# Patient Record
Sex: FEMALE | Race: OTHER | Employment: OTHER | URBAN - METROPOLITAN AREA
[De-identification: names, ages, dates, MRNs, and addresses within clinical notes are randomized per-mention and may not be internally consistent; named-entity substitution may affect disease eponyms.]

---

## 2022-09-30 ENCOUNTER — TELEPHONE (OUTPATIENT)
Dept: FAMILY MEDICINE CLINIC | Facility: CLINIC | Age: 52
End: 2022-09-30

## 2022-10-27 DIAGNOSIS — G40.901 STATUS EPILEPTICUS (HCC): ICD-10-CM

## 2022-10-27 RX ORDER — DIVALPROEX SODIUM 250 MG
750 TABLET, EXTENDED RELEASE 24 HR ORAL EVERY 12 HOURS
Qty: 180 TABLET | Refills: 5 | Status: SHIPPED | OUTPATIENT
Start: 2022-10-27 | End: 2022-11-10

## 2023-03-15 ENCOUNTER — TELEPHONE (OUTPATIENT)
Dept: NEUROLOGY | Facility: CLINIC | Age: 53
End: 2023-03-15

## 2023-03-15 NOTE — TELEPHONE ENCOUNTER
Nurse visit with pt and mother with  service used. Went through each medication and explained dosage, administration time(s) and indication. Pt and mother verbalized an understanding for all of the medications except Invega, which pt's mother was unaware was a psychiatric medication. She was also unaware that it was prescribed to her daughter and was unsure as to why. Explained to pt's mother that we did not prescribe this medication and she would have to contact the doctor for that information. She verbalized an understanding.    Total time spent with pt was >1 hour.

## 2023-04-02 RX ORDER — SODIUM CHLORIDE, SODIUM LACTATE, POTASSIUM CHLORIDE, CALCIUM CHLORIDE 600; 310; 30; 20 MG/100ML; MG/100ML; MG/100ML; MG/100ML
50 INJECTION, SOLUTION INTRAVENOUS CONTINUOUS
OUTPATIENT
Start: 2023-04-02

## 2023-04-17 DIAGNOSIS — R56.9 SEIZURE (HCC): ICD-10-CM

## 2023-04-17 DIAGNOSIS — G40.919 BREAKTHROUGH SEIZURE (HCC): ICD-10-CM

## 2023-04-17 RX ORDER — LACOSAMIDE 150 MG/1
150 TABLET ORAL EVERY 12 HOURS SCHEDULED
Qty: 60 TABLET | Refills: 5 | Status: SHIPPED | OUTPATIENT
Start: 2023-04-17 | End: 2023-07-26 | Stop reason: SDUPTHER

## 2023-04-17 RX ORDER — DIVALPROEX SODIUM 250 MG/1
750 TABLET, FILM COATED, EXTENDED RELEASE ORAL EVERY MORNING
Qty: 90 TABLET | Refills: 5 | Status: SHIPPED | OUTPATIENT
Start: 2023-04-17 | End: 2023-05-09

## 2023-04-17 RX ORDER — DIVALPROEX SODIUM 500 MG/1
1000 TABLET, FILM COATED, EXTENDED RELEASE ORAL
Qty: 60 TABLET | Refills: 5 | Status: SHIPPED | OUTPATIENT
Start: 2023-04-17 | End: 2023-05-09

## 2023-04-17 NOTE — TELEPHONE ENCOUNTER
Recv'd call from chiara Vogel. Was calling in refills for patient as she is [place]e speaking only and patient is almost out of medications.  CB# [phone]    LOV 3/15/23 and next appointment is 5/18/23.    Please refill if agreeable. Requested refills go to [facility]

## 2023-07-17 ENCOUNTER — HOSPITAL ENCOUNTER (EMERGENCY)
Facility: HOSPITAL | Age: 53
Discharge: HOME | End: 2023-07-17
Attending: EMERGENCY MEDICINE | Admitting: EMERGENCY MEDICINE
Payer: MEDICARE

## 2023-07-17 VITALS
DIASTOLIC BLOOD PRESSURE: 58 MMHG | TEMPERATURE: 98 F | OXYGEN SATURATION: 93 % | RESPIRATION RATE: 18 BRPM | SYSTOLIC BLOOD PRESSURE: 109 MMHG | HEART RATE: 102 BPM

## 2023-07-17 DIAGNOSIS — S62.609A FINGER FRACTURE, LEFT: Primary | ICD-10-CM

## 2023-07-17 PROCEDURE — 99284 EMERGENCY DEPT VISIT MOD MDM: CPT | Performed by: EMERGENCY MEDICINE

## 2023-07-17 PROCEDURE — 99283 EMERGENCY DEPT VISIT LOW MDM: CPT

## 2023-07-17 RX ORDER — IBUPROFEN 600 MG/1
600 TABLET ORAL EVERY 6 HOURS PRN
Qty: 20 TABLET | Refills: 0 | Status: SHIPPED | OUTPATIENT
Start: 2023-07-17 | End: 2023-08-03

## 2023-07-17 NOTE — ED PROVIDER NOTES
History  Chief Complaint   Patient presents with   • Arm Pain     Left arm pain. Reports she did not received the two phone calls that ortho made to patient. Pt with continued left hand pain , pt has not seen ortho      Hand Pain  Location:  Left 3rd adn 4th fingers  Severity:  Moderate  Timing:  Constant  Progression:  Unchanged  Chronicity:  Recurrent  Associated symptoms: no abdominal pain        Prior to Admission Medications   Prescriptions Last Dose Informant Patient Reported? Taking?   atorvastatin (LIPITOR) 80 mg tablet  Self, Child No No   Sig: Take 1 tablet (80 mg total) by mouth daily with dinner   divalproex sodium (DEPAKOTE ER) 250 mg 24 hr tablet   No No   Sig: TAKE 3 TABLETS(750 MG) BY MOUTH EVERY MORNING   divalproex sodium (DEPAKOTE ER) 500 mg 24 hr tablet   No No   Sig: TAKE 2 TABLETS(1000 MG) BY MOUTH DAILY AT BEDTIME   folic acid (FOLVITE) 1 mg tablet  Self, Child No No   Sig: Take 1 tablet (1 mg total) by mouth daily Do not start before February 7, 2023.   hydrocortisone 1 % cream   No No   Sig: Apply to affected area 2 times daily   ketoconazole (NIZORAL) 2 % shampoo   Yes No   lacosamide (VIMPAT) 150 mg tablet   No No   Sig: Take 1 tablet (150 mg total) by mouth every 12 (twelve) hours   lacosamide (Vimpat) 50 mg   No No   Sig: Take one tablet by mouth at night.    levothyroxine 150 mcg tablet   No No   Sig: Take 1 tablet (150 mcg total) by mouth daily in the early morning   metoprolol tartrate (LOPRESSOR) 25 mg tablet  Self, Child No No   Sig: Take 1 tablet (25 mg total) by mouth every 12 (twelve) hours   naproxen (EC NAPROSYN) 500 MG EC tablet   No No   Sig: Take 1 tablet (500 mg total) by mouth 2 (two) times a day with meals   niacin (SLO-NIACIN) 500 mg tablet  Self, Child No No   Sig: Take 2 tablets (1,000 mg total) by mouth 2 (two) times a day with meals   paliperidone (INVEGA) 3 mg 24 hr tablet   No No   Sig: Take 1 tablet (3 mg total) by mouth every morning Facility-Administered Medications: None       Past Medical History:   Diagnosis Date   • Cancer ([address])     thyroid cancer   • Disease of thyroid gland    • Hyperactivity (behavior)     Last Assessed: 11/7/2014    • Hyperlipidemia    • Obesity    • Seizures ([address])        Past Surgical History:   Procedure Laterality Date   • OTHER SURGICAL HISTORY      Removal of urinary calculus    • OTHER SURGICAL HISTORY      Rhinologic Surgery    • TOTAL THYROIDECTOMY      LAst Assessed: 11/7/2014       Family History   Problem Relation Age of Onset   • Breast cancer Mother 61   • Hypertension Mother    • ALS Mother    • Stroke Mother    • Prostate cancer Father    • Diabetes type II Father      I have reviewed and agree with the history as documented. E-Cigarette/Vaping   • E-Cigarette Use Never User      E-Cigarette/Vaping Substances   • Nicotine No    • THC No    • CBD No    • Flavoring No    • Other No    • Unknown No      Social History     Tobacco Use   • Smoking status: Never   • Smokeless tobacco: Never   Vaping Use   • Vaping Use: Never used   Substance Use Topics   • Alcohol use: Never   • Drug use: Never       Review of Systems   Constitutional: Negative. HENT: Negative. Eyes: Negative. Respiratory: Negative. Cardiovascular: Negative. Gastrointestinal: Negative. Negative for abdominal pain. Endocrine: Negative. Genitourinary: Negative. Musculoskeletal: Negative. Skin: Negative. Allergic/Immunologic: Negative. Neurological: Negative. Hematological: Negative. Psychiatric/Behavioral: Negative. All other systems reviewed and are negative. Physical Exam  Physical Exam  Vitals and nursing note reviewed. Constitutional:       Appearance: Normal appearance. She is normal weight. Comments: Pt splint intact and in good shape, new ace bandage applied uses original splint    HENT:      Head: Normocephalic and atraumatic.    Cardiovascular:      Rate and Rhythm: Normal rate and regular rhythm. Pulses: Normal pulses. Heart sounds: Normal heart sounds. Pulmonary:      Effort: Pulmonary effort is normal.      Breath sounds: Normal breath sounds. Abdominal:      General: Abdomen is flat. Bowel sounds are normal.      Palpations: Abdomen is soft. Musculoskeletal:      Comments: Left hand 3rd and 4th fingers pain and swelling   From wnl  Distal neuro and vascular wnl  Cap refll less than 2 secs    Skin:     General: Skin is warm. Capillary Refill: Capillary refill takes less than 2 seconds. Neurological:      General: No focal deficit present. Mental Status: She is alert and oriented to person, place, and time. Psychiatric:         Mood and Affect: Mood normal.         Vital Signs  ED Triage Vitals [07/17/23 1246]   Temperature Pulse Respirations Blood Pressure SpO2   98 °F (36.7 °C) 102 18 109/58 93 %      Temp Source Heart Rate Source Patient Position - Orthostatic VS BP Location FiO2 (%)   Tympanic Monitor -- Right arm --      Pain Score       --           Vitals:    07/17/23 1246   BP: 109/58   Pulse: 102         Visual Acuity      ED Medications  Medications - No data to display    Diagnostic Studies  Results Reviewed     None                 No orders to display              Procedures  Procedures         ED Course                                             MDM    Disposition  Final diagnoses:   Finger fracture, left     Time reflects when diagnosis was documented in both MDM as applicable and the Disposition within this note     Time User Action Codes Description Comment    7/17/2023  1:12 PM Michelle Lea. Add [F52.522V] Finger fracture, left       ED Disposition     ED Disposition   Discharge    Condition   Stable    Date/Time   Mon Jul 17, 2023  1:12 PM    Comment   Judith Gandhi discharge to home/self care.                Follow-up Information     Follow up With Specialties Details Why Contact Info Additional Information    [facility] Special[facility] Orthopedic Surgery   [place]  [facility] [address] [facility],[address] [address] [phone] [facility] Orthopedic Care Specialist [facility], [address] [place] [address], [id number] [phone]          Discharge Medication List as of 7/17/2023  1:13 PM      START taking these medications    Details   ibuprofen (MOTRIN) 600 mg tablet Take 1 tablet (600 mg total) by mouth every 6 (six) hours as needed for moderate pain, Starting Mon 7/17/2023, Print         CONTINUE these medications which have NOT CHANGED    Details   atorvastatin (LIPITOR) 80 mg tablet Take 1 tablet (80 mg total) by mouth daily with dinner, Starting Mon 2/6/2023, Normal      !! divalproex sodium (DEPAKOTE ER) 250 mg 24 hr tablet TAKE 3 TABLETS(750 MG) BY MOUTH EVERY MORNING, Normal      !! divalproex sodium (DEPAKOTE ER) 500 mg 24 hr tablet TAKE 2 TABLETS(1000 MG) BY MOUTH DAILY AT BEDTIME, Normal      folic acid (FOLVITE) 1 mg tablet Take 1 tablet (1 mg total) by mouth daily Do not start before February 7, 2023., Starting Tue 2/7/2023, Normal      hydrocortisone 1 % cream Apply to affected area 2 times daily, Normal      ketoconazole (NIZORAL) 2 % shampoo Starting Wed 5/10/2023, Historical Med      !! lacosamide (VIMPAT) 150 mg tablet Take 1 tablet (150 mg total) by mouth every 12 (twelve) hours, Starting Mon 4/17/2023, Normal      !! lacosamide (Vimpat) 50 mg Take one tablet by mouth at night., Normal      levothyroxine 150 mcg tablet Take 1 tablet (150 mcg total) by mouth daily in the early morning, Starting Mon 7/3/2023, Until Wed 8/2/2023, Print      metoprolol tartrate (LOPRESSOR) 25 mg tablet Take 1 tablet (25 mg total) by mouth every 12 (twelve) hours, Starting Mon 2/6/2023, Normal      naproxen (EC NAPROSYN) 500 MG EC tablet Take 1 tablet (500 mg total) by mouth 2 (two) times a day with meals, Starting Wed 7/5/2023, Until Thu 7/4/2024, Normal      niacin (SLO-NIACIN) 500 mg tablet Take 2 tablets (1,000 mg total) by mouth 2 (two) times a day with meals, Starting Mon 2/6/2023, Normal      paliperidone (INVEGA) 3 mg 24 hr tablet Take 1 tablet (3 mg total) by mouth every morning, Starting Thu 6/8/2023, Until Wed 9/6/2023, Normal       !! - Potential duplicate medications found. Please discuss with provider. No discharge procedures on file.     PDMP Review       Value Time User    PDMP Reviewed  Yes 2/7/2023 10:18 AM [employer] DO          ED Provider  Electronically Signed by           Kennedy Toscano., SYLVIE  07/17/23 9579

## 2023-07-20 ENCOUNTER — TELEPHONE (OUTPATIENT)
Dept: OBGYN CLINIC | Facility: HOSPITAL | Age: 53
End: 2023-07-20

## 2023-07-20 NOTE — TELEPHONE ENCOUNTER
Caller: Joana Martinez PT     Doctor/Office: Juan Alberto Mckinley    CB#: [phone]    What needs to be faxed: needs Physical Therapy script faxed over.  Patient in the office     ATTN to: [place]    Fax#: [phone]

## 2023-07-29 ENCOUNTER — HOSPITAL ENCOUNTER (EMERGENCY)
Facility: HOSPITAL | Age: 53
Discharge: HOME | End: 2023-07-29
Attending: EMERGENCY MEDICINE
Payer: MEDICARE

## 2023-07-29 VITALS
RESPIRATION RATE: 20 BRPM | BODY MASS INDEX: 34.09 KG/M2 | HEART RATE: 104 BPM | WEIGHT: 224.21 LBS | SYSTOLIC BLOOD PRESSURE: 114 MMHG | TEMPERATURE: 98.1 F | OXYGEN SATURATION: 93 % | DIASTOLIC BLOOD PRESSURE: 77 MMHG

## 2023-07-29 DIAGNOSIS — R56.9 SEIZURE-LIKE ACTIVITY (HCC): ICD-10-CM

## 2023-07-29 DIAGNOSIS — R25.1 TREMOR: Primary | ICD-10-CM

## 2023-07-29 LAB
ANION GAP SERPL CALCULATED.3IONS-SCNC: 8 MMOL/L
BUN SERPL-MCNC: 25 MG/DL (ref 5–25)
CALCIUM SERPL-MCNC: 9.2 MG/DL (ref 8.4–10.2)
CHLORIDE SERPL-SCNC: 100 MMOL/L (ref 96–108)
CO2 SERPL-SCNC: 28 MMOL/L (ref 21–32)
CREAT SERPL-MCNC: 0.98 MG/DL (ref 0.6–1.3)
GFR SERPL CREATININE-BSD FRML MDRD: 67 ML/MIN/1.73SQ M
GLUCOSE SERPL-MCNC: 94 MG/DL (ref 65–140)
POTASSIUM SERPL-SCNC: 4.1 MMOL/L (ref 3.5–5.3)
SODIUM SERPL-SCNC: 136 MMOL/L (ref 135–147)
VALPROATE SERPL-MCNC: 64 UG/ML (ref 50–100)

## 2023-07-29 PROCEDURE — 36415 COLL VENOUS BLD VENIPUNCTURE: CPT | Performed by: EMERGENCY MEDICINE

## 2023-07-29 PROCEDURE — 80048 BASIC METABOLIC PNL TOTAL CA: CPT | Performed by: EMERGENCY MEDICINE

## 2023-07-29 PROCEDURE — 99284 EMERGENCY DEPT VISIT MOD MDM: CPT | Performed by: EMERGENCY MEDICINE

## 2023-07-29 PROCEDURE — 80164 ASSAY DIPROPYLACETIC ACD TOT: CPT | Performed by: EMERGENCY MEDICINE

## 2023-07-29 PROCEDURE — 99285 EMERGENCY DEPT VISIT HI MDM: CPT

## 2023-07-29 NOTE — ED PROVIDER NOTES
History  Chief Complaint   Patient presents with   • Medical Problem     Patient arrives with EMS; per EMS patient was walking with her cousin and starting having tremors to b/l arms and legs, hx of seizures, cousin called 46. Patient arrives stating she felt a pain in her abdomen and almost fell but the cousin caught her. Patient says she takes medications for seizures, took today, A&O during triage     HPI  Patient is a 29-year-old female with history of epilepsy on Depakote, lacosamide presenting with seizure-like activity. Patient was walking home with her cousin when she complained of some belly discomfort and felt weak and required her cousin's assistance. While patient's cousin was holding her patient started having some tremors in her upper extremities and that is when EMS was called. Per witness patient did not have any loss of consciousness. Patient also reports no loss of consciousness. Currently states that she is asymptomatic. Denies any abdominal pain. No weakness or numbness. Patient is alert oriented x3 with no signs of postictal state. Patient denies any fever, chills, nausea, vomiting, diarrhea. Per EMS patient's glucose was above 200. States that patient has been taking her seizure medications as prescribed. This was confirmed by her mother who make sure that patient takes her antiseizure medications. Prior to Admission Medications   Prescriptions Last Dose Informant Patient Reported?  Taking?   atorvastatin (LIPITOR) 80 mg tablet  Self, Child, Mother No No   Sig: Take 1 tablet (80 mg total) by mouth daily with dinner   divalproex sodium (DEPAKOTE ER) 250 mg 24 hr tablet   No No   Sig: Take 3 tablets (750 mg total) by mouth every morning   divalproex sodium (DEPAKOTE ER) 500 mg 24 hr tablet   No No   Sig: Take 2 tablets (1,000 mg total) by mouth daily at bedtime   folic acid (FOLVITE) 1 mg tablet  Self, Child, Mother No No   Sig: Take 1 tablet (1 mg total) by mouth daily Do not start before February 7, 2023.   hydrocortisone 1 % cream  Mother No No   Sig: Apply to affected area 2 times daily   ibuprofen (MOTRIN) 600 mg tablet  Mother No No   Sig: Take 1 tablet (600 mg total) by mouth every 6 (six) hours as needed for moderate pain   ketoconazole (NIZORAL) 2 % shampoo  Mother Yes No   lacosamide (VIMPAT) 150 mg tablet   No No   Sig: Take 1 tablet (150 mg total) by mouth every 12 (twelve) hours   lacosamide (Vimpat) 50 mg   No No   Sig: Take 1 tablet (50 mg total) by mouth daily at bedtime   levothyroxine 150 mcg tablet  Mother No No   Sig: Take 1 tablet (150 mcg total) by mouth daily in the early morning   metoprolol tartrate (LOPRESSOR) 25 mg tablet  Self, Child, Mother No No   Sig: Take 1 tablet (25 mg total) by mouth every 12 (twelve) hours   naproxen (EC NAPROSYN) 500 MG EC tablet  Mother No No   Sig: Take 1 tablet (500 mg total) by mouth 2 (two) times a day with meals   niacin (SLO-NIACIN) 500 mg tablet  Self, Child, Mother No No   Sig: Take 2 tablets (1,000 mg total) by mouth 2 (two) times a day with meals   paliperidone (INVEGA) 3 mg 24 hr tablet  Mother No No   Sig: Take 1 tablet (3 mg total) by mouth every morning      Facility-Administered Medications: None       Past Medical History:   Diagnosis Date   • Cancer ([address])     thyroid cancer   • Disease of thyroid gland    • Hyperactivity (behavior)     Last Assessed: 11/7/2014    • Hyperlipidemia    • Obesity    • Seizures ([facility])        Past Surgical History:   Procedure Laterality Date   • OTHER SURGICAL HISTORY      Removal of urinary calculus    • OTHER SURGICAL HISTORY      Rhinologic Surgery    • TOTAL THYROIDECTOMY      LAst Assessed: 11/7/2014       Family History   Problem Relation Age of Onset   • Breast cancer Mother 61   • Hypertension Mother    • ALS Mother    • Stroke Mother    • Prostate cancer Father    • Diabetes type II Father      I have reviewed and agree with the history as documented.     E-Cigarette/Vaping   • E-Cigarette Use Never User      E-Cigarette/Vaping Substances   • Nicotine No    • THC No    • CBD No    • Flavoring No    • Other No    • Unknown No      Social History     Tobacco Use   • Smoking status: Never   • Smokeless tobacco: Never   Vaping Use   • Vaping Use: Never used   Substance Use Topics   • Alcohol use: Never   • Drug use: Never       Review of Systems   Constitutional: Negative for chills, diaphoresis, fever and unexpected weight change. HENT: Negative for ear pain and sore throat. Eyes: Negative for visual disturbance. Respiratory: Negative for cough, chest tightness and shortness of breath. Cardiovascular: Negative for chest pain and leg swelling. Gastrointestinal: Negative for abdominal distention, abdominal pain, constipation, diarrhea, nausea and vomiting. Endocrine: Negative. Genitourinary: Negative for difficulty urinating and dysuria. Musculoskeletal: Negative. Skin: Negative. Allergic/Immunologic: Negative. Neurological: Negative. Hematological: Negative. Psychiatric/Behavioral: Negative. All other systems reviewed and are negative. Physical Exam  Physical Exam  Vitals and nursing note reviewed. Constitutional:       General: She is not in acute distress. Appearance: Normal appearance. She is not ill-appearing. HENT:      Head: Normocephalic and atraumatic. Right Ear: External ear normal.      Left Ear: External ear normal.      Nose: Nose normal.      Mouth/Throat:      Mouth: Mucous membranes are moist.      Pharynx: Oropharynx is clear. Eyes:      General: No scleral icterus. Right eye: No discharge. Left eye: No discharge. Extraocular Movements: Extraocular movements intact. Conjunctiva/sclera: Conjunctivae normal.      Pupils: Pupils are equal, round, and reactive to light. Cardiovascular:      Rate and Rhythm: Normal rate and regular rhythm. Pulses: Normal pulses.       Heart sounds: Normal heart sounds. Pulmonary:      Effort: Pulmonary effort is normal.      Breath sounds: Normal breath sounds. Abdominal:      General: Abdomen is flat. Bowel sounds are normal. There is no distension. Palpations: Abdomen is soft. Tenderness: There is no abdominal tenderness. There is no guarding or rebound. Musculoskeletal:         General: Normal range of motion. Cervical back: Normal range of motion and neck supple. Skin:     General: Skin is warm and dry. Capillary Refill: Capillary refill takes less than 2 seconds. Neurological:      General: No focal deficit present. Mental Status: She is alert and oriented to person, place, and time. Mental status is at baseline. Psychiatric:         Mood and Affect: Mood normal.         Behavior: Behavior normal.         Thought Content:  Thought content normal.         Judgment: Judgment normal.         Vital Signs  ED Triage Vitals [07/29/23 1137]   Temperature Pulse Respirations Blood Pressure SpO2   98.1 °F (36.7 °C) 104 20 114/77 93 %      Temp Source Heart Rate Source Patient Position - Orthostatic VS BP Location FiO2 (%)   Oral Monitor Lying Left arm --      Pain Score       --           Vitals:    07/29/23 1137   BP: 114/77   Pulse: 104   Patient Position - Orthostatic VS: Lying         Visual Acuity      ED Medications  Medications - No data to display    Diagnostic Studies  Results Reviewed     Procedure Component Value Units Date/Time    Valproic acid level, total [[id number]]  (Normal) Collected: 07/29/23 1200    Lab Status: Final result Specimen: Blood from Arm, Right Updated: 07/29/23 1246     Valproic Acid, Total 64 ug/mL     Basic metabolic panel [[id number]] Collected: 07/29/23 1200    Lab Status: Final result Specimen: Blood from Arm, Right Updated: 07/29/23 1238     Sodium 136 mmol/L      Potassium 4.1 mmol/L      Chloride 100 mmol/L      CO2 28 mmol/L      ANION GAP 8 mmol/L      BUN 25 mg/dL      Creatinine 0.98 mg/dL Glucose 94 mg/dL      Calcium 9.2 mg/dL      eGFR 67 ml/min/1.73sq m     Narrative:      [place] guidelines for Chronic Kidney Disease (CKD):   •  Stage 1 with normal or high GFR (GFR > 90 mL/min/1.73 square meters)  •  Stage 2 Mild CKD (GFR = 60-89 mL/min/1.73 square meters)  •  Stage 3A Moderate CKD (GFR = 45-59 mL/min/1.73 square meters)  •  Stage 3B Moderate CKD (GFR = 30-44 mL/min/1.73 square meters)  •  Stage 4 Severe CKD (GFR = 15-29 mL/min/1.73 square meters)  •  Stage 5 End Stage CKD (GFR <15 mL/min/1.73 square meters)  Note: GFR calculation is accurate only with a steady state creatinine    Lacosamide [[id number]] Collected: 07/29/23 1200    Lab Status: In process Specimen: Blood from Arm, Right Updated: 07/29/23 1204                 No orders to display              Procedures  Procedures         ED Course                               SBIRT 20yo+    Flowsheet Row Most Recent Value   Initial Alcohol Screen: US AUDIT-C     1. How often do you have a drink containing alcohol? 0 Filed at: 07/29/2023 1141   2. How many drinks containing alcohol do you have on a typical day you are drinking? 0 Filed at: 07/29/2023 1141   3a. Male UNDER 65: How often do you have five or more drinks on one occasion? 0 Filed at: 07/29/2023 1141   3b. FEMALE Any Age, or MALE 65+: How often do you have 4 or more drinks on one occassion? 0 Filed at: 07/29/2023 1141   Audit-C Score 0 Filed at: 07/29/2023 1141   CATHY: How many times in the past year have you. .. Used an illegal drug or used a prescription medication for non-medical reasons? Never Filed at: 07/29/2023 1141                    Medical Decision Making   27-year-old female presenting with suppose it seizure-like activities  Patient is not postictal, and is not symptomatic on examination.   We will obtain blood work as well as drug levels for antiepileptics  Blood work is normal as well as normal glucose  Patient remained asymptomatic throughout her stay. We will plan for discharge with neurology follow-up  Return precautions provided    Seizure-like activity [facility]): acute illness or injury  Tremor: acute illness or injury  Amount and/or Complexity of Data Reviewed  Labs: ordered. Disposition  Final diagnoses:   Tremor   Seizure-like activity ([address])     Time reflects when diagnosis was documented in both MDM as applicable and the Disposition within this note     Time User Action Codes Description Comment    7/29/2023 12:55 PM Amari Daunt Add [R25.1] Tremor     7/29/2023 12:55 PM Amari Daunt Add [R56.9] Seizure-like activity [facility])       ED Disposition     ED Disposition   Discharge    Condition   Stable    Date/Time   Sat Jul 29, 2023 12:55 PM    Comment   Haseeb Lewis discharge to home/self care.                Follow-up Information     Follow up With Specialties Details Why Contact Info Additional [address] [facility] Emergency Medicine Go to  If symptoms worsen [address] [place]  [address] [address] [facility] Emergency Department          Discharge Medication List as of 7/29/2023 12:59 PM      CONTINUE these medications which have NOT CHANGED    Details   atorvastatin (LIPITOR) 80 mg tablet Take 1 tablet (80 mg total) by mouth daily with dinner, Starting Mon 2/6/2023, Normal      !! divalproex sodium (DEPAKOTE ER) 250 mg 24 hr tablet Take 3 tablets (750 mg total) by mouth every morning, Starting Wed 7/26/2023, Normal      !! divalproex sodium (DEPAKOTE ER) 500 mg 24 hr tablet Take 2 tablets (1,000 mg total) by mouth daily at bedtime, Starting Wed 8/68/6606, Normal      folic acid (FOLVITE) 1 mg tablet Take 1 tablet (1 mg total) by mouth daily Do not start before February 7, 2023., Starting Tue 2/7/2023, Normal      hydrocortisone 1 % cream Apply to affected area 2 times daily, Normal      ibuprofen (MOTRIN) 600 mg tablet Take 1 tablet (600 mg total) by mouth every 6 (six) hours as needed for moderate pain, Starting Mon 7/17/2023, Print      ketoconazole (NIZORAL) 2 % shampoo Starting Wed 5/10/2023, Historical Med      !! lacosamide (VIMPAT) 150 mg tablet Take 1 tablet (150 mg total) by mouth every 12 (twelve) hours, Starting Wed 7/26/2023, Normal      !! lacosamide (Vimpat) 50 mg Take 1 tablet (50 mg total) by mouth daily at bedtime, Starting Wed 7/26/2023, Normal      levothyroxine 150 mcg tablet Take 1 tablet (150 mcg total) by mouth daily in the early morning, Starting Mon 7/3/2023, Until Wed 8/2/2023, Print      metoprolol tartrate (LOPRESSOR) 25 mg tablet Take 1 tablet (25 mg total) by mouth every 12 (twelve) hours, Starting Mon 2/6/2023, Normal      naproxen (EC NAPROSYN) 500 MG EC tablet Take 1 tablet (500 mg total) by mouth 2 (two) times a day with meals, Starting Wed 7/5/2023, Until Thu 7/4/2024, Normal      niacin (SLO-NIACIN) 500 mg tablet Take 2 tablets (1,000 mg total) by mouth 2 (two) times a day with meals, Starting Mon 2/6/2023, Normal      paliperidone (INVEGA) 3 mg 24 hr tablet Take 1 tablet (3 mg total) by mouth every morning, Starting Thu 6/8/2023, Until Wed 9/6/2023, Normal       !! - Potential duplicate medications found. Please discuss with provider. No discharge procedures on file.     PDMP Review       Value Time User    PDMP Reviewed  Yes 2/7/2023 10:18 AM Clarice Baumann DO          ED Provider  Electronically Signed by           Hanna Hu MD  07/30/23 0091

## 2023-07-31 ENCOUNTER — HOSPITAL ENCOUNTER (OUTPATIENT)
Dept: RADIOLOGY | Facility: HOSPITAL | Age: 53
Discharge: HOME | End: 2023-07-31
Attending: STUDENT IN AN ORGANIZED HEALTH CARE EDUCATION/TRAINING PROGRAM
Payer: MEDICARE

## 2023-07-31 ENCOUNTER — CONSULT (OUTPATIENT)
Dept: OBGYN CLINIC | Facility: CLINIC | Age: 53
End: 2023-07-31
Payer: MEDICARE

## 2023-07-31 VITALS — WEIGHT: 224 LBS | BODY MASS INDEX: 33.95 KG/M2 | HEIGHT: 68 IN

## 2023-07-31 DIAGNOSIS — S62.92XA CLOSED FRACTURE OF LEFT HAND, INITIAL ENCOUNTER: Primary | ICD-10-CM

## 2023-07-31 DIAGNOSIS — S62.92XA CLOSED FRACTURE OF LEFT HAND, INITIAL ENCOUNTER: ICD-10-CM

## 2023-07-31 PROCEDURE — 73130 X-RAY EXAM OF HAND: CPT

## 2023-07-31 PROCEDURE — 99214 OFFICE O/P EST MOD 30 MIN: CPT | Performed by: STUDENT IN AN ORGANIZED HEALTH CARE EDUCATION/TRAINING PROGRAM

## 2023-07-31 NOTE — PROGRESS NOTES
ORTHOPAEDIC HAND, WRIST, AND ELBOW OFFICE  VISIT       ASSESSMENT/PLAN:      Diagnoses and all orders for this visit:    Closed fracture of left hand, initial encounter  -     XR hand 3+ vw left; Future  -     Ambulatory Referral to PT/OT Hand Therapy; Future  -     Case request operating room: CLOSED REDUCTION PERCUTANEOUS PINNING VERSUS OPEN REDUCTION W/ INTERNAL FIXATION (ORIF) - LEFT RING FINGER; Standing  -     Case request operating room: CLOSED REDUCTION PERCUTANEOUS PINNING VERSUS OPEN REDUCTION W/ INTERNAL FIXATION (ORIF) - LEFT RING FINGER    Other orders  -     Diet NPO; Sips with meds; Standing  -     Void on call to OR; Standing  -     Insert peripheral IV; Standing  -     ceFAZolin (ANCEF) 2,000 mg in dextrose 5 % 100 mL IVPB      50 y.o. female with fractures to left long and ring fingers, DOI 7/5/23. Treatment options and expected outcomes were discussed. Operative vs non operative options were discussed at length. The patient and her family verbalized understanding of exam findings and treatment plan. The patient was given the opportunity to ask questions. Questions were answered to the patient and her families satisfaction. We discussed that her cognitive abilities and delayed presentation put her at higher risk of postoperative complications. Discussed risks of complications including pain, bleeding, stiffness, infection, malrotation, need for further surgery, infection  The patient and mother decided to move forward operative treatment with CRPP versus ORIF of left ring finger proximal phalanx fracture via shared decision making. Follow Up:  2 weeks postop    To Do Next Visit:    Discussions:  Fracture Operative Treatment: The physiology of a fractured bone was discussed with the patient today. With non-displaced or minimally displaced fractures, conservative treatment such as casting or splinting often results in a functional recovery.   Typically, these fractures are immobilized in either a cast or splint depending on the pattern. Radiographs are typically taken at intervals throughout the fracture healing to ensure that reduction or alignment is not lost.  If the fracture loses its alignment, surgical intervention may be required to stabilize it. Medical conditions such as diabetes, osteoporosis, vitamin D deficiency, and a history of or exposure to smoking may delay or prevent fracture healing. Options between cast/splint immobilization and surgical treatment were offered and the risks and benefits of both were discussed. With displaced fractures, operative treatment often results in a functional recovery. Typically, these fractures undergo reduction either through percutaneous or open methods depending on the location and fracture pattern. Radiographs are typically taken at intervals throughout the fracture healing ensure maintenance of reduction and alignment. If the fracture loses its alignment, revision surgery may be required. Medical conditions such as diabetes, osteoporosis, vitamin D deficiency, and a history of or exposure to smoking may delay or prevent fracture healing. The risks and benefits of the procedure were explained to the patient, which include, but are not limited to: Bleeding, infection, recurrence, pain, scar, malunion, nonunion, damage to tendons, damage to nerves, and damage to blood vessels, and complications related to anesthesia, failure to give desire result, need for more surgery. These risks, along with alternative conservative treatment options, and postoperative protocols were voiced back and understood by the patient. All questions were answered to the patient's satisfaction. The patient agrees to comply with a standard postoperative protocol, and is willing to proceed. Education was provided via written and auditory forms. There were no barriers to learning.   Written handouts regarding wound care, incision and scar care, and general preoperative information was provided to the patient. Prior to surgery, the patient may be requested to stop all anti-inflammatory medications. Prophylactic aspirin, Plavix, and Coumadin may be allowed to be continued. Medications including vitamin E., ginkgo, and fish oil are requested to be stopped approximately one week prior to surgery. Hypertensive medications and beta blockers, if taken, should be continued. Natanael Mcdonnell MD  Attending, Orthopaedic Surgery  Hand, Wrist, and Elbow Surgery  [facility]  ____________________________________________________________________________________________________________________    CHIEF COMPLAINT:  Chief Complaint   Patient presents with   • Left Hand - Pain     SUBJECTIVE:  Patient is a 50 y.o. RHD female who presents today for evaluation and treatment of left long and ring finger fractures, DOI 7/5/23. She is accompanied by mother who assisted in history. Patient tripped and fell 3x onto left hand on uneven sidewalk. She was taken to ED via EMS and placed in ulnar gutter splint. Patient followed up with Sophie Conway PA-C on 7/20/23 and was placed in a splint. They  note patient has been splint compliant. She is still swollen and bruised and notes pain over the fracture sites. They asked how to to wash her hands without hurting her further. Patient denies any numbness and tingling. Patient has history of of TIA, schizoaffective disorder, cognitive delays, intellectual disability, and epilepsy. She lives with mom, who has POA. Patient presented to ER on 7/5/2023 and patient was instructed to follow-up with ortho. Patient did not schedule follow-up after initial ER visit and revisited ER on 7/17/2023, which patient was again recommended to follow-up with ortho. Patient seen by orthopedic urgent care on 7/20/23. Patient was scheduled follow-up after a few days with us, however, patient no showed her appt and presents today for follow-up.   Patient referred for evaluation by [facility] Fritz.     I have personally reviewed all the relevant PMH, PSH, SH, FH, Medications and allergies    PAST MEDICAL HISTORY:  Past Medical History:   Diagnosis Date   • Cancer ([address])     thyroid cancer   • Disease of thyroid gland    • Hyperactivity (behavior)     Last Assessed: 11/7/2014    • Hyperlipidemia    • Obesity    • Seizures ([address])      PAST SURGICAL HISTORY:  Past Surgical History:   Procedure Laterality Date   • OTHER SURGICAL HISTORY      Removal of urinary calculus    • OTHER SURGICAL HISTORY      Rhinologic Surgery    • TOTAL THYROIDECTOMY      LAst Assessed: 11/7/2014     FAMILY HISTORY:  Family History   Problem Relation Age of Onset   • Breast cancer Mother 61   • Hypertension Mother    • ALS Mother    • Stroke Mother    • Prostate cancer Father    • Diabetes type II Father      SOCIAL HISTORY:  Social History     Tobacco Use   • Smoking status: Never   • Smokeless tobacco: Never   Vaping Use   • Vaping Use: Never used   Substance Use Topics   • Alcohol use: Never   • Drug use: Never     MEDICATIONS:    Current Outpatient Medications:   •  atorvastatin (LIPITOR) 80 mg tablet, Take 1 tablet (80 mg total) by mouth daily with dinner, Disp: 30 tablet, Rfl: 1  •  divalproex sodium (DEPAKOTE ER) 250 mg 24 hr tablet, Take 3 tablets (750 mg total) by mouth every morning, Disp: 270 tablet, Rfl: 3  •  divalproex sodium (DEPAKOTE ER) 500 mg 24 hr tablet, Take 2 tablets (1,000 mg total) by mouth daily at bedtime, Disp: 180 tablet, Rfl: 3  •  folic acid (FOLVITE) 1 mg tablet, Take 1 tablet (1 mg total) by mouth daily Do not start before February 7, 2023., Disp: 30 tablet, Rfl: 1  •  hydrocortisone 1 % cream, Apply to affected area 2 times daily, Disp: 15 g, Rfl: 0  •  ibuprofen (MOTRIN) 600 mg tablet, Take 1 tablet (600 mg total) by mouth every 6 (six) hours as needed for moderate pain, Disp: 20 tablet, Rfl: 0  •  ketoconazole (NIZORAL) 2 % shampoo, , Disp: , Rfl:   •  lacosamide (VIMPAT) 150 mg tablet, Take 1 tablet (150 mg total) by mouth every 12 (twelve) hours, Disp: 60 tablet, Rfl: 5  •  lacosamide (Vimpat) 50 mg, Take 1 tablet (50 mg total) by mouth daily at bedtime, Disp: 30 tablet, Rfl: 5  •  levothyroxine 150 mcg tablet, Take 1 tablet (150 mcg total) by mouth daily in the early morning, Disp: 30 tablet, Rfl: 0  •  metoprolol tartrate (LOPRESSOR) 25 mg tablet, Take 1 tablet (25 mg total) by mouth every 12 (twelve) hours, Disp: 60 tablet, Rfl: 1  •  naproxen (EC NAPROSYN) 500 MG EC tablet, Take 1 tablet (500 mg total) by mouth 2 (two) times a day with meals, Disp: 20 tablet, Rfl: 0  •  niacin (SLO-NIACIN) 500 mg tablet, Take 2 tablets (1,000 mg total) by mouth 2 (two) times a day with meals, Disp: 60 tablet, Rfl: 1  •  paliperidone (INVEGA) 3 mg 24 hr tablet, Take 1 tablet (3 mg total) by mouth every morning, Disp: 30 tablet, Rfl: 2    ALLERGIES:  No Known Allergies    REVIEW OF SYSTEMS:  Musculoskeletal:        As noted in HPI. All other systems reviewed and are negative. VITALS:  There were no vitals filed for this visit.     LABS:  HgA1c:   Lab Results   Component Value Date    HGBA1C 5.3 08/19/2022     BMP:   Lab Results   Component Value Date    GLUCOSE 106 08/26/2013    CALCIUM 9.2 07/29/2023     06/19/2018    K 4.1 07/29/2023    CO2 28 07/29/2023     07/29/2023    BUN 25 07/29/2023    CREATININE 0.98 07/29/2023   ____________________________________________________  PHYSICAL EXAMINATION:  General: Well developed and well nourished, alert & oriented x 3, appears comfortable  Psychiatric: Normal  HEENT: Normocephalic, Atraumatic Trachea Midline, No torticollis  Pulmonary: No audible wheezing or respiratory distress   Abdomen/GI: Non tender, non distended   Cardiovascular: No pitting edema, 2+ radial pulse   Skin: No masses, erythema, lacerations, fluctation, ulcerations  Neurovascular: Sensation Intact to the Median, Ulnar, Radial Nerve, Motor Intact to the Median, Ulnar, Radial Nerve and Pulses Intact  Musculoskeletal: Normal, except as noted in detailed exam and in HPI.     MUSCULOSKELETAL EXAMINATION:  Left long and ring fingers:  Mild edema and ecchymosis  No erythema  Limited ROM secondary to pain and stiffness  TTP at fracture site  Brisk capillary refill  SILT  Ring finger: 5cm pulp to palm distance  Extensor lag at PIP joint 30 deg  ___________________________________________________  STUDIES REVIEWED:  Xrays of the left hand were reviewed and independently interpreted in PACS by Dr. Ana Crowder and demonstrate:  Long Finger P1 fx - 5 deg extension thru fracture site  Ring Finger P1 fx - 30 deg extension thru fracture site    PROCEDURES PERFORMED:  Procedures  No Procedures performed today  _____________________________________________________    Arvis Bowels    I,:  Silvano Hernandez am acting as a scribe while in the presence of the attending physician.:       I,:  Andi Ponce MD personally performed the services described in this documentation    as scribed in my presence.:

## 2023-07-31 NOTE — H&P (VIEW-ONLY)
ORTHOPAEDIC HAND, WRIST, AND ELBOW OFFICE  VISIT       ASSESSMENT/PLAN:      Diagnoses and all orders for this visit:    Closed fracture of left hand, initial encounter  -     XR hand 3+ vw left; Future  -     Ambulatory Referral to PT/OT Hand Therapy; Future  -     Case request operating room: CLOSED REDUCTION PERCUTANEOUS PINNING VERSUS OPEN REDUCTION W/ INTERNAL FIXATION (ORIF) - LEFT RING FINGER; Standing  -     Case request operating room: CLOSED REDUCTION PERCUTANEOUS PINNING VERSUS OPEN REDUCTION W/ INTERNAL FIXATION (ORIF) - LEFT RING FINGER    Other orders  -     Diet NPO; Sips with meds; Standing  -     Void on call to OR; Standing  -     Insert peripheral IV; Standing  -     ceFAZolin (ANCEF) 2,000 mg in dextrose 5 % 100 mL IVPB      50 y.o. female with fractures to left long and ring fingers, DOI 7/5/23. Treatment options and expected outcomes were discussed. Operative vs non operative options were discussed at length. The patient and her family verbalized understanding of exam findings and treatment plan. The patient was given the opportunity to ask questions. Questions were answered to the patient and her families satisfaction. We discussed that her cognitive abilities and delayed presentation put her at higher risk of postoperative complications. Discussed risks of complications including pain, bleeding, stiffness, infection, malrotation, need for further surgery, infection  The patient and mother decided to move forward operative treatment with CRPP versus ORIF of left ring finger proximal phalanx fracture via shared decision making. Follow Up:  2 weeks postop    To Do Next Visit:    Discussions:  Fracture Operative Treatment: The physiology of a fractured bone was discussed with the patient today. With non-displaced or minimally displaced fractures, conservative treatment such as casting or splinting often results in a functional recovery.   Typically, these fractures are immobilized in either a cast or splint depending on the pattern. Radiographs are typically taken at intervals throughout the fracture healing to ensure that reduction or alignment is not lost.  If the fracture loses its alignment, surgical intervention may be required to stabilize it. Medical conditions such as diabetes, osteoporosis, vitamin D deficiency, and a history of or exposure to smoking may delay or prevent fracture healing. Options between cast/splint immobilization and surgical treatment were offered and the risks and benefits of both were discussed. With displaced fractures, operative treatment often results in a functional recovery. Typically, these fractures undergo reduction either through percutaneous or open methods depending on the location and fracture pattern. Radiographs are typically taken at intervals throughout the fracture healing ensure maintenance of reduction and alignment. If the fracture loses its alignment, revision surgery may be required. Medical conditions such as diabetes, osteoporosis, vitamin D deficiency, and a history of or exposure to smoking may delay or prevent fracture healing. The risks and benefits of the procedure were explained to the patient, which include, but are not limited to: Bleeding, infection, recurrence, pain, scar, malunion, nonunion, damage to tendons, damage to nerves, and damage to blood vessels, and complications related to anesthesia, failure to give desire result, need for more surgery. These risks, along with alternative conservative treatment options, and postoperative protocols were voiced back and understood by the patient. All questions were answered to the patient's satisfaction. The patient agrees to comply with a standard postoperative protocol, and is willing to proceed. Education was provided via written and auditory forms. There were no barriers to learning.   Written handouts regarding wound care, incision and scar care, and general preoperative information was provided to the patient. Prior to surgery, the patient may be requested to stop all anti-inflammatory medications. Prophylactic aspirin, Plavix, and Coumadin may be allowed to be continued. Medications including vitamin E., ginkgo, and fish oil are requested to be stopped approximately one week prior to surgery. Hypertensive medications and beta blockers, if taken, should be continued. Paulo George MD  Attending, Orthopaedic Surgery  Hand, Wrist, and Elbow Surgery  [facility]  ____________________________________________________________________________________________________________________    CHIEF COMPLAINT:  Chief Complaint   Patient presents with   • Left Hand - Pain     SUBJECTIVE:  Patient is a 50 y.o. RHD female who presents today for evaluation and treatment of left long and ring finger fractures, DOI 7/5/23. She is accompanied by mother who assisted in history. Patient tripped and fell 3x onto left hand on uneven sidewalk. She was taken to ED via EMS and placed in ulnar gutter splint. Patient followed up with Carlos Hernandez PA-C on 7/20/23 and was placed in a splint. They  note patient has been splint compliant. She is still swollen and bruised and notes pain over the fracture sites. They asked how to to wash her hands without hurting her further. Patient denies any numbness and tingling. Patient has history of of TIA, schizoaffective disorder, cognitive delays, intellectual disability, and epilepsy. She lives with mom, who has POA. Patient presented to ER on 7/5/2023 and patient was instructed to follow-up with ortho. Patient did not schedule follow-up after initial ER visit and revisited ER on 7/17/2023, which patient was again recommended to follow-up with ortho. Patient seen by orthopedic urgent care on 7/20/23. Patient was scheduled follow-up after a few days with us, however, patient no showed her appt and presents today for follow-up.   Patient referred for evaluation by FANI Gonzales.     I have personally reviewed all the relevant PMH, PSH, SH, FH, Medications and allergies    PAST MEDICAL HISTORY:  Past Medical History:   Diagnosis Date   • Cancer ([address])     thyroid cancer   • Disease of thyroid gland    • Hyperactivity (behavior)     Last Assessed: 11/7/2014    • Hyperlipidemia    • Obesity    • Seizures ([address])      PAST SURGICAL HISTORY:  Past Surgical History:   Procedure Laterality Date   • OTHER SURGICAL HISTORY      Removal of urinary calculus    • OTHER SURGICAL HISTORY      Rhinologic Surgery    • TOTAL THYROIDECTOMY      LAst Assessed: 11/7/2014     FAMILY HISTORY:  Family History   Problem Relation Age of Onset   • Breast cancer Mother 61   • Hypertension Mother    • ALS Mother    • Stroke Mother    • Prostate cancer Father    • Diabetes type II Father      SOCIAL HISTORY:  Social History     Tobacco Use   • Smoking status: Never   • Smokeless tobacco: Never   Vaping Use   • Vaping Use: Never used   Substance Use Topics   • Alcohol use: Never   • Drug use: Never     MEDICATIONS:    Current Outpatient Medications:   •  atorvastatin (LIPITOR) 80 mg tablet, Take 1 tablet (80 mg total) by mouth daily with dinner, Disp: 30 tablet, Rfl: 1  •  divalproex sodium (DEPAKOTE ER) 250 mg 24 hr tablet, Take 3 tablets (750 mg total) by mouth every morning, Disp: 270 tablet, Rfl: 3  •  divalproex sodium (DEPAKOTE ER) 500 mg 24 hr tablet, Take 2 tablets (1,000 mg total) by mouth daily at bedtime, Disp: 180 tablet, Rfl: 3  •  folic acid (FOLVITE) 1 mg tablet, Take 1 tablet (1 mg total) by mouth daily Do not start before February 7, 2023., Disp: 30 tablet, Rfl: 1  •  hydrocortisone 1 % cream, Apply to affected area 2 times daily, Disp: 15 g, Rfl: 0  •  ibuprofen (MOTRIN) 600 mg tablet, Take 1 tablet (600 mg total) by mouth every 6 (six) hours as needed for moderate pain, Disp: 20 tablet, Rfl: 0  •  ketoconazole (NIZORAL) 2 % shampoo, , Disp: , Rfl:   •  lacosamide (VIMPAT) 150 mg tablet, Take 1 tablet (150 mg total) by mouth every 12 (twelve) hours, Disp: 60 tablet, Rfl: 5  •  lacosamide (Vimpat) 50 mg, Take 1 tablet (50 mg total) by mouth daily at bedtime, Disp: 30 tablet, Rfl: 5  •  levothyroxine 150 mcg tablet, Take 1 tablet (150 mcg total) by mouth daily in the early morning, Disp: 30 tablet, Rfl: 0  •  metoprolol tartrate (LOPRESSOR) 25 mg tablet, Take 1 tablet (25 mg total) by mouth every 12 (twelve) hours, Disp: 60 tablet, Rfl: 1  •  naproxen (EC NAPROSYN) 500 MG EC tablet, Take 1 tablet (500 mg total) by mouth 2 (two) times a day with meals, Disp: 20 tablet, Rfl: 0  •  niacin (SLO-NIACIN) 500 mg tablet, Take 2 tablets (1,000 mg total) by mouth 2 (two) times a day with meals, Disp: 60 tablet, Rfl: 1  •  paliperidone (INVEGA) 3 mg 24 hr tablet, Take 1 tablet (3 mg total) by mouth every morning, Disp: 30 tablet, Rfl: 2    ALLERGIES:  No Known Allergies    REVIEW OF SYSTEMS:  Musculoskeletal:        As noted in HPI. All other systems reviewed and are negative. VITALS:  There were no vitals filed for this visit.     LABS:  HgA1c:   Lab Results   Component Value Date    HGBA1C 5.3 08/19/2022     BMP:   Lab Results   Component Value Date    GLUCOSE 106 08/26/2013    CALCIUM 9.2 07/29/2023     06/19/2018    K 4.1 07/29/2023    CO2 28 07/29/2023     07/29/2023    BUN 25 07/29/2023    CREATININE 0.98 07/29/2023   ____________________________________________________  PHYSICAL EXAMINATION:  General: Well developed and well nourished, alert & oriented x 3, appears comfortable  Psychiatric: Normal  HEENT: Normocephalic, Atraumatic Trachea Midline, No torticollis  Pulmonary: No audible wheezing or respiratory distress   Abdomen/GI: Non tender, non distended   Cardiovascular: No pitting edema, 2+ radial pulse   Skin: No masses, erythema, lacerations, fluctation, ulcerations  Neurovascular: Sensation Intact to the Median, Ulnar, Radial Nerve, Motor Intact to the Median, Ulnar, Radial Nerve and Pulses Intact  Musculoskeletal: Normal, except as noted in detailed exam and in HPI.     MUSCULOSKELETAL EXAMINATION:  Left long and ring fingers:  Mild edema and ecchymosis  No erythema  Limited ROM secondary to pain and stiffness  TTP at fracture site  Brisk capillary refill  SILT  Ring finger: 5cm pulp to palm distance  Extensor lag at PIP joint 30 deg  ___________________________________________________  STUDIES REVIEWED:  Xrays of the left hand were reviewed and independently interpreted in PACS by Dr. Anabel Matthew and demonstrate:  Long Finger P1 fx - 5 deg extension thru fracture site  Ring Finger P1 fx - 30 deg extension thru fracture site    PROCEDURES PERFORMED:  Procedures  No Procedures performed today  _____________________________________________________    [facility]    I,:  Margarito Hernandez am acting as a scribe while in the presence of the attending physician.:       I,:  Josie Rivera MD personally performed the services described in this documentation    as scribed in my presence.:

## 2023-07-31 NOTE — LETTER
July 31, 2023     SYLVIE Arita    Patient: Debora Hill   YOB: 1970   Date of Visit: 7/31/2023       Dear Dr. Yenny Du:    Thank you for referring Debora Hill to me for evaluation. Below are my notes for this consultation. If you have questions, please do not hesitate to call me. I look forward to following your patient along with you. Sincerely,        Cat Lema MD        CC: No Recipients    Cat Lema MD  7/31/2023  9:45 PM  Incomplete  ORTHOPAEDIC HAND, WRIST, AND ELBOW OFFICE  VISIT       ASSESSMENT/PLAN:      Diagnoses and all orders for this visit:    Closed fracture of left hand, initial encounter  -     XR hand 3+ vw left; Future  -     Ambulatory Referral to PT/OT Hand Therapy; Future      50 y.o. female with fractures to left long and ring fingers, DOI 7/5/23. Treatment options and expected outcomes were discussed. Operative vs non operative options were discussed at length. The patient and her family verbalized understanding of exam findings and treatment plan. The patient was given the opportunity to ask questions. Questions were answered to the patient and her families satisfaction. The patient decided to move forward with non operative treatment via shared decision making. They are aware that we can revisit surgical options at a later date if needed. Order was placed for PT/OT, working on AROM as tolerated and hold off on Passive motion for 3 weeks. Patient will continue in splint at this time. Can begin weaning out of splint in 2 weeks. NSAIDs/Tylenol, ice, and rest as needed for pain relief. Follow Up:  3 weeks     To Do Next Visit:  Re-evaluation of current issue and X-rays of the  left  Hand, Dr. Patt Sanchez to review    Discussions:  Fracture - Nonoperative Care: The physiology of a fractured bone was discussed with the patient today.   With non-displaced or minimally displaced fractures, conservative treatment such as casting or splinting often results in a functional recovery. Typically, these fractures are immobilized in either a cast or splint depending on the pattern. Radiographs are typically taken at intervals throughout the fracture healing to ensure that reduction or alignment is not lost.  If the fracture loses its alignment, surgical intervention may be required to stabilize it. Medical conditions such as diabetes, osteoporosis, vitamin D deficiency, and a history of or exposure to smoking may delay or prevent fracture healing. Options between cast/splint immobilization and surgical treatment were offered and the risks and benefits of both were discussed. Nany Lopez MD  Attending, Orthopaedic Surgery  Hand, Wrist, and Elbow Surgery  [facility]  ____________________________________________________________________________________________________________________    CHIEF COMPLAINT:  Chief Complaint   Patient presents with   • Left Hand - Pain     SUBJECTIVE:  Patient is a 50 y.o. RHD female who presents today for evaluation and treatment of left long and ring finger fractures, DOI 7/5/23. She is accompanied by mother who assisted in history. Patient tripped and fell 3x onto left hand on uneven sidewalk. She was taken to ED via EMS and placed in ulnar gutter splint. Patient followed up with Keri Cheng PA-C on 7/20/23 and was placed in a splint. They  note patient has been splint compliant. She is still swollen and bruised and notes pain over the fracture sites. They asked how to to wash her hands without hurting her further. Patient denies any numbness and tingling. Patient has history of of TIA, schizoaffective disorder, cognitive delays, intellectual disability, and epilepsy. She lives with mom, who has POA.     I have personally reviewed all the relevant PMH, PSH, SH, FH, Medications and allergies    PAST MEDICAL HISTORY:  Past Medical History: Diagnosis Date   • Cancer [facility])     thyroid cancer   • Disease of thyroid gland    • Hyperactivity (behavior)     Last Assessed: 11/7/2014    • Hyperlipidemia    • Obesity    • Seizures ([address])      PAST SURGICAL HISTORY:  Past Surgical History:   Procedure Laterality Date   • OTHER SURGICAL HISTORY      Removal of urinary calculus    • OTHER SURGICAL HISTORY      Rhinologic Surgery    • TOTAL THYROIDECTOMY      LAst Assessed: 11/7/2014     FAMILY HISTORY:  Family History   Problem Relation Age of Onset   • Breast cancer Mother 61   • Hypertension Mother    • ALS Mother    • Stroke Mother    • Prostate cancer Father    • Diabetes type II Father      SOCIAL HISTORY:  Social History     Tobacco Use   • Smoking status: Never   • Smokeless tobacco: Never   Vaping Use   • Vaping Use: Never used   Substance Use Topics   • Alcohol use: Never   • Drug use: Never     MEDICATIONS:    Current Outpatient Medications:   •  atorvastatin (LIPITOR) 80 mg tablet, Take 1 tablet (80 mg total) by mouth daily with dinner, Disp: 30 tablet, Rfl: 1  •  divalproex sodium (DEPAKOTE ER) 250 mg 24 hr tablet, Take 3 tablets (750 mg total) by mouth every morning, Disp: 270 tablet, Rfl: 3  •  divalproex sodium (DEPAKOTE ER) 500 mg 24 hr tablet, Take 2 tablets (1,000 mg total) by mouth daily at bedtime, Disp: 180 tablet, Rfl: 3  •  folic acid (FOLVITE) 1 mg tablet, Take 1 tablet (1 mg total) by mouth daily Do not start before February 7, 2023., Disp: 30 tablet, Rfl: 1  •  hydrocortisone 1 % cream, Apply to affected area 2 times daily, Disp: 15 g, Rfl: 0  •  ibuprofen (MOTRIN) 600 mg tablet, Take 1 tablet (600 mg total) by mouth every 6 (six) hours as needed for moderate pain, Disp: 20 tablet, Rfl: 0  •  ketoconazole (NIZORAL) 2 % shampoo, , Disp: , Rfl:   •  lacosamide (VIMPAT) 150 mg tablet, Take 1 tablet (150 mg total) by mouth every 12 (twelve) hours, Disp: 60 tablet, Rfl: 5  •  lacosamide (Vimpat) 50 mg, Take 1 tablet (50 mg total) by mouth daily at bedtime, Disp: 30 tablet, Rfl: 5  •  levothyroxine 150 mcg tablet, Take 1 tablet (150 mcg total) by mouth daily in the early morning, Disp: 30 tablet, Rfl: 0  •  metoprolol tartrate (LOPRESSOR) 25 mg tablet, Take 1 tablet (25 mg total) by mouth every 12 (twelve) hours, Disp: 60 tablet, Rfl: 1  •  naproxen (EC NAPROSYN) 500 MG EC tablet, Take 1 tablet (500 mg total) by mouth 2 (two) times a day with meals, Disp: 20 tablet, Rfl: 0  •  niacin (SLO-NIACIN) 500 mg tablet, Take 2 tablets (1,000 mg total) by mouth 2 (two) times a day with meals, Disp: 60 tablet, Rfl: 1  •  paliperidone (INVEGA) 3 mg 24 hr tablet, Take 1 tablet (3 mg total) by mouth every morning, Disp: 30 tablet, Rfl: 2    ALLERGIES:  No Known Allergies    REVIEW OF SYSTEMS:  Musculoskeletal:        As noted in HPI. All other systems reviewed and are negative. VITALS:  There were no vitals filed for this visit. LABS:  HgA1c:   Lab Results   Component Value Date    HGBA1C 5.3 08/19/2022     BMP:   Lab Results   Component Value Date    GLUCOSE 106 08/26/2013    CALCIUM 9.2 07/29/2023     06/19/2018    K 4.1 07/29/2023    CO2 28 07/29/2023     07/29/2023    BUN 25 07/29/2023    CREATININE 0.98 07/29/2023   ____________________________________________________  PHYSICAL EXAMINATION:  General: Well developed and well nourished, alert & oriented x 3, appears comfortable  Psychiatric: Normal  HEENT: Normocephalic, Atraumatic Trachea Midline, No torticollis  Pulmonary: No audible wheezing or respiratory distress   Abdomen/GI: Non tender, non distended   Cardiovascular: No pitting edema, 2+ radial pulse   Skin: No masses, erythema, lacerations, fluctation, ulcerations  Neurovascular: Sensation Intact to the Median, Ulnar, Radial Nerve, Motor Intact to the Median, Ulnar, Radial Nerve and Pulses Intact  Musculoskeletal: Normal, except as noted in detailed exam and in HPI.     MUSCULOSKELETAL EXAMINATION:  Left long and ring fingers:  Mild edema and ecchymosis  No erythema  Limited ROM secondary to pain and stiffness  TTP at fracture site  Brisk capillary refill  SILT  Ring finger: 5cm pulp to palm distance  Extensor lag at PIP joint 30 deg  ___________________________________________________  STUDIES REVIEWED:  Xrays of the left hand were reviewed and independently interpreted in PACS by Dr. Claire Watson and demonstrate:  Long Finger - 5 deg extension thru fracture site  Ring Finger - 30 deg extension thru fracture site    PROCEDURES PERFORMED:  Procedures  No Procedures performed today  _____________________________________________________    Gagandeep Beasley      I,:  Monique Hernandez am acting as a scribe while in the presence of the attending physician.:       I,:  Beth Ward MD personally performed the services described in this documentation    as scribed in my presence.:

## 2023-08-01 ENCOUNTER — ANESTHESIA EVENT (OUTPATIENT)
Dept: PERIOP | Facility: AMBULARY SURGERY CENTER | Age: 53
End: 2023-08-01
Payer: MEDICARE

## 2023-08-02 NOTE — ANESTHESIA PREPROCEDURE EVALUATION
Procedure:  CLOSED REDUCTION PERCUTANEOUS PINNING VERSUS OPEN REDUCTION W/ INTERNAL FIXATION (ORIF) - LEFT RING FINGER (Left: Finger)    Relevant Problems   CARDIO   (+) Hypertension   (+) Mixed hyperlipidemia      ENDO   (+) Hypothyroidism   (+) Myxedema      /RENAL   (+) Chronic kidney disease, stage 3 unspecified (HCC)      MUSCULOSKELETAL   (+) Gout      NEURO/PSYCH   (+) Focal epilepsy (HCC)   (+) Major depression   (+) Seizure disorder (HCC)   (+) Transient ischemic attack      PULMONARY   (+) Obstructive sleep apnea        Physical Exam    Airway    Mallampati score: III  TM Distance: >3 FB  Neck ROM: full     Dental   No notable dental hx     Cardiovascular  Cardiovascular exam normal    Pulmonary  Pulmonary exam normal     Other Findings        Anesthesia Plan  ASA Score- 3     Anesthesia Type- IV sedation with anesthesia with ASA Monitors. Additional Monitors:   Airway Plan:           Plan Factors-    Chart reviewed. EKG reviewed. Existing labs reviewed. Patient summary reviewed. Induction- intravenous. Postoperative Plan-     Informed Consent- Anesthetic plan and risks discussed with patient. I personally reviewed this patient with the CRNA. Discussed and agreed on the Anesthesia Plan with the CRNA. Shemar Franklin Declined

## 2023-08-03 ENCOUNTER — HOSPITAL ENCOUNTER (OUTPATIENT)
Facility: AMBULARY SURGERY CENTER | Age: 53
Setting detail: OUTPATIENT SURGERY
Discharge: HOME | End: 2023-08-03
Attending: STUDENT IN AN ORGANIZED HEALTH CARE EDUCATION/TRAINING PROGRAM | Admitting: STUDENT IN AN ORGANIZED HEALTH CARE EDUCATION/TRAINING PROGRAM
Payer: MEDICARE

## 2023-08-03 ENCOUNTER — APPOINTMENT (OUTPATIENT)
Dept: RADIOLOGY | Facility: AMBULARY SURGERY CENTER | Age: 53
End: 2023-08-03
Payer: MEDICARE

## 2023-08-03 ENCOUNTER — ANESTHESIA (OUTPATIENT)
Dept: PERIOP | Facility: AMBULARY SURGERY CENTER | Age: 53
End: 2023-08-03
Payer: MEDICARE

## 2023-08-03 VITALS
RESPIRATION RATE: 16 BRPM | OXYGEN SATURATION: 99 % | HEIGHT: 68 IN | DIASTOLIC BLOOD PRESSURE: 50 MMHG | HEART RATE: 87 BPM | TEMPERATURE: 97.6 F | SYSTOLIC BLOOD PRESSURE: 102 MMHG | WEIGHT: 234 LBS | BODY MASS INDEX: 35.46 KG/M2

## 2023-08-03 DIAGNOSIS — Z47.89 AFTERCARE FOLLOWING SURGERY OF THE MUSCULOSKELETAL SYSTEM: Primary | ICD-10-CM

## 2023-08-03 LAB
EXT PREGNANCY TEST URINE: NEGATIVE
EXT. CONTROL: NORMAL

## 2023-08-03 PROCEDURE — C1713 ANCHOR/SCREW BN/BN,TIS/BN: HCPCS | Performed by: STUDENT IN AN ORGANIZED HEALTH CARE EDUCATION/TRAINING PROGRAM

## 2023-08-03 PROCEDURE — 73140 X-RAY EXAM OF FINGER(S): CPT

## 2023-08-03 PROCEDURE — 26727 TREAT FINGER FRACTURE EACH: CPT | Performed by: STUDENT IN AN ORGANIZED HEALTH CARE EDUCATION/TRAINING PROGRAM

## 2023-08-03 PROCEDURE — 26727 TREAT FINGER FRACTURE EACH: CPT | Performed by: PHYSICIAN ASSISTANT

## 2023-08-03 PROCEDURE — 26720 TREAT FINGER FRACTURE EACH: CPT | Performed by: STUDENT IN AN ORGANIZED HEALTH CARE EDUCATION/TRAINING PROGRAM

## 2023-08-03 PROCEDURE — 81025 URINE PREGNANCY TEST: CPT | Performed by: STUDENT IN AN ORGANIZED HEALTH CARE EDUCATION/TRAINING PROGRAM

## 2023-08-03 DEVICE — C-WIRE PAK DOUBLE ENDED ORTHOPAEDIC WIRE, SPADE, .045" (1.14 MM)
Type: IMPLANTABLE DEVICE | Site: FINGER | Status: FUNCTIONAL
Brand: C-WIRE

## 2023-08-03 RX ORDER — FENTANYL CITRATE 50 UG/ML
INJECTION, SOLUTION INTRAMUSCULAR; INTRAVENOUS AS NEEDED
Status: DISCONTINUED | OUTPATIENT
Start: 2023-08-03 | End: 2023-08-03

## 2023-08-03 RX ORDER — MIDAZOLAM HYDROCHLORIDE 2 MG/2ML
INJECTION, SOLUTION INTRAMUSCULAR; INTRAVENOUS AS NEEDED
Status: DISCONTINUED | OUTPATIENT
Start: 2023-08-03 | End: 2023-08-03

## 2023-08-03 RX ORDER — OXYCODONE HYDROCHLORIDE 5 MG/1
5 TABLET ORAL EVERY 6 HOURS PRN
Status: DISCONTINUED | OUTPATIENT
Start: 2023-08-03 | End: 2023-08-03 | Stop reason: HOSPADM

## 2023-08-03 RX ORDER — SODIUM CHLORIDE, SODIUM LACTATE, POTASSIUM CHLORIDE, CALCIUM CHLORIDE 600; 310; 30; 20 MG/100ML; MG/100ML; MG/100ML; MG/100ML
INJECTION, SOLUTION INTRAVENOUS CONTINUOUS PRN
Status: DISCONTINUED | OUTPATIENT
Start: 2023-08-03 | End: 2023-08-03

## 2023-08-03 RX ORDER — FENTANYL CITRATE/PF 50 MCG/ML
25 SYRINGE (ML) INJECTION
Status: DISCONTINUED | OUTPATIENT
Start: 2023-08-03 | End: 2023-08-03 | Stop reason: HOSPADM

## 2023-08-03 RX ORDER — MAGNESIUM HYDROXIDE 1200 MG/15ML
LIQUID ORAL AS NEEDED
Status: DISCONTINUED | OUTPATIENT
Start: 2023-08-03 | End: 2023-08-03 | Stop reason: HOSPADM

## 2023-08-03 RX ORDER — ONDANSETRON 2 MG/ML
4 INJECTION INTRAMUSCULAR; INTRAVENOUS ONCE AS NEEDED
Status: DISCONTINUED | OUTPATIENT
Start: 2023-08-03 | End: 2023-08-03 | Stop reason: HOSPADM

## 2023-08-03 RX ORDER — ONDANSETRON 2 MG/ML
INJECTION INTRAMUSCULAR; INTRAVENOUS AS NEEDED
Status: DISCONTINUED | OUTPATIENT
Start: 2023-08-03 | End: 2023-08-03

## 2023-08-03 RX ORDER — OXYCODONE HYDROCHLORIDE 5 MG/1
5 TABLET ORAL EVERY 6 HOURS PRN
Qty: 15 TABLET | Refills: 0 | Status: SHIPPED | OUTPATIENT
Start: 2023-08-03

## 2023-08-03 RX ORDER — PROPOFOL 10 MG/ML
INJECTION, EMULSION INTRAVENOUS CONTINUOUS PRN
Status: DISCONTINUED | OUTPATIENT
Start: 2023-08-03 | End: 2023-08-03

## 2023-08-03 RX ORDER — CEFAZOLIN SODIUM 2 G/50ML
2000 SOLUTION INTRAVENOUS ONCE
Status: COMPLETED | OUTPATIENT
Start: 2023-08-03 | End: 2023-08-03

## 2023-08-03 RX ADMIN — ONDANSETRON 4 MG: 2 INJECTION INTRAMUSCULAR; INTRAVENOUS at 09:22

## 2023-08-03 RX ADMIN — PROPOFOL 100 MCG/KG/MIN: 10 INJECTION, EMULSION INTRAVENOUS at 09:22

## 2023-08-03 RX ADMIN — MIDAZOLAM 1 MG: 1 INJECTION INTRAMUSCULAR; INTRAVENOUS at 09:22

## 2023-08-03 RX ADMIN — MIDAZOLAM 1 MG: 1 INJECTION INTRAMUSCULAR; INTRAVENOUS at 09:16

## 2023-08-03 RX ADMIN — FENTANYL CITRATE 25 MCG: 50 INJECTION INTRAMUSCULAR; INTRAVENOUS at 09:22

## 2023-08-03 RX ADMIN — FENTANYL CITRATE 25 MCG: 50 INJECTION INTRAMUSCULAR; INTRAVENOUS at 09:46

## 2023-08-03 RX ADMIN — CEFAZOLIN SODIUM 2000 MG: 2 SOLUTION INTRAVENOUS at 09:22

## 2023-08-03 RX ADMIN — SODIUM CHLORIDE, SODIUM LACTATE, POTASSIUM CHLORIDE, AND CALCIUM CHLORIDE: .6; .31; .03; .02 INJECTION, SOLUTION INTRAVENOUS at 09:22

## 2023-08-03 NOTE — OP NOTE
OPERATIVE REPORT  PATIENT NAME: Dia Ortega    :  1970  MRN: [id number]  Pt Location: [facility] OR ROOM 04    SURGERY DATE: 8/3/2023     Surgeon(s) and Role:     * Inga Boeck, MD - Primary     * Vaishali Ramírez, [address]    Physician Assistant need: A physician assistant was required during the procedure for reduction, pinning, and splinting. No qualified resident was available. Pre-Op Diagnosis Codes:  Closed fracture of left middle finger proximal phalanx fracture  Closed fracture of left ring finger proximal phalanx fracture    Post-Op Diagnosis Codes:  Closed fracture of left middle finger proximal phalanx fracture  Closed fracture of left ring finger proximal phalanx fracture    Procedure(s) (LRB):  CLOSED REDUCTION PERCUTANEOUS PINNING OF LEFT RING FINGER PROXIMAL PHALANX FRACTURE  CLOSED MANAGEMENT OF LEFT MIDDLE FINGER PROXIMAL PHALANX FRACTURE    Specimen(s):  * No specimens in log *    Estimated Blood Loss:   Minimal    Drains:  None    Implants:  Implant Name Type Inv. Item Serial No.  Lot No. LRB No. Used Action   C-WIRE GREEN .045 - [id number]  [employer]EN .045  [employer] [id number] Left 2 Implanted       Anesthesia Type:   IV Sedation with Anesthesia    Operative Indications:  Patient is a 50 y.o. female evaluated in clinic with Left middle and ring finger proximal phalanx fractures. Radiographs were reviewed with patient. We discussed treatment options with patient and mother (who is medical decision maker given cognitive issues) including conservative management and surgical management. Discussed nonoperative management with immobilization. We discussed operative management with closed reduction percutaneous pinning versus open reduction internal fixation. Given 30 degrees extension at fracture site of left ring finger proximal phalanx on radiographs, we recommended operative management. Discussed risks, benefits, alternatives to surgery.   We discussed risks including pain, bleeding, stiffness, infection, need for therapy, malunion, nonunion. Patient and mother elected for surgical management. Informed consent was obtained. Operative Findings:  Left ring finger proximal phalanx fracture that was able to be close reduced and percutaneously pinned. Left middle finger proximal phalanx fracture that was in reasonable alignment, therefore no reduction or fixation was performed. Complications:   None     Procedure and Technique:  Patient was identified in the preoperative holding area. Surgical sites were marked with patient participation. Patient was taken back to the operating theater. Anesthesia was induced. Arm tourniquet was applied, however, never inflated. Extremity was prepped and draped in typical fashion. Formal time-out was performed confirming site, patient, and procedure. All present were in agreement. A 50-50 mixture of lidocaine 1% and bupivacaine 0.25% was used for digital block to ring finger. Examination under fluoroscopy was performed of middle finger proximal phalanx fracture. The fracture was in reasonable alignment, therefore, no fixation was performed. Attention was turned to the ring finger proximal phalanx fracture. Fracture reduction was performed under fluoroscopy. Fracture reduction was successful with a combination of flexion through the fracture site. Two 0.045 K-wires were introduced at radial base and ulnar base of P1 driven intramedullary distally. Final radiographs were performed and confirmed appropriate alignment with no extension noted at fracture site. Digital alignment was inspected and there was appropriate rotational and angular alignment. Wounds were dressed with Xeroform, 4x4s, cast padding, ulnar gutter splint, and Ace bandage. Patient was taken to PACU in stable condition. Postoperative Plan: We will see the patient at around the 2-week avinash.   We will leave pins in until 4-6 weeks pending radiographic healing. We will likely immobilize in nonremovable splint for 4 weeks (given cognitive issues) and likely remove pins and start therapy at that time.     Patient Disposition:  PACU         SIGNATURE: Cedric Lomeli MD  DATE: August 3, 2023  TIME: 10:54 AM

## 2023-08-03 NOTE — ANESTHESIA POSTPROCEDURE EVALUATION
Post-Op Assessment Note    CV Status:  Stable  Pain Score: 0    Pain management: adequate     Mental Status:  Sleepy   Hydration Status:  Stable   PONV Controlled:  None   Airway Patency:  Patent      Post Op Vitals Reviewed: Yes      Staff: CRNA         There were no known notable events for this encounter.     BP   107/68   Temp  97F   Pulse 89   Resp   10   SpO2 99% 6L FM

## 2023-08-09 ENCOUNTER — HOSPITAL ENCOUNTER (EMERGENCY)
Facility: HOSPITAL | Age: 53
Discharge: HOME | End: 2023-08-09
Attending: EMERGENCY MEDICINE
Payer: MEDICARE

## 2023-08-09 VITALS
BODY MASS INDEX: 35.53 KG/M2 | TEMPERATURE: 97.6 F | RESPIRATION RATE: 20 BRPM | HEART RATE: 81 BPM | WEIGHT: 233.69 LBS | OXYGEN SATURATION: 94 % | DIASTOLIC BLOOD PRESSURE: 76 MMHG | SYSTOLIC BLOOD PRESSURE: 126 MMHG

## 2023-08-09 DIAGNOSIS — E03.9 HYPOTHYROID: ICD-10-CM

## 2023-08-09 DIAGNOSIS — E03.9 HYPOTHYROIDISM: ICD-10-CM

## 2023-08-09 DIAGNOSIS — Z76.0 MEDICATION REFILL: Primary | ICD-10-CM

## 2023-08-09 PROCEDURE — NC001 PR NO CHARGE: Performed by: EMERGENCY MEDICINE

## 2023-08-09 PROCEDURE — 99282 EMERGENCY DEPT VISIT SF MDM: CPT

## 2023-08-09 PROCEDURE — 99284 EMERGENCY DEPT VISIT MOD MDM: CPT | Performed by: EMERGENCY MEDICINE

## 2023-08-09 RX ORDER — LEVOTHYROXINE SODIUM 0.15 MG/1
150 TABLET ORAL
Qty: 21 TABLET | Refills: 0 | Status: SHIPPED | OUTPATIENT
Start: 2023-08-09 | End: 2023-08-30

## 2023-08-09 NOTE — ED TRIAGE NOTES
Reports she takes levothyroxine and she is out of it now since yesterday. Reports she has a family dr- reports hospital fill her meds.  Taking it as prescribed as per mom

## 2023-08-09 NOTE — ED PROVIDER NOTES
History  Chief Complaint   Patient presents with   • Medication Refill     Patient ran out of levothyroxine yesterday. Mother states she always comes to the hospital for refills. I had a long discussion with patient and mother regarding need for f/u with family practice for continued supervision of her medication and health care needs. No sob. Mother notes some weight gain. Patient is awake and alert. Offers no complaints. No medical complaints at this time. No aggravating or alleviating factors. Prior to Admission Medications   Prescriptions Last Dose Informant Patient Reported?  Taking?   atorvastatin (LIPITOR) 80 mg tablet  Self, Child, Mother No No   Sig: Take 1 tablet (80 mg total) by mouth daily with dinner   divalproex sodium (DEPAKOTE ER) 250 mg 24 hr tablet   No No   Sig: Take 3 tablets (750 mg total) by mouth every morning   divalproex sodium (DEPAKOTE ER) 500 mg 24 hr tablet   No No   Sig: Take 2 tablets (1,000 mg total) by mouth daily at bedtime   folic acid (FOLVITE) 1 mg tablet  Self, Child, Mother No No   Sig: Take 1 tablet (1 mg total) by mouth daily Do not start before February 7, 2023.   hydrocortisone 1 % cream  Mother No No   Sig: Apply to affected area 2 times daily   ketoconazole (NIZORAL) 2 % shampoo  Mother Yes No   lacosamide (VIMPAT) 150 mg tablet   No No   Sig: Take 1 tablet (150 mg total) by mouth every 12 (twelve) hours   lacosamide (Vimpat) 50 mg   No No   Sig: Take 1 tablet (50 mg total) by mouth daily at bedtime   levothyroxine 150 mcg tablet  Mother No No   Sig: Take 1 tablet (150 mcg total) by mouth daily in the early morning   levothyroxine 150 mcg tablet   No Yes   Sig: Take 1 tablet (150 mcg total) by mouth daily in the early morning for 21 days   metoprolol tartrate (LOPRESSOR) 25 mg tablet  Self, Child, Mother No No   Sig: Take 1 tablet (25 mg total) by mouth every 12 (twelve) hours   niacin (SLO-NIACIN) 500 mg tablet  Self, Child, Mother No No   Sig: Take 2 tablets (1,000 mg total) by mouth 2 (two) times a day with meals   oxyCODONE (ROXICODONE) 5 immediate release tablet   No No   Sig: Take 1 tablet (5 mg total) by mouth every 6 (six) hours as needed for severe pain for up to 15 doses Max Daily Amount: 20 mg   paliperidone (INVEGA) 3 mg 24 hr tablet  Mother No No   Sig: Take 1 tablet (3 mg total) by mouth every morning      Facility-Administered Medications: None       Past Medical History:   Diagnosis Date   • Cancer ([address])     thyroid cancer   • Disease of thyroid gland    • Hyperactivity (behavior)     Last Assessed: 11/7/2014    • Hyperlipidemia    • Obesity    • Seizures ([address])        Past Surgical History:   Procedure Laterality Date   • OTHER SURGICAL HISTORY      Removal of urinary calculus    • OTHER SURGICAL HISTORY      Rhinologic Surgery    • KS OPEN TX PHALANGEAL SHAFT FRACTURE PROX/MIDDLE EA Left 8/3/2023    Procedure: CLOSED REDUCTION PERCUTANEOUS PINNING - LEFT RING FINGER;  Surgeon: Olive London MD;  Location: [facility] MAIN OR;  Service: Orthopedics   • TOTAL THYROIDECTOMY      LAst Assessed: 11/7/2014       Family History   Problem Relation Age of Onset   • Breast cancer Mother 61   • Hypertension Mother    • ALS Mother    • Stroke Mother    • Prostate cancer Father    • Diabetes type II Father      I have reviewed and agree with the history as documented. E-Cigarette/Vaping   • E-Cigarette Use Never User      E-Cigarette/Vaping Substances   • Nicotine No    • THC No    • CBD No    • Flavoring No    • Other No    • Unknown No      Social History     Tobacco Use   • Smoking status: Never   • Smokeless tobacco: Never   Vaping Use   • Vaping Use: Never used   Substance Use Topics   • Alcohol use: Never   • Drug use: Never       Review of Systems   Constitutional: Positive for unexpected weight change (weight gain). Negative for activity change, appetite change and fever. HENT: Negative for congestion, ear pain, rhinorrhea and sore throat.     Eyes: Negative for pain and redness. Respiratory: Negative for cough, shortness of breath and wheezing. Cardiovascular: Negative for chest pain and palpitations. Gastrointestinal: Negative for abdominal pain, diarrhea, nausea and vomiting. Genitourinary: Negative for difficulty urinating and dysuria. Musculoskeletal: Negative for arthralgias and myalgias. Skin: Negative for color change and rash. Allergic/Immunologic: Negative for immunocompromised state. Neurological: Negative for dizziness, syncope and light-headedness. Hematological: Does not bruise/bleed easily. Psychiatric/Behavioral: Negative for confusion. Physical Exam  Physical Exam  Vitals and nursing note reviewed. Constitutional:       General: She is not in acute distress. Appearance: She is well-developed. HENT:      Head: Normocephalic and atraumatic. Nose: Nose normal.   Eyes:      General: No scleral icterus. Conjunctiva/sclera: Conjunctivae normal.   Cardiovascular:      Rate and Rhythm: Normal rate and regular rhythm. Heart sounds: Normal heart sounds. Pulmonary:      Effort: Pulmonary effort is normal. No respiratory distress. Breath sounds: Normal breath sounds. No stridor. No wheezing. Abdominal:      General: There is no distension. Palpations: Abdomen is soft. Tenderness: There is no abdominal tenderness. There is no guarding or rebound. Musculoskeletal:         General: No deformity. Cervical back: Normal range of motion and neck supple. Comments: Left arm in splint. Skin:     General: Skin is warm and dry. Findings: No rash. Neurological:      Mental Status: She is alert and oriented to person, place, and time. Psychiatric:         Thought Content:  Thought content normal.         Vital Signs  ED Triage Vitals [08/09/23 1558]   Temperature Pulse Respirations Blood Pressure SpO2   97.6 °F (36.4 °C) 81 20 126/76 94 %      Temp Source Heart Rate Source Patient Position - Orthostatic VS BP Location FiO2 (%)   Oral Monitor Sitting Right arm --      Pain Score       --           Vitals:    08/09/23 1558   BP: 126/76   Pulse: 81   Patient Position - Orthostatic VS: Sitting         Visual Acuity      ED Medications  Medications - No data to display    Diagnostic Studies  Results Reviewed     None                 No orders to display              Procedures  Procedures         ED Course  ED Course as of 08/15/23 1408   Wed Aug 09, 2023   1631 Patient's old records were reviewed. She has been referred to follow-up with family or internal medicine on several occasions. In discussion with her and mother, they state that they did not understand that they needed to make an appointment. Explained that I will give three weeks of levothyroxine which will give her time to make appointment. I explained to mother several times that she should call the family practice office tomorrow to schedule a new patient appointment to ensure patient has follow-up for her medical conditions such as HTN, hypercholesterol, hypothyroid. Currently she only sees neurology, psychiatry and ortho. Mother verbalized understanding. Will give referral and number for [facility] family practice. Medical Decision Making  Patient requires medication refill of levothyroxine. Mother states she has all other medications. Discussed need for f/u with family practice. Will provide referral and number. Explained that they need to call to make appointment. Will provide three week refill so patient has time to make appointment. Amount and/or Complexity of Data Reviewed  Independent Historian: parent     Details: mother assisted with history. External Data Reviewed: notes. Details: see ED course. Risk  Prescription drug management.           Disposition  Final diagnoses:   Medication refill   Hypothyroid     Time reflects when diagnosis was documented in both MDM as applicable and the Disposition within this note     Time User Action Codes Description Comment    8/9/2023  4:42 PM Glena Baba Add [Z76.0] Medication refill     8/9/2023  4:42 PM Glena Baba Add [E03.9] Hypothyroid     8/9/2023  4:42 PM Keerthi Barrera Add [E03.9] Hypothyroidism       ED Disposition     ED Disposition   Discharge    Condition   Stable    Date/Time   Wed Aug 9, 2023  4:42 PM    Comment   Milton Yuval discharge to home/self care.                Follow-up Information     Follow up With Specialties Details Why [facility] Family Medicine Call in 1 day Jay Welsh monique [address]  [phone]            Discharge Medication List as of 8/9/2023  4:45 PM      CONTINUE these medications which have CHANGED    Details   levothyroxine 150 mcg tablet Take 1 tablet (150 mcg total) by mouth daily in the early morning for 21 days, Starting Wed 8/9/2023, Until Wed 8/30/2023, Normal         CONTINUE these medications which have NOT CHANGED    Details   atorvastatin (LIPITOR) 80 mg tablet Take 1 tablet (80 mg total) by mouth daily with dinner, Starting Mon 2/6/2023, Normal      !! divalproex sodium (DEPAKOTE ER) 250 mg 24 hr tablet Take 3 tablets (750 mg total) by mouth every morning, Starting Wed 7/26/2023, Normal      !! divalproex sodium (DEPAKOTE ER) 500 mg 24 hr tablet Take 2 tablets (1,000 mg total) by mouth daily at bedtime, Starting Wed 0/67/9104, Normal      folic acid (FOLVITE) 1 mg tablet Take 1 tablet (1 mg total) by mouth daily Do not start before February 7, 2023., Starting Tue 2/7/2023, Normal      hydrocortisone 1 % cream Apply to affected area 2 times daily, Normal      ketoconazole (NIZORAL) 2 % shampoo Starting Wed 5/10/2023, Historical Med      !! lacosamide (VIMPAT) 150 mg tablet Take 1 tablet (150 mg total) by mouth every 12 (twelve) hours, Starting Wed 7/26/2023, Normal      !! lacosamide (Vimpat) 50 mg Take 1 tablet (50 mg total) by mouth daily at bedtime, Starting Wed 7/26/2023, Normal      metoprolol tartrate (LOPRESSOR) 25 mg tablet Take 1 tablet (25 mg total) by mouth every 12 (twelve) hours, Starting Mon 2/6/2023, Normal      niacin (SLO-NIACIN) 500 mg tablet Take 2 tablets (1,000 mg total) by mouth 2 (two) times a day with meals, Starting Mon 2/6/2023, Normal      oxyCODONE (ROXICODONE) 5 immediate release tablet Take 1 tablet (5 mg total) by mouth every 6 (six) hours as needed for severe pain for up to 15 doses Max Daily Amount: 20 mg, Starting Thu 8/3/2023, Normal      paliperidone (INVEGA) 3 mg 24 hr tablet Take 1 tablet (3 mg total) by mouth every morning, Starting Thu 6/8/2023, Until Wed 9/6/2023, Normal       !! - Potential duplicate medications found. Please discuss with provider.               PDMP Review       Value Time User    PDMP Reviewed  Yes 8/3/2023 10:50 AM Jana Lino PA-C          ED Provider  Electronically Signed by           Kevin Martinez MD  08/15/23 3109

## 2023-08-10 ENCOUNTER — HOSPITAL ENCOUNTER (EMERGENCY)
Facility: HOSPITAL | Age: 53
Discharge: HOME | End: 2023-08-10
Attending: EMERGENCY MEDICINE
Payer: MEDICARE

## 2023-08-10 VITALS
DIASTOLIC BLOOD PRESSURE: 78 MMHG | TEMPERATURE: 98.4 F | SYSTOLIC BLOOD PRESSURE: 128 MMHG | OXYGEN SATURATION: 96 % | HEART RATE: 79 BPM | RESPIRATION RATE: 16 BRPM

## 2023-08-10 DIAGNOSIS — R56.9 SEIZURE (HCC): Primary | ICD-10-CM

## 2023-08-10 LAB
ALBUMIN SERPL BCP-MCNC: 3.7 G/DL (ref 3.5–5)
ALP SERPL-CCNC: 35 U/L (ref 34–104)
ALT SERPL W P-5'-P-CCNC: 6 U/L (ref 7–52)
ANION GAP SERPL CALCULATED.3IONS-SCNC: 6 MMOL/L
AST SERPL W P-5'-P-CCNC: 10 U/L (ref 13–39)
BASOPHILS # BLD AUTO: 0.02 THOUSANDS/ÂΜL (ref 0–0.1)
BASOPHILS NFR BLD AUTO: 1 % (ref 0–1)
BILIRUB SERPL-MCNC: 0.37 MG/DL (ref 0.2–1)
BUN SERPL-MCNC: 22 MG/DL (ref 5–25)
CALCIUM SERPL-MCNC: 9.2 MG/DL (ref 8.4–10.2)
CHLORIDE SERPL-SCNC: 99 MMOL/L (ref 96–108)
CO2 SERPL-SCNC: 30 MMOL/L (ref 21–32)
CREAT SERPL-MCNC: 0.98 MG/DL (ref 0.6–1.3)
EOSINOPHIL # BLD AUTO: 0.06 THOUSAND/ÂΜL (ref 0–0.61)
EOSINOPHIL NFR BLD AUTO: 2 % (ref 0–6)
ERYTHROCYTE [DISTWIDTH] IN BLOOD BY AUTOMATED COUNT: 13.2 % (ref 11.6–15.1)
GFR SERPL CREATININE-BSD FRML MDRD: 67 ML/MIN/1.73SQ M
GLUCOSE SERPL-MCNC: 86 MG/DL (ref 65–140)
GLUCOSE SERPL-MCNC: 87 MG/DL (ref 65–140)
HCT VFR BLD AUTO: 32.7 % (ref 34.8–46.1)
HGB BLD-MCNC: 11.2 G/DL (ref 11.5–15.4)
IMM GRANULOCYTES # BLD AUTO: 0.03 THOUSAND/UL (ref 0–0.2)
IMM GRANULOCYTES NFR BLD AUTO: 1 % (ref 0–2)
LYMPHOCYTES # BLD AUTO: 1.16 THOUSANDS/ÂΜL (ref 0.6–4.47)
LYMPHOCYTES NFR BLD AUTO: 37 % (ref 14–44)
MCH RBC QN AUTO: 34.4 PG (ref 26.8–34.3)
MCHC RBC AUTO-ENTMCNC: 34.3 G/DL (ref 31.4–37.4)
MCV RBC AUTO: 100 FL (ref 82–98)
MONOCYTES # BLD AUTO: 0.47 THOUSAND/ÂΜL (ref 0.17–1.22)
MONOCYTES NFR BLD AUTO: 15 % (ref 4–12)
NEUTROPHILS # BLD AUTO: 1.39 THOUSANDS/ÂΜL (ref 1.85–7.62)
NEUTS SEG NFR BLD AUTO: 44 % (ref 43–75)
NRBC BLD AUTO-RTO: 0 /100 WBCS
PLATELET # BLD AUTO: 170 THOUSANDS/UL (ref 149–390)
PMV BLD AUTO: 9.7 FL (ref 8.9–12.7)
POTASSIUM SERPL-SCNC: 4 MMOL/L (ref 3.5–5.3)
PROT SERPL-MCNC: 6.7 G/DL (ref 6.4–8.4)
RBC # BLD AUTO: 3.26 MILLION/UL (ref 3.81–5.12)
SODIUM SERPL-SCNC: 135 MMOL/L (ref 135–147)
VALPROATE SERPL-MCNC: 107 UG/ML (ref 50–100)
WBC # BLD AUTO: 3.13 THOUSAND/UL (ref 4.31–10.16)

## 2023-08-10 PROCEDURE — 82948 REAGENT STRIP/BLOOD GLUCOSE: CPT

## 2023-08-10 PROCEDURE — 99285 EMERGENCY DEPT VISIT HI MDM: CPT | Performed by: EMERGENCY MEDICINE

## 2023-08-10 PROCEDURE — 80235 DRUG ASSAY LACOSAMIDE: CPT | Performed by: EMERGENCY MEDICINE

## 2023-08-10 PROCEDURE — 99284 EMERGENCY DEPT VISIT MOD MDM: CPT

## 2023-08-10 PROCEDURE — 93005 ELECTROCARDIOGRAM TRACING: CPT

## 2023-08-10 PROCEDURE — 85025 COMPLETE CBC W/AUTO DIFF WBC: CPT

## 2023-08-10 PROCEDURE — 36415 COLL VENOUS BLD VENIPUNCTURE: CPT | Performed by: EMERGENCY MEDICINE

## 2023-08-10 PROCEDURE — 80053 COMPREHEN METABOLIC PANEL: CPT

## 2023-08-10 PROCEDURE — 80164 ASSAY DIPROPYLACETIC ACD TOT: CPT | Performed by: EMERGENCY MEDICINE

## 2023-08-10 PROCEDURE — NC001 PR NO CHARGE: Performed by: EMERGENCY MEDICINE

## 2023-08-10 NOTE — ED ATTENDING ATTESTATION
Final Diagnoses:     1. Seizure [facility])      ED Course as of 08/10/23 1502   Thu Aug 10, 2023   1250 ECG 12 lead  This ECG was interpreted by me. The heart rate is 77, which is normal. The rhythm is regular. The axis is normal. The P waves are normal and the KS interval is normal. The QRS height is normal and width is normal. The ST segments are not elevated or depressed. The T waves are normal. The QT segment is normal. This ecg shows sinus rhythm. IEthan, saw and evaluated the patient. All available labs and X-rays were ordered by me or the resident / non-physician and have been reviewed by myself. I discussed the patient with the resident / non-physician and agree with the resident's / non-physician practitioner's findings and plan as documented in the resident's / non-physician practicitioner's note, except where noted. At this point, I agree with the current assessment done in the ED. I was present during key portions of all procedures performed unless otherwise stated. Nursing Triage:     Chief Complaint   Patient presents with   • Seizure - Prior Hx Of     EMS was [place] bus [place]. Pt with known hx of seizures. Pt was awake when EMS arrived. HPI:   This is a 50 y.o. female with a PMH of seizures, schizoaffective disorder, hypertension, developmental delay, hypothyroidism, hyperlipidemia, PCOS, and depression presenting for evaluation of reported seizure. Patient is managed on Depakote and Vimpat for seizures. Her mom is at bedside and reports that patient had a seizure while they were at the bus stop today. Mom describes generalized tonic-clonic type activity. She states that the patient was unconscious. Patient did not fall to the ground, hit her head, or sustain any other kind of trauma. Patient was postictal afterwards. Patient denies any tongue biting or incontinence. No recent fevers.   Patient states that she has been compliant with her medications. Patient also notes a "stomach ache" this morning, which is now resolved. She had no associated symptoms with this and currently has no GI symptoms. ASSESSMENT + PLAN:   This is a 51-year-old female presenting with seizure-like activity. Differential includes but is not limited to: Seizure, syncope, electrolyte imbalance, anemia, arrhythmia, or other acute lab abnormality. No reported trauma and patient has a history of seizures, so CT of the head is not indicated, though considered. We will further evaluate with: EKG, CBC, CMP, antiepileptic medication levels    Work-up here shows no acute abnormalities that would cause patient's symptoms  Plan includes: Continue antiepileptic medications as directed. Follow-up with neurologist.  Return to ED for subsequent seizures, mental status changes, or other worsening symptoms    Physical:       General: VS reviewed  Appears in NAD  awake, alert. Well-nourished, well-developed. Appears stated age. Speaking normally in full sentences. Head: Normocephalic, atraumatic   Eyes: EOM-I. No diplopia. No hyphema. No subconjunctival hemorrhages. Symmetrical lids. ENT: Atraumatic external nose and ears. MMM  No malocclusion. No stridor. Normal phonation. No drooling. Normal swallowing. Neck: No JVD. CV: No pallor noted    Lungs:   No tachypnea  No respiratory distress   Abd: soft nt nd no guarding or rebound  MSK:   FROM spontaneously, 5 out of 5 strength  Skin: Dry, intact. Neuro: Awake, alert, GCS15, cranial nerves II through XII intact  Motor grossly intact. Psychiatric/Behavioral: interacting normally; appropriate mood/affect.  Exam: deferred     Vitals:    08/10/23 1125 08/10/23 1322   BP: 130/77 128/78   BP Location: Left arm Left arm   Pulse: 92 79   Resp: 20 16   Temp: 98.4 °F (36.9 °C)    TempSrc: Oral    SpO2: 95% 96%     - Nursing note reviewed. - Vitals reviewed.    - Orders placed by myself and/or advanced practitioner / resident.    - Previous chart was reviewed including past medical history and medications  - History obtained from patient and mother and EMS      Past Medical:    has a past medical history of Cancer [facility]), Disease of thyroid gland, Hyperactivity (behavior), Hyperlipidemia, Obesity, and Seizures ([address] Past Surgical:    has a past surgical history that includes Other surgical history; Other surgical history; Total thyroidectomy; and pr open tx phalangeal shaft fracture prox/middle ea (Left, 8/3/2023).     Social:     Social History     Substance and Sexual Activity   Alcohol Use Never     Social History     Tobacco Use   Smoking Status Never   Smokeless Tobacco Never     Social History     Substance and Sexual Activity   Drug Use Never           Medications - No data to display  No orders to display     Orders Placed This Encounter   Procedures   • Lacosamide   • Valproic acid level, total   • CBC and differential   • Comprehensive metabolic panel   • ECG 12 lead     Labs Reviewed   VALPROIC ACID LEVEL, TOTAL - Abnormal       Result Value Ref Range Status    Valproic Acid, Total 107 (*) 50 - 100 ug/mL Final   CBC AND DIFFERENTIAL - Abnormal    WBC 3.13 (*) 4.31 - 10.16 Thousand/uL Final    RBC 3.26 (*) 3.81 - 5.12 Million/uL Final    Hemoglobin 11.2 (*) 11.5 - 15.4 g/dL Final    Hematocrit 32.7 (*) 34.8 - 46.1 % Final     (*) 82 - 98 fL Final    MCH 34.4 (*) 26.8 - 34.3 pg Final    MCHC 34.3  31.4 - 37.4 g/dL Final    RDW 13.2  11.6 - 15.1 % Final    MPV 9.7  8.9 - 12.7 fL Final    Platelets 134  285 - 390 Thousands/uL Final    nRBC 0  /100 WBCs Final    Neutrophils Relative 44  43 - 75 % Final    Immat GRANS % 1  0 - 2 % Final    Lymphocytes Relative 37  14 - 44 % Final    Monocytes Relative 15 (*) 4 - 12 % Final    Eosinophils Relative 2  0 - 6 % Final    Basophils Relative 1  0 - 1 % Final    Neutrophils Absolute 1.39 (*) 1.85 - 7.62 Thousands/µL Final    Immature Grans Absolute 0.03  0.00 - 0.20 Thousand/uL Final    Lymphocytes Absolute 1.16  0.60 - 4.47 Thousands/µL Final    Monocytes Absolute 0.47  0.17 - 1.22 Thousand/µL Final    Eosinophils Absolute 0.06  0.00 - 0.61 Thousand/µL Final    Basophils Absolute 0.02  0.00 - 0.10 Thousands/µL Final   COMPREHENSIVE METABOLIC PANEL - Abnormal    Sodium 135  135 - 147 mmol/L Final    Potassium 4.0  3.5 - 5.3 mmol/L Final    Chloride 99  96 - 108 mmol/L Final    CO2 30  21 - 32 mmol/L Final    ANION GAP 6  mmol/L Final    BUN 22  5 - 25 mg/dL Final    Creatinine 0.98  0.60 - 1.30 mg/dL Final    Comment: Standardized to IDMS reference method    Glucose 87  65 - 140 mg/dL Final    Comment: If the patient is fasting, the ADA then defines impaired fasting glucose as > 100 mg/dL and diabetes as > or equal to 123 mg/dL. Calcium 9.2  8.4 - 10.2 mg/dL Final    AST 10 (*) 13 - 39 U/L Final    ALT 6 (*) 7 - 52 U/L Final    Comment: Specimen collection should occur prior to Sulfasalazine administration due to the potential for falsely depressed results. Alkaline Phosphatase 35  34 - 104 U/L Final    Total Protein 6.7  6.4 - 8.4 g/dL Final    Albumin 3.7  3.5 - 5.0 g/dL Final    Total Bilirubin 0.37  0.20 - 1.00 mg/dL Final    Comment: Use of this assay is not recommended for patients undergoing treatment with eltrombopag due to the potential for falsely elevated results. N-acetyl-p-benzoquinone imine (metabolite of Acetaminophen) will generate erroneously low results in samples for patients that have taken an overdose of Acetaminophen.     eGFR 67  ml/min/1.73sq m Final    Narrative:     [place] guidelines for Chronic Kidney Disease (CKD):   •  Stage 1 with normal or high GFR (GFR > 90 mL/min/1.73 square meters)  •  Stage 2 Mild CKD (GFR = 60-89 mL/min/1.73 square meters)  •  Stage 3A Moderate CKD (GFR = 45-59 mL/min/1.73 square meters)  •  Stage 3B Moderate CKD (GFR = 30-44 mL/min/1.73 square meters)  •  Stage 4 Severe CKD (GFR = 15-29 mL/min/1.73 square meters)  •  Stage 5 End Stage CKD (GFR <15 mL/min/1.73 square meters)  Note: GFR calculation is accurate only with a steady state creatinine   POCT GLUCOSE - Normal    POC Glucose 86  65 - 140 mg/dl Final   LACOSAMIDE     Time reflects when diagnosis was documented in both MDM as applicable and the Disposition within this note     Time User Action Codes Description Comment    8/10/2023  2:32 PM Britt Deal [R56.9] Seizure [facility])       ED Disposition     ED Disposition   Discharge    Condition   Stable    Date/Time   Thu Aug 10, 2023  2:32 PM    Comment   Arzella Dose discharge to home/self care.                Follow-up Information     Follow up With Specialties Details Why Contact Lj Jane MD Neurology Schedule an appointment as soon as possible for a visit  As needed, If symptoms worsen [address] [place] [address]  [phone]          Discharge Medication List as of 8/10/2023  2:34 PM      CONTINUE these medications which have NOT CHANGED    Details   atorvastatin (LIPITOR) 80 mg tablet Take 1 tablet (80 mg total) by mouth daily with dinner, Starting Mon 2/6/2023, Normal      !! divalproex sodium (DEPAKOTE ER) 250 mg 24 hr tablet Take 3 tablets (750 mg total) by mouth every morning, Starting Wed 7/26/2023, Normal      !! divalproex sodium (DEPAKOTE ER) 500 mg 24 hr tablet Take 2 tablets (1,000 mg total) by mouth daily at bedtime, Starting Wed 1/24/5720, Normal      folic acid (FOLVITE) 1 mg tablet Take 1 tablet (1 mg total) by mouth daily Do not start before February 7, 2023., Starting Tue 2/7/2023, Normal      hydrocortisone 1 % cream Apply to affected area 2 times daily, Normal      ketoconazole (NIZORAL) 2 % shampoo Starting Wed 5/10/2023, Historical Med      !! lacosamide (VIMPAT) 150 mg tablet Take 1 tablet (150 mg total) by mouth every 12 (twelve) hours, Starting Wed 7/26/2023, Normal      !! lacosamide (Vimpat) 50 mg Take 1 tablet (50 mg total) by mouth daily at bedtime, Starting Wed 7/26/2023, Normal      levothyroxine 150 mcg tablet Take 1 tablet (150 mcg total) by mouth daily in the early morning for 21 days, Starting Wed 8/9/2023, Until Wed 8/30/2023, Normal      metoprolol tartrate (LOPRESSOR) 25 mg tablet Take 1 tablet (25 mg total) by mouth every 12 (twelve) hours, Starting Mon 2/6/2023, Normal      niacin (SLO-NIACIN) 500 mg tablet Take 2 tablets (1,000 mg total) by mouth 2 (two) times a day with meals, Starting Mon 2/6/2023, Normal      oxyCODONE (ROXICODONE) 5 immediate release tablet Take 1 tablet (5 mg total) by mouth every 6 (six) hours as needed for severe pain for up to 15 doses Max Daily Amount: 20 mg, Starting Thu 8/3/2023, Normal      paliperidone (INVEGA) 3 mg 24 hr tablet Take 1 tablet (3 mg total) by mouth every morning, Starting Thu 6/8/2023, Until Wed 9/6/2023, Normal       !! - Potential duplicate medications found. Please discuss with provider. No discharge procedures on file. Prior to Admission Medications   Prescriptions Last Dose Informant Patient Reported?  Taking?   atorvastatin (LIPITOR) 80 mg tablet  Self, Child, Mother No No   Sig: Take 1 tablet (80 mg total) by mouth daily with dinner   divalproex sodium (DEPAKOTE ER) 250 mg 24 hr tablet   No No   Sig: Take 3 tablets (750 mg total) by mouth every morning   divalproex sodium (DEPAKOTE ER) 500 mg 24 hr tablet   No No   Sig: Take 2 tablets (1,000 mg total) by mouth daily at bedtime   folic acid (FOLVITE) 1 mg tablet  Self, Child, Mother No No   Sig: Take 1 tablet (1 mg total) by mouth daily Do not start before February 7, 2023.   hydrocortisone 1 % cream  Mother No No   Sig: Apply to affected area 2 times daily   ketoconazole (NIZORAL) 2 % shampoo  Mother Yes No   lacosamide (VIMPAT) 150 mg tablet   No No   Sig: Take 1 tablet (150 mg total) by mouth every 12 (twelve) hours   lacosamide (Vimpat) 50 mg   No No   Sig: Take 1 tablet (50 mg total) by mouth daily at bedtime levothyroxine 150 mcg tablet   No No   Sig: Take 1 tablet (150 mcg total) by mouth daily in the early morning for 21 days   metoprolol tartrate (LOPRESSOR) 25 mg tablet  Self, Child, Mother No No   Sig: Take 1 tablet (25 mg total) by mouth every 12 (twelve) hours   niacin (SLO-NIACIN) 500 mg tablet  Self, Child, Mother No No   Sig: Take 2 tablets (1,000 mg total) by mouth 2 (two) times a day with meals   oxyCODONE (ROXICODONE) 5 immediate release tablet   No No   Sig: Take 1 tablet (5 mg total) by mouth every 6 (six) hours as needed for severe pain for up to 15 doses Max Daily Amount: 20 mg   paliperidone (INVEGA) 3 mg 24 hr tablet  Mother No No   Sig: Take 1 tablet (3 mg total) by mouth every morning      Facility-Administered Medications: None                          Portions of the record may have been created with voice recognition software. Occasional wrong word or "sound a like" substitutions may have occurred due to the inherent limitations of voice recognition software. Read the chart carefully and recognize, using context, where substitutions have occurred.     Electronically signed by:  Corey Ellis DO

## 2023-08-10 NOTE — ED PROVIDER NOTES
History  Chief Complaint   Patient presents with   • Seizure - Prior Hx Of     EMS was called for a seizure at the bus stop. Pt with known hx of seizures. Pt was awake when EMS arrived. 51 yo female patient with PMH of seizures, hypothyroidism, obesity. Comes today due to sudden onset of abdominal pain and a witnessed episode of seizure. Patient was with her mother at a bus stop when she started feeling pain in her stomach, Patient does not recall loosing conscious. Mother reports her daughter having jerking movements but does not recall her hitting her head. Patient denies headache, she has full cervical ROM. Denies nausea, vomiting , diarrhea. Patient reports taking her medication regularly. During exam patient is alert and oriented x3. Denies any pain or discomfort. Prior to Admission Medications   Prescriptions Last Dose Informant Patient Reported?  Taking?   atorvastatin (LIPITOR) 80 mg tablet  Self, Child, Mother No No   Sig: Take 1 tablet (80 mg total) by mouth daily with dinner   divalproex sodium (DEPAKOTE ER) 250 mg 24 hr tablet   No No   Sig: Take 3 tablets (750 mg total) by mouth every morning   divalproex sodium (DEPAKOTE ER) 500 mg 24 hr tablet   No No   Sig: Take 2 tablets (1,000 mg total) by mouth daily at bedtime   folic acid (FOLVITE) 1 mg tablet  Self, Child, Mother No No   Sig: Take 1 tablet (1 mg total) by mouth daily Do not start before February 7, 2023.   hydrocortisone 1 % cream  Mother No No   Sig: Apply to affected area 2 times daily   ketoconazole (NIZORAL) 2 % shampoo  Mother Yes No   lacosamide (VIMPAT) 150 mg tablet   No No   Sig: Take 1 tablet (150 mg total) by mouth every 12 (twelve) hours   lacosamide (Vimpat) 50 mg   No No   Sig: Take 1 tablet (50 mg total) by mouth daily at bedtime   levothyroxine 150 mcg tablet   No No   Sig: Take 1 tablet (150 mcg total) by mouth daily in the early morning for 21 days   metoprolol tartrate (LOPRESSOR) 25 mg tablet  Self, Child, Mother No No   Sig: Take 1 tablet (25 mg total) by mouth every 12 (twelve) hours   niacin (SLO-NIACIN) 500 mg tablet  Self, Child, Mother No No   Sig: Take 2 tablets (1,000 mg total) by mouth 2 (two) times a day with meals   oxyCODONE (ROXICODONE) 5 immediate release tablet   No No   Sig: Take 1 tablet (5 mg total) by mouth every 6 (six) hours as needed for severe pain for up to 15 doses Max Daily Amount: 20 mg   paliperidone (INVEGA) 3 mg 24 hr tablet  Mother No No   Sig: Take 1 tablet (3 mg total) by mouth every morning      Facility-Administered Medications: None       Past Medical History:   Diagnosis Date   • Cancer ([address])     thyroid cancer   • Disease of thyroid gland    • Hyperactivity (behavior)     Last Assessed: 11/7/2014    • Hyperlipidemia    • Obesity    • Seizures ([address])        Past Surgical History:   Procedure Laterality Date   • OTHER SURGICAL HISTORY      Removal of urinary calculus    • OTHER SURGICAL HISTORY      Rhinologic Surgery    • MD OPEN TX PHALANGEAL SHAFT FRACTURE PROX/MIDDLE EA Left 8/3/2023    Procedure: CLOSED REDUCTION PERCUTANEOUS PINNING - LEFT RING FINGER;  Surgeon: Andi Ponce MD;  Location: [facility];  Service: Orthopedics   • TOTAL THYROIDECTOMY      LAst Assessed: 11/7/2014       Family History   Problem Relation Age of Onset   • Breast cancer Mother 61   • Hypertension Mother    • ALS Mother    • Stroke Mother    • Prostate cancer Father    • Diabetes type II Father      I have reviewed and agree with the history as documented. E-Cigarette/Vaping   • E-Cigarette Use Never User      E-Cigarette/Vaping Substances   • Nicotine No    • THC No    • CBD No    • Flavoring No    • Other No    • Unknown No      Social History     Tobacco Use   • Smoking status: Never   • Smokeless tobacco: Never   Vaping Use   • Vaping Use: Never used   Substance Use Topics   • Alcohol use: Never   • Drug use: Never        Review of Systems   Constitutional: Positive for fatigue. Negative for chills, diaphoresis and fever. HENT: Negative for drooling, ear pain, facial swelling, hearing loss and sore throat. Eyes: Negative for pain and visual disturbance. Respiratory: Negative for cough, chest tightness, shortness of breath and wheezing. Cardiovascular: Negative for chest pain and palpitations. Gastrointestinal: Negative for abdominal pain, diarrhea, nausea and vomiting. Genitourinary: Negative for dysuria and hematuria. Musculoskeletal: Negative for arthralgias, back pain and joint swelling. Skin: Negative for color change and rash. Neurological: Positive for seizures. Negative for headaches. All other systems reviewed and are negative. Physical Exam  ED Triage Vitals [08/10/23 1125]   Temperature Pulse Respirations Blood Pressure SpO2   98.4 °F (36.9 °C) 92 20 130/77 95 %      Temp Source Heart Rate Source Patient Position - Orthostatic VS BP Location FiO2 (%)   Oral Monitor Lying Left arm --      Pain Score       --             Orthostatic Vital Signs  Vitals:    08/10/23 1125   BP: 130/77   Pulse: 92   Patient Position - Orthostatic VS: Lying       Physical Exam  Vitals and nursing note reviewed. Constitutional:       General: She is not in acute distress. Appearance: She is well-developed. She is obese. HENT:      Head: Normocephalic and atraumatic. Right Ear: External ear normal.      Left Ear: External ear normal.      Mouth/Throat:      Mouth: Mucous membranes are moist.   Eyes:      General: No scleral icterus. Conjunctiva/sclera: Conjunctivae normal.   Cardiovascular:      Rate and Rhythm: Normal rate and regular rhythm. Heart sounds: No murmur heard. Pulmonary:      Effort: Pulmonary effort is normal. No respiratory distress. Breath sounds: Normal breath sounds. No wheezing or rales. Abdominal:      General: Bowel sounds are normal. There is no distension. Palpations: Abdomen is soft. Tenderness:  There is no abdominal tenderness. Musculoskeletal:         General: No swelling. Cervical back: Neck supple. Right lower leg: No edema. Left lower leg: No edema. Skin:     General: Skin is warm and dry. Capillary Refill: Capillary refill takes less than 2 seconds. Neurological:      Mental Status: She is alert and oriented to person, place, and time. ED Medications  Medications - No data to display    Diagnostic Studies  Results Reviewed     Procedure Component Value Units Date/Time    CBC and differential [[id number]]     Lab Status: No result Specimen: Blood     Comprehensive metabolic panel [[id number]]     Lab Status: No result Specimen: Blood     Lacosamide [[id number]]     Lab Status: No result Specimen: Blood     Valproic acid level, total [[id number]]     Lab Status: No result Specimen: Blood     Fingerstick Glucose (POCT) [[id number]]  (Normal) Collected: 08/10/23 1122    Lab Status: Final result Updated: 08/10/23 1136     POC Glucose 86 mg/dl                  No orders to display         Procedures  Procedures      ED Course  ED Course as of 08/10/23 1154   Thu Aug 10, 2023   1145 50 y.o. female with PMH of seizures, had a sudden onset abdominal pain this morning. Denies n/v. BM changes. No sick contacts, no recent travels. Patient does not recall loosing conscious, falling. Abdominal pain subsided. [id number] Mother is in the room now, she was wih the patient this morning and witnessed her seizures, she denies seeing the patient hitting her head, patient denies headaches. Medical Decision Making  50 y.o. female with PMH of seizure, hypothyoidism,  Brought to ER after a witnessed seizures. Differential episode of seizure due to hx vs medication noncompiance, vs hypoglycemia vs recent head trauma. CBC, CMP, POCT glucose is normal.  During ED stay she did not experience any other episodes of seizures, no acute complains.  , valproic acid levels are in therapeutic range. lacosamide still pending. Patient will follow up with neurologist outpatient. Amount and/or Complexity of Data Reviewed  Labs: ordered. Disposition  Final diagnoses:   None     ED Disposition     None      Follow-up Information    None         Patient's Medications   Discharge Prescriptions    No medications on file     No discharge procedures on file. PDMP Review       Value Time User    PDMP Reviewed  Yes 8/3/2023 10:50 AM Sammy Ontiveros PA-C           ED Provider  Attending physically available and evaluated Montey Seen. I managed the patient along with the ED Attending.     Electronically Signed by         La Nena Capone MD  08/10/23 3976

## 2023-08-11 ENCOUNTER — TELEPHONE (OUTPATIENT)
Dept: FAMILY MEDICINE CLINIC | Facility: CLINIC | Age: 53
End: 2023-08-11

## 2023-08-11 LAB
ATRIAL RATE: 77 BPM
GLUCOSE SERPL-MCNC: 86 MG/DL (ref 65–140)
P AXIS: 63 DEGREES
PR INTERVAL: 160 MS
QRS AXIS: 32 DEGREES
QRSD INTERVAL: 72 MS
QT INTERVAL: 356 MS
QTC INTERVAL: 402 MS
T WAVE AXIS: 66 DEGREES
VENTRICULAR RATE: 77 BPM

## 2023-08-11 PROCEDURE — 93010 ELECTROCARDIOGRAM REPORT: CPT

## 2023-08-11 NOTE — TELEPHONE ENCOUNTER
[facility] FORM RECEIVED VIA FAX AND PLACED IN PCP FOLDER TO BE DELIVERED AT ASSIGNED TIMES.

## 2023-08-14 ENCOUNTER — HOSPITAL ENCOUNTER (OUTPATIENT)
Dept: RADIOLOGY | Facility: HOSPITAL | Age: 53
Discharge: HOME | End: 2023-08-14
Attending: STUDENT IN AN ORGANIZED HEALTH CARE EDUCATION/TRAINING PROGRAM
Payer: MEDICARE

## 2023-08-14 ENCOUNTER — OFFICE VISIT (OUTPATIENT)
Dept: OBGYN CLINIC | Facility: CLINIC | Age: 53
End: 2023-08-14
Payer: MEDICARE

## 2023-08-14 DIAGNOSIS — Z47.89 AFTERCARE FOLLOWING SURGERY OF THE MUSCULOSKELETAL SYSTEM: Primary | ICD-10-CM

## 2023-08-14 DIAGNOSIS — S62.92XA CLOSED FRACTURE OF LEFT HAND, INITIAL ENCOUNTER: ICD-10-CM

## 2023-08-14 DIAGNOSIS — S62.92XD CLOSED FRACTURE OF LEFT HAND WITH ROUTINE HEALING, SUBSEQUENT ENCOUNTER: ICD-10-CM

## 2023-08-14 LAB — LACOSAMIDE SERPL-MCNC: 1.1 UG/ML (ref 5–10)

## 2023-08-14 PROCEDURE — 29125 APPL SHORT ARM SPLINT STATIC: CPT | Performed by: STUDENT IN AN ORGANIZED HEALTH CARE EDUCATION/TRAINING PROGRAM

## 2023-08-14 PROCEDURE — 99024 POSTOP FOLLOW-UP VISIT: CPT | Performed by: STUDENT IN AN ORGANIZED HEALTH CARE EDUCATION/TRAINING PROGRAM

## 2023-08-14 PROCEDURE — 73130 X-RAY EXAM OF HAND: CPT

## 2023-08-14 NOTE — PROGRESS NOTES
Assessment/Plan:  Patient ID: 50 y.o. female   Surgery: Closed Reduction Percutaneous Pinning - Left Ring Finger - Left  Date of Surgery: 8/3/2023    The patient is doing well postoperatively. Pin sites are clean, dry and intact. Patient was placed into a plaster ulnar gutter splint to include to the long finger in the office today. The patient will remain non weightbearing with her LUE. A referral was provided for OT which she will begin after her next appointment. She will follow up in 2 weeks for repeat evaluation, splint removal, x-rays and pin removal.     Follow Up:  2 weeks    To Do Next Visit:  Splint off, X-rays of the  left  hand with 10 degree supinated lateral view and Pins out      CHIEF COMPLAINT:  No chief complaint on file. SUBJECTIVE:  Patient is a 50y.o. year old female who presents for follow up after Closed Reduction Percutaneous Pinning - Left Ring Finger - Left. Today patient states she is doing well postoperatively. PHYSICAL EXAMINATION:  General: Well developed and well nourished, alert and oriented x 3, appears comfortable  Psychiatric: Normal    MUSCULOSKELETAL EXAMINATION:  Incision: Clean, dry, intact  Surgery Site: normal, no evidence of infection   Range of Motion: As expected  Neurovascular status: Neuro intact, good cap refill         STUDIES REVIEWED:  Xrays of the left hand were reviewed and independently interpreted in PACS by Dr. Sivakumar Zaidi and demonstrate CRPP left ring finger proximal pahalnx fx. PROCEDURES PERFORMED:  Splint application    Date/Time: 8/14/2023 1:00 PM    Performed by: Rita Riggs MD  Authorized by: Rita Riggs MD  Universal Protocol:  Consent: Verbal consent obtained.   Consent given by: patient  Patient identity confirmed: verbally with patient      Procedure details:     Laterality:  Left    Location:  Hand    Hand:  L hand    Splint type:  Ulnar gutter (to include long finger)    Supplies:  Cotton padding, plaster and elastic bandage           Scribe Attestation    I,:  Anitra Sagastume MA am acting as a scribe while in the presence of the attending physician.:       I,:  Alis Peng MD personally performed the services described in this documentation    as scribed in my presence.:

## 2023-08-18 DIAGNOSIS — R56.9 SEIZURE (HCC): ICD-10-CM

## 2023-08-21 ENCOUNTER — OFFICE VISIT (OUTPATIENT)
Dept: FAMILY MEDICINE CLINIC | Facility: CLINIC | Age: 53
End: 2023-08-21

## 2023-08-21 VITALS
RESPIRATION RATE: 18 BRPM | SYSTOLIC BLOOD PRESSURE: 102 MMHG | HEART RATE: 95 BPM | TEMPERATURE: 96.1 F | DIASTOLIC BLOOD PRESSURE: 78 MMHG | BODY MASS INDEX: 34.22 KG/M2 | OXYGEN SATURATION: 96 % | WEIGHT: 225.8 LBS | HEIGHT: 68 IN

## 2023-08-21 DIAGNOSIS — G40.109 FOCAL EPILEPSY (HCC): ICD-10-CM

## 2023-08-21 DIAGNOSIS — Z71.89 COMPLEX CARE COORDINATION: ICD-10-CM

## 2023-08-21 DIAGNOSIS — M79.89 LOCALIZED SWELLING OF LOWER EXTREMITY: ICD-10-CM

## 2023-08-21 DIAGNOSIS — E89.0 POSTOPERATIVE HYPOTHYROIDISM: Primary | ICD-10-CM

## 2023-08-21 RX ORDER — DIVALPROEX SODIUM 500 MG/1
TABLET, EXTENDED RELEASE ORAL
Qty: 180 TABLET | Refills: 3 | Status: SHIPPED | OUTPATIENT
Start: 2023-08-21

## 2023-08-21 NOTE — ASSESSMENT & PLAN NOTE
CERTIFICATE OF WORK       August 2, 2023      Re: Karolyn Plata  [address] [place] [address]      This is to certify that Karolyn Plata has been under my care from 8/2/2023 and can return to regular work on 08/03/2023. Please excuse her from work today 08/02/2023.    RESTRICTIONS: NONE      REMARKS: NONE        SIGNATURE:___________________________________________          Daren Gallegos MD  [facility]  [address]  [place] [address]  Dept Phone: [phone]   7/26/2023 Neurology: continue divalproex 750mg AM and 1000mg HS and lacosamide 200mg HS and 150mg in the morning. Much confusion from both caregiver and patient in regards to Neuro medications. They were under the impression that Depakote was discontinued therefore they were not taking meds.  Have reviewed most recent Neuro visit and have written down medication information so they can use as recommended by specialist.

## 2023-08-21 NOTE — PROGRESS NOTES
Name: Hesham Payer      : 1970      MRN: [id number]  Encounter Provider: Marcella Norman MD  Encounter Date: 2023   Encounter department: [address] [facility]     1. Postoperative hypothyroidism  Assessment & Plan:  Lab Results   Component Value Date    [id number] 152.001 (H) 2023     Endorsing compliance with levothyroxine 150mcg daily however as noted above TSH severely elevated. Will repeat TSH and will adjust levothyroxine dose as needed. Orders:  -     TSH, 3rd generation; Future  -     Ambulatory Referral to Complex Care Management Program; Future  -     Ambulatory Referral to Social Work Care Management Program; Future    2. Focal epilepsy [facility])  Assessment & Plan:  2023 Neurology: continue divalproex 750mg AM and 1000mg HS and lacosamide 200mg HS and 150mg in the morning. Much confusion from both caregiver and patient in regards to Neuro medications. They were under the impression that Depakote was discontinued therefore they were not taking meds. Have reviewed most recent Neuro visit and have written down medication information so they can use as recommended by specialist.    Orders:  -     Ambulatory Referral to Complex Care Management Program; Future  -     Ambulatory Referral to Social Work Care Management Program; Future    3. Localized swelling of lower extremity  Assessment & Plan: Will not give any medication given there is some confusion regarding current medication regimen. Advised to use compression stockings and leg elevation. ED precautions given. 4. Complex care coordination  Assessment & Plan:  Pt has multiple ailments requiring coordinated care with multiple specialists. Care giver and pt both appear to have difficulty in keeping track of recommendations therefore I will be placing a referral to complex care for further assistance.  Will also send referral to  for evaluation of aid for social needs. Lastly, advised pt return to clinic with all of her medications so that med rec can be completed. Orders:  -     Ambulatory Referral to Complex Care Management Program; Future         Subjective      56yo female w/ medical history significant for thyroid cancer s/p total thyroidectomy, focal epilepsy, PCOS, and cognitive developmental delay presenting with her mother Juan Apodaca for evaluation of LE swelling and to continue care with PCP. Pt was previously seen in the office to establish care however she has not followed up since 10/2022. Pt has multiple comorbidities that require frequent follow up with multiple specialists. .    Leg swelling: began 4 months ago. No known trauma to extremities. No orthopnea or SOB noted. Unsure of what medications pt is actually taking. Worse in the evenings. Review of Systems   Constitutional: Negative for chills and fever. Respiratory: Negative for cough and shortness of breath. Cardiovascular: Negative for chest pain. Gastrointestinal: Negative for abdominal pain, blood in stool, constipation, diarrhea, nausea and vomiting. Genitourinary: Negative for dysuria and hematuria. Neurological: Positive for tremors and seizures. Negative for dizziness and headaches. Current Outpatient Medications on File Prior to Visit   Medication Sig   • atorvastatin (LIPITOR) 80 mg tablet Take 1 tablet (80 mg total) by mouth daily with dinner   • divalproex sodium (DEPAKOTE ER) 250 mg 24 hr tablet Take 3 tablets (750 mg total) by mouth every morning   • divalproex sodium (DEPAKOTE ER) 500 mg 24 hr tablet TAKE 2 TABLETS(1000 MG) BY MOUTH DAILY AT BEDTIME   • folic acid (FOLVITE) 1 mg tablet Take 1 tablet (1 mg total) by mouth daily Do not start before February 7, 2023.    • hydrocortisone 1 % cream Apply to affected area 2 times daily   • ketoconazole (NIZORAL) 2 % shampoo    • lacosamide (VIMPAT) 150 mg tablet Take 1 tablet (150 mg total) by mouth every 12 (twelve) hours   • lacosamide (Vimpat) 50 mg Take 1 tablet (50 mg total) by mouth daily at bedtime   • levothyroxine 150 mcg tablet Take 1 tablet (150 mcg total) by mouth daily in the early morning for 21 days   • metoprolol tartrate (LOPRESSOR) 25 mg tablet Take 1 tablet (25 mg total) by mouth every 12 (twelve) hours   • niacin (SLO-NIACIN) 500 mg tablet Take 2 tablets (1,000 mg total) by mouth 2 (two) times a day with meals   • oxyCODONE (ROXICODONE) 5 immediate release tablet Take 1 tablet (5 mg total) by mouth every 6 (six) hours as needed for severe pain for up to 15 doses Max Daily Amount: 20 mg   • paliperidone (INVEGA) 3 mg 24 hr tablet Take 1 tablet (3 mg total) by mouth every morning       Objective     /78 (BP Location: Right arm, Patient Position: Sitting, Cuff Size: Large)   Pulse 95   Temp (!) 96.1 °F (35.6 °C) (Temporal)   Resp 18   Ht 5' 8" (1.727 m)   Wt 102 kg (225 lb 12.8 oz)   LMP  (LMP Unknown)   SpO2 96%   BMI 34.33 kg/m²     Physical Exam  Vitals and nursing note reviewed. Constitutional:       Appearance: Normal appearance. She is obese. HENT:      Head: Normocephalic. Right Ear: Tympanic membrane, ear canal and external ear normal.      Left Ear: Tympanic membrane, ear canal and external ear normal.   Eyes:      Conjunctiva/sclera: Conjunctivae normal.   Cardiovascular:      Rate and Rhythm: Normal rate and regular rhythm. Pulses: Normal pulses. Heart sounds: Normal heart sounds. Pulmonary:      Effort: Pulmonary effort is normal.      Breath sounds: Normal breath sounds. Abdominal:      General: There is no distension. Palpations: Abdomen is soft. Tenderness: There is no abdominal tenderness. There is no right CVA tenderness, left CVA tenderness, guarding or rebound. Skin:     General: Skin is warm and dry. Capillary Refill: Capillary refill takes less than 2 seconds. Neurological:      General: No focal deficit present.       Mental Status: She is alert and oriented to person, place, and time. Comments: Resting tremor   Psychiatric:         Mood and Affect: Mood normal.         Behavior: Behavior normal. Behavior is cooperative.        Gema Arrington MD

## 2023-08-21 NOTE — ASSESSMENT & PLAN NOTE
Lab Results   Component Value Date    [id number] 152.001 (H) 06/29/2023     Endorsing compliance with levothyroxine 150mcg daily however as noted above TSH severely elevated. Will repeat TSH and will adjust levothyroxine dose as needed.

## 2023-08-22 ENCOUNTER — PATIENT OUTREACH (OUTPATIENT)
Dept: FAMILY MEDICINE CLINIC | Facility: CLINIC | Age: 53
End: 2023-08-22

## 2023-08-22 PROBLEM — M79.89 LOCALIZED SWELLING OF LOWER EXTREMITY: Status: ACTIVE | Noted: 2023-08-22

## 2023-08-22 PROBLEM — Z71.89 COMPLEX CARE COORDINATION: Status: ACTIVE | Noted: 2023-08-22

## 2023-08-22 NOTE — ASSESSMENT & PLAN NOTE
Will not give any medication given there is some confusion regarding current medication regimen. Advised to use compression stockings and leg elevation. ED precautions given.

## 2023-08-22 NOTE — PROGRESS NOTES
Direct Provider Referral.    I called the patient's mother using [employer] but received voicemail. Message was left asking for a return call. I will continue further outreach. Chart reviewed.

## 2023-08-22 NOTE — ASSESSMENT & PLAN NOTE
Pt has multiple ailments requiring coordinated care with multiple specialists. Care giver and pt both appear to have difficulty in keeping track of recommendations therefore I will be placing a referral to complex care for further assistance. Will also send referral to  for evaluation of aid for social needs. Lastly, advised pt return to clinic with all of her medications so that med rec can be completed.

## 2023-08-24 ENCOUNTER — PATIENT OUTREACH (OUTPATIENT)
Dept: FAMILY MEDICINE CLINIC | Facility: CLINIC | Age: 53
End: 2023-08-24

## 2023-08-24 NOTE — PROGRESS NOTES
I called Louise using [employer] but received voicemail. Message was left asking for a return call and reminding them to have TSH drawn. I am mailing the 'unable to reach' letter and await response.

## 2023-08-24 NOTE — LETTER
Fecha: 08/24/23    Estimado/a German Brucephil Eugene Iraj nombre es Nohemy; Soy un enfermera registrado administrador de [facility]. No pude comunicarme con usted y me gustaría programar un horario para que hablemos por teléfono. Me dedico a ayudar a los pacientes que tienen afecciones médicas complejas a obtener la atención que necesitan. [place] comprende a pacientes que pueden estar en el hospital o en anne higinio de emergencias. Si tiene preguntas, no dude en llamarme. Atentamente.   Nohemy  [address]

## 2023-08-25 ENCOUNTER — TELEPHONE (OUTPATIENT)
Dept: FAMILY MEDICINE CLINIC | Facility: CLINIC | Age: 53
End: 2023-08-25

## 2023-08-25 NOTE — TELEPHONE ENCOUNTER
08/25/23    Pt mom came into the office today requesting refill / script for the following med: Depakote & Lalosamide    Informed Pt mom that the Depakote med was proscribed by another PCP on 08/21/23 and sent to the Pharmacy. Also informed Pt mom that Lalosamide was also Proscribed by other PCP on 07/26/23 and sent to the Pharmacy with Refills. Pt mom stated that she was not aware, but states that she always haves trouble getting her medication at the pharmacy and is requesting if you can proscribe the med. Any questions, please contact Pt Mom.

## 2023-08-30 NOTE — TELEPHONE ENCOUNTER
08/30/23    Pt mom was advised, and she stated that she was going to the Pharmacy in person to check. Also advised mom to come into the office again or contact us if she is not able to get med because PCP will resend med / [phone] Per Message. PT MOM UNDERSTOOD & AGREED.

## 2023-09-01 ENCOUNTER — OFFICE VISIT (OUTPATIENT)
Dept: OBGYN CLINIC | Facility: CLINIC | Age: 53
End: 2023-09-01

## 2023-09-01 ENCOUNTER — HOSPITAL ENCOUNTER (OUTPATIENT)
Dept: RADIOLOGY | Facility: HOSPITAL | Age: 53
End: 2023-09-01
Attending: STUDENT IN AN ORGANIZED HEALTH CARE EDUCATION/TRAINING PROGRAM
Payer: MEDICARE

## 2023-09-01 VITALS — WEIGHT: 225 LBS | HEIGHT: 68 IN | BODY MASS INDEX: 34.1 KG/M2

## 2023-09-01 DIAGNOSIS — S62.92XA CLOSED FRACTURE OF LEFT HAND, INITIAL ENCOUNTER: ICD-10-CM

## 2023-09-01 DIAGNOSIS — S62.92XA CLOSED FRACTURE OF LEFT HAND, INITIAL ENCOUNTER: Primary | ICD-10-CM

## 2023-09-01 PROCEDURE — 99024 POSTOP FOLLOW-UP VISIT: CPT | Performed by: STUDENT IN AN ORGANIZED HEALTH CARE EDUCATION/TRAINING PROGRAM

## 2023-09-01 PROCEDURE — 73130 X-RAY EXAM OF HAND: CPT

## 2023-09-01 NOTE — PROGRESS NOTES
Assessment/Plan:  Patient ID: 50 y.o. female   Surgery: Closed Reduction Percutaneous Pinning - Left Ring Finger - Left  Date of Surgery: 8/3/2023    The patient is doing well postoperatively. X-rays were reviewed in the office today. Pins were pulled without any issues. a referral was provided to OT to work on active range of motion. Patient may wash her hand but no submerging the hand for 3 days to allow pin sites to heal. She will follow up in 4 weeks for repeat evaluation and x-rays. Follow Up:  4 weeks    To Do Next Visit:  X-rays of the  left  hand with 10 degree supinated lateral view      CHIEF COMPLAINT:  Chief Complaint   Patient presents with   • Left Hand - Post-op         SUBJECTIVE:  Patient is a 50y.o. year old female who presents for follow up after Closed Reduction Percutaneous Pinning - Left Ring Finger - Left. Today patient states she is doing well. She has been tolerating the splint well. PHYSICAL EXAMINATION:  General: Well developed and well nourished, alert and oriented x 3, appears comfortable  Psychiatric: Normal    MUSCULOSKELETAL EXAMINATION:  Incision: Clean, dry, intact  Surgery Site: normal, no evidence of infection   Range of Motion: pulp to palm 6 cm all digits  Full finger extension   Neurovascular status: Neuro intact, good cap refill  Done today: Pin removed      STUDIES REVIEWED:  Xrays of the left hand were reviewed and independently interpreted in PACS by Dr. Magaly Beal and demonstrate healing third proximal phalanx fracture.        PROCEDURES PERFORMED:  Procedures  No Procedures performed today    Scribe Attestation    I,:  Anitra Sagastume MA am acting as a scribe while in the presence of the attending physician.:       I,:  Vickie Carr MD personally performed the services described in this documentation    as scribed in my presence.:

## 2023-09-05 ENCOUNTER — TELEPHONE (OUTPATIENT)
Dept: FAMILY MEDICINE CLINIC | Facility: CLINIC | Age: 53
End: 2023-09-05

## 2023-09-05 DIAGNOSIS — E03.9 HYPOTHYROIDISM: ICD-10-CM

## 2023-09-05 RX ORDER — LEVOTHYROXINE SODIUM 0.15 MG/1
150 TABLET ORAL
Qty: 21 TABLET | Refills: 0 | Status: SHIPPED | OUTPATIENT
Start: 2023-09-05 | End: 2023-09-26

## 2023-09-05 NOTE — TELEPHONE ENCOUNTER
Patient came in and stated she needs refill of following medication. Previous script sent by pharmacy was cancelled.      levothyroxine 150 mcg tablet

## 2023-09-07 ENCOUNTER — PATIENT OUTREACH (OUTPATIENT)
Dept: FAMILY MEDICINE CLINIC | Facility: CLINIC | Age: 53
End: 2023-09-07

## 2023-09-12 ENCOUNTER — HOSPITAL ENCOUNTER (EMERGENCY)
Facility: HOSPITAL | Age: 53
Discharge: HOME | End: 2023-09-12
Attending: EMERGENCY MEDICINE
Payer: COMMERCIAL

## 2023-09-12 ENCOUNTER — APPOINTMENT (EMERGENCY)
Dept: RADIOLOGY | Facility: HOSPITAL | Age: 53
End: 2023-09-12
Payer: COMMERCIAL

## 2023-09-12 VITALS
RESPIRATION RATE: 18 BRPM | OXYGEN SATURATION: 95 % | TEMPERATURE: 97.6 F | HEART RATE: 94 BPM | DIASTOLIC BLOOD PRESSURE: 87 MMHG | SYSTOLIC BLOOD PRESSURE: 123 MMHG

## 2023-09-12 DIAGNOSIS — R60.9 SWELLING: Primary | ICD-10-CM

## 2023-09-12 LAB
ALBUMIN SERPL BCP-MCNC: 3.7 G/DL (ref 3.5–5)
ALP SERPL-CCNC: 35 U/L (ref 34–104)
ALT SERPL W P-5'-P-CCNC: 6 U/L (ref 7–52)
ANION GAP SERPL CALCULATED.3IONS-SCNC: 5 MMOL/L
AST SERPL W P-5'-P-CCNC: 11 U/L (ref 13–39)
BASOPHILS # BLD AUTO: 0.04 THOUSANDS/ÂΜL (ref 0–0.1)
BASOPHILS NFR BLD AUTO: 1 % (ref 0–1)
BILIRUB SERPL-MCNC: 0.26 MG/DL (ref 0.2–1)
BUN SERPL-MCNC: 18 MG/DL (ref 5–25)
CALCIUM SERPL-MCNC: 8.8 MG/DL (ref 8.4–10.2)
CHLORIDE SERPL-SCNC: 99 MMOL/L (ref 96–108)
CO2 SERPL-SCNC: 32 MMOL/L (ref 21–32)
CREAT SERPL-MCNC: 1 MG/DL (ref 0.6–1.3)
EOSINOPHIL # BLD AUTO: 0.07 THOUSAND/ÂΜL (ref 0–0.61)
EOSINOPHIL NFR BLD AUTO: 2 % (ref 0–6)
ERYTHROCYTE [DISTWIDTH] IN BLOOD BY AUTOMATED COUNT: 12.1 % (ref 11.6–15.1)
GFR SERPL CREATININE-BSD FRML MDRD: 65 ML/MIN/1.73SQ M
GLUCOSE SERPL-MCNC: 80 MG/DL (ref 65–140)
HCT VFR BLD AUTO: 37.9 % (ref 34.8–46.1)
HGB BLD-MCNC: 12.7 G/DL (ref 11.5–15.4)
IMM GRANULOCYTES # BLD AUTO: 0.03 THOUSAND/UL (ref 0–0.2)
IMM GRANULOCYTES NFR BLD AUTO: 1 % (ref 0–2)
LYMPHOCYTES # BLD AUTO: 1.88 THOUSANDS/ÂΜL (ref 0.6–4.47)
LYMPHOCYTES NFR BLD AUTO: 49 % (ref 14–44)
MCH RBC QN AUTO: 34.1 PG (ref 26.8–34.3)
MCHC RBC AUTO-ENTMCNC: 33.5 G/DL (ref 31.4–37.4)
MCV RBC AUTO: 102 FL (ref 82–98)
MONOCYTES # BLD AUTO: 0.4 THOUSAND/ÂΜL (ref 0.17–1.22)
MONOCYTES NFR BLD AUTO: 10 % (ref 4–12)
NEUTROPHILS # BLD AUTO: 1.41 THOUSANDS/ÂΜL (ref 1.85–7.62)
NEUTS SEG NFR BLD AUTO: 37 % (ref 43–75)
NRBC BLD AUTO-RTO: 0 /100 WBCS
PLATELET # BLD AUTO: 153 THOUSANDS/UL (ref 149–390)
PMV BLD AUTO: 10.6 FL (ref 8.9–12.7)
POTASSIUM SERPL-SCNC: 4.5 MMOL/L (ref 3.5–5.3)
PROT SERPL-MCNC: 6.9 G/DL (ref 6.4–8.4)
RBC # BLD AUTO: 3.72 MILLION/UL (ref 3.81–5.12)
SODIUM SERPL-SCNC: 136 MMOL/L (ref 135–147)
T4 FREE SERPL-MCNC: 0.8 NG/DL (ref 0.61–1.12)
TSH SERPL DL<=0.05 MIU/L-ACNC: 29.78 UIU/ML (ref 0.45–4.5)
WBC # BLD AUTO: 3.83 THOUSAND/UL (ref 4.31–10.16)

## 2023-09-12 PROCEDURE — 71046 X-RAY EXAM CHEST 2 VIEWS: CPT

## 2023-09-12 PROCEDURE — 85025 COMPLETE CBC W/AUTO DIFF WBC: CPT

## 2023-09-12 PROCEDURE — 99283 EMERGENCY DEPT VISIT LOW MDM: CPT

## 2023-09-12 PROCEDURE — 99284 EMERGENCY DEPT VISIT MOD MDM: CPT | Performed by: EMERGENCY MEDICINE

## 2023-09-12 PROCEDURE — 36415 COLL VENOUS BLD VENIPUNCTURE: CPT

## 2023-09-12 PROCEDURE — 84439 ASSAY OF FREE THYROXINE: CPT

## 2023-09-12 PROCEDURE — 80053 COMPREHEN METABOLIC PANEL: CPT

## 2023-09-12 PROCEDURE — 84443 ASSAY THYROID STIM HORMONE: CPT

## 2023-09-12 NOTE — DISCHARGE INSTRUCTIONS
Your labs and xray are reassuring. We do not have a specific cause for the symptoms you have been experiencing, but at this point in time we believe the best next step is to discuss it with your family doctor as they may have other testing they would ilke to perform. Please return to the ED with difficulty breathing, chest pain, or other concerning symptoms.

## 2023-09-15 DIAGNOSIS — R56.9 SEIZURE (HCC): ICD-10-CM

## 2023-09-15 RX ORDER — DIVALPROEX SODIUM 500 MG/1
TABLET, EXTENDED RELEASE ORAL
Qty: 180 TABLET | Refills: 3 | Status: SHIPPED | OUTPATIENT
Start: 2023-09-15

## 2023-09-25 ENCOUNTER — TELEPHONE (OUTPATIENT)
Dept: FAMILY MEDICINE CLINIC | Facility: CLINIC | Age: 53
End: 2023-09-25

## 2023-09-29 ENCOUNTER — OFFICE VISIT (OUTPATIENT)
Dept: OBGYN CLINIC | Facility: CLINIC | Age: 53
End: 2023-09-29

## 2023-09-29 ENCOUNTER — HOSPITAL ENCOUNTER (OUTPATIENT)
Dept: RADIOLOGY | Facility: HOSPITAL | Age: 53
End: 2023-09-29
Attending: STUDENT IN AN ORGANIZED HEALTH CARE EDUCATION/TRAINING PROGRAM
Payer: COMMERCIAL

## 2023-09-29 VITALS — HEIGHT: 68 IN | WEIGHT: 255 LBS | BODY MASS INDEX: 38.65 KG/M2

## 2023-09-29 DIAGNOSIS — S62.92XA CLOSED FRACTURE OF LEFT HAND, INITIAL ENCOUNTER: ICD-10-CM

## 2023-09-29 DIAGNOSIS — S62.92XA CLOSED FRACTURE OF LEFT HAND, INITIAL ENCOUNTER: Primary | ICD-10-CM

## 2023-09-29 PROCEDURE — 73130 X-RAY EXAM OF HAND: CPT

## 2023-09-29 PROCEDURE — 99024 POSTOP FOLLOW-UP VISIT: CPT | Performed by: STUDENT IN AN ORGANIZED HEALTH CARE EDUCATION/TRAINING PROGRAM

## 2023-09-29 NOTE — PROGRESS NOTES
Assessment/Plan:  Patient ID: 50 y.o. female   Surgery: Closed Reduction Percutaneous Pinning - Left Ring Finger - Left  Date of Surgery: 8/3/2023    8 weeks s/p above surgery. X-rays were performed in the office and reviewed, fracture is healed. She was provided with a therapy script at her last visit, but she has not started therapy thus far. She was advised to start formal therapy as she is stiff. She will attend the [address] [facility] location in [facility]. Follow Up:  6 weeks    To Do Next Visit:  Re-evaluation of current issue      CHIEF COMPLAINT:  Chief Complaint   Patient presents with   • Left Hand - Post-op         SUBJECTIVE:  Patient is a 50y.o. year old female who presents for follow up after Closed Reduction Percutaneous Pinning - Left Ring Finger - Left. At last appointment pins were pulled and patient was given a script for therapy. Today patient states she has not started formal therapy thus far.        PHYSICAL EXAMINATION:  General: Well developed and well nourished, alert and oriented x 3, appears comfortable  Psychiatric: Normal    MUSCULOSKELETAL EXAMINATION:  Incision: healed  Surgery Site: normal, no evidence of infection   Range of Motion: see below   Neurovascular status: Neuro intact, good cap refill   Full MCP extension   PIP is 10 degrees shy of full extension   MCP flexion 65  PIP flexion 75  Tip to palm 3CM       STUDIES REVIEWED:  Xrays of the left ring finger were reviewed and independently interpreted in PACS by Dr. Neal Olmos and demonstrate healed ring proximal phalanx fracture       PROCEDURES PERFORMED:  Procedures  No Procedures performed today    Scribe Attestation    I,:  Zach Sagastume am acting as a scribe while in the presence of the attending physician.:       I,:  Paco Lucas MD personally performed the services described in this documentation    as scribed in my presence.:

## 2023-10-09 ENCOUNTER — OFFICE VISIT (OUTPATIENT)
Dept: FAMILY MEDICINE CLINIC | Facility: CLINIC | Age: 53
End: 2023-10-09

## 2023-10-09 VITALS
BODY MASS INDEX: 33.98 KG/M2 | DIASTOLIC BLOOD PRESSURE: 62 MMHG | OXYGEN SATURATION: 93 % | TEMPERATURE: 98 F | HEART RATE: 101 BPM | HEIGHT: 68 IN | WEIGHT: 224.2 LBS | RESPIRATION RATE: 20 BRPM | SYSTOLIC BLOOD PRESSURE: 102 MMHG

## 2023-10-09 DIAGNOSIS — E89.0 POSTOPERATIVE HYPOTHYROIDISM: ICD-10-CM

## 2023-10-09 DIAGNOSIS — Z78.9 IMPAIRED ABILITY TO MANAGE MEDICATION REGIME: ICD-10-CM

## 2023-10-09 DIAGNOSIS — F81.9 COGNITIVE DEVELOPMENTAL DELAY: ICD-10-CM

## 2023-10-09 DIAGNOSIS — R25.1 TREMOR: ICD-10-CM

## 2023-10-09 DIAGNOSIS — G40.909 SEIZURE DISORDER (HCC): Primary | ICD-10-CM

## 2023-10-09 PROCEDURE — 99213 OFFICE O/P EST LOW 20 MIN: CPT | Performed by: FAMILY MEDICINE

## 2023-10-09 PROCEDURE — 3074F SYST BP LT 130 MM HG: CPT | Performed by: FAMILY MEDICINE

## 2023-10-09 PROCEDURE — 3078F DIAST BP <80 MM HG: CPT | Performed by: FAMILY MEDICINE

## 2023-10-09 RX ORDER — LEVOTHYROXINE SODIUM 175 UG/1
175 TABLET ORAL DAILY
Qty: 90 TABLET | Refills: 0 | Status: SHIPPED | OUTPATIENT
Start: 2023-10-09 | End: 2024-01-07

## 2023-10-09 NOTE — PROGRESS NOTES
Name: Fredric Heimlich      : 1970      MRN: [id number]  Encounter Provider: Ricky Lundebrg MD  Encounter Date: 10/9/2023   Encounter department: [address] [facility],[address] Floor     1. Seizure disorder [facility])  Assessment & Plan:  Following with Neurology. Per Neurology visit 2023: should be taking lacosamide 150mg AM and 200mg PM, divalproex 750mg AM and 1000mg PM.     Medication adherence still questionable as family unsure of what medication doses Suresholljorge should be taking. Previous referral sent to complex care for assistance in management. Next Neurology visit scheduled for 2023. Counseled once again on medication adherence and have written down instructions for both anti-seizure medications. 2. Postoperative hypothyroidism  Assessment & Plan:  Lab Results   Component Value Date    [id number] 29.779 (H) 2023     Currently on levothyroxine 150mcg daily. Most recent TSH reviewed and still elevated. Will increase levo dose to 175mcg daily and repeat TSH in 6 weeks. Orders:  -     levothyroxine 175 mcg tablet; Take 1 tablet (175 mcg total) by mouth daily  -     TSH, 3rd generation; Future; Expected date: 2023    3. Cognitive developmental delay    4. Impaired ability to manage medication regime  Assessment & Plan:  Referral placed to complex care for assistance. Will consider sending referral to pharmacy for med rec if pt again fails to bring in home meds for reconciliation. Have hand written regimen for seizure and thyroid medication. 5. Tremor  Assessment & Plan:  Per Neuro note this is second to divalproex however because it is not interfering with activities will continue to monitor. Subjective      56yo female with cognitive developmental delay presenting with her mother for follow up of seizure and hypothyroidism. Family endorsing continued confusion with anti-seizure medication.  They did not bring in medications today for reconciliation. Pt mother states that Jyothi last had a seizure 3 weeks ago that lasted for a few minutes that self improved. They did not seek medical attention at that time. Review of Systems   Constitutional: Negative for chills and fever. Respiratory: Negative for cough and shortness of breath. Cardiovascular: Negative for chest pain and palpitations. Gastrointestinal: Negative for abdominal pain, blood in stool, constipation, diarrhea, nausea and vomiting. Genitourinary: Negative for dysuria and hematuria. Neurological: Positive for tremors and seizures. Negative for syncope and weakness. Current Outpatient Medications on File Prior to Visit   Medication Sig   • atorvastatin (LIPITOR) 80 mg tablet Take 1 tablet (80 mg total) by mouth daily with dinner   • divalproex sodium (DEPAKOTE ER) 250 mg 24 hr tablet TAKE 3 TABLETS(750 MG) BY MOUTH EVERY MORNING   • divalproex sodium (DEPAKOTE ER) 500 mg 24 hr tablet TAKE 2 TABLETS(1000 MG) BY MOUTH DAILY AT BEDTIME   • folic acid (FOLVITE) 1 mg tablet Take 1 tablet (1 mg total) by mouth daily Do not start before February 7, 2023.    • hydrocortisone 1 % cream Apply to affected area 2 times daily   • ketoconazole (NIZORAL) 2 % shampoo    • lacosamide (VIMPAT) 150 mg tablet Take 1 tablet (150 mg total) by mouth every 12 (twelve) hours   • lacosamide (Vimpat) 50 mg Take 1 tablet (50 mg total) by mouth daily at bedtime   • metoprolol tartrate (LOPRESSOR) 25 mg tablet Take 1 tablet (25 mg total) by mouth every 12 (twelve) hours   • niacin (SLO-NIACIN) 500 mg tablet Take 2 tablets (1,000 mg total) by mouth 2 (two) times a day with meals   • oxyCODONE (ROXICODONE) 5 immediate release tablet Take 1 tablet (5 mg total) by mouth every 6 (six) hours as needed for severe pain for up to 15 doses Max Daily Amount: 20 mg   • paliperidone (INVEGA) 3 mg 24 hr tablet Take 1 tablet (3 mg total) by mouth every morning   • [DISCONTINUED] levothyroxine 150 mcg tablet Take 1 tablet (150 mcg total) by mouth daily       Objective     /62 (BP Location: Right arm, Patient Position: Sitting, Cuff Size: Standard)   Pulse 101   Temp 98 °F (36.7 °C) (Tympanic)   Resp 20   Ht 5' 8" (1.727 m)   Wt 102 kg (224 lb 3.2 oz)   LMP  (LMP Unknown)   SpO2 93%   BMI 34.09 kg/m²     Physical Exam  Vitals reviewed. Eyes:      Conjunctiva/sclera: Conjunctivae normal.      Pupils: Pupils are equal, round, and reactive to light. Cardiovascular:      Rate and Rhythm: Normal rate and regular rhythm. Pulses: Normal pulses. Heart sounds: Normal heart sounds. Pulmonary:      Effort: Pulmonary effort is normal.      Breath sounds: Normal breath sounds. Musculoskeletal:         General: Normal range of motion. Cervical back: Normal range of motion. Skin:     General: Skin is warm. Capillary Refill: Capillary refill takes less than 2 seconds. Neurological:      General: No focal deficit present. Mental Status: She is alert. Mental status is at baseline. Motor: Tremor present.    Psychiatric:         Mood and Affect: Mood normal.         Behavior: Behavior normal.       Nemesio Mendez MD

## 2023-10-09 NOTE — ASSESSMENT & PLAN NOTE
Following with Neurology. Per Neurology visit 7/26/2023: should be taking lacosamide 150mg AM and 200mg PM, divalproex 750mg AM and 1000mg PM.     Medication adherence still questionable as family unsure of what medication doses Edolly should be taking. Previous referral sent to complex care for assistance in management. Next Neurology visit scheduled for 11/28/2023. Counseled once again on medication adherence and have written down instructions for both anti-seizure medications.

## 2023-10-09 NOTE — ASSESSMENT & PLAN NOTE
Per Neuro note this is second to divalproex however because it is not interfering with activities will continue to monitor.

## 2023-10-09 NOTE — ASSESSMENT & PLAN NOTE
Referral placed to complex care for assistance. Will consider sending referral to pharmacy for med rec if pt again fails to bring in home meds for reconciliation. Have hand written regimen for seizure and thyroid medication.

## 2023-10-09 NOTE — ASSESSMENT & PLAN NOTE
Lab Results   Component Value Date    [id number] 29.779 (H) 09/12/2023     Currently on levothyroxine 150mcg daily. Most recent TSH reviewed and still elevated. Will increase levo dose to 175mcg daily and repeat TSH in 6 weeks.

## 2023-10-16 ENCOUNTER — OFFICE VISIT (OUTPATIENT)
Dept: FAMILY MEDICINE CLINIC | Facility: CLINIC | Age: 53
End: 2023-10-16

## 2023-10-16 VITALS
RESPIRATION RATE: 17 BRPM | DIASTOLIC BLOOD PRESSURE: 80 MMHG | HEIGHT: 68 IN | WEIGHT: 224 LBS | HEART RATE: 82 BPM | SYSTOLIC BLOOD PRESSURE: 132 MMHG | TEMPERATURE: 97.5 F | OXYGEN SATURATION: 95 % | BODY MASS INDEX: 33.95 KG/M2

## 2023-10-16 DIAGNOSIS — E89.0 POSTOPERATIVE HYPOTHYROIDISM: ICD-10-CM

## 2023-10-16 DIAGNOSIS — I10 HYPERTENSION, UNSPECIFIED TYPE: ICD-10-CM

## 2023-10-16 DIAGNOSIS — G40.019 PARTIAL IDIOPATHIC EPILEPSY WITH SEIZURES OF LOCALIZED ONSET, INTRACTABLE, WITHOUT STATUS EPILEPTICUS (HCC): ICD-10-CM

## 2023-10-16 DIAGNOSIS — Z79.899 MEDICATION MANAGEMENT: ICD-10-CM

## 2023-10-16 DIAGNOSIS — G40.909 SEIZURE DISORDER (HCC): Primary | ICD-10-CM

## 2023-10-16 RX ORDER — LACOSAMIDE 50 MG/1
50 TABLET ORAL
Qty: 30 TABLET | Refills: 5 | Status: SHIPPED | OUTPATIENT
Start: 2023-10-16

## 2023-10-16 NOTE — ASSESSMENT & PLAN NOTE
Thanked pt for coming in with all of her medications. Have updated pt chart accordingly and removed all medication not present in her case. Have marked all of her medications to help pt manage proper dosing and med schedule. Have also reviewed pt's alarms that help her remember to take her medications. Have provided refill for lacosamide 50mg HS.

## 2023-10-16 NOTE — ASSESSMENT & PLAN NOTE
BP Readings from Last 3 Encounters:   10/16/23 132/80   10/09/23 102/62   09/20/23 102/60      Not using any medication at this time though previous tx consisted of metoprolol 25mg (for tachycardia) and losartan 25mg prior to that. As noted, most recent BP values have been wnl and at goal per [facility] <140/90. Will continue to monitor BP without medication as pt already having difficulty with current medication regimen. If needed will begin medication in future.

## 2023-10-16 NOTE — PROGRESS NOTES
Name: Dimitrios Shaikh      : 1970      MRN: [id number]  Encounter Provider: Maya Kimball MD  Encounter Date: 10/16/2023   Encounter department: [address] [facility],[address] Floor     1. Seizure disorder [facility])  Assessment & Plan:  Medications reviewed. Follow up with Neurology on 2023. No seizures since last evaluation. Orders:  -     lacosamide (Vimpat) 50 mg; Take 1 tablet (50 mg total) by mouth daily at bedtime    2. Partial idiopathic epilepsy with seizures of localized onset, intractable, without status epilepticus (HCC)  -     lacosamide (Vimpat) 50 mg; Take 1 tablet (50 mg total) by mouth daily at bedtime    3. Postoperative hypothyroidism  Assessment & Plan:  Lab Results   Component Value Date    [id number] 29.779 (H) 2023      Due for TSH in 6 weeks. Continue with levothyroxine 175mcg daily. 4. Hypertension, unspecified type  Assessment & Plan:  BP Readings from Last 3 Encounters:   10/16/23 132/80   10/09/23 102/62   23 102/60      Not using any medication at this time though previous tx consisted of metoprolol 25mg (for tachycardia) and losartan 25mg prior to that. As noted, most recent BP values have been wnl and at goal per [facility] <140/90. Will continue to monitor BP without medication as pt already having difficulty with current medication regimen. If needed will begin medication in future. Orders:  -     Lipid Panel with Direct LDL reflex; Future    5. Medication management  Assessment & Plan: Thanked pt for coming in with all of her medications. Have updated pt chart accordingly and removed all medication not present in her case. Have marked all of her medications to help pt manage proper dosing and med schedule. Have also reviewed pt's alarms that help her remember to take her medications. Have provided refill for lacosamide 50mg HS.               Subjective      54yo female presenting to clinic with her mother for medication reconciliation. Endorsing no other concerns. Review of Systems   Constitutional:  Negative for chills and fever. Respiratory:  Negative for shortness of breath. Cardiovascular:  Negative for chest pain. Gastrointestinal:  Negative for abdominal pain, diarrhea, nausea and vomiting. Genitourinary:  Negative for dysuria and hematuria. Neurological:  Negative for seizures. Current Outpatient Medications on File Prior to Visit   Medication Sig    divalproex sodium (DEPAKOTE ER) 250 mg 24 hr tablet TAKE 3 TABLETS(750 MG) BY MOUTH EVERY MORNING    divalproex sodium (DEPAKOTE ER) 500 mg 24 hr tablet TAKE 2 TABLETS(1000 MG) BY MOUTH DAILY AT BEDTIME    lacosamide (VIMPAT) 150 mg tablet Take 1 tablet (150 mg total) by mouth every 12 (twelve) hours    levothyroxine 175 mcg tablet Take 1 tablet (175 mcg total) by mouth daily    paliperidone (INVEGA) 3 mg 24 hr tablet Take 1 tablet (3 mg total) by mouth every morning    [DISCONTINUED] atorvastatin (LIPITOR) 80 mg tablet Take 1 tablet (80 mg total) by mouth daily with dinner    [DISCONTINUED] folic acid (FOLVITE) 1 mg tablet Take 1 tablet (1 mg total) by mouth daily Do not start before February 7, 2023.     [DISCONTINUED] hydrocortisone 1 % cream Apply to affected area 2 times daily    [DISCONTINUED] ketoconazole (NIZORAL) 2 % shampoo     [DISCONTINUED] lacosamide (Vimpat) 50 mg Take 1 tablet (50 mg total) by mouth daily at bedtime    [DISCONTINUED] metoprolol tartrate (LOPRESSOR) 25 mg tablet Take 1 tablet (25 mg total) by mouth every 12 (twelve) hours    [DISCONTINUED] niacin (SLO-NIACIN) 500 mg tablet Take 2 tablets (1,000 mg total) by mouth 2 (two) times a day with meals    [DISCONTINUED] oxyCODONE (ROXICODONE) 5 immediate release tablet Take 1 tablet (5 mg total) by mouth every 6 (six) hours as needed for severe pain for up to 15 doses Max Daily Amount: 20 mg       Objective     /80 (BP Location: Right arm, Patient Position: Sitting, Cuff Size: Standard)   Pulse 82   Temp 97.5 °F (36.4 °C) (Temporal)   Resp 17   Ht 5' 8" (1.727 m)   Wt 102 kg (224 lb)   LMP  (LMP Unknown)   SpO2 95%   BMI 34.06 kg/m²     Physical Exam  Vitals reviewed. Eyes:      Conjunctiva/sclera: Conjunctivae normal.   Skin:     General: Skin is warm and dry. Neurological:      Mental Status: She is alert. Mental status is at baseline. Psychiatric:         Mood and Affect: Mood normal.         Behavior: Behavior normal. Behavior is cooperative.        Tamia Campo MD

## 2023-10-16 NOTE — ASSESSMENT & PLAN NOTE
Lab Results   Component Value Date    [id number] 29.779 (H) 09/12/2023      Due for TSH in 6 weeks. Continue with levothyroxine 175mcg daily.

## 2023-10-18 ENCOUNTER — HOSPITAL ENCOUNTER (EMERGENCY)
Facility: HOSPITAL | Age: 53
Discharge: HOME | End: 2023-10-18
Attending: EMERGENCY MEDICINE
Payer: COMMERCIAL

## 2023-10-18 VITALS
RESPIRATION RATE: 16 BRPM | DIASTOLIC BLOOD PRESSURE: 60 MMHG | OXYGEN SATURATION: 92 % | SYSTOLIC BLOOD PRESSURE: 110 MMHG | BODY MASS INDEX: 34.53 KG/M2 | HEART RATE: 104 BPM | TEMPERATURE: 98.4 F | WEIGHT: 227.07 LBS

## 2023-10-18 DIAGNOSIS — R55 NEAR SYNCOPE: Primary | ICD-10-CM

## 2023-10-18 LAB
ANION GAP SERPL CALCULATED.3IONS-SCNC: 8 MMOL/L
ATRIAL RATE: 116 BPM
BASOPHILS # BLD AUTO: 0.01 THOUSANDS/ÂΜL (ref 0–0.1)
BASOPHILS NFR BLD AUTO: 0 % (ref 0–1)
BUN SERPL-MCNC: 22 MG/DL (ref 5–25)
CALCIUM SERPL-MCNC: 9.7 MG/DL (ref 8.4–10.2)
CHLORIDE SERPL-SCNC: 101 MMOL/L (ref 96–108)
CO2 SERPL-SCNC: 28 MMOL/L (ref 21–32)
CREAT SERPL-MCNC: 0.86 MG/DL (ref 0.6–1.3)
EOSINOPHIL # BLD AUTO: 0.04 THOUSAND/ÂΜL (ref 0–0.61)
EOSINOPHIL NFR BLD AUTO: 1 % (ref 0–6)
ERYTHROCYTE [DISTWIDTH] IN BLOOD BY AUTOMATED COUNT: 12.1 % (ref 11.6–15.1)
GFR SERPL CREATININE-BSD FRML MDRD: 79 ML/MIN/1.73SQ M
GLUCOSE SERPL-MCNC: 145 MG/DL (ref 65–140)
HCT VFR BLD AUTO: 40.1 % (ref 34.8–46.1)
HGB BLD-MCNC: 13.7 G/DL (ref 11.5–15.4)
IMM GRANULOCYTES # BLD AUTO: 0.01 THOUSAND/UL (ref 0–0.2)
IMM GRANULOCYTES NFR BLD AUTO: 0 % (ref 0–2)
LYMPHOCYTES # BLD AUTO: 1.3 THOUSANDS/ÂΜL (ref 0.6–4.47)
LYMPHOCYTES NFR BLD AUTO: 41 % (ref 14–44)
MCH RBC QN AUTO: 33.4 PG (ref 26.8–34.3)
MCHC RBC AUTO-ENTMCNC: 34.2 G/DL (ref 31.4–37.4)
MCV RBC AUTO: 98 FL (ref 82–98)
MONOCYTES # BLD AUTO: 0.35 THOUSAND/ÂΜL (ref 0.17–1.22)
MONOCYTES NFR BLD AUTO: 11 % (ref 4–12)
NEUTROPHILS # BLD AUTO: 1.45 THOUSANDS/ÂΜL (ref 1.85–7.62)
NEUTS SEG NFR BLD AUTO: 47 % (ref 43–75)
NRBC BLD AUTO-RTO: 0 /100 WBCS
P AXIS: 67 DEGREES
PLATELET # BLD AUTO: 174 THOUSANDS/UL (ref 149–390)
PMV BLD AUTO: 9.9 FL (ref 8.9–12.7)
POTASSIUM SERPL-SCNC: 3.6 MMOL/L (ref 3.5–5.3)
PR INTERVAL: 142 MS
QRS AXIS: 7 DEGREES
QRSD INTERVAL: 72 MS
QT INTERVAL: 296 MS
QTC INTERVAL: 411 MS
RBC # BLD AUTO: 4.1 MILLION/UL (ref 3.81–5.12)
SODIUM SERPL-SCNC: 137 MMOL/L (ref 135–147)
T WAVE AXIS: 190 DEGREES
VENTRICULAR RATE: 116 BPM
WBC # BLD AUTO: 3.16 THOUSAND/UL (ref 4.31–10.16)

## 2023-10-18 PROCEDURE — 93010 ELECTROCARDIOGRAM REPORT: CPT | Performed by: INTERNAL MEDICINE

## 2023-10-18 PROCEDURE — 85025 COMPLETE CBC W/AUTO DIFF WBC: CPT | Performed by: EMERGENCY MEDICINE

## 2023-10-18 PROCEDURE — 36415 COLL VENOUS BLD VENIPUNCTURE: CPT | Performed by: EMERGENCY MEDICINE

## 2023-10-18 PROCEDURE — 80048 BASIC METABOLIC PNL TOTAL CA: CPT | Performed by: EMERGENCY MEDICINE

## 2023-10-18 PROCEDURE — 93005 ELECTROCARDIOGRAM TRACING: CPT

## 2023-10-18 PROCEDURE — 99284 EMERGENCY DEPT VISIT MOD MDM: CPT

## 2023-10-18 PROCEDURE — 99285 EMERGENCY DEPT VISIT HI MDM: CPT | Performed by: EMERGENCY MEDICINE

## 2023-10-18 NOTE — ED PROVIDER NOTES
History  Chief Complaint   Patient presents with    Syncope     Pt brought in by EMS. Per EMS pt had a witnessed syncopal episode while standing on a sidewalk. EMS reports she may have struck her head when she fell, but unsure. Pt reports she was having abdominal pain right before she passed out. 51-year-old female, history of epilepsy, presents with near syncopal episode. Patient states she was walking, developed cramping pain in stomach and felt like she was going to pass out. Patient states that she did not lose consciousness, denies any injury. Currently feels well and has no complaints. History provided by:  Patient   used: No    Syncope  Associated symptoms: no chest pain, no fever, no headaches, no palpitations and no shortness of breath        Prior to Admission Medications   Prescriptions Last Dose Informant Patient Reported?  Taking?   divalproex sodium (DEPAKOTE ER) 250 mg 24 hr tablet   No No   Sig: TAKE 3 TABLETS(750 MG) BY MOUTH EVERY MORNING   divalproex sodium (DEPAKOTE ER) 500 mg 24 hr tablet   No No   Sig: TAKE 2 TABLETS(1000 MG) BY MOUTH DAILY AT BEDTIME   lacosamide (VIMPAT) 150 mg tablet   No No   Sig: Take 1 tablet (150 mg total) by mouth every 12 (twelve) hours   lacosamide (Vimpat) 50 mg   No No   Sig: Take 1 tablet (50 mg total) by mouth daily at bedtime   levothyroxine 175 mcg tablet   No No   Sig: Take 1 tablet (175 mcg total) by mouth daily   paliperidone (INVEGA) 3 mg 24 hr tablet  Mother No No   Sig: Take 1 tablet (3 mg total) by mouth every morning      Facility-Administered Medications: None       Past Medical History:   Diagnosis Date    Cancer ([address])     thyroid cancer    Disease of thyroid gland     Hyperactivity (behavior)     Last Assessed: 11/7/2014     Hyperlipidemia     Obesity     Seizures ([address])        Past Surgical History:   Procedure Laterality Date    OTHER SURGICAL HISTORY      Removal of urinary calculus     OTHER SURGICAL HISTORY Rhinologic Surgery     CO OPEN TX PHALANGEAL SHAFT FRACTURE PROX/MIDDLE EA Left 8/3/2023    Procedure: CLOSED REDUCTION PERCUTANEOUS PINNING - LEFT RING FINGER;  Surgeon: Francia Javed MD;  [facility]: [facility] MAIN [facility];  Service: Orthopedics    TOTAL THYROIDECTOMY      LAst Assessed: 11/7/2014       Family History   Problem Relation Age of Onset    Breast cancer Mother 61    Hypertension Mother     ALS Mother     Stroke Mother     Prostate cancer Father     Diabetes type II Father      I have reviewed and agree with the history as documented. E-Cigarette/Vaping    E-Cigarette Use Never User      E-Cigarette/Vaping Substances    Nicotine No     THC No     CBD No     Flavoring No     Other No     Unknown No      Social History     Tobacco Use    Smoking status: Never     Passive exposure: Never    Smokeless tobacco: Never   Vaping Use    Vaping Use: Never used   Substance Use Topics    Alcohol use: Never    Drug use: Never       Review of Systems   Constitutional: Negative. Negative for fever. Respiratory: Negative. Negative for shortness of breath. Cardiovascular:  Positive for syncope. Negative for chest pain and palpitations. Neurological:  Negative for headaches. Physical Exam  Physical Exam  Vitals and nursing note reviewed. Constitutional:       General: She is not in acute distress. HENT:      Head: Normocephalic and atraumatic. Cardiovascular:      Rate and Rhythm: Regular rhythm. Tachycardia present. Pulmonary:      Effort: Pulmonary effort is normal.      Breath sounds: Normal breath sounds. Skin:     General: Skin is warm and dry. Neurological:      General: No focal deficit present. Mental Status: She is alert and oriented to person, place, and time. Motor: No weakness.       Comments: Bilateral upper extremity resting tremors (patient states this is baseline)         Vital Signs  ED Triage Vitals [10/18/23 1418]   Temperature Pulse Respirations Blood Pressure SpO2 98.4 °F (36.9 °C) (!) 121 16 110/60 92 %      Temp src Heart Rate Source Patient Position - Orthostatic VS BP Location FiO2 (%)   -- Monitor Sitting Left arm --      Pain Score       --           Vitals:    10/18/23 1418 10/18/23 1542   BP: 110/60    Pulse: (!) 121 104   Patient Position - Orthostatic VS: Sitting          Visual Acuity      ED Medications  Medications - No data to display    Diagnostic Studies  Results Reviewed       Procedure Component Value Units Date/Time    Basic metabolic panel [[id number]]  (Abnormal) Collected: 10/18/23 1438    Lab Status: Final result Specimen: Blood from Arm, Left Updated: 10/18/23 1515     Sodium 137 mmol/L      Potassium 3.6 mmol/L      Chloride 101 mmol/L      CO2 28 mmol/L      ANION GAP 8 mmol/L      BUN 22 mg/dL      Creatinine 0.86 mg/dL      Glucose 145 mg/dL      Calcium 9.7 mg/dL      eGFR 79 ml/min/1.73sq m     Narrative:      [facility] guidelines for Chronic Kidney Disease (CKD):     Stage 1 with normal or high GFR (GFR > 90 mL/min/1.73 square meters)    Stage 2 Mild CKD (GFR = 60-89 mL/min/1.73 square meters)    Stage 3A Moderate CKD (GFR = 45-59 mL/min/1.73 square meters)    Stage 3B Moderate CKD (GFR = 30-44 mL/min/1.73 square meters)    Stage 4 Severe CKD (GFR = 15-29 mL/min/1.73 square meters)    Stage 5 End Stage CKD (GFR <15 mL/min/1.73 square meters)  Note: GFR calculation is accurate only with a steady state creatinine    CBC and differential [[id number]]  (Abnormal) Collected: 10/18/23 1438    Lab Status: Final result Specimen: Blood from Arm, Left Updated: 10/18/23 1448     WBC 3.16 Thousand/uL      RBC 4.10 Million/uL      Hemoglobin 13.7 g/dL      Hematocrit 40.1 %      MCV 98 fL      MCH 33.4 pg      MCHC 34.2 g/dL      RDW 12.1 %      MPV 9.9 fL      Platelets 454 Thousands/uL      nRBC 0 /100 WBCs      Neutrophils Relative 47 %      Immat GRANS % 0 %      Lymphocytes Relative 41 %      Monocytes Relative 11 % Eosinophils Relative 1 %      Basophils Relative 0 %      Neutrophils Absolute 1.45 Thousands/µL      Immature Grans Absolute 0.01 Thousand/uL      Lymphocytes Absolute 1.30 Thousands/µL      Monocytes Absolute 0.35 Thousand/µL      Eosinophils Absolute 0.04 Thousand/µL      Basophils Absolute 0.01 Thousands/µL                    No orders to display              Procedures  ECG 12 Lead Documentation Only    Date/Time: 10/18/2023 2:44 PM    Performed by: Kaci Barron MD  Authorized by: Kaci Barron MD    ECG reviewed by me, the ED Provider: yes    Patient location:  ED  Previous ECG:     Previous ECG:  Compared to current  Quality:     Tracing quality:  Limited by artifact  Rate:     ECG rate assessment: tachycardic    Rhythm:     Rhythm: sinus tachycardia    Ectopy:     Ectopy: none    QRS:     QRS axis:  Normal    QRS intervals:  Normal  Comments:      Sinus tachycardia 116, baseline artifact due to tremors           ED Course  ED Course as of 10/18/23 1543   Wed Oct 18, 2023   1542 Patient has been awake and alert since arrival.  Feels well and is requesting to be discharged home. Discussed with patient follow-up with primary doctor, instructed to follow-up or return for any worsening or new concerning symptoms. SBIRT 20yo+      Flowsheet Row Most Recent Value   Initial Alcohol Screen: US AUDIT-C     1. How often do you have a drink containing alcohol? 0 Filed at: 10/18/2023 1420   2. How many drinks containing alcohol do you have on a typical day you are drinking? 0 Filed at: 10/18/2023 1420   3a. Male UNDER 65: How often do you have five or more drinks on one occasion? 0 Filed at: 10/18/2023 1420   3b. FEMALE Any Age, or MALE 65+: How often do you have 4 or more drinks on one occassion? 0 Filed at: 10/18/2023 1420   Audit-C Score 0 Filed at: 10/18/2023 1420   CATHY: How many times in the past year have you. ..     Used an illegal drug or used a prescription medication for non-medical reasons? Never Filed at: 10/18/2023 1420                      Medical Decision Making  53-year-old female, history of seizures, presenting with near syncopal episode. Patient states she was walking outside, developed some cramping pain in abdomen then felt like she was going to pass out. Differential diagnosis includes arrhythmia, seizure, vasovagal reaction among other diagnoses. On exam, patient is awake and alert, in no distress, denies any current complaints. EKG and labs ordered, will continue to monitor in ED and reevaluate. Amount and/or Complexity of Data Reviewed  External Data Reviewed: ECG. Labs: ordered. Decision-making details documented in ED Course. ECG/medicine tests: ordered and independent interpretation performed. Decision-making details documented in ED Course. Disposition  Final diagnoses:   Near syncope     Time reflects when diagnosis was documented in both MDM as applicable and the Disposition within this note       Time User Action Codes Description Comment    10/18/2023  3:39 PM Ferd Rides Add [R55] Near syncope           ED Disposition       ED Disposition   Discharge    Condition   Stable    Date/Time   Wed Oct 18, 2023 [address] discharge to home/self care. Follow-up Information       Follow up With Specialties Details Why [address] [facility] 2nd Floor Family Medicine Schedule an appointment as soon as possible for a visit   [address]  [phone]              Patient's Medications   Discharge Prescriptions    No medications on file       No discharge procedures on file.     PDMP Review         Value Time User    PDMP Reviewed  Yes 8/3/2023 10:50 AM [employer], PA-C            ED Provider  Electronically Signed by             Dyllan Villasenor MD  10/18/23 9153

## 2023-10-30 ENCOUNTER — TELEPHONE (OUTPATIENT)
Dept: OBGYN CLINIC | Facility: CLINIC | Age: 53
End: 2023-10-30

## 2023-10-30 NOTE — TELEPHONE ENCOUNTER
Moving staff message to patient chart for documentation.       ----- Message from Robel Wolfe MD sent at 10/30/2023 11:51 AM EDT -----    ----- Message -----  From: Heriberto Begum PT  Sent: 10/30/2023  11:43 AM EDT  To: Michelle Shah, PT; Robel Wolfe MD    Hi Dr. Rosanne Mullen is going to contact her. They are trying to figure out public transportation for her and which office would be closer for her. We'll keep you updated. Thanks,  Abel Cogan  ----- Message -----  From: Robel Wolfe MD  Sent: 10/29/2023   3:06 PM EDT  To: Michelle Shah, PT; Heriberto Begum PT    Hi Team. It looks like Amanda Molina had some therapy scheduled with your team and one appointment was cancelled by provider and patient no showed the other one. She had a CRPP of finger and now has some stiffness. She has some cognitive issues as well. Patient is Martiniquais speaking. If patient does not answer phone, please try calling mother. Please try to call and schedule her in this week. Thank you.

## 2023-11-01 DIAGNOSIS — R56.9 SEIZURE (HCC): ICD-10-CM

## 2023-11-01 RX ORDER — DIVALPROEX SODIUM 250 MG/1
TABLET, EXTENDED RELEASE ORAL
Qty: 270 TABLET | Refills: 1 | Status: SHIPPED | OUTPATIENT
Start: 2023-11-01

## 2023-11-06 ENCOUNTER — TELEPHONE (OUTPATIENT)
Dept: PSYCHIATRY | Facility: CLINIC | Age: 53
End: 2023-11-06

## 2023-11-28 ENCOUNTER — APPOINTMENT (OUTPATIENT)
Dept: LAB | Facility: CLINIC | Age: 53
End: 2023-11-28
Payer: COMMERCIAL

## 2023-11-28 ENCOUNTER — OFFICE VISIT (OUTPATIENT)
Dept: NEUROLOGY | Facility: CLINIC | Age: 53
End: 2023-11-28
Payer: COMMERCIAL

## 2023-11-28 VITALS
WEIGHT: 222.9 LBS | TEMPERATURE: 98.2 F | SYSTOLIC BLOOD PRESSURE: 125 MMHG | HEART RATE: 95 BPM | DIASTOLIC BLOOD PRESSURE: 85 MMHG | BODY MASS INDEX: 33.89 KG/M2

## 2023-11-28 DIAGNOSIS — E55.9 VITAMIN D DEFICIENCY: ICD-10-CM

## 2023-11-28 DIAGNOSIS — G40.909 SEIZURE DISORDER (HCC): ICD-10-CM

## 2023-11-28 DIAGNOSIS — G40.019 PARTIAL IDIOPATHIC EPILEPSY WITH SEIZURES OF LOCALIZED ONSET, INTRACTABLE, WITHOUT STATUS EPILEPTICUS (HCC): ICD-10-CM

## 2023-11-28 DIAGNOSIS — G40.109 FOCAL EPILEPSY (HCC): Primary | ICD-10-CM

## 2023-11-28 DIAGNOSIS — G40.919 BREAKTHROUGH SEIZURE (HCC): ICD-10-CM

## 2023-11-28 DIAGNOSIS — E89.0 POSTOPERATIVE HYPOTHYROIDISM: ICD-10-CM

## 2023-11-28 DIAGNOSIS — G40.109 FOCAL EPILEPSY (HCC): ICD-10-CM

## 2023-11-28 DIAGNOSIS — I10 HYPERTENSION, UNSPECIFIED TYPE: ICD-10-CM

## 2023-11-28 LAB
25(OH)D3 SERPL-MCNC: 17.9 NG/ML (ref 30–100)
ALBUMIN SERPL BCP-MCNC: 3.8 G/DL (ref 3.5–5)
ALP SERPL-CCNC: 46 U/L (ref 34–104)
ALT SERPL W P-5'-P-CCNC: 11 U/L (ref 7–52)
ANION GAP SERPL CALCULATED.3IONS-SCNC: 5 MMOL/L
AST SERPL W P-5'-P-CCNC: 12 U/L (ref 13–39)
BASOPHILS # BLD AUTO: 0.04 THOUSANDS/ÂΜL (ref 0–0.1)
BASOPHILS NFR BLD AUTO: 1 % (ref 0–1)
BILIRUB SERPL-MCNC: 0.41 MG/DL (ref 0.2–1)
BUN SERPL-MCNC: 18 MG/DL (ref 5–25)
CALCIUM SERPL-MCNC: 10 MG/DL (ref 8.4–10.2)
CHLORIDE SERPL-SCNC: 103 MMOL/L (ref 96–108)
CHOLEST SERPL-MCNC: 245 MG/DL
CO2 SERPL-SCNC: 33 MMOL/L (ref 21–32)
CREAT SERPL-MCNC: 0.9 MG/DL (ref 0.6–1.3)
EOSINOPHIL # BLD AUTO: 0.06 THOUSAND/ÂΜL (ref 0–0.61)
EOSINOPHIL NFR BLD AUTO: 2 % (ref 0–6)
ERYTHROCYTE [DISTWIDTH] IN BLOOD BY AUTOMATED COUNT: 12.6 % (ref 11.6–15.1)
GFR SERPL CREATININE-BSD FRML MDRD: 74 ML/MIN/1.73SQ M
GLUCOSE P FAST SERPL-MCNC: 96 MG/DL (ref 65–99)
HCT VFR BLD AUTO: 43.5 % (ref 34.8–46.1)
HDLC SERPL-MCNC: 42 MG/DL
HGB BLD-MCNC: 14.2 G/DL (ref 11.5–15.4)
IMM GRANULOCYTES # BLD AUTO: 0.03 THOUSAND/UL (ref 0–0.2)
IMM GRANULOCYTES NFR BLD AUTO: 1 % (ref 0–2)
LDLC SERPL CALC-MCNC: 144 MG/DL (ref 0–100)
LYMPHOCYTES # BLD AUTO: 1.52 THOUSANDS/ÂΜL (ref 0.6–4.47)
LYMPHOCYTES NFR BLD AUTO: 38 % (ref 14–44)
MCH RBC QN AUTO: 32.6 PG (ref 26.8–34.3)
MCHC RBC AUTO-ENTMCNC: 32.6 G/DL (ref 31.4–37.4)
MCV RBC AUTO: 100 FL (ref 82–98)
MONOCYTES # BLD AUTO: 0.45 THOUSAND/ÂΜL (ref 0.17–1.22)
MONOCYTES NFR BLD AUTO: 11 % (ref 4–12)
NEUTROPHILS # BLD AUTO: 1.87 THOUSANDS/ÂΜL (ref 1.85–7.62)
NEUTS SEG NFR BLD AUTO: 47 % (ref 43–75)
NRBC BLD AUTO-RTO: 0 /100 WBCS
PLATELET # BLD AUTO: 199 THOUSANDS/UL (ref 149–390)
PMV BLD AUTO: 9.9 FL (ref 8.9–12.7)
POTASSIUM SERPL-SCNC: 4.7 MMOL/L (ref 3.5–5.3)
PROT SERPL-MCNC: 7.8 G/DL (ref 6.4–8.4)
RBC # BLD AUTO: 4.35 MILLION/UL (ref 3.81–5.12)
SODIUM SERPL-SCNC: 141 MMOL/L (ref 135–147)
TRIGL SERPL-MCNC: 293 MG/DL
TSH SERPL DL<=0.05 MIU/L-ACNC: 0.03 UIU/ML (ref 0.45–4.5)
VALPROATE SERPL-MCNC: 116 UG/ML (ref 50–100)
WBC # BLD AUTO: 3.97 THOUSAND/UL (ref 4.31–10.16)

## 2023-11-28 PROCEDURE — 82306 VITAMIN D 25 HYDROXY: CPT

## 2023-11-28 PROCEDURE — 80235 DRUG ASSAY LACOSAMIDE: CPT

## 2023-11-28 PROCEDURE — 80164 ASSAY DIPROPYLACETIC ACD TOT: CPT

## 2023-11-28 PROCEDURE — 80061 LIPID PANEL: CPT

## 2023-11-28 PROCEDURE — 85025 COMPLETE CBC W/AUTO DIFF WBC: CPT

## 2023-11-28 PROCEDURE — 99215 OFFICE O/P EST HI 40 MIN: CPT | Performed by: NURSE PRACTITIONER

## 2023-11-28 PROCEDURE — 80053 COMPREHEN METABOLIC PANEL: CPT

## 2023-11-28 PROCEDURE — 36415 COLL VENOUS BLD VENIPUNCTURE: CPT

## 2023-11-28 PROCEDURE — 84443 ASSAY THYROID STIM HORMONE: CPT

## 2023-11-28 RX ORDER — LACOSAMIDE 50 MG/1
50 TABLET ORAL
Qty: 30 TABLET | Refills: 2 | Status: SHIPPED | OUTPATIENT
Start: 2023-11-28

## 2023-11-28 RX ORDER — LACOSAMIDE 150 MG/1
150 TABLET ORAL EVERY 12 HOURS SCHEDULED
Qty: 60 TABLET | Refills: 2 | Status: SHIPPED | OUTPATIENT
Start: 2023-11-28

## 2023-11-28 NOTE — PROGRESS NOTES
Patient ID: Ki Barth is a 48 y.o. female with history of developmental delay and static encephalopathy who returns for follow-up regarding focal epilepsy manifest as simple partial, complex partial and generalized tonic-clonic seizures possibly due to head injury around the age of 5 months, who is returning to Neurology office for follow up of her seizures. Assessment/Plan:    Focal epilepsy [facility])  Patient with focal epilepsy who has been seizure free since her last visit. She confirms she is consistently taking her medications. She brought in her daily pill box and we were able to confirm what she was taking as appropriate. She has been setting an alarm as well to remind her to take her medication. Since she did not take her AEDs yet this morning, she will have blood work done this morning and then take her medications after. Currently taking divalproex 750 mg in the morning and 1000 mg at night as well as lacosamide 150 mg in the morning and 200 mg at night. Prior MRI brain was normal. Most recent EEG with right temporal sharp waves and intermittent right temporal polymorphic theta slowing suggest underlying neuronal dysfunction that is highly epileptogenic. Occasional left temporal sharp waves suggest underlying focal epileptogenic potential. Neurologic exam with bilateral resting tremor and evidence of developmental delay. Due to her ongoing tremor, weight gain, and PCOS we will not make any changes to her Divalproex plan to potentially decrease dose once blood work is resulted. Discussed if changes are made to divalproex dosing, she will need to ensure that she continues to consistently take her lacosamide without missed doses as well. If she continues to be seizure free, goal would be to have her on lacosamide monotherapy. Recommended calling the office with seizures or concerns. Follow up in 3-4 months or sooner if needed with Dr Concha Roth.      Vitamin D deficiency  History of vitamin D deficiency on divalproex. Not currently on supplementation. Checking vitamin D level with labs and will supplement as appropriate. She will Return for 3-4 months with Dr Zainab Marcum. Subjective:  Margarita Obregon is a 48 y.o. female with history of developmental delay and static encephalopathy who returns for follow-up regarding focal epilepsy manifest as simple partial, complex partial and generalized tonic-clonic seizures possibly due to head injury around the age of 5 months, who is returning to Neurology office for follow up of her seizures. She was last seen in the office by Dr Araceli Fitzgerald on 7/26/23. At that time, it was difficult to confirm medication adherence. Noted tremor from divalproex that was not interfering with activities. There was also weight gain related to divalproex and PCOS. Noted if we could establish she was consistently taking lacosamide and seizures were relatively well controlled, could consider monotherapy with lacosamide. No changes were made to AED regimen. Recommended follow up in 4 months or sooner if needed. Since her last visit, she feels that she has been feeling a little better. She does continue to have a tremor. It does bother her sometimes. We reviewed that this is related to kerri Depakote but she reports that her mother thinks that it is something very bad but she is not sure what it is. She is unsure if there are any seizures but she has been consistently taking her medications. She does bring her pill container with her and verified what she is taking with her medication list. She sets an alarm as well. Mother denies her having any recent seizures.  [id number]    Current seizure medications:  - lacosamide 150-200  - divalproex 750-1000  Other medications as per Epic. Event/Seizure semiology:  1. “Big attacks” mother reports the patient tries to sit protect herself. These last couple minutes.   The patient feel something and she describes a stomach pain. There snoring, shakes, moves a lot and foaming at the mouth. Can be combative afterwards. There is a postictal state that may last 30 minutes or more. 2. “Small ones”. Rubs her stomach, turns her head to the right, staring, face turns to the right. Duration is less than a minute. Special Features  Status epilepticus: none known  Self Injury Seizures: unknown  Precipitating Factors: none known     Epilepsy Risk Factors:  Intellectual disability  Head injury (moderate/severe)     Prior AEDs:  Carbamazepine caused behavior problems  Lamotrigine trial in 2020. Stopped on own. Phenobarbital as a child  Phenytoin gingival hyperplasia  Topiramate possible behavior problems     Prior Evaluation:  Routine EEG July 2016 at [facility]:  1- Slow posterior dominant rhythm  2- Frequent right temporal sharp waves  3- Generalized beta activity. REEG 6/10/2021  Abundant right temporal sharp waves and intermittent right temporal polymorphic theta slowing suggest underlying neuronal dysfunction that is highly epileptogenic. Occasional left temporal sharp waves suggest underlying focal epileptogenic potential.      REEG 9/30/2022  This Routine EEG recorded during wakefulness and sleep is abnormal.  The study captures a 1 minute and 20 second left temporal electroclinical seizure involving unresponsiveness, right arm dystonic posturing, as well as oral and left hand automatisms. Most of the study is recorded during stage 2 sleep with only brief wakefulness that occurs immediately before after the seizure. Frequent right temporal sharp waves are present throughout the recording and suggest right temporal epileptogenic potential (right temporal seizure tendency in addition to the left temporal seizure seen during this study). MRI brain 8/18/2021:   IMPRESSION:  No mass effect, acute intracranial hemorrhage or evidence of recent infarction.   No abnormal parenchymal or leptomeningeal enhancement identified  Hippocampal formations are symmetric in size and appearance. No discrete migrational anomalies identified     Psychiatric History:  paranoia and hallucinations in 2020  Following with psychiatry. Social History:   Driving: No  Lives Alone: No  Occupation: on permanent disability     Her history was also obtained from her mother, who was present at today's visit. I reviewed prior neurology notes, most recent labs, as documented in Epic/[employer], and summarized above. Objective:    Blood pressure 125/85, pulse 95, temperature 98.2 °F (36.8 °C), weight 101 kg (222 lb 14.4 oz), not currently breastfeeding. Physical Exam  No apparent distress. Appears well nourished. Mood appropriate for situation     Neurologic Exam  Mental status- alert and oriented to person, place, and time. Speech appropriate for conversation. Limited attention and understanding of medical situation. Cranial Nerves- EOMS normal, facial muscles symmetric, hearing intact, speech normal.      Motor- No pronator drift. Appropriate strength. Moves all extremities freely. Resting tremor of bilateral hands R>L. Sensory-  Intact distally in all extremities to light touch. DTRs- 2+ and symmetric in all extremities. Gait- normal casual gait. Coordination- not assessed at today's visit. ROS:  Review of Systems   Constitutional:  Negative for appetite change, fatigue and fever. HENT: Negative. Negative for hearing loss, tinnitus, trouble swallowing and voice change. Eyes: Negative. Negative for photophobia, pain and visual disturbance. Respiratory: Negative. Negative for shortness of breath. Cardiovascular: Negative. Negative for palpitations. Gastrointestinal: Negative. Negative for nausea and vomiting. Endocrine: Negative. Negative for cold intolerance. Genitourinary: Negative. Negative for dysuria, frequency and urgency.    Musculoskeletal:  Negative for back pain, gait problem, myalgias and neck pain. Skin: Negative. Negative for rash. Allergic/Immunologic: Negative. Neurological: Negative. Negative for dizziness, tremors, seizures, syncope, facial asymmetry, speech difficulty, weakness, light-headedness, numbness and headaches. Hematological: Negative. Does not bruise/bleed easily. Psychiatric/Behavioral: Negative. Negative for confusion, hallucinations and sleep disturbance. All other systems reviewed and are negative. ROS obtained by MA and reviewed by myself. This note may have been created using voice recognition software. There may be unintentional errors such as grammatical errors, spelling errors, or pronoun errors.

## 2023-11-28 NOTE — ASSESSMENT & PLAN NOTE
History of vitamin D deficiency on divalproex. Not currently on supplementation. Checking vitamin D level with labs and will supplement as appropriate.

## 2023-11-28 NOTE — PATIENT INSTRUCTIONS
- Continue current medications  - Have blood work today since you did not take your morning medications yet  - Depending on blood work results, we may be able to make some changes to the Depakote but DO NOT make any changes yet - we will call you with instructions  - Call the office with seizures or concerns  - Follow up in 3-4 months with Dr Munoz Cons

## 2023-11-28 NOTE — ASSESSMENT & PLAN NOTE
Patient with focal epilepsy who has been seizure free since her last visit. She confirms she is consistently taking her medications. She brought in her daily pill box and we were able to confirm what she was taking as appropriate. She has been setting an alarm as well to remind her to take her medication. Since she did not take her AEDs yet this morning, she will have blood work done this morning and then take her medications after. Currently taking divalproex 750 mg in the morning and 1000 mg at night as well as lacosamide 150 mg in the morning and 200 mg at night. Prior MRI brain was normal. Most recent EEG with right temporal sharp waves and intermittent right temporal polymorphic theta slowing suggest underlying neuronal dysfunction that is highly epileptogenic. Occasional left temporal sharp waves suggest underlying focal epileptogenic potential. Neurologic exam with bilateral resting tremor and evidence of developmental delay. Due to her ongoing tremor, weight gain, and PCOS we will not make any changes to her Divalproex plan to potentially decrease dose once blood work is resulted. Discussed if changes are made to divalproex dosing, she will need to ensure that she continues to consistently take her lacosamide without missed doses as well. If she continues to be seizure free, goal would be to have her on lacosamide monotherapy. Recommended calling the office with seizures or concerns. Follow up in 3-4 months or sooner if needed with Dr Tio Gillis

## 2023-12-02 LAB — LACOSAMIDE SERPL-MCNC: 9.4 UG/ML (ref 5–10)

## 2023-12-04 DIAGNOSIS — R56.9 SEIZURE (HCC): ICD-10-CM

## 2023-12-04 RX ORDER — DIVALPROEX SODIUM 250 MG/1
TABLET, EXTENDED RELEASE ORAL
Qty: 270 TABLET | Refills: 3 | Status: SHIPPED | OUTPATIENT
Start: 2023-12-04 | End: 2023-12-06

## 2023-12-06 ENCOUNTER — TELEPHONE (OUTPATIENT)
Dept: NEUROLOGY | Facility: CLINIC | Age: 53
End: 2023-12-06

## 2023-12-06 DIAGNOSIS — E55.9 VITAMIN D DEFICIENCY: Primary | ICD-10-CM

## 2023-12-06 DIAGNOSIS — R56.9 SEIZURE (HCC): ICD-10-CM

## 2023-12-06 RX ORDER — ERGOCALCIFEROL 1.25 MG/1
50000 CAPSULE ORAL WEEKLY
Qty: 12 CAPSULE | Refills: 0 | Status: SHIPPED | OUTPATIENT
Start: 2023-12-06

## 2023-12-06 RX ORDER — DIVALPROEX SODIUM 500 MG/1
500 TABLET, EXTENDED RELEASE ORAL 2 TIMES DAILY
Qty: 180 TABLET | Refills: 1 | Status: SHIPPED | OUTPATIENT
Start: 2023-12-06

## 2023-12-06 RX ORDER — DIVALPROEX SODIUM 250 MG/1
250 TABLET, EXTENDED RELEASE ORAL 2 TIMES DAILY
Qty: 180 TABLET | Refills: 1 | Status: SHIPPED | OUTPATIENT
Start: 2023-12-06

## 2023-12-06 NOTE — TELEPHONE ENCOUNTER
----- Message from Jena Navarrete sent at 12/6/2023  9:46 AM EST -----  Since lacosamide level is good, I am going to start decreasing her Depakote. Currently she is taking 750 mg in the morning and 1000 mg at night. Let's decrease to 750 mg BID (I will send an updated script for her) for about one month. If she is still doing well at that time, we can decrease to 500 mg BID until we see her back. Make sure she knows to continue current dose of lacosamide (vimpat) unchanged. I am going to send high dose vitamin D for her at well. She will take one capsule once per week for 12 weeks.    Thanks  Meg  ----- Message -----  From: Lab, Background User  Sent: 11/28/2023  10:28 AM EST  To: BRINA Navarrete

## 2023-12-06 NOTE — TELEPHONE ENCOUNTER
Called re: below using  services. Agent: Javan Beal  ID #: [id number]     tried several times to explained to pt's mother the instructions below but she was not able to understand. Eventually she provided the  with a number for a family member that speaks English, so that she might assist with the interpretation. Left message on that line to return call to our office. Total time on call 39:44. John Click - would you be able to assist with interpretation of BRINA Cunningham's message below to the pt's mother? Unfortunately, the pt, who speaks [place], is developmentally delayed and she doesn't understand the instructions, so they must be given to the pt's mother, who is Solomon Islander speaking only? The  had extreme difficulty trying to translate BRINA uCnningham's instructions below to the pt's mother. Could you please assist? Thank you. Me     12/6/23 10:12 AM  Note      ----- Message from Jaiden Navarro sent at 12/6/2023  9:46 AM EST -----  Since lacosamide level is good, I am going to start decreasing her Depakote. Currently she is taking 750 mg in the morning and 1000 mg at night. Let's decrease to 750 mg BID (I will send an updated script for her) for about one month. If she is still doing well at that time, we can decrease to 500 mg BID until we see her back. Make sure she knows to continue current dose of lacosamide (vimpat) unchanged. I am going to send high dose vitamin D for her at well. She will take one capsule once per week for 12 weeks.    Thanks  Meg  ----- Message -----  From: Lab, Background User  Sent: 11/28/2023  10:28 AM EST  To: BRINA Patel

## 2023-12-06 NOTE — TELEPHONE ENCOUNTER
Placed call to patient, spoke to patients mom. I relayed medication changes to patients mom, she verbalized understanding. I spoke to patient as well, she stated she needed me to also speak Turkmen. Patients mom verbalized understanding. She will  medication today. No questions or concerns.

## 2023-12-08 ENCOUNTER — OFFICE VISIT (OUTPATIENT)
Dept: FAMILY MEDICINE CLINIC | Facility: CLINIC | Age: 53
End: 2023-12-08

## 2023-12-08 VITALS
SYSTOLIC BLOOD PRESSURE: 100 MMHG | BODY MASS INDEX: 33.3 KG/M2 | OXYGEN SATURATION: 97 % | TEMPERATURE: 96.1 F | WEIGHT: 219 LBS | DIASTOLIC BLOOD PRESSURE: 80 MMHG | RESPIRATION RATE: 16 BRPM | HEART RATE: 90 BPM

## 2023-12-08 DIAGNOSIS — E89.0 POSTOPERATIVE HYPOTHYROIDISM: ICD-10-CM

## 2023-12-08 DIAGNOSIS — Z12.11 COLON CANCER SCREENING: ICD-10-CM

## 2023-12-08 DIAGNOSIS — Z00.00 MEDICARE ANNUAL WELLNESS VISIT, INITIAL: Primary | ICD-10-CM

## 2023-12-08 DIAGNOSIS — Z12.31 ENCOUNTER FOR SCREENING MAMMOGRAM FOR MALIGNANT NEOPLASM OF BREAST: ICD-10-CM

## 2023-12-08 DIAGNOSIS — M81.0 AGE-RELATED OSTEOPOROSIS WITHOUT CURRENT PATHOLOGICAL FRACTURE: ICD-10-CM

## 2023-12-08 DIAGNOSIS — G40.109 FOCAL EPILEPSY (HCC): ICD-10-CM

## 2023-12-08 DIAGNOSIS — Z23 ENCOUNTER FOR IMMUNIZATION: ICD-10-CM

## 2023-12-08 PROBLEM — E03.9 MYXEDEMA: Status: RESOLVED | Noted: 2023-05-15 | Resolved: 2023-12-08

## 2023-12-08 PROBLEM — N90.60 LABIAL HYPERTROPHY: Status: RESOLVED | Noted: 2020-07-10 | Resolved: 2023-12-08

## 2023-12-08 PROBLEM — M79.89 LOCALIZED SWELLING OF LOWER EXTREMITY: Status: RESOLVED | Noted: 2023-08-22 | Resolved: 2023-12-08

## 2023-12-08 PROBLEM — R65.10 SIRS (SYSTEMIC INFLAMMATORY RESPONSE SYNDROME) (HCC): Status: RESOLVED | Noted: 2022-09-30 | Resolved: 2023-12-08

## 2023-12-08 PROBLEM — Z79.899 MEDICATION MANAGEMENT: Status: RESOLVED | Noted: 2023-10-16 | Resolved: 2023-12-08

## 2023-12-08 PROBLEM — Z00.8 MEDICAL CLEARANCE FOR PSYCHIATRIC ADMISSION: Status: RESOLVED | Noted: 2022-03-25 | Resolved: 2023-12-08

## 2023-12-08 PROCEDURE — 3074F SYST BP LT 130 MM HG: CPT | Performed by: FAMILY MEDICINE

## 2023-12-08 PROCEDURE — G0438 PPPS, INITIAL VISIT: HCPCS | Performed by: FAMILY MEDICINE

## 2023-12-08 PROCEDURE — 90677 PCV20 VACCINE IM: CPT | Performed by: FAMILY MEDICINE

## 2023-12-08 PROCEDURE — G0008 ADMIN INFLUENZA VIRUS VAC: HCPCS | Performed by: FAMILY MEDICINE

## 2023-12-08 PROCEDURE — 99213 OFFICE O/P EST LOW 20 MIN: CPT | Performed by: FAMILY MEDICINE

## 2023-12-08 PROCEDURE — 90686 IIV4 VACC NO PRSV 0.5 ML IM: CPT | Performed by: FAMILY MEDICINE

## 2023-12-08 PROCEDURE — G0009 ADMIN PNEUMOCOCCAL VACCINE: HCPCS | Performed by: FAMILY MEDICINE

## 2023-12-08 PROCEDURE — 3079F DIAST BP 80-89 MM HG: CPT | Performed by: FAMILY MEDICINE

## 2023-12-08 NOTE — PROGRESS NOTES
Assessment and Plan:     Problem List Items Addressed This Visit          Endocrine    Hypothyroidism     Lab Results   Component Value Date    [id number] 0.027 (L) 11/28/2023      Will get repeat TSH today. If this value continues to be low, will likely decrease levo dosage. Relevant Orders    TSH, 3rd generation with Free T4 reflex       Nervous and Auditory    Focal epilepsy [facility])     Following with Neurology with most recent visit on 11/28/2023. Per note pt should continue taking divalproex 750 mg in the morning and 1000 mg at night as well as lacosamide 150 mg in the morning and 200 mg at night. She continues to be seizure free since last visit. Advised to continue with Neuro follow in 3-4 months or sooner if needed. Other    Medicare annual wellness visit, initial - Primary     VS and physical examination wnl. Age appropriate screenings and vaccinations reviewed and ordered as noted below. Healthy diet and exercise counseling provided. Last Mammo: Mammo 1/12/2022: negative continue with yearly checks  Pap 3/8/2016: negative, due for repeat. Last Colonoscopy: never completed.  Will send referral to GI         Relevant Orders    HEMOGLOBIN A1C W/ EAG ESTIMATION    Mammo screening bilateral w 3d & cad    DXA bone density spine hip and pelvis     Other Visit Diagnoses       Encounter for immunization        Relevant Orders    influenza vaccine, quadrivalent, 0.5 mL, preservative-free, for adult and pediatric patients 6 mos+ (AFLURIA, FLUARIX, FLULAVAL, FLUZONE) (Completed)    Pneumococcal Conjugate Vaccine 20-valent (Pcv20) (Completed)    Encounter for screening mammogram for malignant neoplasm of breast        Relevant Orders    Mammo screening bilateral w 3d & cad    Colon cancer screening        Relevant Orders    Ambulatory Referral to Gastroenterology    Age-related osteoporosis without current pathological fracture        Relevant Orders    DXA bone density spine hip and pelvis Preventive health issues were discussed with patient, and age appropriate screening tests were ordered as noted in patient's After Visit Summary. Personalized health advice and appropriate referrals for health education or preventive services given if needed, as noted in patient's After Visit Summary. History of Present Illness:     Patient presents for a Medicare Wellness Visit    HPI   Patient Care Team:  Karina Llamas MD as PCP - General (Family Medicine)  Natalia Stevenson MD as PCP - [address] [place] [address])  MD Arvind Grimaldo MD Boni Morn, MD C Benjaman Hem, DO     Review of Systems:     Review of Systems   Constitutional:  Negative for chills and fever. Respiratory:  Negative for cough and shortness of breath. Cardiovascular:  Negative for chest pain. Gastrointestinal:  Negative for abdominal pain, blood in stool, constipation, diarrhea, nausea and vomiting. Genitourinary:  Negative for dysuria and hematuria. Problem List:     Patient Active Problem List   Diagnosis    Vitamin D deficiency    Cognitive developmental delay    Hypertension    Hypothyroidism    Focal epilepsy ([address])    Obesity (BMI 30-39. 9)    Obstructive sleep apnea    History of thyroid cancer    Proteinuria    Encounter for follow-up surveillance of thyroid cancer    Papillary carcinoma of thyroid ([address])    Mixed hyperlipidemia    Brief psychotic disorder ([address])    Family history of diabetes mellitus    Family history of malignant neoplasm of breast    PCOS (polycystic ovarian syndrome)    Intellectual disability    Chronic kidney disease, stage 3 unspecified (HCC)    Dyslipidemia    Gout    Major depression    Schizoaffective disorder (HCC)    Thyroid cancer (HCC)    Transient ischemic attack    Tremor    Vitamin B12 deficiency    Sinus tachycardia    Elevated serum creatinine    Morbid (severe) obesity due to excess calories (HCC)    Bilateral lower extremity edema    Complex care coordination    Impaired ability to manage medication regime    Medicare annual wellness visit, initial      Past Medical and Surgical History:     Past Medical History:   Diagnosis Date    Cancer ([address])     thyroid cancer    Disease of thyroid gland     Hyperactivity (behavior)     Last Assessed: 11/7/2014     Hyperlipidemia     Obesity     Seizures ([address])      Past Surgical History:   Procedure Laterality Date    OTHER SURGICAL HISTORY      Removal of urinary calculus     OTHER SURGICAL HISTORY      Rhinologic Surgery     AZ OPEN TX PHALANGEAL SHAFT FRACTURE PROX/MIDDLE EA Left 8/3/2023    Procedure: CLOSED REDUCTION PERCUTANEOUS PINNING - LEFT RING FINGER;  Surgeon: Emily Adan MD;  [facility]: [facility] MAIN [facility];  Service: Orthopedics    TOTAL THYROIDECTOMY      LAst Assessed: 11/7/2014      Family History:     Family History   Problem Relation Age of Onset    Breast cancer Mother 61    Hypertension Mother     ALS Mother     Stroke Mother     Prostate cancer Father     Diabetes type II Father       Social History:     Social History     Socioeconomic History    Marital status: Single     Spouse name: None    Number of children: None    Years of education: None    Highest education level: None   Occupational History    None   Tobacco Use    Smoking status: Never     Passive exposure: Never    Smokeless tobacco: Never   Vaping Use    Vaping Use: Never used   Substance and Sexual Activity    Alcohol use: Never    Drug use: Never    Sexual activity: Not Currently   Other Topics Concern    None   Social History Narrative    Caffeine Use      Social Determinants of Health     Financial Resource Strain: Low Risk  (12/8/2023)    Overall Financial Resource Strain (CARDIA)     Difficulty of Paying Living Expenses: Not hard at all   Food Insecurity: No Food Insecurity (12/8/2023)    Hunger Vital Sign     Worried About Running Out of Food in the Last Year: Never true     Ran Out of Food in the Last Year: Never true Transportation Needs: Unmet Transportation Needs (12/8/2023)    PRAPARE - Transportation     Lack of Transportation (Medical): Yes     Lack of Transportation (Non-Medical): Yes   Physical Activity: Not on file   Stress: Not on file   Social Connections: Not on file   Intimate Partner Violence: Not on file   Housing Stability: Unknown (5/16/2023)    Housing Stability Vital Sign     Unable to Pay for Housing in the Last Year: No     Number of [address] in the Last Year: Not on file     Unstable Housing in the Last Year: No      Medications and Allergies:     Current Outpatient Medications   Medication Sig Dispense Refill    divalproex sodium (DEPAKOTE ER) 250 mg 24 hr tablet Take 1 tablet (250 mg total) by mouth 2 (two) times a day (In addition to one 500 mg tablet twice per day - total dose of 750 mg twice per day) 180 tablet 1    divalproex sodium (DEPAKOTE ER) 500 mg 24 hr tablet Take 1 tablet (500 mg total) by mouth 2 (two) times a day (In addition to one 250 mg tablet twice per day - total dose of 750 mg twice per day) 180 tablet 1    ergocalciferol (VITAMIN D2) 50,000 units Take 1 capsule (50,000 Units total) by mouth once a week 12 capsule 0    lacosamide (VIMPAT) 150 mg tablet Take 1 tablet (150 mg total) by mouth every 12 (twelve) hours 60 tablet 2    lacosamide (Vimpat) 50 mg Take 1 tablet (50 mg total) by mouth daily at bedtime 30 tablet 2    levothyroxine 175 mcg tablet Take 1 tablet (175 mcg total) by mouth daily 90 tablet 0    paliperidone (INVEGA) 3 mg 24 hr tablet Take 1 tablet (3 mg total) by mouth every morning 30 tablet 2     No current facility-administered medications for this visit.      No Known Allergies   Immunizations:     Immunization History   Administered Date(s) Administered    COVID-19 MODERNA VACC 0.5 ML IM 05/27/2021, 06/22/2021    COVID-19 Pfizer Vac BIVALENT Manuel-sucrose 12 Yr+ IM 10/31/2022    INFLUENZA 11/11/2011    Influenza LAIV (Nasal) 09/24/2015    Influenza Quadrivalent 3 years and older 12/02/2021    Influenza Split Preservative Free ID 10/19/2017    Influenza, injectable, quadrivalent, preservative free 0.5 mL 12/08/2023    Influenza, recombinant, quadrivalent,injectable, preservative free 10/31/2022    Pneumococcal Conjugate Vaccine 20-valent (Pcv20), Polysace 12/08/2023    Pneumococcal Polysaccharide PPV23 06/10/2011    Tdap 06/10/2011    Unknown 09/04/2020, 09/09/2020, 09/16/2020, 09/23/2020, 09/29/2020, 10/28/2020, 01/21/2021, 03/11/2021, 03/24/2022      Health Maintenance:         Topic Date Due    Colorectal Cancer Screening  Never done    Cervical Cancer Screening  03/08/2021    Breast Cancer Screening: Mammogram  01/12/2023    HIV Screening  Completed    Hepatitis C Screening  Completed     There are no preventive care reminders to display for this patient. Medicare Screening Tests and Risk Assessments:     Júnior Bangura is here for her Initial Wellness visit. Health Risk Assessment:   Patient rates overall health as very good. Patient feels that their physical health rating is same. Patient is satisfied with their life. Eyesight was rated as same. Hearing was rated as same. Patient feels that their emotional and mental health rating is slightly better. Patients states they are never, rarely angry. Patient states they are never, rarely unusually tired/fatigued. Pain experienced in the last 7 days has been none. Patient states that she has experienced weight loss or gain in last 6 months. Since 5/2023 has lost 11lbs per records. Fall Risk Screening: In the past year, patient has experienced: no history of falling in past year      Urinary Incontinence Screening:   Patient has not leaked urine accidently in the last six months. Home Safety:  Patient does not have trouble with stairs inside or outside of their home. Patient has working smoke alarms and has working carbon monoxide detector. Home safety hazards include: not having non-slip bath and/or shower mats. Wears sandals that prevent slipping    Nutrition:   Current diet is No Added Salt and Regular. Medications:   Patient is not currently taking any over-the-counter supplements. Patient is able to manage medications. Has pill box that was marked by me to give visual aid to medications. Activities of Daily Living (ADLs)/Instrumental Activities of Daily Living (IADLs):   Walk and transfer into and out of bed and chair?: Yes  Dress and groom yourself?: Yes    Bathe or shower yourself?: Yes    Feed yourself? Yes  Do your laundry/housekeeping?: Yes  Manage your money, pay your bills and track your expenses?: Yes  Make your own meals?: No    Do your own shopping?: Yes    ADL comments: Mother is in charge of making her meals    Previous Hospitalizations:   Any hospitalizations or ED visits within the last 12 months?: Yes    How many hospitalizations have you had in the last year?: more than 4    Hospitalization Comments: Pt has been seen in the hospital 23 times in the last year. Records indicate these visits are due to needing medication refills, breakthrough seizures, bilateral extremity swelling, post fracture care, and hypothyroidism follow up.     Advance Care Planning:     End of Life Decisions reviewed with patient: Yes      Cognitive Screening:   Provider or family/friend/caregiver concerned regarding cognition?: No    PREVENTIVE SCREENINGS      Cardiovascular Screening:    General: Screening Not Indicated and History Lipid Disorder      Diabetes Screening:     General: Risks and Benefits Discussed    Due for: Blood Glucose      Colorectal Cancer Screening:     General: Risks and Benefits Discussed    Due for: Colonoscopy - Low Risk      Breast Cancer Screening:     General: Risks and Benefits Discussed    Due for: Mammogram        Cervical Cancer Screening:    General: Risks and Benefits Discussed    Due for: Cervical Pap Smear and HPV Testing      Osteoporosis Screening:    General: Screening Not Indicated      Abdominal Aortic Aneurysm (AAA) Screening:        General: Screening Not Indicated      Lung Cancer Screening:     General: Screening Not Indicated      Hepatitis C Screening:    General: Screening Current    Screening, Brief Intervention, and Referral to Treatment (SBIRT)    Screening  Typical number of drinks in a day: 0  Typical number of drinks in a week: 0  Interpretation: Low risk drinking behavior. Single Item Drug Screening:  How often have you used an illegal drug (including marijuana) or a prescription medication for non-medical reasons in the past year? never    Single Item Drug Screen Score: 0  Interpretation: Negative screen for possible drug use disorder    Brief Intervention  Alcohol & drug use screenings were reviewed. No concerns regarding substance use disorder identified. Other Counseling Topics:   Car/seat belt/driving safety, skin self-exam, sunscreen and regular weightbearing exercise. No results found. Physical Exam:     /80 (BP Location: Left arm, Patient Position: Sitting, Cuff Size: Large)   Pulse 90   Temp (!) 96.1 °F (35.6 °C)   Resp 16   Wt 99.3 kg (219 lb)   LMP  (LMP Unknown)   SpO2 97%   BMI 33.30 kg/m²     Physical Exam  Vitals reviewed. Constitutional:       General: She is awake. She is not in acute distress. Appearance: Normal appearance. She is well-developed and well-groomed. She is not ill-appearing, toxic-appearing or diaphoretic. HENT:      Head: Normocephalic and atraumatic. Right Ear: Tympanic membrane, ear canal and external ear normal.      Left Ear: Tympanic membrane, ear canal and external ear normal.      Nose: Nose normal.      Mouth/Throat:      Mouth: Mucous membranes are moist.      Pharynx: Oropharynx is clear. Eyes:      General: No scleral icterus. Right eye: No discharge. Left eye: No discharge.       Conjunctiva/sclera: Conjunctivae normal.   Cardiovascular:      Rate and Rhythm: Normal rate and regular rhythm. Pulses: Normal pulses. Radial pulses are 2+ on the right side and 2+ on the left side. Heart sounds: Normal heart sounds, S1 normal and S2 normal. No murmur heard. No friction rub. No gallop. Pulmonary:      Effort: Pulmonary effort is normal. No respiratory distress. Breath sounds: Normal breath sounds and air entry. No stridor. No wheezing, rhonchi or rales. Chest:      Chest wall: No tenderness. Abdominal:      General: There is no distension. Palpations: Abdomen is soft. Tenderness: There is no abdominal tenderness. There is no right CVA tenderness, left CVA tenderness, guarding or rebound. Musculoskeletal:         General: Normal range of motion. Cervical back: Normal range of motion. Right lower leg: No edema. Left lower leg: No edema. Skin:     General: Skin is warm and dry. Coloration: Skin is not jaundiced or pale. Neurological:      General: No focal deficit present. Mental Status: She is alert. Psychiatric:         Mood and Affect: Mood normal.         Speech: Speech normal.         Behavior: Behavior normal. Behavior is cooperative.           Oneida Cruz MD

## 2023-12-08 NOTE — PATIENT INSTRUCTIONS
Medicare Preventive Visit Patient Instructions  Thank you for completing your Welcome to Medicare Visit or Medicare Annual Wellness Visit today. Your next wellness visit will be due in one year (12/8/2024). The screening/preventive services that you may require over the next 5-10 years are detailed below. Some tests may not apply to you based off risk factors and/or age. Screening tests ordered at today's visit but not completed yet may show as past due. Also, please note that scanned in results may not display below. Preventive Screenings:  Service Recommendations Previous Testing/Comments   Colorectal Cancer Screening  * Colonoscopy    * Fecal Occult Blood Test (FOBT)/Fecal Immunochemical Test (FIT)  * Fecal DNA/Cologuard Test  * Flexible Sigmoidoscopy Age: 43-73 years old   Colonoscopy: every 10 years (may be performed more frequently if at higher risk)  OR  FOBT/FIT: every 1 year  OR  Cologuard: every 3 years  OR  Sigmoidoscopy: every 5 years  Screening may be recommended earlier than age 39 if at higher risk for colorectal cancer. Also, an individualized decision between you and your healthcare provider will decide whether screening between the ages of 77-80 would be appropriate. Colonoscopy: Not on file  FOBT/FIT: Not on file  Cologuard: Not on file  Sigmoidoscopy: Not on file          Breast Cancer Screening Age: 36 years old  Frequency: every 1-2 years  Not required if history of left and right mastectomy Mammogram: 01/12/2022        Cervical Cancer Screening Between the ages of 21-29, pap smear recommended once every 3 years. Between the ages of 32-69, can perform pap smear with HPV co-testing every 5 years.    Recommendations may differ for women with a history of total hysterectomy, cervical cancer, or abnormal pap smears in past. Pap Smear: 03/08/2016        Hepatitis C Screening Once for adults born between 1945 and 1965  More frequently in patients at high risk for Hepatitis C Hep C Antibody: 06/29/2023        Diabetes Screening 1-2 times per year if you're at risk for diabetes or have pre-diabetes Fasting glucose: 96 mg/dL (11/28/2023)  A1C: 5.3 % (8/19/2022)      Cholesterol Screening Once every 5 years if you don't have a lipid disorder. May order more often based on risk factors. Lipid panel: 11/28/2023          Other Preventive Screenings Covered by Medicare:  Abdominal Aortic Aneurysm (AAA) Screening: covered once if your at risk. You're considered to be at risk if you have a family history of AAA. Lung Cancer Screening: covers low dose CT scan once per year if you meet all of the following conditions: (1) Age 48-67; (2) No signs or symptoms of lung cancer; (3) Current smoker or have quit smoking within the last 15 years; (4) You have a tobacco smoking history of at least 20 pack years (packs per day multiplied by number of years you smoked); (5) You get a written order from a healthcare provider. Glaucoma Screening: covered annually if you're considered high risk: (1) You have diabetes OR (2) Family history of glaucoma OR (3)  aged 48 and older OR (3)  American aged 72 and older  Osteoporosis Screening: covered every 2 years if you meet one of the following conditions: (1) You're estrogen deficient and at risk for osteoporosis based off medical history and other findings; (2) Have a vertebral abnormality; (3) On glucocorticoid therapy for more than 3 months; (4) Have primary hyperparathyroidism; (5) On osteoporosis medications and need to assess response to drug therapy. Last bone density test (DXA Scan): Not on file. HIV Screening: covered annually if you're between the age of 14-79. Also covered annually if you are younger than 13 and older than 72 with risk factors for HIV infection. For pregnant patients, it is covered up to 3 times per pregnancy.     Immunizations:  Immunization Recommendations   Influenza Vaccine Annual influenza vaccination during flu season is recommended for all persons aged >= 6 months who do not have contraindications   Pneumococcal Vaccine   * Pneumococcal conjugate vaccine = PCV13 (Prevnar 13), PCV15 (Vaxneuvance), PCV20 (Prevnar 20)  * Pneumococcal polysaccharide vaccine = PPSV23 (Pneumovax) Adults 74-03 yo with certain risk factors or if 69+ yo  If never received any pneumonia vaccine: recommend Prevnar 20 (PCV20)  Give PCV20 if previously received 1 dose of PCV13 or PPSV23   Hepatitis B Vaccine 3 dose series if at intermediate or high risk (ex: diabetes, end stage renal disease, liver disease)   Respiratory syncytial virus (RSV) Vaccine - COVERED BY MEDICARE PART D  * RSVPreF3 (Arexvy) CDC recommends that adults 61years of age and older may receive a single dose of RSV vaccine using shared clinical decision-making (SCDM)   Tetanus (Td) Vaccine - COST NOT COVERED BY MEDICARE PART B Following completion of primary series, a booster dose should be given every 10 years to maintain immunity against tetanus. Td may also be given as tetanus wound prophylaxis. Tdap Vaccine - COST NOT COVERED BY MEDICARE PART B Recommended at least once for all adults. For pregnant patients, recommended with each pregnancy. Shingles Vaccine (Shingrix) - COST NOT COVERED BY MEDICARE PART B  2 shot series recommended in those 19 years and older who have or will have weakened immune systems or those 50 years and older     Health Maintenance Due:      Topic Date Due   • Colorectal Cancer Screening  Never done   • Cervical Cancer Screening  03/08/2021   • Breast Cancer Screening: Mammogram  01/12/2023   • HIV Screening  Completed   • Hepatitis C Screening  Completed     Immunizations Due:      Topic Date Due   • Pneumococcal Vaccine: Pediatrics (0 to 5 Years) and At-Risk Patients (6 to 59 Years) (2 - PCV) 06/10/2012   • Influenza Vaccine (1) 09/01/2023     Advance Directives   What are advance directives?   Advance directives are legal documents that state your wishes and plans for medical care. These plans are made ahead of time in case you lose your ability to make decisions for yourself. Advance directives can apply to any medical decision, such as the treatments you want, and if you want to donate organs. What are the types of advance directives? There are many types of advance directives, and each state has rules about how to use them. You may choose a combination of any of the following:  Living will: This is a written record of the treatment you want. You can also choose which treatments you do not want, which to limit, and which to stop at a certain time. This includes surgery, medicine, IV fluid, and tube feedings. Durable power of  for healthcare [facility]): This is a written record that states who you want to make healthcare choices for you when you are unable to make them for yourself. This person, called a proxy, is usually a family member or a friend. You may choose more than 1 proxy. Do not resuscitate (DNR) order:  A DNR order is used in case your heart stops beating or you stop breathing. It is a request not to have certain forms of treatment, such as CPR. A DNR order may be included in other types of advance directives. Medical directive: This covers the care that you want if you are in a coma, near death, or unable to make decisions for yourself. You can list the treatments you want for each condition. Treatment may include pain medicine, surgery, blood transfusions, dialysis, IV or tube feedings, and a ventilator (breathing machine). Values history: This document has questions about your views, beliefs, and how you feel and think about life. This information can help others choose the care that you would choose. Why are advance directives important? An advance directive helps you control your care. Although spoken wishes may be used, it is better to have your wishes written down. Spoken wishes can be misunderstood, or not followed.  Treatments may be given even if you do not want them. An advance directive may make it easier for your family to make difficult choices about your care. Weight Management   Why it is important to manage your weight:  Being overweight increases your risk of health conditions such as heart disease, high blood pressure, type 2 diabetes, and certain types of cancer. It can also increase your risk for osteoarthritis, sleep apnea, and other respiratory problems. Aim for a slow, steady weight loss. Even a small amount of weight loss can lower your risk of health problems. How to lose weight safely:  A safe and healthy way to lose weight is to eat fewer calories and get regular exercise. You can lose up about 1 pound a week by decreasing the number of calories you eat by 500 calories each day. Healthy meal plan for weight management:  A healthy meal plan includes a variety of foods, contains fewer calories, and helps you stay healthy. A healthy meal plan includes the following:  Eat whole-grain foods more often. A healthy meal plan should contain fiber. Fiber is the part of grains, fruits, and vegetables that is not broken down by your body. Whole-grain foods are healthy and provide extra fiber in your diet. Some examples of whole-grain foods are whole-wheat breads and pastas, oatmeal, brown rice, and bulgur. Eat a variety of vegetables every day. Include dark, leafy greens such as spinach, kale, mike greens, and mustard greens. Eat yellow and orange vegetables such as carrots, sweet potatoes, and winter squash. Eat a variety of fruits every day. Choose fresh or canned fruit (canned in its own juice or light syrup) instead of juice. Fruit juice has very little or no fiber. Eat low-fat dairy foods. Drink fat-free (skim) milk or 1% milk. Eat fat-free yogurt and low-fat cottage cheese. Try low-fat cheeses such as mozzarella and other reduced-fat cheeses. Choose meat and other protein foods that are low in fat.   Choose beans or other legumes such as split peas or lentils. Choose fish, skinless poultry (chicken or turkey), or lean cuts of red meat (beef or pork). Before you cook meat or poultry, cut off any visible fat. Use less fat and oil. Try baking foods instead of frying them. Add less fat, such as margarine, sour cream, regular salad dressing and mayonnaise to foods. Eat fewer high-fat foods. Some examples of high-fat foods include french fries, doughnuts, ice cream, and cakes. Eat fewer sweets. Limit foods and drinks that are high in sugar. This includes candy, cookies, regular soda, and sweetened drinks. Exercise:  Exercise at least 30 minutes per day on most days of the week. Some examples of exercise include walking, biking, dancing, and swimming. You can also fit in more physical activity by taking the stairs instead of the elevator or parking farther away from stores. Ask your healthcare provider about the best exercise plan for you. © Copyright [employer] 2018 Information is for End User's use only and may not be sold, redistributed or otherwise used for commercial purposes.  All illustrations and images included in CareNotes® are the copyrighted property of [employer] or [address] [place], [place] [address]ols

## 2023-12-09 NOTE — ASSESSMENT & PLAN NOTE
VS and physical examination wnl. Age appropriate screenings and vaccinations reviewed and ordered as noted below. Healthy diet and exercise counseling provided. Last Mammo: Mammo 1/12/2022: negative continue with yearly checks  Pap 3/8/2016: negative, due for repeat. Last Colonoscopy: never completed.  Will send referral to GI

## 2023-12-09 NOTE — ASSESSMENT & PLAN NOTE
Following with Neurology with most recent visit on 11/28/2023. Per note pt should continue taking divalproex 750 mg in the morning and 1000 mg at night as well as lacosamide 150 mg in the morning and 200 mg at night. She continues to be seizure free since last visit. Advised to continue with Neuro follow in 3-4 months or sooner if needed.

## 2023-12-09 NOTE — ASSESSMENT & PLAN NOTE
Lab Results   Component Value Date    [id number] 0.027 (L) 11/28/2023      Will get repeat TSH today. If this value continues to be low, will likely decrease levo dosage.

## 2023-12-13 ENCOUNTER — PATIENT OUTREACH (OUTPATIENT)
Dept: FAMILY MEDICINE CLINIC | Facility: CLINIC | Age: 53
End: 2023-12-13

## 2023-12-13 NOTE — PROGRESS NOTES
SWCM received referral from provider to assist patient with needs, dated 8/21/2. SWCM completed chart review. SWCM called patient to assist with needs. SWCM unable to reach patient, left voice message in Romanian requesting return call. Contact information provided. SWCM will remain available for further assistance as needed.

## 2023-12-18 ENCOUNTER — HOSPITAL ENCOUNTER (EMERGENCY)
Facility: HOSPITAL | Age: 53
Discharge: HOME | End: 2023-12-18
Attending: EMERGENCY MEDICINE | Admitting: EMERGENCY MEDICINE
Payer: COMMERCIAL

## 2023-12-18 VITALS
DIASTOLIC BLOOD PRESSURE: 78 MMHG | SYSTOLIC BLOOD PRESSURE: 122 MMHG | TEMPERATURE: 97.5 F | OXYGEN SATURATION: 95 % | HEIGHT: 68 IN | WEIGHT: 220.9 LBS | HEART RATE: 90 BPM | BODY MASS INDEX: 33.48 KG/M2 | RESPIRATION RATE: 18 BRPM

## 2023-12-18 DIAGNOSIS — F81.9 COGNITIVE DEVELOPMENTAL DELAY: ICD-10-CM

## 2023-12-18 DIAGNOSIS — L21.0 DANDRUFF IN ADULT: Primary | ICD-10-CM

## 2023-12-18 DIAGNOSIS — Z71.1 WORRIED WELL: ICD-10-CM

## 2023-12-18 LAB
ATRIAL RATE: 101 BPM
P AXIS: 70 DEGREES
PR INTERVAL: 156 MS
QRS AXIS: 64 DEGREES
QRSD INTERVAL: 70 MS
QT INTERVAL: 312 MS
QTC INTERVAL: 404 MS
T WAVE AXIS: 81 DEGREES
VENTRICULAR RATE: 101 BPM

## 2023-12-18 PROCEDURE — 99284 EMERGENCY DEPT VISIT MOD MDM: CPT | Performed by: EMERGENCY MEDICINE

## 2023-12-18 PROCEDURE — 93005 ELECTROCARDIOGRAM TRACING: CPT

## 2023-12-18 PROCEDURE — 99285 EMERGENCY DEPT VISIT HI MDM: CPT

## 2023-12-18 NOTE — DISCHARGE INSTRUCTIONS
Please return to the emergency department if you develop new or worsening symptoms including fever, chest pain, shortness of breath, nausea and vomiting that does not resolve on its own.    Please follow-up with your primary care provider for additional care and management of your seizures and thyroid hormone levels.     Please continue to take your home medications as prescribed.

## 2023-12-18 NOTE — ED PROVIDER NOTES
History  Chief Complaint   Patient presents with    Chest Pain     C/o chest pain and stating she thinks she has a mass on one of her breasts that keeps getting bigger and causing pain. Hx of thyroid cancer. Currently in treatment     Patient is a 53-year-old female with past medical history of thyroid cancer status post total thyroidectomy in 2014, hypothyroidism secondary to total thyroidectomy, treated with levothyroxine, epilepsy, treated with lacosamide/Depakote, essential tremor, cognitive developmental delay, hypertension, obesity, hyperlipidemia, PCOS, who presents to the emergency department along with her mother for evaluation of multiple body abnormalities.  Per mother who is present with the patient, she is concerned about several features of her daughter's physical exam.  She is concerned that her daughter's hair appears to be thinning, appears lessened compared to prior years, she is concerned that the eyes do not appear the same, concerned that the pupils are too small, she is concerned that the neck appears enlarged/has extra folds of tissue, concern at the left breast appears abnormal compared to the right breast, enlarged compared to the right, is concerned that there were too many folds of tissue around her daughter's abdomen, is concerned of the left leg appears enlarged compared to the right leg.  When asked about the origin of the symptoms, she is not able to state a specific timeline/timeframe but reports that she has noticed them in the gradual sense over the last several years.     When asked about acute changes, daughter and mother both deny any, denies recent nausea/vomiting, denies fever, denies recent viral illness/infection, denies chest pain, denies shortness of breath, denies abdominal pain, denies new onset swelling, denies headache, denies loss of appetite/loss of energy.         Prior to Admission Medications   Prescriptions Last Dose Informant Patient Reported? Taking?    divalproex sodium (DEPAKOTE ER) 250 mg 24 hr tablet   No No   Sig: Take 1 tablet (250 mg total) by mouth 2 (two) times a day (In addition to one 500 mg tablet twice per day - total dose of 750 mg twice per day)   divalproex sodium (DEPAKOTE ER) 500 mg 24 hr tablet   No No   Sig: Take 1 tablet (500 mg total) by mouth 2 (two) times a day (In addition to one 250 mg tablet twice per day - total dose of 750 mg twice per day)   ergocalciferol (VITAMIN D2) 50,000 units   No No   Sig: Take 1 capsule (50,000 Units total) by mouth once a week   lacosamide (VIMPAT) 150 mg tablet   No No   Sig: Take 1 tablet (150 mg total) by mouth every 12 (twelve) hours   lacosamide (Vimpat) 50 mg   No No   Sig: Take 1 tablet (50 mg total) by mouth daily at bedtime   levothyroxine 175 mcg tablet   No No   Sig: Take 1 tablet (175 mcg total) by mouth daily   paliperidone (INVEGA) 3 mg 24 hr tablet  Mother No No   Sig: Take 1 tablet (3 mg total) by mouth every morning      Facility-Administered Medications: None       Past Medical History:   Diagnosis Date    Cancer (HCC)     thyroid cancer    Disease of thyroid gland     Hyperactivity (behavior)     Last Assessed: 11/7/2014     Hyperlipidemia     Obesity     Seizures (HCC)        Past Surgical History:   Procedure Laterality Date    OTHER SURGICAL HISTORY      Removal of urinary calculus     OTHER SURGICAL HISTORY      Rhinologic Surgery     NV OPEN TX PHALANGEAL SHAFT FRACTURE PROX/MIDDLE EA Left 8/3/2023    Procedure: CLOSED REDUCTION PERCUTANEOUS PINNING - LEFT RING FINGER;  Surgeon: Fransisco Escalante MD;  Location: [facility] MA[facility];  Service: Orthopedics    TOTAL THYROIDECTOMY      LAst Assessed: 11/7/2014       Family History   Problem Relation Age of Onset    Breast cancer Mother 59    Hypertension Mother     ALS Mother     Stroke Mother     Prostate cancer Father     Diabetes type II Father      I have reviewed and agree with the history as documented.    E-Cigarette/Vaping     E-Cigarette Use Never User      E-Cigarette/Vaping Substances    Nicotine No     THC No     CBD No     Flavoring No     Other No     Unknown No      Social History     Tobacco Use    Smoking status: Never     Passive exposure: Never    Smokeless tobacco: Never   Vaping Use    Vaping status: Never Used   Substance Use Topics    Alcohol use: Never    Drug use: Never        Review of Systems   Constitutional:  Negative for chills and fever.   HENT:  Negative for congestion, ear pain, postnasal drip, rhinorrhea and sore throat.    Eyes:  Negative for pain and visual disturbance.   Respiratory:  Negative for cough and shortness of breath.    Cardiovascular:  Negative for chest pain and palpitations.   Gastrointestinal:  Negative for abdominal pain and vomiting.   Genitourinary:  Negative for dysuria and hematuria.   Musculoskeletal:  Negative for arthralgias and back pain.        Complaint from mother of multiple concerns of swelling / enlarged tissue including neck / L breast / L leg, fatty tissue around neck / waist   Skin:  Negative for color change and rash.   Neurological:  Positive for tremors. Negative for seizures, syncope and headaches.   Psychiatric/Behavioral:  Negative for agitation and confusion.    All other systems reviewed and are negative.      Physical Exam  ED Triage Vitals   Temperature Pulse Respirations Blood Pressure SpO2   12/18/23 1557 12/18/23 1352 12/18/23 1352 12/18/23 1352 12/18/23 1352   97.5 °F (36.4 °C) 97 18 118/79 95 %      Temp Source Heart Rate Source Patient Position - Orthostatic VS BP Location FiO2 (%)   12/18/23 1557 12/18/23 1352 12/18/23 1352 12/18/23 1352 --   Oral Monitor Lying Left arm       Pain Score       --                    Orthostatic Vital Signs  Vitals:    12/18/23 1352 12/18/23 1557   BP: 118/79 122/78   Pulse: 97 90   Patient Position - Orthostatic VS: Lying Lying       Physical Exam  Vitals and nursing note reviewed.   Constitutional:       General: She is not in  acute distress.     Appearance: She is well-developed. She is not ill-appearing or toxic-appearing.   HENT:      Head: Normocephalic and atraumatic.   Eyes:      Conjunctiva/sclera: Conjunctivae normal.   Cardiovascular:      Rate and Rhythm: Normal rate and regular rhythm.      Heart sounds: No murmur heard.  Pulmonary:      Effort: Pulmonary effort is normal. No respiratory distress.      Breath sounds: Normal breath sounds.   Chest:      Chest wall: No mass or tenderness.   Abdominal:      Palpations: Abdomen is soft. There is no mass.      Tenderness: There is no abdominal tenderness. There is no guarding or rebound.   Musculoskeletal:         General: No swelling.      Cervical back: Neck supple.      Right lower leg: No tenderness. No edema.      Left lower leg: No tenderness. No edema.   Skin:     General: Skin is warm and dry.      Capillary Refill: Capillary refill takes less than 2 seconds.      Findings: No ecchymosis, erythema or rash.   Neurological:      General: No focal deficit present.      Mental Status: She is alert.   Psychiatric:         Mood and Affect: Mood normal.         ED Medications  Medications - No data to display    Diagnostic Studies  Results Reviewed       None                   No orders to display         Procedures  Procedures      ED Course                             SBIRT 22yo+      Flowsheet Row Most Recent Value   Initial Alcohol Screen: US AUDIT-C     1. How often do you have a drink containing alcohol? 0 Filed at: 12/18/2023 1425   2. How many drinks containing alcohol do you have on a typical day you are drinking?  0 Filed at: 12/18/2023 1425   3b. FEMALE Any Age, or MALE 65+: How often do you have 4 or more drinks on one occassion? 0 Filed at: 12/18/2023 1425   Audit-C Score 0 Filed at: 12/18/2023 1425   CATHY: How many times in the past year have you...    Used an illegal drug or used a prescription medication for non-medical reasons? Never Filed at: 12/18/2023 1425                   Medical Decision Making  Patient is a 53 y.o. female with PMH of thyroid cancer status post total thyroidectomy, hypothyroidism, seizures/epilepsy, developmental delay who presents to the ED with her mother who is primary caretaker for complaint of multiple body abnormalities.    Vital signs within normal limits. On exam patient's physical exam is largely benign, cardiopulmonary auscultation within normal limits, head is atraumatic/normocephalic, pupils are equal and reactive to light, abdomen is soft/nontender, there is no swelling of the bilateral lower extremities, normal capillary refill, mucous membranes are moist, oropharynx is clear no evidence of erythema/rash/exudate, the focus of concern for the mother includes the hair, which appears normal for someone her stated age, the neck which the mother is complaining swollen, appears normal, the extra folds of tissue around the patient's abdomen appear to be just fatty changes, the left breast appears normal in comparison to the right breast there is no evidence of rash/erythema/mass, the legs are equal in size bilaterally, there is no mass palpably, there is no rash or erythema, the legs appear equal in length, size.    History and physical exam most consistent with a worried well mother, and a well patient. Plan I spent several minutes speaking with the patient as well as with the mother, ascertaining the history of the present illness, the nature of the changes that were being described, the timeframe in which they had happened, and on my physical exam, after determining that the patient had no complaints and no evidence of disease process, I spent a large amount of time educating the patient and the parent on the physical exam features that she was concerned about, and the likelihood that they represented normal age-related changes.    View ED course above for further discussion on patient workup.     All labs reviewed and utilized in the medical  decision making process  All radiology studies independently viewed by me and interpreted by the radiologist.  I reviewed all testing with the patient.     Upon re-evaluation the daughter remains asymptomatic, no complaint, she is safe for return home and outpatient follow-up with her primary care provider at a regular interval.     Plan for care discussed with patient and mother, acknowledges plan for care, consents to plan for care, educated on symptoms concerning for return to the emergency department, feels safe to return home at this time, cleared for discharge.              Disposition  Final diagnoses:   Dandruff in adult   Cognitive developmental delay   Worried well - Caregiver worried for Pt     Time reflects when diagnosis was documented in both MDM as applicable and the Disposition within this note       Time User Action Codes Description Comment    12/18/2023  3:38 PM Benji Porter Add [L21.0] Dandruff in adult     12/18/2023  3:39 PM Benji Porter Add [F81.9] Cognitive developmental delay     12/18/2023  3:44 PM Benji Porter Add [Z71.1] Worried well     12/18/2023  3:45 PM Benji Porter Modify [Z71.1] Worried well Caregiver worried for Pt          ED Disposition       ED Disposition   Discharge    Condition   Stable    Date/Time   Mon Dec 18, 2023 1538    Comment   Esperanza Prajapati discharge to home/self care.                   Follow-up Information       Follow up With Specialties Details Why Contact Info Additional Information    [facility] Go to  for follow up care of your dandruff [address] [address]  [phone] [facility], [address], [place], [address]   [phone]            Discharge Medication List as of 12/18/2023  3:49 PM        CONTINUE these medications which have NOT CHANGED    Details   !! divalproex sodium (DEPAKOTE ER) 250 mg 24 hr tablet Take 1 tablet (250 mg total)  by mouth 2 (two) times a day (In addition to one 500 mg tablet twice per day - total dose of 750 mg twice per day), Starting Wed 12/6/2023, Normal      !! divalproex sodium (DEPAKOTE ER) 500 mg 24 hr tablet Take 1 tablet (500 mg total) by mouth 2 (two) times a day (In addition to one 250 mg tablet twice per day - total dose of 750 mg twice per day), Starting Wed 12/6/2023, Normal      ergocalciferol (VITAMIN D2) 50,000 units Take 1 capsule (50,000 Units total) by mouth once a week, Starting Wed 12/6/2023, Normal      !! lacosamide (VIMPAT) 150 mg tablet Take 1 tablet (150 mg total) by mouth every 12 (twelve) hours, Starting Tue 11/28/2023, Normal      !! lacosamide (Vimpat) 50 mg Take 1 tablet (50 mg total) by mouth daily at bedtime, Starting Tue 11/28/2023, Normal      levothyroxine 175 mcg tablet Take 1 tablet (175 mcg total) by mouth daily, Starting Mon 10/9/2023, Until Sun 1/7/2024, Normal      paliperidone (INVEGA) 3 mg 24 hr tablet Take 1 tablet (3 mg total) by mouth every morning, Starting Thu 6/8/2023, Until Wed 9/6/2023, Normal       !! - Potential duplicate medications found. Please discuss with provider.        No discharge procedures on file.    PDMP Review         Value Time User    PDMP Reviewed  Yes 8/3/2023 10:50 AM Jessica Porter PA-C             ED Provider  Attending physically available and evaluated Esperanza Prajapati. I managed the patient along with the ED Attending.    Electronically Signed by           Benji Porter DO  12/19/23 0695

## 2023-12-18 NOTE — ED ATTENDING ATTESTATION
12/18/2023  IMeg DO, saw and evaluated the patient. I have discussed the patient with the resident/non-physician practitioner and agree with the resident's/non-physician practitioner's findings, Plan of Care, and MDM as documented in the resident's/non-physician practitioner's note, except where noted. All available labs and Radiology studies were reviewed.  I was present for key portions of any procedure(s) performed by the resident/non-physician practitioner and I was immediately available to provide assistance.       At this point I agree with the current assessment done in the Emergency Department.  I have conducted an independent evaluation of this patient a history and physical is as follows:          Sheridan Alexandra760    A 53-year-old female with past medical history of schizoaffective disorder, developmental delay, hypertension, hypothyroidism, epilepsy, thyroid cancer s/p thyroidectomy, hyperlipidemia and CKD; presents with her mother for evaluation of several chronic concerns.  History is provided by the patient's mother, who expresses concern for weight gain, increased neck folds, asymmetric breasts, left leg swelling and dandruff.  Mother states the symptoms have been ongoing for the past 7-8 years, however she would like a diagnosis.  Patient currently offers no complaints, stating she felt well throughout the course of the past several days.  She reports her mother made her come to the ED today.  Pt otherwise denies fever, chills, chest pain, SOB, abd pain, N/V/D, peripheral edema and rashes.       Physical Exam  General Appearance: alert and oriented, nad, non toxic appearing.  Obese.  Skin:  Warm, dry, intact  HEENT: atraumatic, normocephalic  Neck: Supple, trachea midline  Cardiac: RRR; no murmurs, rub, gallops  Pulmonary: lungs CTAB; no wheezes, rales, rhonchi  Gastrointestinal: abdomen soft, nontender, nondistended; no guarding or rebound tenderness; good bowel sounds, no mass or  bruits  Extremities:  no pedal edema, 2+ pulses; no calf tenderness, no clubbing, no cyanosis  Neuro:  no focal motor or sensory deficits, CN 2-12 grossly intact  Psych:  Normal mood and affect, normal judgement and insight    Assessment and Plan:  1.) Worried well by caregiver, pt offering no complaints.  Pt nontoxic with a benign exam.  Reassurance provided to patient and her mother, recommend continued outpatient follow up.  2.) Anika, pt referred to dermatology.       ED Course         Critical Care Time  Procedures

## 2024-01-02 ENCOUNTER — HOSPITAL ENCOUNTER (EMERGENCY)
Facility: HOSPITAL | Age: 54
Discharge: HOME | End: 2024-01-02
Attending: EMERGENCY MEDICINE
Payer: COMMERCIAL

## 2024-01-02 ENCOUNTER — APPOINTMENT (EMERGENCY)
Dept: CT IMAGING | Facility: HOSPITAL | Age: 54
End: 2024-01-02
Payer: COMMERCIAL

## 2024-01-02 VITALS
OXYGEN SATURATION: 96 % | HEART RATE: 88 BPM | SYSTOLIC BLOOD PRESSURE: 132 MMHG | RESPIRATION RATE: 18 BRPM | BODY MASS INDEX: 35.2 KG/M2 | WEIGHT: 231.48 LBS | DIASTOLIC BLOOD PRESSURE: 80 MMHG | TEMPERATURE: 98.4 F

## 2024-01-02 DIAGNOSIS — G40.919 BREAKTHROUGH SEIZURE (HCC): Primary | ICD-10-CM

## 2024-01-02 DIAGNOSIS — M85.2 HYPEROSTOSIS FRONTALIS INTERNA: ICD-10-CM

## 2024-01-02 DIAGNOSIS — D64.9 ANEMIA: ICD-10-CM

## 2024-01-02 LAB
ANION GAP SERPL CALCULATED.3IONS-SCNC: 5 MMOL/L
BASOPHILS # BLD AUTO: 0.02 THOUSANDS/ÂΜL (ref 0–0.1)
BASOPHILS NFR BLD AUTO: 1 % (ref 0–1)
BUN SERPL-MCNC: 20 MG/DL (ref 5–25)
CALCIUM SERPL-MCNC: 8.9 MG/DL (ref 8.4–10.2)
CHLORIDE SERPL-SCNC: 103 MMOL/L (ref 96–108)
CO2 SERPL-SCNC: 30 MMOL/L (ref 21–32)
CREAT SERPL-MCNC: 0.83 MG/DL (ref 0.6–1.3)
EOSINOPHIL # BLD AUTO: 0.1 THOUSAND/ÂΜL (ref 0–0.61)
EOSINOPHIL NFR BLD AUTO: 2 % (ref 0–6)
ERYTHROCYTE [DISTWIDTH] IN BLOOD BY AUTOMATED COUNT: 13.5 % (ref 11.6–15.1)
GFR SERPL CREATININE-BSD FRML MDRD: 81 ML/MIN/1.73SQ M
GLUCOSE SERPL-MCNC: 87 MG/DL (ref 65–140)
GLUCOSE SERPL-MCNC: 88 MG/DL (ref 65–140)
HCT VFR BLD AUTO: 33.8 % (ref 34.8–46.1)
HGB BLD-MCNC: 11.1 G/DL (ref 11.5–15.4)
IMM GRANULOCYTES # BLD AUTO: 0.05 THOUSAND/UL (ref 0–0.2)
IMM GRANULOCYTES NFR BLD AUTO: 1 % (ref 0–2)
LYMPHOCYTES # BLD AUTO: 1.59 THOUSANDS/ÂΜL (ref 0.6–4.47)
LYMPHOCYTES NFR BLD AUTO: 36 % (ref 14–44)
MCH RBC QN AUTO: 33.1 PG (ref 26.8–34.3)
MCHC RBC AUTO-ENTMCNC: 32.8 G/DL (ref 31.4–37.4)
MCV RBC AUTO: 101 FL (ref 82–98)
MONOCYTES # BLD AUTO: 0.71 THOUSAND/ÂΜL (ref 0.17–1.22)
MONOCYTES NFR BLD AUTO: 16 % (ref 4–12)
NEUTROPHILS # BLD AUTO: 1.94 THOUSANDS/ÂΜL (ref 1.85–7.62)
NEUTS SEG NFR BLD AUTO: 44 % (ref 43–75)
NRBC BLD AUTO-RTO: 0 /100 WBCS
PLATELET # BLD AUTO: 184 THOUSANDS/UL (ref 149–390)
PMV BLD AUTO: 11.2 FL (ref 8.9–12.7)
POTASSIUM SERPL-SCNC: 4.5 MMOL/L (ref 3.5–5.3)
RBC # BLD AUTO: 3.35 MILLION/UL (ref 3.81–5.12)
SODIUM SERPL-SCNC: 138 MMOL/L (ref 135–147)
VALPROATE SERPL-MCNC: 97 UG/ML (ref 50–100)
WBC # BLD AUTO: 4.41 THOUSAND/UL (ref 4.31–10.16)

## 2024-01-02 PROCEDURE — G1004 CDSM NDSC: HCPCS

## 2024-01-02 PROCEDURE — 80048 BASIC METABOLIC PNL TOTAL CA: CPT

## 2024-01-02 PROCEDURE — 99284 EMERGENCY DEPT VISIT MOD MDM: CPT

## 2024-01-02 PROCEDURE — 85025 COMPLETE CBC W/AUTO DIFF WBC: CPT

## 2024-01-02 PROCEDURE — 36415 COLL VENOUS BLD VENIPUNCTURE: CPT

## 2024-01-02 PROCEDURE — 82948 REAGENT STRIP/BLOOD GLUCOSE: CPT

## 2024-01-02 PROCEDURE — 70450 CT HEAD/BRAIN W/O DYE: CPT

## 2024-01-02 PROCEDURE — 80164 ASSAY DIPROPYLACETIC ACD TOT: CPT

## 2024-01-02 PROCEDURE — 80235 DRUG ASSAY LACOSAMIDE: CPT

## 2024-01-02 NOTE — ED PROVIDER NOTES
History  Chief Complaint   Patient presents with    Seizure - Prior Hx Of     Mother historian - pt hx of seizure. Was at store today and had a seizure while loading the care with groceries. Mother witnessed fall and states she hit head but was wearing a hoodie.     53 year old female presenting with mother after having seizure today at the [employer]. Witnessed by mother. Did fall and hit her head, was wearing a hoodie. States some head pain. Was confused for a few minutes after seizure. No confusion at present. No nausea or vomiting. No pains other than mild headache. Has been taking seizure medications as prescribed.        Prior to Admission Medications   Prescriptions Last Dose Informant Patient Reported? Taking?   divalproex sodium (DEPAKOTE ER) 250 mg 24 hr tablet   No No   Sig: Take 1 tablet (250 mg total) by mouth 2 (two) times a day (In addition to one 500 mg tablet twice per day - total dose of 750 mg twice per day)   divalproex sodium (DEPAKOTE ER) 500 mg 24 hr tablet   No No   Sig: Take 1 tablet (500 mg total) by mouth 2 (two) times a day (In addition to one 250 mg tablet twice per day - total dose of 750 mg twice per day)   ergocalciferol (VITAMIN D2) 50,000 units   No No   Sig: Take 1 capsule (50,000 Units total) by mouth once a week   lacosamide (VIMPAT) 150 mg tablet   No No   Sig: Take 1 tablet (150 mg total) by mouth every 12 (twelve) hours   lacosamide (Vimpat) 50 mg   No No   Sig: Take 1 tablet (50 mg total) by mouth daily at bedtime   levothyroxine 175 mcg tablet   No No   Sig: Take 1 tablet (175 mcg total) by mouth daily   paliperidone (INVEGA) 3 mg 24 hr tablet  Mother No No   Sig: Take 1 tablet (3 mg total) by mouth every morning      Facility-Administered Medications: None       Past Medical History:   Diagnosis Date    Cancer (HCC)     thyroid cancer    Disease of thyroid gland     Hyperactivity (behavior)     Last Assessed: 11/7/2014     Hyperlipidemia     Obesity     Seizures (HCC)         Past Surgical History:   Procedure Laterality Date    OTHER SURGICAL HISTORY      Removal of urinary calculus     OTHER SURGICAL HISTORY      Rhinologic Surgery     [place] OPEN TX PHALANGEAL SHAFT FRACTURE PROX/MIDDLE EA Left 8/3/2023    Procedure: CLOSED REDUCTION PERCUTANEOUS PINNING - LEFT RING FINGER;  Surgeon: Fransisco Escalante MD;  Location: [facility] MAIN OR;  Service: Orthopedics    TOTAL THYROIDECTOMY      LAst Assessed: 11/7/2014       Family History   Problem Relation Age of Onset    Breast cancer Mother 59    Hypertension Mother     ALS Mother     Stroke Mother     Prostate cancer Father     Diabetes type II Father      I have reviewed and agree with the history as documented.    E-Cigarette/Vaping    E-Cigarette Use Never User      E-Cigarette/Vaping Substances    Nicotine No     THC No     CBD No     Flavoring No     Other No     Unknown No      Social History     Tobacco Use    Smoking status: Never     Passive exposure: Never    Smokeless tobacco: Never   Vaping Use    Vaping status: Never Used   Substance Use Topics    Alcohol use: Never    Drug use: Never       Review of Systems   Constitutional:  Negative for chills and fever.   HENT:  Negative for ear pain and sore throat.    Eyes:  Negative for pain and visual disturbance.   Respiratory:  Negative for cough and shortness of breath.    Cardiovascular:  Negative for chest pain and palpitations.   Gastrointestinal:  Negative for abdominal pain and vomiting.   Genitourinary:  Negative for dysuria and hematuria.   Musculoskeletal:  Negative for arthralgias and back pain.   Skin:  Negative for color change and rash.   Neurological:  Positive for seizures and headaches. Negative for dizziness, tremors, syncope, facial asymmetry, speech difficulty, weakness, light-headedness and numbness.   All other systems reviewed and are negative.      Physical Exam  Physical Exam  Vitals and nursing note reviewed.   Constitutional:       General: She is not in  acute distress.     Appearance: She is well-developed. She is not ill-appearing.   HENT:      Head: Normocephalic and atraumatic.   Eyes:      Conjunctiva/sclera: Conjunctivae normal.   Cardiovascular:      Rate and Rhythm: Normal rate and regular rhythm.      Pulses: Normal pulses.      Heart sounds: Normal heart sounds. No murmur heard.     No friction rub. No gallop.   Pulmonary:      Effort: Pulmonary effort is normal. No respiratory distress.      Breath sounds: Normal breath sounds.   Abdominal:      Palpations: Abdomen is soft.      Tenderness: There is no abdominal tenderness.   Musculoskeletal:         General: No swelling.      Cervical back: Neck supple.      Right lower leg: No edema.      Left lower leg: No edema.   Skin:     General: Skin is warm and dry.      Capillary Refill: Capillary refill takes less than 2 seconds.   Neurological:      Mental Status: She is alert.   Psychiatric:         Mood and Affect: Mood normal.         Vital Signs  ED Triage Vitals   Temperature Pulse Respirations Blood Pressure SpO2   01/02/24 1413 01/02/24 1413 01/02/24 1413 01/02/24 1413 01/02/24 1413   98.4 °F (36.9 °C) 88 18 132/80 96 %      Temp Source Heart Rate Source Patient Position - Orthostatic VS BP Location FiO2 (%)   01/02/24 1413 01/02/24 1413 01/02/24 1413 01/02/24 1413 --   Tympanic Monitor Lying Left arm       Pain Score       01/02/24 1646       No Pain           Vitals:    01/02/24 1413   BP: 132/80   Pulse: 88   Patient Position - Orthostatic VS: Lying         Visual Acuity  Visual Acuity      Flowsheet Row Most Recent Value   L Pupil Size (mm) 3   R Pupil Size (mm) 3            ED Medications  Medications - No data to display    Diagnostic Studies  Results Reviewed       Procedure Component Value Units Date/Time    Valproic acid level, total [[id number]]  (Normal) Collected: 01/02/24 1551    Lab Status: Final result Specimen: Blood from Arm, Right Updated: 01/02/24 1647     Valproic Acid, Total 97 ug/mL      Basic metabolic panel [[id number]] Collected: 01/02/24 1551    Lab Status: Final result Specimen: Blood from Arm, Right Updated: 01/02/24 1636     Sodium 138 mmol/L      Potassium 4.5 mmol/L      Chloride 103 mmol/L      CO2 30 mmol/L      ANION GAP 5 mmol/L      BUN 20 mg/dL      Creatinine 0.83 mg/dL      Glucose 87 mg/dL      Calcium 8.9 mg/dL      eGFR 81 ml/min/1.73sq m     Narrative:      National Kidney Disease Foundation guidelines for Chronic Kidney Disease (CKD):     Stage 1 with normal or high GFR (GFR > 90 mL/min/1.73 square meters)    Stage 2 Mild CKD (GFR = 60-89 mL/min/1.73 square meters)    Stage 3A Moderate CKD (GFR = 45-59 mL/min/1.73 square meters)    Stage 3B Moderate CKD (GFR = 30-44 mL/min/1.73 square meters)    Stage 4 Severe CKD (GFR = 15-29 mL/min/1.73 square meters)    Stage 5 End Stage CKD (GFR <15 mL/min/1.73 square meters)  Note: GFR calculation is accurate only with a steady state creatinine    CBC and differential [[id number]]  (Abnormal) Collected: 01/02/24 1457    Lab Status: Final result Specimen: Blood from Arm, Left Updated: 01/02/24 1512     WBC 4.41 Thousand/uL      RBC 3.35 Million/uL      Hemoglobin 11.1 g/dL      Hematocrit 33.8 %       fL      MCH 33.1 pg      MCHC 32.8 g/dL      RDW 13.5 %      MPV 11.2 fL      Platelets 184 Thousands/uL      nRBC 0 /100 WBCs      Neutrophils Relative 44 %      Immat GRANS % 1 %      Lymphocytes Relative 36 %      Monocytes Relative 16 %      Eosinophils Relative 2 %      Basophils Relative 1 %      Neutrophils Absolute 1.94 Thousands/µL      Immature Grans Absolute 0.05 Thousand/uL      Lymphocytes Absolute 1.59 Thousands/µL      Monocytes Absolute 0.71 Thousand/µL      Eosinophils Absolute 0.10 Thousand/µL      Basophils Absolute 0.02 Thousands/µL     Lacosamide [[id number]] Collected: 01/02/24 1457    Lab Status: In process Specimen: Blood from Arm, Left Updated: 01/02/24 1504    Fingerstick Glucose (POCT) [[id number]]  (Normal)  Collected: 01/02/24 1500    Lab Status: Final result Updated: 01/02/24 1500     POC Glucose 88 mg/dl                    CT head without contrast   Final Result by Joselito Bonner MD (01/02 1614)      No intracranial hemorrhage or calvarial fracture.                  Resident: ROLF AVERY I, the attending radiologist, have reviewed the images and agree with the final report above.      Workstation performed: [id number]                    Procedures  Procedures         ED Course                               SBIRT 20yo+      Flowsheet Row Most Recent Value   Initial Alcohol Screen: US AUDIT-C     1. How often do you have a drink containing alcohol? 0 Filed at: 01/02/2024 1437   2. How many drinks containing alcohol do you have on a typical day you are drinking?  0 Filed at: 01/02/2024 1437   3b. FEMALE Any Age, or MALE 65+: How often do you have 4 or more drinks on one occassion? 0 Filed at: 01/02/2024 1437   Audit-C Score 0 Filed at: 01/02/2024 1437   CATHY: How many times in the past year have you...    Used an illegal drug or used a prescription medication for non-medical reasons? Never Filed at: 01/02/2024 1437                      Medical Decision Making  53 year old female presenting today with concerns of seizure today. Hit head. Witnessed fall  Seizure medication levels.  CT head.  Glucose normal.  Lab showing anemia, no signs of bleeding. BMP/CBC wnl.  CT head normal.  No seizure activity while in the ER. Patient back to baseline. Will have patient follow up with neurology as soon as possible for breakthrough seizure.  ------------------------------------------------------------  Strict return precautions discussed. Patient at time of discharge well-appearing in no acute distress, all questions answered. Patient agreeable to plan.  Patient's vitals, lab/imaging results, diagnosis, and treatment plan were discussed with the patient. All new/changed medications were discussed with patient,  "specifically, route of administration, how often and when to take, and where they can be picked up. Strict return precautions as well as close follow up with PCP was discussed with the patient and the patient was agreeable to my recommendations.  Patient verbally acknowledged understanding of the above communications. All labs reviewed and utilized in the medical decision making process (if labs were ordered). Portions of the record may have been created with voice recognition software.  Occasional wrong word or \"sound a like\" substitutions may have occurred due to the inherent limitations of voice recognition software.  Read the chart carefully and recognize, using context, where substitutions have occurred.      Amount and/or Complexity of Data Reviewed  Labs: ordered.  Radiology: ordered.             Disposition  Final diagnoses:   Breakthrough seizure (HCC)   Hyperostosis frontalis interna   Anemia     Time reflects when diagnosis was documented in both MDM as applicable and the Disposition within this note       Time User Action Codes Description Comment    1/2/2024  4:15 PM Gómez Chu [G40.919] Breakthrough seizure (HCC)     1/2/2024  4:16 PM Gómez Chu [M85.2] Hyperostosis frontalis interna     1/2/2024  4:42 PM Gómez Chu [D64.9] Anemia           ED Disposition       ED Disposition   Discharge    Condition   Stable    Date/Time   Tue Jan 2, 2024 1616    Comment   Esperanza Prajapati discharge to home/self care.                   Follow-up Information       Follow up With Specialties Details Why Contact Info Additional Information    [facility] Emergency Department Emergency Medicine Go to  If symptoms worsen [address] [address] [phone] [facility] Emergency Department    BRINA Liu Neurology, Nurse Practitioner Schedule an appointment as soon as possible for a visit   [address] [place] [id number]  [phone]               Discharge Medication List as of 1/2/2024  4:46 PM        CONTINUE these medications which have NOT CHANGED    Details   !! divalproex sodium (DEPAKOTE ER) 250 mg 24 hr tablet Take 1 tablet (250 mg total) by mouth 2 (two) times a day (In addition to one 500 mg tablet twice per day - total dose of 750 mg twice per day), Starting Wed 12/6/2023, Normal      !! divalproex sodium (DEPAKOTE ER) 500 mg 24 hr tablet Take 1 tablet (500 mg total) by mouth 2 (two) times a day (In addition to one 250 mg tablet twice per day - total dose of 750 mg twice per day), Starting Wed 12/6/2023, Normal      ergocalciferol (VITAMIN D2) 50,000 units Take 1 capsule (50,000 Units total) by mouth once a week, Starting Wed 12/6/2023, Normal      !! lacosamide (VIMPAT) 150 mg tablet Take 1 tablet (150 mg total) by mouth every 12 (twelve) hours, Starting Tue 11/28/2023, Normal      !! lacosamide (Vimpat) 50 mg Take 1 tablet (50 mg total) by mouth daily at bedtime, Starting Tue 11/28/2023, Normal      levothyroxine 175 mcg tablet Take 1 tablet (175 mcg total) by mouth daily, Starting Mon 10/9/2023, Until Sun 1/7/2024, Normal      paliperidone (INVEGA) 3 mg 24 hr tablet Take 1 tablet (3 mg total) by mouth every morning, Starting Thu 6/8/2023, Until Wed 9/6/2023, Normal       !! - Potential duplicate medications found. Please discuss with provider.          No discharge procedures on file.    PDMP Review         Value Time User    PDMP Reviewed  Yes 8/3/2023 10:50 AM Jessica Porter PA-C            ED Provider  Electronically Signed by             Gómez Chu PA-C  01/02/24 0665

## 2024-01-03 DIAGNOSIS — E89.0 POSTOPERATIVE HYPOTHYROIDISM: ICD-10-CM

## 2024-01-03 RX ORDER — LEVOTHYROXINE SODIUM 175 UG/1
TABLET ORAL
Qty: 90 TABLET | Refills: 0 | Status: SHIPPED | OUTPATIENT
Start: 2024-01-03

## 2024-01-04 LAB — LACOSAMIDE SERPL-MCNC: 2.1 UG/ML (ref 5–10)

## 2024-01-10 ENCOUNTER — TELEPHONE (OUTPATIENT)
Dept: NEUROLOGY | Facility: CLINIC | Age: 54
End: 2024-01-10

## 2024-01-10 NOTE — TELEPHONE ENCOUNTER
----- Message from BRINA Liu sent at 1/10/2024  8:18 AM EST -----  Patient placed on my schedule due to recent breakthrough seizure. Looking at her labs, she likely is not taking her lacosamide correctly which is likely the cause of the seizure. Please call her to ensure she is taking the appropriate dose of lacosamide. She has been managing her pills herself but I am wondering if we need to get her pill packs.

## 2024-01-11 ENCOUNTER — PATIENT OUTREACH (OUTPATIENT)
Dept: FAMILY MEDICINE CLINIC | Facility: CLINIC | Age: 54
End: 2024-01-11

## 2024-01-11 NOTE — LETTER
01/11/24    Estimado/a Hector Neal un coordinador de la atención médica en [facility] SERGE  [facility]ACT[facility]  [address] [address] [phone]. Intentamos comunicarnos con usted por teléfono varias veces. Es importante que se comunique con nosotros al [phone] para que podamos ofrecerle ayuda con irasema necesidades de atención médica.     Atentamente.     Flakita Coby, Tr[employer]ajadora Social

## 2024-01-11 NOTE — PROGRESS NOTES
SWCM called patient to follow up on referral from provider to assist patient with needs, dated 8/21/2.  SWCM completed chart review: As per chart, patient ED visit on 1/2/24 due to concerns of seizure. Hit head. Witnessed fall. SWCM called patient to assist with needs (x2). SWCM unable to reach patient, left voice message in Kitt[place] requesting return call. Contact information provided.     SWCM sent Unable to Reach letter in [place] via mail to listed address in chart. SWCM will remain available for further assistance as needed.

## 2024-01-15 ENCOUNTER — TELEPHONE (OUTPATIENT)
Dept: NEUROLOGY | Facility: CLINIC | Age: 54
End: 2024-01-15

## 2024-01-15 NOTE — TELEPHONE ENCOUNTER
Spoke to patient's mom and confirmed appointment with Meg this Wednesday, 1/17. Patient thought appointment was next Wednesday.

## 2024-01-17 ENCOUNTER — TELEPHONE (OUTPATIENT)
Dept: NEUROLOGY | Facility: CLINIC | Age: 54
End: 2024-01-17

## 2024-01-17 ENCOUNTER — OFFICE VISIT (OUTPATIENT)
Dept: NEUROLOGY | Facility: CLINIC | Age: 54
End: 2024-01-17
Payer: COMMERCIAL

## 2024-01-17 VITALS
WEIGHT: 219.7 LBS | BODY MASS INDEX: 33.3 KG/M2 | HEIGHT: 68 IN | TEMPERATURE: 98.1 F | DIASTOLIC BLOOD PRESSURE: 84 MMHG | SYSTOLIC BLOOD PRESSURE: 130 MMHG

## 2024-01-17 DIAGNOSIS — G40.109 FOCAL EPILEPSY (HCC): Primary | ICD-10-CM

## 2024-01-17 PROCEDURE — 99215 OFFICE O/P EST HI 40 MIN: CPT | Performed by: NURSE PRACTITIONER

## 2024-01-17 RX ORDER — LACOSAMIDE 50 MG/1
50 TABLET ORAL
Qty: 30 TABLET | Refills: 2 | Status: SHIPPED | OUTPATIENT
Start: 2024-01-17 | End: 2024-01-19 | Stop reason: SDUPTHER

## 2024-01-17 RX ORDER — DIVALPROEX SODIUM 250 MG/1
250 TABLET, EXTENDED RELEASE ORAL 2 TIMES DAILY
Qty: 270 TABLET | Refills: 1 | Status: SHIPPED | OUTPATIENT
Start: 2024-01-17 | End: 2024-01-19 | Stop reason: SDUPTHER

## 2024-01-17 RX ORDER — LACOSAMIDE 150 MG/1
150 TABLET ORAL EVERY 12 HOURS SCHEDULED
Qty: 60 TABLET | Refills: 2 | Status: SHIPPED | OUTPATIENT
Start: 2024-01-17 | End: 2024-01-19 | Stop reason: SDUPTHER

## 2024-01-17 NOTE — TELEPHONE ENCOUNTER
----- Message from BRINA Liu sent at 1/17/2024 11:01 AM EST -----  Patient likely needs pill packs from her pharmacy. There are issues with her taking her medications appropriately at home. I am wondering if she may need resources as her mother has not been monitoring her medications and it recently resulted in a seizure. The patient speaks english and Romanian but mother speaks Romanian so you will need . It may be better to have someone Romanian speaking to call if able as mother/patient has some difficulty with .

## 2024-01-17 NOTE — PROGRESS NOTES
Patient ID: Esperanza Prajapati is a 53 y.o. female with history of developmental delay and static encephalopathy who returns for follow-up regarding focal epilepsy manifest as simple partial, complex partial and generalized tonic-clonic seizures possibly due to head injury around the age of 10 months, who is returning to Neurology office for follow up of her seizures.     Assessment/Plan:    Focal epilepsy (HCC)  Patient with focal epilepsy, recent breakthrough seizure in the setting of medication non compliance (low lacosamide level). She reports that she is taking her medications without missing doses but she is also not taking them correctly and is confused regarding what she is taking. At her last visit she did seem to be doing much better with managing her medications at home. There is some concern that her mother is not assisting with her medication management. Discussed pill packs to which they are agreeable in the future so Esperanza does not have to dispense the medications herself which has resulted in errors. Social work looking into pharmacies that provide pill packs. In the meantime, Esperanza and her mother will be coming in for a nursing visit Friday at which time she will bring all of her medications from home and make sure that she is taking her medication appropriately.     Prior MRI brain was normal. Most recent EEG with right temporal sharp waves and intermittent right temporal polymorphic theta slowing suggest underlying neuronal dysfunction that is highly epileptogenic. Occasional left temporal sharp waves suggest underlying focal epileptogenic potential. Neurologic exam with bilateral resting tremor and evidence of developmental delay.     Due to her ongoing tremor, weight gain, and PCOS we have been trying to at least decrease her divalproex dose. Given her recent seizure in the setting of medication non-compliance, we will have to wait until she is more consistent with her medications prior  to lowering dose any further. Goal for lacosamide monotherapy int he future if able.     Recommended calling the office with seizures or concerns. Follow up in 3 months or sooner if needed with Dr Cevallos.           Subjective:  Esperanza Prajapati is a 53 y.o. female with history of developmental delay and static encephalopathy who returns for follow-up regarding focal epilepsy manifest as simple partial, complex partial and generalized tonic-clonic seizures possibly due to head injury around the age of 10 months, who is returning to Neurology office for follow up of her seizures. She was last seen in the office on 11/28/2023. At that time, she was seizure free since her prior visit and was consistently taking medications. Recommended having blood work completed. She was taking divalroex 750 mg in the morning and 1000 mg at night as well as lacosamide 150 mg int he morning and 200 mg at night. Due to ongoing tremor, weight gain and PCOS we were working on plan to decrease dose as long as she was taking her lacosamide consistently.     Her blood work revealed an appropriate lacosamide level after her last visit, recommended starting to decrease dose of divalproex from 750 mg in the morning and 1000 mg at night to 750 mg twice per day.  There was unfortunately recent ER visit for a breakthrough seizure, at that time her lacosamide level was extremely low, concerning that she was not taking her lacosamide.    She presents today with her mother.  She is very confused regarding her medicaitons. She was given 3 bottles of divalproex from the pharmacy with her most recent refill and had been taking 9 tablets in the morning instead of 3 tablets twice pre day. She just recently started doing 6 tablets in the morning.  She is not taking any divalproex at nighttime.  Other than divalproex she did not bring any other medications to the office.      She has also been getting confused again when there are changes in  .  Mom does not seem to be involved in helping her make sure her medications are correct.  Esperanza has been managing her own medications.  She is upset when provider tells her that she has been making some mistakes with her medication.  She would be agreeable to pill packs in the future.    She thinks she takes the lacosamide. She is unsure of the names. She does take levothyroxine. She takes two pills as night as well. Her mother is willing to come into the office with her medicaitons on Friday to do a nurse visit with a Chinese-speaking nurse.    Attempted to use  although most of the visit when attempting to use interpretation patient and mother would argue in Chinese.(: [id number])    Current seizure medications:  - lacosamide 150-200  - divalproex 750 mg BID  Other medications as per Epic.    Event/Seizure semiology:  1. “Big attacks” mother reports the patient tries to sit protect herself.  These last couple minutes.  The patient feel something and she describes a stomach pain.  There snoring, shakes, moves a lot and foaming at the mouth.  Can be combative afterwards.  There is a postictal state that may last 30 minutes or more.   2. “Small ones”.  Rubs her stomach, turns her head to the right, staring, face turns to the right.  Duration is less than a minute.     Special Features  Status epilepticus: none known  Self Injury Seizures: unknown  Precipitating Factors: none known     Epilepsy Risk Factors:  Intellectual disability  Head injury (moderate/severe)     Prior AEDs:  Carbamazepine caused behavior problems  Lamotrigine trial in 2020. Stopped on own.   Phenobarbital as a child  Phenytoin gingival hyperplasia  Topiramate possible behavior problems     Prior Evaluation:  Routine EEG July 2016 at [facility]:  1- Slow posterior dominant rhythm  2- Frequent right temporal sharp waves  3- Generalized beta activity.     REEG 6/10/2021  Abundant right temporal sharp waves and  "intermittent right temporal polymorphic theta slowing suggest underlying neuronal dysfunction that is highly epileptogenic. Occasional left temporal sharp waves suggest underlying focal epileptogenic potential.      REEG 9/30/2022  This Routine EEG recorded during wakefulness and sleep is abnormal.  The study captures a 1 minute and 20 second left temporal electroclinical seizure involving unresponsiveness, right arm dystonic posturing, as well as oral and left hand automatisms.   Most of the study is recorded during stage 2 sleep with only brief wakefulness that occurs immediately before after the seizure. Frequent right temporal sharp waves are present throughout the recording and suggest right temporal epileptogenic potential (right temporal seizure tendency in addition to the left temporal seizure seen during this study).     MRI brain 8/18/2021:   IMPRESSION:  No mass effect, acute intracranial hemorrhage or evidence of recent infarction.  No abnormal parenchymal or leptomeningeal enhancement identified  Hippocampal formations are symmetric in size and appearance.  No discrete migrational anomalies identified     Psychiatric History:  paranoia and hallucinations in 2020  Following with psychiatry.      Social History:   Driving: No  Lives Alone: No  Occupation: on permanent disability     I reviewed prior neurology notes, most recent labs, recent ER documentation, as documented in Epic/[employer], and summarized above.        Objective:    Blood pressure 130/84, temperature 98.1 °F (36.7 °C), temperature source Temporal, height 5' 8\" (1.727 m), weight 99.7 kg (219 lb 11.2 oz), not currently breastfeeding.    Physical Exam  No apparent distress.   Appears well nourished.   Mood appropriate for situation     Neurologic Exam  Mental status- alert and oriented to person, place, and time. Speech appropriate for conversation. Limited attention and understanding of medical situation.      Cranial Nerves- EOMS " normal, facial muscles symmetric, hearing intact, speech normal.      Motor- No pronator drift. Appropriate strength. Moves all extremities freely. Resting tremor of bilateral hands R>L.     Sensory-  Intact distally in all extremities to light touch.      DTRs- 2+ and symmetric in all extremities.      Gait- normal casual gait.     Coordination- not assessed at today's visit.        ROS:  Review of Systems   Constitutional:  Negative for appetite change, fatigue and fever.   HENT: Negative.  Negative for hearing loss, tinnitus, trouble swallowing and voice change.    Eyes:  Negative for photophobia, pain and visual disturbance.   Respiratory: Negative.  Negative for shortness of breath.    Cardiovascular: Negative.  Negative for palpitations.   Gastrointestinal: Negative.  Negative for nausea and vomiting.   Endocrine: Negative.  Negative for cold intolerance.   Genitourinary: Negative.  Negative for dysuria, frequency and urgency.   Musculoskeletal:  Negative for back pain, gait problem, myalgias, neck pain and neck stiffness.   Skin: Negative.  Negative for rash.   Allergic/Immunologic: Negative.    Neurological:  Positive for seizures (about a month ago. Went to hospital following seizure.). Negative for dizziness, tremors, syncope, facial asymmetry, speech difficulty, weakness, light-headedness, numbness and headaches.   Hematological: Negative.  Does not bruise/bleed easily.   Psychiatric/Behavioral: Negative.  Negative for confusion, hallucinations and sleep disturbance.    All other systems reviewed and are negative.    ROS obtained by MA and reviewed by myself.       This note may have been created using voice recognition software. There may be unintentional errors such as grammatical errors, spelling errors, or pronoun errors.

## 2024-01-17 NOTE — PATIENT INSTRUCTIONS
- Please take divalproex 250 mg tablets - 3 tablets in the morning and 3 tablets night  - Take lacosamide 150 mg tablets - 1 tablet in the morning and 1 tablet at night  - Take lacosamide 50 mg tablet - 1 tablet at night time only  - Call the office with further seizures or concerns  - Follow up in 3 months with Dr Cevallos    We will call about nurse visit for medication check on Friday

## 2024-01-17 NOTE — TELEPHONE ENCOUNTER
[id number] -   Phone:  [phone]    -do not do pill packs.      [facility] [phone]  [address] [place] [address]   -enroll online [phone] through mail order or they can deliver to pharmacy.     [facility]  [address] [place] [address]  Phone: [phone]  -they do pill packs.      [facility]  [address], [place] [address]  Phone: [phone]  -do not do pill packs.      [facility]  [address] [place] [address]  Phone: [phone]  -do pill packs, will have to do all patient's meds, will need med list as well.      [facility]  [address], [place] [address]  Phone: [phone]   can do pill packs    [facility]  [address], [place] [address]  Phone: [phone]  -can do Pill packs     [facility]  [address] [place] [address]   [phone]  -do not do pill packs.

## 2024-01-17 NOTE — PROGRESS NOTES
Review of Systems   Constitutional:  Negative for appetite change, fatigue and fever.   HENT: Negative.  Negative for hearing loss, tinnitus, trouble swallowing and voice change.    Eyes:  Positive for pain (Yellow eyes, pain.). Negative for photophobia and visual disturbance.   Respiratory: Negative.  Negative for shortness of breath.    Cardiovascular: Negative.  Negative for palpitations.   Gastrointestinal: Negative.  Negative for nausea and vomiting.   Endocrine: Negative.  Negative for cold intolerance.   Genitourinary: Negative.  Negative for dysuria, frequency and urgency.   Musculoskeletal:  Negative for back pain, gait problem, myalgias, neck pain and neck stiffness.   Skin: Negative.  Negative for rash.   Allergic/Immunologic: Negative.    Neurological:  Positive for seizures (about a month ago. Went to hospital following seizure.). Negative for dizziness, tremors, syncope, facial asymmetry, speech difficulty, weakness, light-headedness, numbness and headaches.   Hematological: Negative.  Does not bruise/bleed easily.   Psychiatric/Behavioral: Negative.  Negative for confusion, hallucinations and sleep disturbance.    All other systems reviewed and are negative.

## 2024-01-17 NOTE — ASSESSMENT & PLAN NOTE
Patient with focal epilepsy, recent breakthrough seizure in the setting of medication non compliance (low lacosamide level). She reports that she is taking her medications without missing doses but she is also not taking them correctly and is confused regarding what she is taking. At her last visit she did seem to be doing much better with managing her medications at home. There is some concern that her mother is not assisting with her medication management. Discussed pill packs to which they are agreeable in the future so Esperanza does not have to dispense the medications herself which has resulted in errors. Social work looking into pharmacies that provide pill packs. In the meantime, Esperanza and her mother will be coming in for a nursing visit Friday at which time she will bring all of her medications from home and make sure that she is taking her medication appropriately.     Prior MRI brain was normal. Most recent EEG with right temporal sharp waves and intermittent right temporal polymorphic theta slowing suggest underlying neuronal dysfunction that is highly epileptogenic. Occasional left temporal sharp waves suggest underlying focal epileptogenic potential. Neurologic exam with bilateral resting tremor and evidence of developmental delay.     Due to her ongoing tremor, weight gain, and PCOS we have been trying to at least decrease her divalproex dose. Given her recent seizure in the setting of medication non-compliance, we will have to wait until she is more consistent with her medications prior to lowering dose any further. Goal for lacosamide monotherapy int he future if able.     Recommended calling the office with seizures or concerns. Follow up in 3 months or sooner if needed with Dr Cevallos.

## 2024-01-18 NOTE — TELEPHONE ENCOUNTER
MSW phoned Neisha (mother) and [facility] requesting a call back at [phone].       MSW phoned pt at [phone] and mother Neisha answered and she informed that she will be taking pt to appt tomorrow at neurology (nurse visit) she informed that she will be asking questions about how to take the neuro meds. She also informed that she is agreeable to have pill packs but would like a pharmacy that is in the [facility] area. MSW will continue to research pharmacies who can do pill packs and then will contact Louise.       [facility] pharmacy   [address] [place] [address]   [phone]  Informed that they can do the pill packs   [facility] just have to transfer everything to them.   Fax #[phone]      MSW phoned patient's mother and she is agreeable for patient's meds to be switched to [facility].

## 2024-01-18 NOTE — TELEPHONE ENCOUNTER
Meg,    Patient's mother confirmed that she will be going tomorrow with pt to her nurse appt at [address]. She would like to receive education about how pt has to take her medications. Also,   They are agreeable for prescriptions to be sent to [facility] pharmacy   [facility] [address] [phone]  Can clinical team ensure all meds are transferred correctly to [facility]

## 2024-01-19 ENCOUNTER — TELEPHONE (OUTPATIENT)
Dept: NEUROLOGY | Facility: CLINIC | Age: 54
End: 2024-01-19

## 2024-01-19 ENCOUNTER — CLINICAL SUPPORT (OUTPATIENT)
Dept: NEUROLOGY | Facility: CLINIC | Age: 54
End: 2024-01-19

## 2024-01-19 DIAGNOSIS — G40.109 FOCAL EPILEPSY (HCC): ICD-10-CM

## 2024-01-19 DIAGNOSIS — E55.9 VITAMIN D DEFICIENCY: ICD-10-CM

## 2024-01-19 RX ORDER — DIVALPROEX SODIUM 250 MG/1
TABLET, EXTENDED RELEASE ORAL
Qty: 270 TABLET | Refills: 1 | Status: SHIPPED | OUTPATIENT
Start: 2024-01-19 | End: 2024-01-19 | Stop reason: SDUPTHER

## 2024-01-19 RX ORDER — LACOSAMIDE 150 MG/1
150 TABLET ORAL EVERY 12 HOURS SCHEDULED
Qty: 60 TABLET | Refills: 2 | Status: SHIPPED | OUTPATIENT
Start: 2024-01-19

## 2024-01-19 RX ORDER — DIVALPROEX SODIUM 250 MG/1
TABLET, EXTENDED RELEASE ORAL
Qty: 180 TABLET | Refills: 11 | Status: SHIPPED | OUTPATIENT
Start: 2024-01-19

## 2024-01-19 RX ORDER — DIVALPROEX SODIUM 250 MG/1
250 TABLET, EXTENDED RELEASE ORAL 2 TIMES DAILY
Qty: 270 TABLET | Refills: 1 | Status: CANCELLED | OUTPATIENT
Start: 2024-01-19

## 2024-01-19 RX ORDER — ERGOCALCIFEROL 1.25 MG/1
50000 CAPSULE ORAL WEEKLY
Qty: 12 CAPSULE | Refills: 0 | Status: SHIPPED | OUTPATIENT
Start: 2024-01-19

## 2024-01-19 RX ORDER — LACOSAMIDE 50 MG/1
50 TABLET ORAL
Qty: 30 TABLET | Refills: 2 | Status: SHIPPED | OUTPATIENT
Start: 2024-01-19

## 2024-01-19 NOTE — TELEPHONE ENCOUNTER
Updated scripts sent for all seizures medications if you can please let patient/mother know. I also want her to have blood work in 2 weeks to make sure her levels look okay with her back on prescribed medication. The orders are in and she can go to any [facility] lab to have them completed.

## 2024-01-19 NOTE — TELEPHONE ENCOUNTER
received vm from 1/18 at 12:14pm-I'm calling from [facility] [address] [phone]. In regards to a mutual patient. shirley norris, date of birth 1970. In regards to the depakote 250 extended release prescription, it looks like there's 2 sets of directions. The 1st set of directions is one tablet by mouth, bid. And then the 2nd set is take 3 tablets by mouth in the morning and 3 tablet by mouth at night. Um we just need clarification and what set of directions were going to be using. Um, if you can give us a call back again, that phone number is [phone]. Thank you. Have a nice day.  ------------------------------------------  Updated script sent to [facility] pharm today.    Vm was received from [facility].  See note from erlin alvarenga from today.    Looks like all meds that we recently sent to [facility] were then sent to [facility] pharm today    When script was sent to [facility], script at [facility] should be cancelled electronically

## 2024-01-19 NOTE — TELEPHONE ENCOUNTER
Lalo Weaver,    I see that Depakote is going to be filled at [facility] pharmacy [facility] that can do pill packs. Is there any other medications that patient needs to be taking? If so orders will have to be sent to new pharmacy.

## 2024-01-19 NOTE — TELEPHONE ENCOUNTER
MSW phoned [facility] pharmacy at [phone] and technician informed that they will be closing at 1pm due to the snow and  is not available. They can do pill packs for next week once pharmacist verifies the order. technician will check to see if she has all meds ready and will talk to pt to see if she can  at least a 5 day supply to give them time to work on the bubble/pill packs. She will be calling Louise and patient directly as she speaks Monegasque.   EVARISTO will continue to follow

## 2024-01-19 NOTE — TELEPHONE ENCOUNTER
MSW phoned [facility] at [phone]  Tammie arranged lyft transportation. System is searching for a .     MSW phoned Louise and made her aware to keep phone close as she will be receiving drivers info.     Digna asked pt to sign transportation waiver.     Star informed that  was picked up and is on her way home.

## 2024-01-19 NOTE — PROGRESS NOTES
I reviewed medication list with patient.  I have updated her list to her current regimen. She is in need of Depakote 250 MG 3 tablets BID, she has not been taking it since 1/16/24 due to her running out of it.   Order is pended.

## 2024-01-19 NOTE — TELEPHONE ENCOUNTER
----- Message from Manju Robertson sent at 1/19/2024 11:17 AM EST -----  Hello,   Patient currently in office needing a ride home.

## 2024-01-24 NOTE — TELEPHONE ENCOUNTER
MSW phoned [facility] pharmacy and they informed that patient picked up her medications. They will start doing the pill packs next month.   MSW remains available for  future social needs.

## 2024-01-31 ENCOUNTER — OFFICE VISIT (OUTPATIENT)
Dept: DENTISTRY | Facility: CLINIC | Age: 54
End: 2024-01-31

## 2024-01-31 VITALS — TEMPERATURE: 97.3 F | SYSTOLIC BLOOD PRESSURE: 107 MMHG | DIASTOLIC BLOOD PRESSURE: 78 MMHG | HEART RATE: 106 BPM

## 2024-01-31 DIAGNOSIS — Z01.20 ENCOUNTER FOR DENTAL EXAMINATION: Primary | ICD-10-CM

## 2024-01-31 DIAGNOSIS — K03.6 ACCRETIONS ON TEETH: ICD-10-CM

## 2024-01-31 DIAGNOSIS — K03.6 DENTAL CALCULUS: ICD-10-CM

## 2024-01-31 PROCEDURE — D4346 SCALING IN PRESENCE OF GENERALIZED MODERATE OR SEVERE GINGIVAL INFLAMMATION - FULL MOUTH, AFTER ORAL EVALUATION: HCPCS

## 2024-01-31 NOTE — PROGRESS NOTES
ASA  2   pt takes med for epilepsy   Mom accompanied her to room but was also being seen today    Patient scheduled for 4346 GINGIVITIS CODE    WAS DENIED SRP BY INSURANCE    Very heavy calc and bacteria present   black sub ging calc removed  9 should have been a debridement)  Patient has areas of blake charted from init encounter 6/2023    need to re eval TX     Needs # 3 extracted  should have # 1 extr as well   # 31 (32?) heavy deposits and partially erupted/ tipped   Stressed cervical brushing   stressed she needs to return for blake, periodic exam and also another pro in 6 mos     NV  blake   start easy area anterior   Per EXAM    re eval TX plan  refer as needed  NV 2  3 mos   prophy   re eval

## 2024-02-01 DIAGNOSIS — E89.0 POSTOPERATIVE HYPOTHYROIDISM: ICD-10-CM

## 2024-02-01 RX ORDER — LEVOTHYROXINE SODIUM 175 UG/1
175 TABLET ORAL DAILY
Qty: 30 TABLET | Refills: 0 | Status: SHIPPED | OUTPATIENT
Start: 2024-02-01

## 2024-02-06 PROBLEM — Z00.00 MEDICARE ANNUAL WELLNESS VISIT, INITIAL: Status: RESOLVED | Noted: 2023-12-08 | Resolved: 2024-02-06

## 2024-02-15 ENCOUNTER — OFFICE VISIT (OUTPATIENT)
Dept: DENTISTRY | Facility: CLINIC | Age: 54
End: 2024-02-15

## 2024-02-15 VITALS — DIASTOLIC BLOOD PRESSURE: 84 MMHG | SYSTOLIC BLOOD PRESSURE: 115 MMHG | HEART RATE: 96 BPM | TEMPERATURE: 97.8 F

## 2024-02-15 DIAGNOSIS — K08.9 EXTRACTION OF TOOTH NEEDED: ICD-10-CM

## 2024-02-15 DIAGNOSIS — K02.9 DENTAL CARIES: Primary | ICD-10-CM

## 2024-02-15 PROCEDURE — D2330 RESIN-BASED COMPOSITE - 1 SURFACE, ANTERIOR: HCPCS

## 2024-02-16 ENCOUNTER — OFFICE VISIT (OUTPATIENT)
Dept: DENTISTRY | Facility: CLINIC | Age: 54
End: 2024-02-16

## 2024-02-16 VITALS — SYSTOLIC BLOOD PRESSURE: 137 MMHG | TEMPERATURE: 98.6 F | HEART RATE: 106 BPM | DIASTOLIC BLOOD PRESSURE: 84 MMHG

## 2024-02-16 DIAGNOSIS — Z01.20 ENCOUNTER FOR DENTAL EXAMINATION: ICD-10-CM

## 2024-02-16 DIAGNOSIS — K02.9 DENTAL CARIES: Primary | ICD-10-CM

## 2024-02-16 PROCEDURE — D1354 INTERIM CARIES ARRESTING MEDICAMENT APPLICATION - PER TOOTH: HCPCS

## 2024-02-16 PROCEDURE — D0191 ASSESSMENT OF A PATIENT: HCPCS

## 2024-02-16 NOTE — PROGRESS NOTES
Composite Filling    Esperanza Prajapati presents for composite filling. PMH reviewed, no changes.    Applied topical benzocaine, administered 1 carp 4% articaine 1:100k epi via buccal infiltration    Prepped tooth #6 F, caries removed with round carbide on slow speed. Isolation with cotton rolls     GC cavity conditioner was scrubbed into prep, rinsed, dried. Restored with Equia Forte shade A3 and allowed to cure for 2.5 minutes.    Refined with finishing burs, polished with enhance point, applied Equia forte coat and light cured. Verified  contacts. Pt left satisfied.    NV: #30 O, 31 O

## 2024-02-18 NOTE — PROGRESS NOTES
Assessment and SDF Application    Patient presents for restorative work on number #30, 31. After removing plaque via through tooth brushing, #30, 31 surfaces deemed intact and do not require restorative work. I performed a clinical exam of the rest of the dentition and updated the tooth chart accordingly. #1 presents with carious area but due to difficult access will be very difficult to restore with composite. As such, the decision was made to treat with SDF per the following protocol:    #1 Occ rinsed with area water spray and air dried. SDF solution was scrubbed into site of concern for 60 second, excess blotted with cotton roll, Fl2 varnish applied to treatment area.    NV: 1 week follow up SDF, new OS referral for #3

## 2024-02-28 ENCOUNTER — HOSPITAL ENCOUNTER (EMERGENCY)
Facility: HOSPITAL | Age: 54
Discharge: HOME | End: 2024-02-28
Attending: EMERGENCY MEDICINE | Admitting: EMERGENCY MEDICINE
Payer: COMMERCIAL

## 2024-02-28 VITALS
RESPIRATION RATE: 18 BRPM | DIASTOLIC BLOOD PRESSURE: 88 MMHG | BODY MASS INDEX: 32.83 KG/M2 | OXYGEN SATURATION: 97 % | TEMPERATURE: 97.2 F | HEART RATE: 94 BPM | SYSTOLIC BLOOD PRESSURE: 130 MMHG | WEIGHT: 215.9 LBS

## 2024-02-28 DIAGNOSIS — Z01.89 LABORATORY TEST: Primary | ICD-10-CM

## 2024-02-28 LAB
ALBUMIN SERPL BCP-MCNC: 3.8 G/DL (ref 3.5–5)
ALP SERPL-CCNC: 48 U/L (ref 34–104)
ALT SERPL W P-5'-P-CCNC: 14 U/L (ref 7–52)
ANION GAP SERPL CALCULATED.3IONS-SCNC: 7 MMOL/L
AST SERPL W P-5'-P-CCNC: 13 U/L (ref 13–39)
BASOPHILS # BLD AUTO: 0.02 THOUSANDS/ÂΜL (ref 0–0.1)
BASOPHILS NFR BLD AUTO: 1 % (ref 0–1)
BILIRUB SERPL-MCNC: 0.54 MG/DL (ref 0.2–1)
BUN SERPL-MCNC: 21 MG/DL (ref 5–25)
CALCIUM SERPL-MCNC: 9.5 MG/DL (ref 8.4–10.2)
CHLORIDE SERPL-SCNC: 102 MMOL/L (ref 96–108)
CO2 SERPL-SCNC: 29 MMOL/L (ref 21–32)
CREAT SERPL-MCNC: 0.78 MG/DL (ref 0.6–1.3)
EOSINOPHIL # BLD AUTO: 0.03 THOUSAND/ÂΜL (ref 0–0.61)
EOSINOPHIL NFR BLD AUTO: 1 % (ref 0–6)
ERYTHROCYTE [DISTWIDTH] IN BLOOD BY AUTOMATED COUNT: 12.5 % (ref 11.6–15.1)
GFR SERPL CREATININE-BSD FRML MDRD: 88 ML/MIN/1.73SQ M
GLUCOSE SERPL-MCNC: 102 MG/DL (ref 65–140)
HCT VFR BLD AUTO: 39.5 % (ref 34.8–46.1)
HGB BLD-MCNC: 13.5 G/DL (ref 11.5–15.4)
IMM GRANULOCYTES # BLD AUTO: 0.01 THOUSAND/UL (ref 0–0.2)
IMM GRANULOCYTES NFR BLD AUTO: 0 % (ref 0–2)
LYMPHOCYTES # BLD AUTO: 1.38 THOUSANDS/ÂΜL (ref 0.6–4.47)
LYMPHOCYTES NFR BLD AUTO: 41 % (ref 14–44)
MCH RBC QN AUTO: 33.3 PG (ref 26.8–34.3)
MCHC RBC AUTO-ENTMCNC: 34.2 G/DL (ref 31.4–37.4)
MCV RBC AUTO: 98 FL (ref 82–98)
MONOCYTES # BLD AUTO: 0.49 THOUSAND/ÂΜL (ref 0.17–1.22)
MONOCYTES NFR BLD AUTO: 15 % (ref 4–12)
NEUTROPHILS # BLD AUTO: 1.45 THOUSANDS/ÂΜL (ref 1.85–7.62)
NEUTS SEG NFR BLD AUTO: 42 % (ref 43–75)
NRBC BLD AUTO-RTO: 0 /100 WBCS
PLATELET # BLD AUTO: 197 THOUSANDS/UL (ref 149–390)
PMV BLD AUTO: 10 FL (ref 8.9–12.7)
POTASSIUM SERPL-SCNC: 4 MMOL/L (ref 3.5–5.3)
PROT SERPL-MCNC: 7.5 G/DL (ref 6.4–8.4)
RBC # BLD AUTO: 4.05 MILLION/UL (ref 3.81–5.12)
SODIUM SERPL-SCNC: 138 MMOL/L (ref 135–147)
WBC # BLD AUTO: 3.38 THOUSAND/UL (ref 4.31–10.16)

## 2024-02-28 PROCEDURE — 85025 COMPLETE CBC W/AUTO DIFF WBC: CPT | Performed by: EMERGENCY MEDICINE

## 2024-02-28 PROCEDURE — 99281 EMR DPT VST MAYX REQ PHY/QHP: CPT

## 2024-02-28 PROCEDURE — 99283 EMERGENCY DEPT VISIT LOW MDM: CPT | Performed by: EMERGENCY MEDICINE

## 2024-02-28 PROCEDURE — 80053 COMPREHEN METABOLIC PANEL: CPT | Performed by: EMERGENCY MEDICINE

## 2024-02-28 PROCEDURE — 36415 COLL VENOUS BLD VENIPUNCTURE: CPT | Performed by: EMERGENCY MEDICINE

## 2024-02-28 NOTE — ED TRIAGE NOTES
"Via  #[id number] w/mother w/complaint per mother  of one month ago, whites of eyes became yellow\"; currently no discoloration of eyes; pt now rambling in English stating \"it's the public who is doing this to my eyes, the pictures, cameras and little children's\";  pt appears to be paranoid; mother complains pt's abdomen is swollen; further states pt has liver issues and is requesting to have liver function checked; difficult to speak w/pt and pt's mother utilizing   "

## 2024-02-28 NOTE — ED PROVIDER NOTES
"History  Chief Complaint   Patient presents with    Labs Only     Via  #[id number] w/mother w/complaint per mother  of one month ago, whites of eyes became yellow\"; currently no discoloration of eyes; pt now rambling in English stating \"it's the public who is doing this to my eyes, the pictures, cameras and little children's\";  pt appears to be paranoid; mother complains pt's abdomen is swollen; further states pt has liver issues and is requesting to have liver function checked     HPI  Patient is a 53-year-old female presenting with multiple complaints.  However her main complaints appear to be due to her intermittent sclera turning yellow.  On arrival patient does not have any scleral icterus.  Denies any abdominal pain.  Patient has no other complaints.  Request to check her liver function due to concerns that it may be due to her liver.  Patient however without any known liver disease      Prior to Admission Medications   Prescriptions Last Dose Informant Patient Reported? Taking?   divalproex sodium (DEPAKOTE ER) 250 mg 24 hr tablet   No No   Sig: Take three tablets by mouth in the morning and three tablets by mouth at night time.   ergocalciferol (VITAMIN D2) 50,000 units   No No   Sig: Take 1 capsule (50,000 Units total) by mouth once a week   lacosamide (VIMPAT) 150 mg tablet   No No   Sig: Take 1 tablet (150 mg total) by mouth every 12 (twelve) hours   lacosamide (Vimpat) 50 mg   No No   Sig: Take 1 tablet (50 mg total) by mouth daily at bedtime   levothyroxine 175 mcg tablet   No No   Sig: TAKE 1 TABLET BY MOUTH DAILY   paliperidone (INVEGA) 3 mg 24 hr tablet  Mother No No   Sig: Take 1 tablet (3 mg total) by mouth every morning   Patient not taking: Reported on 1/19/2024      Facility-Administered Medications: None       Past Medical History:   Diagnosis Date    Cancer (HCC)     thyroid cancer    Disease of thyroid gland     Hyperactivity (behavior)     Last Assessed: 11/7/2014     " Hyperlipidemia     Obesity     Seizures (HCC)        Past Surgical History:   Procedure Laterality Date    OTHER SURGICAL HISTORY      Removal of urinary calculus     OTHER SURGICAL HISTORY      Rhinologic Surgery     TX OPEN TX PHALANGEAL SHAFT FRACTURE PROX/MIDDLE EA Left 8/3/2023    Procedure: CLOSED REDUCTION PERCUTANEOUS PINNING - LEFT RING FINGER;  Surgeon: Fransisco Escalante MD;  Location: [facility];  Service: Orthopedics    TOTAL THYROIDECTOMY      LAst Assessed: 11/7/2014       Family History   Problem Relation Age of Onset    Breast cancer Mother 59    Hypertension Mother     ALS Mother     Stroke Mother     Prostate cancer Father     Diabetes type II Father      I have reviewed and agree with the history as documented.    E-Cigarette/Vaping    E-Cigarette Use Never User      E-Cigarette/Vaping Substances    Nicotine No     THC No     CBD No     Flavoring No     Other No     Unknown No      Social History     Tobacco Use    Smoking status: Never     Passive exposure: Never    Smokeless tobacco: Never   Vaping Use    Vaping status: Never Used   Substance Use Topics    Alcohol use: Never    Drug use: Never       Review of Systems   Constitutional:  Negative for chills, diaphoresis, fever and unexpected weight change.   HENT:  Negative for ear pain and sore throat.    Eyes:  Negative for visual disturbance.   Respiratory:  Negative for cough, chest tightness and shortness of breath.    Cardiovascular:  Negative for chest pain and leg swelling.   Gastrointestinal:  Negative for abdominal distention, abdominal pain, constipation, diarrhea, nausea and vomiting.   Endocrine: Negative.    Genitourinary:  Negative for difficulty urinating and dysuria.   Musculoskeletal: Negative.    Skin: Negative.    Allergic/Immunologic: Negative.    Neurological: Negative.    Hematological: Negative.    Psychiatric/Behavioral: Negative.     All other systems reviewed and are negative.      Physical Exam  Physical  Exam  Vitals and nursing note reviewed.   Constitutional:       General: She is not in acute distress.     Appearance: Normal appearance. She is not ill-appearing.   HENT:      Head: Normocephalic and atraumatic.      Right Ear: External ear normal.      Left Ear: External ear normal.      Nose: Nose normal.      Mouth/Throat:      Mouth: Mucous membranes are moist.      Pharynx: Oropharynx is clear.   Eyes:      General: No scleral icterus.        Right eye: No discharge.         Left eye: No discharge.      Extraocular Movements: Extraocular movements intact.      Conjunctiva/sclera: Conjunctivae normal.      Pupils: Pupils are equal, round, and reactive to light.   Cardiovascular:      Rate and Rhythm: Normal rate and regular rhythm.      Pulses: Normal pulses.      Heart sounds: Normal heart sounds.   Pulmonary:      Effort: Pulmonary effort is normal.      Breath sounds: Normal breath sounds.   Abdominal:      General: Abdomen is flat. Bowel sounds are normal. There is no distension.      Palpations: Abdomen is soft.      Tenderness: There is no abdominal tenderness. There is no guarding or rebound.   Musculoskeletal:         General: Normal range of motion.      Cervical back: Normal range of motion and neck supple.   Skin:     General: Skin is warm and dry.      Capillary Refill: Capillary refill takes less than 2 seconds.   Neurological:      General: No focal deficit present.      Mental Status: She is alert and oriented to person, place, and time. Mental status is at baseline.   Psychiatric:         Mood and Affect: Mood normal.         Behavior: Behavior normal.         Thought Content: Thought content normal.         Judgment: Judgment normal.         Vital Signs  ED Triage Vitals   Temperature Pulse Respirations Blood Pressure SpO2   02/28/24 0855 02/28/24 0855 02/28/24 0855 02/28/24 0855 02/28/24 0855   (!) 97.2 °F (36.2 °C) 94 18 130/88 97 %      Temp Source Heart Rate Source Patient Position -  Orthostatic VS BP Location FiO2 (%)   02/28/24 0855 02/28/24 0855 02/28/24 0855 02/28/24 0855 --   Tympanic Monitor Sitting Left arm       Pain Score       02/28/24 0904       No Pain           Vitals:    02/28/24 0855   BP: 130/88   Pulse: 94   Patient Position - Orthostatic VS: Sitting         Visual Acuity      ED Medications  Medications - No data to display    Diagnostic Studies  Results Reviewed       Procedure Component Value Units Date/Time    Comprehensive metabolic panel [[id number]] Collected: 02/28/24 0917    Lab Status: Final result Specimen: Blood from Arm, Right Updated: 02/28/24 0941     Sodium 138 mmol/L      Potassium 4.0 mmol/L      Chloride 102 mmol/L      CO2 29 mmol/L      ANION GAP 7 mmol/L      BUN 21 mg/dL      Creatinine 0.78 mg/dL      Glucose 102 mg/dL      Calcium 9.5 mg/dL      AST 13 U/L      ALT 14 U/L      Alkaline Phosphatase 48 U/L      Total Protein 7.5 g/dL      Albumin 3.8 g/dL      Total Bilirubin 0.54 mg/dL      eGFR 88 ml/min/1.73sq m     Narrative:      National Kid[employer] Disease Foundation guidelines for Chronic Kidney Disease (CKD):     Stage 1 with normal or high GFR (GFR > 90 mL/min/1.73 square meters)    Stage 2 Mild CKD (GFR = 60-89 mL/min/1.73 square meters)    Stage 3A Moderate CKD (GFR = 45-59 mL/min/1.73 square meters)    Stage 3B Moderate CKD (GFR = 30-44 mL/min/1.73 square meters)    Stage 4 Severe CKD (GFR = 15-29 mL/min/1.73 square meters)    Stage 5 End Stage CKD (GFR <15 mL/min/1.73 square meters)  Note: GFR calculation is accurate only with a steady state creatinine    CBC and differential [[id number]]  (Abnormal) Collected: 02/28/24 0917    Lab Status: Final result Specimen: Blood from Arm, Right Updated: 02/28/24 0926     WBC 3.38 Thousand/uL      RBC 4.05 Million/uL      Hemoglobin 13.5 g/dL      Hematocrit 39.5 %      MCV 98 fL      MCH 33.3 pg      MCHC 34.2 g/dL      RDW 12.5 %      MPV 10.0 fL      Platelets 197 Thousands/uL      nRBC 0 /100 WBCs       Neutrophils Relative 42 %      Immat GRANS % 0 %      Lymphocytes Relative 41 %      Monocytes Relative 15 %      Eosinophils Relative 1 %      Basophils Relative 1 %      Neutrophils Absolute 1.45 Thousands/µL      Immature Grans Absolute 0.01 Thousand/uL      Lymphocytes Absolute 1.38 Thousands/µL      Monocytes Absolute 0.49 Thousand/µL      Eosinophils Absolute 0.03 Thousand/µL      Basophils Absolute 0.02 Thousands/µL                    No orders to display              Procedures  Procedures         ED Course                               SBIRT 22yo+      Flowsheet Row Most Recent Value   Initial Alcohol Screen: US AUDIT-C     1. How often do you have a drink containing alcohol? 0 Filed at: 02/28/2024 0907   2. How many drinks containing alcohol do you have on a typical day you are drinking?  0 Filed at: 02/28/2024 0907   3b. FEMALE Any Age, or MALE 65+: How often do you have 4 or more drinks on one occassion? 0 Filed at: 02/28/2024 0907   Audit-C Score 0 Filed at: 02/28/2024 0907   CATHY: How many times in the past year have you...    Used an illegal drug or used a prescription medication for non-medical reasons? Never Filed at: 02/28/2024 0907                      Medical Decision Making  53-year-old female presenting with intermittent scleral icterus  No signs of scleral icterus on exam  Will check for liver function due to patient's request  Liver function is normal and patient was discharged with instructions to follow-up with primary care provider  Return precautions provided    Problems Addressed:  Laboratory test: acute illness or injury    Amount and/or Complexity of Data Reviewed  Labs: ordered.             Disposition  Final diagnoses:   Laboratory test     Time reflects when diagnosis was documented in both MDM as applicable and the Disposition within this note       Time User Action Codes Description Comment    2/28/2024 10:34 AM Phillip Coronado Add [Z01.89] Laboratory test           ED Disposition        ED Disposition   Discharge    Condition   Stable    Date/Time   Wed Feb 28, 2024 1034    Comment   Esperanza Prajapati discharge to home/self care.                   Follow-up Information       Follow up With Specialties Details Why Contact Info Additional Information    [facility] Emergency Medicine Go to   [address] [facility] [address] [phone] [facility]    [facility] Schedule an appointment as soon as possible for a visit   [address] [place] [address], [address]  [facility] [address] [phone] [facility], [address] [place] [address], [place] [address], [place], [place], [address] [phone]            Discharge Medication List as of 2/28/2024 10:35 AM        CONTINUE these medications which have NOT CHANGED    Details   divalproex sodium (DEPAKOTE ER) 250 mg 24 hr tablet Take three tablets by mouth in the morning and three tablets by mouth at night time., Normal      ergocalciferol (VITAMIN D2) 50,000 units Take 1 capsule (50,000 Units total) by mouth once a week, Starting Fri 1/19/2024, Normal      !! lacosamide (VIMPAT) 150 mg tablet Take 1 tablet (150 mg total) by mouth every 12 (twelve) hours, Starting Fri 1/19/2024, Normal      !! lacosamide (Vimpat) 50 mg Take 1 tablet (50 mg total) by mouth daily at bedtime, Starting Fri 1/19/2024, Normal      levothyroxine 175 mcg tablet TAKE 1 TABLET BY MOUTH DAILY, Starting Thu 2/1/2024, Normal      paliperidone (INVEGA) 3 mg 24 hr tablet Take 1 tablet (3 mg total) by mouth every morning, Starting Thu 6/8/2023, Until Wed 9/6/2023, Normal       !! - Potential duplicate medications found. Please discuss with provider.          No discharge procedures on file.    PDMP Review         Value Time User    PDMP Reviewed  Yes 8/3/2023 10:50 AM Jessica Porter PA-C            ED  Provider  Electronically Signed by             Phillip Coronado MD  02/29/24 [id number]

## 2024-03-06 ENCOUNTER — OFFICE VISIT (OUTPATIENT)
Dept: NEUROLOGY | Facility: CLINIC | Age: 54
End: 2024-03-06
Payer: COMMERCIAL

## 2024-03-06 DIAGNOSIS — R25.1 TREMOR: ICD-10-CM

## 2024-03-06 DIAGNOSIS — Z71.89 COMPLEX CARE COORDINATION: Primary | ICD-10-CM

## 2024-03-06 DIAGNOSIS — E28.2 PCOS (POLYCYSTIC OVARIAN SYNDROME): ICD-10-CM

## 2024-03-06 DIAGNOSIS — E55.9 VITAMIN D DEFICIENCY: ICD-10-CM

## 2024-03-06 DIAGNOSIS — R41.82 ALTERED MENTAL STATUS, UNSPECIFIED ALTERED MENTAL STATUS TYPE: ICD-10-CM

## 2024-03-06 DIAGNOSIS — Z78.9 LANGUAGE BARRIER AFFECTING HEALTH CARE: ICD-10-CM

## 2024-03-06 DIAGNOSIS — F81.9 COGNITIVE DEVELOPMENTAL DELAY: ICD-10-CM

## 2024-03-06 DIAGNOSIS — Z91.148 NONADHERENCE TO MEDICATION: ICD-10-CM

## 2024-03-06 DIAGNOSIS — C73 PAPILLARY CARCINOMA OF THYROID (HCC): ICD-10-CM

## 2024-03-06 DIAGNOSIS — G47.33 OBSTRUCTIVE SLEEP APNEA: ICD-10-CM

## 2024-03-06 DIAGNOSIS — E89.0 POSTOPERATIVE HYPOTHYROIDISM: ICD-10-CM

## 2024-03-06 DIAGNOSIS — D75.89 MACROCYTOSIS WITHOUT ANEMIA: ICD-10-CM

## 2024-03-06 DIAGNOSIS — G40.019 PARTIAL IDIOPATHIC EPILEPSY WITH SEIZURES OF LOCALIZED ONSET, INTRACTABLE, WITHOUT STATUS EPILEPTICUS (HCC): ICD-10-CM

## 2024-03-06 DIAGNOSIS — C73 THYROID CANCER (HCC): ICD-10-CM

## 2024-03-06 PROCEDURE — 99215 OFFICE O/P EST HI 40 MIN: CPT | Performed by: PSYCHIATRY & NEUROLOGY

## 2024-03-06 PROCEDURE — G2211 COMPLEX E/M VISIT ADD ON: HCPCS | Performed by: PSYCHIATRY & NEUROLOGY

## 2024-03-06 PROCEDURE — 99417 PROLNG OP E/M EACH 15 MIN: CPT | Performed by: PSYCHIATRY & NEUROLOGY

## 2024-03-06 NOTE — Clinical Note
"Yahnily,  Communication was a struggle during today's visit. There were some abnormal behaviors and odd thinking that was more prominent today than in the past. She mentioned things happening since 9/2022 that are \"very bad\", \"dangerous\" and are \"about life.\" She told me should would not give me any more details and did not answer safety questions directly, but I did not have the impression that this was something new and did not think an acute crisis evaluation was needed. I want to make sure she gets setup with behavioral health. Her mother told me this was in process, but I'd like that to be confirmed and expedited if possible. Can you look into making sure she gets setup with behavioral health?   Thank you. "

## 2024-03-06 NOTE — PATIENT INSTRUCTIONS
Have blood work done.   Continue divalproex and lacosamide unchanged.   Let us know if there are seizures.   Establish care with behavioral health.   Setup visit with Dr. Platt.

## 2024-03-06 NOTE — PROGRESS NOTES
"[facility]  Follow Up Visit    Impression/Plan    Ms. Néstor Prajapati is a 53 y.o. female  with history of developmental delay and static encephalopathy who returns for follow-up regarding focal epilepsy manifest as simple partial, complex partial and generalized tonic-clonic seizures possibly due to head injury around the age of 10 months. Her neurological exam reveals evidence of cognitive impairment and bilateral postural and action tremor, as well as intermittent resting tremor today. There is a history of medication non adherence and confusion about her medication dosing, but this should be improved now that medications are supplied in packs by the pharmacy.     Obtaining details about her history was very difficult today due to multiple barriers to useful communication and the patient's abnormal though process and fixation on reading the screen in the room, which she felt was helping her abnormal \"eye movements\" (repetitive eyebrow elevation). The abnormal facial movements in the office today appeared volitional and distractible. Her eye movements were normal and visual fields were full. Turning off the screen caused significant distress, but she had difficulty focusing on my questions when it was on. She reports \"very bad\" and \"dangerous\" things happening since 9/2022, but will not explain more specifics other than it is \"about life.\" She did not report any intentions to harm herself, but she did not directly answer questions about safety. I strongly encouraged her mother to get her setup with behavioral health, which apparently is in process. Checking TSH, B12 and ammonia for cause of possible cognitive/behavioral change. I am uncertain if the behaviors seen in the visit today were due to a psychiatric/neurologic change or were situational.      Her tremor is likely related to divalproex, although lacosamide can also contribute in some patients. In the past it was " apparently not interfering with activities, but today I was not able to get additional useful history about how this is affecting her.  Today the tremor is more prominent than I have seen in the past. There is also weight gain related to divalproex and PCOS.  I had planned to transition to monotherapy with lacosamide once adherence with an adequate dose of lacosamide had been confirmed, but she has not yet had a repeat lacosamide level after the low level in early January in the ED. Could consider lowering divalproex dose before next visit if a lacosamide level is adequate.     Patient Instructions   Have blood work done.   Continue divalproex and lacosamide unchanged.   Let us know if there are seizures.   Establish care with behavioral health.   Setup visit with Dr. Platt.     Diagnoses and all orders for this visit:    Complex care coordination    PCOS (polycystic ovarian syndrome)    Obstructive sleep apnea    Tremor    Partial idiopathic epilepsy with seizures of localized onset, intractable, without status epilepticus (HCC)  -     Lacosamide; Future  -     Valproic acid level, total; Future  -     Comprehensive metabolic panel; Future    Language barrier affecting health care    Cognitive developmental delay    Nonadherence to medication    Vitamin D deficiency        Subjective    Jinjorge Néstor Lugoes is returning to the [facility] for follow up.     Interval Events:   Seizures since last visit: None (prior: 1/2/2024, low lacosamide level in the emergency department)  Hospitalizations: Multiple ED visits, not for seizure    Record Review:  Last seen 1/17/2024.  We have been working on decreasing divalproex as long as it was confirmed she was taking lacosamide consistently and had a good level.  Her divalproex may contribute to tremor, weight gain and she has a diagnosis of PCOS.  There was a lot of confusion about her medication dosing at the last visit.  She was apparently  taking 9 tablets of divalproex in the morning instead of 3 tablets twice a day and then shortly prior to the visit she started taking 6 tablets in the morning, again without any dose at night.  There had been an emergency department visit on 1/2/2024 for seizure at which time her lacosamide level was low at 2.1.  Valproate was 97.  Our office worked to get her set up with pill packs through her pharmacy.  Repeat valproate and lacosamide levels were ordered, but do not appear to have been completed.    There were 2 back-to-back emergency department visits on January 24 January 25.  The reason for presentation to the emergency department was confusing.  Initially her mother reported complaints about her eyes being yellow and smaller than earlier in the month, but apparently that resolved.  Laboratory workup was unremarkable including normal bilirubin.  There was no objective yellowing of the eyes seen in the emergency department.  The patient mentioned that sometimes when she watched TV or another electronic device she would get dizzy.  She denied blurred vision or double vision.  There is no trouble walking.  Denied recent seizure.  They also noted tremor, but described this is going on for the last 7 years.    She presented back to the emergency department on 2/28/2024.  It seems that the main complaint was intermittent sclera turning yellow.  On exam there was no scleral icterus.  They asked to have her liver checked.  Laboratory workup revealed normal hepatic enzymes.    History from Visit today:  They deny any seizures since January 2, 2024.  Pills come in a pill pack.  Communication was very difficult during today's visit.  The patient spoke in English and asked that we use an  when communicating with her mother.  Her mother often spoken English and occasionally spoke in Monegasque to me when I asked her to use the .  The patient and her mother had many discussions in Monegasque, often while the  " was trying to speak. Attempts to redirect or organize the conversation were only slightly and temporarily successful.     The patient was focused on reading the screen in the room that displays educational information and advertisements.  She felt that looking at and reading the information on the screen was stopping her abnormal eye movements.  I told her that we needed to turn the screen off so that she could focus on the questions I was asking and the exam.  I unplugged the screen and she became distressed, stating that the abnormal \"eye movements\" were returning because I unplugged the screen.  Throughout the visit she had intermittent high amplitude eyebrow raising, sometimes semi-rhythmic.  She told me that the movements were involuntary and that she could not stop them.  Sometimes they did stop when she changed focus or performed a task.  She told me they stopped when she was reading the screen across the room, but they did not consistently stop when she watched the screen. He mother reports that the patient's eyes become yellow and the interpretor describes the mother as stating that Esperanza's liver literally goes into her eyes.     She tells me that something has been happening since 9/2022. Really bad, really wrong and dangerous. When asked for more details she said it is \"about life.\" She mentions that it has to do with things on the TV and cell phone that are \"in real life\", things that are dangerous. Says her mother doesn't believe her about certain things. She says she won't tell me or anyone else any other details, but that it is very bad. She did not directly answer questions about feeling safe at home or if she had intentions to harm herself. She said she would not tell a  about it either. Her mother says they are working on getting her setup with psychiatry.  Her mother does not provide any significant additional insight into the specifics.     The most recent vitamin D " level was 17.9 on 11/28/2023.  She is prescribed ergocalciferol 50,000 units weekly.  DEXA scan was ordered by primary care in December, but has not yet been completed.    Current AEDs:  Lacosamide 150-200  Divalproex 750 mg BID  In pill packs     Event/Seizure semiology:  1. “Big attacks” mother reports the patient tries to sit protect herself.  These last couple minutes.  The patient feel something and she describes a stomach pain.  There snoring, shakes, moves a lot and foaming at the mouth.  Can be combative afterwards.  There is a postictal state that may last 30 minutes or more.   2. “Small ones”.  Rubs her stomach, turns her head to the right, staring, face turns to the right.  Duration is less than a minute.     Special Features  Status epilepticus: none known  Self Injury Seizures: unknown  Precipitating Factors: none known     Epilepsy Risk Factors:  Intellectual disability  Head injury (moderate/severe)     Prior AEDs:  Carbamazepine caused behavior problems  Lamotrigine trial in 2020. Stopped on own.   Phenobarbital as a child  Phenytoin gingival hyperplasia  Topiramate possible behavior problems     Prior Evaluation:  Routine EEG July 2016 at [facility]:  1- Slow posterior dominant rhythm  2- Frequent right temporal sharp waves  3- Generalized beta activity.     REEG 6/10/2021  Abundant right temporal sharp waves and intermittent right temporal polymorphic theta slowing suggest underlying neuronal dysfunction that is highly epileptogenic. Occasional left temporal sharp waves suggest underlying focal epileptogenic potential.      REEG 9/30/2022  This Routine EEG recorded during wakefulness and sleep is abnormal.  The study captures a 1 minute and 20 second left temporal electroclinical seizure involving unresponsiveness, right arm dystonic posturing, as well as oral and left hand automatisms.   Most of the study is recorded during stage 2 sleep with only brief wakefulness that occurs immediately  before after the seizure. Frequent right temporal sharp waves are present throughout the recording and suggest right temporal epileptogenic potential (right temporal seizure tendency in addition to the left temporal seizure seen during this study).     MRI brain 8/18/2021:   IMPRESSION:  No mass effect, acute intracranial hemorrhage or evidence of recent infarction.  No abnormal parenchymal or leptomeningeal enhancement identified  Hippocampal formations are symmetric in size and appearance.  No discrete migrational anomalies identified     Psychiatric History:  paranoia and hallucinations in 2020  Following with psychiatry.      Social History:   Driving: No  Lives Alone: No  Occupation: on permanent disability      Objective    LMP  (LMP Unknown)      General Exam  No acute distress.    Neurologic Exam  Mental Status:  Alert. Follows commands when able to focus on my instructions.   Language: normal fluency.   Cranial Nerves:  VFFTC. EOMI, no nystagums. Face symmetric. No dysarthria.  Motor:  No drift. Strength 5/5 throughout.  Coordination: Intermittent mild to moderate, moderate frequency, ,postural and action tremor. Significant at times in hand holding phone, varies. There is occasional moderate resting tremor in the left upper more than right upper.   Gait: Steady gait. Normal stride length.         I have spent a total time of 70 minutes on 03/06/24 in caring for this patient including Diagnostic results, Prognosis, Risks and benefits of tx options, Instructions for management, Patient and family education, Importance of tx compliance, Risk factor reductions, Impressions, Counseling / Coordination of care, Documenting in the medical record, Reviewing / ordering tests, medicine, procedures  , Obtaining or reviewing history  , and Communicating with other healthcare professionals . Reviewed case with social work after the visit.

## 2024-03-07 ENCOUNTER — TELEPHONE (OUTPATIENT)
Dept: NEUROLOGY | Facility: CLINIC | Age: 54
End: 2024-03-07

## 2024-03-07 DIAGNOSIS — F25.9 SCHIZOAFFECTIVE DISORDER, UNSPECIFIED TYPE (HCC): Primary | Chronic | ICD-10-CM

## 2024-03-07 NOTE — TELEPHONE ENCOUNTER
Dr. Cevallos,    Pt is agreeable to see a MH provider. If agreeable, Could you please place referral in epic ? Thanks !

## 2024-03-07 NOTE — TELEPHONE ENCOUNTER
MSW phoned [phone] and patient's mother answered. This writer requested to speak with patient and she informed that everything is managed by her mother to please speak to mother. This writer was placed on a speaker phone. Pt confirmed that she is safe, no SI. Informed that she is agreeable to see a Psychologist. She participate in a resident program at [facility] part 2/3 times in the month. They are interested in applying for waiver services to participate in a day program in order for patient to be more active.   Pt is active with [facility] and [facility]         [id number] 03/07/2024 03/07/2024   [facility] 03/07/2024 03/07/2024   Medicaid Category: J  Program Status: 00  Service Program: [id number]-[id number] 03/07/2024 03/07/2024   Other or Additional Payor MEDICARE PART B 03/07/2024 03/07/2024   Other or Additional Payor MEDICARE PART A 03/07/2024 03/07/2024   Other or Additional Payor ADVANTRA (MEDICARE ADVANTAGE)

## 2024-03-07 NOTE — TELEPHONE ENCOUNTER
"MD Jeremías Solis, MARIAELENA Veronica,    Communication was a struggle during today's visit. There were some abnormal behaviors and odd thinking that was more prominent today than in the past. She mentioned things happening since 9/2022 that are \"very bad\", \"dangerous\" and are \"about life.\" She told me should would not give me any more details and did not answer safety questions directly, but I did not have the impression that this was something new and did not think an acute crisis evaluation was needed. I want to make sure she gets setup with behavioral health. Her mother told me this was in process, but I'd like that to be confirmed and expedited if possible. Can you look into making sure she gets setup with behavioral health?   "

## 2024-03-11 DIAGNOSIS — G40.109 FOCAL EPILEPSY (HCC): ICD-10-CM

## 2024-03-12 ENCOUNTER — TELEPHONE (OUTPATIENT)
Dept: PSYCHIATRY | Facility: CLINIC | Age: 54
End: 2024-03-12

## 2024-03-12 NOTE — TELEPHONE ENCOUNTER
At this time patient will remain on wait list until something becomes avail. Patient wait list has been updated to [facility] and [facility] due to insurance not being accepted at [facility].

## 2024-03-13 RX ORDER — LACOSAMIDE 150 MG/1
150 TABLET ORAL EVERY 12 HOURS SCHEDULED
Qty: 60 TABLET | Refills: 2 | Status: SHIPPED | OUTPATIENT
Start: 2024-03-13

## 2024-03-13 NOTE — TELEPHONE ENCOUNTER
MSW phoned [facility]   [address] [place] [address]  Hours: Open ? Closes 6?PM  Phone: [phone]  m to add patient to their waitlist

## 2024-03-14 ENCOUNTER — APPOINTMENT (OUTPATIENT)
Dept: LAB | Facility: CLINIC | Age: 54
End: 2024-03-14
Payer: COMMERCIAL

## 2024-03-14 DIAGNOSIS — G40.019 PARTIAL IDIOPATHIC EPILEPSY WITH SEIZURES OF LOCALIZED ONSET, INTRACTABLE, WITHOUT STATUS EPILEPTICUS (HCC): ICD-10-CM

## 2024-03-14 LAB
ALBUMIN SERPL BCP-MCNC: 3.7 G/DL (ref 3.5–5)
ALP SERPL-CCNC: 49 U/L (ref 34–104)
ALT SERPL W P-5'-P-CCNC: 12 U/L (ref 7–52)
ANION GAP SERPL CALCULATED.3IONS-SCNC: 9 MMOL/L (ref 4–13)
AST SERPL W P-5'-P-CCNC: 13 U/L (ref 13–39)
BILIRUB SERPL-MCNC: 0.43 MG/DL (ref 0.2–1)
BUN SERPL-MCNC: 17 MG/DL (ref 5–25)
CALCIUM SERPL-MCNC: 9.4 MG/DL (ref 8.4–10.2)
CHLORIDE SERPL-SCNC: 103 MMOL/L (ref 96–108)
CO2 SERPL-SCNC: 28 MMOL/L (ref 21–32)
CREAT SERPL-MCNC: 0.71 MG/DL (ref 0.6–1.3)
GFR SERPL CREATININE-BSD FRML MDRD: 98 ML/MIN/1.73SQ M
GLUCOSE P FAST SERPL-MCNC: 98 MG/DL (ref 65–99)
POTASSIUM SERPL-SCNC: 4.1 MMOL/L (ref 3.5–5.3)
PROT SERPL-MCNC: 7 G/DL (ref 6.4–8.4)
SODIUM SERPL-SCNC: 140 MMOL/L (ref 135–147)
VALPROATE SERPL-MCNC: 80 UG/ML (ref 50–100)

## 2024-03-14 PROCEDURE — 36415 COLL VENOUS BLD VENIPUNCTURE: CPT

## 2024-03-14 PROCEDURE — 80164 ASSAY DIPROPYLACETIC ACD TOT: CPT

## 2024-03-14 PROCEDURE — 80235 DRUG ASSAY LACOSAMIDE: CPT

## 2024-03-14 PROCEDURE — 80053 COMPREHEN METABOLIC PANEL: CPT

## 2024-03-14 NOTE — TELEPHONE ENCOUNTER
MSW phoned [facility]   [address] [place] [address]  Hours: Open ? Closes 6?PM  Phone: [phone]  Spoke with Felipa and added patient to waitlist# [phone]

## 2024-03-14 NOTE — TELEPHONE ENCOUNTER
EVARISTO phoned [facility] Information and referral at [phone] and spoke with [facility] who informed that she will be sending referral to this writer for an ICM.   She will fax form to :[phone]

## 2024-03-15 ENCOUNTER — HOSPITAL ENCOUNTER (EMERGENCY)
Facility: HOSPITAL | Age: 54
Discharge: HOME | End: 2024-03-15
Attending: EMERGENCY MEDICINE
Payer: COMMERCIAL

## 2024-03-15 VITALS
DIASTOLIC BLOOD PRESSURE: 78 MMHG | OXYGEN SATURATION: 95 % | TEMPERATURE: 97.2 F | SYSTOLIC BLOOD PRESSURE: 120 MMHG | RESPIRATION RATE: 18 BRPM | HEART RATE: 92 BPM

## 2024-03-15 DIAGNOSIS — G40.919 BREAKTHROUGH SEIZURE (HCC): ICD-10-CM

## 2024-03-15 DIAGNOSIS — H57.89 REDNESS OF LEFT EYE: Primary | ICD-10-CM

## 2024-03-15 LAB
ALBUMIN SERPL BCP-MCNC: 3.5 G/DL (ref 3.5–5)
ALP SERPL-CCNC: 47 U/L (ref 34–104)
ALT SERPL W P-5'-P-CCNC: 11 U/L (ref 7–52)
ANION GAP SERPL CALCULATED.3IONS-SCNC: 8 MMOL/L (ref 4–13)
AST SERPL W P-5'-P-CCNC: 13 U/L (ref 13–39)
BASOPHILS # BLD AUTO: 0.02 THOUSANDS/ÂΜL (ref 0–0.1)
BASOPHILS NFR BLD AUTO: 0 % (ref 0–1)
BILIRUB SERPL-MCNC: 0.36 MG/DL (ref 0.2–1)
BUN SERPL-MCNC: 19 MG/DL (ref 5–25)
CALCIUM SERPL-MCNC: 9.4 MG/DL (ref 8.4–10.2)
CHLORIDE SERPL-SCNC: 104 MMOL/L (ref 96–108)
CO2 SERPL-SCNC: 27 MMOL/L (ref 21–32)
CREAT SERPL-MCNC: 0.64 MG/DL (ref 0.6–1.3)
EOSINOPHIL # BLD AUTO: 0.05 THOUSAND/ÂΜL (ref 0–0.61)
EOSINOPHIL NFR BLD AUTO: 1 % (ref 0–6)
ERYTHROCYTE [DISTWIDTH] IN BLOOD BY AUTOMATED COUNT: 12.6 % (ref 11.6–15.1)
GFR SERPL CREATININE-BSD FRML MDRD: 103 ML/MIN/1.73SQ M
GLUCOSE SERPL-MCNC: 81 MG/DL (ref 65–140)
HCT VFR BLD AUTO: 37.1 % (ref 34.8–46.1)
HGB BLD-MCNC: 12.8 G/DL (ref 11.5–15.4)
IMM GRANULOCYTES # BLD AUTO: 0.02 THOUSAND/UL (ref 0–0.2)
IMM GRANULOCYTES NFR BLD AUTO: 0 % (ref 0–2)
LYMPHOCYTES # BLD AUTO: 1.62 THOUSANDS/ÂΜL (ref 0.6–4.47)
LYMPHOCYTES NFR BLD AUTO: 29 % (ref 14–44)
MCH RBC QN AUTO: 33.1 PG (ref 26.8–34.3)
MCHC RBC AUTO-ENTMCNC: 34.5 G/DL (ref 31.4–37.4)
MCV RBC AUTO: 96 FL (ref 82–98)
MONOCYTES # BLD AUTO: 0.83 THOUSAND/ÂΜL (ref 0.17–1.22)
MONOCYTES NFR BLD AUTO: 15 % (ref 4–12)
NEUTROPHILS # BLD AUTO: 3.05 THOUSANDS/ÂΜL (ref 1.85–7.62)
NEUTS SEG NFR BLD AUTO: 55 % (ref 43–75)
NRBC BLD AUTO-RTO: 0 /100 WBCS
PLATELET # BLD AUTO: 211 THOUSANDS/UL (ref 149–390)
PMV BLD AUTO: 10.1 FL (ref 8.9–12.7)
POTASSIUM SERPL-SCNC: 4.2 MMOL/L (ref 3.5–5.3)
PROT SERPL-MCNC: 6.7 G/DL (ref 6.4–8.4)
RBC # BLD AUTO: 3.87 MILLION/UL (ref 3.81–5.12)
SODIUM SERPL-SCNC: 139 MMOL/L (ref 135–147)
VALPROATE SERPL-MCNC: 76 UG/ML (ref 50–100)
WBC # BLD AUTO: 5.59 THOUSAND/UL (ref 4.31–10.16)

## 2024-03-15 PROCEDURE — 80235 DRUG ASSAY LACOSAMIDE: CPT

## 2024-03-15 PROCEDURE — 99284 EMERGENCY DEPT VISIT MOD MDM: CPT | Performed by: EMERGENCY MEDICINE

## 2024-03-15 PROCEDURE — 80053 COMPREHEN METABOLIC PANEL: CPT

## 2024-03-15 PROCEDURE — 80164 ASSAY DIPROPYLACETIC ACD TOT: CPT

## 2024-03-15 PROCEDURE — 99284 EMERGENCY DEPT VISIT MOD MDM: CPT

## 2024-03-15 PROCEDURE — 85025 COMPLETE CBC W/AUTO DIFF WBC: CPT

## 2024-03-15 PROCEDURE — 36415 COLL VENOUS BLD VENIPUNCTURE: CPT

## 2024-03-15 RX ORDER — TETRACAINE HYDROCHLORIDE 5 MG/ML
1 SOLUTION OPHTHALMIC ONCE
Status: COMPLETED | OUTPATIENT
Start: 2024-03-15 | End: 2024-03-15

## 2024-03-15 RX ORDER — ERYTHROMYCIN 5 MG/G
OINTMENT OPHTHALMIC
Qty: 3.5 G | Refills: 0 | Status: SHIPPED | OUTPATIENT
Start: 2024-03-15

## 2024-03-15 RX ADMIN — FLUORESCEIN SODIUM 1 STRIP: 1 STRIP OPHTHALMIC at 16:33

## 2024-03-15 RX ADMIN — TETRACAINE HYDROCHLORIDE 1 DROP: 5 SOLUTION OPHTHALMIC at 16:33

## 2024-03-15 NOTE — ED ATTENDING ATTESTATION
"3/15/2024  I, Brittany Mendes MD, saw and evaluated the patient. I have discussed the patient with the resident/non-physician practitioner and agree with the resident's/non-physician practitioner's findings, Plan of Care, and MDM as documented in the resident's/non-physician practitioner's note, except where noted. All available labs and Radiology studies were reviewed.  I was present for key portions of any procedure(s) performed by the resident/non-physician practitioner and I was immediately available to provide assistance.       At this point I agree with the current assessment done in the Emergency Department.  I have conducted an independent evaluation of this patient a history and physical is as follows:    OA: 52 y/o f with h/o seizures and intellectual disability  who presents to the ED with mother with concern for abnormal eye movements and discoloration of eyes. Per mother, the patient intermittently rolls her eyes backward and then closes them tightly. Also notes she touches her eyes often. Mother is concerned regarding this behavior. Also concerned that her eyes may look more \"yellow\" and her right eye is injected. Per chart review these complaints have been evaluated previously in the ED as well as by neurology most recently within the last two weeks. The pt and mother deny any seizures. Deny any headaches/lightheadedness. No cp/sob. No recent falls/trauma. Pt herself denies any eye pain or visual changes. No discharge. PE, NAD, VSS, NC/AT, MMM, no appreciable icterus, + mild injection to R sclera, PERRL, no hyphema, follows fingers and is able to describe fingers being held up. Neck supple/FROM, RR< lungs CTAB, abd soft, +BS, nonttp, WAITE, - edema, no calf ttp, intact pulses, capillary refill < 2 sec. A/p concern for eye movements, pt is able to control these during eval and is awake the entire time. Pt also without icterus. Given medications and family concern, will eval with labs, stain eye, " treat accordingly.     Upper sorbian interpretor used [id number] during entirety of history and exam.     ED Course         Critical Care Time  Procedures

## 2024-03-15 NOTE — DISCHARGE INSTRUCTIONS
Esperanza Néstor Alo was seen and evaluated today in the emergency department over your concern of red eye.  The workup that we performed showed no abrasion or ulcer.  Please return to the emergency department if you experience blurry vision, difficulty seeing or any other signs and symptoms that may be concerning to you.  Please follow-up with your primary care doctor within 1 day.  All questions were answered prior to discharge.  Thank you for choosing [facility] for your care.    Use ointment 4 times per day for 1 week

## 2024-03-15 NOTE — ED PROVIDER NOTES
History  Chief Complaint   Patient presents with    Seizure Re-Evaluation     Reports seizure like activity where eyes roll in the back of her head     Patient is a 53-year-old female with a history of developmental delay, static encephalopathy, focal epilepsy seizures, presents emergency department for concern of facial tics and redness of right eye.  Patient also mentions a concern of jaundice of her eye.  Based on previous chart reviewed patient was recently tested at [facility] emergency department for very similar complaint of discoloration of eyes.  At that point in time chemistry panel was overall unremarkable for acute pathology.  Based on a neurology note on 3/6/2024, they noted abnormal facial movements that appeared to be volitional and distractible.  She denies any falls or recent trauma.  She denies any focal or generalized seizures that she knows of.  She denies any trauma to the eye itself.  She denies any purulent discharge.  She denies any fevers or chills.  She denies any double vision or blurry vision.     was used throughout the entirety of encounter        Prior to Admission Medications   Prescriptions Last Dose Informant Patient Reported? Taking?   divalproex sodium (DEPAKOTE ER) 250 mg 24 hr tablet  Self No No   Sig: Take three tablets by mouth in the morning and three tablets by mouth at night time.   ergocalciferol (VITAMIN D2) 50,000 units  Self No No   Sig: Take 1 capsule (50,000 Units total) by mouth once a week   lacosamide (VIMPAT) 150 mg tablet   No No   Sig: TAKE 1 TABLET (150 MG TOTAL) BY MOUTH EVERY 12 (TWELVE) HOURS   lacosamide (Vimpat) 50 mg  Self No No   Sig: Take 1 tablet (50 mg total) by mouth daily at bedtime   levothyroxine 175 mcg tablet  Self No No   Sig: TAKE 1 TABLET BY MOUTH DAILY   paliperidone (INVEGA) 3 mg 24 hr tablet  Mother No No   Sig: Take 1 tablet (3 mg total) by mouth every morning   Patient not taking: Reported on 1/19/2024       Facility-Administered Medications: None       Past Medical History:   Diagnosis Date    Cancer (HCC)     thyroid cancer    Disease of thyroid gland     Hyperactivity (behavior)     Last Assessed: 11/7/2014     Hyperlipidemia     Obesity     Seizures (HCC)        Past Surgical History:   Procedure Laterality Date    OTHER SURGICAL HISTORY      Removal of urinary calculus     OTHER SURGICAL HISTORY      Rhinologic Surgery     ND OPEN TX PHALANGEAL SHAFT FRACTURE PROX/MIDDLE EA Left 8/3/2023    Procedure: CLOSED REDUCTION PERCUTANEOUS PINNING - LEFT RING FINGER;  Surgeon: Fransisco Escalante MD;  Location: [facility] MAIN OR;  Service: Orthopedics    TOTAL THYROIDECTOMY      LAst Assessed: 11/7/2014       Family History   Problem Relation Age of Onset    Breast cancer Mother 59    Hypertension Mother     ALS Mother     Stroke Mother     Prostate cancer Father     Diabetes type II Father      I have reviewed and agree with the history as documented.    E-Cigarette/Vaping    E-Cigarette Use Never User      E-Cigarette/Vaping Substances    Nicotine No     THC No     CBD No     Flavoring No     Other No     Unknown No      Social History     Tobacco Use    Smoking status: Never     Passive exposure: Never    Smokeless tobacco: Never   Vaping Use    Vaping status: Never Used   Substance Use Topics    Alcohol use: Never    Drug use: Never        Review of Systems   Eyes:  Positive for redness.   All other systems reviewed and are negative.      Physical Exam  ED Triage Vitals [03/15/24 1221]   Temperature Pulse Respirations Blood Pressure SpO2   (!) 97.2 °F (36.2 °C) (!) 108 18 120/78 95 %      Temp src Heart Rate Source Patient Position - Orthostatic VS BP Location FiO2 (%)   -- Monitor -- Right arm --      Pain Score       --             Orthostatic Vital Signs  Vitals:    03/15/24 1221 03/15/24 1645   BP: 120/78    Pulse: (!) 108 92       Physical Exam  Vitals and nursing note reviewed.   Constitutional:       General: She  is not in acute distress.     Appearance: She is well-developed.   HENT:      Head: Normocephalic and atraumatic.   Eyes:      General: No scleral icterus.        Right eye: No foreign body or discharge.         Left eye: No foreign body or discharge.      Extraocular Movements:      Right eye: Normal extraocular motion and no nystagmus.      Left eye: Normal extraocular motion and no nystagmus.      Conjunctiva/sclera:      Right eye: Right conjunctiva is injected. No chemosis or exudate.     Left eye: Left conjunctiva is not injected. No chemosis or exudate.     Comments: Fluorescein staining was negative from bilateral eyes.   Cardiovascular:      Rate and Rhythm: Normal rate and regular rhythm.      Heart sounds: No murmur heard.  Pulmonary:      Effort: Pulmonary effort is normal. No respiratory distress.      Breath sounds: Normal breath sounds.   Abdominal:      Palpations: Abdomen is soft.      Tenderness: There is no abdominal tenderness.   Musculoskeletal:         General: No swelling.      Cervical back: Neck supple.   Skin:     General: Skin is warm and dry.      Capillary Refill: Capillary refill takes less than 2 seconds.   Neurological:      Mental Status: She is alert.   Psychiatric:         Mood and Affect: Mood normal.         ED Medications  Medications   tetracaine 0.5 % ophthalmic solution 1 drop (1 drop Both Eyes Given by Other 3/15/24 1633)   fluorescein sodium sterile ophthalmic strip 1 strip (1 strip Both Eyes Given by Other 3/15/24 9973)       Diagnostic Studies  Results Reviewed       Procedure Component Value Units Date/Time    Valproic acid level, total [[id number]]  (Normal) Collected: 03/15/24 1353    Lab Status: Final result Specimen: Blood from Arm, Right Updated: 03/15/24 1451     Valproic Acid, Total 76 ug/mL     Comprehensive metabolic panel [[id number]] Collected: 03/15/24 1353    Lab Status: Final result Specimen: Blood from Arm, Right Updated: 03/15/24 1451     Sodium 139 mmol/L       Potassium 4.2 mmol/L      Chloride 104 mmol/L      CO2 27 mmol/L      ANION GAP 8 mmol/L      BUN 19 mg/dL      Creatinine 0.64 mg/dL      Glucose 81 mg/dL      Calcium 9.4 mg/dL      AST 13 U/L      ALT 11 U/L      Alkaline Phosphatase 47 U/L      Total Protein 6.7 g/dL      Albumin 3.5 g/dL      Total Bilirubin 0.36 mg/dL      eGFR 103 ml/min/1.73sq m     Narrative:      National Kidney Disease Foundation guidelines for Chronic Kidney Disease (CKD):     Stage 1 with normal or high GFR (GFR > 90 mL/min/1.73 square meters)    Stage 2 Mild CKD (GFR = 60-89 mL/min/1.73 square meters)    Stage 3A Moderate CKD (GFR = 45-59 mL/min/1.73 square meters)    Stage 3B Moderate CKD (GFR = 30-44 mL/min/1.73 square meters)    Stage 4 Severe CKD (GFR = 15-29 mL/min/1.73 square meters)    Stage 5 End Stage CKD (GFR <15 mL/min/1.73 square meters)  Note: GFR calculation is accurate only with a steady state creatinine    CBC and differential [[id number]]  (Abnormal) Collected: 03/15/24 1353    Lab Status: Final result Specimen: Blood from Arm, Right Updated: 03/15/24 1409     WBC 5.59 Thousand/uL      RBC 3.87 Million/uL      Hemoglobin 12.8 g/dL      Hematocrit 37.1 %      MCV 96 fL      MCH 33.1 pg      MCHC 34.5 g/dL      RDW 12.6 %      MPV 10.1 fL      Platelets 211 Thousands/uL      nRBC 0 /100 WBCs      Neutrophils Relative 55 %      Immature Grans % 0 %      Lymphocytes Relative 29 %      Monocytes Relative 15 %      Eosinophils Relative 1 %      Basophils Relative 0 %      Neutrophils Absolute 3.05 Thousands/µL      Absolute Immature Grans 0.02 Thousand/uL      Absolute Lymphocytes 1.62 Thousands/µL      Absolute Monocytes 0.83 Thousand/µL      Eosinophils Absolute 0.05 Thousand/µL      Basophils Absolute 0.02 Thousands/µL     Lacosamide [[id number]] Collected: 03/15/24 1725    Lab Status: In process Specimen: Blood from Arm, Right Updated: 03/15/24 1400                   No orders to display          Procedures  Procedures      ED Course  ED Course as of 03/15/24 1935   Fri Mar 15, 2024   1453 WBC: 5.59   1453 Hemoglobin: 12.8   1453 Hematocrit: 37.1   1453 Platelet Count: 211   1453 Sodium: 139   1453 Potassium: 4.2   1453 BUN: 19   1453 Creatinine: 0.64   1453 GLUCOSE: 81   1453 VALPROIC ACID TOTAL: 76   1644 Eye exam negative                                       Medical Decision Making      DDx: Seizure, viral conjunctivitis, doubt corneal abrasion, corneal ulcer, glaucoma    Plan: CMP to evaluate for jaundice, bilirubin level, CBC, lacosamide valproic acid level,  Ocular testing    Workup was overall unremarkable for signs and symptoms concerning for jaundice or liver pathology.  Unlikely to present overall of reportable.  No corneal abrasions or vitals were detected.  Patient did have slight resolution of pain and discomfort in her right eye after tetracaine eyedrops.  Eyelids were everted and no foreign bodies were seen.  Given the patient had mild resolution of symptoms with tetracaine drops, patient will be treated with erythromycin ointment for 1 week.  Instructed to follow-up with primary care provider for repeat evaluation as well as neurologist.  Mother and patient verbalized understanding.   manage stable for discharge home.        Amount and/or Complexity of Data Reviewed  Labs: ordered. Decision-making details documented in ED Course.    Risk  Prescription drug management.          Disposition  Final diagnoses:   Redness of right eye   Breakthrough seizure (HCC)     Time reflects when diagnosis was documented in both MDM as applicable and the Disposition within this note       Time User Action Codes Description Comment    3/15/2024  3:21 PM Major Beard [H57.89] Redness of left eye     3/15/2024  4:44 PM Major Berad [G40.909] Recurrent seizures (HCC)     3/15/2024  4:44 PM Major Beard [G40.909] Recurrent seizures (HCC)     3/15/2024  4:45 PM  Major Beard Add [G40.919] Breakthrough seizure (HCC)     3/15/2024  7:35 PM Major Beard Modify [H57.89] Redness of right eye           ED Disposition       ED Disposition   Discharge    Condition   Stable    Date/Time   Fri Mar 15, 2024  4:45 PM    Comment   Esperanza Prajapati discharge to home/self care.                   Follow-up Information    None         Discharge Medication List as of 3/15/2024  4:45 PM        START taking these medications    Details   erythromycin (ILOTYCIN) ophthalmic ointment Place a 1/2 inch ribbon of ointment into the lower eyelid., Normal           CONTINUE these medications which have NOT CHANGED    Details   divalproex sodium (DEPAKOTE ER) 250 mg 24 hr tablet Take three tablets by mouth in the morning and three tablets by mouth at night time., Normal      ergocalciferol (VITAMIN D2) 50,000 units Take 1 capsule (50,000 Units total) by mouth once a week, Starting Fri 1/19/2024, Normal      !! lacosamide (VIMPAT) 150 mg tablet TAKE 1 TABLET (150 MG TOTAL) BY MOUTH EVERY 12 (TWELVE) HOURS, Starting Wed 3/13/2024, Normal      !! lacosamide (Vimpat) 50 mg Take 1 tablet (50 mg total) by mouth daily at bedtime, Starting Fri 1/19/2024, Normal      levothyroxine 175 mcg tablet TAKE 1 TABLET BY MOUTH DAILY, Starting Thu 2/1/2024, Normal      paliperidone (INVEGA) 3 mg 24 hr tablet Take 1 tablet (3 mg total) by mouth every morning, Starting Thu 6/8/2023, Until Wed 9/6/2023, Normal       !! - Potential duplicate medications found. Please discuss with provider.        No discharge procedures on file.    PDMP Review         Value Time User    PDMP Reviewed  Yes 3/13/2024  8:50 AM BRINA Liu             ED Provider  Attending physically available and evaluated Esperanza Prajapati. I managed the patient along with the ED Attending.    Electronically Signed by           Major Beard DO  03/15/24 1935

## 2024-03-18 ENCOUNTER — OFFICE VISIT (OUTPATIENT)
Dept: FAMILY MEDICINE CLINIC | Facility: CLINIC | Age: 54
End: 2024-03-18

## 2024-03-18 VITALS
SYSTOLIC BLOOD PRESSURE: 124 MMHG | TEMPERATURE: 98.3 F | OXYGEN SATURATION: 96 % | WEIGHT: 218 LBS | HEART RATE: 98 BPM | DIASTOLIC BLOOD PRESSURE: 86 MMHG | BODY MASS INDEX: 33.15 KG/M2

## 2024-03-18 DIAGNOSIS — Z12.11 COLON CANCER SCREENING: ICD-10-CM

## 2024-03-18 DIAGNOSIS — Z12.31 ENCOUNTER FOR SCREENING MAMMOGRAM FOR MALIGNANT NEOPLASM OF BREAST: ICD-10-CM

## 2024-03-18 DIAGNOSIS — Z12.4 CERVICAL CANCER SCREENING: ICD-10-CM

## 2024-03-18 DIAGNOSIS — G40.109 FOCAL EPILEPSY (HCC): ICD-10-CM

## 2024-03-18 DIAGNOSIS — R17 YELLOW EYES: ICD-10-CM

## 2024-03-18 DIAGNOSIS — Z71.89 COMPLEX CARE COORDINATION: ICD-10-CM

## 2024-03-18 DIAGNOSIS — F81.9 COGNITIVE DEVELOPMENTAL DELAY: ICD-10-CM

## 2024-03-18 DIAGNOSIS — Z86.79 HISTORY OF HYPERTENSION: Primary | ICD-10-CM

## 2024-03-18 PROCEDURE — 3074F SYST BP LT 130 MM HG: CPT | Performed by: FAMILY MEDICINE

## 2024-03-18 PROCEDURE — 3079F DIAST BP 80-89 MM HG: CPT | Performed by: FAMILY MEDICINE

## 2024-03-18 PROCEDURE — 99213 OFFICE O/P EST LOW 20 MIN: CPT | Performed by: FAMILY MEDICINE

## 2024-03-18 PROCEDURE — G2211 COMPLEX E/M VISIT ADD ON: HCPCS | Performed by: FAMILY MEDICINE

## 2024-03-18 RX ORDER — LACOSAMIDE 50 MG/1
50 TABLET ORAL
Qty: 30 TABLET | Refills: 2 | Status: SHIPPED | OUTPATIENT
Start: 2024-03-18

## 2024-03-18 NOTE — PROGRESS NOTES
Name: Esperanza Prajapati      : 1970      MRN: [id number]  Encounter Provider: Howard Platt MD  Encounter Date: 3/18/2024   Encounter department: [facility]    Assessment & Plan     1. History of hypertension  Assessment & Plan:  BP Readings from Last 3 Encounters:   24 124/86   03/15/24 120/78   24 130/88      BP at goal. Continue without medication. Will monitor and add medication as needed.      2. Focal epilepsy (HCC)  Assessment & Plan:  Last seen by Neuro on 3/6/2024: tremor likely second to divalproex  though lacosamide can also contribute. Continue divalproex 750mg BID and lacosamide 150mg BID unchanged. Establish care w. BH.    Continue per Neuro recommendations.      3. Cognitive developmental delay  Assessment & Plan:  Significant barrier to maintaining stability of chronic conditions. Majority of support given by her mother who also suffers from cognitive impairment. Have previously placed order for complex care however family declined services. Is currently getting medication via packs which should aid compliance of medication.       4. Complex care coordination    5. Encounter for screening mammogram for malignant neoplasm of breast  Comments:  Reminded to schedule screening. Offered she speak with referral staff today to schedule however pt declined.  Orders:  -     Mammo screening bilateral w 3d & cad; Future    6. Colon cancer screening  Comments:  Reminded to schedule screening. Offered she speak with referral staff today to schedule however pt declined.  Orders:  -     Ambulatory Referral to Gastroenterology; Future    7. Cervical cancer screening  Comments:  Reminded to schedule screening. Offered she speak with referral staff today to schedule however pt declined.    8. Yellow eyes  Comments:  No signs juandice on PE. Reviewed recent LFTs, wnl. Do not believe pt requires imaging at this time. Will continue to monitor for changes.  Reviewed with family           Subjective      54yo female presenting to clinic with her mother with concern of yellow eyes. Pt mother states that this initially began in January where pt's eyes would get very yellow and would cause pt to frequently close her eyes. Pt does not believe her eyes get yellow and instead states her mother is just worrying for no reason. When asked about  the frequent eye closing she states it happens after she has been watching her tv for a long time. ROS as noted below.      Review of Systems   Constitutional:  Negative for chills and fever.   Eyes:  Negative for photophobia, pain, discharge, redness and itching.   Respiratory:  Negative for cough and shortness of breath.    Cardiovascular:  Negative for chest pain.   Gastrointestinal:  Negative for abdominal pain, blood in stool, constipation, diarrhea, nausea and vomiting.   Genitourinary:  Negative for dysuria and hematuria.   Neurological:  Negative for seizures.       Current Outpatient Medications on File Prior to Visit   Medication Sig    divalproex sodium (DEPAKOTE ER) 250 mg 24 hr tablet Take three tablets by mouth in the morning and three tablets by mouth at night time.    ergocalciferol (VITAMIN D2) 50,000 units Take 1 capsule (50,000 Units total) by mouth once a week    erythromycin (ILOTYCIN) ophthalmic ointment Place a 1/2 inch ribbon of ointment into the lower eyelid.    lacosamide (VIMPAT) 150 mg tablet TAKE 1 TABLET (150 MG TOTAL) BY MOUTH EVERY 12 (TWELVE) HOURS    levothyroxine 175 mcg tablet TAKE 1 TABLET BY MOUTH DAILY    paliperidone (INVEGA) 3 mg 24 hr tablet Take 1 tablet (3 mg total) by mouth every morning (Patient not taking: Reported on 1/19/2024)    [DISCONTINUED] lacosamide (Vimpat) 50 mg Take 1 tablet (50 mg total) by mouth daily at bedtime       Objective     /86 (BP Location: Left arm, Patient Position: Sitting, Cuff Size: Standard)   Pulse 98   Temp 98.3 °F (36.8 °C) (Temporal)   Wt 98.9 kg (218  lb)   LMP  (LMP Unknown)   SpO2 96%   BMI 33.15 kg/m²     Physical Exam  Vitals reviewed.   Constitutional:       General: She is not in acute distress.     Appearance: Normal appearance. She is not ill-appearing, toxic-appearing or diaphoretic.   HENT:      Head: Normocephalic.   Eyes:      General: Lids are normal. No scleral icterus.        Right eye: No discharge.         Left eye: No discharge.      Extraocular Movements: Extraocular movements intact.      Conjunctiva/sclera: Conjunctivae normal.   Cardiovascular:      Rate and Rhythm: Normal rate and regular rhythm.      Pulses: Normal pulses.      Heart sounds: Normal heart sounds. No murmur heard.     No friction rub. No gallop.   Pulmonary:      Effort: Pulmonary effort is normal. No respiratory distress.      Breath sounds: Normal breath sounds. No stridor. No wheezing, rhonchi or rales.   Abdominal:      General: There is no distension.      Palpations: Abdomen is soft.      Tenderness: There is no abdominal tenderness. There is no guarding or rebound.   Musculoskeletal:         General: Normal range of motion.   Skin:     General: Skin is warm and dry.      Coloration: Skin is not jaundiced.   Neurological:      Mental Status: She is alert. Mental status is at baseline.   Psychiatric:         Mood and Affect: Mood normal.         Behavior: Behavior normal.       Howard Platt MD     out of season (available sept 1 thru apr 2 only)

## 2024-03-18 NOTE — ASSESSMENT & PLAN NOTE
Significant barrier to maintaining stability of chronic conditions. Majority of support given by her mother who also suffers from cognitive impairment. Have previously placed order for complex care however family declined services. Is currently getting medication via packs which should aid compliance of medication.

## 2024-03-18 NOTE — ASSESSMENT & PLAN NOTE
BP Readings from Last 3 Encounters:   03/18/24 124/86   03/15/24 120/78   02/28/24 130/88      BP at goal. Continue without medication. Will monitor and add medication as needed.

## 2024-03-18 NOTE — ASSESSMENT & PLAN NOTE
Last seen by Neuro on 3/6/2024: tremor likely second to divalproex  though lacosamide can also contribute. Continue divalproex 750mg BID and lacosamide 150mg BID unchanged. Establish care w. BH.    Continue per Neuro recommendations.

## 2024-03-22 LAB
LACOSAMIDE SERPL-MCNC: 11.5 UG/ML (ref 5–10)
LACOSAMIDE SERPL-MCNC: 12.5 UG/ML (ref 5–10)

## 2024-03-29 DIAGNOSIS — E55.9 VITAMIN D DEFICIENCY: ICD-10-CM

## 2024-03-29 RX ORDER — ERGOCALCIFEROL 1.25 MG/1
50000 CAPSULE ORAL WEEKLY
Qty: 12 CAPSULE | Refills: 0 | Status: SHIPPED | OUTPATIENT
Start: 2024-03-29

## 2024-04-01 ENCOUNTER — APPOINTMENT (OUTPATIENT)
Dept: LAB | Facility: HOSPITAL | Age: 54
End: 2024-04-01
Payer: COMMERCIAL

## 2024-04-01 ENCOUNTER — ANNUAL EXAM (OUTPATIENT)
Dept: FAMILY MEDICINE CLINIC | Facility: CLINIC | Age: 54
End: 2024-04-01

## 2024-04-01 VITALS
HEIGHT: 68 IN | SYSTOLIC BLOOD PRESSURE: 120 MMHG | OXYGEN SATURATION: 97 % | RESPIRATION RATE: 16 BRPM | HEART RATE: 84 BPM | DIASTOLIC BLOOD PRESSURE: 82 MMHG | TEMPERATURE: 98 F | BODY MASS INDEX: 33.15 KG/M2

## 2024-04-01 DIAGNOSIS — G40.019 PARTIAL IDIOPATHIC EPILEPSY WITH SEIZURES OF LOCALIZED ONSET, INTRACTABLE, WITHOUT STATUS EPILEPTICUS (HCC): ICD-10-CM

## 2024-04-01 DIAGNOSIS — E89.0 POSTOPERATIVE HYPOTHYROIDISM: Primary | ICD-10-CM

## 2024-04-01 DIAGNOSIS — E89.0 POSTOPERATIVE HYPOTHYROIDISM: ICD-10-CM

## 2024-04-01 DIAGNOSIS — C73 THYROID CANCER (HCC): ICD-10-CM

## 2024-04-01 DIAGNOSIS — C73 PAPILLARY CARCINOMA OF THYROID (HCC): ICD-10-CM

## 2024-04-01 DIAGNOSIS — G40.109 FOCAL EPILEPSY (HCC): Primary | ICD-10-CM

## 2024-04-01 DIAGNOSIS — R41.82 ALTERED MENTAL STATUS, UNSPECIFIED ALTERED MENTAL STATUS TYPE: ICD-10-CM

## 2024-04-01 DIAGNOSIS — F81.9 COGNITIVE DEVELOPMENTAL DELAY: ICD-10-CM

## 2024-04-01 DIAGNOSIS — Z12.31 BREAST CANCER SCREENING BY MAMMOGRAM: ICD-10-CM

## 2024-04-01 DIAGNOSIS — Z01.419 WOMEN'S ANNUAL ROUTINE GYNECOLOGICAL EXAMINATION: ICD-10-CM

## 2024-04-01 DIAGNOSIS — D75.89 MACROCYTOSIS WITHOUT ANEMIA: ICD-10-CM

## 2024-04-01 PROBLEM — Z12.4 PAP SMEAR FOR CERVICAL CANCER SCREENING: Status: ACTIVE | Noted: 2023-08-22

## 2024-04-01 LAB
ALBUMIN SERPL BCP-MCNC: 3.9 G/DL (ref 3.5–5)
ALP SERPL-CCNC: 48 U/L (ref 34–104)
ALT SERPL W P-5'-P-CCNC: 10 U/L (ref 7–52)
AMMONIA PLAS-SCNC: 32 UMOL/L (ref 18–72)
ANION GAP SERPL CALCULATED.3IONS-SCNC: 7 MMOL/L (ref 4–13)
AST SERPL W P-5'-P-CCNC: 11 U/L (ref 13–39)
BASOPHILS # BLD AUTO: 0.03 THOUSANDS/ÂΜL (ref 0–0.1)
BASOPHILS NFR BLD AUTO: 1 % (ref 0–1)
BILIRUB SERPL-MCNC: 0.37 MG/DL (ref 0.2–1)
BUN SERPL-MCNC: 16 MG/DL (ref 5–25)
CALCIUM SERPL-MCNC: 9.9 MG/DL (ref 8.4–10.2)
CHLORIDE SERPL-SCNC: 100 MMOL/L (ref 96–108)
CO2 SERPL-SCNC: 31 MMOL/L (ref 21–32)
CREAT SERPL-MCNC: 0.75 MG/DL (ref 0.6–1.3)
EOSINOPHIL # BLD AUTO: 0.04 THOUSAND/ÂΜL (ref 0–0.61)
EOSINOPHIL NFR BLD AUTO: 1 % (ref 0–6)
ERYTHROCYTE [DISTWIDTH] IN BLOOD BY AUTOMATED COUNT: 12.3 % (ref 11.6–15.1)
GFR SERPL CREATININE-BSD FRML MDRD: 92 ML/MIN/1.73SQ M
GLUCOSE P FAST SERPL-MCNC: 88 MG/DL (ref 65–99)
HCT VFR BLD AUTO: 39.7 % (ref 34.8–46.1)
HGB BLD-MCNC: 13.4 G/DL (ref 11.5–15.4)
IMM GRANULOCYTES # BLD AUTO: 0.03 THOUSAND/UL (ref 0–0.2)
IMM GRANULOCYTES NFR BLD AUTO: 1 % (ref 0–2)
LYMPHOCYTES # BLD AUTO: 1.67 THOUSANDS/ÂΜL (ref 0.6–4.47)
LYMPHOCYTES NFR BLD AUTO: 36 % (ref 14–44)
MCH RBC QN AUTO: 33.8 PG (ref 26.8–34.3)
MCHC RBC AUTO-ENTMCNC: 33.8 G/DL (ref 31.4–37.4)
MCV RBC AUTO: 100 FL (ref 82–98)
MONOCYTES # BLD AUTO: 0.45 THOUSAND/ÂΜL (ref 0.17–1.22)
MONOCYTES NFR BLD AUTO: 10 % (ref 4–12)
NEUTROPHILS # BLD AUTO: 2.41 THOUSANDS/ÂΜL (ref 1.85–7.62)
NEUTS SEG NFR BLD AUTO: 51 % (ref 43–75)
NRBC BLD AUTO-RTO: 0 /100 WBCS
PLATELET # BLD AUTO: 219 THOUSANDS/UL (ref 149–390)
PMV BLD AUTO: 10.6 FL (ref 8.9–12.7)
POTASSIUM SERPL-SCNC: 4.5 MMOL/L (ref 3.5–5.3)
PROT SERPL-MCNC: 7.3 G/DL (ref 6.4–8.4)
RBC # BLD AUTO: 3.97 MILLION/UL (ref 3.81–5.12)
SODIUM SERPL-SCNC: 138 MMOL/L (ref 135–147)
TSH SERPL DL<=0.05 MIU/L-ACNC: 0.07 UIU/ML (ref 0.45–4.5)
VALPROATE SERPL-MCNC: 98 UG/ML (ref 50–100)
VIT B12 SERPL-MCNC: 548 PG/ML (ref 180–914)
WBC # BLD AUTO: 4.63 THOUSAND/UL (ref 4.31–10.16)

## 2024-04-01 PROCEDURE — 82607 VITAMIN B-12: CPT

## 2024-04-01 PROCEDURE — 80235 DRUG ASSAY LACOSAMIDE: CPT

## 2024-04-01 PROCEDURE — 80164 ASSAY DIPROPYLACETIC ACD TOT: CPT

## 2024-04-01 PROCEDURE — G2211 COMPLEX E/M VISIT ADD ON: HCPCS | Performed by: STUDENT IN AN ORGANIZED HEALTH CARE EDUCATION/TRAINING PROGRAM

## 2024-04-01 PROCEDURE — 3079F DIAST BP 80-89 MM HG: CPT | Performed by: STUDENT IN AN ORGANIZED HEALTH CARE EDUCATION/TRAINING PROGRAM

## 2024-04-01 PROCEDURE — 84443 ASSAY THYROID STIM HORMONE: CPT

## 2024-04-01 PROCEDURE — 99213 OFFICE O/P EST LOW 20 MIN: CPT | Performed by: STUDENT IN AN ORGANIZED HEALTH CARE EDUCATION/TRAINING PROGRAM

## 2024-04-01 PROCEDURE — 85025 COMPLETE CBC W/AUTO DIFF WBC: CPT

## 2024-04-01 PROCEDURE — 3074F SYST BP LT 130 MM HG: CPT | Performed by: STUDENT IN AN ORGANIZED HEALTH CARE EDUCATION/TRAINING PROGRAM

## 2024-04-01 PROCEDURE — 82140 ASSAY OF AMMONIA: CPT

## 2024-04-01 PROCEDURE — 36415 COLL VENOUS BLD VENIPUNCTURE: CPT

## 2024-04-01 PROCEDURE — 80053 COMPREHEN METABOLIC PANEL: CPT

## 2024-04-01 RX ORDER — LEVOTHYROXINE SODIUM 175 UG/1
175 TABLET ORAL DAILY
Qty: 30 TABLET | Refills: 1 | Status: SHIPPED | OUTPATIENT
Start: 2024-04-01 | End: 2024-05-31

## 2024-04-01 NOTE — ASSESSMENT & PLAN NOTE
Lab Results   Component Value Date    [id number] 0.027 (L) 11/28/2023    TSH 1.40 06/11/2020     Pending labs that have been ordered by previous provider to be done : TSH    -TSH (order placed by previous provider) patient will do lab today  -Levothyroxine 175 mcg   -F/U w/ PCP

## 2024-04-01 NOTE — ASSESSMENT & PLAN NOTE
Patient with a pmh of intellectual disability accompanied by mother.  Mother and patient reports they are unsure if Ms. Néstor Prajapati has ever had a pap smear done.  Patient has never been sexually active.  Last menstrual period 2 years ago  Patient denies hot flashes, night sweats, sob, palpitations    -Unable to do pap smear during this encounter as patient appeared very uncomfortable upon instrument insertion and could not tolerate. Patient would like to re attempt a different day  -mammogram screen ordered.

## 2024-04-01 NOTE — PROGRESS NOTES
Name: Esperanza Prajapati      : 1970      MRN: [id number]  Encounter Provider: Jo Macario MD  Encounter Date: 2024   Encounter department: [facility]    Assessment & Plan     1. Postoperative hypothyroidism  Assessment & Plan:  Lab Results   Component Value Date    [id number] 0.027 (L) 2023    TSH 1.40 2020     Pending labs that have been ordered by previous provider to be done : TSH    -TSH (order placed by previous provider) patient will do lab today  -Levothyroxine 175 mcg   -F/U w/ PCP    Orders:  -     levothyroxine 175 mcg tablet; Take 1 tablet (175 mcg total) by mouth daily    2. Breast cancer screening by mammogram  -     Mammo screening bilateral w 3d & cad; Future    3. Women's annual routine gynecological examination  Assessment & Plan:  Patient with a pmh of intellectual disability accompanied by mother.  Mother and patient reports they are unsure if Ms. Néstor Prajapati has ever had a pap smear done.  Patient has never been sexually active.  Last menstrual period 2 years ago  Patient denies hot flashes, night sweats, sob, palpitations    -Unable to do pap smear during this encounter as patient appeared very uncomfortable upon instrument insertion and could not tolerate. Patient would like to re attempt a different day  -mammogram screen ordered.             Subjective      Esperanza Prajapati is a 54 yo female with a pmh of postoperative hypothyroidism who presents to clinic today for a GYN wellness exam and management of her postoperative hypothyroidism. Patient has intellectual disability. Unable to do pap smear during today's encounter as patient was very uncomfortable and prefferred to reattempt a cervical pap smear another day. Of note mom reports she has never been sexually active and is uncertain if she ever had a papa smear done. Mammogram screen was ordered.  Patient denies sob, palpitations, urinary frequency,  "burning during urination, vaginal discharge, n/v/d/c    Gynecologic Exam  Pertinent negatives include no abdominal pain, back pain, chills, dysuria, fever, hematuria, rash, sore throat or vomiting.     Review of Systems   Constitutional:  Negative for chills and fever.   HENT:  Negative for ear pain and sore throat.    Eyes:  Negative for pain and visual disturbance.   Respiratory:  Negative for cough and shortness of breath.    Cardiovascular:  Negative for chest pain and palpitations.   Gastrointestinal:  Negative for abdominal pain and vomiting.   Genitourinary:  Negative for dysuria and hematuria.   Musculoskeletal:  Negative for arthralgias and back pain.   Skin:  Negative for color change and rash.   Neurological:  Negative for seizures and syncope.   All other systems reviewed and are negative.      Current Outpatient Medications on File Prior to Visit   Medication Sig    divalproex sodium (DEPAKOTE ER) 250 mg 24 hr tablet Take three tablets by mouth in the morning and three tablets by mouth at night time.    ergocalciferol (VITAMIN D2) 50,000 units TAKE 1 CAPSULE (50,000 UNITS TOTAL) BY MOUTH ONCE A WEEK    erythromycin (ILOTYCIN) ophthalmic ointment Place a 1/2 inch ribbon of ointment into the lower eyelid.    lacosamide (VIMPAT) 150 mg tablet TAKE 1 TABLET (150 MG TOTAL) BY MOUTH EVERY 12 (TWELVE) HOURS    lacosamide (VIMPAT) 50 mg TAKE 1 TABLET (50 MG TOTAL) BY MOUTH DAILY AT BEDTIME    paliperidone (INVEGA) 3 mg 24 hr tablet Take 1 tablet (3 mg total) by mouth every morning (Patient not taking: Reported on 1/19/2024)    [DISCONTINUED] levothyroxine 175 mcg tablet TAKE 1 TABLET BY MOUTH DAILY       Objective     /82 (BP Location: Right arm, Patient Position: Sitting, Cuff Size: Standard)   Pulse 84   Temp 98 °F (36.7 °C) (Temporal)   Resp 16   Ht 5' 8\" (1.727 m)   LMP  (LMP Unknown)   SpO2 97%   BMI 33.15 kg/m²     Physical Exam  Vitals reviewed.   HENT:      Head: Normocephalic.      Right " Ear: External ear normal.      Left Ear: External ear normal.      Nose: Nose normal.      Mouth/Throat:      Mouth: Mucous membranes are moist.   Cardiovascular:      Rate and Rhythm: Normal rate and regular rhythm.   Pulmonary:      Effort: Pulmonary effort is normal.      Breath sounds: Normal breath sounds.   Abdominal:      General: Bowel sounds are normal.   Genitourinary:     General: Normal vulva.   Musculoskeletal:         General: Normal range of motion.   Skin:     General: Skin is warm.   Neurological:      General: No focal deficit present.      Mental Status: She is alert. Mental status is at baseline.       Jo Macario MD

## 2024-04-10 LAB — LACOSAMIDE SERPL-MCNC: 8 UG/ML (ref 5–10)

## 2024-04-11 ENCOUNTER — TELEPHONE (OUTPATIENT)
Dept: NEUROLOGY | Facility: CLINIC | Age: 54
End: 2024-04-11

## 2024-04-11 NOTE — TELEPHONE ENCOUNTER
----- Message from BRINA Liu sent at 4/10/2024  4:59 PM EDT -----  Continue current seizure medications. Follow up with Dr Cevallos as scheduled.   ----- Message -----  From: Lab, Background User  Sent: 4/1/2024  10:59 AM EDT  To: BRINA Liu

## 2024-04-11 NOTE — TELEPHONE ENCOUNTER
Called re: below and spoke with mother, who was trying to explain something, but she was speaking in Welsh some of the time and it was also very muffled, which made it impossible to understand.    Called back using  services.    Agent: Adalid  ID #: [id number]    Called re: below with the assistance of  services and spoke with pt, who verbalized an understanding.

## 2024-05-01 PROBLEM — Z01.419 WOMEN'S ANNUAL ROUTINE GYNECOLOGICAL EXAMINATION: Status: RESOLVED | Noted: 2023-08-22 | Resolved: 2024-05-01

## 2024-05-06 ENCOUNTER — OFFICE VISIT (OUTPATIENT)
Dept: DENTISTRY | Facility: CLINIC | Age: 54
End: 2024-05-06

## 2024-05-06 VITALS — SYSTOLIC BLOOD PRESSURE: 115 MMHG | TEMPERATURE: 97.1 F | DIASTOLIC BLOOD PRESSURE: 84 MMHG

## 2024-05-06 DIAGNOSIS — K08.89 NON-RESTORABLE TOOTH: ICD-10-CM

## 2024-05-06 DIAGNOSIS — Z01.20 ENCOUNTER FOR DENTAL EXAMINATION: Primary | ICD-10-CM

## 2024-05-06 DIAGNOSIS — K03.6 DENTAL CALCULUS: ICD-10-CM

## 2024-05-06 DIAGNOSIS — K03.6 ACCRETIONS ON TEETH: ICD-10-CM

## 2024-05-06 PROCEDURE — D1110 PROPHYLAXIS - ADULT: HCPCS

## 2024-05-06 NOTE — DENTAL PROCEDURE DETAILS
ASA 2   Rev. MH  Mother stayed in waiting room    No exam    Patient had scaling in the presence of gingivitis performed 1/31/2024    Suggested she return for prophy today  should be on 3-4 mos recall prophy visits    Prophylaxis completed with ultrasonic  and hand instrumentation.  Soft plaque removed and supra AND SUB gingival calculus removed     MODERATE - heavy plaque  especially UR side  L side improvement noted  Non restorable tooth # 3 needs to be extracted   Gave referral    Polished with prophy cup and paste.  Flossed and provided Oral Health Instructions.  Demonstrated proper brushing and flossing technique.  Patient left satisfied and ambulatory.    Gave referral for OS and copy of x ray    NV  3-4 mos per exam and prophy      May need BLE

## 2024-05-08 ENCOUNTER — HOSPITAL ENCOUNTER (EMERGENCY)
Facility: HOSPITAL | Age: 54
Discharge: HOME | End: 2024-05-08
Attending: STUDENT IN AN ORGANIZED HEALTH CARE EDUCATION/TRAINING PROGRAM
Payer: COMMERCIAL

## 2024-05-08 VITALS
DIASTOLIC BLOOD PRESSURE: 65 MMHG | HEART RATE: 110 BPM | TEMPERATURE: 98 F | OXYGEN SATURATION: 98 % | RESPIRATION RATE: 18 BRPM | SYSTOLIC BLOOD PRESSURE: 109 MMHG

## 2024-05-08 DIAGNOSIS — F95.9 FACIAL TIC: ICD-10-CM

## 2024-05-08 DIAGNOSIS — L30.9 ECZEMA OF FACE: Primary | ICD-10-CM

## 2024-05-08 PROCEDURE — 99282 EMERGENCY DEPT VISIT SF MDM: CPT

## 2024-05-08 PROCEDURE — 99284 EMERGENCY DEPT VISIT MOD MDM: CPT | Performed by: STUDENT IN AN ORGANIZED HEALTH CARE EDUCATION/TRAINING PROGRAM

## 2024-05-08 RX ORDER — BENZOCAINE/MENTHOL 6 MG-10 MG
LOZENGE MUCOUS MEMBRANE ONCE
Status: COMPLETED | OUTPATIENT
Start: 2024-05-08 | End: 2024-05-08

## 2024-05-08 RX ORDER — BENZOCAINE/MENTHOL 6 MG-10 MG
LOZENGE MUCOUS MEMBRANE
Qty: 15 G | Refills: 0 | Status: SHIPPED | OUTPATIENT
Start: 2024-05-08

## 2024-05-08 RX ADMIN — HYDROCORTISONE: 1 CREAM TOPICAL at 13:23

## 2024-05-08 NOTE — DISCHARGE INSTRUCTIONS
Hoy te salgado atendido por sarpullido y tics faciales. Aplique crema de esteroides en la yared dos veces al día. No entre en contacto con los ojos.     Le salgado dado anne derivación a dermatología por la erupción. Por favor, póngase en contacto con ellos.       You were seen today for rash and facial tics. Please apply steroid cream to face twice daily. Do not get in eyes.     You have been given a referral to dermatology for the rash. Please follow up with them.

## 2024-05-08 NOTE — ED ATTENDING ATTESTATION
Final Diagnoses:     1. Eczema of face    2. Facial tic           Fito BERNAL DO, saw and evaluated the patient. All available labs and X-rays were ordered by me or the resident / non-physician and have been reviewed by myself. I discussed the patient with the resident / non-physician and agree with the resident's / non-physician practitioner's findings and plan as documented in the resident's / non-physician practicitioner's note, except where noted.   At this point, I agree with the current assessment done in the ED.   I was present during key portions of all procedures performed unless otherwise stated.     Nursing Triage:     Chief Complaint   Patient presents with    Eye Pain     Pt mother states pt has been moving eyes differently and started with a rash above and below eyes x2 months ago.        HPI:   This is a 53 y.o. female presenting for evaluation of repetitive eye movements, rash.  History difficult to obtain secondary to language barrier and patient not following  instructions.  Apparently patient has been having this type movements for the past 3 months.  They are getting worse.  She states the patient will close her eyes for 10 minutes and move her eyebrows up and down.  She apparently does not respond during these episodes.  Patient has also had a redness around the eyes.  Is unclear how long this is been present for.  Mother states they have been here 3 times for this.  No other complaints or concerns.    ASSESSMENT + PLAN:   Unclear etiology of movements.  Possible tic disorder versus seizure disorder.  Rash is consistent with seborrheic dermatitis versus eczema.    Plan: Neurology consult, steroids, d/c with outpatient f/u    Physical:   Pertinent: Patient intermittently closing eyes and raising eyebrows.  There is also erythematous plaques noted around patient's eyes, nose, and mouth.    General: VS reviewed  Appears in NAD  awake, alert.   Well-nourished, well-developed.  Appears stated age.   Speaking normally in full sentences.   Head: Normocephalic, atraumatic  Eyes: EOM-I. No diplopia.   No hyphema.   No subconjunctival hemorrhages.  Symmetrical lids.   ENT: Atraumatic external nose and ears.    MMM  No malocclusion. No stridor. Normal phonation. No drooling. Normal swallowing.   Neck: No JVD.  CV: No pallor noted  Lungs:   No tachypnea  No respiratory distress  Abd: soft nt nd no rebound/guarding  MSK:   FROM spontaneously  Neuro: Awake, alert  Motor grossly intact.  Psychiatric/Behavioral: interacting normally; appropriate mood/affect.    Exam: deferred    Vitals:    05/08/24 1150   BP: 109/65   Pulse: (!) 110   Resp: 18   Temp: 98 °F (36.7 °C)   SpO2: 98%     - There are no obvious limitations to social determinants of care.   - Nursing note reviewed.   - Vitals reviewed.   - Orders placed by myself and/or advanced practitioner / resident.    - Previous chart was reviewed  - No language barrier.   - History obtained from patient.    - There are no limitations to the history obtained:     Past Medical:    has a past medical history of Cancer (HCC), Disease of thyroid gland, Hyperactivity (behavior), Hyperlipidemia, Obesity, and Seizures (HCC).    Past Surgical:    has a past surgical history that includes Other surgical history; Other surgical history; Total thyroidectomy; and pr open tx phalangeal shaft fracture prox/middle ea (Left, 8/3/2023).    Social:     Social History     Substance and Sexual Activity   Alcohol Use Never     Social History     Tobacco Use   Smoking Status Never    Passive exposure: Never   Smokeless Tobacco Never     Social History     Substance and Sexual Activity   Drug Use Never       Echo:   No results found for this or any previous visit.    No results found for this or any previous visit.      Cath:    No results found for this or any previous visit.      Code Status: Prior  Advance Directive and Living Will:      Power of :    POLST:     Medications   hydrocortisone 1 % cream (has no administration in time range)     No orders to display     Orders Placed This Encounter   Procedures    Ambulatory Referral to Dermatology    Inpatient consult to Neurology     Labs Reviewed - No data to display  Time reflects when diagnosis was documented in both MDM as applicable and the Disposition within this note       Time User Action Codes Description Comment    5/8/2024 12:47 PM Unique Guerra [L30.9] Eczema of face     5/8/2024 12:47 PM Unique Guerra [F95.9] Facial tic           ED Disposition       None          Follow-up Information    None       Patient's Medications   Discharge Prescriptions    HYDROCORTISONE 1 % CREAM    Apply to affected area 2 times daily       Start Date: 5/8/2024  End Date: --       Order Dose: --       Quantity: 15 g    Refills: 0       Prior to Admission Medications   Prescriptions Last Dose Informant Patient Reported? Taking?   divalproex sodium (DEPAKOTE ER) 250 mg 24 hr tablet  Self No No   Sig: Take three tablets by mouth in the morning and three tablets by mouth at night time.   ergocalciferol (VITAMIN D2) 50,000 units   No No   Sig: TAKE 1 CAPSULE (50,000 UNITS TOTAL) BY MOUTH ONCE A WEEK   erythromycin (ILOTYCIN) ophthalmic ointment   No No   Sig: Place a 1/2 inch ribbon of ointment into the lower eyelid.   lacosamide (VIMPAT) 150 mg tablet   No No   Sig: TAKE 1 TABLET (150 MG TOTAL) BY MOUTH EVERY 12 (TWELVE) HOURS   lacosamide (VIMPAT) 50 mg   No No   Sig: TAKE 1 TABLET (50 MG TOTAL) BY MOUTH DAILY AT BEDTIME   levothyroxine 175 mcg tablet   No No   Sig: Take 1 tablet (175 mcg total) by mouth daily   paliperidone (INVEGA) 3 mg 24 hr tablet  Mother No No   Sig: Take 1 tablet (3 mg total) by mouth every morning   Patient not taking: Reported on 1/19/2024      Facility-Administered Medications: None                        Portions of the record may have been created with voice recognition software. Occasional wrong  "word or \"sound a like\" substitutions may have occurred due to the inherent limitations of voice recognition software. Read the chart carefully and recognize, using context, where substitutions have occurred.    Electronically signed by:  Fito Garcia  "

## 2024-05-08 NOTE — ED PROVIDER NOTES
History  Chief Complaint   Patient presents with    Eye Pain     Pt mother states pt has been moving eyes differently and started with a rash above and below eyes x2 months ago.      HPI    Esperanza Prajapati is a 53 y.o. female presenting for rash on face and abnormal eye movements. Rash has been persistent for multiple week; mother is applying lotion without improvement. Now feels that there is L eyelid swelling. Patient often furrowing eyebrows and looking up to forehead, but does track with eyes and has intentional movement of eyes. Denies blurry vision or headache. Had CT head 1/2/24 after blunt trauma with no finding. Per sukumar review, abnormal facial movements were noted by neurology 3/6/24 which they documented as volitional and distractible. My exam is consistent with this.     Prior to Admission Medications   Prescriptions Last Dose Informant Patient Reported? Taking?   divalproex sodium (DEPAKOTE ER) 250 mg 24 hr tablet  Self No No   Sig: Take three tablets by mouth in the morning and three tablets by mouth at night time.   ergocalciferol (VITAMIN D2) 50,000 units   No No   Sig: TAKE 1 CAPSULE (50,000 UNITS TOTAL) BY MOUTH ONCE A WEEK   erythromycin (ILOTYCIN) ophthalmic ointment   No No   Sig: Place a 1/2 inch ribbon of ointment into the lower eyelid.   lacosamide (VIMPAT) 150 mg tablet   No No   Sig: TAKE 1 TABLET (150 MG TOTAL) BY MOUTH EVERY 12 (TWELVE) HOURS   lacosamide (VIMPAT) 50 mg   No No   Sig: TAKE 1 TABLET (50 MG TOTAL) BY MOUTH DAILY AT BEDTIME   levothyroxine 175 mcg tablet   No No   Sig: Take 1 tablet (175 mcg total) by mouth daily   paliperidone (INVEGA) 3 mg 24 hr tablet  Mother No No   Sig: Take 1 tablet (3 mg total) by mouth every morning   Patient not taking: Reported on 1/19/2024      Facility-Administered Medications: None       Past Medical History:   Diagnosis Date    Cancer (HCC)     thyroid cancer    Disease of thyroid gland     Hyperactivity (behavior)     Last Assessed:  11/7/2014     Hyperlipidemia     Obesity     Seizures (HCC)        Past Surgical History:   Procedure Laterality Date    OTHER SURGICAL HISTORY      Removal of urinary calculus     OTHER SURGICAL HISTORY      Rhinologic Surgery     NV OPEN TX PHALANGEAL SHAFT FRACTURE PROX/MIDDLE EA Left 8/3/2023    Procedure: CLOSED REDUCTION PERCUTANEOUS PINNING - LEFT RING FINGER;  Surgeon: Fransisco Escalante MD;  Location: [facility] MAIN OR;  Service: Orthopedics    TOTAL THYROIDECTOMY      LAst Assessed: 11/7/2014       Family History   Problem Relation Age of Onset    Breast cancer Mother 59    Hypertension Mother     ALS Mother     Stroke Mother     Prostate cancer Father     Diabetes type II Father      I have reviewed and agree with the history as documented.    E-Cigarette/Vaping    E-Cigarette Use Never User      E-Cigarette/Vaping Substances    Nicotine No     THC No     CBD No     Flavoring No     Other No     Unknown No      Social History     Tobacco Use    Smoking status: Never     Passive exposure: Never    Smokeless tobacco: Never   Vaping Use    Vaping status: Never Used   Substance Use Topics    Alcohol use: Never    Drug use: Never        Review of Systems   Constitutional:  Negative for activity change and appetite change.   Eyes:  Negative for discharge, redness and visual disturbance.   Respiratory:  Negative for apnea.    Gastrointestinal:  Negative for abdominal distention.   Skin:  Positive for rash.   Neurological:  Positive for seizures. Negative for dizziness.       Physical Exam  ED Triage Vitals [05/08/24 1150]   Temperature Pulse Respirations Blood Pressure SpO2   98 °F (36.7 °C) (!) 110 18 109/65 98 %      Temp src Heart Rate Source Patient Position - Orthostatic VS BP Location FiO2 (%)   -- Monitor -- -- --      Pain Score       --             Orthostatic Vital Signs  Vitals:    05/08/24 1150   BP: 109/65   Pulse: (!) 110       Physical Exam  Constitutional:       Appearance: Normal appearance.    HENT:      Head: Normocephalic and atraumatic.      Nose: Nose normal.      Mouth/Throat:      Mouth: Mucous membranes are moist.   Eyes:      Pupils: Pupils are equal, round, and reactive to light.   Cardiovascular:      Pulses: Normal pulses.   Pulmonary:      Effort: Pulmonary effort is normal.   Abdominal:      General: Abdomen is flat.   Skin:     General: Skin is warm and dry.      Comments: Scaling erythematous rash scattered across forehead and chin with some involvement of eyelids   Neurological:      General: No focal deficit present.      Mental Status: She is alert and oriented to person, place, and time.      Comments: Patient frequently with exaggerated blinking, eyebrow raising, looking up towards forehead.  Is distractible and redirectable.  Appears behavioral/intentional in nature; no evidence of extraparametal symptoms.  No tardive dyskinesia.  Not consistent with seizure activity         ED Medications  Medications   hydrocortisone 1 % cream ( Topical Given 5/8/24 1323)       Diagnostic Studies  Results Reviewed       None                   No orders to display         Procedures  Procedures      ED Course  ED Course as of 05/09/24 1947   Wed May 08, 2024   [id number] Discussed patient with Neurologist Dr. Ivan Dalton who feels there is nothing more to add; My feeling is if it's the same symptom for which she's already been evaluated by the epilepsy specialist, then I will not have anything additional to offer. My recommendation would be that they can just ask the outpatient epileptologist if there's anything additional to do as an outpatient (such as an ambulatory eeg)                                       Medical Decision Making    Patient is a 53 y.o. female  who presents to the ED with eyebrow twitching, looking forward repetitively, facial rash.    Vital signs able. Exam as listed above    Differential diagnosis includes but is not limited to facial tic; idiopathic or behavioral.  Rash consistent  with seborrheic dermatitis or eczema.  Mother does describe some episodes of what may be focal seizures at home with patient closing eyes and being less responsive with eyelid twitching for minutes at a time.  Did reach out to neurology discussing this; see comment and hospital course    Plan referral to dermatology for rash and prescription for steroid cream.  Recommended to follow-up with neurology outpatient for all other complaints    View ED course above for further discussion on patient workup.     On review of previous records see chart reviewed HPI.    All labs reviewed and utilized in the medical decision making process  All radiology studies independently viewed by me and interpreted by the radiologist.  I reviewed all testing with the patient.       Disposition  Final diagnoses:   Eczema of face   Facial tic     Time reflects when diagnosis was documented in both MDM as applicable and the Disposition within this note       Time User Action Codes Description Comment    5/8/2024 12:47 PM Unique Guerra [L30.9] Eczema of face     5/8/2024 12:47 PM Unique Guerra [F95.9] Facial tic           ED Disposition       ED Disposition   Discharge    Condition   Stable    Date/Time   Wed May 8, 2024  1:51 PM    Comment   Esperanza Prajapati discharge to home/self care.                   Follow-up Information    None         Discharge Medication List as of 5/8/2024 12:48 PM        START taking these medications    Details   hydrocortisone 1 % cream Apply to affected area 2 times daily, Normal           CONTINUE these medications which have NOT CHANGED    Details   divalproex sodium (DEPAKOTE ER) 250 mg 24 hr tablet Take three tablets by mouth in the morning and three tablets by mouth at night time., Normal      ergocalciferol (VITAMIN D2) 50,000 units TAKE 1 CAPSULE (50,000 UNITS TOTAL) BY MOUTH ONCE A WEEK, Starting Fri 3/29/2024, Normal      erythromycin (ILOTYCIN) ophthalmic ointment Place a 1/2 inch  ribbon of ointment into the lower eyelid., Normal      !! lacosamide (VIMPAT) 150 mg tablet TAKE 1 TABLET (150 MG TOTAL) BY MOUTH EVERY 12 (TWELVE) HOURS, Starting Wed 3/13/2024, Normal      !! lacosamide (VIMPAT) 50 mg TAKE 1 TABLET (50 MG TOTAL) BY MOUTH DAILY AT BEDTIME, Starting Mon 3/18/2024, Normal      levothyroxine 175 mcg tablet Take 1 tablet (175 mcg total) by mouth daily, Starting Mon 4/1/2024, Until Fri 5/31/2024, Normal      paliperidone (INVEGA) 3 mg 24 hr tablet Take 1 tablet (3 mg total) by mouth every morning, Starting Thu 6/8/2023, Until Wed 9/6/2023, Normal       !! - Potential duplicate medications found. Please discuss with provider.            PDMP Review         Value Time User    PDMP Reviewed  Yes 3/13/2024  8:50 AM BRINA Liu             ED Provider  Attending physically available and evaluated Esperanza Prajapati. I managed the patient along with the ED Attending.    Electronically Signed by           Unique Guerra MD  05/09/24 1947

## 2024-05-13 DIAGNOSIS — E89.0 POSTOPERATIVE HYPOTHYROIDISM: ICD-10-CM

## 2024-05-13 RX ORDER — LEVOTHYROXINE SODIUM 175 UG/1
175 TABLET ORAL DAILY
Qty: 30 TABLET | Refills: 1 | Status: SHIPPED | OUTPATIENT
Start: 2024-05-13 | End: 2024-07-12

## 2024-05-18 DIAGNOSIS — G40.109 FOCAL EPILEPSY (HCC): ICD-10-CM

## 2024-05-20 RX ORDER — LACOSAMIDE 150 MG/1
150 TABLET ORAL EVERY 12 HOURS SCHEDULED
Qty: 60 TABLET | Refills: 2 | Status: ON HOLD | OUTPATIENT
Start: 2024-05-20

## 2024-05-28 ENCOUNTER — HOSPITAL ENCOUNTER (EMERGENCY)
Facility: HOSPITAL | Age: 54
DRG: 885 | End: 2024-05-29
Attending: EMERGENCY MEDICINE
Payer: COMMERCIAL

## 2024-05-28 DIAGNOSIS — E66.01 MORBID (SEVERE) OBESITY DUE TO EXCESS CALORIES (HCC): ICD-10-CM

## 2024-05-28 DIAGNOSIS — G47.33 OBSTRUCTIVE SLEEP APNEA: ICD-10-CM

## 2024-05-28 DIAGNOSIS — Z00.8 MEDICAL CLEARANCE FOR PSYCHIATRIC ADMISSION: ICD-10-CM

## 2024-05-28 DIAGNOSIS — F29 PSYCHOSES (HCC): Primary | ICD-10-CM

## 2024-05-28 DIAGNOSIS — E89.0 POSTOPERATIVE HYPOTHYROIDISM: ICD-10-CM

## 2024-05-28 DIAGNOSIS — E53.8 VITAMIN B12 DEFICIENCY: ICD-10-CM

## 2024-05-28 DIAGNOSIS — N18.30 CHRONIC KIDNEY DISEASE, STAGE 3 UNSPECIFIED (HCC): ICD-10-CM

## 2024-05-28 DIAGNOSIS — E78.2 MIXED HYPERLIPIDEMIA: ICD-10-CM

## 2024-05-28 LAB
ALBUMIN SERPL BCG-MCNC: 4 G/DL (ref 3.5–5)
ALP SERPL-CCNC: 53 U/L (ref 34–104)
ALT SERPL W P-5'-P-CCNC: 9 U/L (ref 7–52)
AMPHETAMINES SERPL QL SCN: NEGATIVE
ANION GAP SERPL CALCULATED.3IONS-SCNC: 8 MMOL/L (ref 4–13)
AST SERPL W P-5'-P-CCNC: 11 U/L (ref 13–39)
BARBITURATES UR QL: NEGATIVE
BASOPHILS # BLD AUTO: 0.03 THOUSANDS/ÂΜL (ref 0–0.1)
BASOPHILS NFR BLD AUTO: 1 % (ref 0–1)
BENZODIAZ UR QL: NEGATIVE
BILIRUB SERPL-MCNC: 0.4 MG/DL (ref 0.2–1)
BUN SERPL-MCNC: 24 MG/DL (ref 5–25)
CALCIUM SERPL-MCNC: 10 MG/DL (ref 8.4–10.2)
CHLORIDE SERPL-SCNC: 102 MMOL/L (ref 96–108)
CO2 SERPL-SCNC: 27 MMOL/L (ref 21–32)
COCAINE UR QL: NEGATIVE
CREAT SERPL-MCNC: 0.79 MG/DL (ref 0.6–1.3)
EOSINOPHIL # BLD AUTO: 0.06 THOUSAND/ÂΜL (ref 0–0.61)
EOSINOPHIL NFR BLD AUTO: 1 % (ref 0–6)
ERYTHROCYTE [DISTWIDTH] IN BLOOD BY AUTOMATED COUNT: 12.2 % (ref 11.6–15.1)
ETHANOL EXG-MCNC: 0 MG/DL
FENTANYL UR QL SCN: NEGATIVE
GFR SERPL CREATININE-BSD FRML MDRD: 86 ML/MIN/1.73SQ M
GLUCOSE SERPL-MCNC: 88 MG/DL (ref 65–140)
HCT VFR BLD AUTO: 41.4 % (ref 34.8–46.1)
HGB BLD-MCNC: 14.5 G/DL (ref 11.5–15.4)
HYDROCODONE UR QL SCN: NEGATIVE
IMM GRANULOCYTES # BLD AUTO: 0.03 THOUSAND/UL (ref 0–0.2)
IMM GRANULOCYTES NFR BLD AUTO: 1 % (ref 0–2)
LACOSAMIDE SERPL-MCNC: 12.56 UG/ML (ref 1–20)
LYMPHOCYTES # BLD AUTO: 2.55 THOUSANDS/ÂΜL (ref 0.6–4.47)
LYMPHOCYTES NFR BLD AUTO: 42 % (ref 14–44)
MCH RBC QN AUTO: 33.2 PG (ref 26.8–34.3)
MCHC RBC AUTO-ENTMCNC: 35 G/DL (ref 31.4–37.4)
MCV RBC AUTO: 95 FL (ref 82–98)
METHADONE UR QL: NEGATIVE
MONOCYTES # BLD AUTO: 0.62 THOUSAND/ÂΜL (ref 0.17–1.22)
MONOCYTES NFR BLD AUTO: 10 % (ref 4–12)
NEUTROPHILS # BLD AUTO: 2.77 THOUSANDS/ÂΜL (ref 1.85–7.62)
NEUTS SEG NFR BLD AUTO: 45 % (ref 43–75)
NRBC BLD AUTO-RTO: 0 /100 WBCS
OPIATES UR QL SCN: NEGATIVE
OXYCODONE+OXYMORPHONE UR QL SCN: NEGATIVE
PCP UR QL: NEGATIVE
PLATELET # BLD AUTO: 181 THOUSANDS/UL (ref 149–390)
PMV BLD AUTO: 10.1 FL (ref 8.9–12.7)
POTASSIUM SERPL-SCNC: 4 MMOL/L (ref 3.5–5.3)
PROT SERPL-MCNC: 8 G/DL (ref 6.4–8.4)
RBC # BLD AUTO: 4.37 MILLION/UL (ref 3.81–5.12)
SODIUM SERPL-SCNC: 137 MMOL/L (ref 135–147)
THC UR QL: NEGATIVE
TSH SERPL DL<=0.05 MIU/L-ACNC: 0.22 UIU/ML (ref 0.45–4.5)
VALPROATE SERPL-MCNC: 107 UG/ML (ref 50–100)
WBC # BLD AUTO: 6.06 THOUSAND/UL (ref 4.31–10.16)

## 2024-05-28 PROCEDURE — 82075 ASSAY OF BREATH ETHANOL: CPT | Performed by: EMERGENCY MEDICINE

## 2024-05-28 PROCEDURE — 84443 ASSAY THYROID STIM HORMONE: CPT | Performed by: EMERGENCY MEDICINE

## 2024-05-28 PROCEDURE — 80164 ASSAY DIPROPYLACETIC ACD TOT: CPT | Performed by: EMERGENCY MEDICINE

## 2024-05-28 PROCEDURE — 93005 ELECTROCARDIOGRAM TRACING: CPT

## 2024-05-28 PROCEDURE — 80235 DRUG ASSAY LACOSAMIDE: CPT | Performed by: EMERGENCY MEDICINE

## 2024-05-28 PROCEDURE — 99284 EMERGENCY DEPT VISIT MOD MDM: CPT

## 2024-05-28 PROCEDURE — 80053 COMPREHEN METABOLIC PANEL: CPT | Performed by: EMERGENCY MEDICINE

## 2024-05-28 PROCEDURE — 36415 COLL VENOUS BLD VENIPUNCTURE: CPT | Performed by: EMERGENCY MEDICINE

## 2024-05-28 PROCEDURE — 82746 ASSAY OF FOLIC ACID SERUM: CPT

## 2024-05-28 PROCEDURE — 82306 VITAMIN D 25 HYDROXY: CPT

## 2024-05-28 PROCEDURE — 80307 DRUG TEST PRSMV CHEM ANLYZR: CPT | Performed by: EMERGENCY MEDICINE

## 2024-05-28 PROCEDURE — 85025 COMPLETE CBC W/AUTO DIFF WBC: CPT | Performed by: EMERGENCY MEDICINE

## 2024-05-28 PROCEDURE — 82607 VITAMIN B-12: CPT

## 2024-05-28 PROCEDURE — 99285 EMERGENCY DEPT VISIT HI MDM: CPT | Performed by: EMERGENCY MEDICINE

## 2024-05-28 PROCEDURE — 84439 ASSAY OF FREE THYROXINE: CPT | Performed by: EMERGENCY MEDICINE

## 2024-05-29 ENCOUNTER — HOSPITAL ENCOUNTER (INPATIENT)
Facility: HOSPITAL | Age: 54
LOS: 23 days | Discharge: HOME | DRG: 885 | End: 2024-06-21
Attending: PSYCHIATRY & NEUROLOGY | Admitting: PSYCHIATRY & NEUROLOGY
Payer: COMMERCIAL

## 2024-05-29 VITALS
SYSTOLIC BLOOD PRESSURE: 106 MMHG | TEMPERATURE: 98.3 F | RESPIRATION RATE: 16 BRPM | DIASTOLIC BLOOD PRESSURE: 46 MMHG | OXYGEN SATURATION: 100 % | HEART RATE: 86 BPM

## 2024-05-29 DIAGNOSIS — E78.2 MIXED HYPERLIPIDEMIA: ICD-10-CM

## 2024-05-29 DIAGNOSIS — G47.33 OBSTRUCTIVE SLEEP APNEA: ICD-10-CM

## 2024-05-29 DIAGNOSIS — F23 BRIEF PSYCHOTIC DISORDER (HCC): ICD-10-CM

## 2024-05-29 DIAGNOSIS — Z00.8 MEDICAL CLEARANCE FOR PSYCHIATRIC ADMISSION: ICD-10-CM

## 2024-05-29 DIAGNOSIS — N18.30 CHRONIC KIDNEY DISEASE, STAGE 3 UNSPECIFIED (HCC): ICD-10-CM

## 2024-05-29 DIAGNOSIS — E66.01 MORBID (SEVERE) OBESITY DUE TO EXCESS CALORIES (HCC): ICD-10-CM

## 2024-05-29 DIAGNOSIS — E89.0 POSTOPERATIVE HYPOTHYROIDISM: ICD-10-CM

## 2024-05-29 DIAGNOSIS — E53.8 VITAMIN B12 DEFICIENCY: ICD-10-CM

## 2024-05-29 DIAGNOSIS — F25.9 SCHIZOAFFECTIVE DISORDER (HCC): Primary | Chronic | ICD-10-CM

## 2024-05-29 DIAGNOSIS — F33.1 MODERATE EPISODE OF RECURRENT MAJOR DEPRESSIVE DISORDER (HCC): ICD-10-CM

## 2024-05-29 LAB
25(OH)D3 SERPL-MCNC: 38.2 NG/ML (ref 30–100)
ATRIAL RATE: 92 BPM
BACTERIA UR QL AUTO: ABNORMAL /HPF
BILIRUB UR QL STRIP: NEGATIVE
CLARITY UR: CLEAR
COLOR UR: ABNORMAL
FOLATE SERPL-MCNC: 17.8 NG/ML
GLUCOSE UR STRIP-MCNC: NEGATIVE MG/DL
HGB UR QL STRIP.AUTO: ABNORMAL
KETONES UR STRIP-MCNC: NEGATIVE MG/DL
LEUKOCYTE ESTERASE UR QL STRIP: ABNORMAL
MUCOUS THREADS UR QL AUTO: ABNORMAL
NITRITE UR QL STRIP: NEGATIVE
NON-SQ EPI CELLS URNS QL MICRO: ABNORMAL /HPF
P AXIS: 84 DEGREES
PH UR STRIP.AUTO: 6 [PH]
PR INTERVAL: 156 MS
PROT UR STRIP-MCNC: ABNORMAL MG/DL
QRS AXIS: 42 DEGREES
QRSD INTERVAL: 72 MS
QT INTERVAL: 338 MS
QTC INTERVAL: 417 MS
RBC #/AREA URNS AUTO: ABNORMAL /HPF
SP GR UR STRIP.AUTO: 1.02 (ref 1–1.03)
T WAVE AXIS: 55 DEGREES
T4 FREE SERPL-MCNC: 1.12 NG/DL (ref 0.61–1.12)
UROBILINOGEN UR STRIP-ACNC: <2 MG/DL
VENTRICULAR RATE: 92 BPM
VIT B12 SERPL-MCNC: 443 PG/ML (ref 180–914)
WBC #/AREA URNS AUTO: ABNORMAL /HPF

## 2024-05-29 PROCEDURE — NC001 PR NO CHARGE: Performed by: EMERGENCY MEDICINE

## 2024-05-29 PROCEDURE — 99222 1ST HOSP IP/OBS MODERATE 55: CPT | Performed by: FAMILY MEDICINE

## 2024-05-29 PROCEDURE — 81001 URINALYSIS AUTO W/SCOPE: CPT | Performed by: EMERGENCY MEDICINE

## 2024-05-29 PROCEDURE — 93010 ELECTROCARDIOGRAM REPORT: CPT | Performed by: INTERNAL MEDICINE

## 2024-05-29 RX ORDER — BENZTROPINE MESYLATE 0.5 MG/1
0.5 TABLET ORAL
Status: DISCONTINUED | OUTPATIENT
Start: 2024-05-29 | End: 2024-06-21 | Stop reason: HOSPADM

## 2024-05-29 RX ORDER — OLANZAPINE 10 MG/2ML
5 INJECTION, POWDER, FOR SOLUTION INTRAMUSCULAR
Status: DISCONTINUED | OUTPATIENT
Start: 2024-05-29 | End: 2024-06-21 | Stop reason: HOSPADM

## 2024-05-29 RX ORDER — ACETAMINOPHEN 325 MG/1
650 TABLET ORAL EVERY 4 HOURS PRN
Status: DISCONTINUED | OUTPATIENT
Start: 2024-05-29 | End: 2024-06-21 | Stop reason: HOSPADM

## 2024-05-29 RX ORDER — AMOXICILLIN 250 MG
1 CAPSULE ORAL DAILY PRN
Status: CANCELLED | OUTPATIENT
Start: 2024-05-29

## 2024-05-29 RX ORDER — OLANZAPINE 10 MG/1
10 TABLET, ORALLY DISINTEGRATING ORAL ONCE
Status: COMPLETED | OUTPATIENT
Start: 2024-05-29 | End: 2024-05-29

## 2024-05-29 RX ORDER — HYDROXYZINE HYDROCHLORIDE 25 MG/1
25 TABLET, FILM COATED ORAL
Status: CANCELLED | OUTPATIENT
Start: 2024-05-29

## 2024-05-29 RX ORDER — ACETAMINOPHEN 325 MG/1
650 TABLET ORAL EVERY 4 HOURS PRN
Status: CANCELLED | OUTPATIENT
Start: 2024-05-29

## 2024-05-29 RX ORDER — HYDROXYZINE HYDROCHLORIDE 25 MG/1
25 TABLET, FILM COATED ORAL
Status: DISCONTINUED | OUTPATIENT
Start: 2024-05-29 | End: 2024-06-21 | Stop reason: HOSPADM

## 2024-05-29 RX ORDER — HYDROXYZINE HYDROCHLORIDE 25 MG/1
50 TABLET, FILM COATED ORAL
Status: CANCELLED | OUTPATIENT
Start: 2024-05-29

## 2024-05-29 RX ORDER — PROPRANOLOL HYDROCHLORIDE 10 MG/1
5 TABLET ORAL EVERY 8 HOURS PRN
Status: CANCELLED | OUTPATIENT
Start: 2024-05-29

## 2024-05-29 RX ORDER — POLYETHYLENE GLYCOL 3350 17 G/17G
17 POWDER, FOR SOLUTION ORAL DAILY PRN
Status: CANCELLED | OUTPATIENT
Start: 2024-05-29

## 2024-05-29 RX ORDER — BISACODYL 10 MG
10 SUPPOSITORY, RECTAL RECTAL DAILY PRN
Status: CANCELLED | OUTPATIENT
Start: 2024-05-29

## 2024-05-29 RX ORDER — OLANZAPINE 10 MG/2ML
5 INJECTION, POWDER, FOR SOLUTION INTRAMUSCULAR
Status: CANCELLED | OUTPATIENT
Start: 2024-05-29

## 2024-05-29 RX ORDER — OLANZAPINE 5 MG/1
5 TABLET ORAL
Status: CANCELLED | OUTPATIENT
Start: 2024-05-29

## 2024-05-29 RX ORDER — LACOSAMIDE 50 MG/1
150 TABLET ORAL EVERY 12 HOURS SCHEDULED
Status: DISCONTINUED | OUTPATIENT
Start: 2024-05-29 | End: 2024-06-17

## 2024-05-29 RX ORDER — LANOLIN ALCOHOL/MO/W.PET/CERES
3 CREAM (GRAM) TOPICAL ONCE
Status: COMPLETED | OUTPATIENT
Start: 2024-05-29 | End: 2024-05-29

## 2024-05-29 RX ORDER — PROPRANOLOL HYDROCHLORIDE 10 MG/1
5 TABLET ORAL EVERY 8 HOURS PRN
Status: DISCONTINUED | OUTPATIENT
Start: 2024-05-29 | End: 2024-06-21 | Stop reason: HOSPADM

## 2024-05-29 RX ORDER — LACOSAMIDE 50 MG/1
50 TABLET ORAL
Status: DISCONTINUED | OUTPATIENT
Start: 2024-05-29 | End: 2024-06-17 | Stop reason: CLARIF

## 2024-05-29 RX ORDER — LORAZEPAM 2 MG/ML
1 INJECTION INTRAMUSCULAR
Status: CANCELLED | OUTPATIENT
Start: 2024-05-29

## 2024-05-29 RX ORDER — BENZTROPINE MESYLATE 0.5 MG/1
0.5 TABLET ORAL
Status: CANCELLED | OUTPATIENT
Start: 2024-05-29

## 2024-05-29 RX ORDER — BENZTROPINE MESYLATE 1 MG/ML
1 INJECTION INTRAMUSCULAR; INTRAVENOUS
Status: DISCONTINUED | OUTPATIENT
Start: 2024-05-29 | End: 2024-06-21 | Stop reason: HOSPADM

## 2024-05-29 RX ORDER — MAGNESIUM HYDROXIDE/ALUMINUM HYDROXICE/SIMETHICONE 120; 1200; 1200 MG/30ML; MG/30ML; MG/30ML
30 SUSPENSION ORAL EVERY 4 HOURS PRN
Status: CANCELLED | OUTPATIENT
Start: 2024-05-29

## 2024-05-29 RX ORDER — CEPHALEXIN 250 MG/1
500 CAPSULE ORAL ONCE
Status: COMPLETED | OUTPATIENT
Start: 2024-05-29 | End: 2024-05-29

## 2024-05-29 RX ORDER — LORAZEPAM 1 MG/1
1 TABLET ORAL
Status: DISCONTINUED | OUTPATIENT
Start: 2024-05-29 | End: 2024-06-21 | Stop reason: HOSPADM

## 2024-05-29 RX ORDER — TRAZODONE HYDROCHLORIDE 50 MG/1
50 TABLET ORAL
Status: DISCONTINUED | OUTPATIENT
Start: 2024-05-29 | End: 2024-06-21 | Stop reason: HOSPADM

## 2024-05-29 RX ORDER — BENZTROPINE MESYLATE 1 MG/ML
1 INJECTION INTRAMUSCULAR; INTRAVENOUS
Status: CANCELLED | OUTPATIENT
Start: 2024-05-29

## 2024-05-29 RX ORDER — ACETAMINOPHEN 325 MG/1
975 TABLET ORAL EVERY 6 HOURS PRN
Status: CANCELLED | OUTPATIENT
Start: 2024-05-29

## 2024-05-29 RX ORDER — MAGNESIUM HYDROXIDE/ALUMINUM HYDROXICE/SIMETHICONE 120; 1200; 1200 MG/30ML; MG/30ML; MG/30ML
30 SUSPENSION ORAL EVERY 4 HOURS PRN
Status: DISCONTINUED | OUTPATIENT
Start: 2024-05-29 | End: 2024-06-21 | Stop reason: HOSPADM

## 2024-05-29 RX ORDER — POLYETHYLENE GLYCOL 3350 17 G/17G
17 POWDER, FOR SOLUTION ORAL DAILY PRN
Status: DISCONTINUED | OUTPATIENT
Start: 2024-05-29 | End: 2024-05-30

## 2024-05-29 RX ORDER — LORAZEPAM 2 MG/ML
1 INJECTION INTRAMUSCULAR
Status: DISCONTINUED | OUTPATIENT
Start: 2024-05-29 | End: 2024-06-21 | Stop reason: HOSPADM

## 2024-05-29 RX ORDER — OLANZAPINE 2.5 MG/1
2.5 TABLET, FILM COATED ORAL
Status: DISCONTINUED | OUTPATIENT
Start: 2024-05-29 | End: 2024-06-21 | Stop reason: HOSPADM

## 2024-05-29 RX ORDER — LORAZEPAM 1 MG/1
1 TABLET ORAL
Status: CANCELLED | OUTPATIENT
Start: 2024-05-29

## 2024-05-29 RX ORDER — HYDROXYZINE 50 MG/1
50 TABLET, FILM COATED ORAL
Status: DISCONTINUED | OUTPATIENT
Start: 2024-05-29 | End: 2024-06-21 | Stop reason: HOSPADM

## 2024-05-29 RX ORDER — OLANZAPINE 5 MG/1
5 TABLET ORAL
Status: DISCONTINUED | OUTPATIENT
Start: 2024-05-29 | End: 2024-06-21 | Stop reason: HOSPADM

## 2024-05-29 RX ORDER — ACETAMINOPHEN 325 MG/1
975 TABLET ORAL EVERY 6 HOURS PRN
Status: DISCONTINUED | OUTPATIENT
Start: 2024-05-29 | End: 2024-06-21 | Stop reason: HOSPADM

## 2024-05-29 RX ORDER — BISACODYL 10 MG
10 SUPPOSITORY, RECTAL RECTAL DAILY PRN
Status: DISCONTINUED | OUTPATIENT
Start: 2024-05-29 | End: 2024-05-30

## 2024-05-29 RX ORDER — TRAZODONE HYDROCHLORIDE 50 MG/1
50 TABLET ORAL
Status: CANCELLED | OUTPATIENT
Start: 2024-05-29

## 2024-05-29 RX ORDER — AMOXICILLIN 250 MG
1 CAPSULE ORAL DAILY PRN
Status: DISCONTINUED | OUTPATIENT
Start: 2024-05-29 | End: 2024-06-21 | Stop reason: HOSPADM

## 2024-05-29 RX ORDER — OLANZAPINE 5 MG/1
2.5 TABLET ORAL
Status: CANCELLED | OUTPATIENT
Start: 2024-05-29

## 2024-05-29 RX ADMIN — CEPHALEXIN 500 MG: 250 CAPSULE ORAL at 02:49

## 2024-05-29 RX ADMIN — LACOSAMIDE 50 MG: 50 TABLET, FILM COATED ORAL at 21:18

## 2024-05-29 RX ADMIN — LACOSAMIDE 150 MG: 50 TABLET, FILM COATED ORAL at 21:17

## 2024-05-29 RX ADMIN — OLANZAPINE 10 MG: 10 TABLET, ORALLY DISINTEGRATING ORAL at 03:16

## 2024-05-29 RX ADMIN — DIVALPROEX SODIUM 750 MG: 250 TABLET, DELAYED RELEASE ORAL at 21:17

## 2024-05-29 RX ADMIN — MELATONIN 3 MG: 3 TAB ORAL at 03:16

## 2024-05-29 NOTE — ED PROVIDER NOTES
"History  Chief Complaint   Patient presents with    Psychiatric Evaluation     Pt brought in by brother accompanied by [employer]. Per family pt has cog delay and has been experiencing paranoia, delusions AH/VH \"seeing [place] people\" Pt brother states that she has \"these episodes when her meds are changed, med change 3 months ago\" Pt rambling and with disorganized speech.      Esperanza is a 54 yo F here for psychiatric evaluation.  She presents with her brother who states that the patient has been acting strangely, not herself for about a month now.  She does have a history of psychiatric admissions, primarily for paranoid delusions.  Patient is a poor historian with very disorganized thought process.  She switches back and forth between English and Luxem[place]ish.  She seems primarily preoccupied about paranoia related to screens, cameras, and pictures.  She feels that she is being watched or manipulated by \"them.\"  Patient's brother reports that patient has had similar symptoms prior to psychiatric admissions in the past.  She does not have a history of self-harm but has decompensated over the past weeks to a month to the point where she is unable to care for herself or perform ADLs.      Psychiatric Evaluation      Prior to Admission Medications   Prescriptions Last Dose Informant Patient Reported? Taking?   divalproex sodium (DEPAKOTE ER) 250 mg 24 hr tablet  Self No No   Sig: Take three tablets by mouth in the morning and three tablets by mouth at night time.   ergocalciferol (VITAMIN D2) 50,000 units   No No   Sig: TAKE 1 CAPSULE (50,000 UNITS TOTAL) BY MOUTH ONCE A WEEK   erythromycin (ILOTYCIN) ophthalmic ointment   No No   Sig: Place a 1/2 inch ribbon of ointment into the lower eyelid.   hydrocortisone 1 % cream   No No   Sig: Apply to affected area 2 times daily   lacosamide (VIMPAT) 150 mg tablet   No No   Sig: TAKE 1 TABLET (150 MG TOTAL) BY MOUTH EVERY 12 (TWELVE) HOURS   lacosamide (VIMPAT) 50 mg   No No   Sig: TAKE " 1 TABLET (50 MG TOTAL) BY MOUTH DAILY AT BEDTIME   levothyroxine 175 mcg tablet   No No   Sig: TAKE 1 TABLET (175 MCG TOTAL) BY MOUTH DAILY   paliperidone (INVEGA) 3 mg 24 hr tablet  Mother No No   Sig: Take 1 tablet (3 mg total) by mouth every morning   Patient not taking: Reported on 1/19/2024      Facility-Administered Medications: None       Past Medical History:   Diagnosis Date    Cancer (HCC)     thyroid cancer    Disease of thyroid gland     Hyperactivity (behavior)     Last Assessed: 11/7/2014     Hyperlipidemia     Obesity     Seizures (HCC)        Past Surgical History:   Procedure Laterality Date    OTHER SURGICAL HISTORY      Removal of urinary calculus     OTHER SURGICAL HISTORY      Rhinologic Surgery     ND OPEN TX PHALANGEAL SHAFT FRACTURE PROX/MIDDLE EA Left 8/3/2023    Procedure: CLOSED REDUCTION PERCUTANEOUS PINNING - LEFT RING FINGER;  Surgeon: Fransisco Escalante MD;  Location: [facility] MAIN OR;  Service: Orthopedics    TOTAL THYROIDECTOMY      LAst Assessed: 11/7/2014       Family History   Problem Relation Age of Onset    Breast cancer Mother 59    Hypertension Mother     ALS Mother     Stroke Mother     Prostate cancer Father     Diabetes type II Father      I have reviewed and agree with the history as documented.    E-Cigarette/Vaping    E-Cigarette Use Never User      E-Cigarette/Vaping Substances    Nicotine No     THC No     CBD No     Flavoring No     Other No     Unknown No      Social History     Tobacco Use    Smoking status: Never     Passive exposure: Never    Smokeless tobacco: Never   Vaping Use    Vaping status: Never Used   Substance Use Topics    Alcohol use: Never    Drug use: Never       Review of Systems   Unable to perform ROS: Psychiatric disorder       Physical Exam  Physical Exam  Vitals and nursing note reviewed.   Constitutional:       General: She is not in acute distress.     Appearance: She is well-developed.   HENT:      Head: Normocephalic and atraumatic.   Eyes:  "     Conjunctiva/sclera: Conjunctivae normal.   Cardiovascular:      Rate and Rhythm: Normal rate and regular rhythm.      Heart sounds: No murmur heard.  Pulmonary:      Effort: Pulmonary effort is normal. No respiratory distress.      Breath sounds: Normal breath sounds.   Abdominal:      Palpations: Abdomen is soft.      Tenderness: There is no abdominal tenderness.   Musculoskeletal:         General: No swelling.      Cervical back: Neck supple.   Skin:     General: Skin is warm and dry.      Capillary Refill: Capillary refill takes less than 2 seconds.   Neurological:      General: No focal deficit present.      Mental Status: She is alert.   Psychiatric:      Comments: Highly disorganized thought process.  Seems preoccupied with visuals.  Talks about pictures and remembering various things in the room.  Unclear if responding to internal stimuli or not.         Vital Signs  ED Triage Vitals [05/28/24 2123]   Temperature Pulse Respirations Blood Pressure SpO2   98.3 °F (36.8 °C) (!) 115 20 145/61 100 %      Temp Source Heart Rate Source Patient Position - Orthostatic VS BP Location FiO2 (%)   Oral Monitor Sitting Right arm --      Pain Score       No Pain           Vitals:    05/28/24 2123 05/29/24 0000 05/29/24 1019 05/29/24 1419   BP: 145/61 144/66 134/71 (!) 106/46   Pulse: (!) 115 98 89 86   Patient Position - Orthostatic VS: Sitting Sitting Lying Lying         Visual Acuity      ED Medications  Medications   cephalexin (KEFLEX) capsule 500 mg (500 mg Oral Given 5/29/24 0249)   melatonin tablet 3 mg (3 mg Oral Given 5/29/24 0316)   OLANZapine (ZyPREXA ZYDIS) dispersible tablet 10 mg (10 mg Oral Given 5/29/24 0316)       Diagnostic Studies  Results Reviewed       Procedure Component Value Units Date/Time    T4, free [[id number]]  (Normal) Collected: 05/28/24 2317    Lab Status: Final result Specimen: Blood from Arm, Right Updated: 05/29/24 0700     Free T4 1.12 ng/dL     Narrative:        \"Therapeutic range " "for patients medicated with thyroid disorders: 0.61-1.24 ng/dL.\"    Urine Microscopic [[id number]]  (Abnormal) Collected: 05/29/24 0019    Lab Status: Final result Specimen: Urine, Clean Catch Updated: 05/29/24 0037     RBC, UA 2-4 /hpf      WBC, UA 30-50 /hpf      Epithelial Cells Occasional /hpf      Bacteria, UA None Seen /hpf      MUCUS THREADS Occasional    UA (URINE) with reflex to Scope [[id number]]  (Abnormal) Collected: 05/29/24 0019    Lab Status: Final result Specimen: Urine, Clean Catch Updated: 05/29/24 0036     Color, UA Light Yellow     Clarity, UA Clear     Specific Gravity, UA 1.022     pH, UA 6.0     Leukocytes, UA Moderate     Nitrite, UA Negative     Protein, UA 30 (1+) mg/dl      Glucose, UA Negative mg/dl      Ketones, UA Negative mg/dl      Urobilinogen, UA <2.0 mg/dl      Bilirubin, UA Negative     Occult Blood, UA Trace    TSH, 3rd generation with Free T4 reflex [[id number]]  (Abnormal) Collected: 05/28/24 2317    Lab Status: Final result Specimen: Blood from Arm, Right Updated: 05/28/24 2354     TSH 3RD GENERATON 0.217 uIU/mL     Lacosamide [[id number]]  (Normal) Collected: 05/28/24 2317    Lab Status: Final result Specimen: Blood from Arm, Right Updated: 05/28/24 2347     Lacosamide 12.56 ug/mL     Narrative:      Coadministration of carbamazepine and phenytoin (inducers of drugmetabolizing  enzymes) may decrease serum concentrations of lacosamide substantially.  Lacosamide drug concentrations should not be the only means of therapeutic drug  management. The assay should be used in conjunction with information available from clinical  evaluations and other diagnostic procedures. Clinicians should carefully monitor patients  during therapy and dosage adjustments.      Comprehensive metabolic panel [[id number]]  (Abnormal) Collected: 05/28/24 2317    Lab Status: Final result Specimen: Blood from Arm, Right Updated: 05/28/24 2346     Sodium 137 mmol/L      Potassium 4.0 mmol/L      Chloride 102 " mmol/L      CO2 27 mmol/L      ANION GAP 8 mmol/L      BUN 24 mg/dL      Creatinine 0.79 mg/dL      Glucose 88 mg/dL      Calcium 10.0 mg/dL      AST 11 U/L      ALT 9 U/L      Alkaline Phosphatase 53 U/L      Total Protein 8.0 g/dL      Albumin 4.0 g/dL      Total Bilirubin 0.40 mg/dL      eGFR 86 ml/min/1.73sq m     Narrative:      National Kidney Disease Foundation guidelines for Chronic Kidney Disease (CKD):     Stage 1 with normal or high GFR (GFR > 90 mL/min/1.73 square meters)    Stage 2 Mild CKD (GFR = 60-89 mL/min/1.73 square meters)    Stage 3A Moderate CKD (GFR = 45-59 mL/min/1.73 square meters)    Stage 3B Moderate CKD (GFR = 30-44 mL/min/1.73 square meters)    Stage 4 Severe CKD (GFR = 15-29 mL/min/1.73 square meters)    Stage 5 End Stage CKD (GFR <15 mL/min/1.73 square meters)  Note: GFR calculation is accurate only with a steady state creatinine    Valproic acid level, total [[id number]]  (Abnormal) Collected: 05/28/24 2317    Lab Status: Final result Specimen: Blood from Arm, Right Updated: 05/28/24 2346     Valproic Acid, Total 107 ug/mL     CBC and differential [[id number]] Collected: 05/28/24 2317    Lab Status: Final result Specimen: Blood from Arm, Right Updated: 05/28/24 2323     WBC 6.06 Thousand/uL      RBC 4.37 Million/uL      Hemoglobin 14.5 g/dL      Hematocrit 41.4 %      MCV 95 fL      MCH 33.2 pg      MCHC 35.0 g/dL      RDW 12.2 %      MPV 10.1 fL      Platelets 181 Thousands/uL      nRBC 0 /100 WBCs      Segmented % 45 %      Immature Grans % 1 %      Lymphocytes % 42 %      Monocytes % 10 %      Eosinophils Relative 1 %      Basophils Relative 1 %      Absolute Neutrophils 2.77 Thousands/µL      Absolute Immature Grans 0.03 Thousand/uL      Absolute Lymphocytes 2.55 Thousands/µL      Absolute Monocytes 0.62 Thousand/µL      Eosinophils Absolute 0.06 Thousand/µL      Basophils Absolute 0.03 Thousands/µL     Rapid drug screen, urine [[id number]]  (Normal) Collected: 05/28/24 2208    Lab  Status: Final result Specimen: Urine, Clean Catch Updated: 05/28/24 2236     Amph/Meth UR Negative     Barbiturate Ur Negative     Benzodiazepine Urine Negative     Cocaine Urine Negative     Methadone Urine Negative     Opiate Urine Negative     PCP Ur Negative     THC Urine Negative     Oxycodone Urine Negative     Fentanyl Urine Negative     HYDROCODONE URINE Negative    Narrative:      FOR MEDICAL PURPOSES ONLY.   IF CONFIRMATION NEEDED PLEASE CONTACT THE LAB WITHIN 5 DAYS.    Drug Screen Cutoff Levels:  AMPHETAMINE/METHAMPHETAMINES  1000 ng/mL  BARBITURATES     200 ng/mL  BENZODIAZEPINES     200 ng/mL  COCAINE      300 ng/mL  METHADONE      300 ng/mL  OPIATES      300 ng/mL  PHENCYCLIDINE     25 ng/mL  THC       50 ng/mL  OXYCODONE      100 ng/mL  FENTANYL      5 ng/mL  HYDROCODONE     300 ng/mL    POCT alcohol breath test [[id number]]  (Normal) Resulted: 05/28/24 2203    Lab Status: Final result Updated: 05/28/24 2203     EXTBreath Alcohol 0.000                   No orders to display              Procedures  Procedures         ED Course                               SBIRT 20yo+      Flowsheet Row Most Recent Value   Initial Alcohol Screen: US AUDIT-C     1. How often do you have a drink containing alcohol? 0 Filed at: 05/28/2024 2130   2. How many drinks containing alcohol do you have on a typical day you are drinking?  0 Filed at: 05/28/2024 2130   3b. FEMALE Any Age, or MALE 65+: How often do you have 4 or more drinks on one occassion? 0 Filed at: 05/28/2024 2130   Audit-C Score 0 Filed at: 05/28/2024 2130   CATHY: How many times in the past year have you...    Used an illegal drug or used a prescription medication for non-medical reasons? Never Filed at: 05/28/2024 2130                      Medical Decision Making  Medically cleared for psychiatric evaluation.  She may have a mild UTI though UA is somewhat equivocal.  Started on Keflex.  Evaluated by ED crisis worker.  Patient does not appear to have capacity  signed 201 paperwork.  302 petitioned by me for evaluation by Dr. Cooney.    Amount and/or Complexity of Data Reviewed  Labs: ordered. Decision-making details documented in ED Course.    Risk  Prescription drug management.  Decision regarding hospitalization.             Disposition  Final diagnoses:   Psychoses (HCC)     Time reflects when diagnosis was documented in both MDM as applicable and the Disposition within this note       Time User Action Codes Description Comment    5/29/2024  2:16 AM Semaj Hendrix Add [F29] Psychoses (HCC)     5/29/2024  1:37 PM Elma Le Add [Z00.8] Medical clearance for psychiatric admission     5/29/2024  1:37 PM Katy Leily Add [E53.8] Vitamin B12 deficiency     5/29/2024  1:37 PM AntioneeyElma Add [E66.01] Morbid (severe) obesity due to excess calories (HCC)     5/29/2024  1:37 PM HangeyKatyElma Add [C73] Thyroid cancer (HCC)     5/29/2024  1:37 PM Elma Le Remove [C73] Thyroid cancer (HCC)     5/29/2024  1:37 PM Elma Le Add [E89.0] Postoperative hypothyroidism     5/29/2024  1:37 PM HangeyKatyElma Add [G47.33] Obstructive sleep apnea     5/29/2024  1:38 PM Katy Leily Add [N18.30] Chronic kidney disease, stage 3 unspecified (HCC)     5/29/2024  1:38 PM Katy Leily Add [E78.2] Mixed hyperlipidemia           ED Disposition       ED Disposition   Transfer to Behavioral Health Condition   --    Date/Time   Wed May 29, 2024 0216    Comment   Esperanza Prajapati should be transferred out to  and has been medically cleared.               MD Documentation      Flowsheet Row Most Recent Value   Accepting Physician Dr Lester   Accepting Facility Name, [address]    (Name & Tel number) roundtrip   Transported by (Company and Unit #) [facility]   Sending MD dr best          RN Documentation      Flowsheet Row Most Recent Value   Accepting Facility Name, [facility]     (Name & Tel number) roundtrip   Transported by (Company and Unit #) [facility]          Follow-up Information    None         Patient's Medications   Discharge Prescriptions    No medications on file       No discharge procedures on file.    PDMP Review         Value Time User    PDMP Reviewed  Yes 5/20/2024  8:36 AM BRINA Liu            ED Provider  Electronically Signed by             Semaj Hendrix MD  05/29/24 5393

## 2024-05-29 NOTE — ED NOTES
Pt vitals deferred at this time, as pt is sleeping comfortably on the stretcher with no outward signs of distress, respirations are equal and non labored. Pt 1:1 continued as ordered.      Nichelle Bullard RN  05/29/24 2323

## 2024-05-29 NOTE — ED NOTES
Called [employer] at [phone]. There was no live representative, but the automated system did find the patient and confirm current, active coverage. The system indicated speaking with a live representative was required if the call was about inpatient mental health services. The system listed their office hours as M-F 8am to 5pm. Authorization will need to be obtained during that time.

## 2024-05-29 NOTE — ED NOTES
Patient is accepted at [facility]  Patient is accepted by Dr. Trevon Heath    Transportation is arranged with [facility]  Transportation is scheduled for TBD    Nurse report is to be called to [phone]   prior to patient transfer.

## 2024-05-29 NOTE — PLAN OF CARE
Problem: Alteration in Thoughts and Perception  Goal: Treatment Goal: Gain control of psychotic behaviors/thinking, reduce/eliminate presenting symptoms and demonstrate improved reality functioning upon discharge  Outcome: Progressing  Goal: Verbalize thoughts and feelings  Description: Interventions:  - Promote a nonjudgmental and trusting relationship with the patient through active listening and therapeutic communication  - Assess patient's level of functioning, behavior and potential for risk  - Engage patient in 1 on 1 interactions  - Encourage patient to express fears, feelings, frustrations, and discuss symptoms    - [place] patient to reality, help patient recognize reality-based thinking   - Administer medications as ordered and assess for potential side effects  - Provide the patient education related to the signs and symptoms of the illness and desired effects of prescribed medications  Outcome: Progressing  Goal: Refrain from acting on delusional thinking/internal stimuli  Description: Interventions:  - Monitor patient closely, per order   - Utilize least restrictive measures   - Set reasonable limits, give positive feedback for acceptable   - Administer medications as ordered and monitor of potential side effects  Outcome: Progressing  Goal: Agree to be compliant with medication regime, as prescribed and report medication side effects  Description: Interventions:  - Offer appropriate PRN medication and supervise ingestion; conduct AIMS, as needed   Outcome: Progressing  Goal: Attend and participate in unit activities, including therapeutic, recreational, and educational groups  Description: Interventions:  -Encourage Visitation and family involvement in care  Outcome: Progressing  Goal: Recognize dysfunctional thoughts, communicate reality-based thoughts at the time of discharge  Description: Interventions:  - Provide medication and psycho-education to assist patient in compliance and developing  insight into his/her illness   Outcome: Progressing  Goal: Complete daily ADLs, including personal hygiene independently, as able  Description: Interventions:  - Observe, teach, and assist patient with ADLS  - Monitor and promote a balance of rest/activity, with adequate nutrition and elimination   Outcome: Progressing     Problem: Ineffective Coping  Goal: Cooperates with admission process  Description: Interventions:   - Complete admission process  Outcome: Progressing  Goal: Identifies ineffective coping skills  Outcome: Progressing  Goal: Identifies healthy coping skills  Outcome: Progressing  Goal: Demonstrates healthy coping skills  Outcome: Progressing  Goal: Participates in unit activities  Description: Interventions:  - Provide therapeutic environment   - Provide required programming   - Redirect inappropriate behaviors   Outcome: Progressing  Goal: Patient/Family participate in treatment and DC plans  Description: Interventions:  - Provide therapeutic environment  Outcome: Progressing  Goal: Patient/Family verbalizes awareness of resources  Outcome: Progressing  Goal: Understands least restrictive measures  Description: Interventions:  - Utilize least restrictive behavior  Outcome: Progressing  Goal: Free from restraint events  Description: - Utilize least restrictive measures   - Provide behavioral interventions   - Redirect inappropriate behaviors   Outcome: Progressing     Problem: Anxiety  Goal: Anxiety is at manageable level  Description: Interventions:  - Assess and monitor patient's anxiety level.   - Monitor for signs and symptoms (heart palpitations, chest pain, shortness of breath, headaches, nausea, feeling jumpy, restlessness, irritable, apprehensive).   - Collaborate with interdisciplinary team and initiate plan and interventions as ordered.  - [place] patient to unit/surroundings  - Explain treatment plan  - Encourage participation in care  - Encourage verbalization of concerns/fears  -  Identify coping mechanisms  - Assist in developing anxiety-reducing skills  - Administer/offer alternative therapies  - Limit or eliminate stimulants  Outcome: Progressing

## 2024-05-29 NOTE — ED NOTES
Faxed completed 2 physician 302 to [facility], and to Intake for review when a bed becomes available.

## 2024-05-29 NOTE — ED NOTES
Insurance Authorization:   Phone call placed to [employer]  Phone number: [phone]     Spoke to: Yariel Cuevas approved- 6  Level of care: Inpatient treatment  Review on 6/3/24  Authorization # [id number]    Review with Murali MCCARTNEY at [phone]

## 2024-05-29 NOTE — NURSING NOTE
I called [facility] pharmacy and spoke with pharmacist Brandy and she confirmed patient is currently taking:   Lacosamide 150 mg every 12 hours  Lacosamide 50 mg at bedtime  Levothyroxine 175 mcg I tablet daily  Drisdol 50,000 one capsule weekly  Divalpro 250 mg 3 tablets BID

## 2024-05-29 NOTE — ED NOTES
Assumed care of pt at this time. Pt resting comfortably in room. Pt respirations relaxed and non labored. Pt is currently on a 1-1 continous observation by PCA Elsie Chu.      Jonatan Braxton RN  05/29/24 0759

## 2024-05-29 NOTE — EMTALA/ACUTE CARE TRANSFER
[facility] EMERGENCY DEPARTMENT  [address] [address]  Dept: [phone]      EMTALA TRANSFER CONSENT    NAME Esperanza Prajapati                                         1970                              MRN [id number]    I have been informed of my rights regarding examination, treatment, and transfer   by Dr. Britt Ramirez DO    Benefits: Specialized equipment and/or services available at the receiving facility (Include comment)________________________    Risks: Potential for delay in receiving treatment, Increased discomfort during transfer      Consent for Transfer:  I acknowledge that my medical condition has been evaluated and explained to me by the emergency department physician or other qualified medical person and/or my attending physician, who has recommended that I be transferred to the service of  Accepting Physician: Dr Lester at Accepting Facility Name, City & State : [facility]. The above potential benefits of such transfer, the potential risks associated with such transfer, and the probable risks of not being transferred have been explained to me, and I fully understand them.  The doctor has explained that, in my case, the benefits of transfer outweigh the risks.  I agree to be transferred.    I authorize the performance of emergency medical procedures and treatments upon me in both transit and upon arrival at the receiving facility.  Additionally, I authorize the release of any and all medical records to the receiving facility and request they be transported with me, if possible.  I understand that the safest mode of transportation during a medical emergency is an ambulance and that the Hospital advocates the use of this mode of transport. Risks of traveling to the receiving facility by car, including absence of medical control, life sustaining equipment, such as oxygen, and medical personnel has been explained to me and I  fully understand them.    (YULIANA CORRECT BOX BELOW)  [  ]  I consent to the stated transfer and to be transported by ambulance/helicopter.  [  ]  I consent to the stated transfer, but refuse transportation by ambulance and accept full responsibility for my transportation by car.  I understand the risks of non-ambulance transfers and I exonerate the Hospital and its staff from any deterioration in my condition that results from this refusal.    X___________________________________________    DATE  24  TIME________  Signature of patient or legally responsible individual signing on patient behalf           RELATIONSHIP TO PATIENT_________________________          Provider Certification    NAME Esperanza Prajapati                                         1970                              MRN [id number]    A medical screening exam was performed on the above named patient.  Based on the examination:    Condition Necessitating Transfer The primary encounter diagnosis was Psychoses (HCC). Diagnoses of Medical clearance for psychiatric admission, Vitamin B12 deficiency, Morbid (severe) obesity due to excess calories (HCC), Postoperative hypothyroidism, Obstructive sleep apnea, Chronic kidney disease, stage 3 unspecified (HCC), and Mixed hyperlipidemia were also pertinent to this visit.    Patient Condition: The patient has been stabilized such that within reasonable medical probability, no material deterioration of the patient condition or the condition of the unborn child(shawanda) is likely to result from the transfer    Reason for Transfer:      Transfer Requirements: Facility [facility]   Space available and qualified personnel available for treatment as acknowledged by yoan  Agreed to accept transfer and to provide appropriate medical treatment as acknowledged by       Dr Lester  Appropriate medical records of the examination and treatment of the patient are provided at the time of  transfer   STAFF INITIAL WHEN COMPLETED _______  Transfer will be performed by qualified personnel from [facility]  and appropriate transfer equipment as required, including the use of necessary and appropriate life support measures.    Provider Certification: I have examined the patient and explained the following risks and benefits of being transferred/refusing transfer to the patient/family:  General risk, such as traffic hazards, adverse weather conditions, rough terrain or turbulence, possible failure of equipment (including vehicle or aircraft), or consequences of actions of persons outside the control of the transport personnel      Based on these reasonable risks and benefits to the patient and/or the unborn child(shawanda), and based upon the information available at the time of the patient’s examination, I certify that the medical benefits reasonably to be expected from the provision of appropriate medical treatments at another medical facility outweigh the increasing risks, if any, to the individual’s medical condition, and in the case of labor to the unborn child, from effecting the transfer.    X____________________________________________ DATE 05/29/24        TIME_______      ORIGINAL - SEND TO MEDICAL RECORDS   COPY - SEND WITH PATIENT DURING TRANSFER

## 2024-05-29 NOTE — NURSING NOTE
"Pt admitted to unit on a 302 commitment status from  [facility] ED.Per records pt admitted d/t  inability to care for self, paranoia, delusions and ah/vh. Pt  reported that she has been experiencing difficulty due to the \"[place]\" people. Reported that the [place] people make her \"eyes go up to the ceiling and shake her hands\". Stated, \"all these things are happening to me. Since September 2022 they have videos and grab my eyes, push and trip me and try to make me fall\". Reported that she has fallen x2 d/t the \"[place] people\". Believes they follow her. Also expressing ideas of reference. Stated, \"when my mom watches Reality TV they follow\" me. Pt frequently looking up to the ceiling with her eyes and only the whites of her eyes visible. Alert and oriented x4. Resides with her mother. Reported that she is medication compliant d/t seizure disorder. Medical history significant for seizure disorder. Thyroid CA and thyroidectomy. Previously inpatient 1/23-2/6/2023. Depakote level on admission 107. Pt experienced difficulty being awake during assessment. Cooperative. Denies history of suicide attempts. Alert and oriented x 4  Denies history of drug and alcohol abuse.Gait is steady.Voiding q shift. Skin check completed.Declined orientation  to the unit. Will continue to monitor and maintain q 7 min checks.   "

## 2024-05-29 NOTE — ED NOTES
CIS went back to patient's room to speak with her about inpatient behavioral health treatment. She appeared even more preoccupied with her delusions than she had been earlier. Attempts to help her focus for a conversation were unsuccessful. Her brother attempted to help, but he was unable to get her to indicate she remembered her previous hospitalization from last year, and she did not appear to understand what was meant by inpatient mental health treatment or hospitalization. Dr Hendrix indicated her lab work did not find any medical reason for her paranoia, delusions, and possible hallucinations. She has been psychiatrically hospitalized in the past with a presentation and symptoms similar to those tonight, and notes indicate inpatient treatment was successful in helping to reduce and even eliminate some of her most debilitating symptoms for a time. Inpatient mental health treatment again is warranted by her current presentation. As she is unable to demonstrate that she understands that plan, she will be involuntarily committed using a 2 physician 302.    Her brother was informed of this, and that there are no beds available at [facility] this evening. It is unknown at this time if one will become available later today. She will need placement in a facility with access to medical treatment if it becomes necessary due to her chronic medical issues.

## 2024-05-30 PROBLEM — N30.00 ACUTE CYSTITIS WITHOUT HEMATURIA: Status: ACTIVE | Noted: 2024-05-30

## 2024-05-30 LAB
ALBUMIN SERPL BCG-MCNC: 3.5 G/DL (ref 3.5–5)
ALP SERPL-CCNC: 47 U/L (ref 34–104)
ALT SERPL W P-5'-P-CCNC: 11 U/L (ref 7–52)
ANION GAP SERPL CALCULATED.3IONS-SCNC: 8 MMOL/L (ref 4–13)
AST SERPL W P-5'-P-CCNC: 13 U/L (ref 13–39)
ATRIAL RATE: 87 BPM
BASOPHILS # BLD AUTO: 0.02 THOUSANDS/ÂΜL (ref 0–0.1)
BASOPHILS NFR BLD AUTO: 0 % (ref 0–1)
BILIRUB SERPL-MCNC: 0.38 MG/DL (ref 0.2–1)
BUN SERPL-MCNC: 23 MG/DL (ref 5–25)
CALCIUM SERPL-MCNC: 9.7 MG/DL (ref 8.4–10.2)
CHLORIDE SERPL-SCNC: 101 MMOL/L (ref 96–108)
CHOLEST SERPL-MCNC: 240 MG/DL
CO2 SERPL-SCNC: 31 MMOL/L (ref 21–32)
CREAT SERPL-MCNC: 0.75 MG/DL (ref 0.6–1.3)
EOSINOPHIL # BLD AUTO: 0.11 THOUSAND/ÂΜL (ref 0–0.61)
EOSINOPHIL NFR BLD AUTO: 2 % (ref 0–6)
ERYTHROCYTE [DISTWIDTH] IN BLOOD BY AUTOMATED COUNT: 12.5 % (ref 11.6–15.1)
GFR SERPL CREATININE-BSD FRML MDRD: 92 ML/MIN/1.73SQ M
GLUCOSE P FAST SERPL-MCNC: 91 MG/DL (ref 65–99)
GLUCOSE SERPL-MCNC: 91 MG/DL (ref 65–140)
HCT VFR BLD AUTO: 41.5 % (ref 34.8–46.1)
HDLC SERPL-MCNC: 39 MG/DL
HGB BLD-MCNC: 14.5 G/DL (ref 11.5–15.4)
IMM GRANULOCYTES # BLD AUTO: 0.03 THOUSAND/UL (ref 0–0.2)
IMM GRANULOCYTES NFR BLD AUTO: 1 % (ref 0–2)
LDLC SERPL CALC-MCNC: 147 MG/DL (ref 0–100)
LYMPHOCYTES # BLD AUTO: 2.24 THOUSANDS/ÂΜL (ref 0.6–4.47)
LYMPHOCYTES NFR BLD AUTO: 46 % (ref 14–44)
MAGNESIUM SERPL-MCNC: 1.9 MG/DL (ref 1.9–2.7)
MCH RBC QN AUTO: 33.5 PG (ref 26.8–34.3)
MCHC RBC AUTO-ENTMCNC: 34.9 G/DL (ref 31.4–37.4)
MCV RBC AUTO: 96 FL (ref 82–98)
MONOCYTES # BLD AUTO: 0.48 THOUSAND/ÂΜL (ref 0.17–1.22)
MONOCYTES NFR BLD AUTO: 10 % (ref 4–12)
NEUTROPHILS # BLD AUTO: 2.02 THOUSANDS/ÂΜL (ref 1.85–7.62)
NEUTS SEG NFR BLD AUTO: 41 % (ref 43–75)
NONHDLC SERPL-MCNC: 201 MG/DL
NRBC BLD AUTO-RTO: 0 /100 WBCS
P AXIS: 73 DEGREES
PHOSPHATE SERPL-MCNC: 4 MG/DL (ref 2.7–4.5)
PLATELET # BLD AUTO: 159 THOUSANDS/UL (ref 149–390)
PMV BLD AUTO: 10.4 FL (ref 8.9–12.7)
POTASSIUM SERPL-SCNC: 3.9 MMOL/L (ref 3.5–5.3)
PR INTERVAL: 158 MS
PROT SERPL-MCNC: 7.1 G/DL (ref 6.4–8.4)
QRS AXIS: 9 DEGREES
QRSD INTERVAL: 78 MS
QT INTERVAL: 366 MS
QTC INTERVAL: 440 MS
RBC # BLD AUTO: 4.33 MILLION/UL (ref 3.81–5.12)
SODIUM SERPL-SCNC: 140 MMOL/L (ref 135–147)
T WAVE AXIS: 75 DEGREES
T4 FREE SERPL-MCNC: 1.05 NG/DL (ref 0.61–1.12)
TRIGL SERPL-MCNC: 268 MG/DL
TSH SERPL DL<=0.05 MIU/L-ACNC: 0.15 UIU/ML (ref 0.45–4.5)
VENTRICULAR RATE: 87 BPM
WBC # BLD AUTO: 4.9 THOUSAND/UL (ref 4.31–10.16)

## 2024-05-30 PROCEDURE — 93005 ELECTROCARDIOGRAM TRACING: CPT

## 2024-05-30 PROCEDURE — 84100 ASSAY OF PHOSPHORUS: CPT

## 2024-05-30 PROCEDURE — 93010 ELECTROCARDIOGRAM REPORT: CPT | Performed by: INTERNAL MEDICINE

## 2024-05-30 PROCEDURE — 84439 ASSAY OF FREE THYROXINE: CPT

## 2024-05-30 PROCEDURE — 83735 ASSAY OF MAGNESIUM: CPT

## 2024-05-30 PROCEDURE — 84443 ASSAY THYROID STIM HORMONE: CPT

## 2024-05-30 PROCEDURE — 80061 LIPID PANEL: CPT

## 2024-05-30 PROCEDURE — 80053 COMPREHEN METABOLIC PANEL: CPT

## 2024-05-30 PROCEDURE — 85025 COMPLETE CBC W/AUTO DIFF WBC: CPT

## 2024-05-30 PROCEDURE — 99223 1ST HOSP IP/OBS HIGH 75: CPT | Performed by: PSYCHIATRY & NEUROLOGY

## 2024-05-30 RX ORDER — BISACODYL 10 MG
10 SUPPOSITORY, RECTAL RECTAL DAILY PRN
Status: DISCONTINUED | OUTPATIENT
Start: 2024-05-30 | End: 2024-06-21 | Stop reason: HOSPADM

## 2024-05-30 RX ORDER — POLYETHYLENE GLYCOL 3350 17 G/17G
17 POWDER, FOR SOLUTION ORAL DAILY PRN
Status: DISCONTINUED | OUTPATIENT
Start: 2024-05-30 | End: 2024-06-21 | Stop reason: HOSPADM

## 2024-05-30 RX ORDER — PALIPERIDONE 3 MG/1
3 TABLET, EXTENDED RELEASE ORAL DAILY
Status: DISCONTINUED | OUTPATIENT
Start: 2024-05-30 | End: 2024-06-03

## 2024-05-30 RX ORDER — CEPHALEXIN 500 MG/1
500 CAPSULE ORAL EVERY 12 HOURS SCHEDULED
Status: COMPLETED | OUTPATIENT
Start: 2024-05-30 | End: 2024-06-04

## 2024-05-30 RX ADMIN — DIVALPROEX SODIUM 750 MG: 250 TABLET, DELAYED RELEASE ORAL at 21:06

## 2024-05-30 RX ADMIN — LACOSAMIDE 150 MG: 50 TABLET, FILM COATED ORAL at 21:05

## 2024-05-30 RX ADMIN — PALIPERIDONE 3 MG: 3 TABLET, EXTENDED RELEASE ORAL at 17:12

## 2024-05-30 RX ADMIN — LEVOTHYROXINE SODIUM 175 MCG: 0.15 TABLET ORAL at 06:04

## 2024-05-30 RX ADMIN — LACOSAMIDE 50 MG: 50 TABLET, FILM COATED ORAL at 21:05

## 2024-05-30 RX ADMIN — DIVALPROEX SODIUM 750 MG: 250 TABLET, DELAYED RELEASE ORAL at 08:12

## 2024-05-30 RX ADMIN — LACOSAMIDE 150 MG: 50 TABLET, FILM COATED ORAL at 08:12

## 2024-05-30 RX ADMIN — CEPHALEXIN 500 MG: 500 CAPSULE ORAL at 17:12

## 2024-05-30 NOTE — H&P
Psychiatric Evaluation - Behavioral Health   Esperanza Prajapati 53 y.o. female MRN: [id number]  Unit/Bed#: [facility] 603-01 Encounter: [id number]    Assessment & Plan   Principal Problem:    Schizoaffective disorder (HCC)  Active Problems:    Cognitive developmental delay    History of hypertension    Hypothyroidism    Focal epilepsy (HCC)    Medical clearance for psychiatric admission    Plan:  1.  Patient is admitted to [facility] on a 302 involuntary commitment basis for safety and stabilization.  2.  Patient will be started back on Invega at 3 mg daily.  3.  Group, milieu and supportive therapies.  4.  Medical team to follow and support patient's medical needs.  5.  Collateral information.  6.  Discharge planning          Current Facility-Administered Medications   Medication Dose Route Frequency Provider Last Rate    acetaminophen  650 mg Oral Q4H PRN BRINA Dominguez      acetaminophen  650 mg Oral Q4H PRN BRINA Dominguez      acetaminophen  975 mg Oral Q6H PRN BRINA Dominguez      aluminum-magnesium hydroxide-simethicone  30 mL Oral Q4H PRN BRINA Dominguez      benztropine  1 mg Intramuscular Q4H PRN Max 6/day BRINA Dominguez      benztropine  0.5 mg Oral Q4H PRN Max 6/day BRINA Dominguez      bisacodyl  10 mg Rectal Daily PRN BRINA Dominguez      divalproex sodium  750 mg Oral Q12H [facility] Wilfredokelsey Teixeirayson, DO      hydrOXYzine HCL  25 mg Oral Q6H PRN Max 4/day BRINA Dominguez      hydrOXYzine HCL  50 mg Oral Q6H PRN Max 4/day BRINA Dominguez      lacosamide  150 mg Oral Q12H [facility] Latia Fitzgerald MD      lacosamide  50 mg Oral  Wilfredo RAKESH Teixeirayson, DO      levothyroxine  175 mcg Oral Daily Latia Fitzgerald MD      LORazepam  1 mg Intramuscular Q6H PRN Max 3/day BRINA Dominguez      LORazepam  1 mg Oral Q6H PRN Max 3/day BRINA Dominguez      OLANZapine  5 mg Intramuscular Q3H PRN Max 3/day BRINA Dominguez      OLANZapine  2.5 mg Oral Q4H PRN Max 6/day BRINA Dominguez       "OLANZapine  5 mg Oral Q4H PRN Max 3/day BRINA Dominguez      OLANZapine  5 mg Oral Q3H PRN Max 3/day BRINA Dominguez      polyethylene glycol  17 g Oral Daily PRN BRINA Dominguez      propranolol  5 mg Oral Q8H PRN BRINA Dominguez      senna-docusate sodium  1 tablet Oral Daily PRN BRINA Dominguez      traZODone  50 mg Oral HS PRN BRINA Dominguez       Risks, benefits and possible side effects of Medications:   Patient does not verbalize understanding at this time and will require further explanation.         Chief Complaint: Decompensation of schizoaffective disorder due to medication change.        History of Present Illness       ED note written by Semaj Hendrix MD on 5/28/2024     Psychiatric Evaluation  Pt brought in by brother accompanied by [employer]. Per family pt has cog delay and has been experiencing paranoia, delusions AH/VH \"seeing [place] people\" Pt brother states that she has \"these episodes when her meds are changed, med change 3 months ago\" Pt rambling and with disorganized speech  Esperanza is a 54 yo F here for psychiatric evaluation.  She presents with her brother who states that the patient has been acting strangely, not herself for about a month now.  She does have a history of psychiatric admissions, primarily for paranoid delusions.  Patient is a poor historian with very disorganized thought process.  She switches back and forth between English and [place].  She seems primarily preoccupied about paranoia related to screens, cameras, and pictures.  She feels that she is being watched or manipulated by \"them.\"  Patient's brother reports that patient has had similar symptoms prior to psychiatric admissions in the past.  She does not have a history of self-harm but has decompensated over the past weeks to a month to the point where she is unable to care for herself or perform ADLs.      ED note written by [facility] crisis worker on 5/29/2024    CIS went back to patient's room to " speak with her about inpatient behavioral health treatment. She appeared even more preoccupied with her delusions than she had been earlier. Attempts to help her focus for a conversation were unsuccessful. Her brother attempted to help, but he was unable to get her to indicate she remembered her previous hospitalization from last year, and she did not appear to understand what was meant by inpatient mental health treatment or hospitalization. Dr Hendrix indicated her lab work did not find any medical reason for her paranoia, delusions, and possible hallucinations. She has been psychiatrically hospitalized in the past with a presentation and symptoms similar to those tonight, and notes indicate inpatient treatment was successful in helping to reduce and even eliminate some of her most debilitating symptoms for a time. Inpatient mental health treatment again is warranted by her current presentation. As she is unable to demonstrate that she understands that plan, she will be involuntarily committed using a 2 physician 302.     Her brother was informed of this, and that there are no beds available at [facility] this evening. It is unknown at this time if one will become available later today. She will need placement in a facility with access to medical treatment if it becomes necessary due to her chronic medical issues.      On evaluation, she is overall calm and cooperative but she is unable to carry on a conversation.  She is however oriented to person place and day month and year.  Patient is disorganized, tangential and delusional.  Patient cannot tell this writer why she is here.  She gives little information about her recent history.  For her medications.  She can tell me that she does not want to harm herself or anyone else.  She denies having any auditory visual examinations.  However the patient certainly seems to be responding to internal stimuli.  The patient was focusing on how this writer's hands had been  in one pocket and holding a clipboard and another.  She says she saw this in a movie.  Apparently the patient had reported [place] people that have videos, grab her eyes and try to make her fall.  Patient informed she will be started back on Invega as she had been on before she decompensated.  Patient gave no response.  Patient denies any changes in the psychiatric review of systems at home.            Psychiatric Review Of Systems:  sleep: no  appetite changes: no  weight changes: no  energy/anergy: no  interest/pleasure/anhedonia: no  somatic symptoms: no  anxiety/panic: no  iliana: no  guilty/hopeless: no  self injurious behavior/risky behavior: History of hitting own head    Historical Information     Past Psychiatric History:   Inpatient Treatment: 2 previous admissions at [facility] January 2023 and March 2022  Outpatient Treatment: Unknown at this time  Past Suicide Attempts: No  Past Violent Behavior: Yes  Past Psychiatric Medication Trials: Risperdal and Invega Depakote.  Patient is unable to recall    Substance Abuse History:  E-Cigarette/Vaping    E-Cigarette Use Never User       E-Cigarette/Vaping Substances    Nicotine No     THC No     CBD No     Flavoring No     Other No     Unknown No        Social History       Tobacco History       Smoking Status  Never      Passive Exposure  Never      Smokeless Tobacco Use  Never              Alcohol History       Alcohol Use Status  Never              Drug Use       Drug Use Status  Never              Sexual Activity       Sexually Active  Never              Activities of Daily Living    Not Asked                 Additional Substance Use Detail       Questions Responses    Problems Due to Past Use of Alcohol? No    Problems Due to Past Use of Substances? No    Substance Use Assessment Denies substance use within the past 12 months    Alcohol Use Frequency Denies use in past 12 months    Cannabis frequency Never used    Comment:  Never used on 5/29/2024      Heroin Frequency Denies use in past 12 months    Cocaine frequency Never used    Comment:  Never used on 1/23/2023     Crack Cocaine Frequency Denies use in past 12 months    Methamphetamine Frequency Denies use in past 12 months    Narcotic Frequency Denies use in past 12 months    Benzodiazepine Frequency Denies use in past 12 months    Amphetamine frequency Denies use in past 12 months    Barbituate Frequency Denies use use in past 12 months    Inhalant frequency Never used    Comment:  Never used on 1/23/2023     Hallucinogen frequency Never used    Comment:  Never used on 1/23/2023     Ecstasy frequency Never used    Comment:  Never used on 1/23/2023     Other drug frequency Never used    Comment:  Never used on 1/23/2023     Opiate frequency Denies use in past 12 months    Last reviewed by Franchesca Johnson on 5/29/2024          I have assessed this patient for substance use within the past 12 months    Family Psychiatric History:   Patient has denied in the past    Social History:  Education: high school diploma/GED  Marital history: Never   Children: None  Living arrangement, social support:  Lives with mother.  Occupational History: SSDI  Functioning Relationships: Family.      Traumatic History:   Abuse:  Unknown at this time  Other Traumatic Events:  History of falling off a horse at 10 years old    Past Medical History:   Diagnosis Date    Cancer (HCC)     thyroid cancer    Disease of thyroid gland     Hallucination     Hyperactivity (behavior)     Last Assessed: 11/7/2014     Hyperlipidemia     Obesity     Psychosis (HCC)     Seizures (HCC)        Medical Review Of Systems:  Pertinent items are noted in HPI.    Meds/Allergies   all current active meds have been reviewed  No Known Allergies    Objective   Vital signs in last 24 hours:  Temp:  [97.2 °F (36.2 °C)-98.5 °F (36.9 °C)] 97.7 °F (36.5 °C)  HR:  [71-89] 71  Resp:  [16-20] 17  BP: (102-134)/(46-72) 118/72      Intake/Output Summary (Last 24  hours) at 5/30/2024 1012  Last data filed at 5/30/2024 0909  Gross per 24 hour   Intake 540 ml   Output --   Net 540 ml       Mental Status Evaluation:  Appearance:  age appropriate, casually dressed, and disheveled   Behavior:  Mostly calm and is cooperative as she can be.   Speech:  Difficult to understand   Mood:  anxious   Affect:  inappropriate   Thought Process:  disorganized and at times incoherent   Thought Content:  Persecute Judy Bizarre delusions   Perceptual Disturbances: Patient responds to internal stimulation   Risk Potential: Suicidal Ideations none  Homicidal Ideations none  Potential for Aggression Yes history of aggressive behavior   Sensorium:  person, place, and time/date   Memory:  recent and remote memory: unable to assess due to lack of cooperation   Consciousness:  alert and awake    Attention: Decreased concentration and decreased attention span   Intellect: decreased   Insight:  Poor   Judgment: Poor   Muscle Strength:  Muscle Tone: WNL   Gait/Station: Patient lying in bed   Motor Activity: no abnormal movements     Lab Results: I have personally reviewed all pertinent laboratory/tests results. Most Recent Labs:   Lab Results   Component Value Date    WBC 4.90 05/30/2024    RBC 4.33 05/30/2024    HGB 14.5 05/30/2024    HCT 41.5 05/30/2024     05/30/2024    RDW 12.5 05/30/2024    NEUTROABS 2.02 05/30/2024    SODIUM 140 05/30/2024    K 3.9 05/30/2024     05/30/2024    CO2 31 05/30/2024    BUN 23 05/30/2024    CREATININE 0.75 05/30/2024    GLUC 91 05/30/2024    GLUF 91 05/30/2024    CALCIUM 9.7 05/30/2024    AST 13 05/30/2024    ALT 11 05/30/2024    ALKPHOS 47 05/30/2024    TP 7.1 05/30/2024    ALB 3.5 05/30/2024    TBILI 0.38 05/30/2024    CHOLESTEROL 240 (H) 05/30/2024    HDL 39 (L) 05/30/2024    TRIG 268 (H) 05/30/2024    LDLCALC 147 (H) 05/30/2024    NONHDLC 201 05/30/2024    VALPROICTOT 107 (H) 05/28/2024    AMMONIA 32 04/01/2024    YMT8EDAPTBWC 0.152 (L) 05/30/2024     FREET4 1.12 05/28/2024    HCG <2 05/06/2014    HCGQUANT <3 03/24/2022    RPR Non-Reactive 03/25/2022    HGBA1C 5.3 08/19/2022     08/19/2022        Code Status: Level 1 - Full Code    Patient Strengths/Assets: family ties, good support system    Patient Barriers/Limitations: noncompliant with medication, patient is on an involuntary commitment    Counseling / Coordination of Care  Total floor / unit time spent today N/A minutes. Greater than 50% of total time was spent with the patient and / or family counseling and / or coordination of care.         Length of stay : 12 -14 midnights     Certification: Estimated length of stay: More than 2 midnights.   I certify that inpatient services are medically necessary for this patient for a duration of greater than 2 midnights. See H&P and MD Progress Notes for additional information about the patients course of treatment.

## 2024-05-30 NOTE — CONSULTS
Inpatient Consultation - [facility]    Patient Information: Esperanza Prajapati 53 y.o. female MRN: [id number]  Unit/Bed#: [facility] 603-01 Encounter: [id number]  PCP: Howard Platt MD  Date of Admission:  5/29/2024  Date of Consultation: 05/29/24  Requesting Physician: Wilfredo Lester DO    Reason For Consultation:   Medical clearance for U hospitalization     Assessment/Plan:    * Medical clearance for psychiatric admission  Assessment & Plan  Patient is medically optimized for U hospitalization     Brief psychotic disorder (HCC)  Assessment & Plan  Auditory and visual hallucinations.  302 signed.   - management per primary team.      Focal epilepsy (HCC)  Assessment & Plan  Last neuro consult 3/6/24.   Vimpat 150 mg BID and 50 mg HS. Total 350  mg daily.   Depakote 750 mg BID,   Unsure last seizure episode.     Plan:  - Continue vimpat 150 mg morning  - 200 mg evening   - continue depakote 750 mg BID     Hypothyroidism  Assessment & Plan  TSH done on 5/28/24 : 0.217  Free T4 1.12   Home medication Levothyroxine 175 mcg daily, Propranolol 5 mg Q8H PRN       -Continue home medication       History of hypertension  Assessment & Plan  BP Readings from Last 3 Encounters:   05/29/24 112/69   05/29/24 (!) 106/46   05/08/24 109/65     - continue to monitor       Cognitive developmental delay  Assessment & Plan  Hx of cognitive impairment, patient lives with mother.   Complex  care referrals placed in the past but family declined.   Could interfere with medication adherence.         VTE Prophylaxis: Reason for no pharmacologic prophylaxis patient ambulates with no restrictions        Recommendations for Discharge:  Per primary team     Counseling / Coordination of Care Time: 30 minutes.  Greater than 50% of total time spent on patient counseling and coordination of care.    Collaboration of Care: Were Recommendations Directly Discussed with Primary Treatment Team? - Yes     History of  Present Illness:    Esperanza Prajapati is a 53 y.o. female with hx of cognitive disability, surgical hypothyroidism, focal epilepsy who is originally admitted to the [facility] service on 5/29/2024 due to inability to care for herself, paranoia. Patient hospitalized on a 302.   We are consulted for medical clearance.    Review of Systems:    Review of Systems   Reason unable to perform ROS: limited ROS due to disorientation although somewhat redirectable .   Constitutional:  Negative for chills, diaphoresis and fever.   HENT:  Negative for trouble swallowing.    Respiratory:  Negative for chest tightness and shortness of breath.    Gastrointestinal:  Negative for diarrhea.   Genitourinary:  Negative for difficulty urinating, flank pain and frequency.   Musculoskeletal:  Negative for back pain.   Skin:  Negative for color change.   Neurological:  Negative for dizziness and weakness.       Past Medical and Surgical History:   Past Medical History:   Diagnosis Date    Cancer (HCC)     thyroid cancer    Disease of thyroid gland     Hallucination     Hyperactivity (behavior)     Last Assessed: 11/7/2014     Hyperlipidemia     Obesity     Psychosis (HCC)     Seizures (HCC)      Past Surgical History:   Procedure Laterality Date    OTHER SURGICAL HISTORY      Removal of urinary calculus     OTHER SURGICAL HISTORY      Rhinologic Surgery     DE OPEN TX PHALANGEAL SHAFT FRACTURE PROX/MIDDLE EA Left 8/3/2023    Procedure: CLOSED REDUCTION PERCUTANEOUS PINNING - LEFT RING FINGER;  Surgeon: Fransisco Escalante MD;  Location[facility] MA[facility] OR;  Service: Orthopedics    TOTAL THYROIDECTOMY      LAst Assessed: 11/7/2014     Meds/Allergies:  Allergies: No Known Allergies  Prior to Admission Medications   Prescriptions Last Dose Informant Patient Reported? Taking?   divalproex sodium (DEPAKOTE ER) 250 mg 24 hr tablet 5/28/2024 Self, Pharmacy (Specify) No Yes   Sig: Take three tablets by mouth in the morning and three tablets by mouth at  night time.   ergocalciferol (VITAMIN D2) 50,000 units 5/28/2024 Pharmacy (Specify) No Yes   Sig: TAKE 1 CAPSULE (50,000 UNITS TOTAL) BY MOUTH ONCE A WEEK   erythromycin (ILOTYCIN) ophthalmic ointment Unknown  No No   Sig: Place a 1/2 inch ribbon of ointment into the lower eyelid.   hydrocortisone 1 % cream Unknown  No No   Sig: Apply to affected area 2 times daily   lacosamide (VIMPAT) 150 mg tablet 5/28/2024 Pharmacy (Specify) No Yes   Sig: TAKE 1 TABLET (150 MG TOTAL) BY MOUTH EVERY 12 (TWELVE) HOURS   lacosamide (VIMPAT) 50 mg 5/28/2024 Pharmacy (Specify) No Yes   Sig: TAKE 1 TABLET (50 MG TOTAL) BY MOUTH DAILY AT BEDTIME   levothyroxine 175 mcg tablet 5/28/2024 Pharmacy (Specify) No Yes   Sig: TAKE 1 TABLET (175 MCG TOTAL) BY MOUTH DAILY   paliperidone (INVEGA) 3 mg 24 hr tablet Unknown Mother No No   Sig: Take 1 tablet (3 mg total) by mouth every morning   Patient not taking: Reported on 1/19/2024      Facility-Administered Medications: None     Social History:     Social History     Socioeconomic History    Marital status: Single     Spouse name: Not on file    Number of children: Not on file    Years of education: Not on file    Highest education level: Not on file   Occupational History    Not on file   Tobacco Use    Smoking status: Never     Passive exposure: Never    Smokeless tobacco: Never   Vaping Use    Vaping status: Never Used   Substance and Sexual Activity    Alcohol use: Never    Drug use: Never    Sexual activity: Never   Other Topics Concern    Not on file   Social History Narrative    Caffeine Use      Social Determinants of Health     Financial Resource Strain: Low Risk  (12/8/2023)    Overall Financial Resource Strain (CARDIA)     Difficulty of Paying Living Expenses: Not hard at all   Food Insecurity: No Food Insecurity (12/8/2023)    Hunger Vital Sign     Worried About Running Out of Food in the Last Year: Never true     Ran Out of Food in the Last Year: Never true   Transportation Needs:  "Unmet Transportation Needs (12/8/2023)    PRAPARE - Transportation     Lack of Transportation (Medical): Yes     Lack of Transportation (Non-Medical): Yes   Physical Activity: Not on file   Stress: Not on file   Social Connections: Not on file   Intimate Partner Violence: Not on file   Housing Stability: Unknown (5/16/2023)    Housing Stability Vital Sign     Unable to Pay for Housing in the Last Year: No     Number of Places Lived in the Last Year: Not on file     Unstable Housing in the Last Year: No       Family History:  Family History   Problem Relation Age of Onset    Breast cancer Mother 59    Hypertension Mother     ALS Mother     Stroke Mother     Prostate cancer Father     Diabetes type II Father        Physical Exam:     Vitals:   Blood Pressure: 112/69 (05/29/24 2002)  Pulse: 84 (05/29/24 2002)  Temperature: (!) 97.2 °F (36.2 °C) (05/29/24 2002)  Temp Source: Temporal (05/29/24 2002)  Respirations: 18 (05/29/24 2002)  Height: 5' 8\" (172.7 cm) (05/29/24 1534)  Weight - Scale: 98.9 kg (218 lb) (05/29/24 1534)  SpO2: 97 % (05/29/24 2002)    Physical Exam  Constitutional:       General: She is not in acute distress.     Appearance: She is obese. She is not ill-appearing.   HENT:      Head: Normocephalic and atraumatic.      Right Ear: External ear normal.      Left Ear: External ear normal.      Nose: Nose normal.      Mouth/Throat:      Mouth: Mucous membranes are moist.   Eyes:      General: No scleral icterus.     Conjunctiva/sclera: Conjunctivae normal.      Comments: Abnormal eye movements mostly upward gaze.    Cardiovascular:      Rate and Rhythm: Normal rate.      Heart sounds: Normal heart sounds.   Pulmonary:      Effort: Pulmonary effort is normal. No respiratory distress.      Breath sounds: Normal breath sounds. No wheezing or rales.   Abdominal:      General: Abdomen is flat. There is no distension.      Palpations: Abdomen is soft.      Tenderness: There is no guarding.   Musculoskeletal:    "      General: Normal range of motion.      Right lower leg: No edema.      Left lower leg: No edema.   Skin:     General: Skin is warm and dry.      Coloration: Skin is not jaundiced or pale.   Neurological:      General: No focal deficit present.      Mental Status: She is alert and oriented to person, place, and time.   Psychiatric:         Mood and Affect: Mood normal.         Behavior: Behavior normal.         Additional Data:     Lab Results: I have personally reviewed pertinent reports.      Results from last 7 days   Lab Units 05/28/24  2317   WBC Thousand/uL 6.06   HEMOGLOBIN g/dL 14.5   HEMATOCRIT % 41.4   PLATELETS Thousands/uL 181   SEGS PCT % 45   LYMPHO PCT % 42   MONO PCT % 10   EOS PCT % 1     Results from last 7 days   Lab Units 05/28/24  2317   POTASSIUM mmol/L 4.0   CHLORIDE mmol/L 102   CO2 mmol/L 27   BUN mg/dL 24   CREATININE mg/dL 0.79   CALCIUM mg/dL 10.0   ALK PHOS U/L 53   ALT U/L 9   AST U/L 11*           Imaging: no imaging done     EKG, Pathology, and Other Studies Reviewed on Admission:   EKG  Result Date: 05/29/24  Impression:   Normal sinus rhythm, no significant changes.       Latia Fitzgerald MD  05/29/24  9:01 PM

## 2024-05-30 NOTE — PLAN OF CARE
Problem: Alteration in Thoughts and Perception  Goal: Treatment Goal: Gain control of psychotic behaviors/thinking, reduce/eliminate presenting symptoms and demonstrate improved reality functioning upon discharge  Outcome: Progressing  Goal: Verbalize thoughts and feelings  Description: Interventions:  - Promote a nonjudgmental and trusting relationship with the patient through active listening and therapeutic communication  - Assess patient's level of functioning, behavior and potential for risk  - Engage patient in 1 on 1 interactions  - Encourage patient to express fears, feelings, frustrations, and discuss symptoms    - [place] patient to reality, help patient recognize reality-based thinking   - Administer medications as ordered and assess for potential side effects  - Provide the patient education related to the signs and symptoms of the illness and desired effects of prescribed medications  Outcome: Progressing  Goal: Refrain from acting on delusional thinking/internal stimuli  Description: Interventions:  - Monitor patient closely, per order   - Utilize least restrictive measures   - Set reasonable limits, give positive feedback for acceptable   - Administer medications as ordered and monitor of potential side effects  Outcome: Progressing  Goal: Agree to be compliant with medication regime, as prescribed and report medication side effects  Description: Interventions:  - Offer appropriate PRN medication and supervise ingestion; conduct AIMS, as needed   Outcome: Progressing  Goal: Recognize dysfunctional thoughts, communicate reality-based thoughts at the time of discharge  Description: Interventions:  - Provide medication and psycho-education to assist patient in compliance and developing insight into his/her illness   Outcome: Progressing     Problem: Anxiety  Goal: Anxiety is at manageable level  Description: Interventions:  - Assess and monitor patient's anxiety level.   - Monitor for signs and symptoms  (heart palpitations, chest pain, shortness of breath, headaches, nausea, feeling jumpy, restlessness, irritable, apprehensive).   - Collaborate with interdisciplinary team and initiate plan and interventions as ordered.  - [place] patient to unit/surroundings  - Explain treatment plan  - Encourage participation in care  - Encourage verbalization of concerns/fears  - Identify coping mechanisms  - Assist in developing anxiety-reducing skills  - Administer/offer alternative therapies  - Limit or eliminate stimulants  Outcome: Progressing

## 2024-05-30 NOTE — NURSING NOTE
"Patient is calm and cooperative at this time. She is found withdrawn to her room for most of the day. She denies SI/HI, but endorses \"rainbow people\" talking to her. She appears to be responding in her room, eyes looking all the way up and interactive with visual hallucinations in her room. She denies pain at this time, but does state that the \"rainbow people\" make her right hand shake very badly. She is not interactive with her peers or the staff unless approached first. She has attended the groups today, She is eating moderately, and denies any unmet needs at this time. Will continue to monitor and accommodate her needs where appropriate.   "

## 2024-05-30 NOTE — TREATMENT PLAN
TREATMENT PLAN REVIEW - Behavioral Health Esperanza Prajapati 53 y.o. 1970 female MRN: [id number]    [address] [facility] [facility] 6[facility] Encounter: [id number]          Admit Date/Time:  5/29/2024  3:33 PM    Treatment Team:   DO Venancio Renteria MD Angelica Gonzalez Shernett Rose Bernice Tobash Tracey Renfer, COTA Felicia Grandison    Diagnosis: Principal Problem:    Schizoaffective disorder (HCC)  Active Problems:    Cognitive developmental delay    History of hypertension    Hypothyroidism    Focal epilepsy (HCC)    Medical clearance for psychiatric admission      Patient Strengths/Assets: family ties, good support system, limited cooperation    Patient Barriers/Limitations: noncompliant with medication, patient is on an involuntary commitment, poor insight    Short Term Goals: decrease in psychotic symptoms, mood stabilization    Long Term Goals: stabilization of mood, resolution of psychotic symptoms    Progress Towards Goals: starting psychiatric medications as prescribed    Recommended Treatment: medication management, patient medication education, group therapy, milieu therapy, continued Behavioral Health psychiatric evaluation/assessment process    Treatment Frequency: daily medication monitoring, group and milieu therapy daily, monitoring through interdisciplinary rounds, monitoring through weekly patient care conferences    Expected Discharge Date:  12-14 midnights    Discharge Plan: referrals as indicated    Treatment Plan Created/Updated By: Wilfredo Lester DO

## 2024-05-30 NOTE — ASSESSMENT & PLAN NOTE
Last neuro consult 3/6/24.   Vimpat 150 mg BID and 50 mg HS. Total 350  mg daily.   Depakote 750 mg BID,   Unsure last seizure episode.     Plan:  - Continue vimpat 150 mg morning  - 200 mg evening   - continue depakote 750 mg BID

## 2024-05-30 NOTE — CASE MANAGEMENT
"Psychosocial Assessment 1:1     Pt admitted to unit on a 302 from [facility]. Patient was admitted due to inability to care for self, paranoia, delusions and AH/VH. Patient  reported that she has been experiencing difficulty due to the \"rainbow\" people. Reported that the [place] people make her \"eyes go up to the ceiling and shake her hands.\" Since September 2022 they have videos and grab my eyes, push and trip me and try to make me fall.\" Also expressing ideas of reference. Stated, \"when my mom watches Reality TV they follow\" me. Patient frequently looking up to the ceiling with her eyes and only the whites of her eyes visible. Reported that she is medication compliant and that she has a seizure disorder. Depakote level on admission 107. Denies history of suicide attempts.      County: [place]  Commitment Status: [facility]  Insurance: [employer], [facility]  Rx coverage: [employer]  Marital Status: Single, never   Children: None  Family: Mother and a total 6 siblings, strained relationship with most.  Residence: [address] [place] [id number]   Can return home: Yes   Lives with: Resides with her mother.  Level of Ed: Completed high school. Attended Special Education classes  Work History: Never worked  Income/Source: SSDI  Hindu: Anglican  Transportation: Family  Legal Issues: None   Pharmacy:  [facility]    Tx HX: [facility] 1/15/23-4/1/23, [facility] 3/24-4/7/22. Denies any previous suicide attempt.   Trauma HX: Fall off horse age 10   Family hx: Denied MH issues. Stroke and diabetes in father. Breast Cancer in mother.   D&A HX: Denies  Medical: Cancer of her thyroid, obesity, hyperlipemia, seizure disorder.  DME: None   Tobacco: None    HX: Denied   Access to firearms: Denied   UDS Results: Normal   PCP:Howard Platt [phone]   Psych: None   Therapist: None   ICM/ACT:  Referral made by neurology for [facility] ICM. CM from [facility] to send updated H and P as it could not be " accepted from Neurology.   Community Supports: Mother and sister.   Patient Strength: Family ties, good support system.  Coping Skills: Patient used to knit and do crossword puzzles but has not done those things in some time.   ROIS Signed: Geovanni Palm (Brother) [phone]  Treatment Plan Signed: Yes  IMM Signed: Yes

## 2024-05-30 NOTE — CASE MANAGEMENT
Patient information email to intake at [facility] to complete ICM referral started by Neurology.     [email or web address]

## 2024-05-30 NOTE — PROGRESS NOTES
05/30/24 0838   Team Meeting   Meeting Type Daily Rounds   Initial Conference Date 05/30/24   Team Members Present   Team Members Present Physician;Nurse;Occupational Therapist;;Other (Discipline and Name)   Physician Team Member Trevon/Mimi   Nursing Team Member Araceli   Care Management Team Member Lavon ALMAZAN Team Member Whitney   Other (Discipline and Name) Radha-Pharmacy   Patient/Family Present   Patient Present No   Patient's Family Present No     Patient is a 302 with paranoia, inability to care for self, auditory and visual hallucinations and she claims to see rainbow people. They trip her and push her and try to make her fall. The people from the tv follow her. She is looking up at the ceiling and only the whites of her eyes are visible. She was on [place] in January. Discharge is pending.

## 2024-05-30 NOTE — ASSESSMENT & PLAN NOTE
TSH done on 5/28/24 : 0.217  Free T4 1.12   Home medication Levothyroxine 175 mcg daily, Propranolol 5 mg Q8H PRN     Total thyroidectomy    -Continue home medication

## 2024-05-30 NOTE — PROGRESS NOTES
05/30/24 1000 05/30/24 1330   Activity/Group Checklist   Group Community meeting Wellness  (watercolor painting)   Attendance Attended Did not attend   Attendance Duration (min) 31-45  --    Interactions Disorganized interaction  --    Affect/Mood Appropriate  --    Goals Achieved Able to listen to others  (responding to internal stimuli)  --

## 2024-05-30 NOTE — ASSESSMENT & PLAN NOTE
Hx of cognitive impairment, patient lives with mother.   Complex  care referrals placed in the past but family declined.   Could interfere with medication adherence.

## 2024-05-30 NOTE — PROGRESS NOTES
05/30/24 1505   Team Meeting   Meeting Type Tx Team Meeting   Initial Conference Date 05/30/24   Team Members Present   Team Members Present Physician;Nurse;   Physician Team Member Trevon   Nursing Team Member Abdullahi   Care Management Team Member Lavon   Patient/Family Present   Patient Present Yes   Patient's Family Present No     Tx plan was reviewed and discussed with Pt. Pt was encouraged to attend groups. Medication was discussed with Pt. Pt signed tx plan.

## 2024-05-30 NOTE — NURSING NOTE
"Pt withdrawn to room this evening, pleasant on approach. Pt denies depression and anxiety. Currently denies having VH of rainbow people at this time, however, reports continuing to have \"problems with eyes\" from the [employer] people. Pt noted to be looking up at ceiling with eyes. Disorganized in speech. Denies further s/s or unmet needs. Took HS medication. Will maintain q7min checks.  "

## 2024-05-30 NOTE — ASSESSMENT & PLAN NOTE
BP Readings from Last 3 Encounters:   05/29/24 112/69   05/29/24 (!) 106/46   05/08/24 109/65     - continue to monitor

## 2024-05-30 NOTE — ASSESSMENT & PLAN NOTE
Hesitency   S/p 1 dose 500 mg keflex in Ed on day of admission.    Plan:   Keflex 500 mg BID for 5 days

## 2024-05-31 DIAGNOSIS — G40.109 FOCAL EPILEPSY (HCC): ICD-10-CM

## 2024-05-31 PROCEDURE — 99232 SBSQ HOSP IP/OBS MODERATE 35: CPT | Performed by: PSYCHIATRY & NEUROLOGY

## 2024-05-31 RX ADMIN — LACOSAMIDE 50 MG: 50 TABLET, FILM COATED ORAL at 21:41

## 2024-05-31 RX ADMIN — DIVALPROEX SODIUM 750 MG: 250 TABLET, DELAYED RELEASE ORAL at 21:41

## 2024-05-31 RX ADMIN — PALIPERIDONE 3 MG: 3 TABLET, EXTENDED RELEASE ORAL at 09:03

## 2024-05-31 RX ADMIN — LEVOTHYROXINE SODIUM 175 MCG: 0.15 TABLET ORAL at 06:09

## 2024-05-31 RX ADMIN — CEPHALEXIN 500 MG: 500 CAPSULE ORAL at 21:41

## 2024-05-31 RX ADMIN — CEPHALEXIN 500 MG: 500 CAPSULE ORAL at 08:55

## 2024-05-31 RX ADMIN — LACOSAMIDE 150 MG: 50 TABLET, FILM COATED ORAL at 08:55

## 2024-05-31 RX ADMIN — DIVALPROEX SODIUM 750 MG: 250 TABLET, DELAYED RELEASE ORAL at 08:55

## 2024-05-31 RX ADMIN — LACOSAMIDE 150 MG: 50 TABLET, FILM COATED ORAL at 21:41

## 2024-05-31 NOTE — PROGRESS NOTES
05/31/24 0757   Team Meeting   Meeting Type Daily Rounds   Initial Conference Date 05/31/24   Team Members Present   Team Members Present Physician;Nurse;;Occupational Therapist   Physician Team Member Trevon/Mimi   Nursing Team Member Araceli   Care Management Team Member Lavon   OT Team Member Karen   Patient/Family Present   Patient Present No   Patient's Family Present No     Patient continues to endorse rainbow people. She is visible and cooperative. Eating and sleeping well. She attended groups and her 303 hearing was completed and upheld today. Responding to internal stimuli. Discharge is pending.    99

## 2024-05-31 NOTE — NURSING NOTE
"Patient awake and alert, oriented to self. Calm and cooperative with medication. Observable tremor, spilling water on herself this morning. Primarily Russian speaking but can understand some English. Patient also states that she cannot read English or Russian. Denies physical pain. Responding in Russian to an unseen person to her right side. Endorses auditory and visual hallucinations, frequently speaking in Russian as she looks up and to her right side. Denies depression and endorses \"nervousness.\" Denies SI/HI. Denies any unmet needs at this time.   "

## 2024-05-31 NOTE — PLAN OF CARE
Problem: Alteration in Thoughts and Perception  Goal: Treatment Goal: Gain control of psychotic behaviors/thinking, reduce/eliminate presenting symptoms and demonstrate improved reality functioning upon discharge  Outcome: Progressing  Goal: Verbalize thoughts and feelings  Description: Interventions:  - Promote a nonjudgmental and trusting relationship with the patient through active listening and therapeutic communication  - Assess patient's level of functioning, behavior and potential for risk  - Engage patient in 1 on 1 interactions  - Encourage patient to express fears, feelings, frustrations, and discuss symptoms    - Otto patient to reality, help patient recognize reality-based thinking   - Administer medications as ordered and assess for potential side effects  - Provide the patient education related to the signs and symptoms of the illness and desired effects of prescribed medications  Outcome: Progressing  Goal: Refrain from acting on delusional thinking/internal stimuli  Description: Interventions:  - Monitor patient closely, per order   - Utilize least restrictive measures   - Set reasonable limits, give positive feedback for acceptable   - Administer medications as ordered and monitor of potential side effects  Outcome: Progressing  Goal: Agree to be compliant with medication regime, as prescribed and report medication side effects  Description: Interventions:  - Offer appropriate PRN medication and supervise ingestion; conduct AIMS, as needed   Outcome: Progressing  Goal: Attend and participate in unit activities, including therapeutic, recreational, and educational groups  Description: Interventions:  -Encourage Visitation and family involvement in care  Outcome: Progressing  Goal: Recognize dysfunctional thoughts, communicate reality-based thoughts at the time of discharge  Description: Interventions:  - Provide medication and psycho-education to assist patient in compliance and developing  insight into his/her illness   Outcome: Progressing  Goal: Complete daily ADLs, including personal hygiene independently, as able  Description: Interventions:  - Observe, teach, and assist patient with ADLS  - Monitor and promote a balance of rest/activity, with adequate nutrition and elimination   Outcome: Progressing     Problem: Ineffective Coping  Goal: Cooperates with admission process  Description: Interventions:   - Complete admission process  Outcome: Progressing  Goal: Identifies ineffective coping skills  Outcome: Progressing  Goal: Identifies healthy coping skills  Outcome: Progressing  Goal: Demonstrates healthy coping skills  Outcome: Progressing  Goal: Participates in unit activities  Description: Interventions:  - Provide therapeutic environment   - Provide required programming   - Redirect inappropriate behaviors   Outcome: Progressing  Goal: Patient/Family participate in treatment and DC plans  Description: Interventions:  - Provide therapeutic environment  Outcome: Progressing  Goal: Patient/Family verbalizes awareness of resources  Outcome: Progressing  Goal: Understands least restrictive measures  Description: Interventions:  - Utilize least restrictive behavior  Outcome: Progressing  Goal: Free from restraint events  Description: - Utilize least restrictive measures   - Provide behavioral interventions   - Redirect inappropriate behaviors   Outcome: Progressing     Problem: Anxiety  Goal: Anxiety is at manageable level  Description: Interventions:  - Assess and monitor patient's anxiety level.   - Monitor for signs and symptoms (heart palpitations, chest pain, shortness of breath, headaches, nausea, feeling jumpy, restlessness, irritable, apprehensive).   - Collaborate with interdisciplinary team and initiate plan and interventions as ordered.  - [place] patient to unit/surroundings  - Explain treatment plan  - Encourage participation in care  - Encourage verbalization of concerns/fears  -  Identify coping mechanisms  - Assist in developing anxiety-reducing skills  - Administer/offer alternative therapies  - Limit or eliminate stimulants  Outcome: Progressing     Problem: DISCHARGE PLANNING  Goal: Discharge to home or other facility with appropriate resources  Description: INTERVENTIONS:  - Identify barriers to discharge w/patient and caregiver  - Arrange for needed discharge resources and transportation as appropriate  - Identify discharge learning needs (meds, wound care, etc.)  - Arrange for interpretive services to assist at discharge as needed  - Refer to Case Management Department for coordinating discharge planning if the patient needs post-hospital services based on physician/advanced practitioner order or complex needs related to functional status, cognitive ability, or social support system  Outcome: Progressing

## 2024-05-31 NOTE — CASE MANAGEMENT
303 hearing completed with [facility]. [place] upheld for an additional 20 days. Patient next hearing to take place on Friday, June 21st.

## 2024-05-31 NOTE — TREATMENT TEAM
Pt attended [facility] Recovery: Hope and gratitude group.  Pt responding to IS and pleasant.  Pt known from past admission and recollected it has been a long time.  Pt indicated she prefers [place] materials and able to make needs known.  Pt was able to stay for duration.  Pt did not engage in group discussion.      05/31/24 1100   Activity/Group Checklist   Group Other (Comment)  ( Recovery: Hope and gratitude group)   Attendance Attended   Attendance Duration (min) 31-45   Interactions Disorganized interaction   Affect/Mood Other (Comment)  (responding to IS)   Goals Achieved Identified feelings;Discussed coping strategies;Able to listen to others;Able to self-disclose;Able to recieve feedback

## 2024-05-31 NOTE — PROGRESS NOTES
Progress Note - Behavioral Health   Esperanza Prajapati 53 y.o. female MRN: [id number]  Unit/Bed#: [facility] 603-01 Encounter: [id number]    Assessment & Plan   Principal Problem:    Schizoaffective disorder (HCC)  Active Problems:    Cognitive developmental delay    History of hypertension    Hypothyroidism    Focal epilepsy (HCC)    Medical clearance for psychiatric admission    Acute cystitis without hematuria      Subjective: Patient was seen, chart was reviewed, and case was discussed with entire team.     Patient seen in day room.  She seems to have a more flattened affect today.  She denies that she is depressed.  She does say she is anxious.  When asked about her auditory and visual hallucinations she does not reply.  She seems internally preoccupied and does respond to internal stimuli.  She reports sleeping well and has been eating well.  She has been med compliant.      Psychiatric Review of Systems:  Behavior over the last 24 hours: unchanged  Sleep: normal  Appetite: adequate  Medication side effects: none verbalized  Medical ROS: Complete review of systems is negative except as noted above.            Vitals:  Vitals:    05/31/24 0730   BP: 114/76   Pulse: 86   Resp: 20   Temp: 98.2 °F (36.8 °C)   SpO2: 91%       Mental Status Exam:    Appearance:  appears stated age, casually dressed, and disheveled   Behavior:  calm and cooperative   Speech:  slow and soft   Mood:  Patient denies being depressed although she does look depressed today.  She does endorse anxiety   Affect:  More flattened today   Thought Process:  disorganized   Thought Content:  Bizarre and persecutory delusions   Perceptual Disturbances: Does not answer questions but she is internally preoccupied.  And can be seen responding to internal stimuli   Risk Potential: Suicidal ideation - None at present  Homicidal ideation - None at present  Potential for aggression - Yes she does have a history of aggressive behaviors   Sensorium:   oriented to person and place   Memory:  Unable to assess   Consciousness:  awake   Attention/Concentration: decreased concentration and decreased attention span   Insight:  poor   Judgment: poor   Gait/Station: Patient sitting in chair at a table   Motor Activity: no abnormal movements     Progress Toward Goals: Progressing    Recommended Treatment: Continue with pharmacotherapy, group therapy, milieu therapy and occupational therapy.  Continue frequent safety checks and vitals per unit protocol. Continue with medical management as indicated. Continue coordinating with case management regarding disposition  1.  We will continue current medications and treatments for now.  2.  Discharge planning      Risks, benefits and possible side effects of Medications: Risks, benefits, and possible side effects of medications have been explained to the patient, who verbalizes understanding      Current Medications:  Current Facility-Administered Medications   Medication Dose Route Frequency Provider Last Rate    acetaminophen  650 mg Oral Q4H PRN BRINA Dominguez      acetaminophen  650 mg Oral Q4H PRN BRINA Dominguez      acetaminophen  975 mg Oral Q6H PRN BRINA Dominguez      aluminum-magnesium hydroxide-simethicone  30 mL Oral Q4H PRN BRINA Dominguez      benztropine  1 mg Intramuscular Q4H PRN Max 6/day BRINA Dominguez      benztropine  0.5 mg Oral Q4H PRN Max 6/day BRINA Dominguez      bisacodyl  10 mg Rectal Daily PRN BRINA Dominguez      cephalexin  500 mg Oral Q12H [facility] Sherri Hernandez MD      divalproex sodium  750 mg Oral Q12H [facility] Wilfredo Lester, DO      hydrOXYzine HCL  25 mg Oral Q6H PRN Max 4/day BRINA Dominguez      hydrOXYzine HCL  50 mg Oral Q6H PRN Max 4/day BRINA Dominguez      lacosamide  150 mg Oral Q12H [facility] Latia Fitzgerald MD      lacosamide  50 mg Oral  Wilfredo Lester, DO      levothyroxine  175 mcg Oral Daily Latia Fitzgerald MD      LORazepam  1 mg Intramuscular Q6H PRN  Max 3/day Elma Le, BRINA      LORazepam  1 mg Oral Q6H PRN Max 3/day Elma Le, ZULEYMANP      OLANZapine  5 mg Intramuscular Q3H PRN Max 3/day Elma Le, ZULEYMANP      OLANZapine  2.5 mg Oral Q4H PRN Max 6/day Elma Le, CRNP      OLANZapine  5 mg Oral Q4H PRN Max 3/day Elma Le, CRNP      OLANZapine  5 mg Oral Q3H PRN Max 3/day Elma Le, ZULEYMANP      paliperidone  3 mg Oral Daily Wilfredo RAKESH Shankaron, DO      polyethylene glycol  17 g Oral Daily PRN Elma Le, ZULEYMANP      propranolol  5 mg Oral Q8H PRN Elma Le, BRINA      senna-docusate sodium  1 tablet Oral Daily PRN Elma Le, ZULEYMANP      traZODone  50 mg Oral HS PRN Elma Le, BRINA         Behavioral Health Medications:   all current active meds have been reviewed.    Laboratory results:  I have personally reviewed all pertinent laboratory/tests results.   Recent Results (from the past 48 hour(s))   TSH, 3rd generation with Free T4 reflex    Collection Time: 05/30/24  6:38 AM   Result Value Ref Range    TSH 3RD GENERATON 0.152 (L) 0.450 - 4.500 uIU/mL   Comprehensive metabolic panel    Collection Time: 05/30/24  6:38 AM   Result Value Ref Range    Sodium 140 135 - 147 mmol/L    Potassium 3.9 3.5 - 5.3 mmol/L    Chloride 101 96 - 108 mmol/L    CO2 31 21 - 32 mmol/L    ANION GAP 8 4 - 13 mmol/L    BUN 23 5 - 25 mg/dL    Creatinine 0.75 0.60 - 1.30 mg/dL    Glucose 91 65 - 140 mg/dL    Glucose, Fasting 91 65 - 99 mg/dL    Calcium 9.7 8.4 - 10.2 mg/dL    AST 13 13 - 39 U/L    ALT 11 7 - 52 U/L    Alkaline Phosphatase 47 34 - 104 U/L    Total Protein 7.1 6.4 - 8.4 g/dL    Albumin 3.5 3.5 - 5.0 g/dL    Total Bilirubin 0.38 0.20 - 1.00 mg/dL    eGFR 92 ml/min/1.73sq m   Magnesium    Collection Time: 05/30/24  6:38 AM   Result Value Ref Range    Magnesium 1.9 1.9 - 2.7 mg/dL   Phosphorus    Collection Time: 05/30/24  6:38 AM   Result Value Ref Range    Phosphorus 4.0 2.7 - 4.5 mg/dL   CBC and differential    Collection Time: 05/30/24  6:38 AM   Result  Value Ref Range    WBC 4.90 4.31 - 10.16 Thousand/uL    RBC 4.33 3.81 - 5.12 Million/uL    Hemoglobin 14.5 11.5 - 15.4 g/dL    Hematocrit 41.5 34.8 - 46.1 %    MCV 96 82 - 98 fL    MCH 33.5 26.8 - 34.3 pg    MCHC 34.9 31.4 - 37.4 g/dL    RDW 12.5 11.6 - 15.1 %    MPV 10.4 8.9 - 12.7 fL    Platelets 159 149 - 390 Thousands/uL    nRBC 0 /100 WBCs    Segmented % 41 (L) 43 - 75 %    Immature Grans % 1 0 - 2 %    Lymphocytes % 46 (H) 14 - 44 %    Monocytes % 10 4 - 12 %    Eosinophils Relative 2 0 - 6 %    Basophils Relative 0 0 - 1 %    Absolute Neutrophils 2.02 1.85 - 7.62 Thousands/µL    Absolute Immature Grans 0.03 0.00 - 0.20 Thousand/uL    Absolute Lymphocytes 2.24 0.60 - 4.47 Thousands/µL    Absolute Monocytes 0.48 0.17 - 1.22 Thousand/µL    Eosinophils Absolute 0.11 0.00 - 0.61 Thousand/µL    Basophils Absolute 0.02 0.00 - 0.10 Thousands/µL   Lipid panel    Collection Time: 05/30/24  6:38 AM   Result Value Ref Range    Cholesterol 240 (H) See Comment mg/dL    Triglycerides 268 (H) See Comment mg/dL    HDL, Direct 39 (L) >=50 mg/dL    LDL Calculated 147 (H) 0 - 100 mg/dL    Non-HDL-Chol (CHOL-HDL) 201 mg/dl   T4, free    Collection Time: 05/30/24  6:38 AM   Result Value Ref Range    Free T4 1.05 0.61 - 1.12 ng/dL   ECG 12 lead    Collection Time: 05/30/24 10:26 AM   Result Value Ref Range    Ventricular Rate 87 BPM    Atrial Rate 87 BPM    ID Interval 158 ms    QRSD Interval 78 ms    QT Interval 366 ms    QTC Interval 440 ms    P [place] 73 degrees    QRS Axis 9 degrees    T Wave Axis 75 degrees            Wilfredo Lester DO 05/31/24

## 2024-05-31 NOTE — CASE MANAGEMENT
Cm returned call for brother and left a voicemail contact information as well as information for the unit that she is residing on in case he wanted to call and speak with her as well. Brother updated that patient is cooperative with care to this point.     Buddy Palm (Brother)   [phone] (JADE)

## 2024-06-01 DIAGNOSIS — E89.0 POSTOPERATIVE HYPOTHYROIDISM: ICD-10-CM

## 2024-06-01 PROCEDURE — 99232 SBSQ HOSP IP/OBS MODERATE 35: CPT

## 2024-06-01 RX ADMIN — DIVALPROEX SODIUM 750 MG: 250 TABLET, DELAYED RELEASE ORAL at 21:16

## 2024-06-01 RX ADMIN — PALIPERIDONE 3 MG: 3 TABLET, EXTENDED RELEASE ORAL at 08:49

## 2024-06-01 RX ADMIN — LEVOTHYROXINE SODIUM 175 MCG: 0.15 TABLET ORAL at 06:26

## 2024-06-01 RX ADMIN — LACOSAMIDE 150 MG: 50 TABLET, FILM COATED ORAL at 08:49

## 2024-06-01 RX ADMIN — LORAZEPAM 1 MG: 1 TABLET ORAL at 11:00

## 2024-06-01 RX ADMIN — LACOSAMIDE 50 MG: 50 TABLET, FILM COATED ORAL at 21:16

## 2024-06-01 RX ADMIN — DIVALPROEX SODIUM 750 MG: 250 TABLET, DELAYED RELEASE ORAL at 08:50

## 2024-06-01 RX ADMIN — CEPHALEXIN 500 MG: 500 CAPSULE ORAL at 21:16

## 2024-06-01 RX ADMIN — CEPHALEXIN 500 MG: 500 CAPSULE ORAL at 08:49

## 2024-06-01 RX ADMIN — LACOSAMIDE 150 MG: 50 TABLET, FILM COATED ORAL at 21:16

## 2024-06-01 NOTE — NURSING NOTE
Patient awake and alert. Time taken to clarify English words and phrases patient is not familiar with, but patient able to respond in English without difficulty. Denies any physical pain. Denies anxiety, depression and SI/HI. Endorses auditory and visual hallucinations. However, patient becomes withdrawn when asked what she is seeing or hearing. Patient appears to roll her eyes upward during conversation, but is able to speak and respond. Does not appear seizure-like in nature. Patient also observed mumbling to self when sitting alone.

## 2024-06-01 NOTE — PROGRESS NOTES
"Progress Note - Behavioral Health   Esperanza Prajapati 53 y.o. female MRN: [id number]  Unit/Bed#: [facility] 603-01 Encounter: [id number]      Assessment & Plan   Principal Problem:    Schizoaffective disorder (HCC)  Active Problems:    Cognitive developmental delay    History of hypertension    Hypothyroidism    Focal epilepsy (HCC)    Medical clearance for psychiatric admission    Acute cystitis without hematuria      Subjective:  Patient was seen today for continuation of care, records reviewed and patient was discussed with the morning case review team.    Esperanza was seen today for psychiatric follow-up.  On assessment today, Esperanza was seen sitting on her bed in her room. Esperanza was pleasant and cooperative with the interview.  She reports that she is feeling \"okay\" today.  When asked if she is experiencing anxiety or depression she states \"that depends\", when asked to clarify she smiles inappropriately and does not answer.  She denies AH/VH to this writer but is observed responding to internal stimuli during the interview.  She reports that she slept well last night and her appetite is good.      Esperanza denies acute suicidal/self-harm ideation/intent/plan upon direct inquiry at this time.  Esperanza remains behaviorally controlled with no agitation or aggression noted on exam or in report.   No overt delusions verbalized.  Esperanza remains adherent to her current psychotropic medication regimen and denies any side effects from medications, as well as none noted on exam.    Recommended Treatment: Treatment plan and medication changes discussed and per the attending physician the plan is:    1.Continue with group therapy, milieu therapy and occupational therapy  2.Behavioral Health checks every 7 minutes  3.Continue frequent safety checks and vitals per unit protocol  4.Continue with SLIM medical management as indicated  5.Continue with current medication regimen:  Depakote DR 750mg q 12 hours (will obtain VPA level " tomorrow evening with CMP and CBC diff).  Continue Invega 3mg PO daily.   6.Will review labs in the a.m.  7.Disposition Planning: Discharge planning and efforts remain ongoing    Vitals:  Vitals:    06/01/24 0756   BP: 120/79   Pulse: 86   Resp: 20   Temp: 97.9 °F (36.6 °C)   SpO2: 96%       Laboratory Results:  I have personally reviewed all pertinent laboratory/tests results.  Most Recent Labs:   Lab Results   Component Value Date    WBC 4.90 05/30/2024    RBC 4.33 05/30/2024    HGB 14.5 05/30/2024    HCT 41.5 05/30/2024     05/30/2024    RDW 12.5 05/30/2024    NEUTROABS 2.02 05/30/2024    SODIUM 140 05/30/2024    K 3.9 05/30/2024     05/30/2024    CO2 31 05/30/2024    BUN 23 05/30/2024    CREATININE 0.75 05/30/2024    GLUC 91 05/30/2024    GLUF 91 05/30/2024    CALCIUM 9.7 05/30/2024    AST 13 05/30/2024    ALT 11 05/30/2024    ALKPHOS 47 05/30/2024    TP 7.1 05/30/2024    ALB 3.5 05/30/2024    TBILI 0.38 05/30/2024    CHOLESTEROL 240 (H) 05/30/2024    HDL 39 (L) 05/30/2024    TRIG 268 (H) 05/30/2024    LDLCALC 147 (H) 05/30/2024    NONHDLC 201 05/30/2024    VALPROICTOT 107 (H) 05/28/2024    AMMONIA 32 04/01/2024    [id number] 0.152 (L) 05/30/2024    FREET4 1.05 05/30/2024    PREGUR negative 01/26/2021    HCG <2 05/06/2014    HCGQUANT <3 03/24/2022    RPR Non-Reactive 03/25/2022    HGBA1C 5.3 08/19/2022     08/19/2022       Psychiatric Review of Systems:  Behavior over the last 24 hours:  unchanged.   Sleep: adequate  Appetite: adequate  Medication side effects:  denies  ROS: no complaints, denies shortness of breath or chest pain and all other systems are negative for acute changes    Mental Status Evaluation:    Appearance:  casually dressed, marginal hygiene, dry skin on brow area of face   Behavior:  cooperative, calm, bizarre   Speech:  delayed   Mood:  labile   Affect:  constricted   Thought Process:  disorganized   Associations: concrete associations   Thought Content:  some paranoia    Perceptual Disturbances: denies auditory or visual hallucinations when asked, but appears responding to internal stimuli   Risk Potential: Suicidal ideation - None at present, contracts for safety on the unit, would talk to staff if not feeling safe on the unit  Homicidal ideation - None  Potential for aggression - No   Sensorium:  oriented to person and situation   Memory:  recent memory intact   Consciousness:  alert and awake   Attention/Concentration: attention span and concentration appear shorter than expected for age   Insight:  impaired   Judgment: impaired   Gait/Station: Sitting on bed   Motor Activity: no abnormal movements     Progress Toward Goals:   Esperanza is progressing towards goals of inpatient psychiatric treatment by continued medication compliance and is attending therapeutic modalities on the milieu. However, the patient continues to require inpatient psychiatric hospitalization for continued medication management and titration to optimize symptom reduction, improve sleep hygiene, and demonstrate adequate self-care.    Risk of Harm to Self:   Nursing Suicide Risk Assessment Last 24 hours: C-SSRS Risk (Since Last Contact)  Calculated C-SSRS Risk Score (Since Last Contact): No Risk Indicated  Current Specific Risk Factors include: mental illness diagnosis  Protective Factors: no current suicidal ideation, ability to communicate with staff on the unit, able to contract for safety on the unit, responds to redirection  Based on today's assessment, Esperanza presents the following risk of harm to self: low    Risk of Harm to Others:  Nursing Homicide Risk Assessment: Violence Risk to Others: Denies within past 6 months  Current Specific Risk Factors include: none  Protective Factors: no current homicidal ideation  Based on today's assessment, Esperanza presents the following risk of harm to others: minimal    The following interventions are recommended: behavioral checks every 7 minutes, continued  hospitalization on locked unit        Behavioral Health Medications: all current active meds have been reviewed and continue current psychiatric medications.  Current Facility-Administered Medications   Medication Dose Route Frequency Provider Last Rate    acetaminophen  650 mg Oral Q4H PRN BRINA Dominguez      acetaminophen  650 mg Oral Q4H PRN BRINA Dominguez      acetaminophen  975 mg Oral Q6H PRN BRINA Dominguez      aluminum-magnesium hydroxide-simethicone  30 mL Oral Q4H PRN BRINA Dominguez      benztropine  1 mg Intramuscular Q4H PRN Max 6/day BRINA Dominguez      benztropine  0.5 mg Oral Q4H PRN Max 6/day BRINA Dominguez      bisacodyl  10 mg Rectal Daily PRN BRINA Dominguez      cephalexin  500 mg Oral Q12H [facility] Sherri Hernandez MD      divalproex sodium  750 mg Oral Q12H [facility] Wilfredokelsey Teixeirayson, DO      hydrOXYzine HCL  25 mg Oral Q6H PRN Max 4/day BRINA Dominguez      hydrOXYzine HCL  50 mg Oral Q6H PRN Max 4/day BRINA Dominguez      lacosamide  150 mg Oral Q12H [facility] Latia Fitzgerald MD      lacosamide  50 mg Oral HS Wilfredo RAKESH Shankaron, DO      levothyroxine  175 mcg Oral Daily Latia Fitzgerald MD      LORazepam  1 mg Intramuscular Q6H PRN Max 3/day BRINA Dominguez      LORazepam  1 mg Oral Q6H PRN Max 3/day BRINA Dominguez      OLANZapine  5 mg Intramuscular Q3H PRN Max 3/day BRINA Dominguez      OLANZapine  2.5 mg Oral Q4H PRN Max 6/day BRINA Dominguez      OLANZapine  5 mg Oral Q4H PRN Max 3/day BRINA Dominguez      OLANZapine  5 mg Oral Q3H PRN Max 3/day BRINA Dominguez      paliperidone  3 mg Oral Daily Wilfredo A Prayson, DO      polyethylene glycol  17 g Oral Daily PRN BRINA Dominguez      propranolol  5 mg Oral Q8H PRN BRINA Dominguez      senna-docusate sodium  1 tablet Oral Daily PRN BRINA Dominguez      traZODone  50 mg Oral HS PRN BRINA Dominguez         Risks / Benefits of Treatment:  Risks, benefits, and possible side effects of medications  explained to patient and patient verbalizes understanding and agreement for treatment.    Counseling / Coordination of Care:  Patient's progress reviewed with nursing staff.  Medications, treatment progress and treatment plan reviewed with patient.  Supportive counseling provided to the patient.    Total floor/unit time spent today 25 minutes. Greater than 50% of total time was spent with the patient and / or family counseling and / or coordination of care. A description of the counseling / coordination of care: medication education, treatment plan, supportive therapy.

## 2024-06-01 NOTE — NURSING NOTE
"Patient pleasant in conversation at HS medication administration and rambled with nonsensical speech reporting she remembers the video from \"here\" of everybody.  She also recanted the number of Vimpat she takes for seizures daily, weekly, monthly and yearly. She is aware of her location but not the reason for her admission and was not able to elaborate.  She took medications as scheduled and safety plan reviewed.  "

## 2024-06-01 NOTE — NURSING NOTE
Patient in bed at start of shift with flat affect, calm, scant conversation, mostly shaking head from side to side to deny  anxiety, HI/SI/AVH.  She has no interaction with peers and is able to make needs known.    Patient affirms no unmet needs at this time.

## 2024-06-01 NOTE — NURSING NOTE
This RN observed patient dial 911 on patient phone. Redirected patient who then stated she wanted to talk to her parents. Patient was able to recall the correct phone number for her parents. This RN dialed her parents number from nurses station and transferred to patient phone after her mother answered and agreed to take the call.

## 2024-06-01 NOTE — PLAN OF CARE
Problem: Alteration in Thoughts and Perception  Goal: Treatment Goal: Gain control of psychotic behaviors/thinking, reduce/eliminate presenting symptoms and demonstrate improved reality functioning upon discharge  Outcome: Progressing  Goal: Verbalize thoughts and feelings  Description: Interventions:  - Promote a nonjudgmental and trusting relationship with the patient through active listening and therapeutic communication  - Assess patient's level of functioning, behavior and potential for risk  - Engage patient in 1 on 1 interactions  - Encourage patient to express fears, feelings, frustrations, and discuss symptoms    - [place] patient to reality, help patient recognize reality-based thinking   - Administer medications as ordered and assess for potential side effects  - Provide the patient education related to the signs and symptoms of the illness and desired effects of prescribed medications  Outcome: Progressing  Goal: Refrain from acting on delusional thinking/internal stimuli  Description: Interventions:  - Monitor patient closely, per order   - Utilize least restrictive measures   - Set reasonable limits, give positive feedback for acceptable   - Administer medications as ordered and monitor of potential side effects  Outcome: Progressing  Goal: Agree to be compliant with medication regime, as prescribed and report medication side effects  Description: Interventions:  - Offer appropriate PRN medication and supervise ingestion; conduct AIMS, as needed   Outcome: Progressing  Goal: Recognize dysfunctional thoughts, communicate reality-based thoughts at the time of discharge  Description: Interventions:  - Provide medication and psycho-education to assist patient in compliance and developing insight into his/her illness   Outcome: Progressing  Goal: Complete daily ADLs, including personal hygiene independently, as able  Description: Interventions:  - Observe, teach, and assist patient with ADLS  - Monitor and  promote a balance of rest/activity, with adequate nutrition and elimination   Outcome: Progressing     Problem: Ineffective Coping  Goal: Cooperates with admission process  Description: Interventions:   - Complete admission process  Outcome: Progressing  Goal: Identifies ineffective coping skills  Outcome: Progressing  Goal: Identifies healthy coping skills  Outcome: Progressing  Goal: Demonstrates healthy coping skills  Outcome: Progressing  Goal: Patient/Family participate in treatment and DC plans  Description: Interventions:  - Provide therapeutic environment  Outcome: Progressing  Goal: Patient/Family verbalizes awareness of resources  Outcome: Progressing  Goal: Understands least restrictive measures  Description: Interventions:  - Utilize least restrictive behavior  Outcome: Progressing  Goal: Free from restraint events  Description: - Utilize least restrictive measures   - Provide behavioral interventions   - Redirect inappropriate behaviors   Outcome: Progressing     Problem: Anxiety  Goal: Anxiety is at manageable level  Description: Interventions:  - Assess and monitor patient's anxiety level.   - Monitor for signs and symptoms (heart palpitations, chest pain, shortness of breath, headaches, nausea, feeling jumpy, restlessness, irritable, apprehensive).   - Collaborate with interdisciplinary team and initiate plan and interventions as ordered.  - [place] patient to unit/surroundings  - Explain treatment plan  - Encourage participation in care  - Encourage verbalization of concerns/fears  - Identify coping mechanisms  - Assist in developing anxiety-reducing skills  - Administer/offer alternative therapies  - Limit or eliminate stimulants  Outcome: Progressing     Problem: DISCHARGE PLANNING  Goal: Discharge to home or other facility with appropriate resources  Description: INTERVENTIONS:  - Identify barriers to discharge w/patient and caregiver  - Arrange for needed discharge resources and transportation as  appropriate  - Identify discharge learning needs (meds, wound care, etc.)  - Arrange for interpretive services to assist at discharge as needed  - Refer to Case Management Department for coordinating discharge planning if the patient needs post-hospital services based on physician/advanced practitioner order or complex needs related to functional status, cognitive ability, or social support system  Outcome: Progressing

## 2024-06-02 LAB
ALBUMIN SERPL BCG-MCNC: 3.4 G/DL (ref 3.5–5)
ALP SERPL-CCNC: 55 U/L (ref 34–104)
ALT SERPL W P-5'-P-CCNC: 12 U/L (ref 7–52)
ANION GAP SERPL CALCULATED.3IONS-SCNC: 9 MMOL/L (ref 4–13)
AST SERPL W P-5'-P-CCNC: 15 U/L (ref 13–39)
BASOPHILS # BLD AUTO: 0.04 THOUSANDS/ÂΜL (ref 0–0.1)
BASOPHILS NFR BLD AUTO: 1 % (ref 0–1)
BILIRUB SERPL-MCNC: 0.2 MG/DL (ref 0.2–1)
BUN SERPL-MCNC: 18 MG/DL (ref 5–25)
CALCIUM ALBUM COR SERPL-MCNC: 10 MG/DL (ref 8.3–10.1)
CALCIUM SERPL-MCNC: 9.5 MG/DL (ref 8.4–10.2)
CHLORIDE SERPL-SCNC: 99 MMOL/L (ref 96–108)
CO2 SERPL-SCNC: 27 MMOL/L (ref 21–32)
CREAT SERPL-MCNC: 0.71 MG/DL (ref 0.6–1.3)
EOSINOPHIL # BLD AUTO: 0.09 THOUSAND/ÂΜL (ref 0–0.61)
EOSINOPHIL NFR BLD AUTO: 2 % (ref 0–6)
ERYTHROCYTE [DISTWIDTH] IN BLOOD BY AUTOMATED COUNT: 12.2 % (ref 11.6–15.1)
GFR SERPL CREATININE-BSD FRML MDRD: 98 ML/MIN/1.73SQ M
GLUCOSE SERPL-MCNC: 100 MG/DL (ref 65–140)
HCT VFR BLD AUTO: 42.6 % (ref 34.8–46.1)
HGB BLD-MCNC: 14.2 G/DL (ref 11.5–15.4)
IMM GRANULOCYTES # BLD AUTO: 0.07 THOUSAND/UL (ref 0–0.2)
IMM GRANULOCYTES NFR BLD AUTO: 1 % (ref 0–2)
LYMPHOCYTES # BLD AUTO: 1.81 THOUSANDS/ÂΜL (ref 0.6–4.47)
LYMPHOCYTES NFR BLD AUTO: 37 % (ref 14–44)
MCH RBC QN AUTO: 33.6 PG (ref 26.8–34.3)
MCHC RBC AUTO-ENTMCNC: 33.3 G/DL (ref 31.4–37.4)
MCV RBC AUTO: 101 FL (ref 82–98)
MONOCYTES # BLD AUTO: 0.57 THOUSAND/ÂΜL (ref 0.17–1.22)
MONOCYTES NFR BLD AUTO: 12 % (ref 4–12)
NEUTROPHILS # BLD AUTO: 2.28 THOUSANDS/ÂΜL (ref 1.85–7.62)
NEUTS SEG NFR BLD AUTO: 47 % (ref 43–75)
NRBC BLD AUTO-RTO: 0 /100 WBCS
PLATELET # BLD AUTO: 165 THOUSANDS/UL (ref 149–390)
PMV BLD AUTO: 10.5 FL (ref 8.9–12.7)
POTASSIUM SERPL-SCNC: 4.7 MMOL/L (ref 3.5–5.3)
PROT SERPL-MCNC: 7.4 G/DL (ref 6.4–8.4)
RBC # BLD AUTO: 4.23 MILLION/UL (ref 3.81–5.12)
SODIUM SERPL-SCNC: 135 MMOL/L (ref 135–147)
VALPROATE SERPL-MCNC: 83 UG/ML (ref 50–100)
WBC # BLD AUTO: 4.86 THOUSAND/UL (ref 4.31–10.16)

## 2024-06-02 PROCEDURE — 80053 COMPREHEN METABOLIC PANEL: CPT

## 2024-06-02 PROCEDURE — 80164 ASSAY DIPROPYLACETIC ACD TOT: CPT

## 2024-06-02 PROCEDURE — 99232 SBSQ HOSP IP/OBS MODERATE 35: CPT | Performed by: PSYCHIATRY & NEUROLOGY

## 2024-06-02 PROCEDURE — 85025 COMPLETE CBC W/AUTO DIFF WBC: CPT

## 2024-06-02 RX ADMIN — LEVOTHYROXINE SODIUM 175 MCG: 0.15 TABLET ORAL at 06:07

## 2024-06-02 RX ADMIN — PALIPERIDONE 3 MG: 3 TABLET, EXTENDED RELEASE ORAL at 08:36

## 2024-06-02 RX ADMIN — DIVALPROEX SODIUM 750 MG: 250 TABLET, DELAYED RELEASE ORAL at 21:35

## 2024-06-02 RX ADMIN — CEPHALEXIN 500 MG: 500 CAPSULE ORAL at 08:36

## 2024-06-02 RX ADMIN — LACOSAMIDE 150 MG: 50 TABLET, FILM COATED ORAL at 08:36

## 2024-06-02 RX ADMIN — LACOSAMIDE 50 MG: 50 TABLET, FILM COATED ORAL at 21:40

## 2024-06-02 RX ADMIN — OLANZAPINE 5 MG: 5 TABLET, FILM COATED ORAL at 17:36

## 2024-06-02 RX ADMIN — CEPHALEXIN 500 MG: 500 CAPSULE ORAL at 21:36

## 2024-06-02 RX ADMIN — DIVALPROEX SODIUM 750 MG: 250 TABLET, DELAYED RELEASE ORAL at 08:36

## 2024-06-02 RX ADMIN — LORAZEPAM 1 MG: 1 TABLET ORAL at 18:23

## 2024-06-02 RX ADMIN — LACOSAMIDE 150 MG: 50 TABLET, FILM COATED ORAL at 21:36

## 2024-06-02 NOTE — NURSING NOTE
"Upon reassessment for agitation, patient remained sitting on her bed, looking up at the ceiling, and speaking in Mongolian. When asked about her mood and ongoing AH/VH, patient stated she is \"Nervous\" but withdraws and will not elaborate. PRN Lorazepam offered for severe anxiety. As this RN was leaving the room, patient again began speaking in Mongolian. Turning back to address the patient, she pointed at me and stated loudly \"No, I'm not talking to you\" before continuing to react to internal stimuli. Encouraged patient to join others in the day room. Will reassess per unit protocol.  "

## 2024-06-02 NOTE — NURSING NOTE
"Patient awake and alert. Observed patient speaking to herself in Syrian while looking at the ceiling in her room. Encouraged patient to join others in the day room for breakfast. Patient continues to endorse AH/VH, stating \"The voices are here in the room with the people out there.\" Denies anxiety, depression, SI/HI. Polite and cooperative with medications and care, but stated \"I sure get a lot pills.\" Patient then poured her medications onto her bed, counting to seven in Syrian as she took them. Denies any other unmet needs.  "

## 2024-06-02 NOTE — NURSING NOTE
"Patient in room, sitting on bed, staring at the ceiling, and speaking loudly to herself in Polish. New roommate complained to staff about not being able rest due to \"Too much noise.\" Upon assessment patient is irritable, confused, and denies auditory and visual hallucinations. Broset score is 2. PRN Zyprexa given at 1736 for moderate agitation. Patient denies and unmet needs. As this RN turns to leave the room, patient again stares up at the ceiling and begins speaking in Polish.  "

## 2024-06-02 NOTE — NURSING NOTE
"Patient is withdrawn to room this shift. She denies anxiety, depression, and SI but endorses auditory and visual hallucinations. When asked she states she hears voices talking about her medications but refuses to elaborate on what she hears them saying. During medication administration, patient stated \"This won't be enough for the voices.\" She was cooperative with medication administration and mouth checks. Denies any unmet needs. Safety checks ongoing.   "

## 2024-06-02 NOTE — PROGRESS NOTES
Psychiatric Progress Note - Department of Behavioral Health   Esperanza Prajapati 53 y.o. female MRN: [id number]  Unit/Bed#: [facility] 603-01 Encounter: [id number]    ASSESSMENT & PLAN     Diagnoses:   Principal Problem:    Schizoaffective disorder (HCC)  Active Problems:    Cognitive developmental delay    History of hypertension    Hypothyroidism    Focal epilepsy (HCC)    Medical clearance for psychiatric admission    Acute cystitis without hematuria      Treatment Recommendations/Precautions:  Continue to promote patient participation in therapeutic milieu.  Continue medical management per medicine.  Continue previously prescribed psychotropic medication regimen; see below.  Continue behavioral health checks q.7 minutes.   Continue vitals per behavioral health unit protocol.  Discharge date per primary team; 303 commitment status.    SUBJECTIVE     Patient evaluated this a.m. for continuity of care. Patient was discussed at length with nursing and treatment team. Per nursing, patient remains calm, cooperative although isolative at times in the milieu, adherent to her medications without any acute adverse effects. No acute distress is noted throughout evaluation. Esperanza Prajapati denies suicidal/homicidal ideation in addition to thoughts of self-injury, receptive to crisis planning provided by this writer, contacting for safety in the inpatient setting, admitting to an ability to appropriately confide in staff including this writer.  Patient appears superficial on approach, denying any/all psychiatric complaints/concerns despite previous complaints pertaining to intermittent auditory visual hallucinations that are negative and derogatory in content, somewhat suspicious and paranoid of this writer throughout evaluation    PSYCHIATRIC REVIEW OF SYSTEMS     Behavior over the last 24 hours:  unchanged  Sleep: adequate  Appetite: adequate  Medication side effects: No    REVIEW OF SYSTEMS   Review of  systems: no complaints    OBJECTIVE     Vital Signs in Past 24 Hours:  Temp:  [97.3 °F (36.3 °C)-97.5 °F (36.4 °C)] 97.5 °F (36.4 °C)  HR:  [] 101  Resp:  [18] 18  BP: (112-129)/(78-83) 129/81    Intake/Output in Past 24 hours:  I/O last 3 completed shifts:  In: 1080 [P.O.:1080]  Out: -   I/O this shift:  In: 240 [P.O.:240]  Out: -         Laboratory Results:  I have personally reviewed all pertinent laboratory/tests results.  Most Recent Labs:   Lab Results   Component Value Date    WBC 4.90 05/30/2024    RBC 4.33 05/30/2024    HGB 14.5 05/30/2024    HCT 41.5 05/30/2024     05/30/2024    RDW 12.5 05/30/2024    NEUTROABS 2.02 05/30/2024    SODIUM 140 05/30/2024    K 3.9 05/30/2024     05/30/2024    CO2 31 05/30/2024    BUN 23 05/30/2024    CREATININE 0.75 05/30/2024    GLUC 91 05/30/2024    GLUF 91 05/30/2024    CALCIUM 9.7 05/30/2024    AST 13 05/30/2024    ALT 11 05/30/2024    ALKPHOS 47 05/30/2024    TP 7.1 05/30/2024    ALB 3.5 05/30/2024    TBILI 0.38 05/30/2024    CHOLESTEROL 240 (H) 05/30/2024    HDL 39 (L) 05/30/2024    TRIG 268 (H) 05/30/2024    LDLCALC 147 (H) 05/30/2024    NONHDLC 201 05/30/2024    VALPROICTOT 107 (H) 05/28/2024    AMMONIA 32 04/01/2024    MXW9ZEYLQXTF 0.152 (L) 05/30/2024    FREET4 1.05 05/30/2024    PREGUR negative 01/26/2021    HCG <2 05/06/2014    HCGQUANT <3 03/24/2022    RPR Non-Reactive 03/25/2022    HGBA1C 5.3 08/19/2022     08/19/2022       Behavioral Health Medications: all current active meds have been reviewed, continue current psychiatric medications, and current meds:   Current Facility-Administered Medications   Medication Dose Route Frequency    acetaminophen (TYLENOL) tablet 650 mg  650 mg Oral Q4H PRN    acetaminophen (TYLENOL) tablet 650 mg  650 mg Oral Q4H PRN    acetaminophen (TYLENOL) tablet 975 mg  975 mg Oral Q6H PRN    aluminum-magnesium hydroxide-simethicone (MAALOX) oral suspension 30 mL  30 mL Oral Q4H PRN    benztropine (COGENTIN)  injection 1 mg  1 mg Intramuscular Q4H PRN Max 6/day    benztropine (COGENTIN) tablet 0.5 mg  0.5 mg Oral Q4H PRN Max 6/day    bisacodyl (DULCOLAX) rectal suppository 10 mg  10 mg Rectal Daily PRN    cephalexin (KEFLEX) capsule 500 mg  500 mg Oral Q12H MOSHE    divalproex sodium (DEPAKOTE) DR tablet 750 mg  750 mg Oral Q12H MOSHE    hydrOXYzine HCL (ATARAX) tablet 25 mg  25 mg Oral Q6H PRN Max 4/day    hydrOXYzine HCL (ATARAX) tablet 50 mg  50 mg Oral Q6H PRN Max 4/day    lacosamide (VIMPAT) tablet 150 mg  150 mg Oral Q12H MOSHE    lacosamide (VIMPAT) tablet 50 mg  50 mg Oral HS    levothyroxine tablet 175 mcg  175 mcg Oral Daily    LORazepam (ATIVAN) injection 1 mg  1 mg Intramuscular Q6H PRN Max 3/day    LORazepam (ATIVAN) tablet 1 mg  1 mg Oral Q6H PRN Max 3/day    OLANZapine (ZyPREXA) IM injection 5 mg  5 mg Intramuscular Q3H PRN Max 3/day    OLANZapine (ZyPREXA) tablet 2.5 mg  2.5 mg Oral Q4H PRN Max 6/day    OLANZapine (ZyPREXA) tablet 5 mg  5 mg Oral Q4H PRN Max 3/day    OLANZapine (ZyPREXA) tablet 5 mg  5 mg Oral Q3H PRN Max 3/day    paliperidone (INVEGA) 24 hr tablet 3 mg  3 mg Oral Daily    polyethylene glycol (MIRALAX) packet 17 g  17 g Oral Daily PRN    propranolol (INDERAL) tablet 5 mg  5 mg Oral Q8H PRN    senna-docusate sodium (SENOKOT S) 8.6-50 mg per tablet 1 tablet  1 tablet Oral Daily PRN    traZODone (DESYREL) tablet 50 mg  50 mg Oral HS PRN   .    Risks, benefits and possible side effects of Medications:   Risks, benefits, and possible side effects of medications explained to patient and patient verbalizes understanding.      Mental Status Evaluation:  Appearance:  age appropriate, casually dressed, and marginal grooming/hygiene, somewhat disheveled with facial dryness   Behavior:  psychomotor retardation calm and cooperative although somewhat superficial and suspicious possessing intermittent eye contact   Speech:  soft and scant possessing slight paucity of speech   Mood:  euthymic   Affect:   blunted and mood-incongruent   Language sparse   Thought Process:  concrete   Thought Content:  no overt obsessions or delusions although appears paranoid , negative thinking   Perceptual Disturbances: negative auditory visual hallucinations appearing internally preoccupied   Risk Potential: Suicidal Ideations none, Homicidal Ideations none, and Potential for Aggression No   Sensorium:  person, place, and time/date   Cognition:  recent and remote memory grossly intact   Consciousness:  alert and awake    Attention: attention span appeared shorter than expected for age   Insight:  limited   Judgment: limited   Intellect Not assessed   Gait/Station: Not assessed; sitting upright in chair   Motor Activity: no abnormal movements     Memory: Short and long term memory fair     Progress Toward Goals: Unchanged, as evidenced by their participation (or lack thereof) in individual, social and therapeutic milieu in addition to adherence to their medication regimen.    Recommended Treatment:   See above for assessment and plan.    Inpatient Psychiatric Certification: Based upon physical, mental and social evaluations, I certify that inpatient psychiatric services are medically necessary for this patient for a duration of greater than 2 midnights for the treatment of Schizoaffective disorder (HCC) including psychotropic medication management, participation in the therapeutic milieu and referrals as indicated. Available alternative community resources do not meet the patient's mental health care needs. I further attest that an established written individualized plan of care has been implemented and is outlined in the patient's medical records.    Counseling/Coordination of Care    Total unit time spent today was greater than 15 minutes. Greater than 50% of total time was spent with the patient and/or patient's relatives and/or coordination of patient's care.     Patient's rights, confidentiality, exceptions to confidentiality,  use of electronic medical record including appropriate staff access to medical record regarding behavioral health services and consent to treatment were reviewed.    Note Share:     This note was not shared with the patient due to reasonable likelihood of causing patient harm     This note has been constructed using a voice recognition system.    There may be translation, syntax, or grammatical errors. If you have any questions, please contact the dictating provider.    Jordan Christopher Holter,  06/02/24

## 2024-06-02 NOTE — NURSING NOTE
Pt sitting in dayroom isolative to self. Pt guarded on approach and scant in conversation. Pt denies anxiety, stating that PRN Ativan was effective. Pt denies depression/SI/HI/AVH. Pt does appear to be internally preoccupied and frequently looks away during conversation. Lab work obtained and pt tolerated well. Pt currently remains in dayroom among peers. Pt encouraged and able to inform staff of any needs.

## 2024-06-02 NOTE — NURSING NOTE
Patient visible in day room for afternoon snack. Withdrawn, not interacting with peers, but asked staff for a spoon for her jello. Appears willing to make needs known. Denies any other unmet needs at this time.

## 2024-06-02 NOTE — PLAN OF CARE
Problem: Alteration in Thoughts and Perception  Goal: Treatment Goal: Gain control of psychotic behaviors/thinking, reduce/eliminate presenting symptoms and demonstrate improved reality functioning upon discharge  Outcome: Not Progressing  Goal: Verbalize thoughts and feelings  Description: Interventions:  - Promote a nonjudgmental and trusting relationship with the patient through active listening and therapeutic communication  - Assess patient's level of functioning, behavior and potential for risk  - Engage patient in 1 on 1 interactions  - Encourage patient to express fears, feelings, frustrations, and discuss symptoms    - [place] patient to reality, help patient recognize reality-based thinking   - Administer medications as ordered and assess for potential side effects  - Provide the patient education related to the signs and symptoms of the illness and desired effects of prescribed medications  Outcome: Progressing  Goal: Refrain from acting on delusional thinking/internal stimuli  Description: Interventions:  - Monitor patient closely, per order   - Utilize least restrictive measures   - Set reasonable limits, give positive feedback for acceptable   - Administer medications as ordered and monitor of potential side effects  Outcome: Not Progressing  Goal: Agree to be compliant with medication regime, as prescribed and report medication side effects  Description: Interventions:  - Offer appropriate PRN medication and supervise ingestion; conduct AIMS, as needed   Outcome: Progressing     Problem: Ineffective Coping  Goal: Cooperates with admission process  Description: Interventions:   - Complete admission process  Outcome: Completed     Problem: Anxiety  Goal: Anxiety is at manageable level  Description: Interventions:  - Assess and monitor patient's anxiety level.   - Monitor for signs and symptoms (heart palpitations, chest pain, shortness of breath, headaches, nausea, feeling jumpy, restlessness,  irritable, apprehensive).   - Collaborate with interdisciplinary team and initiate plan and interventions as ordered.  - [place] patient to unit/surroundings  - Explain treatment plan  - Encourage participation in care  - Encourage verbalization of concerns/fears  - Identify coping mechanisms  - Assist in developing anxiety-reducing skills  - Administer/offer alternative therapies  - Limit or eliminate stimulants  Outcome: Progressing

## 2024-06-03 PROCEDURE — 99232 SBSQ HOSP IP/OBS MODERATE 35: CPT | Performed by: PSYCHIATRY & NEUROLOGY

## 2024-06-03 RX ORDER — PALIPERIDONE 6 MG/1
6 TABLET, EXTENDED RELEASE ORAL DAILY
Status: DISCONTINUED | OUTPATIENT
Start: 2024-06-04 | End: 2024-06-07

## 2024-06-03 RX ORDER — LACOSAMIDE 50 MG/1
50 TABLET ORAL
Qty: 30 TABLET | Refills: 5 | Status: SHIPPED | OUTPATIENT
Start: 2024-06-03

## 2024-06-03 RX ORDER — LEVOTHYROXINE SODIUM 175 UG/1
175 TABLET ORAL DAILY
Qty: 30 TABLET | Refills: 1 | Status: SHIPPED | OUTPATIENT
Start: 2024-06-03 | End: 2024-08-02

## 2024-06-03 RX ADMIN — LEVOTHYROXINE SODIUM 175 MCG: 0.15 TABLET ORAL at 06:17

## 2024-06-03 RX ADMIN — LACOSAMIDE 150 MG: 50 TABLET, FILM COATED ORAL at 08:57

## 2024-06-03 RX ADMIN — DIVALPROEX SODIUM 750 MG: 250 TABLET, DELAYED RELEASE ORAL at 21:04

## 2024-06-03 RX ADMIN — LACOSAMIDE 150 MG: 50 TABLET, FILM COATED ORAL at 21:04

## 2024-06-03 RX ADMIN — OLANZAPINE 5 MG: 5 TABLET, FILM COATED ORAL at 16:42

## 2024-06-03 RX ADMIN — DIVALPROEX SODIUM 750 MG: 250 TABLET, DELAYED RELEASE ORAL at 08:58

## 2024-06-03 RX ADMIN — CEPHALEXIN 500 MG: 500 CAPSULE ORAL at 08:58

## 2024-06-03 RX ADMIN — LACOSAMIDE 50 MG: 50 TABLET, FILM COATED ORAL at 21:05

## 2024-06-03 RX ADMIN — PALIPERIDONE 3 MG: 3 TABLET, EXTENDED RELEASE ORAL at 08:57

## 2024-06-03 RX ADMIN — CEPHALEXIN 500 MG: 500 CAPSULE ORAL at 21:05

## 2024-06-03 NOTE — PROGRESS NOTES
06/03/24 0845   Team Meeting   Meeting Type Daily Rounds   Initial Conference Date 06/03/24   Team Members Present   Team Members Present Physician;Nurse;;Occupational Therapist   Physician Team Member Trevon/Mimi   Nursing Team Member Dayna   Care Management Team Member Lavon   OT Team Member Arina   Patient/Family Present   Patient Present No   Patient's Family Present No     Patient continues to actively respond to internal stimuli. She is looking to the ceiling and speaking to the ceiling. She was showered over the weekend which she was irritated with. She was given Zyprexa and Ativan but both we not effective at the time. Her depakote level was 83 yesterday and she slept through the night. Discharge is pending.

## 2024-06-03 NOTE — NURSING NOTE
Patient was noted to be more agitated this afternoon.She was responding to internal stimuli, appeared to be in distress while having dinner at day room. Zyprexa 5 mg was administered at 1642. Will continue to monitor.

## 2024-06-03 NOTE — PLAN OF CARE
Problem: Alteration in Thoughts and Perception  Goal: Verbalize thoughts and feelings  Description: Interventions:  - Promote a nonjudgmental and trusting relationship with the patient through active listening and therapeutic communication  - Assess patient's level of functioning, behavior and potential for risk  - Engage patient in 1 on 1 interactions  - Encourage patient to express fears, feelings, frustrations, and discuss symptoms    - [place] patient to reality, help patient recognize reality-based thinking   - Administer medications as ordered and assess for potential side effects  - Provide the patient education related to the signs and symptoms of the illness and desired effects of prescribed medications  Outcome: Progressing  Goal: Refrain from acting on delusional thinking/internal stimuli  Description: Interventions:  - Monitor patient closely, per order   - Utilize least restrictive measures   - Set reasonable limits, give positive feedback for acceptable   - Administer medications as ordered and monitor of potential side effects  Outcome: Not Progressing     Problem: Ineffective Coping  Goal: Identifies ineffective coping skills  Outcome: Not Progressing     Problem: Anxiety  Goal: Anxiety is at manageable level  Description: Interventions:  - Assess and monitor patient's anxiety level.   - Monitor for signs and symptoms (heart palpitations, chest pain, shortness of breath, headaches, nausea, feeling jumpy, restlessness, irritable, apprehensive).   - Collaborate with interdisciplinary team and initiate plan and interventions as ordered.  - [place] patient to unit/surroundings  - Explain treatment plan  - Encourage participation in care  - Encourage verbalization of concerns/fears  - Identify coping mechanisms  - Assist in developing anxiety-reducing skills  - Administer/offer alternative therapies  - Limit or eliminate stimulants  Outcome: Not Progressing

## 2024-06-03 NOTE — PROGRESS NOTES
Progress Note - Behavioral Health   Esperanza Prajapati 53 y.o. female MRN: [id number]  Unit/Bed#: [facility] 608-02 Encounter: [id number]    Assessment & Plan   Principal Problem:    Schizoaffective disorder (HCC)  Active Problems:    Cognitive developmental delay    History of hypertension    Hypothyroidism    Focal epilepsy (HCC)    Medical clearance for psychiatric admission    Acute cystitis without hematuria      Subjective: Patient was seen, chart was reviewed, and case was discussed with entire team.     Patient seen in room today sitting on bed.  She looks distressed.  But says she is fine.  She makes little eye contact with writer.  She keeps saying that she knows this writer from the past in a movie.  Patient is responding to internal stimuli pointing her eyes up towards the ceiling and talking Bulgarian.  But then comes back to talk English with this writer.  She reports eating and sleeping adequately.          Psychiatric Review of Systems:  Behavior over the last 24 hours: unchanged  Sleep: normal  Appetite: adequate  Medication side effects: none verbalized  Medical ROS: Complete review of systems is negative except as noted above.            Vitals:  Vitals:    06/03/24 0745   BP: 111/70   Pulse: 90   Resp: 17   Temp: 98.6 °F (37 °C)   SpO2: 97%       Mental Status Exam:    Appearance:  appears stated age, casually dressed, disheveled, and poor eye contact   Behavior:  calm and cooperative   Speech:  slow and soft   Mood:  patient is depressed and anxious   Affect:  flattened today   Thought Process:  disorganized   Thought Content:  Bizarre and persecutory delusions   Perceptual Disturbances: Does not answer questions but she is internally preoccupied.  And can be seen responding to internal stimuli   Risk Potential: Suicidal ideation - None at present  Homicidal ideation - None at present  Potential for aggression - Yes she does have a history of aggressive behaviors   Sensorium:  oriented to  person and place   Memory:  Unable to assess   Consciousness:  awake   Attention/Concentration: decreased concentration and decreased attention span   Insight:  poor   Judgment: poor   Gait/Station: Patient is sitting on the edge of the bed   Motor Activity: no abnormal movements     Progress Toward Goals: Unchanged    Recommended Treatment: Continue with pharmacotherapy, group therapy, milieu therapy and occupational therapy.  Continue frequent safety checks and vitals per unit protocol. Continue with medical management as indicated. Continue coordinating with case management regarding disposition  1.  We will increase Invega to 6 mg daily starting tomorrow   2.  Discharge planning      Risks, benefits and possible side effects of Medications: Risks, benefits, and possible side effects of medications have been explained to the patient, who verbalizes understanding      Current Medications:  Current Facility-Administered Medications   Medication Dose Route Frequency Provider Last Rate    acetaminophen  650 mg Oral Q4H PRN BRINA Dominguez      acetaminophen  650 mg Oral Q4H PRN BRINA Dominguez      acetaminophen  975 mg Oral Q6H PRN BRINA Dominguez      aluminum-magnesium hydroxide-simethicone  30 mL Oral Q4H PRN BRINA Dominguez      benztropine  1 mg Intramuscular Q4H PRN Max 6/day BRINA Dominguez      benztropine  0.5 mg Oral Q4H PRN Max 6/day BRINA Dominguez      bisacodyl  10 mg Rectal Daily PRN BRINA Dominguez      cephalexin  500 mg Oral Q12H [facility] Sherri Hernandez MD      divalproex sodium  750 mg Oral Q12H [facility] Wilfredo Shankaron, DO      hydrOXYzine HCL  25 mg Oral Q6H PRN Max 4/day BRINA Dominguez      hydrOXYzine HCL  50 mg Oral Q6H PRN Max 4/day BRINA Dominguez      lacosamide  150 mg Oral Q12H [facility] Latia Fitzgerald MD      lacosamide  50 mg Oral HS Wilfredo Lester, DO      levothyroxine  175 mcg Oral Daily Latia Fitzgerald MD      LORazepam  1 mg Intramuscular Q6H PRN Max 3/day  Elma Le, BRINA      LORazepam  1 mg Oral Q6H PRN Max 3/day Elma Le, BRINA      OLANZapine  5 mg Intramuscular Q3H PRN Max 3/day Elma Le, BRINA      OLANZapine  2.5 mg Oral Q4H PRN Max 6/day Elma Le, ZULEYMANP      OLANZapine  5 mg Oral Q4H PRN Max 3/day Elma Le, ZULEYMANP      OLANZapine  5 mg Oral Q3H PRN Max 3/day Elma Le, BRINA      paliperidone  3 mg Oral Daily Wilfredo RAKESH Shankaron, DO      polyethylene glycol  17 g Oral Daily PRN Elma Le, BRINA      propranolol  5 mg Oral Q8H PRN Elma Le, BRINA      senna-docusate sodium  1 tablet Oral Daily PRN Elma Le, BRINA      traZODone  50 mg Oral HS PRN Elma Le, BRINA         Behavioral Health Medications:   all current active meds have been reviewed.    Laboratory results:  I have personally reviewed all pertinent laboratory/tests results.   Recent Results (from the past 48 hour(s))   Valproic acid level, total    Collection Time: 06/02/24  7:47 PM   Result Value Ref Range    Valproic Acid, Total 83 50 - 100 ug/mL   Comprehensive metabolic panel    Collection Time: 06/02/24  7:47 PM   Result Value Ref Range    Sodium 135 135 - 147 mmol/L    Potassium 4.7 3.5 - 5.3 mmol/L    Chloride 99 96 - 108 mmol/L    CO2 27 21 - 32 mmol/L    ANION GAP 9 4 - 13 mmol/L    BUN 18 5 - 25 mg/dL    Creatinine 0.71 0.60 - 1.30 mg/dL    Glucose 100 65 - 140 mg/dL    Calcium 9.5 8.4 - 10.2 mg/dL    Corrected Calcium 10.0 8.3 - 10.1 mg/dL    AST 15 13 - 39 U/L    ALT 12 7 - 52 U/L    Alkaline Phosphatase 55 34 - 104 U/L    Total Protein 7.4 6.4 - 8.4 g/dL    Albumin 3.4 (L) 3.5 - 5.0 g/dL    Total Bilirubin 0.20 0.20 - 1.00 mg/dL    eGFR 98 ml/min/1.73sq m   CBC and differential    Collection Time: 06/02/24  7:47 PM   Result Value Ref Range    WBC 4.86 4.31 - 10.16 Thousand/uL    RBC 4.23 3.81 - 5.12 Million/uL    Hemoglobin 14.2 11.5 - 15.4 g/dL    Hematocrit 42.6 34.8 - 46.1 %     (H) 82 - 98 fL    MCH 33.6 26.8 - 34.3 pg    MCHC 33.3 31.4 - 37.4 g/dL     RDW 12.2 11.6 - 15.1 %    MPV 10.5 8.9 - 12.7 fL    Platelets 165 149 - 390 Thousands/uL    nRBC 0 /100 WBCs    Segmented % 47 43 - 75 %    Immature Grans % 1 0 - 2 %    Lymphocytes % 37 14 - 44 %    Monocytes % 12 4 - 12 %    Eosinophils Relative 2 0 - 6 %    Basophils Relative 1 0 - 1 %    Absolute Neutrophils 2.28 1.85 - 7.62 Thousands/µL    Absolute Immature Grans 0.07 0.00 - 0.20 Thousand/uL    Absolute Lymphocytes 1.81 0.60 - 4.47 Thousands/µL    Absolute Monocytes 0.57 0.17 - 1.22 Thousand/µL    Eosinophils Absolute 0.09 0.00 - 0.61 Thousand/µL    Basophils Absolute 0.04 0.00 - 0.10 Thousands/µL            Wilfredo Lester DO 06/03/24

## 2024-06-04 PROCEDURE — 99232 SBSQ HOSP IP/OBS MODERATE 35: CPT | Performed by: PSYCHIATRY & NEUROLOGY

## 2024-06-04 RX ADMIN — DIVALPROEX SODIUM 750 MG: 250 TABLET, DELAYED RELEASE ORAL at 09:01

## 2024-06-04 RX ADMIN — CEPHALEXIN 500 MG: 500 CAPSULE ORAL at 09:01

## 2024-06-04 RX ADMIN — PALIPERIDONE 6 MG: 6 TABLET, EXTENDED RELEASE ORAL at 09:01

## 2024-06-04 RX ADMIN — LACOSAMIDE 150 MG: 50 TABLET, FILM COATED ORAL at 21:10

## 2024-06-04 RX ADMIN — LACOSAMIDE 150 MG: 50 TABLET, FILM COATED ORAL at 09:01

## 2024-06-04 RX ADMIN — DIVALPROEX SODIUM 750 MG: 250 TABLET, DELAYED RELEASE ORAL at 21:10

## 2024-06-04 RX ADMIN — LEVOTHYROXINE SODIUM 175 MCG: 0.15 TABLET ORAL at 06:19

## 2024-06-04 RX ADMIN — LACOSAMIDE 50 MG: 50 TABLET, FILM COATED ORAL at 21:11

## 2024-06-04 NOTE — PLAN OF CARE
Problem: Alteration in Thoughts and Perception  Goal: Verbalize thoughts and feelings  Description: Interventions:  - Promote a nonjudgmental and trusting relationship with the patient through active listening and therapeutic communication  - Assess patient's level of functioning, behavior and potential for risk  - Engage patient in 1 on 1 interactions  - Encourage patient to express fears, feelings, frustrations, and discuss symptoms    - [place] patient to reality, help patient recognize reality-based thinking   - Administer medications as ordered and assess for potential side effects  - Provide the patient education related to the signs and symptoms of the illness and desired effects of prescribed medications  Outcome: Progressing  Goal: Attend and participate in unit activities, including therapeutic, recreational, and educational groups  Description: Interventions:  -Encourage Visitation and family involvement in care  Outcome: Progressing     Problem: Anxiety  Goal: Anxiety is at manageable level  Description: Interventions:  - Assess and monitor patient's anxiety level.   - Monitor for signs and symptoms (heart palpitations, chest pain, shortness of breath, headaches, nausea, feeling jumpy, restlessness, irritable, apprehensive).   - Collaborate with interdisciplinary team and initiate plan and interventions as ordered.  - [place] patient to unit/surroundings  - Explain treatment plan  - Encourage participation in care  - Encourage verbalization of concerns/fears  - Identify coping mechanisms  - Assist in developing anxiety-reducing skills  - Administer/offer alternative therapies  - Limit or eliminate stimulants  Outcome: Progressing

## 2024-06-04 NOTE — CASE MANAGEMENT
Cm called and spoke with patient's brother and updated him on status and progress. Brother will come in for a visit on Saturday at 1pm with his mother and father. It was explained that while all three could come, only two are allowed in the waiting room at a time. He understood the process.       Buddy Palm (Brother)   [phone] (M)

## 2024-06-04 NOTE — NURSING NOTE
"Pt mostly withdrawn to room this shift. Pt heard speaking loudly in room with no one else present. Pt held conversation with this writer in English, but when interviewed regarding AH/VH, pt stated \"There are some things in English I just don't understand.\" Pt states she recognizes unit staff from \"videos.\" Pt often seen looking over her shoulder and speaking out loud. When questioned about this, pt stated \"I'm just talking to myself.\" Pt accepted HS medications. Pt currently resting in bed with even, unlabored respirations. Pt able to and encouraged to inform staff of any needs.   "

## 2024-06-04 NOTE — NURSING NOTE
"Pt withdrawn to room this evening, irritable on approach. Pt paranoid at roommate and staff, pt states \"I've seen the videos\". Pt expresses delusions that there are videos of staff, refuses to elaborate further. Pt also reports her roommate \"knows something bad\". Pt denies any issues with roommate and states she will \"pray tonight\".    Denies depression or anxiety, denies SI/HI. Pt appears to be responding to internal stimuli, talking to self in room. Pt heard repeating the word \"rainbow\" to herself. However, does not answer when asked regarding AVH. Took HS medications. Will maintain q7min checks.   "

## 2024-06-04 NOTE — PROGRESS NOTES
06/04/24 1000 06/04/24 1330   Activity/Group Checklist   Group Community meeting Exercise   Attendance Attended Attended   Attendance Duration (min) 31-45 16-30   Interactions Disorganized interaction Other (Comment)  (responding to internal stimuli)   Affect/Mood Calm Appropriate   Goals Achieved Able to listen to others Able to engage in interactions;Able to listen to others

## 2024-06-04 NOTE — TREATMENT TEAM
Pt attended Positive coping skills group.  Pt responding to IS and pleasant.  Pt did not engage in discussion when prompted.  Pt able to stay for duration and not restless/agitated.  Pt attends groups.  Pt has redness in face.      06/04/24 1100   Activity/Group Checklist   Group Other (Comment)  (Positive coping skills group)   Attendance Attended   Attendance Duration (min) 31-45   Interactions Other (Comment)  (responding to IS)   Affect/Mood Bright;Other (Comment)  (smiling)   Goals Achieved Identified feelings;Able to listen to others;Discussed coping strategies;Discussed self-esteem issues

## 2024-06-04 NOTE — NURSING NOTE
Patient visible, medication compliant, and denies SI/HI and depression and anxiety. Continues to respond  to internal stimuli. Attended groups with encouragement. Monitor foe  safety and support.

## 2024-06-04 NOTE — PROGRESS NOTES
"   06/04/24 0838   Team Meeting   Meeting Type Daily Rounds   Initial Conference Date 06/04/24   Team Members Present   Team Members Present Physician;Nurse;;Occupational Therapist   Physician Team Member Trevon/Mimi   Nursing Team Member Dayna   Care Management Team Member Lavon ALMAZAN Team Member Whitney   Patient/Family Present   Patient Present No   Patient's Family Present No     Patient continues to respond to internal stimuli. She has a UTI and is taking an antibiotic for that. She is talking over her shoulder while she walks and claims that she is \"just speaking to herself.\" Discharge is pending.   "

## 2024-06-04 NOTE — PROGRESS NOTES
Progress Note - Behavioral Health   Esperanza Prajapati 53 y.o. female MRN: [id number]  Unit/Bed#: [facility] 608-02 Encounter: [id number]    Assessment & Plan   Principal Problem:    Schizoaffective disorder (HCC)  Active Problems:    Cognitive developmental delay    History of hypertension    Hypothyroidism    Focal epilepsy (HCC)    Medical clearance for psychiatric admission    Acute cystitis without hematuria      Subjective: Patient was seen, chart was reviewed, and case was discussed with entire team.     Patient seen in room lying in bed.  She is just staring straight forward but will occasionally look at the ceiling intensely.  She denies hallucinations when asked about what she was looking at the ceiling.  She can be heard responding to internal stimuli just outside the room.  Again the patient reports that this writer was seen in a video by her previously.  She reports eating and sleeping adequately. She has been medication compliant        Psychiatric Review of Systems:  Behavior over the last 24 hours: unchanged  Sleep: normal  Appetite: adequate  Medication side effects: none verbalized  Medical ROS: Complete review of systems is negative except as noted above.            Vitals:  Vitals:    06/04/24 0805   BP: 123/74   Pulse: 104   Resp: 20   Temp: (!) 96.7 °F (35.9 °C)   SpO2: 97%       Mental Status Exam:    Appearance:  appears stated age, casually dressed, disheveled, and poor eye contact   Behavior:  calm and cooperative   Speech:  slow and soft   Mood:  patient is depressed and anxious   Affect:  flattened today   Thought Process:  disorganized   Thought Content:  Bizarre and persecutory delusions   Perceptual Disturbances: Does denies hallucinations but she is internally preoccupied.  And can be seen responding to internal stimuli   Risk Potential: Suicidal ideation - None at present  Homicidal ideation - None at present  Potential for aggression - Yes she does have a history of aggressive  behaviors   Sensorium:  oriented to person and place   Memory:  Unable to assess   Consciousness:  awake   Attention/Concentration: decreased concentration and decreased attention span   Insight:  poor   Judgment: poor   Gait/Station: Patient lying in bed   Motor Activity: no abnormal movements     Progress Toward Goals: Unchanged    Recommended Treatment: Continue with pharmacotherapy, group therapy, milieu therapy and occupational therapy.  Continue frequent safety checks and vitals per unit protocol. Continue with medical management as indicated. Continue coordinating with case management regarding disposition  1.  Continue current medication treatments for now.  2.  Discharge planning      Risks, benefits and possible side effects of Medications: Risks, benefits, and possible side effects of medications have been explained to the patient, who verbalizes understanding      Current Medications:  Current Facility-Administered Medications   Medication Dose Route Frequency Provider Last Rate    acetaminophen  650 mg Oral Q4H PRN BRINA Dominguez      acetaminophen  650 mg Oral Q4H PRN BRINA Dominguez      acetaminophen  975 mg Oral Q6H PRN BRINA Dominguez      aluminum-magnesium hydroxide-simethicone  30 mL Oral Q4H PRN BRINA Dominguez      benztropine  1 mg Intramuscular Q4H PRN Max 6/day BRINA Dominguez      benztropine  0.5 mg Oral Q4H PRN Max 6/day BRINA Dominguez      bisacodyl  10 mg Rectal Daily PRN BRINA Dominguez      divalproex sodium  750 mg Oral Q12H [facility] Wilfredo Lester, DO      hydrOXYzine HCL  25 mg Oral Q6H PRN Max 4/day BRINA Dominguez      hydrOXYzine HCL  50 mg Oral Q6H PRN Max 4/day BRINA Dominguez      lacosamide  150 mg Oral Q12H [facility] Latia Fitzgerald MD      lacosamide  50 mg Oral  Wilfredo Lester, DO      levothyroxine  175 mcg Oral Daily Latia Fitzgerald MD      LORazepam  1 mg Intramuscular Q6H PRN Max 3/day BRINA Dominguez      LORazepam  1 mg Oral Q6H PRN Max  3/day Elma Le, BRINA      OLANZapine  5 mg Intramuscular Q3H PRN Max 3/day Elma Le, BRINA      OLANZapine  2.5 mg Oral Q4H PRN Max 6/day Elma Le, BRINA      OLANZapine  5 mg Oral Q4H PRN Max 3/day Elma Le, BRINA      OLANZapine  5 mg Oral Q3H PRN Max 3/day Elma Le, BRINA      paliperidone  6 mg Oral Daily Wilfredo Lester, DO      polyethylene glycol  17 g Oral Daily PRN BRINA Dominguez      propranolol  5 mg Oral Q8H PRN BRINA Dominguez      senna-docusate sodium  1 tablet Oral Daily PRN Elma Le, BRINA      traZODone  50 mg Oral HS PRN BRINA Dominguez         Behavioral Health Medications:   all current active meds have been reviewed.    Laboratory results:  I have personally reviewed all pertinent laboratory/tests results.   Recent Results (from the past 48 hour(s))   Valproic acid level, total    Collection Time: 06/02/24  7:47 PM   Result Value Ref Range    Valproic Acid, Total 83 50 - 100 ug/mL   Comprehensive metabolic panel    Collection Time: 06/02/24  7:47 PM   Result Value Ref Range    Sodium 135 135 - 147 mmol/L    Potassium 4.7 3.5 - 5.3 mmol/L    Chloride 99 96 - 108 mmol/L    CO2 27 21 - 32 mmol/L    ANION GAP 9 4 - 13 mmol/L    BUN 18 5 - 25 mg/dL    Creatinine 0.71 0.60 - 1.30 mg/dL    Glucose 100 65 - 140 mg/dL    Calcium 9.5 8.4 - 10.2 mg/dL    Corrected Calcium 10.0 8.3 - 10.1 mg/dL    AST 15 13 - 39 U/L    ALT 12 7 - 52 U/L    Alkaline Phosphatase 55 34 - 104 U/L    Total Protein 7.4 6.4 - 8.4 g/dL    Albumin 3.4 (L) 3.5 - 5.0 g/dL    Total Bilirubin 0.20 0.20 - 1.00 mg/dL    eGFR 98 ml/min/1.73sq m   CBC and differential    Collection Time: 06/02/24  7:47 PM   Result Value Ref Range    WBC 4.86 4.31 - 10.16 Thousand/uL    RBC 4.23 3.81 - 5.12 Million/uL    Hemoglobin 14.2 11.5 - 15.4 g/dL    Hematocrit 42.6 34.8 - 46.1 %     (H) 82 - 98 fL    MCH 33.6 26.8 - 34.3 pg    MCHC 33.3 31.4 - 37.4 g/dL    RDW 12.2 11.6 - 15.1 %    MPV 10.5 8.9 - 12.7 fL     Platelets 165 149 - 390 Thousands/uL    nRBC 0 /100 WBCs    Segmented % 47 43 - 75 %    Immature Grans % 1 0 - 2 %    Lymphocytes % 37 14 - 44 %    Monocytes % 12 4 - 12 %    Eosinophils Relative 2 0 - 6 %    Basophils Relative 1 0 - 1 %    Absolute Neutrophils 2.28 1.85 - 7.62 Thousands/µL    Absolute Immature Grans 0.07 0.00 - 0.20 Thousand/uL    Absolute Lymphocytes 1.81 0.60 - 4.47 Thousands/µL    Absolute Monocytes 0.57 0.17 - 1.22 Thousand/µL    Eosinophils Absolute 0.09 0.00 - 0.61 Thousand/µL    Basophils Absolute 0.04 0.00 - 0.10 Thousands/µL            Wilfredo Lester DO 06/04/24

## 2024-06-05 ENCOUNTER — TELEPHONE (OUTPATIENT)
Dept: NEUROLOGY | Facility: CLINIC | Age: 54
End: 2024-06-05

## 2024-06-05 PROCEDURE — 99232 SBSQ HOSP IP/OBS MODERATE 35: CPT | Performed by: PSYCHIATRY & NEUROLOGY

## 2024-06-05 RX ADMIN — LEVOTHYROXINE SODIUM 175 MCG: 0.15 TABLET ORAL at 05:51

## 2024-06-05 RX ADMIN — LACOSAMIDE 150 MG: 50 TABLET, FILM COATED ORAL at 08:37

## 2024-06-05 RX ADMIN — LACOSAMIDE 150 MG: 50 TABLET, FILM COATED ORAL at 21:01

## 2024-06-05 RX ADMIN — DIVALPROEX SODIUM 750 MG: 250 TABLET, DELAYED RELEASE ORAL at 08:37

## 2024-06-05 RX ADMIN — PALIPERIDONE 6 MG: 6 TABLET, EXTENDED RELEASE ORAL at 08:37

## 2024-06-05 RX ADMIN — DIVALPROEX SODIUM 750 MG: 250 TABLET, DELAYED RELEASE ORAL at 21:01

## 2024-06-05 RX ADMIN — LACOSAMIDE 50 MG: 50 TABLET, FILM COATED ORAL at 21:01

## 2024-06-05 NOTE — PLAN OF CARE
Problem: Alteration in Thoughts and Perception  Goal: Verbalize thoughts and feelings  Description: Interventions:  - Promote a nonjudgmental and trusting relationship with the patient through active listening and therapeutic communication  - Assess patient's level of functioning, behavior and potential for risk  - Engage patient in 1 on 1 interactions  - Encourage patient to express fears, feelings, frustrations, and discuss symptoms    - [place] patient to reality, help patient recognize reality-based thinking   - Administer medications as ordered and assess for potential side effects  - Provide the patient education related to the signs and symptoms of the illness and desired effects of prescribed medications  Outcome: Progressing  Goal: Refrain from acting on delusional thinking/internal stimuli  Description: Interventions:  - Monitor patient closely, per order   - Utilize least restrictive measures   - Set reasonable limits, give positive feedback for acceptable   - Administer medications as ordered and monitor of potential side effects  Outcome: Progressing  Goal: Agree to be compliant with medication regime, as prescribed and report medication side effects  Description: Interventions:  - Offer appropriate PRN medication and supervise ingestion; conduct AIMS, as needed   Outcome: Progressing     Problem: Anxiety  Goal: Anxiety is at manageable level  Description: Interventions:  - Assess and monitor patient's anxiety level.   - Monitor for signs and symptoms (heart palpitations, chest pain, shortness of breath, headaches, nausea, feeling jumpy, restlessness, irritable, apprehensive).   - Collaborate with interdisciplinary team and initiate plan and interventions as ordered.  - [place] patient to unit/surroundings  - Explain treatment plan  - Encourage participation in care  - Encourage verbalization of concerns/fears  - Identify coping mechanisms  - Assist in developing anxiety-reducing skills  -  Administer/offer alternative therapies  - Limit or eliminate stimulants  Outcome: Progressing     Problem: Alteration in Thoughts and Perception  Goal: Treatment Goal: Gain control of psychotic behaviors/thinking, reduce/eliminate presenting symptoms and demonstrate improved reality functioning upon discharge  Outcome: Not Progressing  Goal: Recognize dysfunctional thoughts, communicate reality-based thoughts at the time of discharge  Description: Interventions:  - Provide medication and psycho-education to assist patient in compliance and developing insight into his/her illness   Outcome: Not Progressing     Problem: Ineffective Coping  Goal: Demonstrates healthy coping skills  Outcome: Not Progressing

## 2024-06-05 NOTE — PROGRESS NOTES
06/05/24 0840   Team Meeting   Meeting Type Daily Rounds   Initial Conference Date 06/05/24   Team Members Present   Team Members Present Physician;Nurse;;Occupational Therapist   Physician Team Member Trevon/Mimi   Nursing Team Member Araceli   Care Management Team Member Lavon   OT Team Member Arina   Patient/Family Present   Patient Present No   Patient's Family Present No     Patient is withdrawn and irritable. She is paranoid claiming that there are videos of staff that she has seen. She is responding to internal stimuli. She is eating and sleeping well. Discharge is pending.

## 2024-06-05 NOTE — NURSING NOTE
"Pt withdrawn to room this evening, appears to be responding to internal stimuli, talking to self in room frequently. Paranoid and guarded with staff. Denies depression and anxiety, denies further s/s or unmet needs. During HS medication pass, pt reports \"I need them in the other hand, they make me shake\", pt then showed her hand shaking. Pt looking up at the ceiling during interaction, talking to the ceiling. Medication compliant. Will maintain q7min checks.   "

## 2024-06-05 NOTE — PROGRESS NOTES
Progress Note - Behavioral Health   Esperanza Prajapati 53 y.o. female MRN: [id number]  Unit/Bed#: [facility] 608-02 Encounter: [id number]    Assessment & Plan   Principal Problem:    Schizoaffective disorder (HCC)  Active Problems:    Cognitive developmental delay    History of hypertension    Hypothyroidism    Focal epilepsy (HCC)    Medical clearance for psychiatric admission    Acute cystitis without hematuria      Subjective: Patient was seen, chart was reviewed, and case was discussed with entire team.     Patient seen in room sitting up in bed.  She is still guarded and scant.  She is smiling more today although most likely inappropriately.  She is paranoid.  She is responding to internal stimuli with frequent scanning of the ceiling.  When asked about what she sees she just says the paint and walls.  She is responding and starts talking Polish at the ceiling.  She talks about how this writer's posture stands and hand movements are seen on a video.  But gives no more information.  The patient has been medication compliant.      Psychiatric Review of Systems:  Behavior over the last 24 hours: unchanged  Sleep: normal  Appetite: adequate  Medication side effects: none verbalized  Medical ROS: Complete review of systems is negative except as noted above.            Vitals:  Vitals:    06/05/24 0801   BP: 133/86   Pulse: 103   Resp: 18   Temp: 97.6 °F (36.4 °C)   SpO2: 98%       Mental Status Exam:    Appearance:  Improving eye contact, appears stated age, casually dressed, and disheveled   Behavior:  calm and cooperative   Speech:  slow and soft   Mood:  patient is depressed and anxious   Affect:  Patient seems to have a brighter affect today and smiles but inappropriately   Thought Process:  disorganized   Thought Content:  Bizarre and persecutory delusions   Perceptual Disturbances: Does denies hallucinations but she is internally preoccupied.  And can be seen responding to internal stimuli   Risk  Potential: Suicidal ideation - None at present  Homicidal ideation - None at present  Potential for aggression - Yes she does have a history of aggressive behaviors   Sensorium:  oriented to person and place   Memory:  Unable to assess   Consciousness:  awake   Attention/Concentration: decreased concentration and decreased attention span   Insight:  poor   Judgment: poor   Gait/Station: Patient sitting up in bed   Motor Activity: no abnormal movements     Progress Toward Goals: Unchanged    Recommended Treatment: Continue with pharmacotherapy, group therapy, milieu therapy and occupational therapy.  Continue frequent safety checks and vitals per unit protocol. Continue with medical management as indicated. Continue coordinating with case management regarding disposition  1.  Continue current medication treatments for now.  Considering an increase in Invega.  2.  Discharge planning      Risks, benefits and possible side effects of Medications: Risks, benefits, and possible side effects of medications have been explained to the patient, who verbalizes understanding      Current Medications:  Current Facility-Administered Medications   Medication Dose Route Frequency Provider Last Rate    acetaminophen  650 mg Oral Q4H PRN BRINA Dominguez      acetaminophen  650 mg Oral Q4H PRN BRINA Dominguez      acetaminophen  975 mg Oral Q6H PRN BRINA Dominguez      aluminum-magnesium hydroxide-simethicone  30 mL Oral Q4H PRN BRINA Dominguez      benztropine  1 mg Intramuscular Q4H PRN Max 6/day BRINA Dominguez      benztropine  0.5 mg Oral Q4H PRN Max 6/day BRINA Dominguez      bisacodyl  10 mg Rectal Daily PRN BRINA Dominguez      divalproex sodium  750 mg Oral Q12H [facility] Wilfredo Lester DO      hydrOXYzine HCL  25 mg Oral Q6H PRN Max 4/day BRINA Dominguez      hydrOXYzine HCL  50 mg Oral Q6H PRN Max 4/day BRINA Dominguez      lacosamide  150 mg Oral Q12H [facility] Latia Fitzgerald MD      lacosamide  50 mg  Oral HS Wilfredo Lester, DO      levothyroxine  175 mcg Oral Daily Latia Fitzgerald MD      LORazepam  1 mg Intramuscular Q6H PRN Max 3/day Elma Le, CRNP      LORazepam  1 mg Oral Q6H PRN Max 3/day Elmamelissa Talbotey, CRNP      OLANZapine  5 mg Intramuscular Q3H PRN Max 3/day Elmamelissa Talbotey, CRNP      OLANZapine  2.5 mg Oral Q4H PRN Max 6/day Elmamelissa Talbotey, CRNP      OLANZapine  5 mg Oral Q4H PRN Max 3/day Elmamelissa Talbotey, CRNP      OLANZapine  5 mg Oral Q3H PRN Max 3/day Elmamelissa Talbotey, CRNP      paliperidone  6 mg Oral Daily Wilfredo Lester, DO      polyethylene glycol  17 g Oral Daily PRN Elma Le, CRNP      propranolol  5 mg Oral Q8H PRN Elma Le, CRNP      senna-docusate sodium  1 tablet Oral Daily PRN Elma Le, CRNP      traZODone  50 mg Oral HS PRN Elma Le, BRINA         Behavioral Health Medications:   all current active meds have been reviewed.    Laboratory results:  I have personally reviewed all pertinent laboratory/tests results.   No results found for this or any previous visit (from the past 48 hour(s)).           Wilfredo Lester DO 06/05/24

## 2024-06-05 NOTE — NURSING NOTE
Patient responding to internal stimuli, medication compliant, and attending group with encouragement. Patient denies all s/s including SI/HI. Withdrawn to room out for meals and groups only. Patient paranoid that room mate and certain staff members have sensitive information but would not elaborate what the information is regarding.  Monitor for safety and support.

## 2024-06-05 NOTE — TELEPHONE ENCOUNTER
Patient's mother gave verbal permission for patient's nephew to cancel appointment due to patient being in hospital.  Patient will call to reschedule once discharged.

## 2024-06-06 PROBLEM — N30.00 ACUTE CYSTITIS WITHOUT HEMATURIA: Status: RESOLVED | Noted: 2024-05-30 | Resolved: 2024-06-06

## 2024-06-06 PROCEDURE — 99232 SBSQ HOSP IP/OBS MODERATE 35: CPT | Performed by: PSYCHIATRY & NEUROLOGY

## 2024-06-06 RX ADMIN — LACOSAMIDE 50 MG: 50 TABLET, FILM COATED ORAL at 21:39

## 2024-06-06 RX ADMIN — LACOSAMIDE 150 MG: 50 TABLET, FILM COATED ORAL at 08:34

## 2024-06-06 RX ADMIN — LEVOTHYROXINE SODIUM 175 MCG: 0.15 TABLET ORAL at 06:03

## 2024-06-06 RX ADMIN — LACOSAMIDE 150 MG: 50 TABLET, FILM COATED ORAL at 21:35

## 2024-06-06 RX ADMIN — DIVALPROEX SODIUM 750 MG: 250 TABLET, DELAYED RELEASE ORAL at 21:35

## 2024-06-06 RX ADMIN — DIVALPROEX SODIUM 750 MG: 250 TABLET, DELAYED RELEASE ORAL at 08:34

## 2024-06-06 RX ADMIN — PALIPERIDONE 6 MG: 6 TABLET, EXTENDED RELEASE ORAL at 08:34

## 2024-06-06 NOTE — PLAN OF CARE
Problem: Alteration in Thoughts and Perception  Goal: Treatment Goal: Gain control of psychotic behaviors/thinking, reduce/eliminate presenting symptoms and demonstrate improved reality functioning upon discharge  Outcome: Progressing  Goal: Verbalize thoughts and feelings  Description: Interventions:  - Promote a nonjudgmental and trusting relationship with the patient through active listening and therapeutic communication  - Assess patient's level of functioning, behavior and potential for risk  - Engage patient in 1 on 1 interactions  - Encourage patient to express fears, feelings, frustrations, and discuss symptoms    - [place] patient to reality, help patient recognize reality-based thinking   - Administer medications as ordered and assess for potential side effects  - Provide the patient education related to the signs and symptoms of the illness and desired effects of prescribed medications  Outcome: Progressing  Goal: Refrain from acting on delusional thinking/internal stimuli  Description: Interventions:  - Monitor patient closely, per order   - Utilize least restrictive measures   - Set reasonable limits, give positive feedback for acceptable   - Administer medications as ordered and monitor of potential side effects  Outcome: Progressing  Goal: Agree to be compliant with medication regime, as prescribed and report medication side effects  Description: Interventions:  - Offer appropriate PRN medication and supervise ingestion; conduct AIMS, as needed   Outcome: Progressing     Problem: Anxiety  Goal: Anxiety is at manageable level  Description: Interventions:  - Assess and monitor patient's anxiety level.   - Monitor for signs and symptoms (heart palpitations, chest pain, shortness of breath, headaches, nausea, feeling jumpy, restlessness, irritable, apprehensive).   - Collaborate with interdisciplinary team and initiate plan and interventions as ordered.  - [place] patient to unit/surroundings  - Explain  treatment plan  - Encourage participation in care  - Encourage verbalization of concerns/fears  - Identify coping mechanisms  - Assist in developing anxiety-reducing skills  - Administer/offer alternative therapies  - Limit or eliminate stimulants  Outcome: Progressing

## 2024-06-06 NOTE — PROGRESS NOTES
Progress Note - Behavioral Health   Esperanza Prajapati 53 y.o. female MRN: [id number]  Unit/Bed#: [facility] 608-02 Encounter: [id number]    Assessment & Plan   Principal Problem:    Schizoaffective disorder (HCC)  Active Problems:    Cognitive developmental delay    History of hypertension    Hypothyroidism    Focal epilepsy (HCC)    Medical clearance for psychiatric admission    Acute cystitis without hematuria      Subjective: Patient was seen, chart was reviewed, and case was discussed with entire team.       Patient seen in room sitting up in bed.  The patient seems more engageable today.  She is still guarded and suspicious.  She does make scanning glances at the ceiling but denies she is hearing any voices.  Per chart she has been responding to internal stimuli talking to self frequently.  Apparently she has been sleeping and has an adequate appetite.  Patient is medication compliant              Psychiatric Review of Systems:  Behavior over the last 24 hours: unchanged  Sleep: normal  Appetite: adequate  Medication side effects: none verbalized  Medical ROS: Complete review of systems is negative except as noted above.            Vitals:  Vitals:    06/06/24 0749   BP: 144/67   Pulse: 92   Resp: 18   Temp: 97.6 °F (36.4 °C)   SpO2: 97%       Mental Status Exam:    Appearance:  Improving eye contact, appears stated age, casually dressed, and disheveled   Behavior:  calm and cooperative   Speech:  slow and soft   Mood:  Patient denies depression and anxiety today to this writer   Affect:  Patient seems to have a brighter affect today and smiles but inappropriately   Thought Process:  disorganized   Thought Content:  Bizarre and persecutory delusions   Perceptual Disturbances: Does denies hallucinations but she is internally preoccupied.  And can be seen responding to internal stimuli   Risk Potential: Suicidal ideation - None at present  Homicidal ideation - None at present  Potential for aggression  - Yes she does have a history of aggressive behaviors   Sensorium:  oriented to person and place   Memory:  Unable to assess   Consciousness:  awake   Attention/Concentration: decreased concentration and decreased attention span   Insight:  poor   Judgment: poor   Gait/Station: Patient sitting up in bed   Motor Activity: no abnormal movements     Progress Toward Goals: Unchanged    Recommended Treatment: Continue with pharmacotherapy, group therapy, milieu therapy and occupational therapy.  Continue frequent safety checks and vitals per unit protocol. Continue with medical management as indicated. Continue coordinating with case management regarding disposition  1.  Continue current medications and treatments for now.  We will consider increase in Invega soon.  2.  Discharge planning      Risks, benefits and possible side effects of Medications: Risks, benefits, and possible side effects of medications have been explained to the patient, who verbalizes understanding      Current Medications:  Current Facility-Administered Medications   Medication Dose Route Frequency Provider Last Rate    acetaminophen  650 mg Oral Q4H PRN BRINA Dominguez      acetaminophen  650 mg Oral Q4H PRN BRINA Dominguez      acetaminophen  975 mg Oral Q6H PRN BRINA Dominguez      aluminum-magnesium hydroxide-simethicone  30 mL Oral Q4H PRN BRINA Dominguez      benztropine  1 mg Intramuscular Q4H PRN Max 6/day BRINA Dominguez      benztropine  0.5 mg Oral Q4H PRN Max 6/day BRINA Dominguez      bisacodyl  10 mg Rectal Daily PRN BRINA Dominguez      divalproex sodium  750 mg Oral Q12H [facility] Wilfredo Shankaron, DO      hydrOXYzine HCL  25 mg Oral Q6H PRN Max 4/day BRINA Dominguez      hydrOXYzine HCL  50 mg Oral Q6H PRN Max 4/day BRINA Dominguez      lacosamide  150 mg Oral Q12H [facility] Latia Fitzgerald MD      lacosamide  50 mg Oral  Wilfredo Lester, DO      levothyroxine  175 mcg Oral Daily Latia Fitzgerald MD       LORazepam  1 mg Intramuscular Q6H PRN Max 3/day Elma Talbotey, CRNP      LORazepam  1 mg Oral Q6H PRN Max 3/day Elma Talbotey, CRNP      OLANZapine  5 mg Intramuscular Q3H PRN Max 3/day Elmamelissa Talbotey, CRNP      OLANZapine  2.5 mg Oral Q4H PRN Max 6/day Elma Antioneey, CRNP      OLANZapine  5 mg Oral Q4H PRN Max 3/day Elmamelissa Talbotey, CRNP      OLANZapine  5 mg Oral Q3H PRN Max 3/day Elmamelissa Talbotey, CRNP      paliperidone  6 mg Oral Daily Wilfredo Lester,       polyethylene glycol  17 g Oral Daily PRN Elmamelissa Le, CRNP      propranolol  5 mg Oral Q8H PRN Elma Le, CRNP      senna-docusate sodium  1 tablet Oral Daily PRN Elma Le, CRNP      traZODone  50 mg Oral HS PRN Elma Le, CRNP         Behavioral Health Medications:   all current active meds have been reviewed.    Laboratory results:  I have personally reviewed all pertinent laboratory/tests results.   No results found for this or any previous visit (from the past 48 hour(s)).           Wilfredo Lester DO 06/06/24

## 2024-06-06 NOTE — PROGRESS NOTES
06/06/24 0852   Team Meeting   Meeting Type Daily Rounds   Initial Conference Date 06/06/24   Team Members Present   Team Members Present Physician;Nurse;;Occupational Therapist   Physician Team Member Trevon/Mimi   Nursing Team Member Dayna   Care Management Team Member Lavon   OT Team Member Whitney   Patient/Family Present   Patient Present No   Patient's Family Present No     Patient is paranoid and responding to internal stimuli. She is medication compliant. She is cooperative with care although she is guarded and suspicious. She is calm and cooperative. Discharge is pending.

## 2024-06-06 NOTE — PROGRESS NOTES
06/06/24 1000 06/06/24 1330   Activity/Group Checklist   Group Community meeting Wellness  (relaxation)   Attendance Attended Attended   Attendance Duration (min) 16-30 31-45   Interactions Did not interact Interacted appropriately   Affect/Mood Appropriate Appropriate   Goals Achieved Able to listen to others Able to listen to others

## 2024-06-06 NOTE — NURSING NOTE
Patient is visible on the unit for meals and groups. Patient remains paranoid and responds to internal stimuli as she is seen talking to herself frequently. Patient is compliant with medications and cooperative with care. Patient appetite is good.

## 2024-06-07 PROBLEM — L30.9 DERMATITIS OF FACE: Status: ACTIVE | Noted: 2024-06-07

## 2024-06-07 PROCEDURE — 99232 SBSQ HOSP IP/OBS MODERATE 35: CPT | Performed by: PSYCHIATRY & NEUROLOGY

## 2024-06-07 RX ORDER — DIAPER,BRIEF,INFANT-TODD,DISP
EACH MISCELLANEOUS 4 TIMES DAILY PRN
Status: DISCONTINUED | OUTPATIENT
Start: 2024-06-07 | End: 2024-06-21 | Stop reason: HOSPADM

## 2024-06-07 RX ORDER — PALIPERIDONE 9 MG/1
9 TABLET, EXTENDED RELEASE ORAL DAILY
Status: DISCONTINUED | OUTPATIENT
Start: 2024-06-08 | End: 2024-06-12

## 2024-06-07 RX ADMIN — LACOSAMIDE 50 MG: 50 TABLET, FILM COATED ORAL at 21:06

## 2024-06-07 RX ADMIN — DIVALPROEX SODIUM 750 MG: 250 TABLET, DELAYED RELEASE ORAL at 21:05

## 2024-06-07 RX ADMIN — LACOSAMIDE 150 MG: 50 TABLET, FILM COATED ORAL at 21:05

## 2024-06-07 RX ADMIN — DIVALPROEX SODIUM 750 MG: 250 TABLET, DELAYED RELEASE ORAL at 09:24

## 2024-06-07 RX ADMIN — PALIPERIDONE 6 MG: 6 TABLET, EXTENDED RELEASE ORAL at 09:24

## 2024-06-07 RX ADMIN — LACOSAMIDE 150 MG: 50 TABLET, FILM COATED ORAL at 09:24

## 2024-06-07 RX ADMIN — LEVOTHYROXINE SODIUM 175 MCG: 0.15 TABLET ORAL at 05:58

## 2024-06-07 NOTE — ASSESSMENT & PLAN NOTE
Oily, scaly, erythematous papules and plaques on face.  Likely seborrheic dermatitis in etiology.  No associated symptoms.    -Hydrocortisone 1% ointment every 4 as needed

## 2024-06-07 NOTE — PLAN OF CARE
Problem: Alteration in Thoughts and Perception  Goal: Treatment Goal: Gain control of psychotic behaviors/thinking, reduce/eliminate presenting symptoms and demonstrate improved reality functioning upon discharge  Outcome: Not Progressing  Goal: Verbalize thoughts and feelings  Description: Interventions:  - Promote a nonjudgmental and trusting relationship with the patient through active listening and therapeutic communication  - Assess patient's level of functioning, behavior and potential for risk  - Engage patient in 1 on 1 interactions  - Encourage patient to express fears, feelings, frustrations, and discuss symptoms    - [place] patient to reality, help patient recognize reality-based thinking   - Administer medications as ordered and assess for potential side effects  - Provide the patient education related to the signs and symptoms of the illness and desired effects of prescribed medications  Outcome: Progressing  Goal: Refrain from acting on delusional thinking/internal stimuli  Description: Interventions:  - Monitor patient closely, per order   - Utilize least restrictive measures   - Set reasonable limits, give positive feedback for acceptable   - Administer medications as ordered and monitor of potential side effects  Outcome: Not Progressing  Goal: Agree to be compliant with medication regime, as prescribed and report medication side effects  Description: Interventions:  - Offer appropriate PRN medication and supervise ingestion; conduct AIMS, as needed   Outcome: Progressing

## 2024-06-07 NOTE — PROGRESS NOTES
Pt attended [facility]: Self care group.  Pt RIS and disorganized.  Initially louder and then became more focused as group progressed.  Pt fluctuated form Czech to English and with clarity of thought at times.  Pt did indicate she goes to the clinic across the street for care and knew the address.  Pt also mentioned she utilized public transportation and knew she needs to walk if missed the bus and how to get another bus.  Pt knew hospital and location displaying positive memory and recall.  Pt did not know the color of her eyes.  Pt was pleasant.    Pt mentioned she has the ability to ask for help and make her needs known.      06/07/24 1100   Activity/Group Checklist   Group Other (Comment)  ([facility]: Self care)   Attendance Attended   Attendance Duration (min) 31-45   Interactions Disorganized interaction   Affect/Mood Calm;Other (Comment)  (RIS)   Goals Achieved Identified feelings;Identified triggers;Identified relapse prevention strategies;Discussed coping strategies;Increased hopefulness;Able to engage in interactions;Able to listen to others;Able to manage/cope with feelings;Able to reflect/comment on own behavior;Able to self-disclose;Verbalized increased hopefulness;Able to recieve feedback

## 2024-06-07 NOTE — NURSING NOTE
Edolly is cooperative and pleasant. Patient denies SI,anxiety or depression but does not answer when asked about hallucinations. Edolly noted to be responding with upward gaze and conversation. Edolly is medication and meal compliant. Isolates to room often but visible in the milieu at meals and snacks. Will continue to monitor and support during treatment.

## 2024-06-07 NOTE — PROGRESS NOTES
"Progress Note - Behavioral Health     Esperanza Prajapati 53 y.o. female MRN: [id number]   Unit/Bed#: [facility] 608-02 Encounter: [id number]    Behavior over the last 24 hours: unchanged.     Esperanza was seen today in follow-up for continuation of care. Per staff, nursing reports no acute events overnight. Esperanza states she feels \"fine\". During encounter, she is able to properly converse in English, but notes she needs  for \"some words\". When utilizing phone translation, she becomes agitated and uncooperative stating she can't read or write Slovak or English. She appears internally preoccupied and makes scanning glances at the ceiling throughout conversation. Behavior is bizarre and disorganized. Per staff, she continues to response to internal stimuli and talking to herself. Upon leaving the room, she starting talking to herself in Slovak. Esperanza adamantly denies suicidal/homicidal ideation in addition to thoughts of self-injury. Esperanza presently is contacting for safety on the unit and feels comfortable to confide in staff if thoughts arise. At time of interview, no overt psychosis elicited. Esperanza has been complaint with medications and tolerating without any side effects.     Sleep: normal  Appetite: fair  Medication side effects: No   ROS: no complaints, all other systems are negative    Mental Status Evaluation:    Appearance:  age appropriate, dressed appropriately, adequate grooming   Behavior:  calm, bizarre, uncooperative, makes eye contact   Speech:  slow, disorganized   Mood:  \"I feel fine\"   Affect:  inappropriate smile   Thought Process:  disorganized   Associations: blocking, bizarre   Thought Content:  bizarre delusions   Perceptual Disturbances: denies auditory or visual hallucinations when asked, but appears preoccupied, per staff, responds to internal stimuli   Risk Potential: Suicidal ideation - None at present  Homicidal ideation - None at present  Potential for aggression - Yes " history of aggressive behaviors   Sensorium:  unable to assess   Memory:  recent and remote memory: unable to assess due to lack of cooperation   Consciousness:  alert and awake   Attention/Concentration: attention span and concentration appear shorter than expected for age   Insight:  poor   Judgment: poor   Gait/Station: Sitting in bed   Motor Activity: no abnormal movements     Vital signs in last 24 hours:    Temp:  [96.5 °F (35.8 °C)-97.5 °F (36.4 °C)] 96.5 °F (35.8 °C)  HR:  [] 94  Resp:  [18-22] 22  BP: (123-140)/(66-93) 137/86    Laboratory results: I have personally reviewed all pertinent laboratory/tests results    Results from the past 24 hours: No results found for this or any previous visit (from the past 24 hour(s)).    Progress Toward Goals: unchanged    Assessment & Plan   Principal Problem:    Schizoaffective disorder (HCC)  Active Problems:    Cognitive developmental delay    History of hypertension    Hypothyroidism    Focal epilepsy (HCC)    Medical clearance for psychiatric admission      Recommended Treatment:     Planned medication and treatment changes:    All current active medications have been reviewed  Encourage group therapy, milieu therapy and occupational therapy  Behavioral Health checks every 7 minutes    Continue current medications and treatments. Increase Invega to 9 mg QHS. Continue inpatient hospitalization for treatment and observation.     Current Facility-Administered Medications   Medication Dose Route Frequency Provider Last Rate    acetaminophen  650 mg Oral Q4H PRN BRINA Dominguez      acetaminophen  650 mg Oral Q4H PRN BRINA Dominguez      acetaminophen  975 mg Oral Q6H PRN BRINA Dominguez      aluminum-magnesium hydroxide-simethicone  30 mL Oral Q4H PRN BRINA Dominguez      benztropine  1 mg Intramuscular Q4H PRN Max 6/day BRINA Dominguez      benztropine  0.5 mg Oral Q4H PRN Max 6/day BRINA Dominguez      bisacodyl  10 mg Rectal Daily PRN Elma  Mimi, BRINA      divalproex sodium  750 mg Oral Q12H [facility] Wilfredo A Prayson, DO      hydrOXYzine HCL  25 mg Oral Q6H PRN Max 4/day Elma Le, BRINA      hydrOXYzine HCL  50 mg Oral Q6H PRN Max 4/day Elma Le, BRINA      lacosamide  150 mg Oral Q12H [facility] Latia Fitzgerald MD      lacosamide  50 mg Oral HS Wilfredo A Prayson, DO      levothyroxine  175 mcg Oral Daily Latia Fitzgerald MD      LORazepam  1 mg Intramuscular Q6H PRN Max 3/day Elma Le, BRINA      LORazepam  1 mg Oral Q6H PRN Max 3/day BRINA Dominguez      OLANZapine  5 mg Intramuscular Q3H PRN Max 3/day Elma Le, BRINA      OLANZapine  2.5 mg Oral Q4H PRN Max 6/day Elma Le, BRINA      OLANZapine  5 mg Oral Q4H PRN Max 3/day BRINA Dominguez      OLANZapine  5 mg Oral Q3H PRN Max 3/day BRINA Dominguez      paliperidone  6 mg Oral Daily Wilfredo A Prayson, DO      polyethylene glycol  17 g Oral Daily PRN BRINA Dominguez      propranolol  5 mg Oral Q8H PRN BRINA Dominguez      senna-docusate sodium  1 tablet Oral Daily PRN BRINA Dominguez      traZODone  50 mg Oral HS PRN BRINA Dominguez       Risks / Benefits of Treatment:    Risks, benefits, and possible side effects of medications explained to patient and patient verbalizes understanding and agreement for treatment.    Counseling / Coordination of Care:    Patient's progress discussed with staff in treatment team meeting.  Medications, treatment progress and treatment plan reviewed with patient.    Brenda Ribera PA-C 06/07/24

## 2024-06-07 NOTE — PROGRESS NOTES
06/07/24 1030   Activity/Group Checklist   Group Community meeting   Attendance Attended   Attendance Duration (min) 16-30   Interactions Disorganized interaction  (Patient stated that she didn't understand English but would sometimes respond in English, other times in Slovenian. She went off topic, responding to internal stimuli)   Affect/Mood Other (Comment)  (Patient was responding to internal stimuli. Frequently rolling her eyes back.)   Goals Achieved Able to listen to others;Able to self-disclose;Able to recieve feedback;Identified feelings

## 2024-06-07 NOTE — QUICK NOTE
Assessment and Plan:        Notified by RN regarding patient's acute onset facial rash.  Eruption of rash noted earlier this week.  Oily, scaly, erythematous papules and plaques in appearance.  Likely seborrheic dermatitis in etiology.  No associated symptoms.    -Hydrocortisone 1% ointment every 4 as needed

## 2024-06-07 NOTE — PROGRESS NOTES
06/07/24 0829   Team Meeting   Meeting Type Daily Rounds   Initial Conference Date 06/07/24   Team Members Present   Team Members Present Physician;Nurse;;Occupational Therapist   Physician Team Member Trevon/Mimi/Mounika   Nursing Team Member Franchesca   Care Management Team Member Lavon   OT Team Member Karen   Patient/Family Present   Patient Present No   Patient's Family Present No     Patient is guarded at times and responding to internal stimuli. She is disorganized and bizarre at times. She is isolated for the most part. She is cooperative with medication and with care at this time. Discharge is pending.

## 2024-06-07 NOTE — NURSING NOTE
Patient was withdrawn to her room but came out for breakfast. Denies ll psych s/s but was responding to IS loudly in her room. No complaints offered. Had 100% for breakfast. Took her Am medications. Safety checks ongoing.

## 2024-06-07 NOTE — PROGRESS NOTES
"   06/07/24 1330   Activity/Group Checklist   Group Life Skills  (Stress relief quotes/Coping skills)   Attendance Attended   Attendance Duration (min) 46-60   Interactions Disorganized interaction  (Patient responded to internal stimuli. At times she was paranoid, expressing that patients were staring at her and talking about her. Patient changed topics frequently \"the color orange is also a fruit\")   Affect/Mood Appropriate;Wide   Goals Achieved Identified feelings;Able to engage in interactions;Able to self-disclose;Able to recieve feedback;Able to listen to others  (Patient was able to complete the coping skill worksheet, choosing some that may be helpful to her with prompts and assistance from writer. She also read a stress relief quote.)       "

## 2024-06-08 PROCEDURE — 99232 SBSQ HOSP IP/OBS MODERATE 35: CPT | Performed by: STUDENT IN AN ORGANIZED HEALTH CARE EDUCATION/TRAINING PROGRAM

## 2024-06-08 RX ADMIN — LACOSAMIDE 150 MG: 50 TABLET, FILM COATED ORAL at 21:19

## 2024-06-08 RX ADMIN — DIVALPROEX SODIUM 750 MG: 250 TABLET, DELAYED RELEASE ORAL at 21:19

## 2024-06-08 RX ADMIN — LEVOTHYROXINE SODIUM 175 MCG: 0.15 TABLET ORAL at 05:53

## 2024-06-08 RX ADMIN — PALIPERIDONE 9 MG: 9 TABLET, EXTENDED RELEASE ORAL at 08:20

## 2024-06-08 RX ADMIN — Medication 1 APPLICATION: at 15:00

## 2024-06-08 RX ADMIN — LACOSAMIDE 150 MG: 50 TABLET, FILM COATED ORAL at 08:20

## 2024-06-08 RX ADMIN — LACOSAMIDE 50 MG: 50 TABLET, FILM COATED ORAL at 21:19

## 2024-06-08 RX ADMIN — DIVALPROEX SODIUM 750 MG: 250 TABLET, DELAYED RELEASE ORAL at 08:20

## 2024-06-08 NOTE — NURSING NOTE
Pt pleasant and cooperative, visible on unit for meals. Pt able to express self and make needs known appropriately. Pt logical during interactions. Pt had positive family visit with patients and sister. No symptoms reported during shift.

## 2024-06-08 NOTE — PROGRESS NOTES
Progress Note - Behavioral Health     Esperanza Prajapati 53 y.o. female MRN: [id number]   Unit/Bed#: [facility] 608-02 Encounter: [id number]    Behavior over the last 24 hours: unchanged.     Esperanza was seen today in follow-up for continuation of care. Per staff, thoughts remain disorganized and appears preoccupied.  Esperanza is seen in hallway, when starting interview, like yesterday states she cannot understand english. When using  said she did not understand how to hear or read [place]. She is generally uncooperative and is responding to internal stimuli throughout encounter. In group yesterday, was an incident when she thought people were talking negatively about her and she was paranoid. Esperanza adamantly denies suicidal/homicidal ideation in addition to thoughts of self-injury. Esperanza presently is contacting for safety on the unit and feels comfortable to confide in staff if thoughts arise. At time of interview, no overt psychosis elicited. Esperanza has been complaint with medications and tolerating without any side effects.     Sleep: normal  Appetite: normal  Medication side effects: No   ROS: no complaints, all other systems are negative    Mental Status Evaluation:    Appearance:  age appropriate, dressed appropriately, adequate grooming   Behavior:  calm, bizarre, uncooperative, fleeting eye contact   Speech:  delayed   Mood:  euthymic   Affect:  inappropriate laugh, inappropriate smile, mood-incongruent   Thought Process:  disorganized, illogical   Associations: tangential associations, blocking   Thought Content:  persecutory and bizarre delusions, paranoid ideation   Perceptual Disturbances: denies auditory hallucinations when asked, appears responding to internal stimuli   Risk Potential: Suicidal ideation - None at present  Homicidal ideation - None at present  Potential for aggression - Yes history of aggressive behaviors   Sensorium:  unable to assess   Memory:  recent and remote memory:  unable to assess due to lack of cooperation   Consciousness:  alert and awake   Attention/Concentration: attention span and concentration appear shorter than expected for age   Insight:  poor   Judgment: poor   Gait/Station: Standing in hallway   Motor Activity: no abnormal movements     Vital signs in last 24 hours:    Temp:  [97.6 °F (36.4 °C)-98.3 °F (36.8 °C)] 97.6 °F (36.4 °C)  HR:  [] 96  Resp:  [18-19] 18  BP: (120-134)/(80-82) 120/81    Laboratory results: I have personally reviewed all pertinent laboratory/tests results    Results from the past 24 hours: No results found for this or any previous visit (from the past 24 hour(s)).    Progress Toward Goals: unchanged    Assessment & Plan   Principal Problem:    Schizoaffective disorder (HCC)  Active Problems:    Cognitive developmental delay    History of hypertension    Hypothyroidism    Focal epilepsy (HCC)    Medical clearance for psychiatric admission    Dermatitis of face      Recommended Treatment:     Planned medication and treatment changes:    All current active medications have been reviewed  Encourage group therapy, milieu therapy and occupational therapy  Behavioral Health checks every 7 minutes    Invega recently increased to 9 mg daily to assist with psychosis and disorganized thoughts. Will add sensitive skin bar for facial dermatitis. No discharge date at present.     Current Facility-Administered Medications   Medication Dose Route Frequency Provider Last Rate    acetaminophen  650 mg Oral Q4H PRN BRINA Dominguez      acetaminophen  650 mg Oral Q4H PRN BRINA Dominguez      acetaminophen  975 mg Oral Q6H PRN BRINA Dominguez      aluminum-magnesium hydroxide-simethicone  30 mL Oral Q4H PRN BRINA Dominguez      benztropine  1 mg Intramuscular Q4H PRN Max 6/day BRINA Dominguez      benztropine  0.5 mg Oral Q4H PRN Max 6/day BRINA Dominguez      bisacodyl  10 mg Rectal Daily PRN BRINA Dominguez      divalproex sodium  750  mg Oral Q12H [facility] Wilfredo RAKESH Prayson, DO      hydrocortisone   Topical 4x Daily PRN Gilson Gonsalves MD      hydrOXYzine HCL  25 mg Oral Q6H PRN Max 4/day Elma Le, CRJOSE ANGEL      hydrOXYzine HCL  50 mg Oral Q6H PRN Max 4/day Elma Le, CRNP      lacosamide  150 mg Oral Q12H [facility] Latia Fitzgerald MD      lacosamide  50 mg Oral HS Wilfredo RAKESH Teixeirayson, DO      levothyroxine  175 mcg Oral Daily Latia Fitzgerald MD      LORazepam  1 mg Intramuscular Q6H PRN Max 3/day Elma Le, CRJOSE ANGEL      LORazepam  1 mg Oral Q6H PRN Max 3/day Elma Le, CRJOSE ANGEL      OLANZapine  5 mg Intramuscular Q3H PRN Max 3/day Elma Le, CRNP      OLANZapine  2.5 mg Oral Q4H PRN Max 6/day Elma Le, CRNP      OLANZapine  5 mg Oral Q4H PRN Max 3/day Elma Le, CRNP      OLANZapine  5 mg Oral Q3H PRN Max 3/day Elma Le, BRINA      paliperidone  9 mg Oral Daily Brenda Ribera PA-C      polyethylene glycol  17 g Oral Daily PRN Elmamelissa Le, CRNP      propranolol  5 mg Oral Q8H PRN Elma Mimi, BRINA      senna-docusate sodium  1 tablet Oral Daily PRN Elmamelissa Le, CRNP      traZODone  50 mg Oral HS PRN Elma Le, BRINA       Risks / Benefits of Treatment:    Risks, benefits, and possible side effects of medications explained to patient and patient verbalizes understanding and agreement for treatment.    Counseling / Coordination of Care:    Total floor / unit time spent today 20 minutes. Greater than 50% of total time was spent with the patient and / or family counseling and / or coordination of care. A description of counseling / coordination of care:  Patient's progress discussed with staff in treatment team meeting.  Medications, treatment progress and treatment plan reviewed with patient.    Brenda Ribera PA-C 06/08/24

## 2024-06-08 NOTE — NURSING NOTE
"Patient cooperative with care, visible in milieu, able to make some needs known, isolative to self. Denied anxiety/depression, Denied SI/HI, VH, AH. She was noted to be responding internally on occasions. She is tangential with conversations.Thoughts disorganized and unable to keep on topic. Offered Hydrocortisone for her face PRN patient agreed to use it, washed her face, then refused ointment stating \"Something happens with this\", patient educated but still refused. Safety precautions maintained.  "

## 2024-06-08 NOTE — PLAN OF CARE
Problem: Alteration in Thoughts and Perception  Goal: Treatment Goal: Gain control of psychotic behaviors/thinking, reduce/eliminate presenting symptoms and demonstrate improved reality functioning upon discharge  Outcome: Progressing  Goal: Verbalize thoughts and feelings  Description: Interventions:  - Promote a nonjudgmental and trusting relationship with the patient through active listening and therapeutic communication  - Assess patient's level of functioning, behavior and potential for risk  - Engage patient in 1 on 1 interactions  - Encourage patient to express fears, feelings, frustrations, and discuss symptoms    - [place] patient to reality, help patient recognize reality-based thinking   - Administer medications as ordered and assess for potential side effects  - Provide the patient education related to the signs and symptoms of the illness and desired effects of prescribed medications  Outcome: Progressing  Goal: Refrain from acting on delusional thinking/internal stimuli  Description: Interventions:  - Monitor patient closely, per order   - Utilize least restrictive measures   - Set reasonable limits, give positive feedback for acceptable   - Administer medications as ordered and monitor of potential side effects  Outcome: Progressing  Goal: Agree to be compliant with medication regime, as prescribed and report medication side effects  Description: Interventions:  - Offer appropriate PRN medication and supervise ingestion; conduct AIMS, as needed   Outcome: Progressing  Goal: Recognize dysfunctional thoughts, communicate reality-based thoughts at the time of discharge  Description: Interventions:  - Provide medication and psycho-education to assist patient in compliance and developing insight into his/her illness   Outcome: Progressing  Goal: Complete daily ADLs, including personal hygiene independently, as able  Description: Interventions:  - Observe, teach, and assist patient with ADLS  - Monitor and  promote a balance of rest/activity, with adequate nutrition and elimination   Outcome: Progressing     Problem: Ineffective Coping  Goal: Identifies ineffective coping skills  Outcome: Progressing  Goal: Demonstrates healthy coping skills  Outcome: Progressing     Problem: Anxiety  Goal: Anxiety is at manageable level  Description: Interventions:  - Assess and monitor patient's anxiety level.   - Monitor for signs and symptoms (heart palpitations, chest pain, shortness of breath, headaches, nausea, feeling jumpy, restlessness, irritable, apprehensive).   - Collaborate with interdisciplinary team and initiate plan and interventions as ordered.  - [place] patient to unit/surroundings  - Explain treatment plan  - Encourage participation in care  - Encourage verbalization of concerns/fears  - Identify coping mechanisms  - Assist in developing anxiety-reducing skills  - Administer/offer alternative therapies  - Limit or eliminate stimulants  Outcome: Progressing     Problem: DISCHARGE PLANNING  Goal: Discharge to home or other facility with appropriate resources  Description: INTERVENTIONS:  - Identify barriers to discharge w/patient and caregiver  - Arrange for needed discharge resources and transportation as appropriate  - Identify discharge learning needs (meds, wound care, etc.)  - Arrange for interpretive services to assist at discharge as needed  - Refer to Case Management Department for coordinating discharge planning if the patient needs post-hospital services based on physician/advanced practitioner order or complex needs related to functional status, cognitive ability, or social support system  Outcome: Progressing

## 2024-06-09 PROCEDURE — 99232 SBSQ HOSP IP/OBS MODERATE 35: CPT | Performed by: STUDENT IN AN ORGANIZED HEALTH CARE EDUCATION/TRAINING PROGRAM

## 2024-06-09 RX ADMIN — BENZTROPINE MESYLATE 1 MG: 1 INJECTION INTRAMUSCULAR; INTRAVENOUS at 11:58

## 2024-06-09 RX ADMIN — LACOSAMIDE 150 MG: 50 TABLET, FILM COATED ORAL at 21:16

## 2024-06-09 RX ADMIN — DIVALPROEX SODIUM 750 MG: 250 TABLET, DELAYED RELEASE ORAL at 09:01

## 2024-06-09 RX ADMIN — LACOSAMIDE 50 MG: 50 TABLET, FILM COATED ORAL at 21:16

## 2024-06-09 RX ADMIN — LACOSAMIDE 150 MG: 50 TABLET, FILM COATED ORAL at 09:00

## 2024-06-09 RX ADMIN — Medication 1 APPLICATION: at 09:01

## 2024-06-09 RX ADMIN — DIVALPROEX SODIUM 750 MG: 250 TABLET, DELAYED RELEASE ORAL at 21:16

## 2024-06-09 RX ADMIN — BENZTROPINE MESYLATE 0.5 MG: 0.5 TABLET ORAL at 18:19

## 2024-06-09 RX ADMIN — LEVOTHYROXINE SODIUM 175 MCG: 0.15 TABLET ORAL at 06:11

## 2024-06-09 RX ADMIN — PALIPERIDONE 9 MG: 9 TABLET, EXTENDED RELEASE ORAL at 09:00

## 2024-06-09 NOTE — NURSING NOTE
"Pt appeared preoccupied in dining room and noted to be repeatedly looking toward ceiling. Pt reported a \"problem with my eyes\" with discomfort and blurry vision noted. Difficult to specify due to poor historian. Pt accepted cogentin IM of possible EPS, tolerated well. Prn appeared effective, no report of continued eye movements following. Pt unable to verbalize if experiencing A/VH. Pt visible on unit, calm and cooperative, pt attended group activity. Pt stated that she would like to return home for father's day.   "

## 2024-06-09 NOTE — NURSING NOTE
Pt reported her eyes were bothering her after dinner, pt observed looking up at ceiling. Pt accepted cogentin po for symptoms. Pt unable to state if medication was effective. Pt noted to be looking upwards when reassessed.

## 2024-06-09 NOTE — PROGRESS NOTES
Progress Note - Behavioral Health     Esperanza Prajapati 53 y.o. female MRN: [id number]   Unit/Bed#: [facility] 608-02 Encounter: [id number]    Behavior over the last 24 hours: unchanged.     Esperanza was seen today in follow-up for continuation of care. Per staff, no acute events reported overnight.  Esperanza was more cooperative and appropriate with writer. Does seem to be slightly less suspicious and paranoid with questioning.  However, continues to appear slightly internally preoccupied. Esperanza adamantly denies suicidal/homicidal ideation in addition to thoughts of self-injury. Esperanza presently is contacting for safety on the unit and feels comfortable to confide in staff if thoughts arise. At time of interview, less disorganized, bizarre and paranoid.  Esperanza has been complaint with medications and tolerating without any side effects.      Sleep: normal  Appetite: normal  Medication side effects: No   ROS: no complaints, all other systems are negative    Mental Status Evaluation:    Appearance:  age appropriate, dressed appropriately, adequate grooming   Behavior:  calm, bizarre, uncooperative, improved eye contact   Speech:  slow, increased latency of response   Mood:  euthymic   Affect:  More appropriate   Thought Process:  Less disorganized   Associations: concrete associations   Thought Content:  paranoid ideation   Perceptual Disturbances: denies auditory hallucinations when asked, appears responding to internal stimuli   Risk Potential: Suicidal ideation - None at present  Homicidal ideation - None at present  Potential for aggression - Yes history of aggressive behaviors   Sensorium:  oriented to person and place   Memory:  recent and remote memory grossly intact   Consciousness:  alert and awake   Attention/Concentration: attention span and concentration appear shorter than expected for age   Insight:  poor   Judgment: poor   Gait/Station: Normal gait/Station   Motor Activity: no abnormal movements      Vital signs in last 24 hours:    Temp:  [97.1 °F (36.2 °C)-98.6 °F (37 °C)] 97.1 °F (36.2 °C)  HR:  [] 105  Resp:  [18-19] 18  BP: (103-150)/(71-80) 150/80    Laboratory results: I have personally reviewed all pertinent laboratory/tests results    Results from the past 24 hours: No results found for this or any previous visit (from the past 24 hour(s)).    Progress Toward Goals: unchanged    Assessment & Plan   Principal Problem:    Schizoaffective disorder (HCC)  Active Problems:    Cognitive developmental delay    History of hypertension    Hypothyroidism    Focal epilepsy (HCC)    Medical clearance for psychiatric admission    Dermatitis of face      Recommended Treatment:     Planned medication and treatment changes:    All current active medications have been reviewed  Encourage group therapy, milieu therapy and occupational therapy  Behavioral Health checks every 7 minutes    Continue current medications and therapy for now.  Tolerating recent adjustment in Invega. Continues to require inpatient treatment due to ongoing psychosis and disorganized thoughts.     Current Facility-Administered Medications   Medication Dose Route Frequency Provider Last Rate    acetaminophen  650 mg Oral Q4H PRN BRINA Dominguez      acetaminophen  650 mg Oral Q4H PRN BRINA Dominguez      acetaminophen  975 mg Oral Q6H PRN BRINA Dominguez      aluminum-magnesium hydroxide-simethicone  30 mL Oral Q4H PRN BRINA Dominguez      basis sensitive skin   Topical Daily Brenda Ribera PA-C      benztropine  1 mg Intramuscular Q4H PRN Max 6/day BRINA Dominguez      benztropine  0.5 mg Oral Q4H PRN Max 6/day BRINA Dominguez      bisacodyl  10 mg Rectal Daily PRN BRINA Dominguez      divalproex sodium  750 mg Oral Q12H MOSHE Lester DO      hydrocortisone   Topical 4x Daily PRN Gilson Gonsalves MD      hydrOXYzine HCL  25 mg Oral Q6H PRN Max 4/day BRINA Dominguez      hydrOXYzine HCL  50 mg Oral Q6H  PRN Max 4/day Elma Le, BRINA      lacosamide  150 mg Oral Q12H [facility] Latia Fitzgerald MD      lacosamide  50 mg Oral HS Wilfredo Lester DO      levothyroxine  175 mcg Oral Daily Latia Fitzgerald MD      LORazepam  1 mg Intramuscular Q6H PRN Max 3/day Elma Le, BRINA      LORazepam  1 mg Oral Q6H PRN Max 3/day Elma Le, CRNP      OLANZapine  5 mg Intramuscular Q3H PRN Max 3/day Elma Le, CRNP      OLANZapine  2.5 mg Oral Q4H PRN Max 6/day Elmamelissa Le, CRNP      OLANZapine  5 mg Oral Q4H PRN Max 3/day Elma Le, CRNP      OLANZapine  5 mg Oral Q3H PRN Max 3/day Elma Le, ZULEYMANP      paliperidone  9 mg Oral Daily Brenda Ribera PA-C      polyethylene glycol  17 g Oral Daily PRN Elma Le, CRNP      propranolol  5 mg Oral Q8H PRN Elma Le, BRINA      senna-docusate sodium  1 tablet Oral Daily PRN Elma Le, ZULEYMANP      traZODone  50 mg Oral HS PRN Elma Le, BRINA       Risks / Benefits of Treatment:    Risks, benefits, and possible side effects of medications explained to patient and patient verbalizes understanding and agreement for treatment.    Counseling / Coordination of Care:    Total floor / unit time spent today 20 minutes. Greater than 50% of total time was spent with the patient and / or family counseling and / or coordination of care. A description of counseling / coordination of care:  Patient's progress discussed with staff in treatment team meeting.  Medications, treatment progress and treatment plan reviewed with patient.    Brenda Ribera PA-C 06/09/24

## 2024-06-09 NOTE — NURSING NOTE
Patient calm, cooperative, pleasant, visible on millieu and takes medications as scheduled.  She is sociable with select [place] speaking peers and denies HI/SI/AVH, depression, and anxiety.

## 2024-06-09 NOTE — PLAN OF CARE
Problem: Alteration in Thoughts and Perception  Goal: Treatment Goal: Gain control of psychotic behaviors/thinking, reduce/eliminate presenting symptoms and demonstrate improved reality functioning upon discharge  Outcome: Progressing  Goal: Verbalize thoughts and feelings  Description: Interventions:  - Promote a nonjudgmental and trusting relationship with the patient through active listening and therapeutic communication  - Assess patient's level of functioning, behavior and potential for risk  - Engage patient in 1 on 1 interactions  - Encourage patient to express fears, feelings, frustrations, and discuss symptoms    - [place] patient to reality, help patient recognize reality-based thinking   - Administer medications as ordered and assess for potential side effects  - Provide the patient education related to the signs and symptoms of the illness and desired effects of prescribed medications  Outcome: Progressing  Goal: Refrain from acting on delusional thinking/internal stimuli  Description: Interventions:  - Monitor patient closely, per order   - Utilize least restrictive measures   - Set reasonable limits, give positive feedback for acceptable   - Administer medications as ordered and monitor of potential side effects  Outcome: Progressing  Goal: Agree to be compliant with medication regime, as prescribed and report medication side effects  Description: Interventions:  - Offer appropriate PRN medication and supervise ingestion; conduct AIMS, as needed   Outcome: Progressing  Goal: Recognize dysfunctional thoughts, communicate reality-based thoughts at the time of discharge  Description: Interventions:  - Provide medication and psycho-education to assist patient in compliance and developing insight into his/her illness   Outcome: Progressing  Goal: Complete daily ADLs, including personal hygiene independently, as able  Description: Interventions:  - Observe, teach, and assist patient with ADLS  - Monitor and  promote a balance of rest/activity, with adequate nutrition and elimination   Outcome: Progressing     Problem: Ineffective Coping  Goal: Identifies ineffective coping skills  Outcome: Progressing  Goal: Demonstrates healthy coping skills  Outcome: Progressing     Problem: Anxiety  Goal: Anxiety is at manageable level  Description: Interventions:  - Assess and monitor patient's anxiety level.   - Monitor for signs and symptoms (heart palpitations, chest pain, shortness of breath, headaches, nausea, feeling jumpy, restlessness, irritable, apprehensive).   - Collaborate with interdisciplinary team and initiate plan and interventions as ordered.  - [place] patient to unit/surroundings  - Explain treatment plan  - Encourage participation in care  - Encourage verbalization of concerns/fears  - Identify coping mechanisms  - Assist in developing anxiety-reducing skills  - Administer/offer alternative therapies  - Limit or eliminate stimulants  Outcome: Progressing     Problem: DISCHARGE PLANNING  Goal: Discharge to home or other facility with appropriate resources  Description: INTERVENTIONS:  - Identify barriers to discharge w/patient and caregiver  - Arrange for needed discharge resources and transportation as appropriate  - Identify discharge learning needs (meds, wound care, etc.)  - Arrange for interpretive services to assist at discharge as needed  - Refer to Case Management Department for coordinating discharge planning if the patient needs post-hospital services based on physician/advanced practitioner order or complex needs related to functional status, cognitive ability, or social support system  Outcome: Progressing

## 2024-06-10 PROCEDURE — 99232 SBSQ HOSP IP/OBS MODERATE 35: CPT | Performed by: PSYCHIATRY & NEUROLOGY

## 2024-06-10 RX ORDER — BENZTROPINE MESYLATE 1 MG/1
1 TABLET ORAL 2 TIMES DAILY
Status: DISCONTINUED | OUTPATIENT
Start: 2024-06-10 | End: 2024-06-12

## 2024-06-10 RX ADMIN — DIVALPROEX SODIUM 750 MG: 250 TABLET, DELAYED RELEASE ORAL at 21:10

## 2024-06-10 RX ADMIN — LACOSAMIDE 50 MG: 50 TABLET, FILM COATED ORAL at 21:10

## 2024-06-10 RX ADMIN — LACOSAMIDE 150 MG: 50 TABLET, FILM COATED ORAL at 21:10

## 2024-06-10 RX ADMIN — PALIPERIDONE 9 MG: 9 TABLET, EXTENDED RELEASE ORAL at 08:14

## 2024-06-10 RX ADMIN — BENZTROPINE MESYLATE 1 MG: 1 TABLET ORAL at 11:52

## 2024-06-10 RX ADMIN — BENZTROPINE MESYLATE 1 MG: 1 TABLET ORAL at 17:03

## 2024-06-10 RX ADMIN — LACOSAMIDE 150 MG: 50 TABLET, FILM COATED ORAL at 10:02

## 2024-06-10 RX ADMIN — DIVALPROEX SODIUM 750 MG: 250 TABLET, DELAYED RELEASE ORAL at 08:14

## 2024-06-10 RX ADMIN — LEVOTHYROXINE SODIUM 175 MCG: 0.15 TABLET ORAL at 05:52

## 2024-06-10 RX ADMIN — Medication 1 APPLICATION: at 08:15

## 2024-06-10 NOTE — NURSING NOTE
Pt calm and cooperative, visible on unit. Good intake at meals. Pt denies symptoms currently. Pleasant during interactions. Social with select peers.

## 2024-06-10 NOTE — PLAN OF CARE
Problem: Alteration in Thoughts and Perception  Goal: Treatment Goal: Gain control of psychotic behaviors/thinking, reduce/eliminate presenting symptoms and demonstrate improved reality functioning upon discharge  Outcome: Progressing  Goal: Verbalize thoughts and feelings  Description: Interventions:  - Promote a nonjudgmental and trusting relationship with the patient through active listening and therapeutic communication  - Assess patient's level of functioning, behavior and potential for risk  - Engage patient in 1 on 1 interactions  - Encourage patient to express fears, feelings, frustrations, and discuss symptoms    - [place] patient to reality, help patient recognize reality-based thinking   - Administer medications as ordered and assess for potential side effects  - Provide the patient education related to the signs and symptoms of the illness and desired effects of prescribed medications  Outcome: Progressing  Goal: Refrain from acting on delusional thinking/internal stimuli  Description: Interventions:  - Monitor patient closely, per order   - Utilize least restrictive measures   - Set reasonable limits, give positive feedback for acceptable   - Administer medications as ordered and monitor of potential side effects  Outcome: Progressing  Goal: Agree to be compliant with medication regime, as prescribed and report medication side effects  Description: Interventions:  - Offer appropriate PRN medication and supervise ingestion; conduct AIMS, as needed   Outcome: Progressing  Goal: Recognize dysfunctional thoughts, communicate reality-based thoughts at the time of discharge  Description: Interventions:  - Provide medication and psycho-education to assist patient in compliance and developing insight into his/her illness   Outcome: Progressing  Goal: Complete daily ADLs, including personal hygiene independently, as able  Description: Interventions:  - Observe, teach, and assist patient with ADLS  - Monitor and  promote a balance of rest/activity, with adequate nutrition and elimination   Outcome: Progressing     Problem: Ineffective Coping  Goal: Identifies ineffective coping skills  Outcome: Progressing  Goal: Demonstrates healthy coping skills  Outcome: Progressing     Problem: Anxiety  Goal: Anxiety is at manageable level  Description: Interventions:  - Assess and monitor patient's anxiety level.   - Monitor for signs and symptoms (heart palpitations, chest pain, shortness of breath, headaches, nausea, feeling jumpy, restlessness, irritable, apprehensive).   - Collaborate with interdisciplinary team and initiate plan and interventions as ordered.  - [place] patient to unit/surroundings  - Explain treatment plan  - Encourage participation in care  - Encourage verbalization of concerns/fears  - Identify coping mechanisms  - Assist in developing anxiety-reducing skills  - Administer/offer alternative therapies  - Limit or eliminate stimulants  Outcome: Progressing     Problem: DISCHARGE PLANNING  Goal: Discharge to home or other facility with appropriate resources  Description: INTERVENTIONS:  - Identify barriers to discharge w/patient and caregiver  - Arrange for needed discharge resources and transportation as appropriate  - Identify discharge learning needs (meds, wound care, etc.)  - Arrange for interpretive services to assist at discharge as needed  - Refer to Case Management Department for coordinating discharge planning if the patient needs post-hospital services based on physician/advanced practitioner order or complex needs related to functional status, cognitive ability, or social support system  Outcome: Progressing

## 2024-06-10 NOTE — NURSING NOTE
Patient calm, cooperative, pleasant, and talkative with this nurse. She is visible on the millieu and able to make needs known.  She denies depression, anxiety, HI/SI/AVH and is sociable with select peers.  She reports she would like to be discharged  sometime this week to be home to celebrate father's day with her family. She was encouraged to discuss discharge planning with provider and CM on Monday and she acknowledged understanding.

## 2024-06-10 NOTE — PROGRESS NOTES
Progress Note - Behavioral Health   Esperanza Prajapati 53 y.o. female MRN: [id number]  Unit/Bed#: [facility] 608-02 Encounter: [id number]    Assessment & Plan   Principal Problem:    Schizoaffective disorder (HCC)  Active Problems:    Cognitive developmental delay    History of hypertension    Hypothyroidism    Focal epilepsy (HCC)    Medical clearance for psychiatric admission    Dermatitis of face      Subjective: Patient was seen, chart was reviewed, and case was discussed with entire team.     Patient seen out in milieu.  She is pleasant, calm and cooperative and eager to talk to this writer.  She wants to show this writer that she is feeling better.  She exhibits a smile and says she feels really good now.  She is still rather bizarre but more organized and easier to understand.  She denies any thoughts to harm herself or others.  She denies any auditory or visual hallucinations.  She does not seem as guarded or paranoid at this time.  She reports sleeping and eating adequately.  She is medication compliant.  Patient is asking to be discharged this week sometime.  Patient also had exhibited what could be oculogyric eye movements.  She has had Cogentin and per notes seems to have relaxed her face and eyes.  We will monitor and put her on a scheduled dose of Cogentin.          Psychiatric Review of Systems:  Behavior over the last 24 hours:  Improving  Sleep: normal  Appetite: adequate  Medication side effects: none verbalized  Medical ROS: Complete review of systems is negative except as noted above.            Vitals:  Vitals:    06/10/24 0717   BP: 118/69   Pulse: 99   Resp: 16   Temp: 98.8 °F (37.1 °C)   SpO2: 98%       Mental Status Exam:    Appearance:  Improving eye contact, appears stated age, casually dressed, and appropriate grooming and hygiene   Behavior:  calm and cooperative   Speech:  Improvement in volume and rate.  More easily understood   Mood:  Euthymic   Affect:  Patient exhibits a brighter  affect and smiles.   Thought Process:  She is more organized and understandable today   Thought Content:  Does not endorse any overt delusions or paranoia at this time.   Perceptual Disturbances: Does denies hallucinations today   Risk Potential: Suicidal ideation - None at present  Homicidal ideation - None at present  Potential for aggression - Yes she does have a history of aggressive behaviors   Sensorium:  oriented to person and place   Memory:  Unable to assess   Consciousness:  awake   Attention/Concentration: decreased concentration and decreased attention span   Insight:  improving   Judgment: improving and poor   Gait/Station: Normal gait/station   Motor Activity: No abnormal movements noted at this time however patient had been exhibiting what his thought is oculogyric eye movements.     Progress Toward Goals: Improving    Recommended Treatment: Continue with pharmacotherapy, group therapy, milieu therapy and occupational therapy.  Continue frequent safety checks and vitals per unit protocol. Continue with medical management as indicated. Continue coordinating with case management regarding disposition  1.  Add Cogentin 1 mg twice daily for EPS  2.  Discharge planning      Risks, benefits and possible side effects of Medications: Risks, benefits, and possible side effects of medications have been explained to the patient, who verbalizes understanding      Current Medications:  Current Facility-Administered Medications   Medication Dose Route Frequency Provider Last Rate    acetaminophen  650 mg Oral Q4H PRN BRINA Dominguez      acetaminophen  650 mg Oral Q4H PRN BRINA Dominguez      acetaminophen  975 mg Oral Q6H PRN BRINA Dominguez      aluminum-magnesium hydroxide-simethicone  30 mL Oral Q4H PRN BRINA Dominguez      basis sensitive skin   Topical Daily Brenda Ribera PA-C      benztropine  1 mg Intramuscular Q4H PRN Max 6/day BRINA Dominguez      benztropine  0.5 mg Oral Q4H PRN Max  6/day Elma Le, BRINA      bisacodyl  10 mg Rectal Daily PRN Elma Le, BRINA      divalproex sodium  750 mg Oral Q12H [facility] Wilfredo Lester, DO      hydrocortisone   Topical 4x Daily PRN Gilson Gonsalves MD      hydrOXYzine HCL  25 mg Oral Q6H PRN Max 4/day Elma Le, CRNP      hydrOXYzine HCL  50 mg Oral Q6H PRN Max 4/day Elma Le, CRJOSE ANGEL      lacosamide  150 mg Oral Q12H [facility] Latia Fitzgerald MD      lacosamide  50 mg Oral HS Wilfredo Lester, DO      levothyroxine  175 mcg Oral Daily Latia Fitzgerald MD      LORazepam  1 mg Intramuscular Q6H PRN Max 3/day Elma Le, BRINA      LORazepam  1 mg Oral Q6H PRN Max 3/day Elma eL, CRNP      OLANZapine  5 mg Intramuscular Q3H PRN Max 3/day Elma Le, CRNP      OLANZapine  2.5 mg Oral Q4H PRN Max 6/day Elma Le, CRNP      OLANZapine  5 mg Oral Q4H PRN Max 3/day Elma Le, CRNP      OLANZapine  5 mg Oral Q3H PRN Max 3/day Elma Le, CRJOSE ANGEL      paliperidone  9 mg Oral Daily Brenda Ribera PA-C      polyethylene glycol  17 g Oral Daily PRN Elma Le, CRNP      propranolol  5 mg Oral Q8H PRN Elma Le, ZULEYMANP      senna-docusate sodium  1 tablet Oral Daily PRN Elma Le, ZULEYMANP      traZODone  50 mg Oral HS PRN Elma Le, BRINA         Behavioral Health Medications:   all current active meds have been reviewed.    Laboratory results:  I have personally reviewed all pertinent laboratory/tests results.   No results found for this or any previous visit (from the past 48 hour(s)).           Wilfredo Lester DO 06/10/24

## 2024-06-10 NOTE — PROGRESS NOTES
06/10/24 0839   Team Meeting   Meeting Type Daily Rounds   Initial Conference Date 06/10/24   Team Members Present   Team Members Present Physician;Nurse;   Physician Team Member Khris   Nursing Team Member Alia   Care Management Team Member Lavon   Patient/Family Present   Patient Present No   Patient's Family Present No     Patient had a very positive family meeting on Saturday and was laughing and joking throughout. She was complaining about her eyes over the weekend and was given cogentin IM and it seemed to relax her face more. PO cogentin give at dinner time as well. She is calm and cooperative with care. She is visible and eating and sleeping well. Discharge is pending.

## 2024-06-11 ENCOUNTER — TELEPHONE (OUTPATIENT)
Dept: PSYCHIATRY | Facility: CLINIC | Age: 54
End: 2024-06-11

## 2024-06-11 PROCEDURE — 99232 SBSQ HOSP IP/OBS MODERATE 35: CPT | Performed by: PSYCHIATRY & NEUROLOGY

## 2024-06-11 RX ORDER — TRAZODONE HYDROCHLORIDE 50 MG/1
50 TABLET ORAL
Status: DISCONTINUED | OUTPATIENT
Start: 2024-06-11 | End: 2024-06-21 | Stop reason: HOSPADM

## 2024-06-11 RX ADMIN — LEVOTHYROXINE SODIUM 175 MCG: 0.15 TABLET ORAL at 06:00

## 2024-06-11 RX ADMIN — LACOSAMIDE 150 MG: 50 TABLET, FILM COATED ORAL at 21:17

## 2024-06-11 RX ADMIN — BENZTROPINE MESYLATE 1 MG: 1 TABLET ORAL at 08:32

## 2024-06-11 RX ADMIN — BENZTROPINE MESYLATE 1 MG: 1 TABLET ORAL at 17:07

## 2024-06-11 RX ADMIN — PALIPERIDONE 9 MG: 9 TABLET, EXTENDED RELEASE ORAL at 08:32

## 2024-06-11 RX ADMIN — LACOSAMIDE 50 MG: 50 TABLET, FILM COATED ORAL at 21:17

## 2024-06-11 RX ADMIN — DIVALPROEX SODIUM 750 MG: 250 TABLET, DELAYED RELEASE ORAL at 21:17

## 2024-06-11 RX ADMIN — Medication: at 08:54

## 2024-06-11 RX ADMIN — DIVALPROEX SODIUM 750 MG: 250 TABLET, DELAYED RELEASE ORAL at 08:32

## 2024-06-11 RX ADMIN — LACOSAMIDE 150 MG: 50 TABLET, FILM COATED ORAL at 08:32

## 2024-06-11 RX ADMIN — TRAZODONE HYDROCHLORIDE 50 MG: 50 TABLET ORAL at 21:17

## 2024-06-11 NOTE — DISCHARGE INSTR - APPOINTMENTS
Haven our Behavioral Health Nurse Navigator, will be calling you after your discharge, on the phone number that you provided.  She will be available as an additional support, if needed.   If you wish to speak with Haven, you may contact her at [phone].

## 2024-06-11 NOTE — NURSING NOTE
"Patient visible on unit pacing unit. Patient reported she \"heard people calling her name\" and that's why \"I need to keep walking\". When writer asked patient for more information, patient stated \"well you know my name but I don't know any of your names. I just know faces. No names\". Patient is med compliant. Patients appetite intact. VSS. No PRN's thus far. Able to make needs known. Continual safety checks ongoing  "

## 2024-06-11 NOTE — TELEPHONE ENCOUNTER
[employer] sent to [facility] [place] Location:  New pt. Appointment with Dr. Dyson  @ 9:30  Follow up  @ 11:30    Have a great week!

## 2024-06-11 NOTE — PLAN OF CARE
Problem: Alteration in Thoughts and Perception  Goal: Treatment Goal: Gain control of psychotic behaviors/thinking, reduce/eliminate presenting symptoms and demonstrate improved reality functioning upon discharge  Outcome: Progressing  Goal: Verbalize thoughts and feelings  Description: Interventions:  - Promote a nonjudgmental and trusting relationship with the patient through active listening and therapeutic communication  - Assess patient's level of functioning, behavior and potential for risk  - Engage patient in 1 on 1 interactions  - Encourage patient to express fears, feelings, frustrations, and discuss symptoms    - [place] patient to reality, help patient recognize reality-based thinking   - Administer medications as ordered and assess for potential side effects  - Provide the patient education related to the signs and symptoms of the illness and desired effects of prescribed medications  Outcome: Progressing  Goal: Refrain from acting on delusional thinking/internal stimuli  Description: Interventions:  - Monitor patient closely, per order   - Utilize least restrictive measures   - Set reasonable limits, give positive feedback for acceptable   - Administer medications as ordered and monitor of potential side effects  Outcome: Progressing  Goal: Agree to be compliant with medication regime, as prescribed and report medication side effects  Description: Interventions:  - Offer appropriate PRN medication and supervise ingestion; conduct AIMS, as needed   Outcome: Progressing     Problem: Anxiety  Goal: Anxiety is at manageable level  Description: Interventions:  - Assess and monitor patient's anxiety level.   - Monitor for signs and symptoms (heart palpitations, chest pain, shortness of breath, headaches, nausea, feeling jumpy, restlessness, irritable, apprehensive).   - Collaborate with interdisciplinary team and initiate plan and interventions as ordered.  - [place] patient to unit/surroundings  - Explain  treatment plan  - Encourage participation in care  - Encourage verbalization of concerns/fears  - Identify coping mechanisms  - Assist in developing anxiety-reducing skills  - Administer/offer alternative therapies  - Limit or eliminate stimulants  Outcome: Progressing

## 2024-06-11 NOTE — PROGRESS NOTES
Progress Note - Behavioral Health   Esperanza Prajapati 53 y.o. female MRN: [id number]  Unit/Bed#: [facility] 608-02 Encounter: [id number]    Assessment & Plan   Principal Problem:    Schizoaffective disorder (HCC)  Active Problems:    Cognitive developmental delay    History of hypertension    Hypothyroidism    Focal epilepsy (HCC)    Medical clearance for psychiatric admission    Dermatitis of face      Subjective: Patient was seen, chart was reviewed, and case was discussed with entire team.       Patient seen out milieu.  She walks right up and is eager to talk with writer.  Does not seem as guarded.  She is pleasant, calm and cooperative.  She has a brighter affect but not too bright.  She reports feeling better and still feels like she could be discharged by the end of the week.  She denies having any depression or anxiety.  She is endorsing having poor sleep however.  Patient says she has insomnia.  She reports the Cogentin helps with her oculogyric movements.  She is eating adequately.  Patient is medication compliant.  She has not exhibited any behavioral outburst or aggressive behaviors.        Psychiatric Review of Systems:  Behavior over the last 24 hours:  Improving  Sleep: normal  Appetite: adequate  Medication side effects: none verbalized  Medical ROS: Complete review of systems is negative except as noted above.            Vitals:  Vitals:    06/11/24 0741   BP: 109/66   Pulse: 96   Resp: 18   Temp: 97.7 °F (36.5 °C)   SpO2: 96%       Mental Status Exam:    Appearance:  Improving eye contact, appears stated age, casually dressed, and appropriate grooming and hygiene   Behavior:  calm and cooperative   Speech:  Improvement in volume and rate.  More easily understood   Mood:  Euthymic   Affect:  Patient exhibits a brighter affect and smiles.   Thought Process:  She is more organized and understandable today   Thought Content:  Does not endorse any overt delusions or paranoia at this time.    Perceptual Disturbances: Does denies hallucinations today   Risk Potential: Suicidal ideation - None at present  Homicidal ideation - None at present  Potential for aggression - Yes she does have a history of aggressive behaviors   Sensorium:  oriented to person and place   Memory:  Unable to assess   Consciousness:  awake   Attention/Concentration: decreased concentration and decreased attention span   Insight:  improving   Judgment: improving and poor   Gait/Station: Normal gait/station   Motor Activity: Patient no longer seems to exhibit the oculogyric movements since adding Cogentin     Progress Toward Goals: Improving    Recommended Treatment: Continue with pharmacotherapy, group therapy, milieu therapy and occupational therapy.  Continue frequent safety checks and vitals per unit protocol. Continue with medical management as indicated. Continue coordinating with case management regarding disposition  1.  We will add trazodone 50 mg at bedtime for her sleep.  2.  Discharge planning      Risks, benefits and possible side effects of Medications: Risks, benefits, and possible side effects of medications have been explained to the patient, who verbalizes understanding      Current Medications:  Current Facility-Administered Medications   Medication Dose Route Frequency Provider Last Rate    acetaminophen  650 mg Oral Q4H PRN BRINA Dominguez      acetaminophen  650 mg Oral Q4H PRN BRINA Dominguez      acetaminophen  975 mg Oral Q6H PRN BRINA Dominguez      aluminum-magnesium hydroxide-simethicone  30 mL Oral Q4H PRN BRINA Dominguez      basis sensitive skin   Topical Daily Brenda Ribera PA-C      benztropine  1 mg Intramuscular Q4H PRN Max 6/day BRINA Dominguez      benztropine  0.5 mg Oral Q4H PRN Max 6/day BRINA Dominguez      benztropine  1 mg Oral BID Wilfredo Lester DO      bisacodyl  10 mg Rectal Daily PRN BRINA Dominguez      divalproex sodium  750 mg Oral Q12H [facility] Wilfredo CAMERON  Trev[place], DO      hydrocortisone   Topical 4x Daily PRN Gilson Gonsalves MD      hydrOXYzine HCL  25 mg Oral Q6H PRN Max 4/day Elma Le, CRNP      hydrOXYzine HCL  50 mg Oral Q6H PRN Max 4/day Elma Le, CRNP      lacosamide  150 mg Oral Q12H [facility] Latia Fitzgerald MD      lacosamide  50 mg Oral HS Wilfredo Lester, DO      levothyroxine  175 mcg Oral Daily Latia Fitzgerald MD      LORazepam  1 mg Intramuscular Q6H PRN Max 3/day Elma Le, CRNP      LORazepam  1 mg Oral Q6H PRN Max 3/day Elmamelissa Le, CRNP      OLANZapine  5 mg Intramuscular Q3H PRN Max 3/day Elma Le, CRNP      OLANZapine  2.5 mg Oral Q4H PRN Max 6/day Elma Le, CRNP      OLANZapine  5 mg Oral Q4H PRN Max 3/day lEma Le, CRNP      OLANZapine  5 mg Oral Q3H PRN Max 3/day Elma Le, CRNP      paliperidone  9 mg Oral Daily Brenda Ribera PA-C      polyethylene glycol  17 g Oral Daily PRN Elma Le, CRNP      propranolol  5 mg Oral Q8H PRN Elma Le, CRNP      senna-docusate sodium  1 tablet Oral Daily PRN Elma Le, CRNP      traZODone  50 mg Oral HS PRN Elma Le, CRNP         Behavioral Health Medications:   all current active meds have been reviewed.    Laboratory results:  I have personally reviewed all pertinent laboratory/tests results.   No results found for this or any previous visit (from the past 48 hour(s)).           Wilfredo Lester DO 06/11/24

## 2024-06-11 NOTE — PROGRESS NOTES
06/11/24 0839   Team Meeting   Meeting Type Daily Rounds   Initial Conference Date 06/11/24   Team Members Present   Team Members Present Physician;Nurse   Physician Team Member Khris   Nursing Team Member Vijaya   Care Management Team Member Lavon   Patient/Family Present   Patient Present No   Patient's Family Present No     Patient is endorsing some AH but is also not responding internally anymore and has been more visible. The cogentin seems to be helping and her eyes are no longer rolling in the back of her head. Discharge is pending and possible for Friday.

## 2024-06-11 NOTE — PLAN OF CARE
Pt attends group and visible.     Problem: Ineffective Coping  Goal: Participates in unit activities  Description: Interventions:  - Provide therapeutic environment   - Provide required programming   - Redirect inappropriate behaviors   Outcome: Progressing

## 2024-06-11 NOTE — PROGRESS NOTES
Pt attended groups.  Pt RIS and as day progressed more focused and making slow progress. Pt able to stay for duration.  Pt needed prompting/encouragement to engage and then did make a inspirational bookmark and diligently coloring in the lines and pleased with her color choices.         06/11/24 1100   Activity/Group Checklist   Group Other (Comment)  (creative expression)   Attendance Attended   Attendance Duration (min) 46-60   Interactions Interacted appropriately   Affect/Mood Appropriate;Calm   Goals Achieved Identified feelings;Able to listen to others;Able to engage in interactions;Discussed coping strategies;Discussed self-esteem issues;Verbalized increased hopefulness;Able to manage/cope with feelings;Able to reflect/comment on own behavior;Able to self-disclose;Able to recieve feedback

## 2024-06-11 NOTE — NURSING NOTE
Pt visible in day room this evening, pleasant, but guarded on approach. Pt denies depression and anxiety. Pt reports hearing voices, however, states they don't say anything. Reports last BM 6/10. Denies unmet needs. Medication compliant. Will maintain q7min checks.

## 2024-06-12 PROCEDURE — 99232 SBSQ HOSP IP/OBS MODERATE 35: CPT | Performed by: PSYCHIATRY & NEUROLOGY

## 2024-06-12 RX ORDER — BENZTROPINE MESYLATE 2 MG/1
2 TABLET ORAL 2 TIMES DAILY
Status: DISCONTINUED | OUTPATIENT
Start: 2024-06-12 | End: 2024-06-21 | Stop reason: HOSPADM

## 2024-06-12 RX ORDER — PALIPERIDONE 6 MG/1
12 TABLET, EXTENDED RELEASE ORAL DAILY
Status: DISCONTINUED | OUTPATIENT
Start: 2024-06-13 | End: 2024-06-18

## 2024-06-12 RX ORDER — PALIPERIDONE 3 MG/1
3 TABLET, EXTENDED RELEASE ORAL ONCE
Status: COMPLETED | OUTPATIENT
Start: 2024-06-12 | End: 2024-06-12

## 2024-06-12 RX ORDER — BENZTROPINE MESYLATE 1 MG/1
1 TABLET ORAL ONCE
Status: COMPLETED | OUTPATIENT
Start: 2024-06-12 | End: 2024-06-12

## 2024-06-12 RX ADMIN — BENZTROPINE MESYLATE 1 MG: 1 TABLET ORAL at 13:50

## 2024-06-12 RX ADMIN — LACOSAMIDE 150 MG: 50 TABLET, FILM COATED ORAL at 21:00

## 2024-06-12 RX ADMIN — LACOSAMIDE 150 MG: 50 TABLET, FILM COATED ORAL at 08:07

## 2024-06-12 RX ADMIN — DIVALPROEX SODIUM 750 MG: 250 TABLET, DELAYED RELEASE ORAL at 21:00

## 2024-06-12 RX ADMIN — PALIPERIDONE 9 MG: 9 TABLET, EXTENDED RELEASE ORAL at 08:07

## 2024-06-12 RX ADMIN — BENZTROPINE MESYLATE 2 MG: 2 TABLET ORAL at 17:03

## 2024-06-12 RX ADMIN — Medication: at 09:25

## 2024-06-12 RX ADMIN — LEVOTHYROXINE SODIUM 175 MCG: 0.15 TABLET ORAL at 06:22

## 2024-06-12 RX ADMIN — PALIPERIDONE 3 MG: 3 TABLET, EXTENDED RELEASE ORAL at 10:43

## 2024-06-12 RX ADMIN — LACOSAMIDE 50 MG: 50 TABLET, FILM COATED ORAL at 21:00

## 2024-06-12 RX ADMIN — DIVALPROEX SODIUM 750 MG: 250 TABLET, DELAYED RELEASE ORAL at 08:07

## 2024-06-12 RX ADMIN — BENZTROPINE MESYLATE 1 MG: 1 TABLET ORAL at 08:07

## 2024-06-12 RX ADMIN — TRAZODONE HYDROCHLORIDE 50 MG: 50 TABLET ORAL at 21:00

## 2024-06-12 NOTE — CASE MANAGEMENT
"Cm reached out to patient's brother Buddy and he shared that he has noticed a change in his sister's voice and her paranoia when she calls him daily. He shared that before she would make references to the \"rainbow people\" and would act as though she couldn't say something out of paranoid that someone was listening. Lately, she has not been mentioning that and has been calmer, clearer, and easier to understand. He is okay with a Friday discharge and would like to pick her up. Cm explained that the potential was there for Friday but that the team would talk about it again on Friday morning. Brother has also been in the middle of filling out [facility] paperwork and will be able to take her to any appointments that she needs. He explained that [place] wouldn't be a problem and that they will make it work. Cm to speak with brother again once a discharge plan is fully established.     Buddy Palm (Brother)   [phone] (M)   "

## 2024-06-12 NOTE — PROGRESS NOTES
"      Progress Note - Behavioral Health   Esperanza Prajapati 53 y.o. female MRN: [id number]  Unit/Bed#: [facility] 608-02 Encounter: [id number]    Assessment & Plan   Principal Problem:    Schizoaffective disorder (HCC)  Active Problems:    Cognitive developmental delay    History of hypertension    Hypothyroidism    Focal epilepsy (HCC)    Medical clearance for psychiatric admission    Dermatitis of face      Subjective: Patient was seen, chart was reviewed, and case was discussed with entire team.       Patient approached this writer out in the [place].  The patient does not look good today.  She is irritable, easily agitated.  She is endorsing auditory and visual hallucinations.  Talks about [place] people.  She says she is known them for many years and does not understand why they are doing this to her.  She looks at writer says \"I know you know you are in the video\" she also looks at other staff reporting that they also were in those videos from years ago.  She is also talks about how her eye movements have still been happening.  Whereas before she denied they were still happening.  Patient is stressing that she wants out by Friday because as before Father's Day and she needs to be there.  She reports poor sleep.  Reports having a good appetite.  She is medication compliant.        Psychiatric Review of Systems:  Behavior over the last 24 hours:  Improving  Sleep: normal  Appetite: adequate  Medication side effects: none verbalized  Medical ROS: Complete review of systems is negative except as noted above.            Vitals:  Vitals:    06/12/24 0727   BP: 122/78   Pulse: 93   Resp: 18   Temp: 97.9 °F (36.6 °C)   SpO2: 95%       Mental Status Exam:    Appearance:  appears stated age, casually dressed, and disheveled   Behavior:  calm and cooperative   Speech:  Improvement in volume and rate.   Mood:  Irritable   Affect:  Constricted   Thought Process:  She is more organized and understandable today   Thought " "Content:  Patient once again is exhibiting paranoia and bizarre delusions.   Perceptual Disturbances: Reports auditory and visual hallucinations of \"[place] people\".  Patient is responding to internal stimuli and internally preoccupied.   Risk Potential: Suicidal ideation - None at present  Homicidal ideation - None at present  Potential for aggression - Yes she does have a history of aggressive behaviors   Sensorium:  oriented to person and place   Memory:  Unable to assess   Consciousness:  awake   Attention/Concentration: decreased concentration and decreased attention span   Insight:  limited   Judgment: limited   Gait/Station: Normal gait/station   Motor Activity: Today the patient seems to be exhibiting same eye movements.     Progress Toward Goals: Regressed    Recommended Treatment: Continue with pharmacotherapy, group therapy, milieu therapy and occupational therapy.  Continue frequent safety checks and vitals per unit protocol. Continue with medical management as indicated. Continue coordinating with case management regarding disposition  1.  Invega 3 mg once, increase Invega to 12 mg daily starting tomorrow.  2.  Discharge planning      Risks, benefits and possible side effects of Medications: Risks, benefits, and possible side effects of medications have been explained to the patient, who verbalizes understanding      Current Medications:  Current Facility-Administered Medications   Medication Dose Route Frequency Provider Last Rate    acetaminophen  650 mg Oral Q4H PRN BRINA Dominguez      acetaminophen  650 mg Oral Q4H PRN BRINA Dominguez      acetaminophen  975 mg Oral Q6H PRN BRINA Dominguez      aluminum-magnesium hydroxide-simethicone  30 mL Oral Q4H PRN BRINA Dominguez      basis sensitive skin   Topical Daily Brenda Ribera PA-C      benztropine  1 mg Intramuscular Q4H PRN Max 6/day BRINA Dominguez      benztropine  0.5 mg Oral Q4H PRN Max 6/day BRINA Dominguez      " benztropine  1 mg Oral BID Wilfredo Lester, DO      bisacodyl  10 mg Rectal Daily PRN Elma Le, CRNP      divalproex sodium  750 mg Oral Q12H [facility] Wilfredo Lester, DO      hydrocortisone   Topical 4x Daily PRN Gilson Gonsalves MD      hydrOXYzine HCL  25 mg Oral Q6H PRN Max 4/day Elma Mimi, CRNP      hydrOXYzine HCL  50 mg Oral Q6H PRN Max 4/day Elma Le, CRNP      lacosamide  150 mg Oral Q12H [facility] Latia Fitzgerald MD      lacosamide  50 mg Oral HS Wilfredo Lester, DO      levothyroxine  175 mcg Oral Daily Latia Fitzgerald MD      LORazepam  1 mg Intramuscular Q6H PRN Max 3/day Elma Mimi, CRNP      LORazepam  1 mg Oral Q6H PRN Max 3/day Elma Mimi, CRNP      OLANZapine  5 mg Intramuscular Q3H PRN Max 3/day Elma Mimi, CRNP      OLANZapine  2.5 mg Oral Q4H PRN Max 6/day Elma Mimi, CRNP      OLANZapine  5 mg Oral Q4H PRN Max 3/day Elma Mimi, CRNP      OLANZapine  5 mg Oral Q3H PRN Max 3/day Elma Mimi, CRNP      paliperidone  9 mg Oral Daily Brenda Ribera PA-C      polyethylene glycol  17 g Oral Daily PRN Elma Talbothuseyin, CRNP      propranolol  5 mg Oral Q8H PRN Elma Mimi, CRNP      senna-docusate sodium  1 tablet Oral Daily PRN Elma Mimi, CRNP      traZODone  50 mg Oral HS PRN Elma Le, CRNP      traZODone  50 mg Oral HS Wilfredo Lester, DO         Behavioral Health Medications:   all current active meds have been reviewed.    Laboratory results:  I have personally reviewed all pertinent laboratory/tests results.   No results found for this or any previous visit (from the past 48 hour(s)).           Wilfredo Lester,  06/12/24

## 2024-06-12 NOTE — PROGRESS NOTES
"Pt attended ALL groups.  Pt  guarded and denied knowing about anger symptoms and smiling.  Pt indicated she can not read English or Latvian  (when worksheet provided of word bank).  Pt also noted she can not verbalize her feelings.  Pt smiled that she does curse when outside of hospital \"everyone does that\" (smiling) and bottles her anger. Pt able to stay for duration.  Less RIS noted and more focused/ improved eye contact. Pt RIS more in afternoon.     06/12/24 1100   Activity/Group Checklist   Group Anger management   Attendance Attended   Attendance Duration (min) 31-45   Interactions Interacted appropriately   Affect/Mood Incongruent   Goals Achieved Identified feelings;Identified relapse prevention strategies;Discussed self-esteem issues;Discussed coping strategies;Able to listen to others;Able to engage in interactions;Able to reflect/comment on own behavior;Verbalized increased hopefulness;Able to manage/cope with feelings;Able to self-disclose;Able to recieve feedback          "

## 2024-06-12 NOTE — PLAN OF CARE
Problem: Alteration in Thoughts and Perception  Goal: Treatment Goal: Gain control of psychotic behaviors/thinking, reduce/eliminate presenting symptoms and demonstrate improved reality functioning upon discharge  Outcome: Progressing  Goal: Verbalize thoughts and feelings  Description: Interventions:  - Promote a nonjudgmental and trusting relationship with the patient through active listening and therapeutic communication  - Assess patient's level of functioning, behavior and potential for risk  - Engage patient in 1 on 1 interactions  - Encourage patient to express fears, feelings, frustrations, and discuss symptoms    - [place] patient to reality, help patient recognize reality-based thinking   - Administer medications as ordered and assess for potential side effects  - Provide the patient education related to the signs and symptoms of the illness and desired effects of prescribed medications  Outcome: Progressing  Goal: Refrain from acting on delusional thinking/internal stimuli  Description: Interventions:  - Monitor patient closely, per order   - Utilize least restrictive measures   - Set reasonable limits, give positive feedback for acceptable   - Administer medications as ordered and monitor of potential side effects  Outcome: Progressing  Goal: Agree to be compliant with medication regime, as prescribed and report medication side effects  Description: Interventions:  - Offer appropriate PRN medication and supervise ingestion; conduct AIMS, as needed   Outcome: Progressing  Goal: Recognize dysfunctional thoughts, communicate reality-based thoughts at the time of discharge  Description: Interventions:  - Provide medication and psycho-education to assist patient in compliance and developing insight into his/her illness   Outcome: Progressing     Problem: Anxiety  Goal: Anxiety is at manageable level  Description: Interventions:  - Assess and monitor patient's anxiety level.   - Monitor for signs and symptoms  (heart palpitations, chest pain, shortness of breath, headaches, nausea, feeling jumpy, restlessness, irritable, apprehensive).   - Collaborate with interdisciplinary team and initiate plan and interventions as ordered.  - [place] patient to unit/surroundings  - Explain treatment plan  - Encourage participation in care  - Encourage verbalization of concerns/fears  - Identify coping mechanisms  - Assist in developing anxiety-reducing skills  - Administer/offer alternative therapies  - Limit or eliminate stimulants  Outcome: Progressing

## 2024-06-12 NOTE — PROGRESS NOTES
"   06/12/24 0838   Team Meeting   Meeting Type Daily Rounds   Initial Conference Date 06/12/24   Team Members Present   Team Members Present Physician;Nurse;;Other (Discipline and Name)   Physician Team Member Khris   Nursing Team Member Araceli   Care Management Team Member Lavon   Other (Discipline and Name) Peter-Pharmacy   Patient/Family Present   Patient Present No   Patient's Family Present No     Patient endorses depression and anxiety. She is denying AH/VH but is responding at time to internal stimuli. She mentioned the \"[place] people\" yesterday. She is eating and sleeping well and is medication compliant. Discharge is pending.   "

## 2024-06-12 NOTE — NURSING NOTE
Pt is calm, cooperative and visible on unit. Pt reports AH/VH, depression and anxiety; denies SI/HI. Noted to be responding to internal stimuli during assessment questions. Compliant with medications and meals. Social with staff and peers. Will maintain q7 min checks.

## 2024-06-12 NOTE — NURSING NOTE
"Patient endorses feeling \"so-so\" today. Patient seen rolling her eyes up. Writer asked patient what's going on with her eyes, patient stated \"they are bad to me\". Patient med and meal compliant. Patient pleasant. Denies SI/HI. VSS. Continual safety checks ongoing  "

## 2024-06-12 NOTE — NURSING NOTE
"Patient is awake and visible in the dayroom. Upon assessment patient observed responding to internal stimuli, talking to self. Patient stated \"[place] groups people, I don't know what news they are giving. I feel like what is said on the news is about me\". Patient reports anxiety and depression, denies all other psych s/s. Medication compliant and cooperative with care. Safety precautions maintained.  "

## 2024-06-13 PROCEDURE — 99232 SBSQ HOSP IP/OBS MODERATE 35: CPT | Performed by: PSYCHIATRY & NEUROLOGY

## 2024-06-13 RX ADMIN — DIVALPROEX SODIUM 750 MG: 250 TABLET, DELAYED RELEASE ORAL at 21:05

## 2024-06-13 RX ADMIN — LEVOTHYROXINE SODIUM 175 MCG: 0.15 TABLET ORAL at 05:52

## 2024-06-13 RX ADMIN — Medication: at 08:48

## 2024-06-13 RX ADMIN — TRAZODONE HYDROCHLORIDE 50 MG: 50 TABLET ORAL at 21:05

## 2024-06-13 RX ADMIN — DIVALPROEX SODIUM 750 MG: 250 TABLET, DELAYED RELEASE ORAL at 08:43

## 2024-06-13 RX ADMIN — PALIPERIDONE 12 MG: 6 TABLET, EXTENDED RELEASE ORAL at 08:43

## 2024-06-13 RX ADMIN — LACOSAMIDE 50 MG: 50 TABLET, FILM COATED ORAL at 21:05

## 2024-06-13 RX ADMIN — BENZTROPINE MESYLATE 2 MG: 2 TABLET ORAL at 08:43

## 2024-06-13 RX ADMIN — LACOSAMIDE 150 MG: 50 TABLET, FILM COATED ORAL at 21:06

## 2024-06-13 RX ADMIN — BENZTROPINE MESYLATE 2 MG: 2 TABLET ORAL at 17:35

## 2024-06-13 RX ADMIN — LACOSAMIDE 150 MG: 50 TABLET, FILM COATED ORAL at 08:43

## 2024-06-13 NOTE — PROGRESS NOTES
"            Progress Note - Behavioral Health   Esperanza Prajapati 53 y.o. female MRN: [id number]  Unit/Bed#: [facility] 608-02 Encounter: [id number]    Assessment & Plan   Principal Problem:    Schizoaffective disorder (HCC)  Active Problems:    Cognitive developmental delay    History of hypertension    Hypothyroidism    Focal epilepsy (HCC)    Medical clearance for psychiatric admission    Dermatitis of face      Subjective: Patient was seen, chart was reviewed, and case was discussed with entire team.     Patient seen out in day room.  Patient looks a little better than yesterday.  She is with a brighter affect today.  The patient is still focused on being discharged on Friday however this patient is still having hallucinations and referring to \"[place] people\" and how they are being bad to her.  She reports that she has not had the eye movements lately.  She is worried about her hand shaking.  She reports sleeping better and eating well.  She is medication compliant.            Psychiatric Review of Systems:  Behavior over the last 24 hours:  Unchanged  Sleep: normal  Appetite: adequate  Medication side effects: tremor  Medical ROS: Complete review of systems is negative except as noted above.            Vitals:  Vitals:    06/13/24 0752   BP: 126/87   Pulse: 102   Resp: 20   Temp: 97.5 °F (36.4 °C)   SpO2: 96%       Mental Status Exam:    Appearance:  appears stated age, casually dressed, and disheveled   Behavior:  calm and cooperative   Speech:  normal volume and rate.   Mood:  Irritable   Affect:  Constricted can brighten on approach   Thought Process:  Goal directed   Thought Content:  Patient is exhibiting paranoia and bizarre delusions.   Perceptual Disturbances: Reports auditory and visual hallucinations of \"[place] people\".  Patient is responding to internal stimuli and internally preoccupied.   Risk Potential: Suicidal ideation - None at present  Homicidal ideation - None at present  Potential for " aggression - Yes she does have a history of aggressive behaviors   Sensorium:  oriented to person and place   Memory:  Unable to assess   Consciousness:  awake   Attention/Concentration: decreased concentration and decreased attention span   Insight:  limited   Judgment: limited   Gait/Station: Normal gait/station   Motor Activity: Today the patient seems to be exhibiting same eye movements.     Progress Toward Goals: Regressed    Recommended Treatment: Continue with pharmacotherapy, group therapy, milieu therapy and occupational therapy.  Continue frequent safety checks and vitals per unit protocol. Continue with medical management as indicated. Continue coordinating with case management regarding disposition  1.  Increase Cogentin to 2 mg twice daily.    2.  Discharge planning      Risks, benefits and possible side effects of Medications: Risks, benefits, and possible side effects of medications have been explained to the patient, who verbalizes understanding      Current Medications:  Current Facility-Administered Medications   Medication Dose Route Frequency Provider Last Rate    acetaminophen  650 mg Oral Q4H PRN BRINA Dominguez      acetaminophen  650 mg Oral Q4H PRN BRINA Dominguez      acetaminophen  975 mg Oral Q6H PRN BRINA Dominguez      aluminum-magnesium hydroxide-simethicone  30 mL Oral Q4H PRN BRINA Dominguez      basis sensitive skin   Topical Daily Brenda Ribera PA-C      benztropine  1 mg Intramuscular Q4H PRN Max 6/day BRINA Dominguez      benztropine  0.5 mg Oral Q4H PRN Max 6/day BRINA Dominguez      benztropine  2 mg Oral BID Wilfredo Lester, DO      bisacodyl  10 mg Rectal Daily PRN BRINA Dominguez      divalproex sodium  750 mg Oral Q12H [facility] Wilfredo Lester, DO      hydrocortisone   Topical 4x Daily PRN Gilson Gonsalves MD      hydrOXYzine HCL  25 mg Oral Q6H PRN Max 4/day BRINA Dominguez      hydrOXYzine HCL  50 mg Oral Q6H PRN Max 4/day BRINA Dominguez       lacosamide  150 mg Oral Q12H [facility] Latia Fitzgerald MD      lacosamide  50 mg Oral HS Wilfredokelsey Shankaron, DO      levothyroxine  175 mcg Oral Daily Latia Fitzgerald MD      LORazepam  1 mg Intramuscular Q6H PRN Max 3/day Elma Le, ZULEYMANP      LORazepam  1 mg Oral Q6H PRN Max 3/day Elma Le, CRNP      OLANZapine  5 mg Intramuscular Q3H PRN Max 3/day Elma Le, CRNP      OLANZapine  2.5 mg Oral Q4H PRN Max 6/day Elma Le, CRNP      OLANZapine  5 mg Oral Q4H PRN Max 3/day Elma Le, CRNP      OLANZapine  5 mg Oral Q3H PRN Max 3/day Elma Le, BRINA      paliperidone  12 mg Oral Daily Wilfredo RAKESH Shankaron, DO      polyethylene glycol  17 g Oral Daily PRN Elma Le, ZULEYMANP      propranolol  5 mg Oral Q8H PRN Elma Le, BRINA      senna-docusate sodium  1 tablet Oral Daily PRN BRINA Dominguez      traZODone  50 mg Oral HS PRN Elma Le, BRINA      traZODone  50 mg Oral HS Wilfredo Lester, DO         Behavioral Health Medications:   all current active meds have been reviewed.    Laboratory results:  I have personally reviewed all pertinent laboratory/tests results.   No results found for this or any previous visit (from the past 48 hour(s)).           Wilfredo Lester, DO 06/13/24

## 2024-06-13 NOTE — PROGRESS NOTES
"   06/13/24 0829   Team Meeting   Meeting Type Daily Rounds   Initial Conference Date 06/13/24   Team Members Present   Team Members Present Physician;Nurse;;Occupational Therapist   Physician Team Member Trevon/Mimi   Nursing Team Member Araceli   Care Management Team Member Lavon ALMAZAN Team Member Whitney   Patient/Family Present   Patient Present No   Patient's Family Present No     Patient continues to have issues with her eyes and is again responding to internal stimuli and stating things like \"they are being bad to me.\" She is compliant with her medications. She is attending groups, eating and sleeping well. Cogentin was increased as was Invega yesterday. Discharge is pending.   "

## 2024-06-13 NOTE — NURSING NOTE
"Patient visible on unit. Patient seen in dayroom responding to internal stimuli. Patient is bright in affect but also appears guarded or suspicious. Patient asks \"why?\" Each time writer asks how patient's  day is going or how patient is doing. Patient is med and meal compliant. VSS. Patient denies unmet needs. Denies SI/HI. Continual safety checks ongoing  "

## 2024-06-13 NOTE — PLAN OF CARE
Problem: Alteration in Thoughts and Perception  Goal: Treatment Goal: Gain control of psychotic behaviors/thinking, reduce/eliminate presenting symptoms and demonstrate improved reality functioning upon discharge  Outcome: Progressing  Goal: Verbalize thoughts and feelings  Description: Interventions:  - Promote a nonjudgmental and trusting relationship with the patient through active listening and therapeutic communication  - Assess patient's level of functioning, behavior and potential for risk  - Engage patient in 1 on 1 interactions  - Encourage patient to express fears, feelings, frustrations, and discuss symptoms    - [place] patient to reality, help patient recognize reality-based thinking   - Administer medications as ordered and assess for potential side effects  - Provide the patient education related to the signs and symptoms of the illness and desired effects of prescribed medications  Outcome: Progressing  Goal: Refrain from acting on delusional thinking/internal stimuli  Description: Interventions:  - Monitor patient closely, per order   - Utilize least restrictive measures   - Set reasonable limits, give positive feedback for acceptable   - Administer medications as ordered and monitor of potential side effects  Outcome: Progressing  Goal: Agree to be compliant with medication regime, as prescribed and report medication side effects  Description: Interventions:  - Offer appropriate PRN medication and supervise ingestion; conduct AIMS, as needed   Outcome: Progressing     Problem: Anxiety  Goal: Anxiety is at manageable level  Description: Interventions:  - Assess and monitor patient's anxiety level.   - Monitor for signs and symptoms (heart palpitations, chest pain, shortness of breath, headaches, nausea, feeling jumpy, restlessness, irritable, apprehensive).   - Collaborate with interdisciplinary team and initiate plan and interventions as ordered.  - [place] patient to unit/surroundings  - Explain  treatment plan  - Encourage participation in care  - Encourage verbalization of concerns/fears  - Identify coping mechanisms  - Assist in developing anxiety-reducing skills  - Administer/offer alternative therapies  - Limit or eliminate stimulants  Outcome: Progressing

## 2024-06-13 NOTE — PROGRESS NOTES
06/13/24 1000 06/13/24 1030 06/13/24 1330   Activity/Group Checklist   Group Community meeting Admission/Discharge  (Began relapse prevention plan.  Will need to complete at a later date) Life Skills  (Improving mental health game)   Attendance Attended Attended Attended   Attendance Duration (min) 16-30 0-15 46-60   Interactions Interacted appropriately Interacted appropriately  (Pt confused by some questions. Rresponding to internal stimuli.) Interacted appropriately   Affect/Mood Appropriate;Calm Appropriate Appropriate   Goals Achieved Able to engage in interactions;Able to listen to others Identified feelings;Discussed coping strategies;Identified resources and support systems;Able to listen to others;Able to engage in interactions Able to engage in interactions;Able to listen to others  (Participated in game. Did not comprehend the topic of discussion)

## 2024-06-14 DIAGNOSIS — E55.9 VITAMIN D DEFICIENCY: ICD-10-CM

## 2024-06-14 PROCEDURE — 99232 SBSQ HOSP IP/OBS MODERATE 35: CPT | Performed by: PSYCHIATRY & NEUROLOGY

## 2024-06-14 RX ORDER — ERGOCALCIFEROL 1.25 MG/1
50000 CAPSULE ORAL WEEKLY
Qty: 12 CAPSULE | Refills: 0 | Status: SHIPPED | OUTPATIENT
Start: 2024-06-14 | End: 2024-06-21

## 2024-06-14 RX ADMIN — DIVALPROEX SODIUM 750 MG: 250 TABLET, DELAYED RELEASE ORAL at 21:19

## 2024-06-14 RX ADMIN — LACOSAMIDE 150 MG: 50 TABLET, FILM COATED ORAL at 08:36

## 2024-06-14 RX ADMIN — TRAZODONE HYDROCHLORIDE 50 MG: 50 TABLET ORAL at 21:18

## 2024-06-14 RX ADMIN — DIVALPROEX SODIUM 750 MG: 250 TABLET, DELAYED RELEASE ORAL at 08:36

## 2024-06-14 RX ADMIN — PALIPERIDONE 12 MG: 6 TABLET, EXTENDED RELEASE ORAL at 08:36

## 2024-06-14 RX ADMIN — BENZTROPINE MESYLATE 2 MG: 2 TABLET ORAL at 08:36

## 2024-06-14 RX ADMIN — BENZTROPINE MESYLATE 2 MG: 2 TABLET ORAL at 17:04

## 2024-06-14 RX ADMIN — LEVOTHYROXINE SODIUM 175 MCG: 0.15 TABLET ORAL at 06:18

## 2024-06-14 RX ADMIN — Medication: at 08:55

## 2024-06-14 RX ADMIN — LACOSAMIDE 150 MG: 50 TABLET, FILM COATED ORAL at 21:19

## 2024-06-14 RX ADMIN — LACOSAMIDE 50 MG: 50 TABLET, FILM COATED ORAL at 21:19

## 2024-06-14 NOTE — NURSING NOTE
Pt visible on the unit. Pt present amongst peers but keeps to self. Noted responding to internal stimuli. Safety checks maintained.

## 2024-06-14 NOTE — PROGRESS NOTES
"      Progress Note - Behavioral Health   Esperanza Prajapati 53 y.o. female MRN: [id number]  Unit/Bed#: [facility] 608-02 Encounter: [id number]    Assessment & Plan   Principal Problem:    Schizoaffective disorder (HCC)  Active Problems:    Cognitive developmental delay    History of hypertension    Hypothyroidism    Focal epilepsy (HCC)    Medical clearance for psychiatric admission    Dermatitis of face      Subjective: Patient was seen, chart was reviewed, and case was discussed with entire team.       Patient seen in the [place] outside of her room.  Patient is upset not being discharged today.  Explained that the patient still needs treatment over the weekend.  She denies any eye movements but soon after she does she has one.  She does seem like she still internally preoccupied.  When asked about \"[place] people\", the patient just waves her hand and does not provide any more information.  She does not look as tremulous today as she did yesterday and she also denies having tremors.  Patient is upset because she will miss Father's Day celebration at home.  Patient reports she did sleep and reports her appetite is well.  She is medication compliant        Psychiatric Review of Systems:  Behavior over the last 24 hours:  Unchanged  Sleep: normal  Appetite: adequate  Medication side effects: tremor  Medical ROS: Complete review of systems is negative except as noted above.            Vitals:  Vitals:    06/14/24 0730   BP:    Pulse: 99   Resp: 19   Temp: 97.8 °F (36.6 °C)   SpO2: 98%       Mental Status Exam:    Appearance:  appears stated age, casually dressed, and disheveled   Behavior:  calm and cooperative   Speech:  normal volume and rate.   Mood:  Irritable   Affect:  Constricted can brighten on approach   Thought Process:  Goal directed   Thought Content:  Patient is exhibiting paranoia and bizarre delusions.   Perceptual Disturbances:  Patient is responding to internal stimuli and internally preoccupied.   Risk " Potential: Suicidal ideation - None at present  Homicidal ideation - None at present  Potential for aggression - Yes she does have a history of aggressive behaviors   Sensorium:  oriented to person and place   Memory:  Unable to assess   Consciousness:  awake   Attention/Concentration: decreased concentration and decreased attention span   Insight:  limited   Judgment: limited   Gait/Station: Normal gait/station   Motor Activity: Today the patient seems to be exhibiting same eye movements.     Progress Toward Goals: Regressed    Recommended Treatment: Continue with pharmacotherapy, group therapy, milieu therapy and occupational therapy.  Continue frequent safety checks and vitals per unit protocol. Continue with medical management as indicated. Continue coordinating with case management regarding disposition  1.  We will continue current medications and treatments for now  2.  Discharge planning      Risks, benefits and possible side effects of Medications: Risks, benefits, and possible side effects of medications have been explained to the patient, who verbalizes understanding      Current Medications:  Current Facility-Administered Medications   Medication Dose Route Frequency Provider Last Rate    acetaminophen  650 mg Oral Q4H PRN BRINA Dominguez      acetaminophen  650 mg Oral Q4H PRN BRINA Dominguez      acetaminophen  975 mg Oral Q6H PRN BRINA Dominguez      aluminum-magnesium hydroxide-simethicone  30 mL Oral Q4H PRN BRINA Dominguez      basis sensitive skin   Topical Daily Brenda Ribera PA-C      benztropine  1 mg Intramuscular Q4H PRN Max 6/day BRINA Dominguez      benztropine  0.5 mg Oral Q4H PRN Max 6/day BRINA Dominguez      benztropine  2 mg Oral BID Wilfredo Lester, DO      bisacodyl  10 mg Rectal Daily PRN BRINA Dominguez      divalproex sodium  750 mg Oral Q12H [facility] Wilfredo Lester, DO      hydrocortisone   Topical 4x Daily PRN Gilson Gonsalves MD      hydrOXYzine HCL  25  mg Oral Q6H PRN Max 4/day Elma Le, CRNP      hydrOXYzine HCL  50 mg Oral Q6H PRN Max 4/day Elma Le, CRNP      lacosamide  150 mg Oral Q12H [facility] Latia Fitzgerald MD      lacosamide  50 mg Oral HS Wilfredo Shankaron, DO      levothyroxine  175 mcg Oral Daily Latia Fitzgerald MD      LORazepam  1 mg Intramuscular Q6H PRN Max 3/day Elma Le, CRNP      LORazepam  1 mg Oral Q6H PRN Max 3/day Elmamelissa Talbotey, CRNP      OLANZapine  5 mg Intramuscular Q3H PRN Max 3/day Elma Le, CRNP      OLANZapine  2.5 mg Oral Q4H PRN Max 6/day Elma Le, CRNP      OLANZapine  5 mg Oral Q4H PRN Max 3/day Elma Le, CRNP      OLANZapine  5 mg Oral Q3H PRN Max 3/day Elma Le, CRNP      paliperidone  12 mg Oral Daily Wilfredo RAKESH Shankaron, DO      polyethylene glycol  17 g Oral Daily PRN Elma Le, CRNP      propranolol  5 mg Oral Q8H PRN Elma Le, CRNP      senna-docusate sodium  1 tablet Oral Daily PRN Elma Le, CRNP      traZODone  50 mg Oral HS PRN Elma Le, CRNP      traZODone  50 mg Oral HS Wilfredo Lester, DO         Behavioral Health Medications:   all current active meds have been reviewed.    Laboratory results:  I have personally reviewed all pertinent laboratory/tests results.   No results found for this or any previous visit (from the past 48 hour(s)).           Wilfredo Lester,  06/14/24

## 2024-06-14 NOTE — PLAN OF CARE
Problem: Alteration in Thoughts and Perception  Goal: Treatment Goal: Gain control of psychotic behaviors/thinking, reduce/eliminate presenting symptoms and demonstrate improved reality functioning upon discharge  Outcome: Progressing  Goal: Verbalize thoughts and feelings  Description: Interventions:  - Promote a nonjudgmental and trusting relationship with the patient through active listening and therapeutic communication  - Assess patient's level of functioning, behavior and potential for risk  - Engage patient in 1 on 1 interactions  - Encourage patient to express fears, feelings, frustrations, and discuss symptoms    - [place] patient to reality, help patient recognize reality-based thinking   - Administer medications as ordered and assess for potential side effects  - Provide the patient education related to the signs and symptoms of the illness and desired effects of prescribed medications  Outcome: Progressing  Goal: Agree to be compliant with medication regime, as prescribed and report medication side effects  Description: Interventions:  - Offer appropriate PRN medication and supervise ingestion; conduct AIMS, as needed   Outcome: Progressing     Problem: Ineffective Coping  Goal: Identifies ineffective coping skills  Outcome: Progressing  Goal: Demonstrates healthy coping skills  Outcome: Progressing     Problem: Anxiety  Goal: Anxiety is at manageable level  Description: Interventions:  - Assess and monitor patient's anxiety level.   - Monitor for signs and symptoms (heart palpitations, chest pain, shortness of breath, headaches, nausea, feeling jumpy, restlessness, irritable, apprehensive).   - Collaborate with interdisciplinary team and initiate plan and interventions as ordered.  - [place] patient to unit/surroundings  - Explain treatment plan  - Encourage participation in care  - Encourage verbalization of concerns/fears  - Identify coping mechanisms  - Assist in developing anxiety-reducing skills  -  Administer/offer alternative therapies  - Limit or eliminate stimulants  Outcome: Progressing     Problem: Ineffective Coping  Goal: Participates in unit activities  Description: Interventions:  - Provide therapeutic environment   - Provide required programming   - Redirect inappropriate behaviors   Outcome: Progressing

## 2024-06-14 NOTE — PROGRESS NOTES
"Pt attended ALL groups including MH Recovery: protective factors and kindness group. Pt calm and not able to provide feedback when prompted.  Pt indicated she is \"so so (waving hand)  Pt not able to elaborate on any toic presented an dporvide opibion.  Lacks insight. RIS in afternoon.         06/14/24 1100   Activity/Group Checklist   Group Other (Comment)  (MH Recovery: protective factors and kindness group.)   Attendance Attended   Attendance Duration (min) 31-45   Interactions Disorganized interaction   Affect/Mood Calm   Goals Achieved Identified feelings;Discussed self-esteem issues;Discussed coping strategies;Able to reflect/comment on own behavior;Able to engage in interactions;Able to listen to others;Able to manage/cope with feelings;Able to recieve feedback;Able to self-disclose       "

## 2024-06-14 NOTE — PROGRESS NOTES
06/14/24 0825   Team Meeting   Meeting Type Daily Rounds   Initial Conference Date 06/14/24   Team Members Present   Team Members Present Physician;Nurse;;Occupational Therapist   Physician Team Member Trevon/Mimi   Nursing Team Member Araceli   Care Management Team Member Lavon   Patient/Family Present   Patient Present No   Patient's Family Present No     Patient is calm and compliant. She is focused on discharge. There have been no new issues. She is sleeping and eating well and showered on the 12th. She is still speaking with the [place] people and still has some issues with her eyes. Discharge is pending.

## 2024-06-14 NOTE — NURSING NOTE
Patient calm and cooperative. Visible in the milieu for meals and group, social with select peers. Seen responding to internal stimuli when alone. Denies all psych symptoms. Medication and meal compliant. VSS, appetite good, steady gait. Patient denies any unmet needs.

## 2024-06-15 PROCEDURE — 99232 SBSQ HOSP IP/OBS MODERATE 35: CPT | Performed by: STUDENT IN AN ORGANIZED HEALTH CARE EDUCATION/TRAINING PROGRAM

## 2024-06-15 RX ORDER — DIPHENHYDRAMINE HCL 25 MG
25 TABLET ORAL ONCE
Status: COMPLETED | OUTPATIENT
Start: 2024-06-15 | End: 2024-06-15

## 2024-06-15 RX ADMIN — DIVALPROEX SODIUM 750 MG: 250 TABLET, DELAYED RELEASE ORAL at 21:13

## 2024-06-15 RX ADMIN — Medication: at 08:59

## 2024-06-15 RX ADMIN — LACOSAMIDE 50 MG: 50 TABLET, FILM COATED ORAL at 21:15

## 2024-06-15 RX ADMIN — DIPHENHYDRAMINE HYDROCHLORIDE 25 MG: 25 TABLET ORAL at 10:42

## 2024-06-15 RX ADMIN — BENZTROPINE MESYLATE 2 MG: 2 TABLET ORAL at 08:53

## 2024-06-15 RX ADMIN — PALIPERIDONE 12 MG: 6 TABLET, EXTENDED RELEASE ORAL at 08:53

## 2024-06-15 RX ADMIN — BENZTROPINE MESYLATE 2 MG: 2 TABLET ORAL at 17:09

## 2024-06-15 RX ADMIN — LACOSAMIDE 150 MG: 50 TABLET, FILM COATED ORAL at 08:53

## 2024-06-15 RX ADMIN — LACOSAMIDE 150 MG: 50 TABLET, FILM COATED ORAL at 21:15

## 2024-06-15 RX ADMIN — LEVOTHYROXINE SODIUM 175 MCG: 0.15 TABLET ORAL at 06:21

## 2024-06-15 RX ADMIN — TRAZODONE HYDROCHLORIDE 50 MG: 50 TABLET ORAL at 21:13

## 2024-06-15 RX ADMIN — DIVALPROEX SODIUM 750 MG: 250 TABLET, DELAYED RELEASE ORAL at 08:52

## 2024-06-15 NOTE — NURSING NOTE
"Patient calm and cooperative. Visible in the milieu with no interaction with peers. Preoccupied with discharge because of \"father's day\". Patient seen responding to internal stimuli. C/o of eyes movement and pain but enable to answer further assessment questions. One time dose of Benadryl given, patient stated \" It feels better'. Patient denies all psych symptoms. Medication and meal compliant. VSS, appetite good, steady gait.  "

## 2024-06-15 NOTE — NURSING NOTE
Patient calm, cooperative, pleasant, and visible on millieu.  She has minimal interaction with peers and reports sleeping well.  She denies depression, anxiety, HI/SI/AVH and is able to make needs known.  She takes medications as scheduled and reports she does not know why she was not able to be discharged today in time for Father's Day.  Patient allowed to express self and was encouraged to discuss with psychiatry provider in the morning the plan for discharge.

## 2024-06-15 NOTE — PROGRESS NOTES
"Progress Note - Behavioral Health     Esperanza Prajapati 53 y.o. female MRN: [id number]   Unit/Bed#: [facility] 608-02 Encounter: [id number]    Behavior over the last 24 hours: unchanged.     Esperanza seen today for follow-up.  When he came into the room with the translation iPad that she requested, she was seen speaking to her self at full volume in [place].  We did connect with language line  #[id number] named Brian, but patient did not utilize .  She states she was brought to the hospital because \"somebody went to my house, somebody wanted me to come to the hospital, but I did not want to come here \".  She states that she does hear voices, low volume, cannot understand what they are saying.  She states \"I do not pay attention to them, because it can understand\".  She denies thoughts to herself or anyone else, but does admit to visual hallucinations of \"people \".  She does not know any further details.  When asked about her eye movements, she notes she moves her eyes in all directions, does have some eye rolling at times during interview.  She agrees it is painful when it happens.  He laments she is not getting discharged for Father's Day, and describes that she would be able to walk to her home from here.  Per nursing staff: Compliant with medications. Attends some groups. More visible on the unit.    Sleep: normal  Appetite: normal  Medication side effects: No   ROS: no complaints, all other systems are negative    Mental Status Evaluation:    Appearance:  age appropriate, casually dressed, overweight, dry skin noticed along face and crown of head   Behavior:  cooperative, bizarre, intense eye contact, restless   Speech:  normal rate and volume   Mood:  anxious   Affect:  overbright   Thought Process:  mildly coherent, slightly logical, still at times disorganized   Associations: tangential associations   Thought Content:  no overt delusions, preoccupied with discharge   Perceptual " "Disturbances: auditory hallucinations of \"mumbling\", appears responding to internal stimuli, appears preoccupied, talks to self at times, visual hallucinations of \"figures\"   Risk Potential: Suicidal ideation - None at present, contracts for safety on the unit, would talk to staff if not feeling safe on the unit  Homicidal ideation - None at present  Potential for aggression - No   Sensorium:  oriented to person, place, and time/date   Memory:  recent and remote memory grossly intact   Consciousness:  alert and awake   Attention/Concentration: decreased concentration and decreased attention span   Insight:  impaired due to psychosis   Judgment: impaired due to psychosis   Gait/Station: normal gait/station, normal balance   Motor Activity: abnormal movement noted: minimal involuntary eye rolling movements present     Vital signs in last 24 hours:    Temp:  [97.5 °F (36.4 °C)-98.4 °F (36.9 °C)] 98.4 °F (36.9 °C)  HR:  [] 106  Resp:  [19-20] 20  BP: (113-130)/(58-78) 130/78    Laboratory results: I have personally reviewed all pertinent laboratory/tests results    Results from the past 24 hours: No results found for this or any previous visit (from the past 24 hour(s)).    Suicide/Homicide Risk Assessment:    Risk of Harm to Self:   Nursing Suicide Risk Assessment Last 24 hours: C-SSRS Risk (Since Last Contact)  Calculated C-SSRS Risk Score (Since Last Contact): No Risk Indicated  Current Specific Risk Factors include: diagnosis of mood disorder, mental illness diagnosis, presence of hallucinations  Protective Factors: Protective Factors: The patient has desire to live, The patient does not want to die, no current suicidal ideation, ability to make plans for the future, having a desire to be alive  Based on today's assessment, Esperanza presents the following risk of harm to self: moderate    Risk of Harm to Others:  Nursing Homicide Risk Assessment: Violence Risk to Others: Denies within past 6 months  Current " Specific Risk Factors include: none  Protective Factors: no current homicidal ideation, compliant with medications on the unit as ordered, compliant with unit milieu, follows staff redirection  Based on today's assessment, Esperanza presents the following risk of harm to others: minimal    The following interventions are recommended: behavioral checks every 7 minutes, continued hospitalization on locked unit    Progress Toward Goals: progressing slowly, still disorganized, reports psychotic symptoms    Assessment & Plan   Principal Problem:    Schizoaffective disorder (HCC)  Active Problems:    Cognitive developmental delay    History of hypertension    Hypothyroidism    Focal epilepsy (HCC)    Medical clearance for psychiatric admission    Dermatitis of face      Recommended Treatment:     Planned medication and treatment changes:    All current active medications have been reviewed  Encourage group therapy, milieu therapy and occupational therapy  Behavioral Health checks every 7 minutes  Will order benadryl 25mg dose now to see if helps with eye movements; staff noted some improvement since patient has been on cogentin regularly.    Continue current medications:    Current Facility-Administered Medications   Medication Dose Route Frequency Provider Last Rate    acetaminophen  650 mg Oral Q4H PRN BRINA Dominguez      acetaminophen  650 mg Oral Q4H PRN BRINA Dominguez      acetaminophen  975 mg Oral Q6H PRN BRINA Dominguez      aluminum-magnesium hydroxide-simethicone  30 mL Oral Q4H PRN BRINA Dominguez      basis sensitive skin   Topical Daily Brenda Ribera PA-C      benztropine  1 mg Intramuscular Q4H PRN Max 6/day BRINA Dominguez      benztropine  0.5 mg Oral Q4H PRN Max 6/day BRINA Dominguez      benztropine  2 mg Oral BID Wilfredo Lester DO      bisacodyl  10 mg Rectal Daily PRN BRINA Dominguez      divalproex sodium  750 mg Oral Q12H [facility] Wilfredo Lester, DO      hydrocortisone    Topical 4x Daily PRN Gilson Gonsalves MD      hydrOXYzine HCL  25 mg Oral Q6H PRN Max 4/day Elma Le, BRINA      hydrOXYzine HCL  50 mg Oral Q6H PRN Max 4/day Elma Le, BRINA      lacosamide  150 mg Oral Q12H [facility] Latia Fitzgerald MD      lacosamide  50 mg Oral HS Wilfredo A Prayson, DO      levothyroxine  175 mcg Oral Daily Latia Fitzgerald MD      LORazepam  1 mg Intramuscular Q6H PRN Max 3/day Elma Le, CRNP      LORazepam  1 mg Oral Q6H PRN Max 3/day Elma Le, CRNP      OLANZapine  5 mg Intramuscular Q3H PRN Max 3/day Elma Le, CRNP      OLANZapine  2.5 mg Oral Q4H PRN Max 6/day Elma Le, CRNP      OLANZapine  5 mg Oral Q4H PRN Max 3/day Elma Le, CRNP      OLANZapine  5 mg Oral Q3H PRN Max 3/day BRINA Dominguez      paliperidone  12 mg Oral Daily Wilfredo A Prayson, DO      polyethylene glycol  17 g Oral Daily PRN Elma Le, ZULEYMANP      propranolol  5 mg Oral Q8H PRN Elma Le, BRINA      senna-docusate sodium  1 tablet Oral Daily PRN BRINA Dominguez      traZODone  50 mg Oral HS PRN Elma eL, BRINA      traZODone  50 mg Oral HS Wilfredo A Prayson, DO       Risks / Benefits of Treatment:    Risks, benefits, and possible side effects of medications explained to patient and patient verbalizes understanding and agreement for treatment.    Counseling / Coordination of Care:    Total floor / unit time spent today 30 minutes. Greater than 50% of total time was spent with the patient and / or family counseling and / or coordination of care. A description of counseling / coordination of care:  Patient's progress discussed with staff in treatment team meeting.  Medications, treatment progress and treatment plan reviewed with patient.  Recent stressors including ongoing hospitalization discussed with patient.  Educated on importance of medication and treatment compliance.  Reassurance and supportive therapy provided.    Soctt Blackman, DO 06/15/24

## 2024-06-15 NOTE — PLAN OF CARE
Problem: Alteration in Thoughts and Perception  Goal: Treatment Goal: Gain control of psychotic behaviors/thinking, reduce/eliminate presenting symptoms and demonstrate improved reality functioning upon discharge  Outcome: Progressing  Goal: Verbalize thoughts and feelings  Description: Interventions:  - Promote a nonjudgmental and trusting relationship with the patient through active listening and therapeutic communication  - Assess patient's level of functioning, behavior and potential for risk  - Engage patient in 1 on 1 interactions  - Encourage patient to express fears, feelings, frustrations, and discuss symptoms    - [place] patient to reality, help patient recognize reality-based thinking   - Administer medications as ordered and assess for potential side effects  - Provide the patient education related to the signs and symptoms of the illness and desired effects of prescribed medications  Outcome: Progressing  Goal: Refrain from acting on delusional thinking/internal stimuli  Description: Interventions:  - Monitor patient closely, per order   - Utilize least restrictive measures   - Set reasonable limits, give positive feedback for acceptable   - Administer medications as ordered and monitor of potential side effects  Outcome: Progressing  Goal: Agree to be compliant with medication regime, as prescribed and report medication side effects  Description: Interventions:  - Offer appropriate PRN medication and supervise ingestion; conduct AIMS, as needed   Outcome: Progressing  Goal: Recognize dysfunctional thoughts, communicate reality-based thoughts at the time of discharge  Description: Interventions:  - Provide medication and psycho-education to assist patient in compliance and developing insight into his/her illness   Outcome: Progressing  Goal: Complete daily ADLs, including personal hygiene independently, as able  Description: Interventions:  - Observe, teach, and assist patient with ADLS  - Monitor and  promote a balance of rest/activity, with adequate nutrition and elimination   Outcome: Progressing     Problem: Ineffective Coping  Goal: Identifies ineffective coping skills  Outcome: Progressing  Goal: Demonstrates healthy coping skills  Outcome: Progressing     Problem: Anxiety  Goal: Anxiety is at manageable level  Description: Interventions:  - Assess and monitor patient's anxiety level.   - Monitor for signs and symptoms (heart palpitations, chest pain, shortness of breath, headaches, nausea, feeling jumpy, restlessness, irritable, apprehensive).   - Collaborate with interdisciplinary team and initiate plan and interventions as ordered.  - [place] patient to unit/surroundings  - Explain treatment plan  - Encourage participation in care  - Encourage verbalization of concerns/fears  - Identify coping mechanisms  - Assist in developing anxiety-reducing skills  - Administer/offer alternative therapies  - Limit or eliminate stimulants  Outcome: Progressing     Problem: DISCHARGE PLANNING  Goal: Discharge to home or other facility with appropriate resources  Description: INTERVENTIONS:  - Identify barriers to discharge w/patient and caregiver  - Arrange for needed discharge resources and transportation as appropriate  - Identify discharge learning needs (meds, wound care, etc.)  - Arrange for interpretive services to assist at discharge as needed  - Refer to Case Management Department for coordinating discharge planning if the patient needs post-hospital services based on physician/advanced practitioner order or complex needs related to functional status, cognitive ability, or social support system  Outcome: Progressing

## 2024-06-16 PROCEDURE — 99232 SBSQ HOSP IP/OBS MODERATE 35: CPT | Performed by: STUDENT IN AN ORGANIZED HEALTH CARE EDUCATION/TRAINING PROGRAM

## 2024-06-16 RX ORDER — DIPHENHYDRAMINE HCL 25 MG
25 TABLET ORAL EVERY 6 HOURS PRN
Status: DISCONTINUED | OUTPATIENT
Start: 2024-06-16 | End: 2024-06-21 | Stop reason: HOSPADM

## 2024-06-16 RX ADMIN — LACOSAMIDE 150 MG: 50 TABLET, FILM COATED ORAL at 21:06

## 2024-06-16 RX ADMIN — DIVALPROEX SODIUM 750 MG: 250 TABLET, DELAYED RELEASE ORAL at 08:53

## 2024-06-16 RX ADMIN — LACOSAMIDE 150 MG: 50 TABLET, FILM COATED ORAL at 08:54

## 2024-06-16 RX ADMIN — PALIPERIDONE 12 MG: 6 TABLET, EXTENDED RELEASE ORAL at 08:54

## 2024-06-16 RX ADMIN — BENZTROPINE MESYLATE 2 MG: 2 TABLET ORAL at 17:00

## 2024-06-16 RX ADMIN — DIVALPROEX SODIUM 750 MG: 250 TABLET, DELAYED RELEASE ORAL at 21:05

## 2024-06-16 RX ADMIN — LORAZEPAM 1 MG: 1 TABLET ORAL at 17:39

## 2024-06-16 RX ADMIN — TRAZODONE HYDROCHLORIDE 50 MG: 50 TABLET ORAL at 21:05

## 2024-06-16 RX ADMIN — LEVOTHYROXINE SODIUM 175 MCG: 0.15 TABLET ORAL at 06:18

## 2024-06-16 RX ADMIN — LACOSAMIDE 50 MG: 50 TABLET, FILM COATED ORAL at 21:05

## 2024-06-16 RX ADMIN — Medication: at 08:58

## 2024-06-16 RX ADMIN — DIPHENHYDRAMINE HYDROCHLORIDE 25 MG: 25 TABLET ORAL at 13:42

## 2024-06-16 RX ADMIN — BENZTROPINE MESYLATE 2 MG: 2 TABLET ORAL at 08:54

## 2024-06-16 NOTE — NURSING NOTE
"Patient is visible on the unit and social with select peers. Patient appears to be internally responding but denies when asked stating \"I only hear you guys and those wheels on the cart\" referring to the [employer]. She endorses anxiety stating \"I'm a little nervous.\" Patient is insisting she is being discharged tomorrow stating \"It's Fathers Day. I don't know if I'm leaving in the morning, afternoon, or night but I have papers that I'm supposed to leave.\" Patient provided education on discharge but she continues to be adamant on discharging tomorrow. She is medication compliant. Encouraged to inform staff of any needs or concerns. Safety checks ongoing.   "

## 2024-06-16 NOTE — TREATMENT TEAM
"   06/16/24 1739   Berman Anxiety Scale   Anxious Mood 4   Tension 4   Fears 4   Insomnia 2   Intellectual 4   Depressed Mood 2   Somatic Complaints: Muscular 0   Somatic Complaints: Sensory 4   Cardiovascular Symptoms 0   Respiratory Symptoms 0   Gastrointestinal Symptoms 0   Genitourinary Symptoms 0   Autonomic Symptoms 2   Behavior at Interview 4   Berman Anxiety Score 30     Patient getting upset about a peer being \"to close\" to her. Patient having disorganized speech while explaining the event during which a peer \"pat her in the back\". Patient getting argumentative and hearing voices in the empty halls.PRN Ativan given at 1739.  "

## 2024-06-16 NOTE — NURSING NOTE
"Patient calm, pleasant and cooperative. Visible in the milieu throughout the day, social wit select peers. Preoccupied and \"frustrated\" with being on the unit for father's day. Patient seen responding to internal stimuli. Patient endorses anxiety, denies depression, SI/HI, AVH. Medication and meal compliant. VSS, appetite good, steady gait.  "

## 2024-06-16 NOTE — PROGRESS NOTES
"Progress Note - Behavioral Health     Esperanza Prajapati 53 y.o. female MRN: [id number]   Unit/Bed#: [facility] 608-02 Encounter: [id number]    Behavior over the last 24 hours: unchanged.     Esperanza seen today for follow-up.  Patient states she is feeling \"anxious\" today because she wants to be discharged for Father's day, states she had talked to staff earlier on in her stay about wanting to be out for the holiday and is frustrated she is still here.  States she is still experiencing auditory hallucinations, states \"I'm not deaf, and I can still see, I'm not blind\".  Denies visual hallucinations.  States her eye movements are still bothering her at times.  Does mention \"the [place] people\" but does not specify if she is still seeing them.    Per nursing staff: Compliant with medications. Attends some groups. More visible on the unit.    Sleep: normal  Appetite: normal  Medication side effects: No   ROS: no complaints, all other systems are negative    Mental Status Evaluation:    Appearance:  age appropriate, casually dressed, overweight, dry skin noticed along face and crown of head   Behavior:  cooperative, still somewhat bizarre, fair eye contact, restless   Speech:  normal rate and volume   Mood:  anxious   Affect:  overbright   Thought Process:  mildly coherent, slightly logical, still at times disorganized   Associations: tangential associations   Thought Content:  no overt delusions, preoccupied with discharge   Perceptual Disturbances: auditory hallucinations of \"mumbling\", appears responding to internal stimuli, appears preoccupied, talks to self at times, visual hallucinations of \"figures\"   Risk Potential: Suicidal ideation - None at present, contracts for safety on the unit, would talk to staff if not feeling safe on the unit  Homicidal ideation - None at present  Potential for aggression - No   Sensorium:  oriented to person, place, and time/date   Memory:  recent and remote memory grossly intact "   Consciousness:  alert and awake   Attention/Concentration: decreased concentration and decreased attention span   Insight:  impaired due to psychosis   Judgment: impaired due to psychosis   Gait/Station: normal gait/station, normal balance   Motor Activity: abnormal movement noted: minimal involuntary eye rolling movements present     Vital signs in last 24 hours:    Temp:  [97.5 °F (36.4 °C)-97.7 °F (36.5 °C)] 97.7 °F (36.5 °C)  HR:  [92-99] 97  Resp:  [16-18] 16  BP: (127-136)/(61-86) 127/77    Laboratory results: I have personally reviewed all pertinent laboratory/tests results    Results from the past 24 hours: No results found for this or any previous visit (from the past 24 hour(s)).    Suicide/Homicide Risk Assessment:    Risk of Harm to Self:   Nursing Suicide Risk Assessment Last 24 hours: C-SSRS Risk (Since Last Contact)  Calculated C-SSRS Risk Score (Since Last Contact): No Risk Indicated  Current Specific Risk Factors include: diagnosis of mood disorder, mental illness diagnosis, presence of hallucinations  Protective Factors: Protective Factors: The patient has desire to live, The patient does not want to die, The patient has no thoughts of suicide, no current suicidal ideation, ability to make plans for the future, having a desire to be alive  Based on today's assessmentEsperanza presents the following risk of harm to self: moderate    Risk of Harm to Others:  Nursing Homicide Risk Assessment: Violence Risk to Others: Denies within past 6 months  Current Specific Risk Factors include: none  Protective Factors: no current homicidal ideation, compliant with medications on the unit as ordered, compliant with unit milieu, follows staff redirection  Based on today's assessmentEsperanza presents the following risk of harm to others: minimal    The following interventions are recommended: behavioral checks every 7 minutes, continued hospitalization on locked unit    Progress Toward Goals: progressing slowly,  still disorganized, reports psychotic symptoms    Assessment & Plan   Principal Problem:    Schizoaffective disorder (HCC)  Active Problems:    Cognitive developmental delay    History of hypertension    Hypothyroidism    Focal epilepsy (HCC)    Medical clearance for psychiatric admission    Dermatitis of face      Recommended Treatment:     Planned medication and treatment changes:    All current active medications have been reviewed  Encourage group therapy, milieu therapy and occupational therapy  Behavioral Health checks every 7 minutes  Will order benadryl 25mg dose PRN involuntary eye movements for patient to take as needed.   Possibly consider augmentation with first generation antipsychotic, but concerning that patient is still having these eye movements.  May need longer time with invega, as just increase three days ago to 12mg.     Continue current medications:    Current Facility-Administered Medications   Medication Dose Route Frequency Provider Last Rate    acetaminophen  650 mg Oral Q4H PRN BRINA Dominguez      acetaminophen  650 mg Oral Q4H PRN BRINA Dominguez      acetaminophen  975 mg Oral Q6H PRN BRINA Dominguez      aluminum-magnesium hydroxide-simethicone  30 mL Oral Q4H PRN BRINA Dominguez      basis sensitive skin   Topical Daily Brenda Ribera PA-C      benztropine  1 mg Intramuscular Q4H PRN Max 6/day BRINA Dominguez      benztropine  0.5 mg Oral Q4H PRN Max 6/day BRINA Dominguez      benztropine  2 mg Oral BID Wilfredo Lester DO      bisacodyl  10 mg Rectal Daily PRN BRINA Dominguez      divalproex sodium  750 mg Oral Q12H [facility] Wilfredo Lester DO      hydrocortisone   Topical 4x Daily PRN Gilson Gonsalves MD      hydrOXYzine HCL  25 mg Oral Q6H PRN Max 4/day BRINA Dominguez      hydrOXYzine HCL  50 mg Oral Q6H PRN Max 4/day BRINA Dominguez      lacosamide  150 mg Oral Q12H [facility] Latia Fitzgerald MD      lacosamide  50 mg Oral HS Wilfredokelsey Lester, DO       levothyroxine  175 mcg Oral Daily Latia Fitzgerald MD      LORazepam  1 mg Intramuscular Q6H PRN Max 3/day Elma Le, BRINA      LORazepam  1 mg Oral Q6H PRN Max 3/day Elma Le, BRINA      OLANZapine  5 mg Intramuscular Q3H PRN Max 3/day Elma Le, ZULEYMANP      OLANZapine  2.5 mg Oral Q4H PRN Max 6/day Elma Le, CRNP      OLANZapine  5 mg Oral Q4H PRN Max 3/day Elma Le, CRNP      OLANZapine  5 mg Oral Q3H PRN Max 3/day Elma Le, CRNP      paliperidone  12 mg Oral Daily Wilfredokelsey Lester, DO      polyethylene glycol  17 g Oral Daily PRN BRINA Dominguez      propranolol  5 mg Oral Q8H PRN Elma Le, BRINA      senna-docusate sodium  1 tablet Oral Daily PRN Elma Le, BRINA      traZODone  50 mg Oral HS PRN BRINA Dominguez      traZODone  50 mg Oral HS Wilfredo Lester, DO       Risks / Benefits of Treatment:    Risks, benefits, and possible side effects of medications explained to patient and patient verbalizes understanding and agreement for treatment.    Counseling / Coordination of Care:    Total floor / unit time spent today 30 minutes. Greater than 50% of total time was spent with the patient and / or family counseling and / or coordination of care. A description of counseling / coordination of care:  Patient's progress discussed with staff in treatment team meeting.  Medications, treatment progress and treatment plan reviewed with patient.  Recent stressors including ongoing hospitalization discussed with patient.  Educated on importance of medication and treatment compliance.  Reassurance and supportive therapy provided.    Scott Blackman,  06/16/24

## 2024-06-16 NOTE — PLAN OF CARE
Problem: Alteration in Thoughts and Perception  Goal: Treatment Goal: Gain control of psychotic behaviors/thinking, reduce/eliminate presenting symptoms and demonstrate improved reality functioning upon discharge  Outcome: Not Progressing  Goal: Verbalize thoughts and feelings  Description: Interventions:  - Promote a nonjudgmental and trusting relationship with the patient through active listening and therapeutic communication  - Assess patient's level of functioning, behavior and potential for risk  - Engage patient in 1 on 1 interactions  - Encourage patient to express fears, feelings, frustrations, and discuss symptoms    - [place] patient to reality, help patient recognize reality-based thinking   - Administer medications as ordered and assess for potential side effects  - Provide the patient education related to the signs and symptoms of the illness and desired effects of prescribed medications  Outcome: Progressing  Goal: Agree to be compliant with medication regime, as prescribed and report medication side effects  Description: Interventions:  - Offer appropriate PRN medication and supervise ingestion; conduct AIMS, as needed   Outcome: Progressing  Goal: Recognize dysfunctional thoughts, communicate reality-based thoughts at the time of discharge  Description: Interventions:  - Provide medication and psycho-education to assist patient in compliance and developing insight into his/her illness   Outcome: Not Progressing     Problem: Anxiety  Goal: Anxiety is at manageable level  Description: Interventions:  - Assess and monitor patient's anxiety level.   - Monitor for signs and symptoms (heart palpitations, chest pain, shortness of breath, headaches, nausea, feeling jumpy, restlessness, irritable, apprehensive).   - Collaborate with interdisciplinary team and initiate plan and interventions as ordered.  - [place] patient to unit/surroundings  - Explain treatment plan  - Encourage participation in care  -  Encourage verbalization of concerns/fears  - Identify coping mechanisms  - Assist in developing anxiety-reducing skills  - Administer/offer alternative therapies  - Limit or eliminate stimulants  Outcome: Progressing

## 2024-06-17 DIAGNOSIS — E55.9 VITAMIN D DEFICIENCY: ICD-10-CM

## 2024-06-17 PROCEDURE — 99232 SBSQ HOSP IP/OBS MODERATE 35: CPT | Performed by: PSYCHIATRY & NEUROLOGY

## 2024-06-17 RX ORDER — ERGOCALCIFEROL 1.25 MG/1
50000 CAPSULE ORAL WEEKLY
Qty: 12 CAPSULE | Refills: 0 | OUTPATIENT
Start: 2024-06-17

## 2024-06-17 RX ORDER — LACOSAMIDE 100 MG/1
200 TABLET ORAL
Status: DISCONTINUED | OUTPATIENT
Start: 2024-06-18 | End: 2024-06-21 | Stop reason: HOSPADM

## 2024-06-17 RX ADMIN — PALIPERIDONE 12 MG: 6 TABLET, EXTENDED RELEASE ORAL at 08:45

## 2024-06-17 RX ADMIN — LACOSAMIDE 50 MG: 50 TABLET, FILM COATED ORAL at 21:08

## 2024-06-17 RX ADMIN — TRAZODONE HYDROCHLORIDE 50 MG: 50 TABLET ORAL at 21:07

## 2024-06-17 RX ADMIN — Medication: at 09:30

## 2024-06-17 RX ADMIN — LACOSAMIDE 150 MG: 50 TABLET, FILM COATED ORAL at 08:45

## 2024-06-17 RX ADMIN — DIVALPROEX SODIUM 750 MG: 250 TABLET, DELAYED RELEASE ORAL at 21:07

## 2024-06-17 RX ADMIN — DIVALPROEX SODIUM 750 MG: 250 TABLET, DELAYED RELEASE ORAL at 08:45

## 2024-06-17 RX ADMIN — BENZTROPINE MESYLATE 2 MG: 2 TABLET ORAL at 17:03

## 2024-06-17 RX ADMIN — BENZTROPINE MESYLATE 2 MG: 2 TABLET ORAL at 08:45

## 2024-06-17 RX ADMIN — DIPHENHYDRAMINE HYDROCHLORIDE 25 MG: 25 TABLET ORAL at 19:28

## 2024-06-17 RX ADMIN — LEVOTHYROXINE SODIUM 175 MCG: 0.15 TABLET ORAL at 06:27

## 2024-06-17 RX ADMIN — LACOSAMIDE 150 MG: 50 TABLET, FILM COATED ORAL at 21:07
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
## 2024-06-17 NOTE — PROGRESS NOTES
Progress Note - Behavioral Health   Esperanza Prajapati 53 y.o. female MRN: [id number]  Unit/Bed#: [facility] 608-02 Encounter: [id number]    Assessment & Plan   Principal Problem:    Schizoaffective disorder (HCC)  Active Problems:    Cognitive developmental delay    History of hypertension    Hypothyroidism    Focal epilepsy (HCC)    Medical clearance for psychiatric admission    Dermatitis of face      Subjective: Patient was seen, chart was reviewed, and case was discussed with entire team.     Patient seen outside of room.  She is overall calm and cooperative.  Patient is still upset not being discharged Friday so that she could go to a Father's Day celebration.  The patient does look better today with minimal tremors.  She reports that her eye movements are rare and none were seen during the visit.  When asked about [place] people she shows me her newspaper that has a [employer] [place] flag in the top margin.  Patient has also been reportedly responding to internal stimuli on the unit.  Patient does look bizarre, brighter but does seem confused at times.  Reportedly she is sleeping all night.  And has been eating adequately.            Psychiatric Review of Systems:  Behavior over the last 24 hours:  Unchanged  Sleep: normal  Appetite: adequate  Medication side effects: none verbalized  Medical ROS: Complete review of systems is negative except as noted above.            Vitals:  Vitals:    06/17/24 0743   BP: 124/81   Pulse: (!) 110   Resp: 18   Temp: 97.6 °F (36.4 °C)   SpO2: 93%       Mental Status Exam:    Appearance:  appears stated age, casually dressed, and disheveled   Behavior:  calm and cooperative somewhat bizarre   Speech:  normal volume and rate.   Mood:  Irritable anxious   Affect:  Constricted can brighten on approach   Thought Process:  Goal directed   Thought Content:  Patient is exhibiting paranoia and bizarre delusions.   Perceptual Disturbances:  Patient is responding to internal stimuli  and internally preoccupied.   Risk Potential: Suicidal ideation - None at present  Homicidal ideation - None at present  Potential for aggression - Yes she does have a history of aggressive behaviors   Sensorium:  oriented to person and place   Memory:  Recent and remote memory grossly intact   Consciousness:  awake   Attention/Concentration: decreased concentration and decreased attention span   Insight:  limited   Judgment: limited   Gait/Station: Normal gait/station   Motor Activity: Today no involuntary eye rolling movements were appreciated during visit     Progress Toward Goals: Regressed    Recommended Treatment: Continue with pharmacotherapy, group therapy, milieu therapy and occupational therapy.  Continue frequent safety checks and vitals per unit protocol. Continue with medical management as indicated. Continue coordinating with case management regarding disposition  1.  We will continue current medications and treatments for now.    2.  Discharge planning      Risks, benefits and possible side effects of Medications: Risks, benefits, and possible side effects of medications have been explained to the patient, who verbalizes understanding      Current Medications:  Current Facility-Administered Medications   Medication Dose Route Frequency Provider Last Rate    acetaminophen  650 mg Oral Q4H PRN BRINA Dominguez      acetaminophen  650 mg Oral Q4H PRN BRINA Dominguez      acetaminophen  975 mg Oral Q6H PRN BRINA Dominguez      aluminum-magnesium hydroxide-simethicone  30 mL Oral Q4H PRN BRINA Dominguez      basis sensitive skin   Topical Daily Brenda Ribera PA-C      benztropine  1 mg Intramuscular Q4H PRN Max 6/day BRINA Dominguez      benztropine  0.5 mg Oral Q4H PRN Max 6/day BRINA Dominguez      benztropine  2 mg Oral BID Wilfredo Lester, DO      bisacodyl  10 mg Rectal Daily PRN BRINA Dominguez      diphenhydrAMINE  25 mg Oral Q6H PRN Scott Blackman, DO      divalproex  sodium  750 mg Oral Q12H [facility] Wilfredo Teixeirayson, DO      hydrocortisone   Topical 4x Daily PRN Gilson Gonsalves MD      hydrOXYzine HCL  25 mg Oral Q6H PRN Max 4/day Elma Le, CRNP      hydrOXYzine HCL  50 mg Oral Q6H PRN Max 4/day Elma Le, CRNP      lacosamide  150 mg Oral Q12H [facility] Latia Fitzgerald MD      lacosamide  50 mg Oral HS Wilfredo Shankaron, DO      levothyroxine  175 mcg Oral Daily Latia Fitzgerald MD      LORazepam  1 mg Intramuscular Q6H PRN Max 3/day Elma Le, CRNP      LORazepam  1 mg Oral Q6H PRN Max 3/day Elma Le, CRNP      OLANZapine  5 mg Intramuscular Q3H PRN Max 3/day Elma Le, CRNP      OLANZapine  2.5 mg Oral Q4H PRN Max 6/day Elma Le, CRNP      OLANZapine  5 mg Oral Q4H PRN Max 3/day Elma Le, CRNP      OLANZapine  5 mg Oral Q3H PRN Max 3/day Elma Le, CRNP      paliperidone  12 mg Oral Daily Wilfredo RAKESH Shankaron, DO      polyethylene glycol  17 g Oral Daily PRN Elma Mimi, CRNP      propranolol  5 mg Oral Q8H PRN Elma Le, CRNP      senna-docusate sodium  1 tablet Oral Daily PRN Elma Le, CRNP      traZODone  50 mg Oral HS PRN Elma Le, CRNP      traZODone  50 mg Oral HS Wilfredo Lester, DO         Behavioral Health Medications:   all current active meds have been reviewed.    Laboratory results:  I have personally reviewed all pertinent laboratory/tests results.   No results found for this or any previous visit (from the past 48 hour(s)).           Wilfredo Lester, DO 06/17/24

## 2024-06-17 NOTE — PLAN OF CARE
Problem: Alteration in Thoughts and Perception  Goal: Treatment Goal: Gain control of psychotic behaviors/thinking, reduce/eliminate presenting symptoms and demonstrate improved reality functioning upon discharge  Outcome: Not Progressing  Goal: Verbalize thoughts and feelings  Description: Interventions:  - Promote a nonjudgmental and trusting relationship with the patient through active listening and therapeutic communication  - Assess patient's level of functioning, behavior and potential for risk  - Engage patient in 1 on 1 interactions  - Encourage patient to express fears, feelings, frustrations, and discuss symptoms    - [place] patient to reality, help patient recognize reality-based thinking   - Administer medications as ordered and assess for potential side effects  - Provide the patient education related to the signs and symptoms of the illness and desired effects of prescribed medications  Outcome: Progressing  Goal: Agree to be compliant with medication regime, as prescribed and report medication side effects  Description: Interventions:  - Offer appropriate PRN medication and supervise ingestion; conduct AIMS, as needed   Outcome: Progressing  Goal: Recognize dysfunctional thoughts, communicate reality-based thoughts at the time of discharge  Description: Interventions:  - Provide medication and psycho-education to assist patient in compliance and developing insight into his/her illness   Outcome: Not Progressing
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
## 2024-06-17 NOTE — NURSING NOTE
Patient calm, pleasant, and medication compliant. Reported anxiety secondary to not being discharge before fathers day. Denies all s/s including  SI/HI but is noted to be responding to internal stimuli at times. Attended group and minimally socializes with select peers and staff.  Monitor for safety and support.

## 2024-06-18 PROCEDURE — 99232 SBSQ HOSP IP/OBS MODERATE 35: CPT | Performed by: PSYCHIATRY & NEUROLOGY

## 2024-06-18 RX ORDER — PALIPERIDONE 6 MG/1
12 TABLET, EXTENDED RELEASE ORAL DAILY
Status: DISCONTINUED | OUTPATIENT
Start: 2024-06-19 | End: 2024-06-21 | Stop reason: HOSPADM

## 2024-06-18 RX ORDER — PALIPERIDONE 9 MG/1
9 TABLET, EXTENDED RELEASE ORAL DAILY
Status: DISCONTINUED | OUTPATIENT
Start: 2024-06-19 | End: 2024-06-18

## 2024-06-18 RX ADMIN — HYDROXYZINE HYDROCHLORIDE 50 MG: 50 TABLET, FILM COATED ORAL at 19:25

## 2024-06-18 RX ADMIN — TRAZODONE HYDROCHLORIDE 50 MG: 50 TABLET ORAL at 21:03

## 2024-06-18 RX ADMIN — DIVALPROEX SODIUM 750 MG: 250 TABLET, DELAYED RELEASE ORAL at 21:03

## 2024-06-18 RX ADMIN — DIVALPROEX SODIUM 750 MG: 250 TABLET, DELAYED RELEASE ORAL at 08:07

## 2024-06-18 RX ADMIN — BENZTROPINE MESYLATE 2 MG: 2 TABLET ORAL at 08:07

## 2024-06-18 RX ADMIN — BENZTROPINE MESYLATE 2 MG: 2 TABLET ORAL at 17:17

## 2024-06-18 RX ADMIN — LACOSAMIDE 200 MG: 100 TABLET, FILM COATED ORAL at 21:03

## 2024-06-18 RX ADMIN — PALIPERIDONE 12 MG: 6 TABLET, EXTENDED RELEASE ORAL at 08:06

## 2024-06-18 RX ADMIN — DIPHENHYDRAMINE HYDROCHLORIDE 25 MG: 25 TABLET ORAL at 19:25

## 2024-06-18 RX ADMIN — Medication: at 10:40

## 2024-06-18 RX ADMIN — LEVOTHYROXINE SODIUM 175 MCG: 0.15 TABLET ORAL at 06:20
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
## 2024-06-18 NOTE — PROGRESS NOTES
Pt attended groups and visible   Pt bizarre at times.  Pt was bright and able to identify favorite color is orange and explained that it its a fruit and color with confidence.      06/18/24 1100   Activity/Group Checklist   Group Other (Comment)  (Mindfulness and movement: likes, opnions and strengths)   Attendance Attended   Attendance Duration (min) 31-45   Interactions Interacted appropriately   Affect/Mood Bright   Goals Achieved Discussed coping strategies;Able to listen to others;Able to engage in interactions;Identified feelings;Able to self-disclose;Able to recieve feedback

## 2024-06-18 NOTE — PLAN OF CARE
Problem: Alteration in Thoughts and Perception  Goal: Treatment Goal: Gain control of psychotic behaviors/thinking, reduce/eliminate presenting symptoms and demonstrate improved reality functioning upon discharge  Outcome: Not Progressing  Goal: Verbalize thoughts and feelings  Description: Interventions:  - Promote a nonjudgmental and trusting relationship with the patient through active listening and therapeutic communication  - Assess patient's level of functioning, behavior and potential for risk  - Engage patient in 1 on 1 interactions  - Encourage patient to express fears, feelings, frustrations, and discuss symptoms    - [place] patient to reality, help patient recognize reality-based thinking   - Administer medications as ordered and assess for potential side effects  - Provide the patient education related to the signs and symptoms of the illness and desired effects of prescribed medications  Outcome: Progressing  Goal: Refrain from acting on delusional thinking/internal stimuli  Description: Interventions:  - Monitor patient closely, per order   - Utilize least restrictive measures   - Set reasonable limits, give positive feedback for acceptable   - Administer medications as ordered and monitor of potential side effects  Outcome: Not Progressing  Goal: Agree to be compliant with medication regime, as prescribed and report medication side effects  Description: Interventions:  - Offer appropriate PRN medication and supervise ingestion; conduct AIMS, as needed   Outcome: Progressing  Goal: Recognize dysfunctional thoughts, communicate reality-based thoughts at the time of discharge  Description: Interventions:  - Provide medication and psycho-education to assist patient in compliance and developing insight into his/her illness   Outcome: Not Progressing

## 2024-06-18 NOTE — NURSING NOTE
Patient visible in the unit. She is cooperative and compliant will all medications. Patient denies anxiety, depression, SI and AH. She did say TV commercials tell people what to do. I did ask her if they told her anything and she said that she does not listen to them.     Staff reported patient witnessed talking and laughing to herself and responding to internal stimuli.
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
## 2024-06-18 NOTE — SOCIAL WORK
CM reached out to brother to see his impressions about patients baseline and current behavior. He said that she sounds a lot closer to normal this week and that some of her delusions have been there for a while but that they increase and this usually happens about twice a year. She calls him everyday and GINI stated that he would call back tomorrow to see how the conversation went tonight and if brother is comfortable with her being discharge. He is okay with an end of the week discharge at this point and would be able to pick her up and get her to her appointments.       Buddy Palm (Brother)   [phone] (M)

## 2024-06-18 NOTE — PROGRESS NOTES
"          Progress Note - Behavioral Health   Esperanza Prajapati 53 y.o. female MRN: [id number]  Unit/Bed#: [facility] 608-02 Encounter: [id number]    Assessment & Plan   Principal Problem:    Schizoaffective disorder (HCC)  Active Problems:    Cognitive developmental delay    History of hypertension    Hypothyroidism    Focal epilepsy (HCC)    Medical clearance for psychiatric admission    Dermatitis of face      Subjective: Patient was seen, chart was reviewed, and case was discussed with entire team.       Patient seen with Palestinian-speaking staff out in the milieu.  Patient is eager to speak with writer.  She is focused on being discharged.  When asked about depression and anxiety the patient denies these.  She denies hearing voices or any visual hallucinations.  However when asked about \"[place] people\" and if they bother her still, she replies that they do not bother her much and she just ignores any of it.  Still some note saying that she does talk and laugh to herself.  Patient does not exhibit any involuntary eye movements during the visit.  Patient is much better than on admission however it is hard to know what patient's baseline is and will require collateral information to know how far we have left to go for her to be discharged.  She is medication compliant                  Psychiatric Review of Systems:  Behavior over the last 24 hours:  Improving  Sleep: normal  Appetite: adequate  Medication side effects: none verbalized  Medical ROS: Complete review of systems is negative except as noted above.            Vitals:  Vitals:    06/18/24 0751   BP: 122/86   Pulse: 99   Resp: 18   Temp: 97.7 °F (36.5 °C)   SpO2: 94%       Mental Status Exam:    Appearance:  appears stated age, casually dressed, and disheveled   Behavior:  calm and cooperative bizarre guarded and evasive at times   Speech:  normal volume and rate.   Mood:  \"Good\"   Affect:  Constricted can brighten on approach   Thought Process:  Goal " directed does seem confused at times   Thought Content:  Patient still   Perceptual Disturbances:  Patient is seen responding to internal stimuli.   Risk Potential: Suicidal ideation - None at present  Homicidal ideation - None at present  Potential for aggression - Yes she does have a history of aggressive behaviors   Sensorium:  oriented to person and place   Memory:  Recent and remote memory grossly intact   Consciousness:  awake   Attention/Concentration: decreased concentration and decreased attention span   Insight:  limited   Judgment: limited   Gait/Station: Normal gait/station   Motor Activity: Today no involuntary eye rolling movements were appreciated during visit     Progress Toward Goals: Improving    Recommended Treatment: Continue with pharmacotherapy, group therapy, milieu therapy and occupational therapy.  Continue frequent safety checks and vitals per unit protocol. Continue with medical management as indicated. Continue coordinating with case management regarding disposition  1.  We will continue current medications and treatments for now.    2.  Discharge planning      Risks, benefits and possible side effects of Medications: Risks, benefits, and possible side effects of medications have been explained to the patient, who verbalizes understanding      Current Medications:  Current Facility-Administered Medications   Medication Dose Route Frequency Provider Last Rate    acetaminophen  650 mg Oral Q4H PRN BRINA Dominguez      acetaminophen  650 mg Oral Q4H PRN BRINA Dominguez      acetaminophen  975 mg Oral Q6H PRN BRINA Dominguez      aluminum-magnesium hydroxide-simethicone  30 mL Oral Q4H PRN BRINA Dominguez      basis sensitive skin   Topical Daily Brenda Ribera PA-C      benztropine  1 mg Intramuscular Q4H PRN Max 6/day BRINA Dominguez      benztropine  0.5 mg Oral Q4H PRN Max 6/day BRINA Dominguez      benztropine  2 mg Oral BID Wilfredo Lester DO      bisacodyl   10 mg Rectal Daily PRN BRINA Dominguez      diphenhydrAMINE  25 mg Oral Q6H PRN Scott Blackman, DO      divalproex sodium  750 mg Oral Q12H MOSHE Wilfredo A Prayson, DO      hydrocortisone   Topical 4x Daily PRN Gilson Gonsalves MD      hydrOXYzine HCL  25 mg Oral Q6H PRN Max 4/day Elma Le, BRINA      hydrOXYzine HCL  50 mg Oral Q6H PRN Max 4/day BRINA Dominguez      [START ON 6/19/2024] lacosamide  150 mg Oral Daily Wilfredo A Prayson, DO      lacosamide  200 mg Oral HS Wilrfedo A Prayson, DO      levothyroxine  175 mcg Oral Daily Latia Fitzgerald MD      LORazepam  1 mg Intramuscular Q6H PRN Max 3/day BRINA Dominguez      LORazepam  1 mg Oral Q6H PRN Max 3/day BRINA Dominguez      OLANZapine  5 mg Intramuscular Q3H PRN Max 3/day BRINA Dominguez      OLANZapine  2.5 mg Oral Q4H PRN Max 6/day BRINA Dominguez      OLANZapine  5 mg Oral Q4H PRN Max 3/day BRINA Dominguez      OLANZapine  5 mg Oral Q3H PRN Max 3/day BRINA Dominguez      paliperidone  12 mg Oral Daily Wilfredo A Prayson, DO      polyethylene glycol  17 g Oral Daily PRN BRINA Dominguez      propranolol  5 mg Oral Q8H PRN BRINA Dominguez      senna-docusate sodium  1 tablet Oral Daily PRN BRINA Dominguez      traZODone  50 mg Oral HS PRN BRINA Dominguez      traZODone  50 mg Oral HS Wilfredo A Prayson, DO         Behavioral Health Medications:   all current active meds have been reviewed.    Laboratory results:  I have personally reviewed all pertinent laboratory/tests results.   No results found for this or any previous visit (from the past 48 hour(s)).           Wilfredo A Raadon, DO 06/18/24

## 2024-06-18 NOTE — NURSING NOTE
"Patient reports that PRN Benadryl was effective but asks for \"medication for my eyes\" multiple times from this writer. Patient reminded that she already took medication and then states that it was effective.   "

## 2024-06-18 NOTE — NURSING NOTE
"Patient is visible on the unit. 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 Patient is blaming a peer for \"messing with her mind.\" She reports wanting discharge \"to get out for the fathers and sons and visit my parents in [place].\" She is medication compliant. Encouraged to inform staff of any needs or concerns. Safety checks ongoing    "

## 2024-06-19 PROCEDURE — 99232 SBSQ HOSP IP/OBS MODERATE 35: CPT | Performed by: PSYCHIATRY & NEUROLOGY

## 2024-06-19 RX ORDER — LORAZEPAM 1 MG/1
1 TABLET ORAL 2 TIMES DAILY
Status: DISCONTINUED | OUTPATIENT
Start: 2024-06-19 | End: 2024-06-21 | Stop reason: HOSPADM

## 2024-06-19 RX ADMIN — LORAZEPAM 1 MG: 1 TABLET ORAL at 11:39

## 2024-06-19 RX ADMIN — Medication: at 11:17

## 2024-06-19 RX ADMIN — BENZTROPINE MESYLATE 2 MG: 2 TABLET ORAL at 08:16

## 2024-06-19 RX ADMIN — DIVALPROEX SODIUM 750 MG: 250 TABLET, DELAYED RELEASE ORAL at 21:09

## 2024-06-19 RX ADMIN — LACOSAMIDE 200 MG: 100 TABLET, FILM COATED ORAL at 21:09

## 2024-06-19 RX ADMIN — BENZTROPINE MESYLATE 2 MG: 2 TABLET ORAL at 17:04

## 2024-06-19 RX ADMIN — LACOSAMIDE 150 MG: 50 TABLET, FILM COATED ORAL at 08:15

## 2024-06-19 RX ADMIN — PALIPERIDONE 12 MG: 6 TABLET, EXTENDED RELEASE ORAL at 08:15

## 2024-06-19 RX ADMIN — LORAZEPAM 1 MG: 1 TABLET ORAL at 17:04

## 2024-06-19 RX ADMIN — LEVOTHYROXINE SODIUM 175 MCG: 0.15 TABLET ORAL at 05:58

## 2024-06-19 RX ADMIN — DIVALPROEX SODIUM 750 MG: 250 TABLET, DELAYED RELEASE ORAL at 08:16

## 2024-06-19 RX ADMIN — TRAZODONE HYDROCHLORIDE 50 MG: 50 TABLET ORAL at 21:09

## 2024-06-19 NOTE — NURSING NOTE
"Patient is visible on the unit. She is guarded, suspicious, paranoid, and presents as anxious. Patient is seen +RIS but denies all psych s/s when asked. Symptoms are helped with PRN medications and patient states \"I'm getting there.\" She is medication compliant. Encouraged to inform staff of any needs or concerns. Safety checks ongoing    "

## 2024-06-19 NOTE — PROGRESS NOTES
"   06/19/24 0838   Team Meeting   Meeting Type Daily Rounds   Initial Conference Date 06/19/24   Team Members Present   Team Members Present Physician;Nurse;   Physician Team Member Khris   Nursing Team Member Araceli   Care Management Team Member Lavon   Patient/Family Present   Patient Present No   Patient's Family Present No     Patient is ruminating on peers and being \"followed.\" Stated that she is not having AH but she is saying that patients are coming to her with their \"hands out.\" She is compliant with medications and is eating and sleeping well. She also goes to groups. Discharge is pending.   "

## 2024-06-19 NOTE — PLAN OF CARE
Problem: Alteration in Thoughts and Perception  Goal: Treatment Goal: Gain control of psychotic behaviors/thinking, reduce/eliminate presenting symptoms and demonstrate improved reality functioning upon discharge  Outcome: Not Progressing  Goal: Verbalize thoughts and feelings  Description: Interventions:  - Promote a nonjudgmental and trusting relationship with the patient through active listening and therapeutic communication  - Assess patient's level of functioning, behavior and potential for risk  - Engage patient in 1 on 1 interactions  - Encourage patient to express fears, feelings, frustrations, and discuss symptoms    - [place] patient to reality, help patient recognize reality-based thinking   - Administer medications as ordered and assess for potential side effects  - Provide the patient education related to the signs and symptoms of the illness and desired effects of prescribed medications  Outcome: Progressing  Goal: Refrain from acting on delusional thinking/internal stimuli  Description: Interventions:  - Monitor patient closely, per order   - Utilize least restrictive measures   - Set reasonable limits, give positive feedback for acceptable   - Administer medications as ordered and monitor of potential side effects  Outcome: Not Progressing  Goal: Agree to be compliant with medication regime, as prescribed and report medication side effects  Description: Interventions:  - Offer appropriate PRN medication and supervise ingestion; conduct AIMS, as needed   Outcome: Progressing  Goal: Recognize dysfunctional thoughts, communicate reality-based thoughts at the time of discharge  Description: Interventions:  - Provide medication and psycho-education to assist patient in compliance and developing insight into his/her illness   Outcome: Not Progressing

## 2024-06-19 NOTE — NURSING NOTE
"Patient present in the milieu. Patient is cooperative and compliant with all medications. Patient denies anxiety, depression, SI and AH. I did see her talking to herself and I asked who she was talking to, patient replied \"I'm praying\". Patient did attend group. She also mentioned her eye make her dizzy. After breakfast, I spoke with patient about her face soap, she started talking to herself again about the conversation I had with her about her soap.  "

## 2024-06-19 NOTE — PROGRESS NOTES
Pt attends group and able to stay for duration.  Disorganized and unable to engage in discussion today.      06/19/24 0930   Activity/Group Checklist   Group Community meeting   Attendance Attended   Attendance Duration (min) 31-45   Interactions Disorganized interaction   Affect/Mood Blunted/flat   Goals Achieved Identified feelings;Discussed self-esteem issues;Discussed coping strategies;Able to listen to others

## 2024-06-19 NOTE — NURSING NOTE
Patient reports PRN Atarax and Benadryl were effective. She has more control over her eye movements and is less paranoid about her peers.

## 2024-06-19 NOTE — CASE MANAGEMENT
Evan spoke to brother Buddy and he is good to  patient on Friday at noon. Said that he, mother, and bother are very grateful for her treatment here and outside of some small moments that she sounds completed back to normal to them.       Discharge for Noon on Friday.         Buddy Palm (Brother)   [phone] (M)

## 2024-06-19 NOTE — PROGRESS NOTES
"      Progress Note - Behavioral Health   Esperanza Prajapati 53 y.o. female MRN: [id number]  Unit/Bed#: [facility] 608-02 Encounter: [id number]    Assessment & Plan   Principal Problem:    Schizoaffective disorder (HCC)  Active Problems:    Cognitive developmental delay    History of hypertension    Hypothyroidism    Focal epilepsy (HCC)    Medical clearance for psychiatric admission    Dermatitis of face      Subjective: Patient was seen, chart was reviewed, and case was discussed with entire team.     Patient seen in room.  Patient is eager to talk with writer.  Patient seems anxious and reports only rare involuntary eye movements.  The patient still denies any auditory or visual hallucinations..  When asked about \"[place] people\" and if they bother her still, she replies that they do not bother her much.  She denies any depression and denies any thoughts to harm herself.  She still seems rather suspicious and can be heard talking to herself.  She has been eating and sleeping adequately.  And she is medication compliant        Psychiatric Review of Systems:  Behavior over the last 24 hours:  Improving  Sleep: normal  Appetite: adequate  Medication side effects: none verbalized  Medical ROS: Complete review of systems is negative except as noted above.            Vitals:  Vitals:    06/19/24 0748   BP: 122/76   Pulse: 94   Resp: 12   Temp: 97.6 °F (36.4 °C)   SpO2: 94%       Mental Status Exam:    Appearance:  appears stated age and casually dressed   Behavior:  calm and cooperative bizarre    Speech:  normal volume and rate.   Mood:  \"Good\"   Affect:  Constricted can brighten on approach   Thought Process:  Goal directed concrete   Thought Content:  Patient still seems rather suspicious at times   Perceptual Disturbances: She denies any auditory or visual hallucinations but patient is seen responding to internal stimuli.   Risk Potential: Suicidal ideation - None at present  Homicidal ideation - None at " present  Potential for aggression - Yes she does have a history of aggressive behaviors   Sensorium:  oriented to person and place   Memory:  Recent and remote memory grossly intact   Consciousness:  awake   Attention/Concentration: decreased concentration and decreased attention span   Insight:  limited   Judgment: limited   Gait/Station: Normal gait/station   Motor Activity: Today again no involuntary eye rolling movements were appreciated during visit     Progress Toward Goals: Improving    Recommended Treatment: Continue with pharmacotherapy, group therapy, milieu therapy and occupational therapy.  Continue frequent safety checks and vitals per unit protocol. Continue with medical management as indicated. Continue coordinating with case management regarding disposition  1.  Ativan 1 mg twice daily for anxiety.    2.  Discharge planning      Risks, benefits and possible side effects of Medications: Risks, benefits, and possible side effects of medications have been explained to the patient, who verbalizes understanding      Current Medications:  Current Facility-Administered Medications   Medication Dose Route Frequency Provider Last Rate    acetaminophen  650 mg Oral Q4H PRN BRINA Dominguez      acetaminophen  650 mg Oral Q4H PRN BRINA Dominguez      acetaminophen  975 mg Oral Q6H PRN BRINA Dominguez      aluminum-magnesium hydroxide-simethicone  30 mL Oral Q4H PRN BRINA Dominguez      basis sensitive skin   Topical Daily Brenda Ribera PA-C      benztropine  1 mg Intramuscular Q4H PRN Max 6/day BRINA Dominguez      benztropine  0.5 mg Oral Q4H PRN Max 6/day BRINA Dominguez      benztropine  2 mg Oral BID Wilfredo Lester, DO      bisacodyl  10 mg Rectal Daily PRN BRINA Dominguez      diphenhydrAMINE  25 mg Oral Q6H PRN Scott Blackman, DO      divalproex sodium  750 mg Oral Q12H MOSHE Wilfredo Lester, DO      hydrocortisone   Topical 4x Daily PRN Gilson Gonsalves MD      hydrOXYzine  HCL  25 mg Oral Q6H PRN Max 4/day Elma Le, CRNP      hydrOXYzine HCL  50 mg Oral Q6H PRN Max 4/day Elma Le, CRNP      lacosamide  150 mg Oral Daily Wilfredo A Prayson, DO      lacosamide  200 mg Oral HS Wilfredo A Prayson, DO      levothyroxine  175 mcg Oral Daily Latia Fitzgerald MD      LORazepam  1 mg Intramuscular Q6H PRN Max 3/day Elma Le, CRNP      LORazepam  1 mg Oral Q6H PRN Max 3/day Elma Le, CRNP      OLANZapine  5 mg Intramuscular Q3H PRN Max 3/day Elma Le, CRNP      OLANZapine  2.5 mg Oral Q4H PRN Max 6/day Elma Le, CRNP      OLANZapine  5 mg Oral Q4H PRN Max 3/day Elma Le, CRNP      OLANZapine  5 mg Oral Q3H PRN Max 3/day Elma Le, CRNP      paliperidone  12 mg Oral Daily Wilfredo A Prayson, DO      polyethylene glycol  17 g Oral Daily PRN Elma Le, CRNP      propranolol  5 mg Oral Q8H PRN Elma Le, CRNP      senna-docusate sodium  1 tablet Oral Daily PRN Elma Le, CRNP      traZODone  50 mg Oral HS PRN Elma Le, CRNP      traZODone  50 mg Oral HS Wilfredo A Prayson, DO         Behavioral Health Medications:   all current active meds have been reviewed.    Laboratory results:  I have personally reviewed all pertinent laboratory/tests results.   No results found for this or any previous visit (from the past 48 hour(s)).           Wilfredokelsey Shankaron, DO 06/19/24

## 2024-06-20 PROBLEM — Z00.8 MEDICAL CLEARANCE FOR PSYCHIATRIC ADMISSION: Status: RESOLVED | Noted: 2022-03-25 | Resolved: 2024-06-20

## 2024-06-20 PROCEDURE — 99232 SBSQ HOSP IP/OBS MODERATE 35: CPT | Performed by: PSYCHIATRY & NEUROLOGY

## 2024-06-20 RX ORDER — LEVOTHYROXINE SODIUM 175 UG/1
175 TABLET ORAL DAILY
Qty: 30 TABLET | Refills: 1 | Status: SHIPPED | OUTPATIENT
Start: 2024-06-21

## 2024-06-20 RX ORDER — TRAZODONE HYDROCHLORIDE 50 MG/1
50 TABLET ORAL
Qty: 30 TABLET | Refills: 1 | Status: SHIPPED | OUTPATIENT
Start: 2024-06-20

## 2024-06-20 RX ORDER — LACOSAMIDE 150 MG/1
150 TABLET ORAL DAILY
Qty: 30 TABLET | Refills: 0 | Status: SHIPPED | OUTPATIENT
Start: 2024-06-21 | End: 2024-06-26 | Stop reason: SDUPTHER

## 2024-06-20 RX ORDER — LORAZEPAM 1 MG/1
1 TABLET ORAL 2 TIMES DAILY
Qty: 20 TABLET | Refills: 2 | Status: SHIPPED | OUTPATIENT
Start: 2024-06-20 | End: 2024-07-20

## 2024-06-20 RX ORDER — DIVALPROEX SODIUM 250 MG/1
750 TABLET, DELAYED RELEASE ORAL EVERY 12 HOURS SCHEDULED
Qty: 180 TABLET | Refills: 1 | Status: SHIPPED | OUTPATIENT
Start: 2024-06-20

## 2024-06-20 RX ORDER — LACOSAMIDE 200 MG/1
200 TABLET ORAL
Qty: 30 TABLET | Refills: 0 | Status: SHIPPED | OUTPATIENT
Start: 2024-06-20 | End: 2024-06-26 | Stop reason: SDUPTHER

## 2024-06-20 RX ORDER — BENZTROPINE MESYLATE 2 MG/1
2 TABLET ORAL 2 TIMES DAILY
Qty: 60 TABLET | Refills: 1 | Status: SHIPPED | OUTPATIENT
Start: 2024-06-20

## 2024-06-20 RX ORDER — PALIPERIDONE 6 MG/1
12 TABLET, EXTENDED RELEASE ORAL DAILY
Qty: 60 TABLET | Refills: 1 | Status: SHIPPED | OUTPATIENT
Start: 2024-06-21

## 2024-06-20 RX ADMIN — DIVALPROEX SODIUM 750 MG: 250 TABLET, DELAYED RELEASE ORAL at 08:54

## 2024-06-20 RX ADMIN — LACOSAMIDE 200 MG: 100 TABLET, FILM COATED ORAL at 20:54

## 2024-06-20 RX ADMIN — BENZTROPINE MESYLATE 2 MG: 2 TABLET ORAL at 08:54

## 2024-06-20 RX ADMIN — LEVOTHYROXINE SODIUM 175 MCG: 0.15 TABLET ORAL at 06:24

## 2024-06-20 RX ADMIN — TRAZODONE HYDROCHLORIDE 50 MG: 50 TABLET ORAL at 20:55

## 2024-06-20 RX ADMIN — LACOSAMIDE 150 MG: 50 TABLET, FILM COATED ORAL at 08:54

## 2024-06-20 RX ADMIN — LORAZEPAM 1 MG: 1 TABLET ORAL at 08:54

## 2024-06-20 RX ADMIN — DIVALPROEX SODIUM 750 MG: 250 TABLET, DELAYED RELEASE ORAL at 20:55

## 2024-06-20 RX ADMIN — LORAZEPAM 1 MG: 1 TABLET ORAL at 17:23

## 2024-06-20 RX ADMIN — PALIPERIDONE 12 MG: 6 TABLET, EXTENDED RELEASE ORAL at 08:54

## 2024-06-20 RX ADMIN — Medication 1 APPLICATION: at 08:56

## 2024-06-20 RX ADMIN — BENZTROPINE MESYLATE 2 MG: 2 TABLET ORAL at 17:23

## 2024-06-20 NOTE — PLAN OF CARE
Problem: Alteration in Thoughts and Perception  Goal: Treatment Goal: Gain control of psychotic behaviors/thinking, reduce/eliminate presenting symptoms and demonstrate improved reality functioning upon discharge  Outcome: Progressing  Goal: Verbalize thoughts and feelings  Description: Interventions:  - Promote a nonjudgmental and trusting relationship with the patient through active listening and therapeutic communication  - Assess patient's level of functioning, behavior and potential for risk  - Engage patient in 1 on 1 interactions  - Encourage patient to express fears, feelings, frustrations, and discuss symptoms    - [place] patient to reality, help patient recognize reality-based thinking   - Administer medications as ordered and assess for potential side effects  - Provide the patient education related to the signs and symptoms of the illness and desired effects of prescribed medications  Outcome: Progressing  Goal: Refrain from acting on delusional thinking/internal stimuli  Description: Interventions:  - Monitor patient closely, per order   - Utilize least restrictive measures   - Set reasonable limits, give positive feedback for acceptable   - Administer medications as ordered and monitor of potential side effects  Outcome: Progressing  Goal: Agree to be compliant with medication regime, as prescribed and report medication side effects  Description: Interventions:  - Offer appropriate PRN medication and supervise ingestion; conduct AIMS, as needed   Outcome: Progressing  Goal: Recognize dysfunctional thoughts, communicate reality-based thoughts at the time of discharge  Description: Interventions:  - Provide medication and psycho-education to assist patient in compliance and developing insight into his/her illness   Outcome: Progressing  Goal: Complete daily ADLs, including personal hygiene independently, as able  Description: Interventions:  - Observe, teach, and assist patient with ADLS  - Monitor and  promote a balance of rest/activity, with adequate nutrition and elimination   Outcome: Progressing     Problem: Ineffective Coping  Goal: Identifies ineffective coping skills  Outcome: Progressing  Goal: Demonstrates healthy coping skills  Outcome: Progressing     Problem: Anxiety  Goal: Anxiety is at manageable level  Description: Interventions:  - Assess and monitor patient's anxiety level.   - Monitor for signs and symptoms (heart palpitations, chest pain, shortness of breath, headaches, nausea, feeling jumpy, restlessness, irritable, apprehensive).   - Collaborate with interdisciplinary team and initiate plan and interventions as ordered.  - [place] patient to unit/surroundings  - Explain treatment plan  - Encourage participation in care  - Encourage verbalization of concerns/fears  - Identify coping mechanisms  - Assist in developing anxiety-reducing skills  - Administer/offer alternative therapies  - Limit or eliminate stimulants  Outcome: Progressing     Problem: DISCHARGE PLANNING  Goal: Discharge to home or other facility with appropriate resources  Description: INTERVENTIONS:  - Identify barriers to discharge w/patient and caregiver  - Arrange for needed discharge resources and transportation as appropriate  - Identify discharge learning needs (meds, wound care, etc.)  - Arrange for interpretive services to assist at discharge as needed  - Refer to Case Management Department for coordinating discharge planning if the patient needs post-hospital services based on physician/advanced practitioner order or complex needs related to functional status, cognitive ability, or social support system  Outcome: Progressing     Problem: Ineffective Coping  Goal: Participates in unit activities  Description: Interventions:  - Provide therapeutic environment   - Provide required programming   - Redirect inappropriate behaviors   Outcome: Progressing

## 2024-06-20 NOTE — BH TRANSITION RECORD
Contact Information: If you have any questions, concerns, pended studies, tests and/or procedures, or emergencies regarding your inpatient behavioral health visit. Please contact [place] [facility] unit 6T [phone] and ask to speak to a , nurse or physician. A contact is available 24 hours/ 7 days a week at this number.     Summary of Procedures Performed During your Stay:  Below is a list of major procedures performed during your hospital stay and a summary of results:  - No major procedures performed.    Pending Studies (From admission, onward)      None          Please follow up on the above pending studies with your PCP and/or referring provider.

## 2024-06-20 NOTE — PROGRESS NOTES
06/20/24 1000   Activity/Group Checklist   Group Community meeting   Attendance Attended   Attendance Duration (min) 31-45   Interactions Interacted appropriately   Affect/Mood Appropriate;Bright   Goals Achieved Identified feelings;Identified triggers;Identified relapse prevention strategies;Able to listen to others;Able to engage in interactions

## 2024-06-20 NOTE — NURSING NOTE
Patient cooperative with care, medication complaint, able to make needs know, visible in milieu. Behavior remains bizzare, paranoid, and labile. Attempts to horde papers in the room. Patient counseled. Denied anxiety/depression, denied SI/HI, AH/VH but noted to be internally responding at times, safety precautions maintained.

## 2024-06-20 NOTE — NURSING NOTE
Patient calm, cooperative,  visible on millieu, pleasant and takes medications as scheduled.  She reports sleeping well and offers no unmet needs at this time.  She denies HI/SI/AVH, anxiety and depression.

## 2024-06-20 NOTE — PLAN OF CARE
Problem: DISCHARGE PLANNING  Goal: Discharge to home or other facility with appropriate resources  Description: INTERVENTIONS:  - Identify barriers to discharge w/patient and caregiver  - Arrange for needed discharge resources and transportation as appropriate  - Identify discharge learning needs (meds, wound care, etc.)  - Arrange for interpretive services to assist at discharge as needed  - Refer to Case Management Department for coordinating discharge planning if the patient needs post-hospital services based on physician/advanced practitioner order or complex needs related to functional status, cognitive ability, or social support system  Outcome: Completed  Pt to D/C Friday June 21st at 12pm. Pt denies SI/HI/AVH. Pt oriented x3. Pt to d/c home and her brother Buddy will  upon discharge. Pt to follow up with [facility] and is on the waitlist for the [facility] [place] [facility]. Brother to transport her to appointments. Pt to follow up with PCP referral. Scripts sent to preferred pharmacy.     Discharge Address: [address], [place] [address]   Phone: [phone]

## 2024-06-20 NOTE — PROGRESS NOTES
"        Progress Note - Behavioral Health   Esperanza Prajapati 53 y.o. female MRN: [id number]  Unit/Bed#: [facility] 608-02 Encounter: [id number]    Assessment & Plan   Principal Problem:    Schizoaffective disorder (HCC)  Active Problems:    Cognitive developmental delay    History of hypertension    Hypothyroidism    Focal epilepsy (HCC)    Medical clearance for psychiatric admission    Dermatitis of face      Subjective: Patient was seen, chart was reviewed, and case was discussed with entire team.       Patient seen in room as well as out in the halls and milieu.  Patient is somewhat intrusive and keeps approaching writer about discharge.  This writer has to keep reiterating that her discharge is tomorrow and her brother will be picking her up.  Patient still seems anxious.  She denies any depression however.  She denies any auditory visualizations.  Although she can still be heard talking to herself.  She still denies that the \"[place] people\" bother her.  Somewhat suspicious however much improved over admission.  She has been eating and sleeping adequately.      Psychiatric Review of Systems:  Behavior over the last 24 hours:  Improving  Sleep: normal  Appetite: adequate  Medication side effects: none verbalized  Medical ROS: Complete review of systems is negative except as noted above.            Vitals:  Vitals:    06/20/24 0753   BP: 141/72   Pulse: 99   Resp: 17   Temp: 97.5 °F (36.4 °C)   SpO2: 95%       Mental Status Exam:    Appearance:  appears stated age and casually dressed   Behavior:  calm and cooperative bizarre    Speech:  normal volume and rate.   Mood:  \"Good\"   Affect:  Brighter but not overly bright   Thought Process:  Goal directed concrete   Thought Content:  Patient still seems rather suspicious at times but improved   Perceptual Disturbances: She denies any auditory or visual hallucinations but patient is seen responding to internal stimuli.   Risk Potential: Suicidal ideation - None at " present  Homicidal ideation - None at present  Potential for aggression - Yes she does have a history of aggressive behaviors   Sensorium:  oriented to person and place   Memory:  Recent and remote memory grossly intact   Consciousness:  awake   Attention/Concentration: decreased concentration and decreased attention span   Insight:  limited   Judgment: limited   Gait/Station: Normal gait/station   Motor Activity: Today again no involuntary eye rolling movements were appreciated during visit     Progress Toward Goals: Improving    Recommended Treatment: Continue with pharmacotherapy, group therapy, milieu therapy and occupational therapy.  Continue frequent safety checks and vitals per unit protocol. Continue with medical management as indicated. Continue coordinating with case management regarding disposition  1.  We will continue current medications and treatments for now.  2.  Discharge planning with discharge tentatively for tomorrow.      Risks, benefits and possible side effects of Medications: Risks, benefits, and possible side effects of medications have been explained to the patient, who verbalizes understanding      Current Medications:  Current Facility-Administered Medications   Medication Dose Route Frequency Provider Last Rate    acetaminophen  650 mg Oral Q4H PRN BRINA Dominguez      acetaminophen  650 mg Oral Q4H PRN BRINA Dominguez      acetaminophen  975 mg Oral Q6H PRN BRINA Dominguez      aluminum-magnesium hydroxide-simethicone  30 mL Oral Q4H PRN BRINA Dominguez      basis sensitive skin   Topical Daily Brenda Ribera PA-C      benztropine  1 mg Intramuscular Q4H PRN Max 6/day BRINA Dominguez      benztropine  0.5 mg Oral Q4H PRN Max 6/day BRINA Dominguez      benztropine  2 mg Oral BID Wilfredo Lester, DO      bisacodyl  10 mg Rectal Daily PRN BRINA Dominguez      diphenhydrAMINE  25 mg Oral Q6H PRN Scott Blackman, DO      divalproex sodium  750 mg Oral Q12H MOSHE  Wilfredo A Prayson, DO      hydrocortisone   Topical 4x Daily PRN Gilson Gonsalves MD      hydrOXYzine HCL  25 mg Oral Q6H PRN Max 4/day Elma Le, CRNP      hydrOXYzine HCL  50 mg Oral Q6H PRN Max 4/day Elma Le, CRNP      lacosamide  150 mg Oral Daily Wilfredo A Prayson, DO      lacosamide  200 mg Oral HS Wilfredo A Lluviayson, DO      levothyroxine  175 mcg Oral Daily Latia Fitzgerald MD      LORazepam  1 mg Intramuscular Q6H PRN Max 3/day Elma Le, CRNP      LORazepam  1 mg Oral Q6H PRN Max 3/day Elma Le, CRNP      LORazepam  1 mg Oral BID Wilfredo A Raadon, DO      OLANZapine  5 mg Intramuscular Q3H PRN Max 3/day Elma Le, CRNP      OLANZapine  2.5 mg Oral Q4H PRN Max 6/day Elma Le, CRNP      OLANZapine  5 mg Oral Q4H PRN Max 3/day Elma Le, CRNP      OLANZapine  5 mg Oral Q3H PRN Max 3/day Elma Le, CRNP      paliperidone  12 mg Oral Daily Wilfredo A Prayson, DO      polyethylene glycol  17 g Oral Daily PRN Elma Le, CRNP      propranolol  5 mg Oral Q8H PRN Elma Le, CRNP      senna-docusate sodium  1 tablet Oral Daily PRN Elma Le, CRNP      traZODone  50 mg Oral HS PRN Elma Le, CRNP      traZODone  50 mg Oral HS Wilfredo RAKESH Teixeirayson, DO         Behavioral Health Medications:   all current active meds have been reviewed.    Laboratory results:  I have personally reviewed all pertinent laboratory/tests results.   No results found for this or any previous visit (from the past 48 hour(s)).           Wilfredokelsey Shankaron, DO 06/20/24

## 2024-06-20 NOTE — PROGRESS NOTES
06/20/24 0829   Team Meeting   Meeting Type Daily Rounds   Initial Conference Date 06/20/24   Team Members Present   Team Members Present Physician;Nurse;   Physician Team Member Khris    Nursing Team Member Araceli   Care Management Team Member Lavon   Patient/Family Present   Patient Present No   Patient's Family Present No     Patient is for discharge tomorrow at 12pm. She is compliant with her medications. She is sleeping well and going to groups. Discharge is pending.
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
## 2024-06-21 VITALS
DIASTOLIC BLOOD PRESSURE: 83 MMHG | SYSTOLIC BLOOD PRESSURE: 118 MMHG | HEART RATE: 105 BPM | OXYGEN SATURATION: 95 % | TEMPERATURE: 97.5 F | HEIGHT: 68 IN | WEIGHT: 228.4 LBS | BODY MASS INDEX: 34.62 KG/M2 | RESPIRATION RATE: 18 BRPM

## 2024-06-21 PROCEDURE — 99239 HOSP IP/OBS DSCHRG MGMT >30: CPT | Performed by: PSYCHIATRY & NEUROLOGY

## 2024-06-21 RX ADMIN — DIVALPROEX SODIUM 750 MG: 250 TABLET, DELAYED RELEASE ORAL at 08:43

## 2024-06-21 RX ADMIN — BENZTROPINE MESYLATE 2 MG: 2 TABLET ORAL at 08:43

## 2024-06-21 RX ADMIN — Medication: at 08:52

## 2024-06-21 RX ADMIN — LACOSAMIDE 150 MG: 50 TABLET, FILM COATED ORAL at 08:43

## 2024-06-21 RX ADMIN — PALIPERIDONE 12 MG: 6 TABLET, EXTENDED RELEASE ORAL at 08:43

## 2024-06-21 RX ADMIN — LEVOTHYROXINE SODIUM 175 MCG: 0.15 TABLET ORAL at 06:17

## 2024-06-21 RX ADMIN — LORAZEPAM 1 MG: 1 TABLET ORAL at 08:43

## 2024-06-21 NOTE — PLAN OF CARE
Problem: Alteration in Thoughts and Perception  Goal: Treatment Goal: Gain control of psychotic behaviors/thinking, reduce/eliminate presenting symptoms and demonstrate improved reality functioning upon discharge  Outcome: Adequate for Discharge  Goal: Verbalize thoughts and feelings  Description: Interventions:  - Promote a nonjudgmental and trusting relationship with the patient through active listening and therapeutic communication  - Assess patient's level of functioning, behavior and potential for risk  - Engage patient in 1 on 1 interactions  - Encourage patient to express fears, feelings, frustrations, and discuss symptoms    - [place] patient to reality, help patient recognize reality-based thinking   - Administer medications as ordered and assess for potential side effects  - Provide the patient education related to the signs and symptoms of the illness and desired effects of prescribed medications  Outcome: Adequate for Discharge  Goal: Refrain from acting on delusional thinking/internal stimuli  Description: Interventions:  - Monitor patient closely, per order   - Utilize least restrictive measures   - Set reasonable limits, give positive feedback for acceptable   - Administer medications as ordered and monitor of potential side effects  Outcome: Adequate for Discharge  Goal: Agree to be compliant with medication regime, as prescribed and report medication side effects  Description: Interventions:  - Offer appropriate PRN medication and supervise ingestion; conduct AIMS, as needed   Outcome: Adequate for Discharge  Goal: Recognize dysfunctional thoughts, communicate reality-based thoughts at the time of discharge  Description: Interventions:  - Provide medication and psycho-education to assist patient in compliance and developing insight into his/her illness   Outcome: Adequate for Discharge  Goal: Complete daily ADLs, including personal hygiene independently, as able  Description: Interventions:  -  Observe, teach, and assist patient with ADLS  - Monitor and promote a balance of rest/activity, with adequate nutrition and elimination   Outcome: Adequate for Discharge     Problem: Ineffective Coping  Goal: Identifies ineffective coping skills  Outcome: Adequate for Discharge  Goal: Demonstrates healthy coping skills  Outcome: Adequate for Discharge     Problem: Anxiety  Goal: Anxiety is at manageable level  Description: Interventions:  - Assess and monitor patient's anxiety level.   - Monitor for signs and symptoms (heart palpitations, chest pain, shortness of breath, headaches, nausea, feeling jumpy, restlessness, irritable, apprehensive).   - Collaborate with interdisciplinary team and initiate plan and interventions as ordered.  - [place] patient to unit/surroundings  - Explain treatment plan  - Encourage participation in care  - Encourage verbalization of concerns/fears  - Identify coping mechanisms  - Assist in developing anxiety-reducing skills  - Administer/offer alternative therapies  - Limit or eliminate stimulants  Outcome: Adequate for Discharge     Problem: Ineffective Coping  Goal: Participates in unit activities  Description: Interventions:  - Provide therapeutic environment   - Provide required programming   - Redirect inappropriate behaviors   Outcome: Adequate for Discharge

## 2024-06-21 NOTE — PROGRESS NOTES
06/21/24 1035   Activity/Group Checklist   Group Admission/Discharge  (Relapse prevention plan)   Attendance Attended   Attendance Duration (min) 0-15   Interactions Interacted appropriately   Affect/Mood Appropriate   Goals Achieved Identified resources and support systems;Able to self-disclose;Able to recieve feedback  (Patient was unable to identify triggers, stressors and situations or multiple warning signs that place her at risk for a crisis)     Patient completed the relapse prevention plan

## 2024-06-21 NOTE — PLAN OF CARE
Problem: Alteration in Thoughts and Perception  Goal: Treatment Goal: Gain control of psychotic behaviors/thinking, reduce/eliminate presenting symptoms and demonstrate improved reality functioning upon discharge  Outcome: Progressing  Goal: Verbalize thoughts and feelings  Description: Interventions:  - Promote a nonjudgmental and trusting relationship with the patient through active listening and therapeutic communication  - Assess patient's level of functioning, behavior and potential for risk  - Engage patient in 1 on 1 interactions  - Encourage patient to express fears, feelings, frustrations, and discuss symptoms    - [place] patient to reality, help patient recognize reality-based thinking   - Administer medications as ordered and assess for potential side effects  - Provide the patient education related to the signs and symptoms of the illness and desired effects of prescribed medications  Outcome: Progressing  Goal: Refrain from acting on delusional thinking/internal stimuli  Description: Interventions:  - Monitor patient closely, per order   - Utilize least restrictive measures   - Set reasonable limits, give positive feedback for acceptable   - Administer medications as ordered and monitor of potential side effects  Outcome: Progressing  Goal: Agree to be compliant with medication regime, as prescribed and report medication side effects  Description: Interventions:  - Offer appropriate PRN medication and supervise ingestion; conduct AIMS, as needed   Outcome: Progressing  Goal: Recognize dysfunctional thoughts, communicate reality-based thoughts at the time of discharge  Description: Interventions:  - Provide medication and psycho-education to assist patient in compliance and developing insight into his/her illness   Outcome: Progressing  Goal: Complete daily ADLs, including personal hygiene independently, as able  Description: Interventions:  - Observe, teach, and assist patient with ADLS  - Monitor and  promote a balance of rest/activity, with adequate nutrition and elimination   Outcome: Progressing     Problem: Ineffective Coping  Goal: Identifies ineffective coping skills  Outcome: Progressing  Goal: Demonstrates healthy coping skills  Outcome: Progressing     Problem: Anxiety  Goal: Anxiety is at manageable level  Description: Interventions:  - Assess and monitor patient's anxiety level.   - Monitor for signs and symptoms (heart palpitations, chest pain, shortness of breath, headaches, nausea, feeling jumpy, restlessness, irritable, apprehensive).   - Collaborate with interdisciplinary team and initiate plan and interventions as ordered.  - [place] patient to unit/surroundings  - Explain treatment plan  - Encourage participation in care  - Encourage verbalization of concerns/fears  - Identify coping mechanisms  - Assist in developing anxiety-reducing skills  - Administer/offer alternative therapies  - Limit or eliminate stimulants  Outcome: Progressing     Problem: Ineffective Coping  Goal: Participates in unit activities  Description: Interventions:  - Provide therapeutic environment   - Provide required programming   - Redirect inappropriate behaviors   Outcome: Progressing

## 2024-06-21 NOTE — NURSING NOTE
Patient visible in the milieu, calm, pleasant and social with select peers. Patient D/C home today, VSS. Denies any pain or discomfort, reports no anxiety/depression. Medication and F/U appointment reviewed with patient, patient verbalized understanding.Patient left unit with all belongings accompanied by staff.

## 2024-06-21 NOTE — PROGRESS NOTES
06/21/24 1000   Activity/Group Checklist   Group Community meeting  (Gratitude Journal)   Attendance Attended   Attendance Duration (min) 16-30   Interactions Interacted appropriately  (Patient shared that she is grateful for being discharged today)   Affect/Mood Blunted/flat;Appropriate   Goals Achieved Identified feelings;Able to listen to others;Able to engage in interactions;Able to self-disclose;Able to recieve feedback;Able to give feedback to another

## 2024-06-21 NOTE — PROGRESS NOTES
06/21/24 0801   Team Meeting   Meeting Type Daily Rounds   Initial Conference Date 06/21/24   Team Members Present   Team Members Present Physician;Nurse;   Physician Team Member Khris   Nursing Team Member Araceli   Care Management Team Member Lavon   Patient/Family Present   Patient Present No   Patient's Family Present No     Patient is for discharge at 12pm via brother. She is calm and cooperative with care. Sleeping and eating well.

## 2024-06-26 DIAGNOSIS — F25.9 SCHIZOAFFECTIVE DISORDER (HCC): Chronic | ICD-10-CM

## 2024-06-26 NOTE — TELEPHONE ENCOUNTER
Macrina, the pharmacist from [facility] pharmacy called on the triage line re: needing new scripts to be sent to them, as they were sent in error to [facility] by pt's IP psych provider, BRINA Le, after her last hospitalization. Pt gets all off her meds from [facility] in bubble pack form.    Please sign off if agreeable. Thank you.

## 2024-06-27 RX ORDER — LORAZEPAM 1 MG/1
1 TABLET ORAL 2 TIMES DAILY
Qty: 20 TABLET | Refills: 0 | OUTPATIENT
Start: 2024-06-27 | End: 2024-07-07

## 2024-06-27 RX ORDER — LACOSAMIDE 150 MG/1
150 TABLET ORAL DAILY
Qty: 30 TABLET | Refills: 5 | Status: SHIPPED | OUTPATIENT
Start: 2024-06-27

## 2024-06-27 RX ORDER — LACOSAMIDE 200 MG/1
200 TABLET ORAL
Qty: 30 TABLET | Refills: 5 | Status: SHIPPED | OUTPATIENT
Start: 2024-06-27

## 2024-06-27 NOTE — TELEPHONE ENCOUNTER
Brandy from [facility] pharmacy called and states that they received the vimpat 200 mg and 150 mg this morning but not  the Ativan 1 mg 1 tab bid. Ativan is newly prescribed in the hospital. Brandy wants to know if you want pt to continue taking this or not.   Pls send script to [facility] pharmacy if agreeable to Ativan.

## 2024-07-13 DIAGNOSIS — F25.9 SCHIZOAFFECTIVE DISORDER (HCC): Chronic | ICD-10-CM

## 2024-07-15 DIAGNOSIS — F25.9 SCHIZOAFFECTIVE DISORDER (HCC): Chronic | ICD-10-CM

## 2024-07-16 ENCOUNTER — HOSPITAL ENCOUNTER (EMERGENCY)
Facility: HOSPITAL | Age: 54
Discharge: HOME | End: 2024-07-16
Attending: EMERGENCY MEDICINE
Payer: COMMERCIAL

## 2024-07-16 VITALS
RESPIRATION RATE: 20 BRPM | TEMPERATURE: 97.7 F | SYSTOLIC BLOOD PRESSURE: 129 MMHG | HEART RATE: 65 BPM | DIASTOLIC BLOOD PRESSURE: 69 MMHG | OXYGEN SATURATION: 98 %

## 2024-07-16 DIAGNOSIS — R63.5 WEIGHT GAIN: Primary | ICD-10-CM

## 2024-07-16 LAB
ALBUMIN SERPL BCG-MCNC: 3.5 G/DL (ref 3.5–5)
ALP SERPL-CCNC: 42 U/L (ref 34–104)
ALT SERPL W P-5'-P-CCNC: 11 U/L (ref 7–52)
ANION GAP SERPL CALCULATED.3IONS-SCNC: 6 MMOL/L (ref 4–13)
AST SERPL W P-5'-P-CCNC: 11 U/L (ref 13–39)
BASOPHILS # BLD AUTO: 0.03 THOUSANDS/ÂΜL (ref 0–0.1)
BASOPHILS NFR BLD AUTO: 1 % (ref 0–1)
BILIRUB SERPL-MCNC: 0.32 MG/DL (ref 0.2–1)
BUN SERPL-MCNC: 14 MG/DL (ref 5–25)
CALCIUM SERPL-MCNC: 8.7 MG/DL (ref 8.4–10.2)
CARDIAC TROPONIN I PNL SERPL HS: <2 NG/L (ref 8–18)
CHLORIDE SERPL-SCNC: 102 MMOL/L (ref 96–108)
CO2 SERPL-SCNC: 29 MMOL/L (ref 21–32)
CREAT SERPL-MCNC: 0.79 MG/DL (ref 0.6–1.3)
EOSINOPHIL # BLD AUTO: 0.08 THOUSAND/ÂΜL (ref 0–0.61)
EOSINOPHIL NFR BLD AUTO: 2 % (ref 0–6)
ERYTHROCYTE [DISTWIDTH] IN BLOOD BY AUTOMATED COUNT: 12.4 % (ref 11.6–15.1)
GFR SERPL CREATININE-BSD FRML MDRD: 86 ML/MIN/1.73SQ M
GLUCOSE SERPL-MCNC: 83 MG/DL (ref 65–140)
HCT VFR BLD AUTO: 36.5 % (ref 34.8–46.1)
HGB BLD-MCNC: 12.3 G/DL (ref 11.5–15.4)
IMM GRANULOCYTES # BLD AUTO: 0.04 THOUSAND/UL (ref 0–0.2)
IMM GRANULOCYTES NFR BLD AUTO: 1 % (ref 0–2)
LYMPHOCYTES # BLD AUTO: 1.91 THOUSANDS/ÂΜL (ref 0.6–4.47)
LYMPHOCYTES NFR BLD AUTO: 42 % (ref 14–44)
MCH RBC QN AUTO: 33 PG (ref 26.8–34.3)
MCHC RBC AUTO-ENTMCNC: 33.7 G/DL (ref 31.4–37.4)
MCV RBC AUTO: 98 FL (ref 82–98)
MONOCYTES # BLD AUTO: 0.55 THOUSAND/ÂΜL (ref 0.17–1.22)
MONOCYTES NFR BLD AUTO: 12 % (ref 4–12)
NEUTROPHILS # BLD AUTO: 1.86 THOUSANDS/ÂΜL (ref 1.85–7.62)
NEUTS SEG NFR BLD AUTO: 42 % (ref 43–75)
NRBC BLD AUTO-RTO: 0 /100 WBCS
PLATELET # BLD AUTO: 222 THOUSANDS/UL (ref 149–390)
PMV BLD AUTO: 9.9 FL (ref 8.9–12.7)
POTASSIUM SERPL-SCNC: 4.3 MMOL/L (ref 3.5–5.3)
PROT SERPL-MCNC: 6.9 G/DL (ref 6.4–8.4)
RBC # BLD AUTO: 3.73 MILLION/UL (ref 3.81–5.12)
SODIUM SERPL-SCNC: 137 MMOL/L (ref 135–147)
WBC # BLD AUTO: 4.47 THOUSAND/UL (ref 4.31–10.16)

## 2024-07-16 PROCEDURE — 85025 COMPLETE CBC W/AUTO DIFF WBC: CPT | Performed by: EMERGENCY MEDICINE

## 2024-07-16 PROCEDURE — 99283 EMERGENCY DEPT VISIT LOW MDM: CPT

## 2024-07-16 PROCEDURE — 36415 COLL VENOUS BLD VENIPUNCTURE: CPT | Performed by: EMERGENCY MEDICINE

## 2024-07-16 PROCEDURE — 84484 ASSAY OF TROPONIN QUANT: CPT | Performed by: EMERGENCY MEDICINE

## 2024-07-16 PROCEDURE — 80053 COMPREHEN METABOLIC PANEL: CPT | Performed by: EMERGENCY MEDICINE

## 2024-07-16 PROCEDURE — 99284 EMERGENCY DEPT VISIT MOD MDM: CPT | Performed by: EMERGENCY MEDICINE

## 2024-07-16 NOTE — ED PROVIDER NOTES
History  Chief Complaint   Patient presents with    Medical Problem     Pt's mother reports for several months pt has been gaining weight and they are unsure why. Pt reports pain in eyes, head, chest, abd, legs, but not right now. Pt herself denies pain anywhere currently. Pt has thyroid cancer hx.      53-year-old female with prior medical history as listed presents for evaluation of weight gain over the past few months.  Patient and her mom are Thai-speaking only therefore  was utilized.  According to patient and her mom she has been gradually gaining weight.  She denies any generalized weakness.  No fevers or chills.  No abdominal pain.  No skin changes.  No nausea or vomiting.  No headache.  No back pain.  No palpitations.      History provided by:  Patient  Medical Problem  Associated symptoms: no abdominal pain, no chest pain, no cough, no ear pain, no fever, no rash, no shortness of breath, no sore throat and no vomiting        Prior to Admission Medications   Prescriptions Last Dose Informant Patient Reported? Taking?   LORazepam (ATIVAN) 1 mg tablet   No No   Sig: Take 1 tablet (1 mg total) by mouth 2 (two) times a day   benztropine (COGENTIN) 2 mg tablet   No No   Sig: Take 1 tablet (2 mg total) by mouth 2 (two) times a day   divalproex sodium (DEPAKOTE) 250 mg DR tablet   No No   Sig: Take 3 tablets (750 mg total) by mouth every 12 (twelve) hours   lacosamide (VIMPAT) 150 mg tablet   No No   Sig: Take 1 tablet (150 mg total) by mouth in the morning   lacosamide (VIMPAT) 200 mg tablet   No No   Sig: Take 1 tablet (200 mg total) by mouth daily at bedtime   levothyroxine 175 mcg tablet   No No   Sig: Take 1 tablet (175 mcg total) by mouth daily   paliperidone (INVEGA) 6 MG 24 hr tablet   No No   Sig: Take 2 tablets (12 mg total) by mouth daily   traZODone (DESYREL) 50 mg tablet   No No   Sig: Take 1 tablet (50 mg total) by mouth daily at bedtime      Facility-Administered Medications: None        Past Medical History:   Diagnosis Date    Cancer (HCC)     thyroid cancer    Disease of thyroid gland     Hallucination     Hyperactivity (behavior)     Last Assessed: 11/7/2014     Hyperlipidemia     Obesity     Psychosis (HCC)     Seizures (HCC)        Past Surgical History:   Procedure Laterality Date    OTHER SURGICAL HISTORY      Removal of urinary calculus     OTHER SURGICAL HISTORY      Rhinologic Surgery     ND OPEN TX PHALANGEAL SHAFT FRACTURE PROX/MIDDLE EA Left 8/3/2023    Procedure: CLOSED REDUCTION PERCUTANEOUS PINNING - LEFT RING FINGER;  Surgeon: Fransisco Escalante MD;  Location: [facility] MAIN OR;  Service: Orthopedics    TOTAL THYROIDECTOMY      LAst Assessed: 11/7/2014       Family History   Problem Relation Age of Onset    Breast cancer Mother 59    Hypertension Mother     ALS Mother     Stroke Mother     Prostate cancer Father     Diabetes type II Father      I have reviewed and agree with the history as documented.    E-Cigarette/Vaping    E-Cigarette Use Never User      E-Cigarette/Vaping Substances    Nicotine No     THC No     CBD No     Flavoring No     Other No     Unknown No      Social History     Tobacco Use    Smoking status: Never     Passive exposure: Never    Smokeless tobacco: Never   Vaping Use    Vaping status: Never Used   Substance Use Topics    Alcohol use: Never    Drug use: Never       Review of Systems   Constitutional:  Negative for chills and fever.   HENT:  Negative for ear pain and sore throat.    Eyes:  Negative for pain and visual disturbance.   Respiratory:  Negative for cough and shortness of breath.    Cardiovascular:  Negative for chest pain and palpitations.   Gastrointestinal:  Negative for abdominal pain and vomiting.   Endocrine: Negative for cold intolerance, heat intolerance and polydipsia.   Genitourinary:  Negative for dysuria and hematuria.   Musculoskeletal:  Negative for arthralgias and back pain.   Skin:  Negative for color change and rash.    Allergic/Immunologic: Negative for environmental allergies.   Neurological:  Negative for seizures and syncope.   Hematological:  Negative for adenopathy.   Psychiatric/Behavioral:  Negative for agitation, behavioral problems and dysphoric mood.    All other systems reviewed and are negative.      Physical Exam  Physical Exam  Vitals and nursing note reviewed.   Constitutional:       General: She is not in acute distress.     Appearance: She is well-developed.   HENT:      Head: Normocephalic and atraumatic.   Eyes:      Conjunctiva/sclera: Conjunctivae normal.   Cardiovascular:      Rate and Rhythm: Normal rate and regular rhythm.      Heart sounds: No murmur heard.  Pulmonary:      Effort: Pulmonary effort is normal. No respiratory distress.      Breath sounds: Normal breath sounds.   Abdominal:      Palpations: Abdomen is soft.      Tenderness: There is no abdominal tenderness.   Musculoskeletal:         General: No swelling.      Cervical back: Neck supple.   Skin:     General: Skin is warm and dry.      Capillary Refill: Capillary refill takes less than 2 seconds.   Neurological:      General: No focal deficit present.      Mental Status: She is alert and oriented to person, place, and time.      Cranial Nerves: No cranial nerve deficit.      Sensory: No sensory deficit.      Motor: No weakness.   Psychiatric:         Mood and Affect: Mood normal.         Vital Signs  ED Triage Vitals [07/16/24 1258]   Temperature Pulse Respirations Blood Pressure SpO2   97.7 °F (36.5 °C) 65 20 129/69 98 %      Temp Source Heart Rate Source Patient Position - Orthostatic VS BP Location FiO2 (%)   Oral Monitor Sitting Right arm --      Pain Score       --           Vitals:    07/16/24 1258   BP: 129/69   Pulse: 65   Patient Position - Orthostatic VS: Sitting         Visual Acuity      ED Medications  Medications - No data to display    Diagnostic Studies  Results Reviewed       Procedure Component Value Units Date/Time    High  Sensitivity Troponin I Random [[id number]]  (Abnormal) Collected: 07/16/24 1542    Lab Status: Final result Specimen: Blood from Arm, Right Updated: 07/16/24 1614     HS TnI random <2 ng/L     Comprehensive metabolic panel [[id number]]  (Abnormal) Collected: 07/16/24 1542    Lab Status: Final result Specimen: Blood from Arm, Right Updated: 07/16/24 1609     Sodium 137 mmol/L      Potassium 4.3 mmol/L      Chloride 102 mmol/L      CO2 29 mmol/L      ANION GAP 6 mmol/L      BUN 14 mg/dL      Creatinine 0.79 mg/dL      Glucose 83 mg/dL      Calcium 8.7 mg/dL      AST 11 U/L      ALT 11 U/L      Alkaline Phosphatase 42 U/L      Total Protein 6.9 g/dL      Albumin 3.5 g/dL      Total Bilirubin 0.32 mg/dL      eGFR 86 ml/min/1.73sq m     Narrative:      National Kidney Disease Foundation guidelines for Chronic Kidney Disease (CKD):     Stage 1 with normal or high GFR (GFR > 90 mL/min/1.73 square meters)    Stage 2 Mild CKD (GFR = 60-89 mL/min/1.73 square meters)    Stage 3A Moderate CKD (GFR = 45-59 mL/min/1.73 square meters)    Stage 3B Moderate CKD (GFR = 30-44 mL/min/1.73 square meters)    Stage 4 Severe CKD (GFR = 15-29 mL/min/1.73 square meters)    Stage 5 End Stage CKD (GFR <15 mL/min/1.73 square meters)  Note: GFR calculation is accurate only with a steady state creatinine    CBC and differential [[id number]]  (Abnormal) Collected: 07/16/24 1542    Lab Status: Final result Specimen: Blood from Arm, Right Updated: 07/16/24 1552     WBC 4.47 Thousand/uL      RBC 3.73 Million/uL      Hemoglobin 12.3 g/dL      Hematocrit 36.5 %      MCV 98 fL      MCH 33.0 pg      MCHC 33.7 g/dL      RDW 12.4 %      MPV 9.9 fL      Platelets 222 Thousands/uL      nRBC 0 /100 WBCs      Segmented % 42 %      Immature Grans % 1 %      Lymphocytes % 42 %      Monocytes % 12 %      Eosinophils Relative 2 %      Basophils Relative 1 %      Absolute Neutrophils 1.86 Thousands/µL      Absolute Immature Grans 0.04 Thousand/uL      Absolute  Lymphocytes 1.91 Thousands/µL      Absolute Monocytes 0.55 Thousand/µL      Eosinophils Absolute 0.08 Thousand/µL      Basophils Absolute 0.03 Thousands/µL     UA w Reflex to Microscopic w Reflex to Culture [[id number]]     Lab Status: No result Specimen: Urine                    No orders to display              Procedures  Procedures         ED Course       53-year-old female presents with unintentional weight gain.  Her basic blood work is overall reassuring however I had a long conversation with patient and her mom that she needs to be evaluated by family doctor and endocrinologist for further workup including but not limited to thyroid and adrenal etiology.  Patient was referred to see an endocrinologist and family doctor.  Extensive turn precautions provided.  Exam is overall reassuring.  Patient discharged home in stable condition.                                        Medical Decision Making  53-year-old female presents with unintentional weight gain.  Her basic blood work is overall reassuring however I had a long conversation with patient and her mom that she needs to be evaluated by family doctor and endocrinologist for further workup including but not limited to thyroid and adrenal etiology.  Patient was referred to see an endocrinologist and family doctor.  Extensive turn precautions provided.  Exam is overall reassuring.  Patient discharged home in stable condition.    Amount and/or Complexity of Data Reviewed  External Data Reviewed: labs.  Labs: ordered.                 Disposition  Final diagnoses:   Weight gain     Time reflects when diagnosis was documented in both MDM as applicable and the Disposition within this note       Time User Action Codes Description Comment    7/16/2024  4:21 PM Jonas Ibarra Add [R63.5] Weight gain           ED Disposition       ED Disposition   Discharge    Condition   Stable    Date/Time   Tue Jul 16, 2024  4:21 PM    Comment   Esperanza Prajapati discharge to  home/self care.                   Follow-up Information       Follow up With Specialties Details Why Contact Info Additional Information    [facility] [facility] In 2 days  [address]  [address]  [facility] [address] [phone] [facility] [place], [address], [address], [place], [place], [id number] [phone]    [address] [id number] [phone] [address], [address] [phone]            Patient's Medications   Discharge Prescriptions    No medications on file       No discharge procedures on file.    PDMP Review         Value Time User    PDMP Reviewed  Yes 6/27/2024 11:48 AM BRINA Dominguez            ED Provider  Electronically Signed by             Jonas Ibarra DO  07/16/24 5598

## 2024-07-25 ENCOUNTER — OFFICE VISIT (OUTPATIENT)
Dept: ENDOCRINOLOGY | Facility: CLINIC | Age: 54
End: 2024-07-25
Payer: COMMERCIAL

## 2024-07-25 VITALS
DIASTOLIC BLOOD PRESSURE: 70 MMHG | WEIGHT: 228 LBS | BODY MASS INDEX: 34.67 KG/M2 | SYSTOLIC BLOOD PRESSURE: 110 MMHG | HEART RATE: 80 BPM

## 2024-07-25 DIAGNOSIS — R60.0 BILATERAL LOWER EXTREMITY EDEMA: ICD-10-CM

## 2024-07-25 DIAGNOSIS — E66.01 MORBID (SEVERE) OBESITY DUE TO EXCESS CALORIES (HCC): ICD-10-CM

## 2024-07-25 DIAGNOSIS — C73 PAPILLARY CARCINOMA OF THYROID (HCC): Primary | ICD-10-CM

## 2024-07-25 DIAGNOSIS — Z86.79 HISTORY OF HYPERTENSION: ICD-10-CM

## 2024-07-25 DIAGNOSIS — E89.0 POSTOPERATIVE HYPOTHYROIDISM: ICD-10-CM

## 2024-07-25 PROCEDURE — 99204 OFFICE O/P NEW MOD 45 MIN: CPT

## 2024-07-25 NOTE — PROGRESS NOTES
Assessment and Plan:  1. Postoperative hypothyroidism  Assessment & Plan:  S/p ablation and surgery for papillary cancer of thyroid in 2013  TSH from May/2024 0.152, free T4 1.05  Compliant with levothyroxine    Continue current dose of levothyroxine 175 mcg daily  Check TSH and free T4 4-6 weeks  Follow-up in 6 months  Orders:  -     Ambulatory Referral to Internal Medicine; Future  -     TSH, 3rd generation; Future  -     T4, free; Future  -     US head neck lymph node mapping; Future; Expected date: 07/25/2024  2. Papillary carcinoma of thyroid (HCC)  Assessment & Plan:  Hx of papillary cancer of thyroid stage I - s/p ablation and surgery in 2013  Last whole-body scan unremarkable in May 2016  Neck US (6/27/2023) - Thyroid bed nodules are similar to the prior study. No evidence of recurrent or metastatic disease elsewhere.  Labs May/2024 TSH 0.152 suppressed, free T4 1.05.  From June/2023 thyroglobulin by LAKESHIA was 0.5.    Ultrasound of neck for surveillance  Check TSH, free T4, thyroglobulin, thyroglobulin antibody  Continue current dose of levothyroxine  Follow-up in 3 months  Referred to internal medicine to establish care with PCP in [place]  Orders:  -     Ambulatory Referral to Internal Medicine; Future  -     TSH, 3rd generation; Future  -     T4, free; Future  -     US head neck lymph node mapping; Future; Expected date: 07/25/2024  -     Thyroglobulin Panel; Future  3. Morbid (severe) obesity due to excess calories (HCC)  4. History of hypertension  -     Ambulatory Referral to Internal Medicine; Future  5. Bilateral lower extremity edema  -     Ambulatory Referral to Internal Medicine; Future         Physical Activity Assessment and Intervention:    Activity journal reviewed         HPI:   Jinjorge Prajapati is  53 y.o. female with history of papillary cancer of thyroid s/p ablation and surgery, acquired hypothyroidism, morbid obesity, intellectual disability and cognitive developmental delay, that  arrives to clinic for establishing with endocrinology for management of hypothyroidism.     Patient arrives accompanied by mother and assist with history.  They report that patient at baseline tends to be drowsy and tilts her body side-to-side.  However, does report some concern with frequent eye closing and falling asleep which is no and different from her baseline.  Of note, patient was recently at [facility] and was prescribed new medications such as Ativan, benztropine, and trazodone.    Patient denies any changes in her weight, and does reports she usually eats small portions.  Mother has concerns with lower extremity swelling.  Otherwise, denies any palpitations, constipation, diarrhea, diaphoresis, dry skin, hair loss, brittle nails, and additional eye symptoms.    Pertaining to papillary cancer thyroid history, she underwent ablation and surgical removal in 2013.  Was seen by [facility] endocrinology in 2019.  Last followed up with surgical oncology Dr. Mason 2022.      Patient has no other complaints at this time.      Subjective:  Review of Systems   Constitutional:  Negative for diaphoresis, fatigue, fever and unexpected weight change.        Sleepy   Eyes:  Negative for photophobia and visual disturbance.   Respiratory:  Negative for chest tightness and shortness of breath.    Cardiovascular:  Positive for leg swelling.   Gastrointestinal:  Negative for abdominal pain, constipation, diarrhea, nausea and vomiting.   Endocrine: Negative for cold intolerance, heat intolerance, polydipsia, polyphagia and polyuria.   Musculoskeletal:  Negative for neck pain.   Neurological:  Negative for tremors, weakness, light-headedness and headaches.        Patient Active Problem List   Diagnosis   • Vitamin D deficiency   • Cognitive developmental delay   • History of hypertension   • Hypothyroidism   • Focal epilepsy (HCC)   • Obesity (BMI 30-39.9)   • Obstructive sleep apnea   • History of thyroid cancer   •  Proteinuria   • Encounter for follow-up surveillance of thyroid cancer   • Papillary carcinoma of thyroid (HCC)   • Mixed hyperlipidemia   • Brief psychotic disorder (HCC)   • Family history of diabetes mellitus   • Family history of malignant neoplasm of breast   • PCOS (polycystic ovarian syndrome)   • Intellectual disability   • Chronic kidney disease, stage 3 unspecified (HCC)   • Dyslipidemia   • Gout   • Major depression   • Schizoaffective disorder (HCC)   • Thyroid cancer (HCC)   • Transient ischemic attack   • Tremor   • Vitamin B12 deficiency   • Sinus tachycardia   • Elevated serum creatinine   • Morbid (severe) obesity due to excess calories (HCC)   • Bilateral lower extremity edema   • Impaired ability to manage medication regime   • Dermatitis of face        Current Outpatient Medications   Medication Sig Dispense Refill   • benztropine (COGENTIN) 2 mg tablet Take 1 tablet (2 mg total) by mouth 2 (two) times a day 60 tablet 1   • divalproex sodium (DEPAKOTE) 250 mg DR tablet Take 3 tablets (750 mg total) by mouth every 12 (twelve) hours 180 tablet 1   • lacosamide (VIMPAT) 150 mg tablet Take 1 tablet (150 mg total) by mouth in the morning 30 tablet 5   • lacosamide (VIMPAT) 200 mg tablet Take 1 tablet (200 mg total) by mouth daily at bedtime 30 tablet 5   • levothyroxine 175 mcg tablet Take 1 tablet (175 mcg total) by mouth daily 30 tablet 1   • LORazepam (ATIVAN) 1 mg tablet Take 1 tablet (1 mg total) by mouth 2 (two) times a day 20 tablet 2   • paliperidone (INVEGA) 6 MG 24 hr tablet Take 2 tablets (12 mg total) by mouth daily 60 tablet 1   • traZODone (DESYREL) 50 mg tablet Take 1 tablet (50 mg total) by mouth daily at bedtime 30 tablet 1     No current facility-administered medications for this visit.        No Known Allergies     The following portions of the patient's history were reviewed and updated as appropriate: allergies, current medications, past family history, past medical history, past  social history, past surgical history and problem list.             Objective:  /70 (BP Location: Left arm, Patient Position: Sitting, Cuff Size: Large)   Pulse 80   Wt 103 kg (228 lb)   LMP  (LMP Unknown)   BMI 34.67 kg/m²      Body mass index is 34.67 kg/m².     Physical Exam  Vitals reviewed.   Constitutional:       General: She is not in acute distress.     Appearance: She is obese. She is not ill-appearing or diaphoretic.   HENT:      Head: Normocephalic and atraumatic.   Eyes:      General: No scleral icterus.     Conjunctiva/sclera: Conjunctivae normal.   Neck:      Thyroid: No thyroid mass (absent thyroid).   Cardiovascular:      Rate and Rhythm: Normal rate and regular rhythm.      Pulses: Normal pulses.      Heart sounds: Normal heart sounds. No murmur heard.     No gallop.   Pulmonary:      Effort: Pulmonary effort is normal. No respiratory distress.      Breath sounds: Normal breath sounds. No wheezing or rales.   Abdominal:      General: Bowel sounds are normal. There is no distension.      Palpations: Abdomen is soft. There is no mass.      Tenderness: There is no abdominal tenderness.   Musculoskeletal:         General: Normal range of motion.      Cervical back: Normal range of motion and neck supple.      Right lower leg: No edema.      Left lower leg: No edema.   Lymphadenopathy:      Cervical: No cervical adenopathy.   Skin:     General: Skin is warm and dry.      Coloration: Skin is not jaundiced or pale.   Neurological:      General: No focal deficit present.      Mental Status: She is alert and oriented to person, place, and time. Mental status is at baseline.      Sensory: No sensory deficit.   Psychiatric:         Attention and Perception: She is inattentive (Sleepy).         Mood and Affect: Affect is flat.         Speech: Speech normal.         Behavior: Behavior normal.          Labs:  I have personally reviewed the following labs.   Latest Reference Range & Units 05/16/23 14:42  06/29/23 10:47 09/12/23 15:27 11/28/23 09:21 04/01/24 09:24 05/28/24 23:17 05/30/24 06:38   TSH 3RD GENERATON 0.450 - 4.500 uIU/mL  152.001 (H) 29.779 (H) 0.027 (L) 0.069 (L) 0.217 (L) 0.152 (L)   FREE T4 0.61 - 1.12 ng/dL  <0.25 (L) 0.80   1.12 1.05   THYROGLOBULIN AB 0.0 - 0.9 IU/mL <1.0 <1.0        Thyroglobulin-LAKESHIA 1.5 - 38.5 ng/mL 1.0 (L) 0.5 (L)        (H): Data is abnormally high  (L): Data is abnormally low     Imaging:  I have personally reviewed the following imaging.  Narrative & Impression   NECK ULTRASOUND  DATE: 6/27/2023      INDICATION:     C73: Malignant neoplasm of thyroid gland. History of papillary thyroid carcinoma in 2013 status post surgery/thyroidectomy.     COMPARISON: 7/5/2022; 3/10/2022; ultrasound from 9/21/2018     FINDINGS:     Ultrasound of the thyroidectomy bed and cervical lymph node chains was performed with a high frequency linear transducer.     5 x 4 x 5 mm  nodule in the thyroid bed is not significantly changed, previously 5 x 3 x 4.  5 x 9 x 4 mm nodule, previously 11 x 3 x 5 mm.  4 x 4 x 3 mm nodule, previously 3 x 2 x 2 mm.     Lymph nodes maintain normal morphologic contour, echogenicity and short axis dimensions of less than 0.7 cm.  No evidence for microcalcification or focal nodularity.     IMPRESSION:     Thyroid bed nodules are similar to the prior study.  No evidence of recurrent or metastatic disease elsewhere.          Mark Parker MD  Endocrinology Fellow, PGY-4   no

## 2024-07-25 NOTE — ASSESSMENT & PLAN NOTE
S/p ablation and surgery for papillary cancer of thyroid in 2013  TSH from May/2024 0.152, free T4 1.05  Compliant with levothyroxine    Continue current dose of levothyroxine 175 mcg daily  Check TSH and free T4 4-6 weeks  Follow-up in 6 months

## 2024-07-25 NOTE — ASSESSMENT & PLAN NOTE
Hx of papillary cancer of thyroid stage I - s/p ablation and surgery in 2013  Last whole-body scan unremarkable in May 2016  Neck US (6/27/2023) - Thyroid bed nodules are similar to the prior study. No evidence of recurrent or metastatic disease elsewhere.  Labs May/2024 TSH 0.152 suppressed, free T4 1.05.  From June/2023 thyroglobulin by LAKESHIA was 0.5.    Ultrasound of neck for surveillance  Check TSH, free T4, thyroglobulin, thyroglobulin antibody  Continue current dose of levothyroxine  Follow-up in 3 months  Referred to internal medicine to establish care with PCP in [place]

## 2024-07-30 DIAGNOSIS — F25.9 SCHIZOAFFECTIVE DISORDER (HCC): Chronic | ICD-10-CM

## 2024-07-31 ENCOUNTER — APPOINTMENT (OUTPATIENT)
Dept: LAB | Facility: CLINIC | Age: 54
End: 2024-07-31
Payer: COMMERCIAL

## 2024-07-31 DIAGNOSIS — C73 PAPILLARY CARCINOMA OF THYROID (HCC): ICD-10-CM

## 2024-07-31 DIAGNOSIS — E89.0 POSTOPERATIVE HYPOTHYROIDISM: ICD-10-CM

## 2024-07-31 DIAGNOSIS — C73 MALIGNANT NEOPLASM OF THYROID GLAND (HCC): ICD-10-CM

## 2024-07-31 LAB
T4 FREE SERPL-MCNC: 1.06 NG/DL (ref 0.61–1.12)
TSH SERPL DL<=0.05 MIU/L-ACNC: 0.67 UIU/ML (ref 0.45–4.5)

## 2024-07-31 PROCEDURE — 86800 THYROGLOBULIN ANTIBODY: CPT

## 2024-07-31 PROCEDURE — 84439 ASSAY OF FREE THYROXINE: CPT

## 2024-07-31 PROCEDURE — 84443 ASSAY THYROID STIM HORMONE: CPT

## 2024-07-31 PROCEDURE — 36415 COLL VENOUS BLD VENIPUNCTURE: CPT

## 2024-07-31 PROCEDURE — 84432 ASSAY OF THYROGLOBULIN: CPT

## 2024-07-31 RX ORDER — LORAZEPAM 1 MG/1
1 TABLET ORAL 2 TIMES DAILY
Qty: 20 TABLET | OUTPATIENT
Start: 2024-07-31

## 2024-07-31 RX ORDER — DIVALPROEX SODIUM 250 MG/1
750 TABLET, DELAYED RELEASE ORAL EVERY 12 HOURS
Qty: 360 TABLET | OUTPATIENT
Start: 2024-07-31

## 2024-08-01 LAB
THYROGLOB AB SERPL-ACNC: <1 IU/ML (ref 0–0.9)
THYROGLOB SERPL-MCNC: <0.1 NG/ML (ref 1.5–38.5)

## 2024-08-03 DIAGNOSIS — F25.9 SCHIZOAFFECTIVE DISORDER (HCC): Chronic | ICD-10-CM

## 2024-08-05 ENCOUNTER — TELEPHONE (OUTPATIENT)
Dept: ENDOCRINOLOGY | Facility: CLINIC | Age: 54
End: 2024-08-05

## 2024-08-05 DIAGNOSIS — F25.9 SCHIZOAFFECTIVE DISORDER (HCC): Chronic | ICD-10-CM

## 2024-08-05 NOTE — TELEPHONE ENCOUNTER
Reason for call:   [x] Refill   [] Prior Auth  [] Other:     Office:   [] PCP/Provider -   [x] Specialty/Provider - Neuro/Tunde Cevallos    Medication:       Pharmacy: [facility]    Does the patient have enough for 3 days?   [x] Yes   [] No - Send as HP to POD

## 2024-08-05 NOTE — TELEPHONE ENCOUNTER
Spoke with patient's mother Louise -discussed recent labs and recommending no change in her levothyroxine therapy.  I did discuss with mother to reschedule ultrasound of neck for surveillance, as she could not make today's appointment.  Was provided with central scheduling phone number.  Questions were answered and mother confirmed understanding.

## 2024-08-06 DIAGNOSIS — F25.9 SCHIZOAFFECTIVE DISORDER (HCC): Chronic | ICD-10-CM

## 2024-08-07 ENCOUNTER — HOSPITAL ENCOUNTER (OUTPATIENT)
Dept: MAMMOGRAPHY | Facility: CLINIC | Age: 54
Discharge: HOME | End: 2024-08-07
Payer: COMMERCIAL

## 2024-08-07 DIAGNOSIS — Z12.31 BREAST CANCER SCREENING BY MAMMOGRAM: ICD-10-CM

## 2024-08-07 PROCEDURE — 77067 SCR MAMMO BI INCL CAD: CPT

## 2024-08-07 PROCEDURE — 77063 BREAST TOMOSYNTHESIS BI: CPT

## 2024-08-08 DIAGNOSIS — F25.9 SCHIZOAFFECTIVE DISORDER (HCC): Chronic | ICD-10-CM

## 2024-08-08 RX ORDER — TRAZODONE HYDROCHLORIDE 50 MG/1
50 TABLET ORAL
Qty: 30 TABLET | Refills: 1 | Status: CANCELLED | OUTPATIENT
Start: 2024-08-08

## 2024-08-08 RX ORDER — LORAZEPAM 1 MG/1
1 TABLET ORAL 2 TIMES DAILY
Qty: 20 TABLET | Refills: 0 | OUTPATIENT
Start: 2024-08-08 | End: 2024-09-07

## 2024-08-08 RX ORDER — BENZTROPINE MESYLATE 2 MG/1
2 TABLET ORAL 2 TIMES DAILY
Qty: 60 TABLET | Refills: 0 | OUTPATIENT
Start: 2024-08-08

## 2024-08-08 RX ORDER — LORAZEPAM 1 MG/1
1 TABLET ORAL 2 TIMES DAILY
Qty: 20 TABLET | Refills: 0 | Status: CANCELLED | OUTPATIENT
Start: 2024-08-08 | End: 2024-09-07

## 2024-08-08 RX ORDER — PALIPERIDONE 6 MG/1
12 TABLET, EXTENDED RELEASE ORAL DAILY
Qty: 60 TABLET | Refills: 0 | OUTPATIENT
Start: 2024-08-08

## 2024-08-08 RX ORDER — PALIPERIDONE 6 MG/1
12 TABLET, EXTENDED RELEASE ORAL DAILY
Qty: 60 TABLET | Refills: 0 | Status: CANCELLED | OUTPATIENT
Start: 2024-08-08

## 2024-08-08 RX ORDER — BENZTROPINE MESYLATE 2 MG/1
2 TABLET ORAL 2 TIMES DAILY
Qty: 60 TABLET | Refills: 0 | Status: CANCELLED | OUTPATIENT
Start: 2024-08-08

## 2024-08-08 RX ORDER — TRAZODONE HYDROCHLORIDE 50 MG/1
50 TABLET ORAL
Qty: 30 TABLET | Refills: 0 | OUTPATIENT
Start: 2024-08-08

## 2024-08-08 NOTE — TELEPHONE ENCOUNTER
Patients pharmacy called the RX Refill Line. Message is being forwarded to the office.     [facility] pharmacy called is requesting the status of refill for Divalproex Informed them they it requested on 08/05/24 and are pending approval. Pharmacy needs the refill in order to bubble pack patients medication and needs them before the weekend. Informed would avinash HP.      Pharmacy would like to be contacted if these cannot be refilled    Please contact pharmacy  [phone]

## 2024-08-08 NOTE — TELEPHONE ENCOUNTER
For provider review.   Pool attached- please contact Edolly to schedule an appointment. She no showed last appt.

## 2024-08-08 NOTE — TELEPHONE ENCOUNTER
Reason for call: Last ordered from ED  [x] Refill   [] Prior Auth  [] Other:     Office:   [] PCP/Provider -   [x] Specialty/Provider - Psyc        Pharmacy: [facility] - [place] - [id number]     Does the patient have enough for 3 days?   [] Yes   [x] No - Send as HP to POD

## 2024-08-09 RX ORDER — DIVALPROEX SODIUM 250 MG/1
750 TABLET, DELAYED RELEASE ORAL EVERY 12 HOURS SCHEDULED
Qty: 180 TABLET | Refills: 1 | Status: SHIPPED | OUTPATIENT
Start: 2024-08-09

## 2024-08-12 DIAGNOSIS — F25.9 SCHIZOAFFECTIVE DISORDER (HCC): Chronic | ICD-10-CM

## 2024-08-28 DIAGNOSIS — F25.9 SCHIZOAFFECTIVE DISORDER (HCC): Chronic | ICD-10-CM

## 2024-08-28 RX ORDER — DIVALPROEX SODIUM 250 MG/1
750 TABLET, DELAYED RELEASE ORAL EVERY 12 HOURS SCHEDULED
Qty: 180 TABLET | Refills: 1 | Status: SHIPPED | OUTPATIENT
Start: 2024-08-28

## 2024-09-06 ENCOUNTER — OFFICE VISIT (OUTPATIENT)
Dept: DENTISTRY | Facility: CLINIC | Age: 54
End: 2024-09-06

## 2024-09-06 ENCOUNTER — TELEPHONE (OUTPATIENT)
Age: 54
End: 2024-09-06

## 2024-09-06 VITALS — SYSTOLIC BLOOD PRESSURE: 123 MMHG | DIASTOLIC BLOOD PRESSURE: 83 MMHG | TEMPERATURE: 97.8 F | HEART RATE: 93 BPM

## 2024-09-06 DIAGNOSIS — Z01.20 ENCOUNTER FOR DENTAL EXAMINATION: Primary | ICD-10-CM

## 2024-09-06 DIAGNOSIS — K03.6 DENTAL CALCULUS: ICD-10-CM

## 2024-09-06 DIAGNOSIS — K03.6 ACCRETIONS ON TEETH: ICD-10-CM

## 2024-09-06 DIAGNOSIS — K05.6 PERIODONTAL DISEASE: ICD-10-CM

## 2024-09-06 PROCEDURE — D1110 PROPHYLAXIS - ADULT: HCPCS

## 2024-09-06 PROCEDURE — D0274 BITEWINGS - 4 RADIOGRAPHIC IMAGES: HCPCS

## 2024-09-06 PROCEDURE — D0120 PERIODIC ORAL EVALUATION - ESTABLISHED PATIENT: HCPCS

## 2024-09-06 PROCEDURE — D0220 INTRAORAL - PERIAPICAL FIRST RADIOGRAPHIC IMAGE: HCPCS

## 2024-09-06 NOTE — TELEPHONE ENCOUNTER
Returning patient call using  services (Cece, [phone]) in regards to scheduling appointment. Spoke w. Patient but before interpretor could introduce writer line was disconnected not by interpretor. Unable to Lvm for patient to contact intake dept due to no vm available.

## 2024-09-06 NOTE — TELEPHONE ENCOUNTER
[facility] called to try to help Esperanza. Esperanza's mother usually helps her but is in the hospital. Esperanza needs a  so the Pharmacy helps translate what she needs. Writer was able to speak to Esperanza and verify her. The Pharmacist states that Esperanza was at the pharmacy for medications and they can not fill them since there are not scripts for them. Pharmacist states that Esperanza is out of the medications some time after she was released from the Hospital.    Pt had appt scheduled with Dr John Said but was a No Show for appt. Esperanza is seeking a MM appt at [facility] because she takes the bus to get there.    Patient Esperanza would like a return call but will need . [phone]

## 2024-09-06 NOTE — DENTAL PROCEDURE DETAILS
PERIODIC EXAM, ADULT PROPHY , 4 BWX and PA # 3     REVIEWED MED HX: meds, allergies, health changes reviewed in [facility]. All consents signed.  CHIEF CONCERN: none  PAIN SCALE:  0  ASA CLASS:  ASA 3 - Patient with moderate systemic disease with functional limitations  PLAQUE:  heavy right side  much less Left side  CALCULUS: Moderate  BLEEDING:  moderate  STAIN : Light     PERIO:   gen gingivitis present    Hygiene Procedures:  Scaled, Polished, Flossed and Used Cavitron    Oral Hygiene Instruction: Brushing minimum 2x daily for 2 minutes, daily flossing and Recommended soft toothbrush only    Dispensed: Toothbrush, Toothpaste, and Flossers    Visual and Tactile Intraoral/ Extraoral evaluation: Oral and Oropharyngeal cancer evaluation. No findings     Dr. TEJA TO/Dae/ Leena   Reviewed with patient clinical and radiographic findings and patient verbalized understanding. All questions and concerns addressed.     REFERRALS: [facility] referral provided-  she was given a referral last spring   appt was scheduled but she did not attend  Stressed need to get non restorable tooth extracted  possible PAP developing  Syrian speaking assistant stressed importance of getting tooth extr  I will contact Neal for assistance with [employer] if needed    CARIES FINDINGS:   no new decay       TREATMENT  PLAN :         Next Recall: 3  month recall   may need BLE    Last BWX:   9/6/24  Last Panorex/ FMX :  6/2023

## 2024-09-13 RX ORDER — PALIPERIDONE 6 MG/1
12 TABLET, EXTENDED RELEASE ORAL DAILY
Qty: 120 TABLET | OUTPATIENT
Start: 2024-09-13

## 2024-09-28 RX ORDER — TRAZODONE HYDROCHLORIDE 50 MG/1
50 TABLET, FILM COATED ORAL
Qty: 30 TABLET | Refills: 1 | OUTPATIENT
Start: 2024-09-28

## 2024-09-30 ENCOUNTER — TELEPHONE (OUTPATIENT)
Dept: NEUROLOGY | Facility: CLINIC | Age: 54
End: 2024-09-30

## 2024-09-30 ENCOUNTER — TELEPHONE (OUTPATIENT)
Age: 54
End: 2024-09-30

## 2024-09-30 ENCOUNTER — OFFICE VISIT (OUTPATIENT)
Dept: NEUROLOGY | Facility: CLINIC | Age: 54
End: 2024-09-30
Payer: COMMERCIAL

## 2024-09-30 VITALS
DIASTOLIC BLOOD PRESSURE: 72 MMHG | BODY MASS INDEX: 33.19 KG/M2 | SYSTOLIC BLOOD PRESSURE: 140 MMHG | OXYGEN SATURATION: 98 % | TEMPERATURE: 98.2 F | HEART RATE: 96 BPM | HEIGHT: 68 IN | WEIGHT: 219 LBS

## 2024-09-30 DIAGNOSIS — F23 BRIEF PSYCHOTIC DISORDER (HCC): ICD-10-CM

## 2024-09-30 DIAGNOSIS — Z78.9 IMPAIRED ABILITY TO MANAGE MEDICATION REGIME: ICD-10-CM

## 2024-09-30 DIAGNOSIS — G40.019 PARTIAL IDIOPATHIC EPILEPSY WITH SEIZURES OF LOCALIZED ONSET, INTRACTABLE, WITHOUT STATUS EPILEPTICUS (HCC): Primary | ICD-10-CM

## 2024-09-30 DIAGNOSIS — F32.9 MAJOR DEPRESSION: ICD-10-CM

## 2024-09-30 DIAGNOSIS — F25.9 SCHIZOAFFECTIVE DISORDER (HCC): Chronic | ICD-10-CM

## 2024-09-30 PROCEDURE — 99215 OFFICE O/P EST HI 40 MIN: CPT | Performed by: PSYCHIATRY & NEUROLOGY

## 2024-09-30 RX ORDER — LACOSAMIDE 150 MG/1
150 TABLET ORAL DAILY
Qty: 30 TABLET | Refills: 5 | Status: SHIPPED | OUTPATIENT
Start: 2024-09-30

## 2024-09-30 RX ORDER — LACOSAMIDE 200 MG/1
200 TABLET ORAL
Qty: 30 TABLET | Refills: 5 | Status: SHIPPED | OUTPATIENT
Start: 2024-09-30

## 2024-09-30 NOTE — PROGRESS NOTES
[facility]  Follow Up Visit    Impression/Plan    Ms. Néstor Prajapati is a 53 y.o. female  with history of developmental delay and static encephalopathy who returns for follow-up regarding focal epilepsy manifest as simple partial, complex partial and generalized tonic-clonic seizures possibly due to head injury around the age of 10 months. Her neurological exam reveals evidence of cognitive impairment and bilateral postural and action tremor, as well as intermittent resting tremor today. There is a history of medication non adherence and confusion about her medication dosing, but this should be improved now that medications are supplied in packs by the pharmacy.     There have been no recent seizures. Continue lacosamide and divalproex unchanged. Divalproex may contribute to tremor, weight gain and PCOS. Lacosamide monotherapy could be considered in terms of seizure control, but divalproex is also needed for mood stabilization. Changes to divalproex should only be made in coordination with psychiatry due to risk of destabilizing mood in the setting of prior psychosis and prolonged behavioral health admission in mid 2024.      Her tremor is likely related to divalproex, although lacosamide can also contribute in some patients. Her tremor has been more prominent during visits this year than in the past. She is currently off Cogentin as well as her other psychiatric medications.     We discovered today that she has been off he psychiatric medications since 9/2. Multiple medications were started/adjusted during her prolonged behavioral health admission this summer (5/29--6/21). Unfortunately, it appears she did not establish care with outpatient psychiatry and ran out of her medications. I emphasized the importance of getting in with psychiatry. Coordinating care has been difficult in the past. Esperanza was proactive today and called her pharmacist during today's visit so that we  could clarify the medication problem. Her brother in law brought her to today's visit, but he does not know the details of her medications. I will ask our social work team to facilitate urgent visit with psychiatry.     Patient Instructions   It is very important that you get scheduled to see psychiatry as soon as possible. You are currently not taking any of the medications prescribed by psychiatry.   Continue lacosamide and divalproex unchanged (prescribed by neurology).   Return in about 4 months.   Let us know if there are seizures.     Diagnoses and all orders for this visit:    Partial idiopathic epilepsy with seizures of localized onset, intractable, without status epilepticus (HCC)  -     Comprehensive metabolic panel; Future  -     Valproic acid level, total; Future  -     Lacosamide; Future    Schizoaffective disorder (HCC)  -     lacosamide (VIMPAT) 150 mg tablet; Take 1 tablet (150 mg total) by mouth in the morning  -     lacosamide (VIMPAT) 200 mg tablet; Take 1 tablet (200 mg total) by mouth daily at bedtime          Subjective    Esperanza Prajapati is returning to the [facility] for follow up.     Interval Events:   Seizures since last visit: None (prior: 1/2/2024, low lacosamide level in the emergency department)  Hospitalizations: Multiple ED visits, not for seizure    Last seen 3/6/2024. 23 day behavioral health admission 5/29-6/21/2024. She was discharged on the following medications prescribed by psychiatry (lacosamide was continued unchanged):   Cogentin 2mg PO BID for EPS  Depakote 750mg PO BID for mood lability  VPA on 6/2 was 83  Ativan 1mg PO BID for EPS  Invega 12mg PO Daily for psychosis  Trazodone 50mg PO QHS for insomnia    Arrives with brother in law. Mother diagnosed with dementia and now living out of the area. Out of her psychiatric medications (Invega, Ativan, Trazodone, Cogentin). Last dose 9/2 according to her pharmacist (spoke to her by phone during  visit). Appears she was never able to establish care with psychiatry after discharge from the hospital. She denies hallucinations or new problems with mood today, but there is no other historian present today that knows the details of her history. .    No events concerning for seizure have been detected according to the patient. No phone calls to our office to report neurological problems.     Current AEDs:  Lacosamide 150-200  Divalproex 750 mg BID  In pill packs     Event/Seizure semiology:  1. “Big attacks” mother reports the patient tries to sit protect herself.  These last couple minutes.  The patient feel something and she describes a stomach pain.  There snoring, shakes, moves a lot and foaming at the mouth.  Can be combative afterwards.  There is a postictal state that may last 30 minutes or more.   2. “Small ones”.  Rubs her stomach, turns her head to the right, staring, face turns to the right.  Duration is less than a minute.     Special Features  Status epilepticus: none known  Self Injury Seizures: unknown  Precipitating Factors: none known     Epilepsy Risk Factors:  Intellectual disability  Head injury (moderate/severe)     Prior AEDs:  Carbamazepine caused behavior problems  Lamotrigine trial in 2020. Stopped on own.   Phenobarbital as a child  Phenytoin gingival hyperplasia  Topiramate possible behavior problems     Prior Evaluation:  Routine EEG July 2016 at [facility]:  1- Slow posterior dominant rhythm  2- Frequent right temporal sharp waves  3- Generalized beta activity.     REEG 6/10/2021  Abundant right temporal sharp waves and intermittent right temporal polymorphic theta slowing suggest underlying neuronal dysfunction that is highly epileptogenic. Occasional left temporal sharp waves suggest underlying focal epileptogenic potential.      REEG 9/30/2022  This Routine EEG recorded during wakefulness and sleep is abnormal.  The study captures a 1 minute and 20 second left temporal  "electroclinical seizure involving unresponsiveness, right arm dystonic posturing, as well as oral and left hand automatisms.   Most of the study is recorded during stage 2 sleep with only brief wakefulness that occurs immediately before after the seizure. Frequent right temporal sharp waves are present throughout the recording and suggest right temporal epileptogenic potential (right temporal seizure tendency in addition to the left temporal seizure seen during this study).     MRI brain 8/18/2021:   IMPRESSION:  No mass effect, acute intracranial hemorrhage or evidence of recent infarction.  No abnormal parenchymal or leptomeningeal enhancement identified  Hippocampal formations are symmetric in size and appearance.  No discrete migrational anomalies identified     Psychiatric History:  paranoia and hallucinations in 2020  Following with psychiatry.      Social History:   Driving: No  Lives Alone: No  Occupation: on permanent disability      Objective    /72 (BP Location: Right arm, Patient Position: Sitting, Cuff Size: Large)   Pulse 96   Temp 98.2 °F (36.8 °C) (Temporal)   Ht 5' 8\" (1.727 m)   Wt 99.3 kg (219 lb)   LMP  (LMP Unknown)   SpO2 98%   BMI 33.30 kg/m²      General Exam  No acute distress.    Neurologic Exam  Mental Status:  Alert. Follows commands. Oriented to month, date and year. Not aware of president.   Language: normal fluency.   Cranial Nerves:  Face symmetric. No dysarthria.  Coordination: Intermittent mild to moderate, moderate frequency, postural and action tremor, noted more on the right today. Significant at times in hand holding phone, varies. There is occasional moderate resting tremor.   Gait: Steady gait. Normal stride length.     I have spent a total time of 40 minutes in caring for this patient on the day of the visit/encounter including Diagnostic results, Prognosis, Risks and benefits of tx options, Instructions for management, Patient and family education, Importance of " tx compliance, Risk factor reductions, Impressions, Counseling / Coordination of care, Documenting in the medical record, Reviewing / ordering tests, medicine, procedures  , Obtaining or reviewing history  , and Communicating with other healthcare professionals .

## 2024-09-30 NOTE — TELEPHONE ENCOUNTER
----- Message from Tunde Cevallos MD sent at 9/30/2024 11:20 AM EDT -----  Please help facilitate urgent visit with psychiatry. Patient off her psychiatric medications since 9/2 when she ran out of her supply.

## 2024-09-30 NOTE — PATIENT INSTRUCTIONS
It is very important that you get scheduled to see psychiatry as soon as possible. You are currently not taking any of the medications prescribed by psychiatry.   Continue lacosamide and divalproex unchanged (prescribed by neurology).   Return in about 4 months.   Let us know if there are seizures.

## 2024-09-30 NOTE — Clinical Note
Please help facilitate urgent visit with psychiatry. Patient off her psychiatric medications since 9/2 when she ran out of her supply.

## 2024-10-01 ENCOUNTER — OFFICE VISIT (OUTPATIENT)
Dept: INTERNAL MEDICINE CLINIC | Facility: CLINIC | Age: 54
End: 2024-10-01
Payer: COMMERCIAL

## 2024-10-01 VITALS
WEIGHT: 219 LBS | TEMPERATURE: 97.1 F | HEIGHT: 68 IN | BODY MASS INDEX: 33.19 KG/M2 | HEART RATE: 92 BPM | DIASTOLIC BLOOD PRESSURE: 85 MMHG | SYSTOLIC BLOOD PRESSURE: 124 MMHG

## 2024-10-01 DIAGNOSIS — G40.109 FOCAL EPILEPSY (HCC): ICD-10-CM

## 2024-10-01 DIAGNOSIS — E89.0 POSTOPERATIVE HYPOTHYROIDISM: Primary | ICD-10-CM

## 2024-10-01 DIAGNOSIS — F25.9 SCHIZOAFFECTIVE DISORDER, UNSPECIFIED TYPE (HCC): Chronic | ICD-10-CM

## 2024-10-01 PROBLEM — C73 THYROID CANCER (HCC): Status: RESOLVED | Noted: 2022-06-05 | Resolved: 2024-10-01

## 2024-10-01 PROBLEM — Z85.850 ENCOUNTER FOR FOLLOW-UP SURVEILLANCE OF THYROID CANCER: Status: RESOLVED | Noted: 2022-01-10 | Resolved: 2024-10-01

## 2024-10-01 PROBLEM — Z08 ENCOUNTER FOR FOLLOW-UP SURVEILLANCE OF THYROID CANCER: Status: RESOLVED | Noted: 2022-01-10 | Resolved: 2024-10-01

## 2024-10-01 PROCEDURE — 99204 OFFICE O/P NEW MOD 45 MIN: CPT | Performed by: STUDENT IN AN ORGANIZED HEALTH CARE EDUCATION/TRAINING PROGRAM

## 2024-10-01 NOTE — PROGRESS NOTES
"INTERNAL MEDICINE OFFICE VISIT NOTE  [facility] Internal Medicine [place]    NAME: Esperanza Prajapati  AGE: 53 y.o. SEX: female    DATE OF ENCOUNTER:       Chief Complaint   Patient presents with    hospitals Care     New Patient to hospitals Care      She is accompanied today by her brother-in-law however he is not well-informed her medical history or medications.   He reports she received her medications in bubble pack from the pharmacy.   She did authorized me to contact her sister Layne to discuss her medical history as needed.     As per chart review, she was following with a PCP at [facility].     F/w neurology, endocrinology  It appears that she has also been following with psychiatry but missed her last visit in July, after a prolong hospitalization in May through June.     Unable to confirm her medical history or her medication regimen secondary to cognitive developmental delay that is documented in her chart.  Other medical history includes hypothyroidism, PCOS, history of papilloma carcinoma of the thyroid, focal epilepsy, CKD, psychoaffective disorder, dyslipidemia, obesity, B12 deficiency, vitamin D deficiency        Shx: Denies current or former habitual use of: Tobacco, vaping of , marijuana, illict drug use  Alcohol use: Denies  Environmental exposures: NK  Works - Volunteer in an office   Single - never , NO Kids       Labs or imaging reports: Reviewed      Assessment & Plan  Postoperative hypothyroidism  H/o papillary thyroid carcinoma in 2013, status post total thyroidectomy   Her medication list includes levothyroxine 175 mcg daily however she is unable to confirm this.  I requested her  to confirm this at home and contact us to update her list    Orders:    Ambulatory Referral to Internal Medicine    Focal epilepsy (HCC)  F/w  Neurology. Last visit on 9/30. As per documentation of last encounter:  \"history of developmental delay and static encephalopathy who returns for follow-up " regarding focal epilepsy manifest as simple partial, complex partial and generalized tonic-clonic seizures possibly due to head injury around the age of 10 months. Her neurological exam reveals evidence of cognitive impairment and bilateral postural and action tremor, as well as intermittent resting tremor today. There is a history of medication non adherence and confusion about her medication dosing, but this should be improved now that medications are supplied in packs by the pharmacy.      There have been no recent seizures. Continue lacosamide and divalproex unchanged. Divalproex may contribute to tremor, weight gain and PCOS. Lacosamide monotherapy could be considered in terms of seizure control, but divalproex is also needed for mood stabilization. Changes to divalproex should only be made in coordination with psychiatry due to risk of destabilizing mood in the setting of prior psychosis and prolonged behavioral health admission in mid 2024.      Her tremor is likely related to divalproex, although lacosamide can also contribute in some patients. Her tremor has been more prominent during visits this year than in the past. She is currently off Cogentin as well as her other psychiatric medications.      We discovered today that she has been off he psychiatric medications since 9/2. Multiple medications were started/adjusted during her prolonged behavioral health admission this summer (5/29--6/21). Unfortunately, it appears she did not establish care with outpatient psychiatry and ran out of her medications. I emphasized the importance of getting in with psychiatry. Coordinating care has been difficult in the past. Esperanza was proactive today and called her pharmacist during today's visit so that we could clarify the medication problem. Her brother in law brought her to today's visit, but he does not know the details of her medications. I will ask our social work team to facilitate urgent visit with psychiatry.     "  Patient Instructions   It is very important that you get scheduled to see psychiatry as soon as possible. You are currently not taking any of the medications prescribed by psychiatry.   Continue lacosamide and divalproex unchanged (prescribed by neurology).   Return in about 4 months.   Let us know if there are seizures\"           Schizoaffective disorder, unspecified type (HCC)  Previously following with psychiatry.   Her medication list includes paliperidone 6mg, trazodone 50mg, and lorazepam 1mg , however she and her  are unable to confirm her regimen.   I requested them to go home and to contact our office with her current medications to update her list.            Return 2-4 weeks.      OBJECTIVE:  Vitals:    10/01/24 0946   BP: 124/85   BP Location: Left arm   Patient Position: Sitting   Cuff Size: Standard   Pulse: 92   Temp: (!) 97.1 °F (36.2 °C)   Weight: 99.3 kg (219 lb)   Height: 5' 8\" (1.727 m)         Physical Exam:   GENERAL: NAD, Normal appearance.    NEUROLOGIC:  Alert/oriented x2.  HEENT:  NC/AT, no scleral icterus  CARDIAC:  RRR, +S1/S2  PULMONARY:  non-labored breathing        Current Outpatient Medications:     benztropine (COGENTIN) 2 mg tablet, Take 1 tablet (2 mg total) by mouth 2 (two) times a day, Disp: 60 tablet, Rfl: 1    divalproex sodium (DEPAKOTE) 250 mg DR tablet, TAKE 3 TABLETS (750 MG TOTAL) BY MOUTH EVERY 12 (TWELVE) HOURS, Disp: 180 tablet, Rfl: 1    lacosamide (VIMPAT) 150 mg tablet, Take 1 tablet (150 mg total) by mouth in the morning, Disp: 30 tablet, Rfl: 5    lacosamide (VIMPAT) 200 mg tablet, Take 1 tablet (200 mg total) by mouth daily at bedtime, Disp: 30 tablet, Rfl: 5    levothyroxine 175 mcg tablet, Take 1 tablet (175 mcg total) by mouth daily, Disp: 30 tablet, Rfl: 1    LORazepam (ATIVAN) 1 mg tablet, Take 1 tablet (1 mg total) by mouth 2 (two) times a day, Disp: 20 tablet, Rfl: 2    paliperidone (INVEGA) 6 MG 24 hr tablet, Take 2 tablets (12 mg total) by " mouth daily, Disp: 60 tablet, Rfl: 1    traZODone (DESYREL) 50 mg tablet, Take 1 tablet (50 mg total) by mouth daily at bedtime, Disp: 30 tablet, Rfl: 1    Patient Active Problem List   Diagnosis    Vitamin D deficiency    Cognitive developmental delay    History of hypertension    Postoperative hypothyroidism    Focal epilepsy (HCC)    Obesity (BMI 30-39.9)    Obstructive sleep apnea    History of thyroid cancer    Proteinuria    h/o Papillary carcinoma of thyroid (HCC)    Mixed hyperlipidemia    Family history of diabetes mellitus    Family history of malignant neoplasm of breast    PCOS (polycystic ovarian syndrome)    Intellectual disability    Chronic kidney disease, stage 3 unspecified (HCC)    Dyslipidemia    Gout    Major depression    Schizoaffective disorder (HCC)    Transient ischemic attack    Tremor    Vitamin B12 deficiency    Sinus tachycardia    Elevated serum creatinine    Morbid (severe) obesity due to excess calories (HCC)    Bilateral lower extremity edema    Impaired ability to manage medication regime    Dermatitis of face             Foster Biswas MD  [facility] Internal Medicine [place]    * Please Note: This note was completed in part utilizing a dictation software.  Grammatical errors, random word insertions, spelling mistakes, and incomplete sentences may be an occasional consequence of this system secondary to software limitations, ambient noise, and hardware issues.  If you have any questions or concerns about the content, text, or information contained within the body of this dictation, please contact the provider for clarification.**

## 2024-10-01 NOTE — ASSESSMENT & PLAN NOTE
"F/w  Neurology. Last visit on 9/30. As per documentation of last encounter:  \"history of developmental delay and static encephalopathy who returns for follow-up regarding focal epilepsy manifest as simple partial, complex partial and generalized tonic-clonic seizures possibly due to head injury around the age of 10 months. Her neurological exam reveals evidence of cognitive impairment and bilateral postural and action tremor, as well as intermittent resting tremor today. There is a history of medication non adherence and confusion about her medication dosing, but this should be improved now that medications are supplied in packs by the pharmacy.      There have been no recent seizures. Continue lacosamide and divalproex unchanged. Divalproex may contribute to tremor, weight gain and PCOS. Lacosamide monotherapy could be considered in terms of seizure control, but divalproex is also needed for mood stabilization. Changes to divalproex should only be made in coordination with psychiatry due to risk of destabilizing mood in the setting of prior psychosis and prolonged behavioral health admission in mid 2024.      Her tremor is likely related to divalproex, although lacosamide can also contribute in some patients. Her tremor has been more prominent during visits this year than in the past. She is currently off Cogentin as well as her other psychiatric medications.      We discovered today that she has been off he psychiatric medications since 9/2. Multiple medications were started/adjusted during her prolonged behavioral health admission this summer (5/29--6/21). Unfortunately, it appears she did not establish care with outpatient psychiatry and ran out of her medications. I emphasized the importance of getting in with psychiatry. Coordinating care has been difficult in the past. Esperanza was proactive today and called her pharmacist during today's visit so that we could clarify the medication problem. Her brother in " "law brought her to today's visit, but he does not know the details of her medications. I will ask our social work team to facilitate urgent visit with psychiatry.      Patient Instructions   It is very important that you get scheduled to see psychiatry as soon as possible. You are currently not taking any of the medications prescribed by psychiatry.   Continue lacosamide and divalproex unchanged (prescribed by neurology).   Return in about 4 months.   Let us know if there are seizures\"           "

## 2024-10-01 NOTE — ASSESSMENT & PLAN NOTE
H/o papillary thyroid carcinoma in 2013, status post total thyroidectomy   Her medication list includes levothyroxine 175 mcg daily however she is unable to confirm this.  I requested her  to confirm this at home and contact us to update her list    Orders:    Ambulatory Referral to Internal Medicine

## 2024-10-02 ENCOUNTER — TELEPHONE (OUTPATIENT)
Age: 54
End: 2024-10-02

## 2024-10-02 RX ORDER — LEVOTHYROXINE SODIUM 175 UG/1
175 TABLET ORAL DAILY
Qty: 60 TABLET | OUTPATIENT
Start: 2024-10-02

## 2024-10-02 NOTE — TELEPHONE ENCOUNTER
Contacted patient in regards to STAT Referral in attempts to verify patient's needs of services. Writer verified N/A - NO ANSWER. Writer LVM and left callback number [phone]; option 3.    1st call attempt, 3 days defer    : [id number] Sofia

## 2024-10-04 PROBLEM — F23 BRIEF PSYCHOTIC DISORDER (HCC): Status: RESOLVED | Noted: 2022-03-25 | Resolved: 2024-10-04

## 2024-10-04 NOTE — ASSESSMENT & PLAN NOTE
Previously following with psychiatry.   Her medication list includes paliperidone 6mg, trazodone 50mg, and lorazepam 1mg , however she and her  are unable to confirm her regimen.   I requested them to go home and to contact our office with her current medications to update her list.

## 2024-10-05 RX ORDER — LORAZEPAM 1 MG/1
1 TABLET ORAL 2 TIMES DAILY
Qty: 20 TABLET | OUTPATIENT
Start: 2024-10-05

## 2024-10-07 NOTE — TELEPHONE ENCOUNTER
Contacted patient in regards to STAT Referral in attempts to verify patient's needs of services. Writer verified N/A - NO ANSWER. Writer LVM and left callback number [phone]; option 3.    2nd call attempt, 7 days defer    : [id number]

## 2024-10-09 ENCOUNTER — TELEPHONE (OUTPATIENT)
Age: 54
End: 2024-10-09

## 2024-10-09 ENCOUNTER — OFFICE VISIT (OUTPATIENT)
Dept: PSYCHIATRY | Facility: CLINIC | Age: 54
End: 2024-10-09
Payer: COMMERCIAL

## 2024-10-09 DIAGNOSIS — F25.9 SCHIZOAFFECTIVE DISORDER (HCC): Primary | Chronic | ICD-10-CM

## 2024-10-09 DIAGNOSIS — F79 INTELLECTUAL DISABILITY: ICD-10-CM

## 2024-10-09 PROCEDURE — 90792 PSYCH DIAG EVAL W/MED SRVCS: CPT | Performed by: STUDENT IN AN ORGANIZED HEALTH CARE EDUCATION/TRAINING PROGRAM

## 2024-10-09 RX ORDER — PALIPERIDONE 9 MG/1
9 TABLET, EXTENDED RELEASE ORAL DAILY
Qty: 30 TABLET | Refills: 0 | Status: SHIPPED | OUTPATIENT
Start: 2024-10-09 | End: 2024-11-08

## 2024-10-09 RX ORDER — BENZTROPINE MESYLATE 2 MG/1
2 TABLET ORAL 2 TIMES DAILY
Qty: 60 TABLET | Refills: 0 | Status: SHIPPED | OUTPATIENT
Start: 2024-10-09

## 2024-10-09 RX ORDER — TRAZODONE HYDROCHLORIDE 50 MG/1
50 TABLET, FILM COATED ORAL
Qty: 30 TABLET | Refills: 0 | Status: SHIPPED | OUTPATIENT
Start: 2024-10-09

## 2024-10-09 RX ORDER — BENZTROPINE MESYLATE 2 MG/1
2 TABLET ORAL 2 TIMES DAILY
Qty: 120 TABLET | OUTPATIENT
Start: 2024-10-09

## 2024-10-09 RX ORDER — OLANZAPINE 10 MG/1
10 TABLET ORAL
Qty: 30 TABLET | Refills: 0 | Status: SHIPPED | OUTPATIENT
Start: 2024-10-09 | End: 2024-11-08

## 2024-10-09 NOTE — BH TREATMENT PLAN
TREATMENT PLAN (Medication Management Only)        [facility]    Name and Date of Birth:  Esperanza Prajapati 53 y.o. 1970  Date of Treatment Plan: October 9, 2024  Diagnosis/Diagnoses:    1. Schizoaffective disorder (HCC)    2. Intellectual disability      Strengths/Personal Resources for Self-Care: supportive family, taking medications as prescribed, financial security.  Area/Areas of need (in own words): anxiety symptoms, mood instability, sleep problems, anger control, behavioral problems, lack of sleep, paranoid thoughts  1. Long Term Goal: improve psychotic symptoms.  Target Date:6 months - 4/9/2025  Person/Persons responsible for completion of goal: Esperanza  2.  Short Term Objective (s) - How will we reach this goal?:   A. Provider new recommended medication/dosage changes and/or continue medication(s): Medication changes: I have changed Esperanza Prajapati's paliperidone. I am also having her start on OLANZapine. Additionally, I am having her maintain her levothyroxine, LORazepam, divalproex sodium, lacosamide, lacosamide, benztropine, and traZODone., monitor Depakote level, CBC/diff, CMP, liver enzymes, lipid profile, hemoglobin A1C, TSH, free T4, and free T3.  B. Take psychiatric medications responsibly.  C. Attend to Activities of Daily Living regularly.  Target Date:6 months - 4/9/2025  Person/Persons Responsible for Completion of Goal: family, caregiver  Progress Towards Goals: continuing treatment  Treatment Modality: medication management with psychotherapy every 4 weeks, medication education at every visit  Review due 180 days from date of this plan: 6 months - 4/9/2025  Expected length of service: ongoing treatment  My Physician and I have developed this plan together and I agree to work on the goals and objectives. I understand the treatment goals that were developed for my treatment.

## 2024-10-09 NOTE — PSYCH
"Psychiatric Evaluation - Behavioral Health   MRN: [id number]    Visit Time  Visit Start Time: 3:30 PM  Visit Stop Time: 4:00 PM  Total Visit Duration:  30 minutes    Chief Complaint: \"to see the doctor\"    History of Present Illness     Patient was brought in by her sister, Josephine Palm, who is new to her medical treatments but will be caring for patient (and her mother) moving forward and bring patient for appointments.     Esperanza Prajapati is a 53 y.o. 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     Patient presented as a new patient/hospital discharge from [address] [facility] under an involuntary status on admission:   ED note written by Semaj Hendrix MD on 5/28/2024     Psychiatric Evaluation  Pt brought in by brother accompanied by [employer]. Per family pt has cog delay and has been experiencing paranoia, delusions AH/VH \"seeing [place] people\" Pt brother states that she has \"these episodes when her meds are changed, med change 3 months ago\" Pt rambling and with disorganized speech  Esperanza is a 54 yo F here for psychiatric evaluation.  She presents with her brother who states that the patient has been acting strangely, not herself for about a month now.  She does have a history of psychiatric admissions, primarily for " "paranoid delusions.  Patient is a poor historian with very disorganized thought process.  She switches back and forth between English and Turkmen.  She seems primarily preoccupied about paranoia related to screens, cameras, and pictures.  She feels that she is being watched or manipulated by \"them.\"  Patient's brother reports that patient has had similar symptoms prior to psychiatric admissions in the past.  She does not have a history of self-harm but has decompensated over the past weeks to a month to the point where she is unable to care for herself or perform ADLs.        ED note written by Treasure Jordan, crisis worker on 5/29/2024     [facility] went back to patient's room to speak with her about inpatient behavioral health treatment. She appeared even more preoccupied with her delusions than she had been earlier. Attempts to help her focus for a conversation were unsuccessful. Her brother attempted to help, but he was unable to get her to indicate she remembered her previous hospitalization from last year, and she did not appear to understand what was meant by inpatient mental health treatment or hospitalization. Dr Hendrix indicated her lab work did not find any medical reason for her paranoia, delusions, and possible hallucinations. She has been psychiatrically hospitalized in the past with a presentation and symptoms similar to those tonight, and notes indicate inpatient treatment was successful in helping to reduce and even eliminate some of her most debilitating symptoms for a time. Inpatient mental health treatment again is warranted by her current presentation. As she is unable to demonstrate that she understands that plan, she will be involuntarily committed using a 2 physician 302.     Her brother was informed of this, and that there are no beds available at [facility] this evening. It is unknown at this time if one will become available later today. She will need placement in a facility with access to " medical treatment if it becomes necessary due to her chronic medical issues    According to H&P completed by Dr. Lester on 5/30/2024:     On evaluation, she is overall calm and cooperative but she is unable to carry on a conversation.  She is however oriented to person place and day month and year.  Patient is disorganized, tangential and delusional.  Patient cannot tell this writer why she is here.  She gives little information about her recent history.  For her medications.  She can tell me that she does not want to harm herself or anyone else.  She denies having any auditory visual examinations.  However the patient certainly seems to be responding to internal stimuli.  The patient was focusing on how this writer's hands had been in one pocket and holding a clipboard and another.  She says she saw this in a movie.  Apparently the patient had reported [place] people that have videos, grab her eyes and try to make her fall.  Patient informed she will be started back on Invega as she had been on before she decompensated.  Patient gave no response.  Patient denies any changes in the psychiatric review of systems at home    Hospital Course:      Esperanza was admitted to the inpatient psychiatric unit and started on Behavioral Health checks every 7 minutes. During the hospitalization she was encouraged to attend individual therapy, group therapy, milieu therapy and occupational therapy.   Esperanza was treated with a multidisciplinary approach that included daily lethality assessments, pharmacotherapy, psychotherapy as well as consultation to hospital internal medicine.     Psychiatric medications were titrated over the hospital stay. To address mood instability, psychotic symptoms, auditory hallucinations, extrapyramidal symptoms due to antipsychotic medications, and insomnia, Esperanza was treated with mood stabilizer Depakote, antipsychotic medication Invega, antiparkinsonian medication Cogentin and Ativan, and hypnotic  "medication Trazodone. Medication doses were added and adjusted during the hospital course.  The patient was discharged on the following medication regimen:     Cogentin 2mg PO BID for EPS  Depakote 750mg PO BID for mood lability  VPA on 6/2 was 83  Ativan 1mg PO BID for EPS  Invega 12mg PO Daily for psychosis  Trazodone 50mg PO QHS for insomnia     Prior to beginning of treatment medications risks and benefits and possible side effects including risk of parkinsonian symptoms, Tardive Dyskinesia and metabolic syndrome related to treatment with antipsychotic medications, risk of cardiovascular events in elderly related to treatment with antipsychotic medications, risks of misuse, abuse or dependence, sedation and respiratory depression related to treatment with benzodiazepine medications, and risk of impaired next-day mental alertness, complex sleep-related behavior and dependence related to treatment with hypnotic medications were reviewed with Esperanza. She verbalized understanding and agreement for treatment. Upon admission Esperanza was seen by medical service for medical clearance for inpatient treatment and medical follow up.     Esperanza's symptoms slowly improved over the hospital course. Initially after admission she was still feeling depressed, anxious, frustrated, overwhelmed, labile, and psychotic. With adjustment of medications and therapeutic milieu her symptoms slowly improved. At the end of treatment Esperanza was doing much better. Her mood  was less depressed and less anxious  at the time of discharge.  She was no longer reporting seeing \"group of people\".  She was more visible and social in the milieu, her affect was significantly more bright as well.  Her depression and anxiety was improved, she was more forward thinking and goal-directed in her thought process.  She expressed a desire to remain on her medications once discharged.  Her insight and judgment was also improved.  Her brother and mother noted an " "improvement in the patient symptoms and more in agreement with discharge. At the moment, patient's acute lethality risk in the community is LOW, long-term/chronic lethality risk is mildly elevated given history of noncompliance with treatment recommendations and medications, chronicity of mental health symptoms, limited psychosocial supports. These chronic risk factors cannot be mitigated with further inpatient hospitalization which is not currently warranted. To mitigate future risk, patient should adhere to treatment recommendations set forth above, avoid alcohol/illicit substance use, and prioritize mental health treatment.  Patient has no recent history of suicidal gestures/attempts and does not currently have access to weapons/firearms. Patient is future-oriented and demonstrates ability to act in a self-preserving manner. Esperanza has maximally benefited from inpatient admission and thus, will be discharged with appropriate, outpatient linkage.  Patient is not at acute risk of harm to self or others. Risk has been mitigated by inpatient stay and treatment.  Esperanza verbalized an adequate safety plan to utilize post discharge.  This includes: \"pray\", \"read\" and lastly, patient was encouraged to seek the nearest Emergency Department should suicidal thoughts arise.  On the day of discharge, Esperanza appears pleasant, calm and cooperative. Denies any symptoms of depression, iliana/hypomania or psychosis.   Esperanza denied suicidal ideation, intent or plan at the time of discharge and denied homicidal ideation, intent or plan at the time of discharge. Auditory hallucinations were significantly improved and not command in nature. Esperanza was participating appropriately in milieu at the time of discharge. Behavior was appropriate on the unit at the time of discharge. Sleep and appetite were improved. Esperanza was tolerating medications and was not reporting any significant side effects at the time of discharge.     Since Esperanza " was doing well at the end of the hospitalization, treatment team felt that she could be safely discharged to outpatient care. Esperanza expressed their noted improvement and was in agreement with discharge. The outpatient follow up was arranged by the unit  upon discharge.  Time was afforded for questions and all questions were addressed.  Patient was given the number for the Suicide and Crisis Lifeline at 988 as well as their local [facility] Crisis in case they find themselves in a psychiatric emergency in the future.  Progression of hospitalization, current presentation, and discharge plans reviewed with brother who expressed agreement with such:  Patient will follow-up with [facility] on 7/31 @0930AM for ongoing psychiatric support    Patient was brought today to the clinic accompanied by her sister Josephine.  Patient missed her follow-up appointment after the hospital discharge and canceled 2 times prior to today visit.  She is limited in her verbal communication but she reported that she feels paranoid and anxious at the house and feels that she is being tracked by some devices.  Her sister reported that Esperanza often just speaks to herself and speaks to the pictures on the wall.  She is also not sleeping at nighttime and frequently gets agitated and angry when confronted about her symptoms.  She also needs help with the care of herself.  She is currently taking Invega 12 mg and she also on Vimpat for seizures and trazodone for insomnia.  She does not feel that Invega is effective for her symptoms and seeking medications adjustments      Psychiatric Review Of Systems:  Esperanza reports Symptoms as described in HPI.  Esperanza denies Significantly depressed mood, Problems with sleep, Anhedonia, Hopelessness, Energy problems, Concentration problems, Appetite changes, Current suicidal thoughts, plan, or intent, Current thoughts of self-harm, Current homicidal thoughts, plan, or intent, Significant anxiety , Panic attacks,  Somatic symptoms, Obsessive or compulsive symptoms, Trauma related symptoms, Impulsivity or recklessness, Significantly elevated mood, Racing thoughts, Increased goal-directed activity, Decreased need for sleep or Excessive talkativeness.    Medical Review Of Systems:  Complete review of systems is negative except as noted above.    --------------------------------------  Past Medical History:   Diagnosis Date    Cancer (HCC)     thyroid cancer    Disease of thyroid gland     Hallucination     Hyperactivity (behavior)     Last Assessed: 11/7/2014     Hyperlipidemia     Obesity     Psychosis (HCC)     Seizures (HCC)       Past Surgical History:   Procedure Laterality Date    OTHER SURGICAL HISTORY      Removal of urinary calculus     OTHER SURGICAL HISTORY      Rhinologic Surgery     TN OPEN TX PHALANGEAL SHAFT FRACTURE PROX/MIDDLE EA Left 8/3/2023    Procedure: CLOSED REDUCTION PERCUTANEOUS PINNING - LEFT RING FINGER;  Surgeon: Fransisco Escalante MD;  [facility]: [facility];  Service: Orthopedics    TOTAL THYROIDECTOMY      LAst Assessed: 11/7/2014     Family History   Problem Relation Age of Onset    Breast cancer Mother 59    Hypertension Mother     ALS Mother     Stroke Mother     Prostate cancer Father     Diabetes type II Father        The following portions of the patient's history were reviewed and updated as appropriate: allergies, current medications, past family history, past medical history, past social history, past surgical history and problem list.    Visit Vitals  LMP  (LMP Unknown)   OB Status Postmenopausal   Smoking Status Never      Wt Readings from Last 6 Encounters:   10/01/24 99.3 kg (219 lb)   09/30/24 99.3 kg (219 lb)   07/25/24 103 kg (228 lb)   06/16/24 104 kg (228 lb 6.4 oz)   03/18/24 98.9 kg (218 lb)   02/28/24 97.9 kg (215 lb 14.4 oz)        Mental Status Exam:  Appearance:  alert, fair eye contact, appears stated age, casually dressed, well groomed, obese and smiling at times  "  Behavior:  calm, cooperative and sitting comfortably   Motor: no abnormal movements and normal gait and balance   Speech:  delayed initiation, normal rate, normal volume and coherent   Mood:  \"okay\"   Affect:  appropriate range and anxious   Thought Process:  generally linear and goal-directed but preseverative at times   Thought Content: mild paranoia, ideas of reference   Perceptual disturbances: denies current hallucinations and appears to be responding to internal stimuli   Risk Potential: No active or passive suicidal or homicidal ideation was verbalized during interview   Cognition: oriented to self and situation, memory grossly intact, appears to be below average intelligence, impaired abstract reasoning, attention span appeared shorter than expected for age and cognition not formally tested   Insight:  Limited   Judgment: Limited     Meds/Allergies    No Known Allergies  Current Outpatient Medications   Medication Instructions    benztropine (COGENTIN) 2 mg, Oral, 2 times daily    divalproex sodium (DEPAKOTE) 750 mg, Oral, Every 12 hours scheduled    lacosamide (VIMPAT) 150 mg, Oral, Daily    lacosamide (VIMPAT) 200 mg, Oral, Daily at bedtime    levothyroxine 175 mcg, Oral, Daily    LORazepam (ATIVAN) 1 mg, Oral, 2 times daily    paliperidone (INVEGA) 12 mg, Oral, Daily    traZODone (DESYREL) 50 mg, Oral, Daily at bedtime        Labs & Imaging:  I have personally reviewed all pertinent laboratory tests and imaging results.  Appointment on 07/31/2024   Component Date Value Ref Range Status    TSH 3RD [facility] 07/31/2024 0.671  0.450 - 4.500 uIU/mL Final    The recommended reference ranges for TSH during pregnancy are as follows:   First trimester 0.100 to 2.500 uIU/mL   Second trimester  0.200 to 3.000 uIU/mL   Third trimester 0.300 to 3.000 uIU/m    Note: Normal ranges may not apply to patients who are transgender, non-binary, or whose legal sex, sex at birth, and gender identity differ.  Adult TSH (3rd " generation) reference range follows the recommended guidelines of the American Thyroid Association, January, 2020.    Free T4 07/31/2024 1.06  0.61 - 1.12 ng/dL Final    Specimens with biotin concentrations > 10 ng/mL can lead to significant (> 10%) positive bias in result.    Thyroglobulin Ab 07/31/2024 <1.0  0.0 - 0.9 IU/mL Final    Thyroglobulin Antibody measured by [facility] Methodology  It should be noted that the presence of thyroglobulin antibodies  may not be pathogenic nor diagnostic, especially at very low  levels. The assay  has found that four percent of  individuals without evidence of thyroid disease or autoimmunity  will have positive TgAb levels up to 4 IU/mL.    Thyroglobulin-LAKESHIA 07/31/2024 <0.1 (L)  1.5 - 38.5 ng/mL Final    According to the National Academy of Clinical Biochemistry, the  reference interval for Thyroglobulin (TG) should be related to  euthyroid patients and not for patients who underwent thyroidectomy.  TG reference intervals for these patients depend on the residual  mass of the thyroid tissue left after surgery. Establishing a  post-operative baseline is recommended.  The assay limit of quantitation is 0.1 ng/mL  Thyroglobulin measured by [facility] [place] Immunometric Assay   Admission on 07/16/2024, Discharged on 07/16/2024   Component Date Value Ref Range Status    WBC 07/16/2024 4.47  4.31 - 10.16 Thousand/uL Final    RBC 07/16/2024 3.73 (L)  3.81 - 5.12 Million/uL Final    Hemoglobin 07/16/2024 12.3  11.5 - 15.4 g/dL Final    Hematocrit 07/16/2024 36.5  34.8 - 46.1 % Final    MCV 07/16/2024 98  82 - 98 fL Final    MCH 07/16/2024 33.0  26.8 - 34.3 pg Final    MCHC 07/16/2024 33.7  31.4 - 37.4 g/dL Final    RDW 07/16/2024 12.4  11.6 - 15.1 % Final    MPV 07/16/2024 9.9  8.9 - 12.7 fL Final    Platelets 07/16/2024 222  149 - 390 Thousands/uL Final    nRBC 07/16/2024 0  /100 WBCs Final    Segmented % 07/16/2024 42 (L)  43 - 75 % Final    Immature Grans %  07/16/2024 1  0 - 2 % Final    Lymphocytes % 07/16/2024 42  14 - 44 % Final    Monocytes % 07/16/2024 12  4 - 12 % Final    Eosinophils Relative 07/16/2024 2  0 - 6 % Final    Basophils Relative 07/16/2024 1  0 - 1 % Final    Absolute Neutrophils 07/16/2024 1.86  1.85 - 7.62 Thousands/µL Final    Absolute Immature Grans 07/16/2024 0.04  0.00 - 0.20 Thousand/uL Final    Absolute Lymphocytes 07/16/2024 1.91  0.60 - 4.47 Thousands/µL Final    Absolute Monocytes 07/16/2024 0.55  0.17 - 1.22 Thousand/µL Final    Eosinophils Absolute 07/16/2024 0.08  0.00 - 0.61 Thousand/µL Final    Basophils Absolute 07/16/2024 0.03  0.00 - 0.10 Thousands/µL Final    Sodium 07/16/2024 137  135 - 147 mmol/L Final    Potassium 07/16/2024 4.3  3.5 - 5.3 mmol/L Final    Chloride 07/16/2024 102  96 - 108 mmol/L Final    CO2 07/16/2024 29  21 - 32 mmol/L Final    ANION GAP 07/16/2024 6  4 - 13 mmol/L Final    BUN 07/16/2024 14  5 - 25 mg/dL Final    Creatinine 07/16/2024 0.79  0.60 - 1.30 mg/dL Final    Standardized to IDMS reference method    Glucose 07/16/2024 83  65 - 140 mg/dL Final    If the patient is fasting, the ADA then defines impaired fasting glucose as > 100 mg/dL and diabetes as > or equal to 123 mg/dL.    Calcium 07/16/2024 8.7  8.4 - 10.2 mg/dL Final    AST 07/16/2024 11 (L)  13 - 39 U/L Final    ALT 07/16/2024 11  7 - 52 U/L Final    Specimen collection should occur prior to Sulfasalazine administration due to the potential for falsely depressed results.     Alkaline Phosphatase 07/16/2024 42  34 - 104 U/L Final    Total Protein 07/16/2024 6.9  6.4 - 8.4 g/dL Final    Albumin 07/16/2024 3.5  3.5 - 5.0 g/dL Final    Total Bilirubin 07/16/2024 0.32  0.20 - 1.00 mg/dL Final    Use of this assay is not recommended for patients undergoing treatment with eltrombopag due to the potential for falsely elevated results.  N-acetyl-p-benzoquinone imine (metabolite of Acetaminophen) will generate erroneously low results in samples for  patients that have taken an overdose of Acetaminophen.    eGFR 07/16/2024 86  ml/min/1.73sq m Final    HS TnI random 07/16/2024 <2 (L)  8 - 18 ng/L Final    Comment:                                              Initial (time 0) result  If >=50 ng/L, Myocardial injury suggested ;  Type of myocardial injury and treatment strategy  to be determined.  If 5-49 ng/L, a delta result at 2 hours and or 4 hours will be needed to further evaluate.  If <4 ng/L, and chest pain has been >3 hours since onset, patient may qualify for discharge based on the HEART score in the ED.  If <5 ng/L and <3hours since onset of chest pain, a delta result at 2 hours will be needed to further evaluate.    HS Troponin 99th Percentile URL of a Health Population=12 ng/L with a 95% Confidence Interval of 8-18 ng/L.    Second Troponin (time 2 hours)  If calculated delta >= 20 ng/L,  Myocardial injury suggested ; Type of myocardial injury and treatment strategy to be determined.  If 5-49 ng/L and the calculated delta is 5-19 ng/L, consult medical service for evaluation.  Continue evaluation for ischemia on ecg and other possible etiology and repeat hs troponin at 4 hours.  If delta                            is <5 ng/L at 2 hours, consider discharge based on risk stratification via the HEART score (if in ED), or JOY risk score in IP/Observation.    HS Troponin 99th Percentile URL of a Health Population=12 ng/L with a 95% Confidence Interval of 8-18 ng/L.   Admission on 05/29/2024, Discharged on 06/21/2024   Component Date Value Ref Range Status    TSH 3RD [facility] 05/30/2024 0.152 (L)  0.450 - 4.500 uIU/mL Final    The recommended reference ranges for TSH during pregnancy are as follows:   First trimester 0.100 to 2.500 uIU/mL   Second trimester  0.200 to 3.000 uIU/mL   Third trimester 0.300 to 3.000 uIU/m    Note: Normal ranges may not apply to patients who are transgender, non-binary, or whose legal sex, sex at birth, and gender identity  differ.  Adult TSH (3rd generation) reference range follows the recommended guidelines of the American Thyroid Association, January, 2020.    Vitamin B-12 05/28/2024 443  180 - 914 pg/mL Final    Folate 05/28/2024 17.8  >5.9 ng/mL Final    The World Health Organization has determined deficient folate concentrations are considered to be <4.0 ng/mL.    Vit D, 25-Hydroxy 05/28/2024 38.2  30.0 - 100.0 ng/mL Final    Vitamin D guidelines established by Clinical Guidelines Subcommittee  of the Endocrine Society Task Force, 2011    Deficiency <20ng/ml   Insufficiency 20-30ng/ml   Sufficient  ng/ml     Sodium 05/30/2024 140  135 - 147 mmol/L Final    Potassium 05/30/2024 3.9  3.5 - 5.3 mmol/L Final    Chloride 05/30/2024 101  96 - 108 mmol/L Final    CO2 05/30/2024 31  21 - 32 mmol/L Final    ANION GAP 05/30/2024 8  4 - 13 mmol/L Final    BUN 05/30/2024 23  5 - 25 mg/dL Final    Creatinine 05/30/2024 0.75  0.60 - 1.30 mg/dL Final    Standardized to IDMS reference method    Glucose 05/30/2024 91  65 - 140 mg/dL Final    If the patient is fasting, the ADA then defines impaired fasting glucose as > 100 mg/dL and diabetes as > or equal to 123 mg/dL.    Glucose, Fasting 05/30/2024 91  65 - 99 mg/dL Final    Calcium 05/30/2024 9.7  8.4 - 10.2 mg/dL Final    AST 05/30/2024 13  13 - 39 U/L Final    ALT 05/30/2024 11  7 - 52 U/L Final    Specimen collection should occur prior to Sulfasalazine administration due to the potential for falsely depressed results.     Alkaline Phosphatase 05/30/2024 47  34 - 104 U/L Final    Total Protein 05/30/2024 7.1  6.4 - 8.4 g/dL Final    Albumin 05/30/2024 3.5  3.5 - 5.0 g/dL Final    Total Bilirubin 05/30/2024 0.38  0.20 - 1.00 mg/dL Final    Use of this assay is not recommended for patients undergoing treatment with eltrombopag due to the potential for falsely elevated results.  N-acetyl-p-benzoquinone imine (metabolite of Acetaminophen) will generate erroneously low results in samples for  patients that have taken an overdose of Acetaminophen.    eGFR 05/30/2024 92  ml/min/1.73sq m Final    Magnesium 05/30/2024 1.9  1.9 - 2.7 mg/dL Final    Phosphorus 05/30/2024 4.0  2.7 - 4.5 mg/dL Final    WBC 05/30/2024 4.90  4.31 - 10.16 Thousand/uL Final    RBC 05/30/2024 4.33  3.81 - 5.12 Million/uL Final    Hemoglobin 05/30/2024 14.5  11.5 - 15.4 g/dL Final    Hematocrit 05/30/2024 41.5  34.8 - 46.1 % Final    MCV 05/30/2024 96  82 - 98 fL Final    MCH 05/30/2024 33.5  26.8 - 34.3 pg Final    MCHC 05/30/2024 34.9  31.4 - 37.4 g/dL Final    RDW 05/30/2024 12.5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

## 2024-10-10 ENCOUNTER — APPOINTMENT (OUTPATIENT)
Dept: LAB | Facility: HOSPITAL | Age: 54
End: 2024-10-10
Payer: COMMERCIAL

## 2024-10-10 DIAGNOSIS — G40.019 PARTIAL IDIOPATHIC EPILEPSY WITH SEIZURES OF LOCALIZED ONSET, INTRACTABLE, WITHOUT STATUS EPILEPTICUS (HCC): ICD-10-CM

## 2024-10-10 LAB
ALBUMIN SERPL BCG-MCNC: 3.3 G/DL (ref 3.5–5)
ALP SERPL-CCNC: 44 U/L (ref 34–104)
ALT SERPL W P-5'-P-CCNC: 24 U/L (ref 7–52)
ANION GAP SERPL CALCULATED.3IONS-SCNC: 7 MMOL/L (ref 4–13)
AST SERPL W P-5'-P-CCNC: 18 U/L (ref 13–39)
BILIRUB SERPL-MCNC: 0.32 MG/DL (ref 0.2–1)
BUN SERPL-MCNC: 17 MG/DL (ref 5–25)
CALCIUM ALBUM COR SERPL-MCNC: 10.2 MG/DL (ref 8.3–10.1)
CALCIUM SERPL-MCNC: 9.6 MG/DL (ref 8.4–10.2)
CHLORIDE SERPL-SCNC: 100 MMOL/L (ref 96–108)
CO2 SERPL-SCNC: 32 MMOL/L (ref 21–32)
CREAT SERPL-MCNC: 0.77 MG/DL (ref 0.6–1.3)
GFR SERPL CREATININE-BSD FRML MDRD: 89 ML/MIN/1.73SQ M
GLUCOSE P FAST SERPL-MCNC: 100 MG/DL (ref 65–99)
LACOSAMIDE SERPL-MCNC: 11.44 UG/ML (ref 1–20)
POTASSIUM SERPL-SCNC: 4.4 MMOL/L (ref 3.5–5.3)
PROT SERPL-MCNC: 7.7 G/DL (ref 6.4–8.4)
SODIUM SERPL-SCNC: 139 MMOL/L (ref 135–147)
VALPROATE SERPL-MCNC: 102 UG/ML (ref 50–100)

## 2024-10-10 PROCEDURE — 80053 COMPREHEN METABOLIC PANEL: CPT

## 2024-10-10 PROCEDURE — 80164 ASSAY DIPROPYLACETIC ACD TOT: CPT

## 2024-10-10 PROCEDURE — 80235 DRUG ASSAY LACOSAMIDE: CPT

## 2024-10-23 ENCOUNTER — HOSPITAL ENCOUNTER (OUTPATIENT)
Dept: ULTRASOUND IMAGING | Facility: HOSPITAL | Age: 54
Discharge: HOME | End: 2024-10-23
Attending: STUDENT IN AN ORGANIZED HEALTH CARE EDUCATION/TRAINING PROGRAM
Payer: COMMERCIAL

## 2024-10-23 ENCOUNTER — OFFICE VISIT (OUTPATIENT)
Dept: INTERNAL MEDICINE CLINIC | Facility: CLINIC | Age: 54
End: 2024-10-23
Payer: COMMERCIAL

## 2024-10-23 VITALS
DIASTOLIC BLOOD PRESSURE: 73 MMHG | HEART RATE: 101 BPM | SYSTOLIC BLOOD PRESSURE: 106 MMHG | BODY MASS INDEX: 33.91 KG/M2 | TEMPERATURE: 98.5 F | WEIGHT: 223 LBS

## 2024-10-23 DIAGNOSIS — R60.0 BILATERAL LOWER EXTREMITY EDEMA: ICD-10-CM

## 2024-10-23 DIAGNOSIS — E55.9 VITAMIN D DEFICIENCY: ICD-10-CM

## 2024-10-23 DIAGNOSIS — Z78.9 IMPAIRED ABILITY TO MANAGE MEDICATION REGIME: ICD-10-CM

## 2024-10-23 DIAGNOSIS — R33.9 URINARY RETENTION: ICD-10-CM

## 2024-10-23 DIAGNOSIS — Z12.11 SCREENING FOR COLORECTAL CANCER: ICD-10-CM

## 2024-10-23 DIAGNOSIS — N18.30 STAGE 3 CHRONIC KIDNEY DISEASE, UNSPECIFIED WHETHER STAGE 3A OR 3B CKD (HCC): ICD-10-CM

## 2024-10-23 DIAGNOSIS — E53.8 VITAMIN B12 DEFICIENCY: ICD-10-CM

## 2024-10-23 DIAGNOSIS — Z12.12 SCREENING FOR COLORECTAL CANCER: ICD-10-CM

## 2024-10-23 DIAGNOSIS — E78.5 DYSLIPIDEMIA: ICD-10-CM

## 2024-10-23 DIAGNOSIS — E89.0 POSTOPERATIVE HYPOTHYROIDISM: ICD-10-CM

## 2024-10-23 DIAGNOSIS — R80.9 PROTEINURIA, UNSPECIFIED TYPE: ICD-10-CM

## 2024-10-23 DIAGNOSIS — F25.9 SCHIZOAFFECTIVE DISORDER (HCC): Chronic | ICD-10-CM

## 2024-10-23 DIAGNOSIS — N17.9 AKI (ACUTE KIDNEY INJURY) (HCC): ICD-10-CM

## 2024-10-23 DIAGNOSIS — R34 DECREASED URINATION: Primary | ICD-10-CM

## 2024-10-23 PROBLEM — F32.9 MAJOR DEPRESSION: Status: RESOLVED | Noted: 2022-06-05 | Resolved: 2024-10-23

## 2024-10-23 PROBLEM — R00.0 SINUS TACHYCARDIA: Status: RESOLVED | Noted: 2022-10-03 | Resolved: 2024-10-23

## 2024-10-23 PROBLEM — E66.01 MORBID (SEVERE) OBESITY DUE TO EXCESS CALORIES (HCC): Status: RESOLVED | Noted: 2023-02-06 | Resolved: 2024-10-23

## 2024-10-23 PROCEDURE — 99214 OFFICE O/P EST MOD 30 MIN: CPT | Performed by: STUDENT IN AN ORGANIZED HEALTH CARE EDUCATION/TRAINING PROGRAM

## 2024-10-23 PROCEDURE — 51798 US URINE CAPACITY MEASURE: CPT

## 2024-10-23 PROCEDURE — G2211 COMPLEX E/M VISIT ADD ON: HCPCS | Performed by: STUDENT IN AN ORGANIZED HEALTH CARE EDUCATION/TRAINING PROGRAM

## 2024-10-23 RX ORDER — LEVOTHYROXINE SODIUM 175 UG/1
TABLET ORAL
Qty: 30 TABLET | Refills: 1 | Status: SHIPPED | OUTPATIENT
Start: 2024-10-23

## 2024-10-23 NOTE — ASSESSMENT & PLAN NOTE
Lab Results   Component Value Date    [id number] 0.048 (L) 10/10/2024    [id number] 0.671 07/31/2024    FREET4 1.89 (H) 10/10/2024    FREET4 1.06 07/31/2024        Medication list verify  She is currently on levothyroxine 175 mcg daily  Will decrease levothyroxine to 175 mcg 6 days a week.  She is accompanied by her sister, and we have agreed on Sunday and being a good day for her to not take her medication.  Will repeat a TSH during her next visit

## 2024-10-23 NOTE — PROGRESS NOTES
INTERNAL MEDICINE OFFICE VISIT NOTE  [facility] Internal Medicine [place]    NAME: Esperanza Prajapati  AGE: 53 y.o. SEX: female    DATE OF ENCOUNTER:       Follow-up on chronic conditions  Patient presents accompanied by her sister who was able to provide a list of her medications.  Was recently evaluated by psychiatry and her regimen was modified.     Assessment & Plan  Decreased urination  Patient is accompanied by her sister who reports she has noticed patient to have less episodes of micturition throughout the day  Patient denies episodes of incontinence, dysuria, urgency  On exam, patient appears euvolemic. No suprapubic tenderness, CVA tenderness  Peripheral edema  Have agreed on proceeding with a ultrasound of her bladder with PVR along with a UA and a CMP           Screening for colorectal cancer    Orders:    Cologuard    Schizoaffective disorder (HCC)  Recently reestablished with psychiatry  Her regimen was recently modified  She is currently on Invega 9 mg daily, Zyprexa 10 mg at bedtime, Depakote 750 mg every 12 hours, benztropine 2 mg twice a day and trazodone 50 mg at bedtime as needed which she has not been taking secondary to her family noticing increased somnolence throughout the day.  I advised her sister to contact her psychiatrist to discuss this further    Orders:    levothyroxine 175 mcg tablet; Take 1 tablet by mouth Monday thru Saturday    Urinary retention    Orders:    US bladder with post void residual; Future    Comprehensive metabolic panel; Future    UA w Reflex to Microscopic w Reflex to Culture; Future    Postoperative hypothyroidism  Lab Results   Component Value Date    [id number] 0.048 (L) 10/10/2024    HDW4BGOXIZWM 0.671 07/31/2024    FREET4 1.89 (H) 10/10/2024    FREET4 1.06 07/31/2024        Medication list verify  She is currently on levothyroxine 175 mcg daily  Will decrease levothyroxine to 175 mcg 6 days a week.  She is accompanied by her sister, and we have agreed  on Sunday and being a good day for her to not take her medication.  Will repeat a TSH during her next visit         Stage 3 chronic kidney disease, unspecified whether stage 3a or 3b CKD ([facility])  Lab Results   Component Value Date    EGFR 89 10/10/2024    EGFR 86 07/16/2024    EGFR 98 06/02/2024    CREATININE 0.77 10/10/2024    CREATININE 0.79 07/16/2024    CREATININE 0.71 06/02/2024     No evidence of CKD on chart review    Orders:    Albumin / creatinine urine ratio; Future    Impaired ability to manage medication regime  Currently received her medications in a bubble pack from her pharmacy  Her sister is considering the idea of transitioning to pill bottles, however I advised her that she decides to do this someone should still be involved in setting of the medications and ensuring that she is taking all them as recommended         Dyslipidemia  Lab Results   Component Value Date    LDLCALC 147 (H) 05/30/2024    LDLCALC 144 (H) 11/28/2023     ASCVD risk of 2.4%  No indication for medication at this time  Will monitor yearly         h/o VALERIE (acute kidney injury) ([facility])  Lab Results   Component Value Date    EGFR 89 10/10/2024    EGFR 86 07/16/2024    EGFR 98 06/02/2024    CREATININE 0.77 10/10/2024    CREATININE 0.79 07/16/2024    CREATININE 0.71 06/02/2024        No evidence of recurrence        Orders:    Albumin / creatinine urine ratio; Future    Proteinuria, unspecified type  Lab Results   Component Value Date    MICROALBCRE 76 (H) 11/04/2021    MICROALBCRE 103 (H) 03/16/2021    MICROALBCRE 69 (H) 01/06/2021               Bilateral lower extremity edema  No evidence of lower extremity edema on exam  Follow-up as needed         Vitamin B12 deficiency  Lab Results   Component Value Date    [id number] 443 05/28/2024      Most recent level within normal limits.  Not on supplementation         Vitamin D deficiency  Lab Results   Component Value Date    [id number] 38.2 05/28/2024    [id number] 17.9 (L) 11/28/2023      Recent level within normal limits.  Currently not on supplementation  Will monitor yearly           Return in about 8 weeks (around 12/18/2024) for AWV.      OBJECTIVE:  Vitals:    10/23/24 1039   BP: 106/73   BP Location: Left arm   Patient Position: Sitting   Cuff Size: Standard   Pulse: 101   Temp: 98.5 °F (36.9 °C)   Weight: 101 kg (223 lb)         Physical Exam:   GENERAL: NAD, Normal appearance.    NEUROLOGIC:  Alert/oriented x3.  HEENT:  NC/AT, no scleral icterus  CARDIAC:  RRR, +S1/S2  PULMONARY:  non-labored breathing        Current Outpatient Medications:     benztropine (COGENTIN) 2 mg tablet, Take 1 tablet (2 mg total) by mouth 2 (two) times a day, Disp: 60 tablet, Rfl: 0    divalproex sodium (DEPAKOTE) 250 mg DR tablet, TAKE 3 TABLETS (750 MG TOTAL) BY MOUTH EVERY 12 (TWELVE) HOURS, Disp: 180 tablet, Rfl: 1    lacosamide (VIMPAT) 150 mg tablet, Take 1 tablet (150 mg total) by mouth in the morning, Disp: 30 tablet, Rfl: 5    lacosamide (VIMPAT) 200 mg tablet, Take 1 tablet (200 mg total) by mouth daily at bedtime, Disp: 30 tablet, Rfl: 5    levothyroxine 175 mcg tablet, Take 1 tablet by mouth Monday thru Saturday, Disp: 30 tablet, Rfl: 1    OLANZapine (ZyPREXA) 10 mg tablet, Take 1 tablet (10 mg total) by mouth daily at bedtime, Disp: 30 tablet, Rfl: 0    paliperidone (INVEGA) 9 MG 24 hr tablet, Take 1 tablet (9 mg total) by mouth daily, Disp: 30 tablet, Rfl: 0    traZODone (DESYREL) 50 mg tablet, Take 1 tablet (50 mg total) by mouth daily at bedtime (Patient taking differently: Take 50 mg by mouth daily at bedtime as needed for sleep), Disp: 30 tablet, Rfl: 0    Patient Active Problem List   Diagnosis    Vitamin D deficiency    Cognitive developmental delay    History of hypertension    Postoperative hypothyroidism    Focal epilepsy (HCC)    Obesity (BMI 30-39.9)    Obstructive sleep apnea    History of thyroid cancer    Proteinuria    h/o Papillary carcinoma of thyroid (HCC)    Mixed hyperlipidemia     Family history of diabetes mellitus    Family history of malignant neoplasm of breast    PCOS (polycystic ovarian syndrome)    Intellectual disability    Chronic kidney disease, stage 3 unspecified (HCC)    Dyslipidemia    Gout    Major depression    Schizoaffective disorder (HCC)    Transient ischemic attack    Tremor    Vitamin B12 deficiency    Sinus tachycardia    Elevated serum creatinine    Morbid (severe) obesity due to excess calories (HCC)    Bilateral lower extremity edema    Impaired ability to manage medication regime    Dermatitis of face           Foster Biswas MD  [facility] Internal Medicine [place]    * Please Note: This note was completed in part utilizing a dictation software.  Grammatical errors, random word insertions, spelling mistakes, and incomplete sentences may be an occasional consequence of this system secondary to software limitations, ambient noise, and hardware issues.  If you have any questions or concerns about the content, text, or information contained within the body of this dictation, please contact the provider for clarification.**

## 2024-10-23 NOTE — ASSESSMENT & PLAN NOTE
Lab Results   Component Value Date    EGFR 89 10/10/2024    EGFR 86 07/16/2024    EGFR 98 06/02/2024    CREATININE 0.77 10/10/2024    CREATININE 0.79 07/16/2024    CREATININE 0.71 06/02/2024     No evidence of CKD on chart review    Orders:    Albumin / creatinine urine ratio; Future

## 2024-10-23 NOTE — ASSESSMENT & PLAN NOTE
Currently received her medications in a bubble pack from her pharmacy  Her sister is considering the idea of transitioning to pill bottles, however I advised her that she decides to do this someone should still be involved in setting of the medications and ensuring that she is taking all them as recommended          61 y F, hx of UC, HTN, presenting with multiple facial injuries and L knee pain after a mechanical fall earlier today. Patient was walking with her dog which suddenly sprinted to jhon another dog, yanking the leash and patient fell forward on her face. Injuries include swelling and laceration of nose, laceration of forehead, abrasion of chin, L knee pain. +LOC for a "few minutes", reports headache, dizziness, not on thinner, able to ambulate after the incident. Denies any other acute symptoms. Does not remember last time patient got tetanus.  In ED, patient had CT head showing Left frontal and parafalcine subdural hematoma. Trace right temporal subarachnoid hemorrhage. No midline shift or significant mass effect. No displaced calvarial fracture. Left knee x ray showed no signs of fracture or dislocation. Pt was evaluated by Neurosurgery who recommended CDU for frequent reeval, vitals q 4hrs, neuro checks, PT eval, pain control, repeat CTH 6hrs stability scan, keppra 500mg bid and monitoring. 61 y F, hx of UC, HTN, presenting with multiple facial injuries and L knee pain after a mechanical fall earlier today. Patient was walking with her dog which suddenly sprinted to jhon another dog, yanking the leash and patient fell forward on her face. Injuries include swelling and laceration of nose, laceration of forehead, abrasion of chin, L knee pain. +LOC for a "few minutes", reports headache, dizziness, not on thinner, able to ambulate after the incident. Denies any other acute symptoms. Does not remember last time patient got tetanus.  In ED, patient had CT head showing Left frontal and parafalcine subdural hematoma. Trace right temporal subarachnoid hemorrhage. No midline shift or significant mass effect. No displaced calvarial fracture. Left knee x ray showed no signs of fracture or dislocation. Pt was evaluated by Neurosurgery who recommended CDU for frequent reeval, vitals q 4hrs, neuro checks, PT eval, pain control, repeat CTH 6hrs stability scan, keppra 500mg bid and monitoring. Head CT scans remained stable. cleared by physical therapy. discharged home with outpt followup.

## 2024-10-23 NOTE — ASSESSMENT & PLAN NOTE
Lab Results   Component Value Date    MICROALBCRE 76 (H) 11/04/2021    MICROALBCRE 103 (H) 03/16/2021    MICROALBCRE 69 (H) 01/06/2021

## 2024-10-23 NOTE — ASSESSMENT & PLAN NOTE
Recently reestablished with psychiatry  Her regimen was recently modified  She is currently on Invega 9 mg daily, Zyprexa 10 mg at bedtime, Depakote 750 mg every 12 hours, benztropine 2 mg twice a day and trazodone 50 mg at bedtime as needed which she has not been taking secondary to her family noticing increased somnolence throughout the day.  I advised her sister to contact her psychiatrist to discuss this further    Orders:    levothyroxine 175 mcg tablet; Take 1 tablet by mouth Monday thru Saturday

## 2024-10-23 NOTE — ASSESSMENT & PLAN NOTE
Lab Results   Component Value Date    EGFR 89 10/10/2024    EGFR 86 07/16/2024    EGFR 98 06/02/2024    CREATININE 0.77 10/10/2024    CREATININE 0.79 07/16/2024    CREATININE 0.71 06/02/2024        No evidence of recurrence        Orders:    Albumin / creatinine urine ratio; Future

## 2024-10-23 NOTE — ASSESSMENT & PLAN NOTE
Lab Results   Component Value Date    LDLCALC 147 (H) 05/30/2024    LDLCALC 144 (H) 11/28/2023     ASCVD risk of 2.4%  No indication for medication at this time  Will monitor yearly

## 2024-10-24 NOTE — ASSESSMENT & PLAN NOTE
Lab Results   Component Value Date    [id number] 38.2 05/28/2024    [id number] 17.9 (L) 11/28/2023     Recent level within normal limits.  Currently not on supplementation  Will monitor yearly

## 2024-10-24 NOTE — ASSESSMENT & PLAN NOTE
Patient is accompanied by her sister who reports she has noticed patient to have less episodes of micturition throughout the day  Patient denies episodes of incontinence, dysuria, urgency  On exam, patient appears euvolemic. No suprapubic tenderness, CVA tenderness  Peripheral edema  Have agreed on proceeding with a ultrasound of her bladder with PVR along with a UA and a CMP

## 2024-10-24 NOTE — ASSESSMENT & PLAN NOTE
Lab Results   Component Value Date    [id number] 443 05/28/2024      Most recent level within normal limits.  Not on supplementation

## 2024-10-28 DIAGNOSIS — F25.9 SCHIZOAFFECTIVE DISORDER (HCC): Chronic | ICD-10-CM

## 2024-10-29 RX ORDER — BENZTROPINE MESYLATE 2 MG/1
2 TABLET ORAL 2 TIMES DAILY
Qty: 60 TABLET | Refills: 0 | Status: SHIPPED | OUTPATIENT
Start: 2024-10-29

## 2024-10-30 RX ORDER — PALIPERIDONE 9 MG/1
9 TABLET, EXTENDED RELEASE ORAL DAILY
Qty: 30 TABLET | Refills: 0 | Status: SHIPPED | OUTPATIENT
Start: 2024-10-30 | End: 2024-11-29

## 2024-10-30 RX ORDER — OLANZAPINE 10 MG/1
10 TABLET ORAL
Qty: 30 TABLET | Refills: 0 | Status: SHIPPED | OUTPATIENT
Start: 2024-10-30 | End: 2024-11-29

## 2024-11-11 DIAGNOSIS — F25.9 SCHIZOAFFECTIVE DISORDER (HCC): Chronic | ICD-10-CM

## 2024-11-12 RX ORDER — PALIPERIDONE 9 MG/1
9 TABLET, EXTENDED RELEASE ORAL DAILY
Qty: 30 TABLET | Refills: 0 | Status: SHIPPED | OUTPATIENT
Start: 2024-11-12 | End: 2024-12-12

## 2024-11-15 ENCOUNTER — TELEPHONE (OUTPATIENT)
Dept: PSYCHIATRY | Facility: CLINIC | Age: 54
End: 2024-11-15

## 2024-11-15 NOTE — TELEPHONE ENCOUNTER
Left voicemail informing patient and/or parent/guardian of the Psych Encounter form needing to be signed as a requirement from the insurance company for billing purposes. Patient can access form via [employer] and sign electronically.     Please make patient aware this form must be signed for each visit as a requirement to continue future visits with provider.

## 2024-11-29 DIAGNOSIS — F25.9 SCHIZOAFFECTIVE DISORDER (HCC): Chronic | ICD-10-CM

## 2024-11-29 RX ORDER — LEVOTHYROXINE SODIUM 175 UG/1
TABLET ORAL
Qty: 30 TABLET | Refills: 1 | Status: SHIPPED | OUTPATIENT
Start: 2024-11-29

## 2024-11-30 DIAGNOSIS — F25.9 SCHIZOAFFECTIVE DISORDER (HCC): Chronic | ICD-10-CM

## 2024-12-02 RX ORDER — OLANZAPINE 10 MG/1
10 TABLET ORAL
Qty: 30 TABLET | Refills: 0 | Status: SHIPPED | OUTPATIENT
Start: 2024-12-02 | End: 2025-01-01

## 2024-12-02 RX ORDER — BENZTROPINE MESYLATE 2 MG/1
2 TABLET ORAL 2 TIMES DAILY
Qty: 60 TABLET | Refills: 0 | Status: SHIPPED | OUTPATIENT
Start: 2024-12-02

## 2024-12-08 ENCOUNTER — RA CDI HCC (OUTPATIENT)
Dept: OTHER | Facility: HOSPITAL | Age: 54
End: 2024-12-08

## 2024-12-18 DIAGNOSIS — F25.9 SCHIZOAFFECTIVE DISORDER (HCC): Chronic | ICD-10-CM

## 2024-12-19 ENCOUNTER — TELEPHONE (OUTPATIENT)
Dept: PSYCHIATRY | Facility: CLINIC | Age: 54
End: 2024-12-19

## 2024-12-19 DIAGNOSIS — F25.9 SCHIZOAFFECTIVE DISORDER, UNSPECIFIED TYPE (HCC): Primary | ICD-10-CM

## 2024-12-19 RX ORDER — BENZTROPINE MESYLATE 2 MG/1
2 TABLET ORAL 2 TIMES DAILY
Qty: 60 TABLET | Refills: 0 | Status: SHIPPED | OUTPATIENT
Start: 2024-12-19

## 2024-12-19 RX ORDER — OLANZAPINE 10 MG/1
10 TABLET ORAL
Qty: 30 TABLET | Refills: 0 | Status: SHIPPED | OUTPATIENT
Start: 2024-12-19 | End: 2025-01-18

## 2024-12-19 RX ORDER — PALIPERIDONE 6 MG/1
6 TABLET, EXTENDED RELEASE ORAL EVERY MORNING
Qty: 30 TABLET | Refills: 0 | Status: SHIPPED | OUTPATIENT
Start: 2024-12-19

## 2025-01-02 ENCOUNTER — TELEPHONE (OUTPATIENT)
Age: 55
End: 2025-01-02

## 2025-01-02 NOTE — TELEPHONE ENCOUNTER
Contacted patient off of [facility]  wait list to verify needs of services in attempts to offer appt. LVM for patient to contact intake dept  in regards to scheduling.     Pt is established with psych.    Attempt #1

## 2025-01-09 DIAGNOSIS — F25.9 SCHIZOAFFECTIVE DISORDER (HCC): Chronic | ICD-10-CM

## 2025-01-10 RX ORDER — LEVOTHYROXINE SODIUM 175 UG/1
TABLET ORAL
Qty: 90 TABLET | Refills: 1 | Status: SHIPPED | OUTPATIENT
Start: 2025-01-10

## 2025-01-11 DIAGNOSIS — F25.9 SCHIZOAFFECTIVE DISORDER, UNSPECIFIED TYPE (HCC): ICD-10-CM

## 2025-01-11 DIAGNOSIS — F25.9 SCHIZOAFFECTIVE DISORDER (HCC): Chronic | ICD-10-CM

## 2025-01-13 ENCOUNTER — TELEPHONE (OUTPATIENT)
Dept: NEUROLOGY | Facility: CLINIC | Age: 55
End: 2025-01-13

## 2025-01-13 DIAGNOSIS — F25.9 SCHIZOAFFECTIVE DISORDER (HCC): Chronic | ICD-10-CM

## 2025-01-14 ENCOUNTER — TELEPHONE (OUTPATIENT)
Dept: NEUROLOGY | Facility: CLINIC | Age: 55
End: 2025-01-14

## 2025-01-14 RX ORDER — OLANZAPINE 10 MG/1
10 TABLET ORAL
Qty: 30 TABLET | Refills: 0 | OUTPATIENT
Start: 2025-01-14 | End: 2025-02-13

## 2025-01-14 RX ORDER — PALIPERIDONE 6 MG/1
6 TABLET, EXTENDED RELEASE ORAL EVERY MORNING
Qty: 30 TABLET | Refills: 0 | OUTPATIENT
Start: 2025-01-14

## 2025-01-14 RX ORDER — BENZTROPINE MESYLATE 2 MG/1
2 TABLET ORAL 2 TIMES DAILY
Qty: 60 TABLET | Refills: 0 | OUTPATIENT
Start: 2025-01-14

## 2025-01-14 NOTE — TELEPHONE ENCOUNTER
Lmom for pt to r/s her as vel santana be in the office that she is scheduled in that day but will be in [place] - provided call back number

## 2025-01-15 ENCOUNTER — TELEPHONE (OUTPATIENT)
Dept: INTERNAL MEDICINE CLINIC | Facility: CLINIC | Age: 55
End: 2025-01-15

## 2025-01-15 RX ORDER — DIVALPROEX SODIUM 250 MG/1
750 TABLET, DELAYED RELEASE ORAL EVERY 12 HOURS SCHEDULED
Qty: 180 TABLET | Refills: 1 | Status: ON HOLD | OUTPATIENT
Start: 2025-01-15

## 2025-01-19 ENCOUNTER — HOSPITAL ENCOUNTER (INPATIENT)
Facility: HOSPITAL | Age: 55
LOS: 19 days | DRG: 870 | End: 2025-02-08
Admitting: INTERNAL MEDICINE
Payer: COMMERCIAL

## 2025-01-19 ENCOUNTER — APPOINTMENT (EMERGENCY)
Dept: CT IMAGING | Facility: HOSPITAL | Age: 55
DRG: 870 | End: 2025-01-19
Payer: COMMERCIAL

## 2025-01-19 DIAGNOSIS — F25.9 SCHIZOAFFECTIVE DISORDER (HCC): Chronic | ICD-10-CM

## 2025-01-19 DIAGNOSIS — G93.40 ACUTE ENCEPHALOPATHY: ICD-10-CM

## 2025-01-19 DIAGNOSIS — J96.01 ACUTE HYPOXIC RESPIRATORY FAILURE (HCC): ICD-10-CM

## 2025-01-19 DIAGNOSIS — J96.01 ACUTE RESPIRATORY FAILURE WITH HYPOXIA (HCC): ICD-10-CM

## 2025-01-19 DIAGNOSIS — J10.1 INFLUENZA A: Primary | ICD-10-CM

## 2025-01-19 LAB
ALBUMIN SERPL BCG-MCNC: 3.6 G/DL (ref 3.5–5)
ALP SERPL-CCNC: 42 U/L (ref 34–104)
ALT SERPL W P-5'-P-CCNC: 9 U/L (ref 7–52)
ANION GAP SERPL CALCULATED.3IONS-SCNC: 9 MMOL/L (ref 4–13)
APAP SERPL-MCNC: <2 UG/ML (ref 10–20)
APTT PPP: 26 SECONDS (ref 23–34)
AST SERPL W P-5'-P-CCNC: 13 U/L (ref 13–39)
BACTERIA UR QL AUTO: ABNORMAL /HPF
BASE EX.OXY STD BLDV CALC-SCNC: 78.6 % (ref 60–80)
BASE EXCESS BLDV CALC-SCNC: 2.8 MMOL/L
BASOPHILS # BLD AUTO: 0.02 THOUSANDS/ΜL (ref 0–0.1)
BASOPHILS NFR BLD AUTO: 0 % (ref 0–1)
BILIRUB SERPL-MCNC: 0.28 MG/DL (ref 0.2–1)
BILIRUB UR QL STRIP: NEGATIVE
BUN SERPL-MCNC: 14 MG/DL (ref 5–25)
CALCIUM SERPL-MCNC: 9 MG/DL (ref 8.4–10.2)
CARDIAC TROPONIN I PNL SERPL HS: 4 NG/L (ref ?–50)
CHLORIDE SERPL-SCNC: 95 MMOL/L (ref 96–108)
CLARITY UR: CLEAR
CO2 SERPL-SCNC: 27 MMOL/L (ref 21–32)
COLOR UR: YELLOW
CREAT SERPL-MCNC: 0.99 MG/DL (ref 0.6–1.3)
EOSINOPHIL # BLD AUTO: 0 THOUSAND/ΜL (ref 0–0.61)
EOSINOPHIL NFR BLD AUTO: 0 % (ref 0–6)
ERYTHROCYTE [DISTWIDTH] IN BLOOD BY AUTOMATED COUNT: 13.8 % (ref 11.6–15.1)
ETHANOL SERPL-MCNC: <10 MG/DL
FLUAV AG UPPER RESP QL IA.RAPID: POSITIVE
FLUBV AG UPPER RESP QL IA.RAPID: NEGATIVE
GFR SERPL CREATININE-BSD FRML MDRD: 65 ML/MIN/1.73SQ M
GLUCOSE SERPL-MCNC: 79 MG/DL (ref 65–140)
GLUCOSE SERPL-MCNC: 93 MG/DL (ref 65–140)
GLUCOSE UR STRIP-MCNC: NEGATIVE MG/DL
HCO3 BLDV-SCNC: 27.6 MMOL/L (ref 24–30)
HCT VFR BLD AUTO: 40.2 % (ref 34.8–46.1)
HGB BLD-MCNC: 13.5 G/DL (ref 11.5–15.4)
HGB UR QL STRIP.AUTO: ABNORMAL
HYALINE CASTS #/AREA URNS LPF: ABNORMAL /LPF
IMM GRANULOCYTES # BLD AUTO: 0.04 THOUSAND/UL (ref 0–0.2)
IMM GRANULOCYTES NFR BLD AUTO: 1 % (ref 0–2)
INR PPP: 1.09 (ref 0.85–1.19)
KETONES UR STRIP-MCNC: ABNORMAL MG/DL
LACTATE SERPL-SCNC: 1.7 MMOL/L (ref 0.5–2)
LEUKOCYTE ESTERASE UR QL STRIP: NEGATIVE
LYMPHOCYTES # BLD AUTO: 0.95 THOUSANDS/ΜL (ref 0.6–4.47)
LYMPHOCYTES NFR BLD AUTO: 17 % (ref 14–44)
MCH RBC QN AUTO: 32.4 PG (ref 26.8–34.3)
MCHC RBC AUTO-ENTMCNC: 33.6 G/DL (ref 31.4–37.4)
MCV RBC AUTO: 96 FL (ref 82–98)
MONOCYTES # BLD AUTO: 1.59 THOUSAND/ΜL (ref 0.17–1.22)
MONOCYTES NFR BLD AUTO: 29 % (ref 4–12)
MUCOUS THREADS UR QL AUTO: ABNORMAL
NEUTROPHILS # BLD AUTO: 2.97 THOUSANDS/ΜL (ref 1.85–7.62)
NEUTS SEG NFR BLD AUTO: 53 % (ref 43–75)
NITRITE UR QL STRIP: NEGATIVE
NON-SQ EPI CELLS URNS QL MICRO: ABNORMAL /HPF
NRBC BLD AUTO-RTO: 0 /100 WBCS
O2 CT BLDV-SCNC: 16 ML/DL
PCO2 BLDV: 43.1 MM HG (ref 42–50)
PH BLDV: 7.42 [PH] (ref 7.3–7.4)
PH UR STRIP.AUTO: 6 [PH]
PLATELET # BLD AUTO: 193 THOUSANDS/UL (ref 149–390)
PMV BLD AUTO: 10 FL (ref 8.9–12.7)
PO2 BLDV: 44.8 MM HG (ref 35–45)
POTASSIUM SERPL-SCNC: 4 MMOL/L (ref 3.5–5.3)
PROCALCITONIN SERPL-MCNC: 0.08 NG/ML
PROT SERPL-MCNC: 8.3 G/DL (ref 6.4–8.4)
PROT UR STRIP-MCNC: ABNORMAL MG/DL
PROTHROMBIN TIME: 14.3 SECONDS (ref 12.3–15)
RBC # BLD AUTO: 4.17 MILLION/UL (ref 3.81–5.12)
RBC #/AREA URNS AUTO: ABNORMAL /HPF
SALICYLATES SERPL-MCNC: <5 MG/DL (ref 3–20)
SARS-COV+SARS-COV-2 AG RESP QL IA.RAPID: NEGATIVE
SODIUM SERPL-SCNC: 131 MMOL/L (ref 135–147)
SP GR UR STRIP.AUTO: 1.02 (ref 1–1.03)
TSH SERPL DL<=0.05 MIU/L-ACNC: 3.32 UIU/ML (ref 0.45–4.5)
UROBILINOGEN UR STRIP-ACNC: <2 MG/DL
WBC # BLD AUTO: 5.57 THOUSAND/UL (ref 4.31–10.16)
WBC #/AREA URNS AUTO: ABNORMAL /HPF

## 2025-01-19 PROCEDURE — 85730 THROMBOPLASTIN TIME PARTIAL: CPT

## 2025-01-19 PROCEDURE — 96365 THER/PROPH/DIAG IV INF INIT: CPT

## 2025-01-19 PROCEDURE — 93005 ELECTROCARDIOGRAM TRACING: CPT

## 2025-01-19 PROCEDURE — 87811 SARS-COV-2 COVID19 W/OPTIC: CPT

## 2025-01-19 PROCEDURE — 82805 BLOOD GASES W/O2 SATURATION: CPT

## 2025-01-19 PROCEDURE — 81001 URINALYSIS AUTO W/SCOPE: CPT

## 2025-01-19 PROCEDURE — 84145 PROCALCITONIN (PCT): CPT

## 2025-01-19 PROCEDURE — 85025 COMPLETE CBC W/AUTO DIFF WBC: CPT

## 2025-01-19 PROCEDURE — 80179 DRUG ASSAY SALICYLATE: CPT

## 2025-01-19 PROCEDURE — 85610 PROTHROMBIN TIME: CPT

## 2025-01-19 PROCEDURE — 99291 CRITICAL CARE FIRST HOUR: CPT

## 2025-01-19 PROCEDURE — 70450 CT HEAD/BRAIN W/O DYE: CPT

## 2025-01-19 PROCEDURE — 71260 CT THORAX DX C+: CPT

## 2025-01-19 PROCEDURE — 96375 TX/PRO/DX INJ NEW DRUG ADDON: CPT

## 2025-01-19 PROCEDURE — 87040 BLOOD CULTURE FOR BACTERIA: CPT

## 2025-01-19 PROCEDURE — 36415 COLL VENOUS BLD VENIPUNCTURE: CPT

## 2025-01-19 PROCEDURE — 83605 ASSAY OF LACTIC ACID: CPT

## 2025-01-19 PROCEDURE — 72125 CT NECK SPINE W/O DYE: CPT

## 2025-01-19 PROCEDURE — 84443 ASSAY THYROID STIM HORMONE: CPT

## 2025-01-19 PROCEDURE — 80053 COMPREHEN METABOLIC PANEL: CPT

## 2025-01-19 PROCEDURE — 74177 CT ABD & PELVIS W/CONTRAST: CPT

## 2025-01-19 PROCEDURE — 80143 DRUG ASSAY ACETAMINOPHEN: CPT

## 2025-01-19 PROCEDURE — 87804 INFLUENZA ASSAY W/OPTIC: CPT

## 2025-01-19 PROCEDURE — 82948 REAGENT STRIP/BLOOD GLUCOSE: CPT

## 2025-01-19 PROCEDURE — 82077 ASSAY SPEC XCP UR&BREATH IA: CPT

## 2025-01-19 PROCEDURE — 96361 HYDRATE IV INFUSION ADD-ON: CPT

## 2025-01-19 PROCEDURE — 84484 ASSAY OF TROPONIN QUANT: CPT

## 2025-01-19 PROCEDURE — 99285 EMERGENCY DEPT VISIT HI MDM: CPT

## 2025-01-19 RX ORDER — MIDAZOLAM HYDROCHLORIDE 2 MG/2ML
1 INJECTION, SOLUTION INTRAMUSCULAR; INTRAVENOUS ONCE
Status: COMPLETED | OUTPATIENT
Start: 2025-01-20 | End: 2025-01-19

## 2025-01-19 RX ORDER — ACETAMINOPHEN 10 MG/ML
1000 INJECTION, SOLUTION INTRAVENOUS ONCE
Status: COMPLETED | OUTPATIENT
Start: 2025-01-19 | End: 2025-01-20

## 2025-01-19 RX ORDER — OSELTAMIVIR PHOSPHATE 75 MG/1
75 CAPSULE ORAL ONCE
Status: COMPLETED | OUTPATIENT
Start: 2025-01-19 | End: 2025-01-20

## 2025-01-19 RX ORDER — KETOROLAC TROMETHAMINE 30 MG/ML
15 INJECTION, SOLUTION INTRAMUSCULAR; INTRAVENOUS ONCE
Status: COMPLETED | OUTPATIENT
Start: 2025-01-19 | End: 2025-01-20

## 2025-01-19 RX ADMIN — ACETAMINOPHEN 1000 MG: 10 INJECTION INTRAVENOUS at 23:03

## 2025-01-19 RX ADMIN — SODIUM CHLORIDE 1000 ML: 0.9 INJECTION, SOLUTION INTRAVENOUS at 23:00

## 2025-01-19 RX ADMIN — CEFTRIAXONE 2000 MG: 10 INJECTION, POWDER, FOR SOLUTION INTRAVENOUS at 23:06

## 2025-01-19 RX ADMIN — MIDAZOLAM HYDROCHLORIDE 1 MG: 1 INJECTION, SOLUTION INTRAMUSCULAR; INTRAVENOUS at 23:52

## 2025-01-19 NOTE — Clinical Note
Case was discussed with JOSE and the patient's admission status was agreed to be Admission Status: inpatient status to the service of Dr. Sanchez

## 2025-01-20 ENCOUNTER — TELEPHONE (OUTPATIENT)
Dept: NEUROLOGY | Facility: CLINIC | Age: 55
End: 2025-01-20

## 2025-01-20 ENCOUNTER — APPOINTMENT (INPATIENT)
Dept: RADIOLOGY | Facility: HOSPITAL | Age: 55
DRG: 870 | End: 2025-01-20
Payer: COMMERCIAL
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
## 2025-01-20 PROBLEM — B97.89 VIRAL SEPSIS (HCC): Status: ACTIVE | Noted: 2025-01-20

## 2025-01-20 PROBLEM — G92.8 TOXIC METABOLIC ENCEPHALOPATHY: Status: ACTIVE | Noted: 2025-01-20

## 2025-01-20 PROBLEM — E87.1 HYPONATREMIA: Status: ACTIVE | Noted: 2025-01-20

## 2025-01-20 PROBLEM — A41.89 VIRAL SEPSIS (HCC): Status: ACTIVE | Noted: 2025-01-20

## 2025-01-20 PROBLEM — J10.1 INFLUENZA A: Status: ACTIVE | Noted: 2025-01-20

## 2025-01-20 PROBLEM — J96.01 ACUTE RESPIRATORY FAILURE WITH HYPOXIA (HCC): Status: ACTIVE | Noted: 2025-01-20
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
## 2025-01-20 LAB
2HR DELTA HS TROPONIN: 0 NG/L
ANION GAP SERPL CALCULATED.3IONS-SCNC: 4 MMOL/L (ref 4–13)
ATRIAL RATE: 114 BPM
ATRIAL RATE: 126 BPM
ATRIAL RATE: 140 BPM
ATRIAL RATE: 152 BPM
BASOPHILS # BLD AUTO: 0.02 THOUSANDS/ΜL (ref 0–0.1)
BASOPHILS NFR BLD AUTO: 1 % (ref 0–1)
BUN SERPL-MCNC: 15 MG/DL (ref 5–25)
CALCIUM SERPL-MCNC: 7.7 MG/DL (ref 8.4–10.2)
CARDIAC TROPONIN I PNL SERPL HS: 4 NG/L (ref ?–50)
CHLORIDE SERPL-SCNC: 100 MMOL/L (ref 96–108)
CO2 SERPL-SCNC: 28 MMOL/L (ref 21–32)
CREAT SERPL-MCNC: 1.03 MG/DL (ref 0.6–1.3)
EOSINOPHIL # BLD AUTO: 0 THOUSAND/ΜL (ref 0–0.61)
EOSINOPHIL NFR BLD AUTO: 0 % (ref 0–6)
ERYTHROCYTE [DISTWIDTH] IN BLOOD BY AUTOMATED COUNT: 14 % (ref 11.6–15.1)
GFR SERPL CREATININE-BSD FRML MDRD: 62 ML/MIN/1.73SQ M
GLUCOSE SERPL-MCNC: 97 MG/DL (ref 65–140)
HCT VFR BLD AUTO: 35.8 % (ref 34.8–46.1)
HGB BLD-MCNC: 11.5 G/DL (ref 11.5–15.4)
IMM GRANULOCYTES # BLD AUTO: 0.04 THOUSAND/UL (ref 0–0.2)
IMM GRANULOCYTES NFR BLD AUTO: 1 % (ref 0–2)
LYMPHOCYTES # BLD AUTO: 1.12 THOUSANDS/ΜL (ref 0.6–4.47)
LYMPHOCYTES NFR BLD AUTO: 28 % (ref 14–44)
MAGNESIUM SERPL-MCNC: 1.9 MG/DL (ref 1.9–2.7)
MCH RBC QN AUTO: 32.1 PG (ref 26.8–34.3)
MCHC RBC AUTO-ENTMCNC: 32.1 G/DL (ref 31.4–37.4)
MCV RBC AUTO: 100 FL (ref 82–98)
MONOCYTES # BLD AUTO: 0.95 THOUSAND/ΜL (ref 0.17–1.22)
MONOCYTES NFR BLD AUTO: 24 % (ref 4–12)
NEUTROPHILS # BLD AUTO: 1.81 THOUSANDS/ΜL (ref 1.85–7.62)
NEUTS SEG NFR BLD AUTO: 46 % (ref 43–75)
NRBC BLD AUTO-RTO: 0 /100 WBCS
P AXIS: 76 DEGREES
P AXIS: 76 DEGREES
P AXIS: 88 DEGREES
PLATELET # BLD AUTO: 159 THOUSANDS/UL (ref 149–390)
PMV BLD AUTO: 9.4 FL (ref 8.9–12.7)
POTASSIUM SERPL-SCNC: 3.8 MMOL/L (ref 3.5–5.3)
PR INTERVAL: 152 MS
PR INTERVAL: 158 MS
PR INTERVAL: 178 MS
QRS AXIS: 10 DEGREES
QRS AXIS: 54 DEGREES
QRS AXIS: 56 DEGREES
QRS AXIS: 57 DEGREES
QRSD INTERVAL: 68 MS
QRSD INTERVAL: 68 MS
QRSD INTERVAL: 70 MS
QRSD INTERVAL: 94 MS
QT INTERVAL: 266 MS
QT INTERVAL: 280 MS
QT INTERVAL: 290 MS
QT INTERVAL: 304 MS
QTC INTERVAL: 399 MS
QTC INTERVAL: 406 MS
QTC INTERVAL: 440 MS
QTC INTERVAL: 445 MS
RBC # BLD AUTO: 3.58 MILLION/UL (ref 3.81–5.12)
SODIUM SERPL-SCNC: 132 MMOL/L (ref 135–147)
T WAVE AXIS: -20 DEGREES
T WAVE AXIS: 46 DEGREES
T WAVE AXIS: 6 DEGREES
T WAVE AXIS: 68 DEGREES
VALPROATE SERPL-MCNC: 87 UG/ML (ref 50–100)
VENTRICULAR RATE: 114 BPM
VENTRICULAR RATE: 126 BPM
VENTRICULAR RATE: 140 BPM
VENTRICULAR RATE: 152 BPM
WBC # BLD AUTO: 3.94 THOUSAND/UL (ref 4.31–10.16)

## 2025-01-20 PROCEDURE — 94664 DEMO&/EVAL PT USE INHALER: CPT

## 2025-01-20 PROCEDURE — C9254 INJECTION, LACOSAMIDE: HCPCS

## 2025-01-20 PROCEDURE — 36415 COLL VENOUS BLD VENIPUNCTURE: CPT

## 2025-01-20 PROCEDURE — 94640 AIRWAY INHALATION TREATMENT: CPT

## 2025-01-20 PROCEDURE — 99223 1ST HOSP IP/OBS HIGH 75: CPT

## 2025-01-20 PROCEDURE — 93005 ELECTROCARDIOGRAM TRACING: CPT

## 2025-01-20 PROCEDURE — 85025 COMPLETE CBC W/AUTO DIFF WBC: CPT | Performed by: NURSE PRACTITIONER

## 2025-01-20 PROCEDURE — 94760 N-INVAS EAR/PLS OXIMETRY 1: CPT

## 2025-01-20 PROCEDURE — 80164 ASSAY DIPROPYLACETIC ACD TOT: CPT | Performed by: INTERNAL MEDICINE

## 2025-01-20 PROCEDURE — RECHECK: Performed by: NURSE PRACTITIONER

## 2025-01-20 PROCEDURE — 93010 ELECTROCARDIOGRAM REPORT: CPT | Performed by: INTERNAL MEDICINE

## 2025-01-20 PROCEDURE — RECHECK: Performed by: INTERNAL MEDICINE

## 2025-01-20 PROCEDURE — 83735 ASSAY OF MAGNESIUM: CPT | Performed by: NURSE PRACTITIONER

## 2025-01-20 PROCEDURE — 84484 ASSAY OF TROPONIN QUANT: CPT

## 2025-01-20 PROCEDURE — 99223 1ST HOSP IP/OBS HIGH 75: CPT | Performed by: INTERNAL MEDICINE

## 2025-01-20 PROCEDURE — 80048 BASIC METABOLIC PNL TOTAL CA: CPT | Performed by: NURSE PRACTITIONER

## 2025-01-20 PROCEDURE — 96375 TX/PRO/DX INJ NEW DRUG ADDON: CPT

## 2025-01-20 PROCEDURE — 71045 X-RAY EXAM CHEST 1 VIEW: CPT

## 2025-01-20 PROCEDURE — 93010 ELECTROCARDIOGRAM REPORT: CPT

## 2025-01-20 RX ORDER — SODIUM CHLORIDE 9 MG/ML
100 INJECTION, SOLUTION INTRAVENOUS CONTINUOUS
Status: DISCONTINUED | OUTPATIENT
Start: 2025-01-20 | End: 2025-01-20

## 2025-01-20 RX ORDER — ACETAMINOPHEN 325 MG/1
650 TABLET ORAL EVERY 6 HOURS PRN
Status: DISCONTINUED | OUTPATIENT
Start: 2025-01-20 | End: 2025-01-20

## 2025-01-20 RX ORDER — BENZTROPINE MESYLATE 1 MG/1
2 TABLET ORAL 2 TIMES DAILY
Status: DISCONTINUED | OUTPATIENT
Start: 2025-01-20 | End: 2025-01-20

## 2025-01-20 RX ORDER — BENZTROPINE MESYLATE 1 MG/ML
2 INJECTION, SOLUTION INTRAMUSCULAR; INTRAVENOUS 2 TIMES DAILY
Status: DISCONTINUED | OUTPATIENT
Start: 2025-01-20 | End: 2025-01-21

## 2025-01-20 RX ORDER — ACETAMINOPHEN 10 MG/ML
1000 INJECTION, SOLUTION INTRAVENOUS EVERY 6 HOURS PRN
Status: DISCONTINUED | OUTPATIENT
Start: 2025-01-20 | End: 2025-01-21

## 2025-01-20 RX ORDER — GUAIFENESIN/DEXTROMETHORPHAN 100-10MG/5
10 SYRUP ORAL EVERY 4 HOURS PRN
Status: DISCONTINUED | OUTPATIENT
Start: 2025-01-20 | End: 2025-01-21

## 2025-01-20 RX ORDER — OSELTAMIVIR PHOSPHATE 75 MG/1
75 CAPSULE ORAL EVERY 12 HOURS SCHEDULED
Status: DISCONTINUED | OUTPATIENT
Start: 2025-01-20 | End: 2025-01-21

## 2025-01-20 RX ORDER — LACOSAMIDE 10 MG/ML
200 INJECTION, SOLUTION INTRAVENOUS
Status: DISCONTINUED | OUTPATIENT
Start: 2025-01-20 | End: 2025-01-21

## 2025-01-20 RX ORDER — LEVALBUTEROL INHALATION SOLUTION 1.25 MG/3ML
1.25 SOLUTION RESPIRATORY (INHALATION)
Status: DISCONTINUED | OUTPATIENT
Start: 2025-01-20 | End: 2025-01-31

## 2025-01-20 RX ORDER — MAGNESIUM HYDROXIDE/ALUMINUM HYDROXICE/SIMETHICONE 120; 1200; 1200 MG/30ML; MG/30ML; MG/30ML
30 SUSPENSION ORAL EVERY 6 HOURS PRN
Status: DISCONTINUED | OUTPATIENT
Start: 2025-01-20 | End: 2025-02-08 | Stop reason: HOSPADM

## 2025-01-20 RX ORDER — ENOXAPARIN SODIUM 100 MG/ML
40 INJECTION SUBCUTANEOUS DAILY
Status: DISCONTINUED | OUTPATIENT
Start: 2025-01-20 | End: 2025-02-08 | Stop reason: HOSPADM

## 2025-01-20 RX ORDER — TRAZODONE HYDROCHLORIDE 50 MG/1
50 TABLET, FILM COATED ORAL
Status: DISCONTINUED | OUTPATIENT
Start: 2025-01-20 | End: 2025-01-21

## 2025-01-20 RX ORDER — METOPROLOL TARTRATE 1 MG/ML
2.5 INJECTION, SOLUTION INTRAVENOUS ONCE
Status: COMPLETED | OUTPATIENT
Start: 2025-01-20 | End: 2025-01-20

## 2025-01-20 RX ORDER — ACETAMINOPHEN 10 MG/ML
1000 INJECTION, SOLUTION INTRAVENOUS ONCE
Status: COMPLETED | OUTPATIENT
Start: 2025-01-20 | End: 2025-01-20

## 2025-01-20 RX ORDER — SODIUM CHLORIDE 9 MG/ML
75 INJECTION, SOLUTION INTRAVENOUS CONTINUOUS
Status: DISCONTINUED | OUTPATIENT
Start: 2025-01-20 | End: 2025-01-20

## 2025-01-20 RX ORDER — LACOSAMIDE 50 MG/1
200 TABLET ORAL
Status: DISCONTINUED | OUTPATIENT
Start: 2025-01-20 | End: 2025-01-20

## 2025-01-20 RX ORDER — ALBUTEROL SULFATE 90 UG/1
2 INHALANT RESPIRATORY (INHALATION) 4 TIMES DAILY
Status: DISCONTINUED | OUTPATIENT
Start: 2025-01-20 | End: 2025-01-20

## 2025-01-20 RX ORDER — DIAZEPAM 10 MG/2ML
5 INJECTION, SOLUTION INTRAMUSCULAR; INTRAVENOUS ONCE
Status: COMPLETED | OUTPATIENT
Start: 2025-01-20 | End: 2025-01-20

## 2025-01-20 RX ORDER — LACOSAMIDE 10 MG/ML
150 INJECTION, SOLUTION INTRAVENOUS DAILY
Status: DISCONTINUED | OUTPATIENT
Start: 2025-01-21 | End: 2025-01-21

## 2025-01-20 RX ORDER — LACOSAMIDE 50 MG/1
150 TABLET ORAL DAILY
Status: DISCONTINUED | OUTPATIENT
Start: 2025-01-20 | End: 2025-01-20

## 2025-01-20 RX ORDER — OLANZAPINE 5 MG/1
10 TABLET ORAL
Status: DISCONTINUED | OUTPATIENT
Start: 2025-01-20 | End: 2025-01-20

## 2025-01-20 RX ORDER — ONDANSETRON 2 MG/ML
4 INJECTION INTRAMUSCULAR; INTRAVENOUS EVERY 6 HOURS PRN
Status: DISCONTINUED | OUTPATIENT
Start: 2025-01-20 | End: 2025-02-08 | Stop reason: HOSPADM

## 2025-01-20 RX ORDER — SIMETHICONE 80 MG
80 TABLET,CHEWABLE ORAL 4 TIMES DAILY PRN
Status: DISCONTINUED | OUTPATIENT
Start: 2025-01-20 | End: 2025-01-21

## 2025-01-20 RX ORDER — PALIPERIDONE 3 MG/1
6 TABLET, EXTENDED RELEASE ORAL EVERY MORNING
Status: DISCONTINUED | OUTPATIENT
Start: 2025-01-20 | End: 2025-02-08 | Stop reason: HOSPADM

## 2025-01-20 RX ORDER — OLANZAPINE 10 MG/2ML
10 INJECTION, POWDER, FOR SOLUTION INTRAMUSCULAR
Status: DISCONTINUED | OUTPATIENT
Start: 2025-01-20 | End: 2025-01-21

## 2025-01-20 RX ADMIN — SODIUM CHLORIDE 75 ML/HR: 0.9 INJECTION, SOLUTION INTRAVENOUS at 03:56

## 2025-01-20 RX ADMIN — SODIUM CHLORIDE 1000 ML: 0.9 INJECTION, SOLUTION INTRAVENOUS at 00:36

## 2025-01-20 RX ADMIN — OSELTAMIVIR PHOSPHATE 75 MG: 75 CAPSULE ORAL at 08:34

## 2025-01-20 RX ADMIN — GUAIFENESIN AND DEXTROMETHORPHAN 10 ML: 100; 10 SYRUP ORAL at 08:35

## 2025-01-20 RX ADMIN — LACOSAMIDE 200 MG: 10 INJECTION INTRAVENOUS at 21:59

## 2025-01-20 RX ADMIN — IPRATROPIUM BROMIDE 0.5 MG: 0.5 SOLUTION RESPIRATORY (INHALATION) at 15:25

## 2025-01-20 RX ADMIN — GUAIFENESIN AND DEXTROMETHORPHAN 10 ML: 100; 10 SYRUP ORAL at 15:05

## 2025-01-20 RX ADMIN — OSELTAMIVIR PHOSPHATE 75 MG: 75 CAPSULE ORAL at 00:37

## 2025-01-20 RX ADMIN — CEFEPIME 2000 MG: 2 INJECTION, POWDER, FOR SOLUTION INTRAVENOUS at 20:24

## 2025-01-20 RX ADMIN — ENOXAPARIN SODIUM 40 MG: 40 INJECTION SUBCUTANEOUS at 08:37

## 2025-01-20 RX ADMIN — BENZTROPINE MESYLATE 2 MG: 1 TABLET ORAL at 08:34

## 2025-01-20 RX ADMIN — PALIPERIDONE 6 MG: 3 TABLET, EXTENDED RELEASE ORAL at 08:40

## 2025-01-20 RX ADMIN — METOROPROLOL TARTRATE 2.5 MG: 5 INJECTION, SOLUTION INTRAVENOUS at 10:02

## 2025-01-20 RX ADMIN — IPRATROPIUM BROMIDE 0.5 MG: 0.5 SOLUTION RESPIRATORY (INHALATION) at 20:56

## 2025-01-20 RX ADMIN — BENZTROPINE MESYLATE 2 MG: 1 INJECTION INTRAMUSCULAR; INTRAVENOUS at 22:05

## 2025-01-20 RX ADMIN — OLANZAPINE 10 MG: 10 INJECTION, POWDER, FOR SOLUTION INTRAMUSCULAR at 22:00

## 2025-01-20 RX ADMIN — SODIUM CHLORIDE 100 ML/HR: 0.9 INJECTION, SOLUTION INTRAVENOUS at 16:16

## 2025-01-20 RX ADMIN — ALBUTEROL SULFATE 2 PUFF: 90 AEROSOL, METERED RESPIRATORY (INHALATION) at 11:40

## 2025-01-20 RX ADMIN — LEVALBUTEROL HYDROCHLORIDE 1.25 MG: 1.25 SOLUTION RESPIRATORY (INHALATION) at 17:35

## 2025-01-20 RX ADMIN — IPRATROPIUM BROMIDE 0.5 MG: 0.5 SOLUTION RESPIRATORY (INHALATION) at 17:35

## 2025-01-20 RX ADMIN — LEVOTHYROXINE SODIUM 175 MCG: 0.05 TABLET ORAL at 07:11

## 2025-01-20 RX ADMIN — LEVALBUTEROL HYDROCHLORIDE 1.25 MG: 1.25 SOLUTION RESPIRATORY (INHALATION) at 20:56

## 2025-01-20 RX ADMIN — KETOROLAC TROMETHAMINE 15 MG: 30 INJECTION, SOLUTION INTRAMUSCULAR; INTRAVENOUS at 00:32

## 2025-01-20 RX ADMIN — IOHEXOL 100 ML: 350 INJECTION, SOLUTION INTRAVENOUS at 00:18

## 2025-01-20 RX ADMIN — DIVALPROEX SODIUM 750 MG: 250 TABLET, DELAYED RELEASE ORAL at 08:35

## 2025-01-20 RX ADMIN — SODIUM CHLORIDE 2000 ML: 0.9 INJECTION, SOLUTION INTRAVENOUS at 16:27

## 2025-01-20 RX ADMIN — ACETAMINOPHEN 1000 MG: 10 INJECTION INTRAVENOUS at 16:14

## 2025-01-20 RX ADMIN — AZITHROMYCIN MONOHYDRATE 500 MG: 500 INJECTION, POWDER, LYOPHILIZED, FOR SOLUTION INTRAVENOUS at 21:04

## 2025-01-20 RX ADMIN — ALBUTEROL SULFATE 2 PUFF: 90 AEROSOL, METERED RESPIRATORY (INHALATION) at 08:35

## 2025-01-20 RX ADMIN — LACOSAMIDE 150 MG: 150 TABLET, FILM COATED ORAL at 08:34

## 2025-01-20 RX ADMIN — DIAZEPAM 5 MG: 10 INJECTION, SOLUTION INTRAMUSCULAR; INTRAVENOUS at 10:11

## 2025-01-20 RX ADMIN — ACETAMINOPHEN 1000 MG: 10 INJECTION INTRAVENOUS at 22:52

## 2025-01-20 RX ADMIN — LEVALBUTEROL HYDROCHLORIDE 1.25 MG: 1.25 SOLUTION RESPIRATORY (INHALATION) at 11:56

## 2025-01-20 NOTE — TELEPHONE ENCOUNTER
Spoke with patients member and she stated she is currently at work and can't r/s the appt for Edolly so she will go into the portal to r/s. Stated that Edtajy is currently admitted so she can't call to do it.

## 2025-01-20 NOTE — PLAN OF CARE
Problem: Potential for Falls  Goal: Patient will remain free of falls  Description: INTERVENTIONS:  - Educate patient/family on patient safety including physical limitations  - Instruct patient to call for assistance with activity   - Consult OT/PT to assist with strengthening/mobility   - Keep Call bell within reach  - Keep bed low and locked with side rails adjusted as appropriate  - Keep care items and personal belongings within reach  - Initiate and maintain comfort rounds  - Make Fall Risk Sign visible to staff  - Offer Toileting every 3 Hours, in advance of need  - Initiate/Maintain bed alarm  - Obtain necessary fall risk management equipment  - Apply yellow socks and bracelet for high fall risk patients  - Consider moving patient to room near nurses station  Outcome: Progressing     Problem: Prexisting or High Potential for Compromised Skin Integrity  Goal: Skin integrity is maintained or improved  Description: INTERVENTIONS:  - Identify patients at risk for skin breakdown  - Assess and monitor skin integrity  - Assess and monitor nutrition and hydration status  - Monitor labs   - Assess for incontinence   - Turn and reposition patient  - Assist with mobility/ambulation  - Relieve pressure over bony prominences  - Avoid friction and shearing  - Provide appropriate hygiene as needed including keeping skin clean and dry  - Evaluate need for skin moisturizer/barrier cream  - Collaborate with interdisciplinary team   - Patient/family teaching  - Consider wound care consult   Outcome: Progressing     Problem: PAIN - ADULT  Goal: Verbalizes/displays adequate comfort level or baseline comfort level  Description: Interventions:  - Encourage patient to monitor pain and request assistance  - Assess pain using appropriate pain scale  - Administer analgesics based on type and severity of pain and evaluate response  - Implement non-pharmacological measures as appropriate and evaluate response  - Consider cultural and  social influences on pain and pain management  - Notify physician/advanced practitioner if interventions unsuccessful or patient reports new pain  Outcome: Progressing     Problem: INFECTION - ADULT  Goal: Absence or prevention of progression during hospitalization  Description: INTERVENTIONS:  - Assess and monitor for signs and symptoms of infection  - Monitor lab/diagnostic results  - Monitor all insertion sites, i.e. indwelling lines, tubes, and drains  - Monitor endotracheal if appropriate and nasal secretions for changes in amount and color  - [place] appropriate cooling/warming therapies per order  - Administer medications as ordered  - Instruct and encourage patient and family to use good hand hygiene technique  - Identify and instruct in appropriate isolation precautions for identified infection/condition  Outcome: Progressing     Problem: SAFETY ADULT  Goal: Patient will remain free of falls  Description: INTERVENTIONS:  - Educate patient/family on patient safety including physical limitations  - Instruct patient to call for assistance with activity   - Consult OT/PT to assist with strengthening/mobility   - Keep Call bell within reach  - Keep bed low and locked with side rails adjusted as appropriate  - Keep care items and personal belongings within reach  - Initiate and maintain comfort rounds  - Make Fall Risk Sign visible to staff  - Offer Toileting every 3 Hours, in advance of need  - Initiate/Maintain bed alarm  - Obtain necessary fall risk management equipment  - Apply yellow socks and bracelet for high fall risk patients  - Consider moving patient to room near nurses station  Outcome: Progressing  Goal: Maintain or return to baseline ADL function  Description: INTERVENTIONS:  -  Assess patient's ability to carry out ADLs; assess patient's baseline for ADL function and identify physical deficits which impact ability to perform ADLs (bathing, care of mouth/teeth, toileting, grooming, dressing,  etc.)  - Assess/evaluate cause of self-care deficits   - Assess range of motion  - Assess patient's mobility; develop plan if impaired  - Assess patient's need for assistive devices and provide as appropriate  - Encourage maximum independence but intervene and supervise when necessary  - Involve family in performance of ADLs  - Assess for home care needs following discharge   - Consider OT consult to assist with ADL evaluation and planning for discharge  - Provide patient education as appropriate  Outcome: Progressing  Goal: Maintains/Returns to pre admission functional level  Description: INTERVENTIONS:  - Perform AM-PAC 6 Click Basic Mobility/ Daily Activity assessment daily.  - Set and communicate daily mobility goal to care team and patient/family/caregiver.   - Collaborate with rehabilitation services on mobility goals if consulted  - Perform Range of Motion 3 times a day.  - Reposition patient every 3 hours.  - Dangle patient 3 times a day  - Stand patient 3 times a day  - Ambulate patient 3 times a day  - Out of bed to chair 3 times a day   - Out of bed for meals 3 times a day  - Out of bed for toileting  - Record patient progress and toleration of activity level   Outcome: Progressing     Problem: DISCHARGE PLANNING  Goal: Discharge to home or other facility with appropriate resources  Description: INTERVENTIONS:  - Identify barriers to discharge w/patient and caregiver  - Arrange for needed discharge resources and transportation as appropriate  - Identify discharge learning needs (meds, wound care, etc.)  - Arrange for interpretive services to assist at discharge as needed  - Refer to Case Management Department for coordinating discharge planning if the patient needs post-hospital services based on physician/advanced practitioner order or complex needs related to functional status, cognitive ability, or social support system  Outcome: Progressing     Problem: NEUROSENSORY - ADULT  Goal: Achieves stable or  improved neurological status  Description: INTERVENTIONS  - Monitor and report changes in neurological status  - Monitor vital signs such as temperature, blood pressure, glucose, and any other labs ordered   - Initiate measures to prevent increased intracranial pressure  - Monitor for seizure activity and implement precautions if appropriate      Outcome: Progressing  Goal: Remains free of injury related to seizures activity  Description: INTERVENTIONS  - Maintain airway, patient safety  and administer oxygen as ordered  - Monitor patient for seizure activity, document and report duration and description of seizure to physician/advanced practitioner  - If seizure occurs,  ensure patient safety during seizure  - Reorient patient post seizure  - Seizure pads on all 4 side rails  - Instruct patient/family to notify RN of any seizure activity including if an aura is experienced  - Instruct patient/family to call for assistance with activity based on nursing assessment  - Administer anti-seizure medications if ordered    Outcome: Progressing  Goal: Achieves maximal functionality and self care  Description: INTERVENTIONS  - Monitor swallowing and airway patency with patient fatigue and changes in neurological status  - Encourage and assist patient to increase activity and self care.   - Encourage visually impaired, hearing impaired and aphasic patients to use assistive/communication devices  Outcome: Progressing     Problem: RESPIRATORY - ADULT  Goal: Achieves optimal ventilation and oxygenation  Description: INTERVENTIONS:  - Assess for changes in respiratory status  - Assess for changes in mentation and behavior  - Position to facilitate oxygenation and minimize respiratory effort  - Oxygen administered by appropriate delivery if ordered  - Initiate smoking cessation education as indicated  - Encourage broncho-pulmonary hygiene including cough, deep breathe, Incentive Spirometry  - Assess the need for suctioning and  aspirate as needed  - Assess and instruct to report SOB or any respiratory difficulty  - Respiratory Therapy support as indicated  Outcome: Progressing     Problem: GENITOURINARY - ADULT  Goal: Maintains or returns to baseline urinary function  Description: INTERVENTIONS:  - Assess urinary function  - Encourage oral fluids to ensure adequate hydration if ordered  - Administer IV fluids as ordered to ensure adequate hydration  - Administer ordered medications as needed  - Offer frequent toileting  - Follow urinary retention protocol if ordered  Outcome: Progressing  Goal: Absence of urinary retention  Description: INTERVENTIONS:  - Assess patient’s ability to void and empty bladder  - Monitor I/O  - Bladder scan as needed  - Discuss with physician/AP medications to alleviate retention as needed  - Discuss catheterization for long term situations as appropriate  Outcome: Progressing     Problem: METABOLIC, FLUID AND ELECTROLYTES - ADULT  Goal: Electrolytes maintained within normal limits  Description: INTERVENTIONS:  - Monitor labs and assess patient for signs and symptoms of electrolyte imbalances  - Administer electrolyte replacement as ordered  - Monitor response to electrolyte replacements, including repeat lab results as appropriate  - Instruct patient on fluid and nutrition as appropriate  Outcome: Progressing  Goal: Fluid balance maintained  Description: INTERVENTIONS:  - Monitor labs   - Monitor I/O and WT  - Instruct patient on fluid and nutrition as appropriate  - Assess for signs & symptoms of volume excess or deficit  Outcome: Progressing     Problem: HEMATOLOGIC - ADULT  Goal: Maintains hematologic stability  Description: INTERVENTIONS  - Assess for signs and symptoms of bleeding or hemorrhage  - Monitor labs  - Administer supportive blood products/factors as ordered and appropriate  Outcome: Progressing     Problem: MUSCULOSKELETAL - ADULT  Goal: Maintain or return mobility to safest level of  function  Description: INTERVENTIONS:  - Assess patient's ability to carry out ADLs; assess patient's baseline for ADL function and identify physical deficits which impact ability to perform ADLs (bathing, care of mouth/teeth, toileting, grooming, dressing, etc.)  - Assess/evaluate cause of self-care deficits   - Assess range of motion  - Assess patient's mobility  - Assess patient's need for assistive devices and provide as appropriate  - Encourage maximum independence but intervene and supervise when necessary  - Involve family in performance of ADLs  - Assess for home care needs following discharge   - Consider OT consult to assist with ADL evaluation and planning for discharge  - Provide patient education as appropriate  Outcome: Progressing  Goal: Maintain proper alignment of affected body part  Description: INTERVENTIONS:  - Support, maintain and protect limb and body alignment  - Provide patient/ family with appropriate education  Outcome: Progressing

## 2025-01-20 NOTE — PROGRESS NOTES
Treatment plan    Patient reevaluated on the floor due to increased oxygen requirement.  Family at bedside.  She does have shakes/tremors at baseline and when she has a seizure it is usually some sort of abdominal discomfort    Patient has decreased breath sounds CXR with bilateral slight opacities may represent early pneumonia.  Will start cefepime/azithromycin given condition.  Recheck procalcitonin in AM.    Pa Lloyd, DO FACP

## 2025-01-20 NOTE — PROGRESS NOTES
Deterioration Index Critical Care Recommendations  Room #: [address]  Deterioration index score: 68.71%    Critical Care recommends   Influenza A with viral sepsis  Acute hypoxic respiratory failure secondary to influenza A  Tachycardia multifactorial in the setting of influenza A, viral sepsis, and acute hypoxic respiratory failure  Hyponatremia  Toxic metabolic encephalopathy  Cognitive developmental delay with associated schizoaffective disorder and history of focal epilepsy    Spoke with Dr. Lloyd from primary team and in light of rising oxygen requirements this patient will be moved to stepdown level 1.    Brief summary:   Critical care was brought to the patient's bedside via deterioration index alert. The alert was concerning for worsening hypoxic respiratory failure with increasing oxygen requirements.  This is a 54-year-old female with a past medical history of hypothyroid, hyperlipidemia, obesity, seizures, psychosis, schizoaffective disorder, and cognitive developmental delays.  She presented to [facility] emergency department with a 2-week history of coughing, increasing confusion over the past 48 hours, and a recent witnessed fall.  She arrived to the emergency department encephalopathic, she is normally alert/conversant/and oriented.  Upon workup in the emergency department she was found to have viral sepsis secondary to influenza A, toxic metabolic encephalopathy, acute respiratory failure with hypoxia, and hyponatremia.  She was admitted to the internal medicine service for supportive care.  Over the course of the day on 1/20/2025 she continued to develop increasing hypoxia with requirements for increasing supplemental oxygen.  Due to ongoing tachycardia and increasing O2 requirements with ongoing hypoxia the critical care team was called to the bedside via the DI alert system.  Upon evaluation and discussion with primary service plan was to transfer to ICU for ongoing monitoring and care.   Please see ICU admission note.    Please contact critical care via [place] [facility] with any questions or concerns.

## 2025-01-20 NOTE — H&P
H&P - Hospitalist   Name: Esperanza Prajapati 54 y.o. female I MRN: [id number]  Unit/Bed#: ED-14 I Date of Admission: 1/19/2025   Date of Service: 1/20/2025 I Hospital Day: 0     Assessment & Plan  Viral sepsis (HCC)  Sepsis POA with fever and tachycardia.  Tmax 102.2F  Influenza A positive  Normal PCT  Start Tamiflu 75mg BID x5 days  Supportive care with IV hydration, antipyretic and antitussives   Follow blood cultures  Toxic metabolic encephalopathy  Family reporting change in mental status.  History of developmental delay although at baseline she is alert and oriented and able to hold a conversation and make her needs known  In setting of viral sepsis/fever/flu A/hypoxia  Coma panel normal  CTh unremarkable  Supportive care.  Monitor closely.  Safety precautions  Expect improvement with supportive care   Influenza A  Antiviral tx with Tamiflu   See plan above: Viral sepsis  Fall at home, initial encounter  Fall with head strike  Trauma scan: CTH/CAP/Cspine unremarkable  Neurochecks  PT OT consult  Acute respiratory failure with hypoxia (HCC)  O2 sat 88%.  Stable on 2LNC  Multifactorial in setting of flu, morbid obesity, JEFF  Minimal expiratory wheezing, will order albuterol inhaler QID for 3 days  Wean off O2 as able  Postoperative hypothyroidism  History of papillary thyroid carcinoma in 2013 s/p total thyroidectomy and I-131 ablation  Levothyroxine 175 mcg   Focal epilepsy (HCC)  Lacosamide 150 mg daily in the morning, 200mg at bedtime  On divalproex for mood stabilization 750mg BID  h/o Papillary carcinoma of thyroid (HCC)  Stage I papillary thyroid cancer s/p total thyroidectomy and I-131 ablation in 2013   Hyponatremia  Na 131.  Mild hyponatremia  Suspect euvolemic hyponatremia in setting of flu/sepsis   Provide IV hydration.  Repeat a.m. 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 Takes prn  Olanzapine 10 mg at bedtime   1/11/25: Refills denied by outpatient psychiatrist.  Needs follow-up appt; last visit in October  Tremor  Tremors thought to be 2/2 to divalproex therapy      VTE Pharmacologic Prophylaxis: VTE Score: 4 Moderate Risk (Score 3-4) - Pharmacological DVT Prophylaxis Ordered: enoxaparin (Lovenox).  Code Status: Level 1 - Full Code FC by default  Discussion with family:  called, no answer .     Anticipated Length of Stay: Patient will be admitted on an inpatient basis with an anticipated length of stay of greater than 2 midnights secondary to sepsis, flu, resp failure, metabolic encephalopathy.    History of Present Illness   Chief Complaint:     Esperanza Prajapati is a 54 y.o. female with a PMH as above who presents with c/o altered mental status.  Patient is somnolent and confused, unable to obtain HPI.  No family present at bedside, attempted to call, no answer.     ED note:  As per family, patient was ill approximately 2 weeks ago with some coughing. This has persisted. She has been acting more confused over the last 2 days that they described as some brain fog and say that yesterday she put on her pants backwards which is something she typically would not do. Today, when she was ambulating up the stairs she had a witnessed fall and struck her head on the wall. She has been acting increasingly confused since this fall. She is not known to have any fevers. She typically is able to hold a conversation and is alert and oriented. She typically ambulates on her own. Since this event she is oriented only to her name but not to time or place     Review of Systems   Unable to perform ROS: Acuity of condition       Historical Information   Past Medical History:   Diagnosis Date    Cancer (HCC)     thyroid cancer    Disease of thyroid gland     Hallucination     Hyperactivity (behavior)      Last Assessed: 11/7/2014     Hyperlipidemia     Obesity     Psychosis (HCC)     Seizures (HCC)      Past Surgical History:   Procedure Laterality Date    OTHER SURGICAL HISTORY      Removal of urinary calculus     OTHER SURGICAL HISTORY      Rhinologic Surgery     IN OPEN TX PHALANGEAL SHAFT FRACTURE PROX/MIDDLE EA Left 8/3/2023    Procedure: CLOSED REDUCTION PERCUTANEOUS PINNING - LEFT RING FINGER;  Surgeon: Fransisco Escalante MD;  [facility]: [facility] MA[facility] OR;  Service: Orthopedics    TOTAL THYROIDECTOMY      LAst Assessed: 11/7/2014     Social History     Tobacco Use    Smoking status: Never     Passive exposure: Never    Smokeless tobacco: Never   Vaping Use    Vaping status: Never Used   Substance and Sexual Activity    Alcohol use: Never    Drug use: Never    Sexual activity: Never     E-Cigarette/Vaping    E-Cigarette Use Never User      E-Cigarette/Vaping Substances    Nicotine No     THC No     CBD No     Flavoring No     Other No     Unknown No      Family History   Problem Relation Age of Onset    Breast cancer Mother 59    Hypertension Mother     ALS Mother     Stroke Mother     Prostate cancer Father     Diabetes type II Father      Social History:  Marital Status: Single   Occupation: disabled  Patient Pre-hospital Living Situation: Home  Patient Pre-hospital Level of Mobility: walks  Patient Pre-hospital Diet Restrictions:     Meds/Allergies   I have reviewed home medications using recent Epic encounter.  Prior to Admission medications    Medication Sig Start Date End Date Taking? Authorizing Provider   benztropine (COGENTIN) 2 mg tablet TAKE 1 TABLET (2 MG TOTAL) BY MOUTH 2 (TWO) TIMES A DAY 12/19/24   Alvaro Dyson MD   divalproex sodium (DEPAKOTE) 250 mg DR tablet TAKE 3 TABLETS (750 MG TOTAL) BY MOUTH EVERY 12 (TWELVE) HOURS 1/15/25   Maurizio Maldonado MD   lacosamide (VIMPAT) 150 mg tablet Take 1 tablet (150 mg total) by mouth in the morning 9/30/24   Tunde Cevallos MD    lacosamide (VIMPAT) 200 mg tablet Take 1 tablet (200 mg total) by mouth daily at bedtime 9/30/24   Tunde Cevallos MD   levothyroxine 175 mcg tablet TAKE 1 TABLET BY MOUTH MONDAY THRU SATURDAY 1/10/25   Foster Biswas MD   OLANZapine (ZyPREXA) 10 mg tablet TAKE 1 TABLET (10 MG TOTAL) BY MOUTH DAILY AT BEDTIME 12/19/24 1/18/25  Alvaro Dyson MD   paliperidone (INVEGA) 6 MG 24 hr tablet Take 1 tablet (6 mg total) by mouth every morning 12/19/24   Avlaro Dyson MD   traZODone (DESYREL) 50 mg tablet Take 1 tablet (50 mg total) by mouth daily at bedtime  Patient taking differently: Take 50 mg by mouth daily at bedtime as needed for sleep 10/9/24   Alvaro Dyson MD     No Known Allergies    Objective :  Temp:  [98.1 °F (36.7 °C)-102.2 °F (39 °C)] 102.2 °F (39 °C)  HR:  [116-143] 116  BP: (100-128)/(59-74) 100/59  Resp:  [13-21] 18  SpO2:  [88 %-97 %] 96 %  O2 Device: Nasal cannula  Nasal Cannula O2 Flow Rate (L/min):  [2 L/min-3 L/min] 3 L/min    Physical Exam  Constitutional:       General: She is not in acute distress.     Appearance: She is obese. She is ill-appearing. She is not toxic-appearing or diaphoretic.   HENT:      Head: Normocephalic and atraumatic.      Mouth/Throat:      Mouth: Mucous membranes are dry.   Cardiovascular:      Rate and Rhythm: Regular rhythm. Tachycardia present.      Heart sounds: Normal heart sounds.   Pulmonary:      Effort: Pulmonary effort is normal.      Breath sounds: Wheezing present.      Comments: Minimal expiratory wheezing  2lnc  Abdominal:      General: Bowel sounds are normal.      Palpations: Abdomen is soft.   Musculoskeletal:      Right lower leg: No edema.      Left lower leg: No edema.   Neurological:      Mental Status: She is disoriented.   Psychiatric:      Comments: Somnolent             Lines/Drains:            Lab Results: I have reviewed the following results:  Results from last 7 days   Lab Units  01/19/25  2208   WBC Thousand/uL 5.57   HEMOGLOBIN g/dL 13.5   HEMATOCRIT % 40.2   PLATELETS Thousands/uL 193   SEGS PCT % 53   LYMPHO PCT % 17   MONO PCT % 29*   EOS PCT % 0     Results from last 7 days   Lab Units 01/19/25  2208   SODIUM mmol/L 131*   POTASSIUM mmol/L 4.0   CHLORIDE mmol/L 95*   CO2 mmol/L 27   BUN mg/dL 14   CREATININE mg/dL 0.99   ANION GAP mmol/L 9   CALCIUM mg/dL 9.0   ALBUMIN g/dL 3.6   TOTAL BILIRUBIN mg/dL 0.28   ALK PHOS U/L 42   ALT U/L 9   AST U/L 13   GLUCOSE RANDOM mg/dL 79     Results from last 7 days   Lab Units 01/19/25  2208   INR  1.09     Results from last 7 days   Lab Units 01/19/25  2158   POC GLUCOSE mg/dl 93     Lab Results   Component Value Date    HGBA1C 5.5 10/10/2024    HGBA1C 5.3 08/19/2022    HGBA1C 5.4 04/29/2022     Results from last 7 days   Lab Units 01/19/25  2208   LACTIC ACID mmol/L 1.7   PROCALCITONIN ng/ml 0.08       Imaging Results Review: I reviewed radiology reports from this admission including: CT chest, CT abdomen/pelvis, and CT C-spine.  Other Study Results Review: EKG was reviewed.     Administrative Statements       ** Please Note: This note has been constructed using a voice recognition system. **

## 2025-01-20 NOTE — ED NOTES
Patient sitting up in bed requesting to go to bathroom. Patient assisted to bedside commode with assist x2 for patient's safety. Patient stood on feet with equal weight distribution and lowered herself onto commode. Patient assisted back into bed with call bell within reach.     Ayanna Lewis RN  01/20/25 [id number]

## 2025-01-20 NOTE — ED PROVIDER NOTES
Time reflects when diagnosis was documented in both MDM as applicable and the Disposition within this note       Time User Action Codes Description Comment    1/20/2025 12:07 AM Haim Crystal [J10.1] Influenza A     1/20/2025 12:11 AM Haim Crystal [G93.40] Acute encephalopathy     1/20/2025 12:11 AM Haim Crystal [J96.01] Acute hypoxic respiratory failure (HCC)           ED Disposition       ED Disposition   Admit    Condition   Stable    Date/Time   Mon Jan 20, 2025  2:18 AM    Comment   Case was discussed with JOSE and the patient's admission status was agreed to be Admission Status: inpatient status to the service of Dr. Hampton.               Assessment & Plan       Medical Decision Making  Patient with history as below presented with multiple complaints. History obtained from patient family secondary to patient altered mental status.    Differential diagnosis includes: Urinary tract infection, pneumonia, viral URI, intra-abdominal infection, obstruction, arrhythmia, anemia, electrolyte disturbance, ACS, intracranial bleed, cervical fracture    Plan: Sepsis labs, troponin, ECG, CT head, CT cervical spine, CT chest abdomen pelvis, fluids, ceftriaxone, Tylenol    I considered the patient's chronic medical conditions including their seizure disorder in forming my differential diagnosis, plan, and my medical decision making. I also reviewed their external records including office visit on 10/23/2024.    ECG independently interpreted by myself as below. Labs reviewed and unremarkable.  Influenza test positive. Initiated on tamiflu. Independently reviewed imaging without acute emergent pathology.  Suspect that patient's hypoxia as well as encephalopathy in the setting of her influenza A.  Discussed patient's management with medicine who agreed to admit patient under their service.    Amount and/or Complexity of Data Reviewed  Labs: ordered. Decision-making details documented in ED Course.  Radiology:  ordered.    Risk  Prescription drug management.  Decision regarding hospitalization.        ED Course as of 01/20/25 0614   Sun Jan 19, 2025 2205 Patient placed on cardiac monitor due to tachycardia. My interpretation of this bedside demonstrates sinus tachycardia with a rate of 140 bpm.   2206 Patient placed on 2L NC due to hypoxia.   2207 Procedure Note: EKG  Date/Time: 01/19/25 10:08 PM   Interpreted by: Haim Crystal   Indications / Diagnosis: AMS  ECG reviewed by me, the ED Provider: yes   The EKG demonstrates:  Rhythm: sinus tachycardia  Intervals: normal intervals  Axis: normal axis  QRS/Blocks: normal QRS  ST Changes: No acute ST Changes, no STD/GRAEME.   2320 Influenza A Rapid Antigen(!): Positive   Mon Jan 20, 2025   0009 1mg midazolam given for agitation and facilitation during CT scan.       Medications   benztropine (COGENTIN) tablet 2 mg (has no administration in time range)   divalproex sodium (DEPAKOTE) DR tablet 750 mg (has no administration in time range)   lacosamide (VIMPAT) tablet 150 mg (has no administration in time range)   lacosamide (VIMPAT) tablet 200 mg (has no administration in time range)   levothyroxine tablet 175 mcg (0 mcg Oral Hold 1/20/25 0508)   OLANZapine (ZyPREXA) tablet 10 mg (has no administration in time range)   paliperidone (INVEGA) 24 hr tablet 6 mg (has no administration in time range)   traZODone (DESYREL) tablet 50 mg (has no administration in time range)   dextromethorphan-guaiFENesin (ROBITUSSIN DM) oral syrup 10 mL (has no administration in time range)   sodium chloride 0.9 % infusion (75 mL/hr Intravenous New Bag 1/20/25 0356)   acetaminophen (TYLENOL) tablet 650 mg (has no administration in time range)   ondansetron (ZOFRAN) injection 4 mg (has no administration in time range)   aluminum-magnesium hydroxide-simethicone (MAALOX) oral suspension 30 mL (has no administration in time range)   simethicone (MYLICON) chewable tablet 80 mg (has no administration in time  range)   enoxaparin (LOVENOX) subcutaneous injection 40 mg (has no administration in time range)   oseltamivir (TAMIFLU) capsule 75 mg (has no administration in time range)   albuterol (PROVENTIL HFA,VENTOLIN HFA) inhaler 2 puff (has no administration in time range)   ceftriaxone (ROCEPHIN) 2 g/50 mL in dextrose IVPB (0 mg Intravenous Stopped 1/19/25 2329)   sodium chloride 0.9 % bolus 1,000 mL (0 mL Intravenous Stopped 1/20/25 0130)   acetaminophen (Ofirmev) injection 1,000 mg (0 mg Intravenous Stopped 1/20/25 0026)   oseltamivir (TAMIFLU) capsule 75 mg (75 mg Oral Given 1/20/25 0037)   ketorolac (TORADOL) injection 15 mg (15 mg Intravenous Given 1/20/25 0032)   midazolam (VERSED) injection 1 mg (1 mg Intravenous Given 1/19/25 2352)   sodium chloride 0.9 % bolus 1,000 mL (0 mL Intravenous Stopped 1/20/25 0130)   iohexol (OMNIPAQUE) 350 MG/ML injection (MULTI-DOSE) 100 mL (100 mL Intravenous Given 1/20/25 0018)       ED Risk Strat Scores                          SBIRT 22yo+      Flowsheet Row Most Recent Value   Initial Alcohol Screen: US AUDIT-C     1. How often do you have a drink containing alcohol? 0 Filed at: 01/19/2025 2314   2. How many drinks containing alcohol do you have on a typical day you are drinking?  0 Filed at: 01/19/2025 2314   3b. FEMALE Any Age, or MALE 65+: How often do you have 4 or more drinks on one occassion? 0 Filed at: 01/19/2025 2314   Audit-C Score 0 Filed at: 01/19/2025 2314   CATHY: How many times in the past year have you...    Used an illegal drug or used a prescription medication for non-medical reasons? Never Filed at: 01/19/2025 2314                            History of Present Illness       Chief Complaint   Patient presents with    Medical Problem     Per family at bedside, pt has been more confused than normal for the past 2-3 days. Pt fell backwards on the steps and hit head on wall. Pt able to get self up from ground. Per family, they were trying to to bring pt to ED this  morning for altered mental status, but she was refusing. Pt alert to self, disoriented to place, time, and situation.        Past Medical History:   Diagnosis Date    Cancer (HCC)     thyroid cancer    Disease of thyroid gland     Hallucination     Hyperactivity (behavior)     Last Assessed: 11/7/2014     Hyperlipidemia     Obesity     Psychosis (HCC)     Seizures (HCC)       Past Surgical History:   Procedure Laterality Date    OTHER SURGICAL HISTORY      Removal of urinary calculus     OTHER SURGICAL HISTORY      Rhinologic Surgery     AZ OPEN TX PHALANGEAL SHAFT FRACTURE PROX/MIDDLE EA Left 8/3/2023    Procedure: CLOSED REDUCTION PERCUTANEOUS PINNING - LEFT RING FINGER;  Surgeon: Fransisco Escalante MD;  Location: [facility];  Service: Orthopedics    TOTAL THYROIDECTOMY      LAst Assessed: 11/7/2014      Family History   Problem Relation Age of Onset    Breast cancer Mother 59    Hypertension Mother     ALS Mother     Stroke Mother     Prostate cancer Father     Diabetes type II Father       Social History     Tobacco Use    Smoking status: Never     Passive exposure: Never    Smokeless tobacco: Never   Vaping Use    Vaping status: Never Used   Substance Use Topics    Alcohol use: Never    Drug use: Never      E-Cigarette/Vaping    E-Cigarette Use Never User       E-Cigarette/Vaping Substances    Nicotine No     THC No     CBD No     Flavoring No     Other No     Unknown No       I have reviewed and agree with the history as documented.     Patient is a 54-year-old female with a significant past medical history of thyroid cancer, seizure disorder, developmental delay, presenting for evaluation of change in mental status.  Patient presents with family who is bedside and provides the history.  As per family, the patient was ill approximately 2 weeks ago with some coughing.  This has persisted.  She has been acting more confused over the last 2 days that they described as some brain fog and say that yesterday she  put on her pants backwards which is something she typically would not do.  Today, when she was ambulating up the stairs she had a witnessed fall and struck her head.  She has been acting increasingly confused since this fall.  She is not known to have any fevers.  She typically is able to hold a conversation and is alert and oriented.  She typically ambulates on her own.  Since this event she is oriented only to her name but not to time or place.  She only intermittently follows commands.  History otherwise limited secondary to mental status change.        Review of Systems   Unable to perform ROS: Mental status change           Objective       ED Triage Vitals   Temperature Pulse Blood Pressure Respirations SpO2 Patient Position - Orthostatic VS   01/19/25 2155 01/19/25 2155 01/19/25 2155 01/19/25 2155 01/19/25 2155 01/19/25 2155   98.1 °F (36.7 °C) (!) 143 128/74 20 90 % Lying      Temp Source Heart Rate Source BP Location FiO2 (%) Pain Score    01/19/25 2155 01/19/25 2155 01/19/25 2155 -- 01/20/25 0032    Oral Monitor Left arm  Med Not Given for Pain - for MAR use only      Vitals      Date and Time Temp Pulse SpO2 Resp BP Pain Score FACES Pain Rating User   01/20/25 0600 -- 110 95 % 12 89/55 -- -- VF   01/20/25 0545 -- 109 95 % 11 85/60 SLIM made aware -- -- VF   01/20/25 0530 -- 106 94 % 12 107/63 -- -- VF   01/20/25 0515 -- 106 93 % 12 114/64 -- -- VF   01/20/25 0445 -- 113 96 % 12 106/58 -- -- VF   01/20/25 0430 -- 109 97 % 13 116/65 -- -- VF   01/20/25 0400 -- 113 90 % 14 98/56 -- -- VF   01/20/25 0345 -- 114 96 % 13 108/58 -- -- VF   01/20/25 0330 -- 112 93 % 13 109/66 -- -- VF   01/20/25 0317 98.5 °F (36.9 °C) -- -- -- -- -- -- VF   01/20/25 0315 -- 115 94 % 13 109/59 No Pain -- VF   01/20/25 0230 -- 116 96 % 18 100/59 -- -- VF   01/20/25 0215 -- 120 95 % 14 102/63 -- -- VF   01/20/25 0200 -- 118 96 % 13 101/66 -- -- VF   01/20/25 0045 -- 122 95 % 21 117/68 -- -- VF   01/20/25 0032 -- 123 94 % 16 115/69  Med Not Given for Pain - for MAR use only -- VF   01/19/25 2310 -- 133 96 % 19 121/74 -- -- VF   01/19/25 2301 102.2 °F (39 °C) -- -- -- -- -- -- VF   01/19/25 2202 -- -- 97 % -- -- -- -- AB   01/19/25 2200 -- --  88 % -- -- -- -- VF   01/19/25 2155 98.1 °F (36.7 °C) 143 90 % 20 128/74 -- -- AB            Physical Exam  Constitutional:       Appearance: She is obese. She is ill-appearing.   HENT:      Head: Normocephalic and atraumatic.      Right Ear: External ear normal.      Left Ear: External ear normal.      Mouth/Throat:      Mouth: Mucous membranes are dry.   Eyes:      Extraocular Movements: Extraocular movements intact.      Pupils: Pupils are equal, round, and reactive to light.      Comments: 3 millimeters, equal and reactive   Cardiovascular:      Rate and Rhythm: Regular rhythm. Tachycardia present.      Pulses: Normal pulses.      Heart sounds: Normal heart sounds.   Pulmonary:      Effort: Tachypnea present.      Breath sounds: Rhonchi present.   Abdominal:      General: Abdomen is flat.      Palpations: Abdomen is soft.      Tenderness: There is no abdominal tenderness.   Musculoskeletal:      Right lower leg: No edema.      Left lower leg: No edema.   Skin:     General: Skin is warm and dry.   Neurological:      General: No focal deficit present.      Comments: Oriented to self.  Not to place or time.  Intermittently following commands.  Moving all extremities purposefully but not consistently to command.         Results Reviewed       Procedure Component Value Units Date/Time    Basic metabolic panel [[id number]]  (Abnormal) Collected: 01/20/25 0514    Lab Status: Final result Specimen: Blood from Arm, Right Updated: 01/20/25 0542     Sodium 132 mmol/L      Potassium 3.8 mmol/L      Chloride 100 mmol/L      CO2 28 mmol/L      ANION GAP 4 mmol/L      BUN 15 mg/dL      Creatinine 1.03 mg/dL      Glucose 97 mg/dL      Calcium 7.7 mg/dL      eGFR 62 ml/min/1.73sq m     Narrative:      National Kidney  Disease Foundation guidelines for Chronic Kidney Disease (CKD):     Stage 1 with normal or high GFR (GFR > 90 mL/min/1.73 square meters)    Stage 2 Mild CKD (GFR = 60-89 mL/min/1.73 square meters)    Stage 3A Moderate CKD (GFR = 45-59 mL/min/1.73 square meters)    Stage 3B Moderate CKD (GFR = 30-44 mL/min/1.73 square meters)    Stage 4 Severe CKD (GFR = 15-29 mL/min/1.73 square meters)    Stage 5 End Stage CKD (GFR <15 mL/min/1.73 square meters)  Note: GFR calculation is accurate only with a steady state creatinine    Magnesium [[id number]]  (Normal) Collected: 01/20/25 0514    Lab Status: Final result Specimen: Blood from Arm, Right Updated: 01/20/25 0542     Magnesium 1.9 mg/dL     CBC and differential [[id number]]  (Abnormal) Collected: 01/20/25 0514    Lab Status: Final result Specimen: Blood from Arm, Right Updated: 01/20/25 0524     WBC 3.94 Thousand/uL      RBC 3.58 Million/uL      Hemoglobin 11.5 g/dL      Hematocrit 35.8 %       fL      MCH 32.1 pg      MCHC 32.1 g/dL      RDW 14.0 %      MPV 9.4 fL      Platelets 159 Thousands/uL      nRBC 0 /100 WBCs      Segmented % 46 %      Immature Grans % 1 %      Lymphocytes % 28 %      Monocytes % 24 %      Eosinophils Relative 0 %      Basophils Relative 1 %      Absolute Neutrophils 1.81 Thousands/µL      Absolute Immature Grans 0.04 Thousand/uL      Absolute Lymphocytes 1.12 Thousands/µL      Absolute Monocytes 0.95 Thousand/µL      Eosinophils Absolute 0.00 Thousand/µL      Basophils Absolute 0.02 Thousands/µL     HS Troponin I 2hr [[id number]]  (Normal) Collected: 01/20/25 0031    Lab Status: Final result Specimen: Blood from Arm, Right Updated: 01/20/25 0107     hs TnI 2hr 4 ng/L      Delta 2hr hsTnI 0 ng/L     TSH, 3rd generation with Free T4 reflex [[id number]]  (Normal) Collected: 01/19/25 2237    Lab Status: Final result Specimen: Blood from Arm, Right Updated: 01/19/25 2323     TSH 3RD GENERATON 3.323 uIU/mL     Procalcitonin [[id number]]  (Normal)  Collected: 01/19/25 2208    Lab Status: Final result Specimen: Blood from Arm, Right Updated: 01/19/25 2316     Procalcitonin 0.08 ng/ml     Urine Microscopic [[id number]]  (Abnormal) Collected: 01/19/25 2303    Lab Status: Final result Specimen: Urine, Straight Cath Updated: 01/19/25 2314     RBC, UA 4-10 /hpf      WBC, UA 2-4 /hpf      Epithelial Cells None Seen /hpf      Bacteria, UA None Seen /hpf      MUCUS THREADS Innumerable     Hyaline Casts, UA 3-5 /lpf     HS Troponin 0hr (reflex protocol) [[id number]]  (Normal) Collected: 01/19/25 2208    Lab Status: Final result Specimen: Blood from Arm, Right Updated: 01/19/25 2313     hs TnI 0hr 4 ng/L     UA w Reflex to Microscopic w Reflex to Culture [[id number]]  (Abnormal) Collected: 01/19/25 2303    Lab Status: Final result Specimen: Urine, Straight Cath Updated: 01/19/25 2311     Color, UA Yellow     Clarity, UA Clear     Specific Gravity, UA 1.024     pH, UA 6.0     Leukocytes, UA Negative     Nitrite, UA Negative     Protein, UA 50 (1+) mg/dl      Glucose, UA Negative mg/dl      Ketones, UA 10 (1+) mg/dl      Urobilinogen, UA <2.0 mg/dl      Bilirubin, UA Negative     Occult Blood, UA Small    FLU/COVID Rapid Antigen (30 min. TAT) - Preferred screening test in ED [[id number]]  (Abnormal) Collected: 01/19/25 2208    Lab Status: Final result Specimen: Nares from Nose Updated: 01/19/25 2310     SARS COV Rapid Antigen Negative     Influenza A Rapid Antigen Positive     Influenza B Rapid Antigen Negative    Narrative:      This test has been performed using the [employer] Georg[employer] 2 FLU+SARS Antigen test under the Emergency Use Authorization (EUA). This test has been validated by the  and verified by the performing laboratory. The Georgina uses lateral flow immunofluorescent sandwich assay to detect SARS-COV, Influenza A and Influenza B Antigen.     The Quidel Georgina 2 SARS Antigen test does not differentiate between SARS-CoV and SARS-CoV-2.     Negative results are  presumptive and may be confirmed with a molecular assay, if necessary, for patient management. Negative results do not rule out SARS-CoV-2 or influenza infection and should not be used as the sole basis for treatment or patient management decisions. A negative test result may occur if the level of antigen in a sample is below the limit of detection of this test.     Positive results are indicative of the presence of viral antigens, but do not rule out bacterial infection or co-infection with other viruses.     All test results should be used as an adjunct to clinical observations and other information available to the provider.    FOR PEDIATRIC PATIENTS - copy/paste COVID Guidelines URL to browser: [email or web address]    Comprehensive metabolic panel [[id number]]  (Abnormal) Collected: 01/19/25 2208    Lab Status: Final result Specimen: Blood from Arm, Right Updated: 01/19/25 2310     Sodium 131 mmol/L      Potassium 4.0 mmol/L      Chloride 95 mmol/L      CO2 27 mmol/L      ANION GAP 9 mmol/L      BUN 14 mg/dL      Creatinine 0.99 mg/dL      Glucose 79 mg/dL      Calcium 9.0 mg/dL      AST 13 U/L      ALT 9 U/L      Alkaline Phosphatase 42 U/L      Total Protein 8.3 g/dL      Albumin 3.6 g/dL      Total Bilirubin 0.28 mg/dL      eGFR 65 ml/min/1.73sq m     Narrative:      National Kidney Disease Foundation guidelines for Chronic Kidney Disease (CKD):     Stage 1 with normal or high GFR (GFR > 90 mL/min/1.73 square meters)    Stage 2 Mild CKD (GFR = 60-89 mL/min/1.73 square meters)    Stage 3A Moderate CKD (GFR = 45-59 mL/min/1.73 square meters)    Stage 3B Moderate CKD (GFR = 30-44 mL/min/1.73 square meters)    Stage 4 Severe CKD (GFR = 15-29 mL/min/1.73 square meters)    Stage 5 End Stage CKD (GFR <15 mL/min/1.73 square meters)  Note: GFR calculation is accurate only with a steady state creatinine    Lactic acid [[id number]]  (Normal) Collected: 01/19/25 2208     Lab Status: Final result Specimen: Blood from Arm, Right Updated: 01/19/25 2309     LACTIC ACID 1.7 mmol/L     Narrative:      Result may be elevated if tourniquet was used during collection.    Ethanol [[id number]]  (Normal) Collected: 01/19/25 2237    Lab Status: Final result Specimen: Blood from Arm, Right Updated: 01/19/25 2309     Ethanol Lvl <10 mg/dL     Salicylate level [[id number]]  (Normal) Collected: 01/19/25 2237    Lab Status: Final result Specimen: Blood from Arm, Right Updated: 01/19/25 2306     Salicylate Lvl <5 mg/dL     Acetaminophen level-If concentration is detectable, please discuss with medical  on call. [[id number]]  (Abnormal) Collected: 01/19/25 2237    Lab Status: Final result Specimen: Blood from Arm, Right Updated: 01/19/25 2306     Acetaminophen Level <2 ug/mL     Protime-INR [[id number]]  (Normal) Collected: 01/19/25 2208    Lab Status: Final result Specimen: Blood from Arm, Right Updated: 01/19/25 2305     Protime 14.3 seconds      INR 1.09    Narrative:      INR Therapeutic Range    Indication                                             INR Range      Atrial Fibrillation                                               2.0-3.0  Hypercoagulable State                                    2.0.2.3  Left Ventricular Asist Device                            2.0-3.0  Mechanical Heart Valve                                  -    Aortic(with afib, MI, embolism, HF, LA enlargement,    and/or coagulopathy)                                     2.0-3.0 (2.5-3.5)     Mitral                                                             2.5-3.5  Prosthetic/Bioprosthetic Heart Valve               2.0-3.0  Venous thromboembolism (VTE: VT, PE        2.0-3.0    APTT [[id number]]  (Normal) Collected: 01/19/25 2208    Lab Status: Final result Specimen: Blood from Arm, Right Updated: 01/19/25 2305     PTT 26 seconds     Blood gas, Venous [[id number]]  (Abnormal) Collected: 01/19/25 2208    Lab Status: Final  result Specimen: Blood from Arm, Right Updated: 01/19/25 2254     pH, Robby 7.425     pCO2, Robby 43.1 mm Hg      pO2, Robby 44.8 mm Hg      HCO3, Robby 27.6 mmol/L      Base Excess, Robby 2.8 mmol/L      O2 Content, Robby 16.0 ml/dL      O2 HGB, VENOUS 78.6 %     CBC and differential [[id number]]  (Abnormal) Collected: 01/19/25 2208    Lab Status: Final result Specimen: Blood from Arm, Right Updated: 01/19/25 2250     WBC 5.57 Thousand/uL      RBC 4.17 Million/uL      Hemoglobin 13.5 g/dL      Hematocrit 40.2 %      MCV 96 fL      MCH 32.4 pg      MCHC 33.6 g/dL      RDW 13.8 %      MPV 10.0 fL      Platelets 193 Thousands/uL      nRBC 0 /100 WBCs      Segmented % 53 %      Immature Grans % 1 %      Lymphocytes % 17 %      Monocytes % 29 %      Eosinophils Relative 0 %      Basophils Relative 0 %      Absolute Neutrophils 2.97 Thousands/µL      Absolute Immature Grans 0.04 Thousand/uL      Absolute Lymphocytes 0.95 Thousands/µL      Absolute Monocytes 1.59 Thousand/µL      Eosinophils Absolute 0.00 Thousand/µL      Basophils Absolute 0.02 Thousands/µL     Blood culture #1 [[id number]] Collected: 01/19/25 2243    Lab Status: In process Specimen: Blood from Arm, Left Updated: 01/19/25 2248    Blood culture #2 [[id number]] Collected: 01/19/25 2208    Lab Status: In process Specimen: Blood from Arm, Right Updated: 01/19/25 2248    Fingerstick Glucose (POCT) [[id number]]  (Normal) Collected: 01/19/25 2158    Lab Status: Final result Specimen: Blood Updated: 01/19/25 2159     POC Glucose 93 mg/dl             CT head without contrast   Final Interpretation by Jose Kingston MD (01/20 0117)      No acute intracranial abnormality.                  Workstation performed: [id number]         CT chest abdomen pelvis w contrast   Final Interpretation by Jose iKngston MD (01/20 0205)      No acute findings in the chest, abdomen or pelvis, noting images degraded by motion artifact.               Workstation performed: [id number]         CT cervical  spine without contrast   Final Interpretation by Jose Kingston MD (01/20 0138)      1.  No cervical spine fracture or traumatic malalignment, noting images are degraded by motion artifact.   2.  Mildly prominent bilateral level 2 lymph nodes, nonspecific.                  Workstation performed: [id number]             CriticalCare Time    Date/Time: 1/19/2025 10:25 PM    Performed by: Haim Crystal DO  Authorized by: Haim Crystal DO    Critical care provider statement:     Critical care time (minutes):  34    Critical care time was exclusive of:  Separately billable procedures and treating other patients    Critical care was necessary to treat or prevent imminent or life-threatening deterioration of the following conditions:  Respiratory failure    Critical care was time spent personally by me on the following activities:  Obtaining history from patient or surrogate, development of treatment plan with patient or surrogate, evaluation of patient's response to treatment, examination of patient, ordering and review of laboratory studies, ordering and review of radiographic studies and re-evaluation of patient's condition    I assumed direction of critical care for this patient from another provider in my specialty: no    Comments:      Hypoxia necessitating supplemental oxygen via nasal cannula.  Upper respiratory infection resulting in hypoxia as well as significant tachycardia necessitating multiple fluid boluses for tachycardia.      ED Medication and Procedure Management   Prior to Admission Medications   Prescriptions Last Dose Informant Patient Reported? Taking?   OLANZapine (ZyPREXA) 10 mg tablet   No No   Sig: TAKE 1 TABLET (10 MG TOTAL) BY MOUTH DAILY AT BEDTIME   benztropine (COGENTIN) 2 mg tablet   No No   Sig: TAKE 1 TABLET (2 MG TOTAL) BY MOUTH 2 (TWO) TIMES A DAY   divalproex sodium (DEPAKOTE) 250 mg DR tablet   No No   Sig: TAKE 3 TABLETS (750 MG TOTAL) BY MOUTH EVERY 12 (TWELVE) HOURS    lacosamide (VIMPAT) 150 mg tablet   No No   Sig: Take 1 tablet (150 mg total) by mouth in the morning   lacosamide (VIMPAT) 200 mg tablet   No No   Sig: Take 1 tablet (200 mg total) by mouth daily at bedtime   levothyroxine 175 mcg tablet   No No   Sig: TAKE 1 TABLET BY MOUTH MONDAY THRU SATURDAY   paliperidone (INVEGA) 6 MG 24 hr tablet   No No   Sig: Take 1 tablet (6 mg total) by mouth every morning   traZODone (DESYREL) 50 mg tablet   No No   Sig: Take 1 tablet (50 mg total) by mouth daily at bedtime   Patient taking differently: Take 50 mg by mouth daily at bedtime as needed for sleep      Facility-Administered Medications: None     Patient's Medications   Discharge Prescriptions    No medications on file     No discharge procedures on file.  ED SEPSIS DOCUMENTATION   Time reflects when diagnosis was documented in both MDM as applicable and the Disposition within this note       Time User Action Codes Description Comment    1/20/2025 12:07 AM Haim Crystal [J10.1] Influenza A     1/20/2025 12:11 AM Haim Crystal [G93.40] Acute encephalopathy     1/20/2025 12:11 AM Haim Crystal [J96.01] Acute hypoxic respiratory failure (HCC)                  Haim Crystal DO  01/20/25 0614

## 2025-01-20 NOTE — PLAN OF CARE
Problem: Potential for Falls  Goal: Patient will remain free of falls  Description: INTERVENTIONS:  - Educate patient/family on patient safety including physical limitations  - Instruct patient to call for assistance with activity   - Consult OT/PT to assist with strengthening/mobility   - Keep Call bell within reach  - Keep bed low and locked with side rails adjusted as appropriate  - Keep care items and personal belongings within reach  - Initiate and maintain comfort rounds  - Make Fall Risk Sign visible to staff  - Offer Toileting every 2 Hours, in advance of need  - Initiate/Maintain bed alarm  - Obtain necessary fall risk management equipment: bed alarm  - Apply yellow socks and bracelet for high fall risk patients  - Consider moving patient to room near nurses station  Outcome: Progressing     Problem: Prexisting or High Potential for Compromised Skin Integrity  Goal: Skin integrity is maintained or improved  Description: INTERVENTIONS:  - Identify patients at risk for skin breakdown  - Assess and monitor skin integrity  - Assess and monitor nutrition and hydration status  - Monitor labs   - Assess for incontinence   - Turn and reposition patient  - Assist with mobility/ambulation  - Relieve pressure over bony prominences  - Avoid friction and shearing  - Provide appropriate hygiene as needed including keeping skin clean and dry  - Evaluate need for skin moisturizer/barrier cream  - Collaborate with interdisciplinary team   - Patient/family teaching  - Consider wound care consult   Outcome: Progressing     Problem: PAIN - ADULT  Goal: Verbalizes/displays adequate comfort level or baseline comfort level  Description: Interventions:  - Encourage patient to monitor pain and request assistance  - Assess pain using appropriate pain scale  - Administer analgesics based on type and severity of pain and evaluate response  - Implement non-pharmacological measures as appropriate and evaluate response  - Consider  cultural and social influences on pain and pain management  - Notify physician/advanced practitioner if interventions unsuccessful or patient reports new pain  Outcome: Progressing     Problem: INFECTION - ADULT  Goal: Absence or prevention of progression during hospitalization  Description: INTERVENTIONS:  - Assess and monitor for signs and symptoms of infection  - Monitor lab/diagnostic results  - Monitor all insertion sites, i.e. indwelling lines, tubes, and drains  - Monitor endotracheal if appropriate and nasal secretions for changes in amount and color  - [place] appropriate cooling/warming therapies per order  - Administer medications as ordered  - Instruct and encourage patient and family to use good hand hygiene technique  - Identify and instruct in appropriate isolation precautions for identified infection/condition  Outcome: Progressing     Problem: SAFETY ADULT  Goal: Patient will remain free of falls  Description: INTERVENTIONS:  - Educate patient/family on patient safety including physical limitations  - Instruct patient to call for assistance with activity   - Consult OT/PT to assist with strengthening/mobility   - Keep Call bell within reach  - Keep bed low and locked with side rails adjusted as appropriate  - Keep care items and personal belongings within reach  - Initiate and maintain comfort rounds  - Make Fall Risk Sign visible to staff  - Offer Toileting every 3 Hours, in advance of need  - Initiate/Maintain bed alarm  - Obtain necessary fall risk management equipment: socks  - Apply yellow socks and bracelet for high fall risk patients  - Consider moving patient to room near nurses station  Outcome: Progressing  Goal: Maintain or return to baseline ADL function  Description: INTERVENTIONS:  -  Assess patient's ability to carry out ADLs; assess patient's baseline for ADL function and identify physical deficits which impact ability to perform ADLs (bathing, care of mouth/teeth, toileting,  grooming, dressing, etc.)  - Assess/evaluate cause of self-care deficits   - Assess range of motion  - Assess patient's mobility; develop plan if impaired  - Assess patient's need for assistive devices and provide as appropriate  - Encourage maximum independence but intervene and supervise when necessary  - Involve family in performance of ADLs  - Assess for home care needs following discharge   - Consider OT consult to assist with ADL evaluation and planning for discharge  - Provide patient education as appropriate  Outcome: Progressing  Goal: Maintains/Returns to pre admission functional level  Description: INTERVENTIONS:  - Perform AM-PAC 6 Click Basic Mobility/ Daily Activity assessment daily.  - Set and communicate daily mobility goal to care team and patient/family/caregiver.   - Collaborate with rehabilitation services on mobility goals if consulted  - Perform Range of Motion 3 times a day.  - Reposition patient every 2 hours.  - Dangle patient 3 times a day  - Stand patient 3 times a day  - Ambulate patient 3 times a day  - Out of bed to chair 3 times a day   - Out of bed for meals 3 times a day  - Out of bed for toileting  - Record patient progress and toleration of activity level   Outcome: Progressing     Problem: DISCHARGE PLANNING  Goal: Discharge to home or other facility with appropriate resources  Description: INTERVENTIONS:  - Identify barriers to discharge w/patient and caregiver  - Arrange for needed discharge resources and transportation as appropriate  - Identify discharge learning needs (meds, wound care, etc.)  - Arrange for interpretive services to assist at discharge as needed  - Refer to Case Management Department for coordinating discharge planning if the patient needs post-hospital services based on physician/advanced practitioner order or complex needs related to functional status, cognitive ability, or social support system  Outcome: Progressing     Problem: NEUROSENSORY - ADULT  Goal:  Achieves stable or improved neurological status  Description: INTERVENTIONS  - Monitor and report changes in neurological status  - Monitor vital signs such as temperature, blood pressure, glucose, and any other labs ordered   - Initiate measures to prevent increased intracranial pressure  - Monitor for seizure activity and implement precautions if appropriate      Outcome: Progressing  Goal: Remains free of injury related to seizures activity  Description: INTERVENTIONS  - Maintain airway, patient safety  and administer oxygen as ordered  - Monitor patient for seizure activity, document and report duration and description of seizure to physician/advanced practitioner  - If seizure occurs,  ensure patient safety during seizure  - Reorient patient post seizure  - Seizure pads on all 4 side rails  - Instruct patient/family to notify RN of any seizure activity including if an aura is experienced  - Instruct patient/family to call for assistance with activity based on nursing assessment  - Administer anti-seizure medications if ordered    Outcome: Progressing  Goal: Achieves maximal functionality and self care  Description: INTERVENTIONS  - Monitor swallowing and airway patency with patient fatigue and changes in neurological status  - Encourage and assist patient to increase activity and self care.   - Encourage visually impaired, hearing impaired and aphasic patients to use assistive/communication devices  Outcome: Progressing     Problem: RESPIRATORY - ADULT  Goal: Achieves optimal ventilation and oxygenation  Description: INTERVENTIONS:  - Assess for changes in respiratory status  - Assess for changes in mentation and behavior  - Position to facilitate oxygenation and minimize respiratory effort  - Oxygen administered by appropriate delivery if ordered  - Initiate smoking cessation education as indicated  - Encourage broncho-pulmonary hygiene including cough, deep breathe, Incentive Spirometry  - Assess the need for  suctioning and aspirate as needed  - Assess and instruct to report SOB or any respiratory difficulty  - Respiratory Therapy support as indicated  Outcome: Progressing     Problem: GENITOURINARY - ADULT  Goal: Maintains or returns to baseline urinary function  Description: INTERVENTIONS:  - Assess urinary function  - Encourage oral fluids to ensure adequate hydration if ordered  - Administer IV fluids as ordered to ensure adequate hydration  - Administer ordered medications as needed  - Offer frequent toileting  - Follow urinary retention protocol if ordered  Outcome: Progressing  Goal: Absence of urinary retention  Description: INTERVENTIONS:  - Assess patient’s ability to void and empty bladder  - Monitor I/O  - Bladder scan as needed  - Discuss with physician/AP medications to alleviate retention as needed  - Discuss catheterization for long term situations as appropriate  Outcome: Progressing     Problem: METABOLIC, FLUID AND ELECTROLYTES - ADULT  Goal: Electrolytes maintained within normal limits  Description: INTERVENTIONS:  - Monitor labs and assess patient for signs and symptoms of electrolyte imbalances  - Administer electrolyte replacement as ordered  - Monitor response to electrolyte replacements, including repeat lab results as appropriate  - Instruct patient on fluid and nutrition as appropriate  Outcome: Progressing  Goal: Fluid balance maintained  Description: INTERVENTIONS:  - Monitor labs   - Monitor I/O and WT  - Instruct patient on fluid and nutrition as appropriate  - Assess for signs & symptoms of volume excess or deficit  Outcome: Progressing     Problem: HEMATOLOGIC - ADULT  Goal: Maintains hematologic stability  Description: INTERVENTIONS  - Assess for signs and symptoms of bleeding or hemorrhage  - Monitor labs  - Administer supportive blood products/factors as ordered and appropriate  Outcome: Progressing     Problem: MUSCULOSKELETAL - ADULT  Goal: Maintain or return mobility to safest level  of function  Description: INTERVENTIONS:  - Assess patient's ability to carry out ADLs; assess patient's baseline for ADL function and identify physical deficits which impact ability to perform ADLs (bathing, care of mouth/teeth, toileting, grooming, dressing, etc.)  - Assess/evaluate cause of self-care deficits   - Assess range of motion  - Assess patient's mobility  - Assess patient's need for assistive devices and provide as appropriate  - Encourage maximum independence but intervene and supervise when necessary  - Involve family in performance of ADLs  - Assess for home care needs following discharge   - Consider OT consult to assist with ADL evaluation and planning for discharge  - Provide patient education as appropriate  Outcome: Progressing  Goal: Maintain proper alignment of affected body part  Description: INTERVENTIONS:  - Support, maintain and protect limb and body alignment  - Provide patient/ family with appropriate education  Outcome: Progressing

## 2025-01-21 ENCOUNTER — APPOINTMENT (INPATIENT)
Dept: RADIOLOGY | Facility: HOSPITAL | Age: 55
DRG: 870 | End: 2025-01-21
Payer: COMMERCIAL

## 2025-01-21 ENCOUNTER — APPOINTMENT (INPATIENT)
Dept: GASTROENTEROLOGY | Facility: HOSPITAL | Age: 55
DRG: 870 | End: 2025-01-21
Payer: COMMERCIAL

## 2025-01-21 LAB
ALBUMIN SERPL BCG-MCNC: 2.8 G/DL (ref 3.5–5)
ALP SERPL-CCNC: 29 U/L (ref 34–104)
ALT SERPL W P-5'-P-CCNC: 8 U/L (ref 7–52)
ANION GAP SERPL CALCULATED.3IONS-SCNC: 4 MMOL/L (ref 4–13)
ARTERIAL PATENCY WRIST A: YES
AST SERPL W P-5'-P-CCNC: 16 U/L (ref 13–39)
BASE EX.OXY STD BLDV CALC-SCNC: 97.3 % (ref 60–80)
BASE EXCESS BLDA CALC-SCNC: -2.6 MMOL/L
BASE EXCESS BLDV CALC-SCNC: -3.5 MMOL/L
BILIRUB SERPL-MCNC: 0.27 MG/DL (ref 0.2–1)
BUN SERPL-MCNC: 16 MG/DL (ref 5–25)
CA-I BLD-SCNC: 1 MMOL/L (ref 1.12–1.32)
CALCIUM ALBUM COR SERPL-MCNC: 8.6 MG/DL (ref 8.3–10.1)
CALCIUM SERPL-MCNC: 7.6 MG/DL (ref 8.4–10.2)
CHLORIDE SERPL-SCNC: 105 MMOL/L (ref 96–108)
CO2 SERPL-SCNC: 26 MMOL/L (ref 21–32)
CREAT SERPL-MCNC: 0.92 MG/DL (ref 0.6–1.3)
ERYTHROCYTE [DISTWIDTH] IN BLOOD BY AUTOMATED COUNT: 14.1 % (ref 11.6–15.1)
GFR SERPL CREATININE-BSD FRML MDRD: 71 ML/MIN/1.73SQ M
GLUCOSE SERPL-MCNC: 102 MG/DL (ref 65–140)
HCO3 BLDA-SCNC: 24.5 MMOL/L (ref 22–28)
HCO3 BLDV-SCNC: 23.6 MMOL/L (ref 24–30)
HCT VFR BLD AUTO: 35.4 % (ref 34.8–46.1)
HFNC FLOW LPM: 15
HGB BLD-MCNC: 11.3 G/DL (ref 11.5–15.4)
HOROWITZ INDEX BLDA+IHG-RTO: 100 MM[HG]
L PNEUMO1 AG UR QL IA.RAPID: NEGATIVE
LACTATE SERPL-SCNC: 1.4 MMOL/L (ref 0.5–2)
MAGNESIUM SERPL-MCNC: 1.8 MG/DL (ref 1.9–2.7)
MCH RBC QN AUTO: 32.3 PG (ref 26.8–34.3)
MCHC RBC AUTO-ENTMCNC: 31.9 G/DL (ref 31.4–37.4)
MCV RBC AUTO: 101 FL (ref 82–98)
NON VENT TYPE HFNC: ABNORMAL
O2 CT BLDA-SCNC: 97.4 ML/DL (ref 16–23)
O2 CT BLDV-SCNC: 17.6 ML/DL
OXYHGB MFR BLDA: 96.6 % (ref 94–97)
PCO2 BLDA: 52.8 MM HG (ref 36–44)
PCO2 BLDV: 51.4 MM HG (ref 42–50)
PEEP RESPIRATORY: 12 CM[H2O]
PH BLDA: 7.28 [PH] (ref 7.35–7.45)
PH BLDV: 7.28 [PH] (ref 7.3–7.4)
PHOSPHATE SERPL-MCNC: 2.7 MG/DL (ref 2.7–4.5)
PLATELET # BLD AUTO: 132 THOUSANDS/UL (ref 149–390)
PMV BLD AUTO: 9.8 FL (ref 8.9–12.7)
PO2 BLDA: 97.2 MM HG (ref 75–129)
PO2 BLDV: 150 MM HG (ref 35–45)
POTASSIUM SERPL-SCNC: 4 MMOL/L (ref 3.5–5.3)
PROCALCITONIN SERPL-MCNC: 1.97 NG/ML
PROT SERPL-MCNC: 6.5 G/DL (ref 6.4–8.4)
RBC # BLD AUTO: 3.5 MILLION/UL (ref 3.81–5.12)
S PNEUM AG UR QL: NEGATIVE
SODIUM SERPL-SCNC: 135 MMOL/L (ref 135–147)
SPECIMEN SOURCE: ABNORMAL
VENT AC: 18
VENT- AC: AC
VIT B12 SERPL-MCNC: 334 PG/ML (ref 180–914)
VT SETTING VENT: 400 ML
WBC # BLD AUTO: 3.25 THOUSAND/UL (ref 4.31–10.16)

## 2025-01-21 PROCEDURE — 82607 VITAMIN B-12: CPT

## 2025-01-21 PROCEDURE — 84145 PROCALCITONIN (PCT): CPT | Performed by: INTERNAL MEDICINE

## 2025-01-21 PROCEDURE — 82805 BLOOD GASES W/O2 SATURATION: CPT

## 2025-01-21 PROCEDURE — 4A133J1 MONITORING OF ARTERIAL PULSE, PERIPHERAL, PERCUTANEOUS APPROACH: ICD-10-PCS

## 2025-01-21 PROCEDURE — 31500 INSERT EMERGENCY AIRWAY: CPT

## 2025-01-21 PROCEDURE — 83735 ASSAY OF MAGNESIUM: CPT

## 2025-01-21 PROCEDURE — 71045 X-RAY EXAM CHEST 1 VIEW: CPT

## 2025-01-21 PROCEDURE — 94150 VITAL CAPACITY TEST: CPT

## 2025-01-21 PROCEDURE — 94760 N-INVAS EAR/PLS OXIMETRY 1: CPT

## 2025-01-21 PROCEDURE — 0BH17EZ INSERTION OF ENDOTRACHEAL AIRWAY INTO TRACHEA, VIA NATURAL OR ARTIFICIAL OPENING: ICD-10-PCS

## 2025-01-21 PROCEDURE — 82330 ASSAY OF CALCIUM: CPT

## 2025-01-21 PROCEDURE — 99291 CRITICAL CARE FIRST HOUR: CPT | Performed by: INTERNAL MEDICINE

## 2025-01-21 PROCEDURE — 5A1955Z RESPIRATORY VENTILATION, GREATER THAN 96 CONSECUTIVE HOURS: ICD-10-PCS

## 2025-01-21 PROCEDURE — 87081 CULTURE SCREEN ONLY: CPT

## 2025-01-21 PROCEDURE — 36556 INSERT NON-TUNNEL CV CATH: CPT

## 2025-01-21 PROCEDURE — 94002 VENT MGMT INPAT INIT DAY: CPT

## 2025-01-21 PROCEDURE — 94640 AIRWAY INHALATION TREATMENT: CPT

## 2025-01-21 PROCEDURE — C9254 INJECTION, LACOSAMIDE: HCPCS

## 2025-01-21 PROCEDURE — 93005 ELECTROCARDIOGRAM TRACING: CPT

## 2025-01-21 PROCEDURE — 76937 US GUIDE VASCULAR ACCESS: CPT

## 2025-01-21 PROCEDURE — 36620 INSERTION CATHETER ARTERY: CPT

## 2025-01-21 PROCEDURE — 83605 ASSAY OF LACTIC ACID: CPT | Performed by: NURSE PRACTITIONER

## 2025-01-21 PROCEDURE — 87449 NOS EACH ORGANISM AG IA: CPT | Performed by: NURSE PRACTITIONER

## 2025-01-21 PROCEDURE — 84100 ASSAY OF PHOSPHORUS: CPT

## 2025-01-21 PROCEDURE — 31622 DX BRONCHOSCOPE/WASH: CPT | Performed by: INTERNAL MEDICINE

## 2025-01-21 PROCEDURE — 80053 COMPREHEN METABOLIC PANEL: CPT | Performed by: INTERNAL MEDICINE

## 2025-01-21 PROCEDURE — 36600 WITHDRAWAL OF ARTERIAL BLOOD: CPT

## 2025-01-21 PROCEDURE — 4A133B1 MONITORING OF ARTERIAL PRESSURE, PERIPHERAL, PERCUTANEOUS APPROACH: ICD-10-PCS

## 2025-01-21 PROCEDURE — 06H033Z INSERTION OF INFUSION DEVICE INTO INFERIOR VENA CAVA, PERCUTANEOUS APPROACH: ICD-10-PCS

## 2025-01-21 PROCEDURE — NC001 PR NO CHARGE

## 2025-01-21 PROCEDURE — 03HY32Z INSERTION OF MONITORING DEVICE INTO UPPER ARTERY, PERCUTANEOUS APPROACH: ICD-10-PCS

## 2025-01-21 PROCEDURE — 85027 COMPLETE CBC AUTOMATED: CPT | Performed by: INTERNAL MEDICINE

## 2025-01-21 RX ORDER — MAGNESIUM SULFATE HEPTAHYDRATE 40 MG/ML
2 INJECTION, SOLUTION INTRAVENOUS ONCE
Status: COMPLETED | OUTPATIENT
Start: 2025-01-21 | End: 2025-01-21

## 2025-01-21 RX ORDER — FENTANYL CITRATE-0.9 % NACL/PF 10 MCG/ML
50 PLASTIC BAG, INJECTION (ML) INTRAVENOUS CONTINUOUS
Status: DISCONTINUED | OUTPATIENT
Start: 2025-01-21 | End: 2025-01-23

## 2025-01-21 RX ORDER — CHLORHEXIDINE GLUCONATE ORAL RINSE 1.2 MG/ML
15 SOLUTION DENTAL EVERY 12 HOURS SCHEDULED
Status: DISCONTINUED | OUTPATIENT
Start: 2025-01-21 | End: 2025-02-03

## 2025-01-21 RX ORDER — VALPROIC ACID 250 MG/5ML
750 SOLUTION ORAL EVERY 12 HOURS SCHEDULED
Status: DISCONTINUED | OUTPATIENT
Start: 2025-01-21 | End: 2025-01-31

## 2025-01-21 RX ORDER — PANTOPRAZOLE SODIUM 40 MG/1
40 TABLET, DELAYED RELEASE ORAL
Status: DISCONTINUED | OUTPATIENT
Start: 2025-01-21 | End: 2025-01-21

## 2025-01-21 RX ORDER — CYANOCOBALAMIN 1000 UG/ML
1000 INJECTION, SOLUTION INTRAMUSCULAR; SUBCUTANEOUS ONCE
Status: COMPLETED | OUTPATIENT
Start: 2025-01-21 | End: 2025-01-21

## 2025-01-21 RX ORDER — ACETAMINOPHEN 650 MG/20.3ML
650 SUSPENSION ORAL EVERY 4 HOURS PRN
Status: DISCONTINUED | OUTPATIENT
Start: 2025-01-21 | End: 2025-01-24

## 2025-01-21 RX ORDER — FENTANYL CITRATE 50 UG/ML
50 INJECTION, SOLUTION INTRAMUSCULAR; INTRAVENOUS
Refills: 0 | Status: DISCONTINUED | OUTPATIENT
Start: 2025-01-21 | End: 2025-01-30

## 2025-01-21 RX ORDER — FENTANYL CITRATE 50 UG/ML
INJECTION, SOLUTION INTRAMUSCULAR; INTRAVENOUS
Status: COMPLETED
Start: 2025-01-21 | End: 2025-01-21

## 2025-01-21 RX ORDER — PROPOFOL 10 MG/ML
5-50 INJECTION, EMULSION INTRAVENOUS
Status: DISCONTINUED | OUTPATIENT
Start: 2025-01-21 | End: 2025-01-21

## 2025-01-21 RX ORDER — LACOSAMIDE 50 MG/1
150 TABLET ORAL DAILY
Status: DISCONTINUED | OUTPATIENT
Start: 2025-01-22 | End: 2025-01-21

## 2025-01-21 RX ORDER — LACOSAMIDE 10 MG/ML
150 SOLUTION ORAL DAILY
Status: DISCONTINUED | OUTPATIENT
Start: 2025-01-22 | End: 2025-01-31

## 2025-01-21 RX ORDER — OSELTAMIVIR PHOSPHATE 6 MG/ML
75 FOR SUSPENSION ORAL EVERY 12 HOURS SCHEDULED
Status: COMPLETED | OUTPATIENT
Start: 2025-01-21 | End: 2025-01-24

## 2025-01-21 RX ORDER — FUROSEMIDE 10 MG/ML
20 INJECTION INTRAMUSCULAR; INTRAVENOUS ONCE
Status: COMPLETED | OUTPATIENT
Start: 2025-01-21 | End: 2025-01-21

## 2025-01-21 RX ORDER — SUCCINYLCHOLINE/SOD CL,ISO/PF 100 MG/5ML
100 SYRINGE (ML) INTRAVENOUS ONCE
Status: COMPLETED | OUTPATIENT
Start: 2025-01-21 | End: 2025-01-21

## 2025-01-21 RX ORDER — SODIUM CHLORIDE, SODIUM GLUCONATE, SODIUM ACETATE, POTASSIUM CHLORIDE, MAGNESIUM CHLORIDE, SODIUM PHOSPHATE, DIBASIC, AND POTASSIUM PHOSPHATE .53; .5; .37; .037; .03; .012; .00082 G/100ML; G/100ML; G/100ML; G/100ML; G/100ML; G/100ML; G/100ML
1000 INJECTION, SOLUTION INTRAVENOUS ONCE
Status: COMPLETED | OUTPATIENT
Start: 2025-01-21 | End: 2025-01-21

## 2025-01-21 RX ORDER — PROPOFOL 10 MG/ML
5-50 INJECTION, EMULSION INTRAVENOUS
Status: DISCONTINUED | OUTPATIENT
Start: 2025-01-21 | End: 2025-01-23

## 2025-01-21 RX ORDER — HYDROCORTISONE SODIUM SUCCINATE 100 MG/2ML
100 INJECTION INTRAMUSCULAR; INTRAVENOUS EVERY 8 HOURS SCHEDULED
Status: DISCONTINUED | OUTPATIENT
Start: 2025-01-21 | End: 2025-01-26

## 2025-01-21 RX ORDER — OLANZAPINE 5 MG/1
10 TABLET ORAL
Status: DISCONTINUED | OUTPATIENT
Start: 2025-01-21 | End: 2025-01-31

## 2025-01-21 RX ORDER — FENTANYL CITRATE 50 UG/ML
50 INJECTION, SOLUTION INTRAMUSCULAR; INTRAVENOUS ONCE
Refills: 0 | Status: COMPLETED | OUTPATIENT
Start: 2025-01-21 | End: 2025-01-21

## 2025-01-21 RX ORDER — MIDAZOLAM HYDROCHLORIDE 2 MG/2ML
INJECTION, SOLUTION INTRAMUSCULAR; INTRAVENOUS
Status: DISCONTINUED
Start: 2025-01-21 | End: 2025-01-21 | Stop reason: WASHOUT

## 2025-01-21 RX ORDER — LACOSAMIDE 10 MG/ML
200 SOLUTION ORAL
Status: DISCONTINUED | OUTPATIENT
Start: 2025-01-21 | End: 2025-01-31

## 2025-01-21 RX ORDER — LACOSAMIDE 50 MG/1
200 TABLET ORAL
Status: DISCONTINUED | OUTPATIENT
Start: 2025-01-21 | End: 2025-01-21

## 2025-01-21 RX ORDER — ETOMIDATE 2 MG/ML
40 INJECTION INTRAVENOUS ONCE
Status: COMPLETED | OUTPATIENT
Start: 2025-01-21 | End: 2025-01-21

## 2025-01-21 RX ORDER — ALBUMIN (HUMAN) 12.5 G/50ML
25 SOLUTION INTRAVENOUS ONCE
Status: COMPLETED | OUTPATIENT
Start: 2025-01-21 | End: 2025-01-21

## 2025-01-21 RX ORDER — BENZTROPINE MESYLATE 1 MG/1
2 TABLET ORAL 2 TIMES DAILY
Status: DISCONTINUED | OUTPATIENT
Start: 2025-01-21 | End: 2025-01-31

## 2025-01-21 RX ORDER — CALCIUM GLUCONATE 20 MG/ML
2 INJECTION, SOLUTION INTRAVENOUS ONCE
Status: COMPLETED | OUTPATIENT
Start: 2025-01-21 | End: 2025-01-21

## 2025-01-21 RX ORDER — AZITHROMYCIN 250 MG/1
500 TABLET, FILM COATED ORAL EVERY 24 HOURS
Status: DISCONTINUED | OUTPATIENT
Start: 2025-01-21 | End: 2025-01-23

## 2025-01-21 RX ORDER — MIDAZOLAM HYDROCHLORIDE 2 MG/2ML
INJECTION, SOLUTION INTRAMUSCULAR; INTRAVENOUS
Status: DISCONTINUED
Start: 2025-01-21 | End: 2025-01-21

## 2025-01-21 RX ORDER — MIDAZOLAM HYDROCHLORIDE 2 MG/2ML
2 INJECTION, SOLUTION INTRAMUSCULAR; INTRAVENOUS ONCE
Status: COMPLETED | OUTPATIENT
Start: 2025-01-21 | End: 2025-01-21

## 2025-01-21 RX ADMIN — LACOSAMIDE ORAL SOLUTION 200 MG: 10 SOLUTION ORAL at 21:57

## 2025-01-21 RX ADMIN — MELATONIN 3 MG: 3 TAB ORAL at 21:58

## 2025-01-21 RX ADMIN — CALCIUM GLUCONATE 2 G: 20 INJECTION, SOLUTION INTRAVENOUS at 05:49

## 2025-01-21 RX ADMIN — Medication 100 MG: at 09:52

## 2025-01-21 RX ADMIN — FENTANYL CITRATE 50 MCG: 50 INJECTION INTRAMUSCULAR; INTRAVENOUS at 09:52

## 2025-01-21 RX ADMIN — NOREPINEPHRINE BITARTRATE 25 MCG/MIN: 1 INJECTION, SOLUTION, CONCENTRATE INTRAVENOUS at 15:00

## 2025-01-21 RX ADMIN — Medication 50 MCG/HR: at 14:29

## 2025-01-21 RX ADMIN — FENTANYL CITRATE 50 MCG: 50 INJECTION, SOLUTION INTRAMUSCULAR; INTRAVENOUS at 09:52

## 2025-01-21 RX ADMIN — LACOSAMIDE 150 MG: 10 INJECTION, SOLUTION INTRAVENOUS at 08:45

## 2025-01-21 RX ADMIN — LEVALBUTEROL HYDROCHLORIDE 1.25 MG: 1.25 SOLUTION RESPIRATORY (INHALATION) at 13:13

## 2025-01-21 RX ADMIN — VALPROIC ACID 750 MG: 500 SOLUTION ORAL at 10:21

## 2025-01-21 RX ADMIN — MIDAZOLAM 2 MG: 1 INJECTION INTRAMUSCULAR; INTRAVENOUS at 09:53

## 2025-01-21 RX ADMIN — HYDROCORTISONE SODIUM SUCCINATE 100 MG: 100 INJECTION, POWDER, FOR SOLUTION INTRAMUSCULAR; INTRAVENOUS at 22:01

## 2025-01-21 RX ADMIN — VASOPRESSIN 0.04 UNITS/MIN: 20 INJECTION INTRAVENOUS at 14:29

## 2025-01-21 RX ADMIN — CHLORHEXIDINE GLUCONATE 15 ML: 1.2 RINSE ORAL at 10:12

## 2025-01-21 RX ADMIN — MAGNESIUM SULFATE HEPTAHYDRATE 2 G: 40 INJECTION, SOLUTION INTRAVENOUS at 08:44

## 2025-01-21 RX ADMIN — PROPOFOL 15 MCG/KG/MIN: 10 INJECTION, EMULSION INTRAVENOUS at 09:53

## 2025-01-21 RX ADMIN — IPRATROPIUM BROMIDE 0.5 MG: 0.5 SOLUTION RESPIRATORY (INHALATION) at 07:59

## 2025-01-21 RX ADMIN — AZITHROMYCIN DIHYDRATE 500 MG: 250 TABLET ORAL at 21:57

## 2025-01-21 RX ADMIN — CHLORHEXIDINE GLUCONATE 15 ML: 1.2 RINSE ORAL at 21:57

## 2025-01-21 RX ADMIN — NOREPINEPHRINE BITARTRATE 5 MCG/MIN: 1 INJECTION, SOLUTION, CONCENTRATE INTRAVENOUS at 13:40

## 2025-01-21 RX ADMIN — OLANZAPINE 10 MG: 5 TABLET, FILM COATED ORAL at 21:57

## 2025-01-21 RX ADMIN — IPRATROPIUM BROMIDE 0.5 MG: 0.5 SOLUTION RESPIRATORY (INHALATION) at 19:58

## 2025-01-21 RX ADMIN — MIDAZOLAM HYDROCHLORIDE 2 MG: 2 INJECTION, SOLUTION INTRAMUSCULAR; INTRAVENOUS at 09:53

## 2025-01-21 RX ADMIN — FUROSEMIDE 20 MG: 10 INJECTION, SOLUTION INTRAVENOUS at 10:12

## 2025-01-21 RX ADMIN — NOREPINEPHRINE BITARTRATE 15 MCG/MIN: 1 INJECTION, SOLUTION, CONCENTRATE INTRAVENOUS at 18:11

## 2025-01-21 RX ADMIN — BENZTROPINE MESYLATE 2 MG: 1 TABLET ORAL at 17:28

## 2025-01-21 RX ADMIN — ETOMIDATE 40 MG: 2 INJECTION INTRAVENOUS at 09:52

## 2025-01-21 RX ADMIN — CEFTRIAXONE 2000 MG: 10 INJECTION, POWDER, FOR SOLUTION INTRAVENOUS at 21:58

## 2025-01-21 RX ADMIN — HYDROCORTISONE SODIUM SUCCINATE 100 MG: 100 INJECTION, POWDER, FOR SOLUTION INTRAMUSCULAR; INTRAVENOUS at 17:27

## 2025-01-21 RX ADMIN — PROPOFOL 40 MCG/KG/MIN: 10 INJECTION, EMULSION INTRAVENOUS at 12:45

## 2025-01-21 RX ADMIN — PROPOFOL 40 MCG/KG/MIN: 10 INJECTION, EMULSION INTRAVENOUS at 10:04

## 2025-01-21 RX ADMIN — CEFEPIME 2000 MG: 2 INJECTION, POWDER, FOR SOLUTION INTRAVENOUS at 09:10

## 2025-01-21 RX ADMIN — BENZTROPINE MESYLATE 2 MG: 1 INJECTION INTRAMUSCULAR; INTRAVENOUS at 09:12

## 2025-01-21 RX ADMIN — VALPROIC ACID 750 MG: 500 SOLUTION ORAL at 22:10

## 2025-01-21 RX ADMIN — OSELTAMIVIR PHOSPHATE 75 MG: 6 POWDER, FOR SUSPENSION ORAL at 21:58

## 2025-01-21 RX ADMIN — IPRATROPIUM BROMIDE 0.5 MG: 0.5 SOLUTION RESPIRATORY (INHALATION) at 13:13

## 2025-01-21 RX ADMIN — VASOPRESSIN 0.04 UNITS/MIN: 20 INJECTION INTRAVENOUS at 22:34

## 2025-01-21 RX ADMIN — ACETAMINOPHEN 650 MG: 650 SUSPENSION ORAL at 22:57

## 2025-01-21 RX ADMIN — SODIUM CHLORIDE, SODIUM GLUCONATE, SODIUM ACETATE, POTASSIUM CHLORIDE, MAGNESIUM CHLORIDE, SODIUM PHOSPHATE, DIBASIC, AND POTASSIUM PHOSPHATE 1000 ML: .53; .5; .37; .037; .03; .012; .00082 INJECTION, SOLUTION INTRAVENOUS at 02:14

## 2025-01-21 RX ADMIN — ALBUMIN (HUMAN) 25 G: 0.25 INJECTION, SOLUTION INTRAVENOUS at 08:45

## 2025-01-21 RX ADMIN — PHENYLEPHRINE HYDROCHLORIDE 50 MCG/MIN: 50 INJECTION INTRAVENOUS at 19:59

## 2025-01-21 RX ADMIN — LEVALBUTEROL HYDROCHLORIDE 1.25 MG: 1.25 SOLUTION RESPIRATORY (INHALATION) at 07:59

## 2025-01-21 RX ADMIN — LEVALBUTEROL HYDROCHLORIDE 1.25 MG: 1.25 SOLUTION RESPIRATORY (INHALATION) at 19:58

## 2025-01-21 RX ADMIN — OSELTAMIVIR PHOSPHATE 75 MG: 6 POWDER, FOR SUSPENSION ORAL at 12:05

## 2025-01-21 RX ADMIN — Medication 20 MG: at 10:21

## 2025-01-21 RX ADMIN — NOREPINEPHRINE BITARTRATE 8 MCG/MIN: 1 INJECTION, SOLUTION, CONCENTRATE INTRAVENOUS at 22:50

## 2025-01-21 RX ADMIN — ENOXAPARIN SODIUM 40 MG: 40 INJECTION SUBCUTANEOUS at 08:45

## 2025-01-21 RX ADMIN — FENTANYL CITRATE 50 MCG: 50 INJECTION INTRAMUSCULAR; INTRAVENOUS at 14:50

## 2025-01-21 RX ADMIN — ACETAMINOPHEN 1000 MG: 10 INJECTION INTRAVENOUS at 05:05

## 2025-01-21 RX ADMIN — CYANOCOBALAMIN 1000 MCG: 1000 INJECTION INTRAMUSCULAR; SUBCUTANEOUS at 17:27

## 2025-01-21 NOTE — CONSULTS
Consultation - Critical Care/ICU   Name: Esperanza Prajapati 54 y.o. female I MRN: [id number]  Unit/Bed#: ICU 15 I Date of Admission: 1/19/2025   Date of Service: 1/20/2025 I Hospital Day: 0   Consults  Physician Requesting Evaluation: Pa Lloyd DO   Reason for Evaluation / Principal Problem: Acute respiratory failure with hypoxia      Assessment & Plan  Acute respiratory failure with hypoxia (HCC)  Due to Flu A, viral sepsis - consider aspiration  CT C/A/P negative on admission   Procal negative on admission  Initially on NC - progressed to midflow during the day   CXR showed b/l opacities - aspiration cannot be excluded at this time   Continue adebayo. Nebs   Continue cefepime, azithromycin (started 1/20)  Continue tamiflu once able to take PO   Wean MFNC as tolerated   Check procal tmrw   Sputum cx if able    Focal epilepsy (HCC)  Follow[facility] with [facility]y   Focal epilepsy manifests as simple partial, complex partial, and generalized tonic-clonic seizures   Hx of b/l postural and action tremor per neurology notes  Home regimen: vimpat 150 daily, vimpat 200mg qHS, Depakote 750mg BID  Valproic level therapeutic 1/20  Low suspicion for any breakthrough seizures, not post-ictal on exam and is interactive  Vimpat transitioned to IV, depakote on hold due to NPO  Viral sepsis (HCC)  Due to Flu A + on 1/19   SIRS on admission with tachycardia, tachypnea, leukopenia, fever   Lactic negative, no hypotension   Abx with cefepime and azithromycin  Bcx pending, U/a negative   Cognitive developmental delay  Due to head injury at age of 10 months   Postoperative hypothyroidism  Stage I papillary thyroid cancer s/p total thyroidectomy and ablation in 2013  Post-operative hypothyroidism   Hold home levothyroxine due to NPO  Schizoaffective disorder (HCC)  Home regimen: zyprexa 10mg daily, trazodone 50mg PRN, invega 6mg daily   Zyprexa transitioned to IM due to NPO, trazodone and invega on hold   Tachycardia  In the  setting of agitation, hypoxia, viral sepsis, fever  Sinus tach with occasional PVCs to 150s   CT C/A/P negative on admission   Trial nighttime zyprexa   Consider CTA if persistent    EKG as needed  Toxic metabolic encephalopathy  Due to viral sepsis, hypoxia, and infection  Hx of developmental delay although at baseline she is A&O x 3  CTH on admission negative   Do not suspect breakthrough seizures  Supportive care at this time  Fall at home, initial encounter  Presented with fall, + head strike   CTH negative   Bed alarm/fall precautions  Hyponatremia  Likely in the setting of poor PO intake   Na on admission 132  Continue to trend BMP   Influenza A  Positive on 1/19  Complicated by viral sepsis, see plan below  Tamiflu started 1/19 but held 1/20 due to not taking PO     Disposition: Stepdown Level 1    History of Present Illness   Esperanza Prajapati is a 54 y.o. F hx of developmental delay, focal epilepsy, papillary carcinoma s/p total thyroidectomy and ablation, hypothyroidism, schizoaffective disorder who presented 1/19 with AMS and was initially admitted to [facility] for viral sepsis in the setting of Flu A, NOW being upgraded to SD1 under critical care for increasing oxygen requirements on MFNC.     On 1/20, acute hypoxic respiratory failure progressed, increasing to max MFNC. CXR showed b/l opacities, although CT C/A/P on admission was negative. There may be a component of aspiration with blossoming multifocal PNA. Continuing abx with cefepime and azithromycin. Tamiflu on hold currently due to NPO in the setting of her encephalopathy. She also has sinus tachycardia to 150s likey due to sepsis, fever, hypoxia, and agitation.     History obtained from chart review and unobtainable from patient due to mental status.  Review of Systems: Review of Systems not obtainable due to Altered mental status    Historical Information   Past Medical History:  No date: Cancer (HCC)      Comment:  thyroid cancer  No date:  Disease of thyroid gland  No date: Hallucination  No date: Hyperactivity (behavior)      Comment:  Last Assessed: 11/7/2014   No date: Hyperlipidemia  No date: Obesity  No date: Psychosis (HCC)  No date: Seizures (HCC) Past Surgical History:  No date: OTHER SURGICAL HISTORY      Comment:  Removal of urinary calculus   No date: OTHER SURGICAL HISTORY      Comment:  Rhinologic Surgery   8/3/2023: GA OPEN TX PHALANGEAL SHAFT FRACTURE PROX/MIDDLE EA; Left      Comment:  Procedure: CLOSED REDUCTION PERCUTANEOUS PINNING - LEFT                RING FINGER;  Surgeon: Fransisco Escalante MD;  Location:                [facility] MAIN OR;  Service: Orthopedics  No date: TOTAL THYROIDECTOMY      Comment:  LAst Assessed: 11/7/2014   Current Outpatient Medications   Medication Instructions    benztropine (COGENTIN) 2 mg, Oral, 2 times daily    divalproex sodium (DEPAKOTE) 750 mg, Oral, Every 12 hours scheduled    lacosamide (VIMPAT) 150 mg, Oral, Daily    lacosamide (VIMPAT) 200 mg, Oral, Daily at bedtime    levothyroxine 175 mcg tablet TAKE 1 TABLET BY MOUTH MONDAY THRU SATURDAY    OLANZapine (ZYPREXA) 10 mg, Oral, Daily at bedtime    paliperidone (INVEGA) 6 mg, Oral, Every morning    traZODone (DESYREL) 50 mg, Oral, Daily at bedtime    No Known Allergies   Social History     Tobacco Use    Smoking status: Never     Passive exposure: Never    Smokeless tobacco: Never   Vaping Use    Vaping status: Never Used   Substance Use Topics    Alcohol use: Never    Drug use: Never    Family History   Problem Relation Age of Onset    Breast cancer Mother 59    Hypertension Mother     ALS Mother     Stroke Mother     Prostate cancer Father     Diabetes type II Father           Objective :                   Vitals I/O      Most Recent Min/Max in 24hrs   Temp 100.5 °F (38.1 °C) Temp  Min: 98.5 °F (36.9 °C)  Max: 103.3 °F (39.6 °C)   Pulse (!) 138 Pulse  Min: 106  Max: 156   Resp (!) 26 Resp  Min: 11  Max: 30   /67 BP  Min: 85/60  Max: 122/69    O2 Sat 95 % SpO2  Min: 80 %  Max: 100 %      Intake/Output Summary (Last 24 hours) at 1/20/2025 2230  Last data filed at 1/20/2025 1627  Gross per 24 hour   Intake 4250 ml   Output 250 ml   Net 4000 ml       Diet NPO; Sips with meds    Invasive Monitoring           Physical Exam   Physical Exam  Eyes:      Pupils: Pupils are equal, round, and reactive to light.   Skin:     General: Skin is warm and dry.      Capillary Refill: Capillary refill takes less than 2 seconds.   HENT:      Head: Normocephalic and atraumatic.   Cardiovascular:      Rate and Rhythm: Regular rhythm. Tachycardia present.      Pulses: Normal pulses.      Heart sounds: Normal heart sounds.   Musculoskeletal:         General: Normal range of motion.      Right lower leg: Trace Edema present.      Left lower leg: Trace Edema present.   Abdominal: General: There is no distension.      Palpations: Abdomen is soft.      Tenderness: There is no abdominal tenderness.   Constitutional:       Appearance: She is well-developed and well-nourished.   Pulmonary:      Effort: Tachypnea present.      Breath sounds: Normal breath sounds.   Secretions are abnormal .Neurological:      Mental Status: She is agitated, disoriented to person, disoriented to place, disoriented to time and disoriented to situation.      Comments: Intermittently responds to basic questions, withdraws to pain, does not follow commands. Tremors in UE.           Diagnostic Studies        Lab Results: I have reviewed the following results:     Medications:  Scheduled PRN   cefepime, 2,000 mg, Q12H   And  azithromycin, 500 mg, Q24H  benztropine, 2 mg, BID  [Held by provider] divalproex sodium, 750 mg, Q12H MOSHE  enoxaparin, 40 mg, Daily  ipratropium, 0.5 mg, TID  [START ON 1/21/2025] lacosamide, 150 mg, Daily  lacosamide, 200 mg, HS  levalbuterol, 1.25 mg, TID  [Held by provider] levothyroxine, 175 mcg, Early Morning  OLANZapine, 10 mg, HS  [Held by provider] oseltamivir, 75 mg, Q12H  MOSHE  [Held by provider] paliperidone, 6 mg, QAM      acetaminophen, 1,000 mg, Q6H PRN  [Held by provider] aluminum-magnesium hydroxide-simethicone, 30 mL, Q6H PRN  [Held by provider] dextromethorphan-guaiFENesin, 10 mL, Q4H PRN  ondansetron, 4 mg, Q6H PRN  [Held by provider] simethicone, 80 mg, 4x Daily PRN  [Held by provider] traZODone, 50 mg, HS PRN       Continuous          Labs:   CBC    Recent Labs     01/19/25 2208 01/20/25  0514   WBC 5.57 3.94*   HGB 13.5 11.5   HCT 40.2 35.8    159     BMP    Recent Labs     01/19/25 2208 01/20/25  0514   SODIUM 131* 132*   K 4.0 3.8   CL 95* 100   CO2 27 28   AGAP 9 4   BUN 14 15   CREATININE 0.99 1.03   CALCIUM 9.0 7.7*       Coags    Recent Labs     01/19/25 2208   INR 1.09   PTT 26        Additional Electrolytes  Recent Labs     01/20/25  0514   MG 1.9          Blood Gas    No recent results  Recent Labs     01/19/25 2208   PHVEN 7.425*   [id number] 43.1   PO2VEN 44.8   [id number] 27.6   BEVEN 2.8   [id number] 78.6    LFTs  Recent Labs     01/19/25 2208   ALT 9   AST 13   ALKPHOS 42   ALB 3.6   TBILI 0.28       Infectious  Recent Labs     01/19/25 2208   PROCALCITONI 0.08     Glucose  Recent Labs     01/19/25 2208 01/20/25  0514   GLUC 79 97           70

## 2025-01-21 NOTE — UTILIZATION REVIEW
Initial Clinical Review    Start date 1/19 in ED    Admission: Date/Time/Statement:   Admission Orders (From admission, onward)       Ordered        01/20/25 0218  INPATIENT ADMISSION  Once                          Orders Placed This Encounter   Procedures    INPATIENT ADMISSION     Standing Status:   Standing     Number of Occurrences:   1     Level of Care:   Med Surg [16]     Estimated length of stay:   More than 2 Midnights     Certification:   I certify that inpatient services are medically necessary for this patient for a duration of greater than two midnights. See H&P and MD Progress Notes for additional information about the patient's course of treatment.     ED Arrival Information       Expected   -    Arrival   1/19/2025 21:50    Acuity   Emergent              Means of arrival   Ambulance    Escorted by   [facility] ([facility])    Service   Critical Care/ICU    Admission type   Emergency              Arrival complaint   fever             Chief Complaint   Patient presents with    Medical Problem     Per family at bedside, pt has been more confused than normal for the past 2-3 days. Pt fell backwards on the steps and hit head on wall. Pt able to get self up from ground. Per family, they were trying to to bring pt to ED this morning for altered mental status, but she was refusing. Pt alert to self, disoriented to place, time, and situation.        Initial Presentation: 54 y.o. female with Pmhx of developmental delay, focal epilepsy, papillary carcinoma s/p total thyroidectomy and ablation, hypothyroidism, schizoaffective d/o presenting to ED by ems on 1/19 from home with change in mental status. Per pt family pt has been ill for ~ 2 wks with some coughing and more confused for past 2 days. Today while ambulating up the stairs she had a witnessed fall and struck her head. Since the fall she has been acting more confused.    In ED pt tachycardic, O2 sat 88% on RA requiring 2 then 3L nc.  WBC 3.94,  Positive Influenza A. CT head, c-spine, c/a/p neg for acute findings. IVFs and Tamiflu given in ED.    Admitted Inpatient to MS unit with Viral Sepsis 2/2 Influenza, Acute Respiratory Failure/Hypoxia  -- IVFs, Tamiflu. Supportive care. O2 as needed, currently on 3L, wean off as able. IV metoprolol x1. Telemetry. Diazepam x1 for shakiness, continue to monitor. Neuro checks Q4h. Continue benztropine olanzapine and paliperidone. Continue depakote and lacosamide. BMP in AM.      Anticipated Length of Stay/Certification Statement: Patient will be admitted on an inpatient basis with an anticipated length of stay of greater than 2 midnights secondary to sepsis, flu, resp failure, metabolic encephalopathy.     Post admit / PM note - upgraded to SD1 under critical care for increasing oxygen requirements on MFNC.   Critical care note -- acute hypoxic respiratory failure progressed, increasing to max MFNC. CXR showed b/l opacities, although CT C/A/P on admission was negative. There may be a component of aspiration with blossoming multifocal PNA. Continuing abx with cefepime and azithromycin. Tamiflu on hold currently d/t NPO in the setting of her encephalopathy. She also has sinus tachycardia to 150s likey d/t sepsis, fever, hypoxia, and agitation.       Date: 1/21  Day 3: Has surpassed a 2nd midnight with active treatments and services.  Progressive acute hypoxic and hypercapnic respiratory failure 2/2 below, required emergent intubation, concerning for developing ARDS  Acute metabolic encephalopathy  Sepsis 2/2 influenza A pneumonia with anticipated superimposed bacterial pneumonia    Maintained on midflow overnight 15L saturating in mid 90s. Continues with upper aiway secretions. Remains febrile to 102. Borderline hypotension overnight responsive to 1L bolus. Remains in sinus tachycardia, persisted even with sleeping. + rhonchi, tr b/l LE edema. Withdraws to pain, does not follow commands, intermittently interactive, tracks  across the room. Required emergent intubation, concerning for developing ARDS.    Plan: Continue vent - VAC: 400/18/12/100% and start weaning FiO2 as tolerated to maintain Pox > 90%. Check ABG in 30 minutes. PRN bronchodilators. Consider corticosteroids. Bronchoscopy today. F/u CXR tomorrow. Sedation with propofol and fentanyl. Continue anti-seizure meds, levothyroxine. Telemetry, IV lasix, monitor I/Os, wts. OG tube. Harris cath, monitor I/Os. Continue azithromycin, switch to ceftriaxone, d/c cefepime. Check cxs, MRSA swab. Accu-Cheks w/ ssi. SCDs, Hep sq.      ED Treatment-Medication Administration from 01/19/2025 2150 to 01/20/2025 1540         Date/Time Order Dose Route Action     01/19/2025 2306 ceftriaxone (ROCEPHIN) 2 g/50 mL in dextrose IVPB 2,000 mg Intravenous New Bag     01/19/2025 2300 sodium chloride 0.9 % bolus 1,000 mL 1,000 mL Intravenous New Bag     01/19/2025 2303 acetaminophen (Ofirmev) injection 1,000 mg 1,000 mg Intravenous New Bag     01/20/2025 0037 oseltamivir (TAMIFLU) capsule 75 mg 75 mg Oral Given     01/20/2025 0032 ketorolac (TORADOL) injection 15 mg 15 mg Intravenous Given     01/19/2025 2352 midazolam (VERSED) injection 1 mg 1 mg Intravenous Given     01/20/2025 0036 sodium chloride 0.9 % bolus 1,000 mL 1,000 mL Intravenous New Bag     01/20/2025 0834 benztropine (COGENTIN) tablet 2 mg 2 mg Oral Given     01/20/2025 0835 divalproex sodium (DEPAKOTE) DR tablet 750 mg 750 mg Oral Given     01/20/2025 0834 lacosamide (VIMPAT) tablet 150 mg 150 mg Oral Given     01/20/2025 0711 levothyroxine tablet 175 mcg 175 mcg Oral Given     01/20/2025 0840 paliperidone (INVEGA) 24 hr tablet 6 mg 6 mg Oral Given     01/20/2025 0835 dextromethorphan-guaiFENesin (ROBITUSSIN DM) oral syrup 10 mL 10 mL Oral Given     01/20/2025 1505 dextromethorphan-guaiFENesin (ROBITUSSIN DM) oral syrup 10 mL 10 mL Oral Given     01/20/2025 0356 sodium chloride 0.9 % infusion 75 mL/hr Intravenous New Bag     01/20/2025  0837 enoxaparin (LOVENOX) subcutaneous injection 40 mg 40 mg Subcutaneous Given     01/20/2025 0834 oseltamivir (TAMIFLU) capsule 75 mg 75 mg Oral Given     01/20/2025 0835 albuterol (PROVENTIL HFA,VENTOLIN HFA) inhaler 2 puff 2 puff Inhalation Given     01/20/2025 1140 albuterol (PROVENTIL HFA,VENTOLIN HFA) inhaler 2 puff 2 puff Inhalation Given     01/20/2025 1002 metoprolol (LOPRESSOR) injection 2.5 mg 2.5 mg Intravenous Given     01/20/2025 1011 diazepam (VALIUM) injection 5 mg 5 mg Intravenous Given     01/20/2025 1156 levalbuterol (XOPENEX) inhalation solution 1.25 mg 1.25 mg Nebulization Given     01/20/2025 1525 ipratropium (ATROVENT) 0.02 % inhalation solution 0.5 mg 0.5 mg Nebulization Given         Scheduled Medications:  azithromycin, 500 mg, Oral, Q24H  benztropine, 2 mg, Oral, BID  cefTRIAXone, 2,000 mg, Intravenous, Q24H  chlorhexidine, 15 mL, Mouth/Throat, Q12H MOSHE  enoxaparin, 40 mg, Subcutaneous, Daily  ipratropium, 0.5 mg, Nebulization, TID  [START ON 1/22/2025] lacosamide, 150 mg, Oral, Daily  lacosamide, 200 mg, Oral, HS  levalbuterol, 1.25 mg, Nebulization, TID  levothyroxine, 175 mcg, Oral, Early Morning  magnesium sulfate, 2 g, Intravenous, Once  melatonin, 3 mg, Oral, HS  norepinephrine 4 mg in 0.9% sodium chloride 250 mL  OLANZapine, 10 mg, Oral, HS  omeprazole (PRILOSEC) suspension 2 mg/mL, 20 mg, Oral, Daily  oseltamivir, 75 mg, Oral, Q12H MOSHE  [Held by provider] paliperidone, 6 mg, Oral, QAM  valproic acid, 750 mg, Oral, Q12H MOSHE    Continuous IV Infusions:  propofol, 5-50 mcg/kg/min, Intravenous, Titrated      PRN Meds:  Acetaminophen, 650 mg, Oral, Q4H PRN  aluminum-magnesium hydroxide-simethicone, 30 mL, Oral, Q6H PRN  fentaNYL, 50 mcg, Intravenous, Q1H PRN  norepinephrine 4 mg in 0.9% sodium chloride 250 mL  ondansetron, 4 mg, Intravenous, Q6H PRN      ED Triage Vitals   Temperature Pulse Respirations Blood Pressure SpO2 Pain Score   01/19/25 2155 01/19/25 2155 01/19/25 2155 01/19/25  2155 01/19/25 2155 01/20/25 0032   98.1 °F (36.7 °C) (!) 143 20 128/74 90 % Med Not Given for Pain - for MAR use only     Weight (last 2 days)       Date/Time Weight    01/21/25 0421 115 (253.75)            Vital Signs (last 3 days)       Date/Time Temp Pulse Resp BP MAP (mmHg) SpO2 Calculated FIO2 (%) - Nasal Cannula O2 Flow Rate (L/min) Nasal Cannula O2 Flow Rate (L/min) O2 Device Patient Position - Orthostatic VS Luisana Coma Scale Score Pain    01/21/25 1000 -- 154 21 109/60 72 94 % -- -- -- -- -- -- --    01/21/25 0953 -- 154 23 123/62 76 93 % -- -- -- -- -- -- --    01/21/25 0952 -- -- -- -- -- -- -- -- -- -- -- -- Med Not Given for Pain - for MAR use only    01/21/25 0820 101.7 °F (38.7 °C) -- -- -- -- -- -- -- -- -- -- -- --    01/21/25 0759 -- -- -- -- -- 93 % 68 -- 12 L/min Mid flow nasal cannula -- -- --    01/21/25 0642 -- 136 21 105/68 82 92 % -- -- -- -- -- -- --    01/21/25 0600 101.1 °F (38.4 °C) 140 23 -- -- 91 % -- -- -- -- -- -- --    01/21/25 0459 -- -- -- -- -- -- -- -- -- -- -- 10 --    01/21/25 0421 -- 152 22 -- -- -- -- -- -- -- -- -- --    01/21/25 0400 102.1 °F (38.9 °C) 150 27 104/71 94 96 % 80 -- 15 L/min Mid flow nasal cannula Lying -- --    01/21/25 0225 100.5 °F (38.1 °C) 132 19 -- -- 98 % -- -- -- -- -- -- --    01/21/25 0100 -- 136 20 90/56 66 95 % -- -- -- -- -- -- --    01/21/25 0059 -- -- -- -- -- -- -- -- -- -- -- 10 --    01/21/25 0000 102 °F (38.9 °C) 136 18 83/51 61 100 % -- 15 L/min -- Mid flow nasal cannula Lying -- --    01/20/25 2322 -- 142 19 95/52 70 100 % -- -- -- -- -- -- --    01/20/25 2300 -- 152 32 103/56 70 98 % -- -- -- -- -- -- --    01/20/25 2200 -- 150 18 115/64 89 98 % -- -- -- -- -- -- --    01/20/25 2100 -- 148 70 100/66 87 97 % -- -- -- -- -- -- --    01/20/25 2059 -- -- -- -- -- -- -- -- -- -- -- 10 --    01/20/25 2057 -- -- -- -- -- 95 % -- -- -- -- -- -- --    01/20/25 2000 102.1 °F (38.9 °C) 142 17 108/60 76 98 % 80 -- 15 L/min Mid flow nasal cannula  Lying -- --    01/20/25 1900 100.5 °F (38.1 °C) 136 19 110/71 100 92 % -- -- -- -- -- -- --    01/20/25 1802 -- 138 -- -- -- 94 % 64 -- 11 L/min Mid flow nasal cannula -- -- --    01/20/25 1755 -- 137 -- 102/67 79 99 % -- -- -- -- -- -- --    01/20/25 1736 -- 135 -- -- -- -- -- -- -- -- -- -- --    01/20/25 1735 101.2 °F (38.4 °C) 136 -- -- -- 93 % 64 -- 11 L/min Mid flow nasal cannula -- -- --    01/20/25 1732 -- -- -- -- -- 95 % 64 -- 11 L/min Mid flow nasal cannula -- -- --    01/20/25 1730 -- 140 -- -- -- 92 % -- -- -- -- -- -- --    01/20/25 1725 -- 137 -- 110/65 80 96 % -- -- -- -- -- -- --    01/20/25 1715 -- 137 -- 108/68 81 86 % -- -- -- -- -- -- --    01/20/25 1714 -- 138 -- -- -- 89 % 64 -- 11 L/min Mid flow nasal cannula -- -- --    01/20/25 1708 -- 142 -- 101/68 79 91 % 52 -- 8 L/min Nasal cannula -- -- --    01/20/25 1707 -- 146 -- -- -- 80 % -- -- -- -- -- -- --    01/20/25 1700 -- 140 -- 105/66 79 92 % 36 -- 4 L/min Nasal cannula -- -- --    01/20/25 16:48:16 101.7 °F (38.7 °C) 139 -- 110/62 78 95 % -- -- -- -- -- -- --    01/20/25 1635 -- 140 -- 111/69 83 96 % 36 -- 4 L/min Nasal cannula -- -- --    01/20/25 16:25:02 -- 144 -- 115/68 84 94 % -- -- -- -- -- -- --    01/20/25 1621 103.3 °F (39.6 °C) 145 -- 122/69 87 95 % 36 -- 4 L/min Nasal cannula -- -- --    01/20/25 16:20:33 -- 146 -- 122/69 87 95 % -- -- -- -- -- -- --    01/20/25 1613 -- 152 26 118/66 83 -- -- -- -- -- Sitting -- --    01/20/25 16:09:09 -- 154 -- 118/66 83 95 % -- -- -- -- -- -- --    01/20/25 16:02:22 -- 156 -- 105/70 82 92 % -- -- -- -- -- -- --    01/20/25 15:56:38 102.5 °F (39.2 °C) 153 30 117/65 82 93 % 36 -- 4 L/min Nasal cannula -- -- --    01/20/25 1555 -- 153 -- -- -- 91 % -- -- -- -- -- -- --    01/20/25 15:46:37 102.3 °F (39.1 °C) 154 30 117/72 87 89 % 36 -- 4 L/min Nasal cannula Lying -- --    01/20/25 1516 103.1 °F (39.5 °C) -- -- -- -- -- -- -- -- -- -- -- --    01/20/25 1500 -- 128 21 100/54 -- 93 % 36 -- 4 L/min  Nasal cannula Lying 14 --    01/20/25 1400 -- 124 22 101/63 75 92 % 36 -- 4 L/min Nasal cannula Lying -- --    01/20/25 1330 -- -- -- -- -- -- -- -- -- -- -- 14 --    01/20/25 1300 -- 130 22 103/58 75 92 % 36 -- 4 L/min Nasal cannula Sitting -- --    01/20/25 1230 -- 138 22 103/57 75 93 % 36 -- 4 L/min Nasal cannula Sitting -- --    01/20/25 1200 -- 144 24 115/64 83 92 % 36 -- 4 L/min Nasal cannula Sitting -- --    01/20/25 1130 -- 144 24 112/59 81 90 % 36 -- 4 L/min Nasal cannula Sitting -- --    01/20/25 1100 -- 140 21 120/56 79 96 % 36 -- 4 L/min Nasal cannula Lying -- --    01/20/25 1030 -- 135 19 122/63 86 97 % 36 -- 4 L/min Nasal cannula Lying -- --    01/20/25 1015 -- 130 20 122/85 98 97 % 36 -- 4 L/min Nasal cannula Lying -- --    01/20/25 1000 -- 121 18 122/85 -- 100 % 36 -- 4 L/min Nasal cannula Lying -- --    01/20/25 0900 98.6 °F (37 °C) 112 17 102/56 73 100 % 36 -- 4 L/min Nasal cannula Lying -- No Pain    01/20/25 0830 -- -- -- -- -- -- -- -- -- -- -- -- No Pain    01/20/25 0800 -- 112 16 104/63 78 93 % 36 -- 4 L/min Nasal cannula Lying -- --    01/20/25 0716 -- -- -- -- -- -- -- -- -- -- -- 14 --    01/20/25 0715 -- 116 17 111/61 73 93 % 36 -- 4 L/min Nasal cannula Lying 14 --    01/20/25 0615 -- 110 12 109/81 89 95 % 36 -- 4 L/min Nasal cannula -- -- --    01/20/25 0600 -- 110 12 89/55 65 95 % 36 -- 4 L/min Nasal cannula Lying -- --    01/20/25 0545 -- 109 11 85/60 67 95 % 36 -- 4 L/min Nasal cannula Lying -- --    01/20/25 0530 -- 106 12 107/63 78 94 % 36 -- 4 L/min Nasal cannula -- -- --    01/20/25 0515 -- 106 12 114/64 82 93 % 36 -- 4 L/min Nasal cannula -- -- --    01/20/25 0445 -- 113 12 106/58 76 96 % 36 -- 4 L/min Nasal cannula Lying -- --    01/20/25 0430 -- 109 13 116/65 84 97 % -- -- -- Nasal cannula -- -- --    01/20/25 0400 -- 113 14 98/56 73 90 % 36 -- 4 L/min Nasal cannula -- -- --    01/20/25 0345 -- 114 13 108/58 76 96 % -- -- -- Nasal cannula -- -- --    01/20/25 0330 -- 112 13  109/66 83 93 % 32 -- 3 L/min Nasal cannula Lying -- --    01/20/25 0317 98.5 °F (36.9 °C) -- -- -- -- -- -- -- -- -- -- -- --    01/20/25 0315 -- 115 13 109/59 80 94 % 32 -- 3 L/min Nasal cannula Lying 14 No Pain    01/20/25 0230 -- 116 18 100/59 74 96 % 32 -- 3 L/min Nasal cannula Lying -- --    01/20/25 0215 -- 120 14 102/63 75 95 % 32 -- 3 L/min Nasal cannula -- -- --    01/20/25 0200 -- 118 13 101/66 79 96 % -- -- -- -- -- -- --    01/20/25 0045 -- 122 21 117/68 88 95 % -- -- -- Nasal cannula Lying -- --    01/20/25 0032 -- 123 16 115/69 82 94 % -- -- -- Nasal cannula -- -- Med Not Given for Pain - for MAR use only    01/19/25 2310 -- 133 19 121/74 94 96 % 28 -- 2 L/min Nasal cannula Lying -- --    01/19/25 2301 102.2 °F (39 °C) -- -- -- -- -- -- -- -- -- -- -- --    01/19/25 2202 -- -- -- -- -- 97 % 28 -- 2 L/min Nasal cannula -- -- --    01/19/25 2200 -- -- -- -- -- 88 %  -- -- -- None (Room air) -- -- --    01/19/25 2159 -- -- -- -- -- -- -- -- -- -- -- 13 --    01/19/25 2155 98.1 °F (36.7 °C) 143 20 128/74 -- 90 % -- -- -- None (Room air) Lying -- --              Pertinent Labs/Diagnostic Test Results:   Radiology:  XR chest portable   Final Interpretation by Kaycee Manriquez MD (01/21 0559)      Low lung volumes with bibasilar opacity with loss of the medial left diaphragm which could be due to atelectasis. Pneumonia not excluded in the appropriate clinical setting.            Workstation performed: [id number]         CT head without contrast   Final Interpretation by Jose Kingston MD (01/20 0117)      No acute intracranial abnormality.                  Workstation performed: [id number]         CT chest abdomen pelvis w contrast   Final Interpretation by Jose Kingston MD (01/20 0205)      No acute findings in the chest, abdomen or pelvis, noting images degraded by motion artifact.               Workstation performed: [id number]         CT cervical spine without contrast   Final Interpretation by Jose  MD Thee (01/20 0138)      1.  No cervical spine fracture or traumatic malalignment, noting images are degraded by motion artifact.   2.  Mildly prominent bilateral level 2 lymph nodes, nonspecific.                  Workstation performed: [id number]         XR chest portable ICU    (Results Pending)     Cardiology:  ECG 12 lead   Final Result by Major Rubin DO (01/20 1223)   Sinus tachycardia with significant  Baseline artifact      Abnormal ECG      Confirmed by Major Rubin ([id number]) on 1/20/2025 12:23:46 PM      ECG 12 lead   Final Result by Major Rubin DO (01/20 1221)   Likely  Sinus tachycardia Baseline artifact   Abnormal ECG      Confirmed by Major Rubin ([id number]) on 1/20/2025 12:21:39 PM      ECG 12 lead   Final Result by Tavon Bolton MD (01/20 0033)   Sinus tachycardia   Otherwise normal ECG   When compared with ECG of 19-Jan-2025 21:57, (unconfirmed)   No significant change was found   Confirmed by Tavon Bolton ([id number]) on 1/20/2025 12:33:43 AM      ECG 12 lead   Final Result by Tavon Bolton MD (01/20 0037)   Sinus tachycardia   Otherwise normal ECG   When compared with ECG of 30-May-2024 10:26,   Vent. rate has increased by  53 bpm   Non-specific change in ST segment in Inferior leads   Confirmed by Tavon Bolton ([id number]) on 1/20/2025 12:37:51 AM          Results from last 7 days   Lab Units 01/21/25  0422 01/20/25 0514 01/19/25 2208   WBC Thousand/uL 3.25* 3.94* 5.57   HEMOGLOBIN g/dL 11.3* 11.5 13.5   HEMATOCRIT % 35.4 35.8 40.2   PLATELETS Thousands/uL 132* 159 193   TOTAL NEUT ABS Thousands/µL  --  1.81* 2.97     Results from last 7 days   Lab Units 01/21/25  0555 01/21/25  0422 01/20/25  0514 01/19/25  2208   SODIUM mmol/L 135  --  132* 131*   POTASSIUM mmol/L 4.0  --  3.8 4.0   CHLORIDE mmol/L 105  --  100 95*   CO2 mmol/L 26  --  28 27   ANION GAP mmol/L 4  --  4 9   BUN mg/dL 16  --  15 14   CREATININE mg/dL 0.92  --  1.03 0.99   EGFR ml/min/1.73sq m 71  --  62 65    CALCIUM mg/dL 7.6*  --  7.7* 9.0   CALCIUM, IONIZED mmol/L  --  1.00*  --   --    MAGNESIUM mg/dL 1.8*  --  1.9  --    PHOSPHORUS mg/dL 2.7  --   --   --      Results from last 7 days   Lab Units 01/21/25  0555 01/19/25  2208   AST U/L 16 13   ALT U/L 8 9   ALK PHOS U/L 29* 42   TOTAL PROTEIN g/dL 6.5 8.3   ALBUMIN g/dL 2.8* 3.6   TOTAL BILIRUBIN mg/dL 0.27 0.28     Results from last 7 days   Lab Units 01/19/25  2158   POC GLUCOSE mg/dl 93     Results from last 7 days   Lab Units 01/21/25  0555 01/20/25  0514 01/19/25  2208   GLUCOSE RANDOM mg/dL 102 97 79     Results from last 7 days   Lab Units 01/21/25  0903   NON VENT TYPE HFNC  HFNC Flow   HFNC FLOW LPM  15     Results from last 7 days   Lab Units 01/21/25  0903 01/19/25  2208   PH JUNE  7.280* 7.425*   PCO2 JUNE mm Hg 51.4* 43.1   PO2 JUNE mm Hg 150.0* 44.8   HCO3 JUNE mmol/L 23.6* 27.6   BASE EXC JUNE mmol/L -3.5 2.8   O2 CONTENT JUNE ml/dL 17.6 16.0   O2 HGB, VENOUS % 97.3* 78.6     Results from last 7 days   Lab Units 01/20/25  0031 01/19/25  2208   HS TNI 0HR ng/L  --  4   HS TNI 2HR ng/L 4  --    HSTNI D2 ng/L 0  --      Results from last 7 days   Lab Units 01/19/25  2208   PROTIME seconds 14.3   INR  1.09   PTT seconds 26     Results from last 7 days   Lab Units 01/19/25  2237   TSH 3RD GENERATON uIU/mL 3.323     Results from last 7 days   Lab Units 01/21/25  0422 01/19/25  2208   PROCALCITONIN ng/ml 1.97* 0.08     Results from last 7 days   Lab Units 01/19/25  2208   LACTIC ACID mmol/L 1.7     Results from last 7 days   Lab Units 01/19/25  2303   CLARITY UA  Clear   COLOR UA  Yellow   SPEC GRAV UA  1.024   PH UA  6.0   GLUCOSE UA mg/dl Negative   KETONES UA mg/dl 10 (1+)*   BLOOD UA  Small*   PROTEIN UA mg/dl 50 (1+)*   NITRITE UA  Negative   BILIRUBIN UA  Negative   UROBILINOGEN UA (BE) mg/dl <2.0   LEUKOCYTES UA  Negative   WBC UA /hpf 2-4*   RBC UA /hpf 4-10*   BACTERIA UA /hpf None Seen   EPITHELIAL CELLS WET PREP /hpf None Seen   MUCUS THREADS   Innumerable*     Results from last 7 days   Lab Units 01/19/25  2237   ETHANOL LVL mg/dL <10   ACETAMINOPHEN LVL ug/mL <2*   SALICYLATE LVL mg/dL <5       Results from last 7 days   Lab Units 01/19/25  2243 01/19/25  2208   BLOOD CULTURE  No Growth at 24 hrs. No Growth at 24 hrs.         Past Medical History:   Diagnosis Date    Cancer (HCC)     thyroid cancer    Disease of thyroid gland     Hallucination     Hyperactivity (behavior)     Last Assessed: 11/7/2014     Hyperlipidemia     Obesity     Psychosis (HCC)     Seizures (HCC)      Present on Admission:   Viral sepsis (HCC)   Toxic metabolic encephalopathy   Hyponatremia   Postoperative hypothyroidism   Focal epilepsy (HCC)   Cognitive developmental delay   Schizoaffective disorder (HCC)   Influenza A   Acute respiratory failure with hypoxia (HCC)   Tachycardia      Admitting Diagnosis: Influenza A [J10.1]  Fever [R50.9]  Acute encephalopathy [G93.40]  Acute hypoxic respiratory failure (HCC) [J96.01]  Age/Sex: 54 y.o. female    Network Utilization Review Department  ATTENTION: Please call with any questions or concerns to [phone] and carefully listen to the prompts so that you are directed to the right person. All voicemails are confidential.   For Discharge needs, contact Care Management DC Support Team at [phone] opt. [phone]  Send all requests for admission clinical reviews, approved or denied determinations and any other requests to dedicated fax number below belonging to the campus where the patient is receiving treatment. List of dedicated fax numbers for the Facilities:  FACILITY NAME UR FAX NUMBER   ADMISSION DENIALS (Administrative/Medical Necessity) [phone]   DISCHARGE SUPPORT TEAM (NETWORK) [phone]   PARENT CHILD HEALTH (Maternity/NICU/Pediatrics) [phone]   [facility] [phone]   [facility] [phone]   [facility] [phone]   [facility]  [facility] [phone]   [facility] [phone]   [facility] [phone]   [facility] [phone]   [facility] [phone]   [facility] [phone]   [facility] [phone]   [facility] [phone]   [facility] [phone]

## 2025-01-21 NOTE — PHYSICAL THERAPY NOTE
PHYSICAL THERAPY NOTE          Patient Name: Esperanza Prajapati  Today's Date: 1/21/2025 01/21/25 0948   PT Last Visit   PT Visit Date 01/21/25   Note Type   Note type Cancelled Session;Evaluation   Cancel Reasons Intubated/sedated   Additional Comments PT consult received. Pt currently  on mechanical ventilation. Pt not medically appropriate for PT eval at this time. Will continue to follow as medically appropriate.     Dario Marino

## 2025-01-21 NOTE — PLAN OF CARE
Problem: Potential for Falls  Goal: Patient will remain free of falls  Description: INTERVENTIONS:  - Educate patient/family on patient safety including physical limitations  - Instruct patient to call for assistance with activity   - Consult OT/PT to assist with strengthening/mobility   - Keep Call bell within reach  - Keep bed low and locked with side rails adjusted as appropriate  - Keep care items and personal belongings within reach  - Initiate and maintain comfort rounds  - Make Fall Risk Sign visible to staff  - Offer Toileting every 2 Hours, in advance of need  - Initiate/Maintain bed alarm  - Obtain necessary fall risk management equipment:   - Apply yellow socks and bracelet for high fall risk patients  - Consider moving patient to room near nurses station  Outcome: Progressing     Problem: Prexisting or High Potential for Compromised Skin Integrity  Goal: Skin integrity is maintained or improved  Description: INTERVENTIONS:  - Identify patients at risk for skin breakdown  - Assess and monitor skin integrity  - Assess and monitor nutrition and hydration status  - Monitor labs   - Assess for incontinence   - Turn and reposition patient  - Assist with mobility/ambulation  - Relieve pressure over bony prominences  - Avoid friction and shearing  - Provide appropriate hygiene as needed including keeping skin clean and dry  - Evaluate need for skin moisturizer/barrier cream  - Collaborate with interdisciplinary team   - Patient/family teaching  - Consider wound care consult   Outcome: Progressing     Problem: PAIN - ADULT  Goal: Verbalizes/displays adequate comfort level or baseline comfort level  Description: Interventions:  - Encourage patient to monitor pain and request assistance  - Assess pain using appropriate pain scale  - Administer analgesics based on type and severity of pain and evaluate response  - Implement non-pharmacological measures as appropriate and evaluate response  - Consider cultural and  social influences on pain and pain management  - Notify physician/advanced practitioner if interventions unsuccessful or patient reports new pain  Outcome: Progressing     Problem: INFECTION - ADULT  Goal: Absence or prevention of progression during hospitalization  Description: INTERVENTIONS:  - Assess and monitor for signs and symptoms of infection  - Monitor lab/diagnostic results  - Monitor all insertion sites, i.e. indwelling lines, tubes, and drains  - Monitor endotracheal if appropriate and nasal secretions for changes in amount and color  - Meherrin appropriate cooling/warming therapies per order  - Administer medications as ordered  - Instruct and encourage patient and family to use good hand hygiene technique  - Identify and instruct in appropriate isolation precautions for identified infection/condition  Outcome: Progressing     Problem: SAFETY ADULT  Goal: Patient will remain free of falls  Description: INTERVENTIONS:  - Educate patient/family on patient safety including physical limitations  - Instruct patient to call for assistance with activity   - Consult OT/PT to assist with strengthening/mobility   - Keep Call bell within reach  - Keep bed low and locked with side rails adjusted as appropriate  - Keep care items and personal belongings within reach  - Initiate and maintain comfort rounds  - Make Fall Risk Sign visible to staff  - Offer Toileting every 2 Hours, in advance of need  - Initiate/Maintain bed alarm  - Obtain necessary fall risk management equipment:   - Apply yellow socks and bracelet for high fall risk patients  - Consider moving patient to room near nurses station  Outcome: Progressing  Goal: Maintain or return to baseline ADL function  Description: INTERVENTIONS:  -  Assess patient's ability to carry out ADLs; assess patient's baseline for ADL function and identify physical deficits which impact ability to perform ADLs (bathing, care of mouth/teeth, toileting, grooming, dressing,  etc.)  - Assess/evaluate cause of self-care deficits   - Assess range of motion  - Assess patient's mobility; develop plan if impaired  - Assess patient's need for assistive devices and provide as appropriate  - Encourage maximum independence but intervene and supervise when necessary  - Involve family in performance of ADLs  - Assess for home care needs following discharge   - Consider OT consult to assist with ADL evaluation and planning for discharge  - Provide patient education as appropriate  Outcome: Progressing  Goal: Maintains/Returns to pre admission functional level  Description: INTERVENTIONS:  - Perform AM-PAC 6 Click Basic Mobility/ Daily Activity assessment daily.  - Set and communicate daily mobility goal to care team and patient/family/caregiver.   - Collaborate with rehabilitation services on mobility goals if consulted  - Perform Range of Motion 3 times a day.  - Reposition patient every 2 hours.  - Dangle patient 3 times a day  - Stand patient 3 times a day  - Ambulate patient 3 times a day  - Out of bed to chair 3 times a day   - Out of bed for meals 3 times a day  - Out of bed for toileting  - Record patient progress and toleration of activity level   Outcome: Progressing     Problem: DISCHARGE PLANNING  Goal: Discharge to home or other facility with appropriate resources  Description: INTERVENTIONS:  - Identify barriers to discharge w/patient and caregiver  - Arrange for needed discharge resources and transportation as appropriate  - Identify discharge learning needs (meds, wound care, etc.)  - Arrange for interpretive services to assist at discharge as needed  - Refer to Case Management Department for coordinating discharge planning if the patient needs post-hospital services based on physician/advanced practitioner order or complex needs related to functional status, cognitive ability, or social support system  Outcome: Progressing     Problem: NEUROSENSORY - ADULT  Goal: Achieves stable or  improved neurological status  Description: INTERVENTIONS  - Monitor and report changes in neurological status  - Monitor vital signs such as temperature, blood pressure, glucose, and any other labs ordered   - Initiate measures to prevent increased intracranial pressure  - Monitor for seizure activity and implement precautions if appropriate      Outcome: Progressing  Goal: Remains free of injury related to seizures activity  Description: INTERVENTIONS  - Maintain airway, patient safety  and administer oxygen as ordered  - Monitor patient for seizure activity, document and report duration and description of seizure to physician/advanced practitioner  - If seizure occurs,  ensure patient safety during seizure  - Reorient patient post seizure  - Seizure pads on all 4 side rails  - Instruct patient/family to notify RN of any seizure activity including if an aura is experienced  - Instruct patient/family to call for assistance with activity based on nursing assessment  - Administer anti-seizure medications if ordered    Outcome: Progressing  Goal: Achieves maximal functionality and self care  Description: INTERVENTIONS  - Monitor swallowing and airway patency with patient fatigue and changes in neurological status  - Encourage and assist patient to increase activity and self care.   - Encourage visually impaired, hearing impaired and aphasic patients to use assistive/communication devices  Outcome: Progressing     Problem: RESPIRATORY - ADULT  Goal: Achieves optimal ventilation and oxygenation  Description: INTERVENTIONS:  - Assess for changes in respiratory status  - Assess for changes in mentation and behavior  - Position to facilitate oxygenation and minimize respiratory effort  - Oxygen administered by appropriate delivery if ordered  - Initiate smoking cessation education as indicated  - Encourage broncho-pulmonary hygiene including cough, deep breathe, Incentive Spirometry  - Assess the need for suctioning and  aspirate as needed  - Assess and instruct to report SOB or any respiratory difficulty  - Respiratory Therapy support as indicated  Outcome: Progressing     Problem: GENITOURINARY - ADULT  Goal: Maintains or returns to baseline urinary function  Description: INTERVENTIONS:  - Assess urinary function  - Encourage oral fluids to ensure adequate hydration if ordered  - Administer IV fluids as ordered to ensure adequate hydration  - Administer ordered medications as needed  - Offer frequent toileting  - Follow urinary retention protocol if ordered  Outcome: Progressing  Goal: Absence of urinary retention  Description: INTERVENTIONS:  - Assess patient’s ability to void and empty bladder  - Monitor I/O  - Bladder scan as needed  - Discuss with physician/AP medications to alleviate retention as needed  - Discuss catheterization for long term situations as appropriate  Outcome: Progressing     Problem: METABOLIC, FLUID AND ELECTROLYTES - ADULT  Goal: Electrolytes maintained within normal limits  Description: INTERVENTIONS:  - Monitor labs and assess patient for signs and symptoms of electrolyte imbalances  - Administer electrolyte replacement as ordered  - Monitor response to electrolyte replacements, including repeat lab results as appropriate  - Instruct patient on fluid and nutrition as appropriate  Outcome: Progressing  Goal: Fluid balance maintained  Description: INTERVENTIONS:  - Monitor labs   - Monitor I/O and WT  - Instruct patient on fluid and nutrition as appropriate  - Assess for signs & symptoms of volume excess or deficit  Outcome: Progressing     Problem: HEMATOLOGIC - ADULT  Goal: Maintains hematologic stability  Description: INTERVENTIONS  - Assess for signs and symptoms of bleeding or hemorrhage  - Monitor labs  - Administer supportive blood products/factors as ordered and appropriate  Outcome: Progressing     Problem: MUSCULOSKELETAL - ADULT  Goal: Maintain or return mobility to safest level of  function  Description: INTERVENTIONS:  - Assess patient's ability to carry out ADLs; assess patient's baseline for ADL function and identify physical deficits which impact ability to perform ADLs (bathing, care of mouth/teeth, toileting, grooming, dressing, etc.)  - Assess/evaluate cause of self-care deficits   - Assess range of motion  - Assess patient's mobility  - Assess patient's need for assistive devices and provide as appropriate  - Encourage maximum independence but intervene and supervise when necessary  - Involve family in performance of ADLs  - Assess for home care needs following discharge   - Consider OT consult to assist with ADL evaluation and planning for discharge  - Provide patient education as appropriate  Outcome: Progressing  Goal: Maintain proper alignment of affected body part  Description: INTERVENTIONS:  - Support, maintain and protect limb and body alignment  - Provide patient/ family with appropriate education  Outcome: Progressing

## 2025-01-21 NOTE — PROGRESS NOTES
Progress Note - Critical Care/ICU   Name: Esperanza Prajapati 54 y.o. female I MRN: [id number]  Unit/Bed#: ICU 15 I Date of Admission: 1/19/2025   Date of Service: 1/21/2025 I Hospital Day: 1      Assessment & Plan  Acute respiratory failure with hypoxia (HCC)  Due to Flu A, viral sepsis - consider multifocal aspiration PNA  CT C/A/P negative on admission   Procal negative on admission  Initially on NC - progressed to midflow during the day   CXR showed b/l opacities - aspiration cannot be excluded at this time   Continue adebayo. Nebs   Continue cefepime, azithromycin (started 1/20)  Continue tamiflu once able to take PO   Wean MFNC as tolerated   F/u procal today  Sputum cx if able, MRSA pending  Focal epilepsy (HCC)  Follows with [facility] neurology   Focal epilepsy manifests as simple partial, complex partial, and generalized tonic-clonic seizures   Hx of b/l postural and action tremor per neurology notes  Home regimen: vimpat 150 daily, vimpat 200mg qHS, Depakote 750mg BID  Valproic level therapeutic 1/20  Low suspicion for any breakthrough seizures, not post-ictal on exam and is interactive  Vimpat transitioned to IV, depakote on hold due to NPO  Viral sepsis (HCC)  Due to Flu A + on 1/19   SIRS on admission with tachycardia, tachypnea, leukopenia, fever   Lactic negative, no hypotension on admission   Overnight 1/21 with borderline hypotensive, responsive to 1L bolus  Abx with cefepime and azithromycin  Bcx pending, U/a negative, MRSA  Cognitive developmental delay  Due to head injury at age of 10 months   Postoperative hypothyroidism  Stage I papillary thyroid cancer s/p total thyroidectomy and ablation in 2013  Post-operative hypothyroidism   Hold home levothyroxine due to NPO  Schizoaffective disorder (HCC)  Home regimen: zyprexa 10mg daily, trazodone 50mg PRN, invega 6mg daily   Zyprexa transitioned to IM due to NPO, trazodone and invega on hold   Tachycardia  In the setting of agitation, hypoxia, viral  sepsis, fever  Sinus tach with occasional PVCs to 150s   CT C/A/P negative on admission   Trial nighttime zyprexa   Consider CTA if persistent    EKG as needed  Toxic metabolic encephalopathy  Due to viral sepsis, hypoxia, and infection  Hx of developmental delay although at baseline she is A&O x 3  CTH on admission negative   Do not suspect breakthrough seizures  Supportive care at this time  Fall at home, initial encounter  Presented with fall, + head strike   CTH negative   Bed alarm/fall precautions  Hyponatremia  Likely in the setting of poor PO intake   Na on admission 132  Continue to trend BMP   Influenza A  Positive on 1/19  Complicated by viral sepsis, see plan below  Tamiflu started 1/19 but held 1/20 due to not taking PO     Disposition: Stepdown Level 1    ICU Core Measures     A: Assess, Prevent, and Manage Pain Has pain been assessed? Yes  Need for changes to pain regimen? No   B: Both SAT/SAT  N/A   C: Choice of Sedation RASS Goal: 0 Alert and Calm  Need for changes to sedation or analgesia regimen? No   D: Delirium CAM-ICU: Unable to perform secondary to Dementia or other chronic cognitive dysfunction   E: Early Mobility  Plan for early mobility? Yes   F: Family Engagement Plan for family engagement today? Yes       Antibiotic Review: Awaiting culture results.       Prophylaxis:  VTE VTE covered by:  enoxaparin, Subcutaneous, 40 mg at 01/20/25 0837       Stress Ulcer  not ordered         24 Hour Events : Transferred to ICU SD1 yesterday due to increasing oxygen requirements. Maintained on midflow overnight 15L saturating in mid 90s. Continues with upper aiway secretions. Remains febrile to 102. Borderline hypotension overnight responsive to 1L bolus. Remains in sinus tachycardia, persisted even with sleeping/   Subjective   Review of Systems: Review of Systems not obtainable due to Altered mental status    Objective :                   Vitals I/O      Most Recent Min/Max in 24hrs   Temp 100.5 °F  (38.1 °C) Temp  Min: 98.6 °F (37 °C)  Max: 103.3 °F (39.6 °C)   Pulse (!) 132 Pulse  Min: 106  Max: 156   Resp 19 Resp  Min: 11  Max: 70   BP 90/56 BP  Min: 83/51  Max: 122/69   O2 Sat 98 % SpO2  Min: 80 %  Max: 100 %      Intake/Output Summary (Last 24 hours) at 1/21/2025 0405  Last data filed at 1/20/2025 1627  Gross per 24 hour   Intake 2100 ml   Output --   Net 2100 ml       Diet NPO    Invasive Monitoring           Physical Exam   Physical Exam  Eyes:      Pupils: Pupils are equal, round, and reactive to light.   Skin:     General: Skin is warm and dry.      Capillary Refill: Capillary refill takes less than 2 seconds.   HENT:      Head: Normocephalic and atraumatic.   Cardiovascular:      Rate and Rhythm: Regular rhythm. Tachycardia present.      Pulses: Normal pulses.      Heart sounds: Normal heart sounds.   Musculoskeletal:         General: Normal range of motion.      Right lower leg: Trace Edema present.      Left lower leg: Trace Edema present.   Abdominal: General: There is no distension.      Palpations: Abdomen is soft.      Tenderness: There is no abdominal tenderness.   Constitutional:       Appearance: She is well-developed and well-nourished. She is ill-appearing.   Pulmonary:      Effort: Pulmonary effort is normal.      Breath sounds: Rhonchi present.   Secretions are abnormal .Neurological:      Mental Status: She is alert. She is calm.      Motor: Strength full and intact in all extremities.      Comments: Withdraws to pain, does not follow commands, intermittently interactive, tracks across the room.           Diagnostic Studies        Lab Results: I have reviewed the following results:     Medications:  Scheduled PRN   cefepime, 2,000 mg, Q12H   And  azithromycin, 500 mg, Q24H  benztropine, 2 mg, BID  [Held by provider] divalproex sodium, 750 mg, Q12H MOSHE  enoxaparin, 40 mg, Daily  ipratropium, 0.5 mg, TID  lacosamide, 150 mg, Daily  lacosamide, 200 mg, HS  levalbuterol, 1.25 mg, TID  [Held  by provider] levothyroxine, 175 mcg, Early Morning  OLANZapine, 10 mg, HS  [Held by provider] oseltamivir, 75 mg, Q12H MOSHE  [Held by provider] paliperidone, 6 mg, QAM      acetaminophen, 1,000 mg, Q6H PRN  [Held by provider] aluminum-magnesium hydroxide-simethicone, 30 mL, Q6H PRN  [Held by provider] dextromethorphan-guaiFENesin, 10 mL, Q4H PRN  ondansetron, 4 mg, Q6H PRN  [Held by provider] simethicone, 80 mg, 4x Daily PRN  [Held by provider] traZODone, 50 mg, HS PRN       Continuous          Labs:   CBC    Recent Labs     01/19/25 2208 01/20/25  0514   WBC 5.57 3.94*   HGB 13.5 11.5   HCT 40.2 35.8    159     BMP    Recent Labs     01/19/25 2208 01/20/25  0514   SODIUM 131* 132*   K 4.0 3.8   CL 95* 100   CO2 27 28   AGAP 9 4   BUN 14 15   CREATININE 0.99 1.03   CALCIUM 9.0 7.7*       Coags    Recent Labs     01/19/25 2208   INR 1.09   PTT 26        Additional Electrolytes  Recent Labs     01/20/25  0514   MG 1.9          Blood Gas    No recent results  Recent Labs     01/19/25 2208   PHVEN 7.425*   IZK5OCE 43.1   PO2VEN 44.8   IBH7DKV 27.6   BEVEN 2.8   [id number] 78.6    LFTs  Recent Labs     01/19/25 2208   ALT 9   AST 13   ALKPHOS 42   ALB 3.6   TBILI 0.28       Infectious  Recent Labs     01/19/25 2208   PROCALCITONI 0.08     Glucose  Recent Labs     01/19/25 2208 01/20/25  0514   GLUC 79 97

## 2025-01-21 NOTE — PROCEDURES
Arterial Line Insertion    Date/Time: 1/21/2025 2:30 PM    Performed by: Dania Davenport PA-C  Authorized by: Dania Davenport PA-C    Patient location:  ICU and bedside  Consent:     Consent obtained:  Emergent situation    Consent given by:  Healthcare agent    Risks discussed:  Bleeding, ischemia and repeat procedure  Universal protocol:     Patient identity confirmed:  Arm band and hospital-assigned identification number  Indications:     Indications: hemodynamic monitoring, multiple ABGs and continuous blood pressure monitoring    Pre-procedure details:     Skin preparation:  Chlorhexidine  Anesthesia (see MAR for exact dosages):     Anesthesia method:  None  Procedure details:     Location / Tip of Catheter:  Radial    Laterality:  Right    Placement technique:  Ultrasound guided    Ultrasound image availability:  Not obtained due to urgency    Sterile ultrasound techniques: Sterile gel and sterile probe covers were used      Number of attempts:  1    Successful placement: yes      Transducer: waveform confirmed    Post-procedure details:     Post-procedure:  Secured with tape, sterile dressing applied, sutured and wrist guard applied    CMS:  Normal    Patient tolerance of procedure:  Tolerated well, no immediate complications  Comments:      Ashish Cedeno DNP

## 2025-01-21 NOTE — PROCEDURES
Intubation    Date/Time: 1/21/2025 9:50 AM    Performed by: Dania Davenport PA-C  Authorized by: Dania Davenport PA-C    Patient location:  Bedside  Other Assisting Provider: Yes (comment)    Consent:     Consent obtained:  Emergent situation  Universal protocol:     Patient identity confirmed:  Arm band and hospital-assigned identification number  Pre-procedure details:     Patient status:  Altered mental status    Pretreatment medications:  Etomidate    Paralytics:  Succinylcholine  Indications:     Indications for intubation: respiratory failure and hypoxemia    Procedure details:     Preoxygenation:  Bag valve mask    CPR in progress: no      Intubation method:  Oral    Oral intubation technique:  Glidescope    Laryngoscope blade:  Mac 3    Tube size (mm):  8.0    Tube type:  Cuffed and hi-lo    Number of attempts:  1    Ventilation between attempts: no      Cricoid pressure: no      Tube visualized through cords: yes    Placement assessment:     ETT to lip:  26    Tube secured with:  ETT briseno and adhesive tape    Breath sounds:  Equal    Placement verification: chest rise, colorimetric ETCO2 device and CXR verification      CXR findings:  ETT in proper place  Post-procedure details:     Patient tolerance of procedure:  Tolerated well, no immediate complications  Comments:      BRINA Cordero Dr.

## 2025-01-21 NOTE — PROCEDURES
Central Line Insertion    Date/Time: 1/21/2025 2:15 PM    Performed by: Dania Davenport PA-C  Authorized by: Dania Davenport PA-C    Patient location:  ICU and bedside  Other Assisting Provider: Yes (comment)    Consent:     Consent obtained:  Emergent situation    Consent given by:  Healthcare agent    Risks discussed:  Arterial puncture, incorrect placement, infection and bleeding    Alternatives discussed:  No treatment and delayed treatment  Universal protocol:     Site/side marked: yes      Immediately prior to procedure, a time out was called: yes      Patient identity confirmed:  Arm band and hospital-assigned identification number  Pre-procedure details:     Hand hygiene: Hand hygiene performed prior to insertion      Sterile barrier technique: All elements of maximal sterile technique followed      Skin preparation:  ChloraPrep    Skin preparation agent: Skin preparation agent completely dried prior to procedure    Indications:     Central line indications: medications requiring central line    Anesthesia (see MAR for exact dosages):     Anesthesia method:  None  Procedure details:     Location:  Right internal jugular    Vessel type: vein      Laterality:  Right    Approach: percutaneous technique used      Patient position:  Reverse Trendelenburg    Catheter type:  Triple lumen 16cm    Catheter size:  7 Fr    Landmarks identified: yes      Ultrasound guidance: yes      Ultrasound image availability:  Still images obtained    Sterile ultrasound techniques: Sterile gel and sterile probe covers were used      Manometry confirmation: yes      Number of attempts:  1    Successful placement: yes      Catheter tip vessel location: inferior vena cava    Post-procedure details:     Post-procedure:  Dressing applied and line sutured    Assessment:  Blood return through all ports, no pneumothorax on x-ray and placement verified by x-ray    Patient tolerance of procedure:  Tolerated well, no immediate  complications  Comments:      Ashish Cedeno, JOLEEN

## 2025-01-21 NOTE — PLAN OF CARE
Problem: Potential for Falls  Goal: Patient will remain free of falls  Description: INTERVENTIONS:  - Educate patient/family on patient safety including physical limitations  - Instruct patient to call for assistance with activity   - Consult OT/PT to assist with strengthening/mobility   - Keep Call bell within reach  - Keep bed low and locked with side rails adjusted as appropriate  - Keep care items and personal belongings within reach  - Initiate and maintain comfort rounds  - Make Fall Risk Sign visible to staff  - Offer Toileting every 2 Hours, in advance of need  - Initiate/Maintain bed alarm  - Obtain necessary fall risk management equipment  - Apply yellow socks and bracelet for high fall risk patients  - Consider moving patient to room near nurses station  Outcome: Progressing     Problem: Prexisting or High Potential for Compromised Skin Integrity  Goal: Skin integrity is maintained or improved  Description: INTERVENTIONS:  - Identify patients at risk for skin breakdown  - Assess and monitor skin integrity  - Assess and monitor nutrition and hydration status  - Monitor labs   - Assess for incontinence   - Turn and reposition patient  - Assist with mobility/ambulation  - Relieve pressure over bony prominences  - Avoid friction and shearing  - Provide appropriate hygiene as needed including keeping skin clean and dry  - Evaluate need for skin moisturizer/barrier cream  - Collaborate with interdisciplinary team   - Patient/family teaching  - Consider wound care consult   Outcome: Progressing     Problem: PAIN - ADULT  Goal: Verbalizes/displays adequate comfort level or baseline comfort level  Description: Interventions:  - Encourage patient to monitor pain and request assistance  - Assess pain using appropriate pain scale  - Administer analgesics based on type and severity of pain and evaluate response  - Implement non-pharmacological measures as appropriate and evaluate response  - Consider cultural and  social influences on pain and pain management  - Notify physician/advanced practitioner if interventions unsuccessful or patient reports new pain  Outcome: Progressing     Problem: INFECTION - ADULT  Goal: Absence or prevention of progression during hospitalization  Description: INTERVENTIONS:  - Assess and monitor for signs and symptoms of infection  - Monitor lab/diagnostic results  - Monitor all insertion sites, i.e. indwelling lines, tubes, and drains  - Monitor endotracheal if appropriate and nasal secretions for changes in amount and color  - [place] appropriate cooling/warming therapies per order  - Administer medications as ordered  - Instruct and encourage patient and family to use good hand hygiene technique  - Identify and instruct in appropriate isolation precautions for identified infection/condition  Outcome: Progressing     Problem: SAFETY ADULT  Goal: Patient will remain free of falls  Description: INTERVENTIONS:  - Educate patient/family on patient safety including physical limitations  - Instruct patient to call for assistance with activity   - Consult OT/PT to assist with strengthening/mobility   - Keep Call bell within reach  - Keep bed low and locked with side rails adjusted as appropriate  - Keep care items and personal belongings within reach  - Initiate and maintain comfort rounds  - Make Fall Risk Sign visible to staff  - Offer Toileting every 2 Hours, in advance of need  - Initiate/Maintain bed alarm  - Obtain necessary fall risk management equipment  - Apply yellow socks and bracelet for high fall risk patients  - Consider moving patient to room near nurses station  Outcome: Progressing  Goal: Maintain or return to baseline ADL function  Description: INTERVENTIONS:  -  Assess patient's ability to carry out ADLs; assess patient's baseline for ADL function and identify physical deficits which impact ability to perform ADLs (bathing, care of mouth/teeth, toileting, grooming, dressing,  etc.)  - Assess/evaluate cause of self-care deficits   - Assess range of motion  - Assess patient's mobility; develop plan if impaired  - Assess patient's need for assistive devices and provide as appropriate  - Encourage maximum independence but intervene and supervise when necessary  - Involve family in performance of ADLs  - Assess for home care needs following discharge   - Consider OT consult to assist with ADL evaluation and planning for discharge  - Provide patient education as appropriate  Outcome: Progressing  Goal: Maintains/Returns to pre admission functional level  Description: INTERVENTIONS:  - Perform AM-PAC 6 Click Basic Mobility/ Daily Activity assessment daily.  - Set and communicate daily mobility goal to care team and patient/family/caregiver.   - Collaborate with rehabilitation services on mobility goals if consulted  - Perform Range of Motion 3 times a day.  - Reposition patient every 2 hours.  - Dangle patient 3 times a day  - Stand patient 3 times a day  - Ambulate patient 3 times a day  - Out of bed to chair 3 times a day   - Out of bed for meals 3 times a day  - Out of bed for toileting  - Record patient progress and toleration of activity level   Outcome: Progressing     Problem: DISCHARGE PLANNING  Goal: Discharge to home or other facility with appropriate resources  Description: INTERVENTIONS:  - Identify barriers to discharge w/patient and caregiver  - Arrange for needed discharge resources and transportation as appropriate  - Identify discharge learning needs (meds, wound care, etc.)  - Arrange for interpretive services to assist at discharge as needed  - Refer to Case Management Department for coordinating discharge planning if the patient needs post-hospital services based on physician/advanced practitioner order or complex needs related to functional status, cognitive ability, or social support system  Outcome: Progressing     Problem: NEUROSENSORY - ADULT  Goal: Achieves stable or  improved neurological status  Description: INTERVENTIONS  - Monitor and report changes in neurological status  - Monitor vital signs such as temperature, blood pressure, glucose, and any other labs ordered   - Initiate measures to prevent increased intracranial pressure  - Monitor for seizure activity and implement precautions if appropriate      Outcome: Progressing  Goal: Remains free of injury related to seizures activity  Description: INTERVENTIONS  - Maintain airway, patient safety  and administer oxygen as ordered  - Monitor patient for seizure activity, document and report duration and description of seizure to physician/advanced practitioner  - If seizure occurs,  ensure patient safety during seizure  - Reorient patient post seizure  - Seizure pads on all 4 side rails  - Instruct patient/family to notify RN of any seizure activity including if an aura is experienced  - Instruct patient/family to call for assistance with activity based on nursing assessment  - Administer anti-seizure medications if ordered    Outcome: Progressing  Goal: Achieves maximal functionality and self care  Description: INTERVENTIONS  - Monitor swallowing and airway patency with patient fatigue and changes in neurological status  - Encourage and assist patient to increase activity and self care.   - Encourage visually impaired, hearing impaired and aphasic patients to use assistive/communication devices  Outcome: Progressing     Problem: RESPIRATORY - ADULT  Goal: Achieves optimal ventilation and oxygenation  Description: INTERVENTIONS:  - Assess for changes in respiratory status  - Assess for changes in mentation and behavior  - Position to facilitate oxygenation and minimize respiratory effort  - Oxygen administered by appropriate delivery if ordered  - Initiate smoking cessation education as indicated  - Encourage broncho-pulmonary hygiene including cough, deep breathe, Incentive Spirometry  - Assess the need for suctioning and  aspirate as needed  - Assess and instruct to report SOB or any respiratory difficulty  - Respiratory Therapy support as indicated  Outcome: Progressing     Problem: GENITOURINARY - ADULT  Goal: Maintains or returns to baseline urinary function  Description: INTERVENTIONS:  - Assess urinary function  - Encourage oral fluids to ensure adequate hydration if ordered  - Administer IV fluids as ordered to ensure adequate hydration  - Administer ordered medications as needed  - Offer frequent toileting  - Follow urinary retention protocol if ordered  Outcome: Progressing  Goal: Absence of urinary retention  Description: INTERVENTIONS:  - Assess patient’s ability to void and empty bladder  - Monitor I/O  - Bladder scan as needed  - Discuss with physician/AP medications to alleviate retention as needed  - Discuss catheterization for long term situations as appropriate  Outcome: Progressing     Problem: METABOLIC, FLUID AND ELECTROLYTES - ADULT  Goal: Electrolytes maintained within normal limits  Description: INTERVENTIONS:  - Monitor labs and assess patient for signs and symptoms of electrolyte imbalances  - Administer electrolyte replacement as ordered  - Monitor response to electrolyte replacements, including repeat lab results as appropriate  - Instruct patient on fluid and nutrition as appropriate  Outcome: Progressing  Goal: Fluid balance maintained  Description: INTERVENTIONS:  - Monitor labs   - Monitor I/O and WT  - Instruct patient on fluid and nutrition as appropriate  - Assess for signs & symptoms of volume excess or deficit  Outcome: Progressing     Problem: HEMATOLOGIC - ADULT  Goal: Maintains hematologic stability  Description: INTERVENTIONS  - Assess for signs and symptoms of bleeding or hemorrhage  - Monitor labs  - Administer supportive blood products/factors as ordered and appropriate  Outcome: Progressing     Problem: MUSCULOSKELETAL - ADULT  Goal: Maintain or return mobility to safest level of  function  Description: INTERVENTIONS:  - Assess patient's ability to carry out ADLs; assess patient's baseline for ADL function and identify physical deficits which impact ability to perform ADLs (bathing, care of mouth/teeth, toileting, grooming, dressing, etc.)  - Assess/evaluate cause of self-care deficits   - Assess range of motion  - Assess patient's mobility  - Assess patient's need for assistive devices and provide as appropriate  - Encourage maximum independence but intervene and supervise when necessary  - Involve family in performance of ADLs  - Assess for home care needs following discharge   - Consider OT consult to assist with ADL evaluation and planning for discharge  - Provide patient education as appropriate  Outcome: Progressing  Goal: Maintain proper alignment of affected body part  Description: INTERVENTIONS:  - Support, maintain and protect limb and body alignment  - Provide patient/ family with appropriate education  Outcome: Progressing

## 2025-01-21 NOTE — QUICK NOTE
I did attempt to call family member and give them an update. Voicemail was not setup yet so I did not put voicemail.

## 2025-01-21 NOTE — CONSULTS
Nutrition consult received and appreciated      Recommend to initiate Vital HP at 20 ml/hr and advance 10ml q8hr or as tolerated to goal rate 45ml/hr ( will provide 1080kcal, 95g protein, 903ml free water)   Free water flushes per md;     Additional calories provided from Propofol, will monitor infusion/need to adjust TF     Nutrition will continue to follow up as per policy    Ann Olmstead MS RD LDN CN[facility]

## 2025-01-22 ENCOUNTER — APPOINTMENT (INPATIENT)
Dept: RADIOLOGY | Facility: HOSPITAL | Age: 55
DRG: 870 | End: 2025-01-22
Payer: COMMERCIAL

## 2025-01-22 PROBLEM — E87.1 HYPONATREMIA: Status: RESOLVED | Noted: 2025-01-20 | Resolved: 2025-01-22

## 2025-01-22 LAB
ALBUMIN SERPL BCG-MCNC: 2.8 G/DL (ref 3.5–5)
ALP SERPL-CCNC: 22 U/L (ref 34–104)
ALT SERPL W P-5'-P-CCNC: 10 U/L (ref 7–52)
ANION GAP SERPL CALCULATED.3IONS-SCNC: 10 MMOL/L (ref 4–13)
ANION GAP SERPL CALCULATED.3IONS-SCNC: 7 MMOL/L (ref 4–13)
ANISOCYTOSIS BLD QL SMEAR: PRESENT
AST SERPL W P-5'-P-CCNC: 32 U/L (ref 13–39)
ATRIAL RATE: 160 BPM
BASO STIPL BLD QL SMEAR: PRESENT
BASOPHILS # BLD MANUAL: 0 THOUSAND/UL (ref 0–0.1)
BASOPHILS NFR MAR MANUAL: 0 % (ref 0–1)
BILIRUB SERPL-MCNC: 0.32 MG/DL (ref 0.2–1)
BUN SERPL-MCNC: 23 MG/DL (ref 5–25)
BUN SERPL-MCNC: 34 MG/DL (ref 5–25)
CA-I BLD-SCNC: 1.2 MMOL/L (ref 1.12–1.32)
CALCIUM ALBUM COR SERPL-MCNC: 9.4 MG/DL (ref 8.3–10.1)
CALCIUM SERPL-MCNC: 8.1 MG/DL (ref 8.4–10.2)
CALCIUM SERPL-MCNC: 8.4 MG/DL (ref 8.4–10.2)
CHLORIDE SERPL-SCNC: 103 MMOL/L (ref 96–108)
CHLORIDE SERPL-SCNC: 104 MMOL/L (ref 96–108)
CO2 SERPL-SCNC: 23 MMOL/L (ref 21–32)
CO2 SERPL-SCNC: 25 MMOL/L (ref 21–32)
CREAT SERPL-MCNC: 1.16 MG/DL (ref 0.6–1.3)
CREAT SERPL-MCNC: 1.16 MG/DL (ref 0.6–1.3)
DOHLE BOD BLD QL SMEAR: PRESENT
EOSINOPHIL # BLD MANUAL: 0 THOUSAND/UL (ref 0–0.4)
EOSINOPHIL NFR BLD MANUAL: 0 % (ref 0–6)
ERYTHROCYTE [DISTWIDTH] IN BLOOD BY AUTOMATED COUNT: 13.8 % (ref 11.6–15.1)
GFR SERPL CREATININE-BSD FRML MDRD: 54 ML/MIN/1.73SQ M
GFR SERPL CREATININE-BSD FRML MDRD: 54 ML/MIN/1.73SQ M
GLUCOSE SERPL-MCNC: 181 MG/DL (ref 65–140)
GLUCOSE SERPL-MCNC: 184 MG/DL (ref 65–140)
GLUCOSE SERPL-MCNC: 186 MG/DL (ref 65–140)
GLUCOSE SERPL-MCNC: 205 MG/DL (ref 65–140)
GLUCOSE SERPL-MCNC: 222 MG/DL (ref 65–140)
GLUCOSE SERPL-MCNC: 235 MG/DL (ref 65–140)
HCT VFR BLD AUTO: 32.3 % (ref 34.8–46.1)
HGB BLD-MCNC: 10.5 G/DL (ref 11.5–15.4)
LACTATE SERPL-SCNC: 2.3 MMOL/L (ref 0.5–2)
LACTATE SERPL-SCNC: 2.4 MMOL/L (ref 0.5–2)
LYMPHOCYTES # BLD AUTO: 0.96 THOUSAND/UL (ref 0.6–4.47)
LYMPHOCYTES # BLD AUTO: 13 % (ref 14–44)
MAGNESIUM SERPL-MCNC: 2.3 MG/DL (ref 1.9–2.7)
MAGNESIUM SERPL-MCNC: 2.4 MG/DL (ref 1.9–2.7)
MCH RBC QN AUTO: 32.1 PG (ref 26.8–34.3)
MCHC RBC AUTO-ENTMCNC: 32.5 G/DL (ref 31.4–37.4)
MCV RBC AUTO: 99 FL (ref 82–98)
METAMYELOCYTE ABSOLUTE CT: 0.06 THOUSAND/UL (ref 0–0.1)
METAMYELOCYTES NFR BLD MANUAL: 1 % (ref 0–1)
MONOCYTES # BLD AUTO: 0.9 THOUSAND/UL (ref 0–1.22)
MONOCYTES NFR BLD: 14 % (ref 4–12)
MRSA NOSE QL CULT: NORMAL
MYELOCYTE ABSOLUTE CT: 0.06 THOUSAND/UL (ref 0–0.1)
MYELOCYTES NFR BLD MANUAL: 1 % (ref 0–1)
NEUTROPHILS # BLD MANUAL: 4.42 THOUSAND/UL (ref 1.85–7.62)
NEUTS BAND NFR BLD MANUAL: 33 % (ref 0–8)
NEUTS SEG NFR BLD AUTO: 36 % (ref 43–75)
P AXIS: 72 DEGREES
PHOSPHATE SERPL-MCNC: 2.5 MG/DL (ref 2.7–4.5)
PHOSPHATE SERPL-MCNC: 3.1 MG/DL (ref 2.7–4.5)
PLATELET # BLD AUTO: 120 THOUSANDS/UL (ref 149–390)
PLATELET BLD QL SMEAR: ABNORMAL
PMV BLD AUTO: 10.4 FL (ref 8.9–12.7)
POLYCHROMASIA BLD QL SMEAR: PRESENT
POTASSIUM SERPL-SCNC: 4.1 MMOL/L (ref 3.5–5.3)
POTASSIUM SERPL-SCNC: 4.3 MMOL/L (ref 3.5–5.3)
PR INTERVAL: 188 MS
PROT SERPL-MCNC: 6.6 G/DL (ref 6.4–8.4)
QRS AXIS: 85 DEGREES
QRSD INTERVAL: 71 MS
QT INTERVAL: 288 MS
QTC INTERVAL: 470 MS
RBC # BLD AUTO: 3.27 MILLION/UL (ref 3.81–5.12)
SODIUM SERPL-SCNC: 136 MMOL/L (ref 135–147)
SODIUM SERPL-SCNC: 136 MMOL/L (ref 135–147)
T WAVE AXIS: 62 DEGREES
VARIANT LYMPHS # BLD AUTO: 2 %
VENTRICULAR RATE: 160 BPM
WBC # BLD AUTO: 6.41 THOUSAND/UL (ref 4.31–10.16)

## 2025-01-22 PROCEDURE — 85007 BL SMEAR W/DIFF WBC COUNT: CPT | Performed by: NURSE PRACTITIONER

## 2025-01-22 PROCEDURE — 84100 ASSAY OF PHOSPHORUS: CPT | Performed by: NURSE PRACTITIONER

## 2025-01-22 PROCEDURE — 83605 ASSAY OF LACTIC ACID: CPT | Performed by: NURSE PRACTITIONER

## 2025-01-22 PROCEDURE — 82948 REAGENT STRIP/BLOOD GLUCOSE: CPT

## 2025-01-22 PROCEDURE — 71045 X-RAY EXAM CHEST 1 VIEW: CPT

## 2025-01-22 PROCEDURE — 94150 VITAL CAPACITY TEST: CPT

## 2025-01-22 PROCEDURE — 82330 ASSAY OF CALCIUM: CPT | Performed by: NURSE PRACTITIONER

## 2025-01-22 PROCEDURE — 99291 CRITICAL CARE FIRST HOUR: CPT | Performed by: INTERNAL MEDICINE

## 2025-01-22 PROCEDURE — 87070 CULTURE OTHR SPECIMN AEROBIC: CPT

## 2025-01-22 PROCEDURE — 83735 ASSAY OF MAGNESIUM: CPT | Performed by: NURSE PRACTITIONER

## 2025-01-22 PROCEDURE — 94640 AIRWAY INHALATION TREATMENT: CPT

## 2025-01-22 PROCEDURE — 93010 ELECTROCARDIOGRAM REPORT: CPT | Performed by: INTERNAL MEDICINE

## 2025-01-22 PROCEDURE — 85027 COMPLETE CBC AUTOMATED: CPT | Performed by: NURSE PRACTITIONER

## 2025-01-22 PROCEDURE — 94760 N-INVAS EAR/PLS OXIMETRY 1: CPT

## 2025-01-22 PROCEDURE — 80053 COMPREHEN METABOLIC PANEL: CPT | Performed by: NURSE PRACTITIONER

## 2025-01-22 PROCEDURE — 80048 BASIC METABOLIC PNL TOTAL CA: CPT | Performed by: NURSE PRACTITIONER

## 2025-01-22 PROCEDURE — 94003 VENT MGMT INPAT SUBQ DAY: CPT

## 2025-01-22 PROCEDURE — 87205 SMEAR GRAM STAIN: CPT

## 2025-01-22 RX ORDER — INSULIN LISPRO 100 [IU]/ML
2-12 INJECTION, SOLUTION INTRAVENOUS; SUBCUTANEOUS
Status: DISCONTINUED | OUTPATIENT
Start: 2025-01-22 | End: 2025-01-24

## 2025-01-22 RX ORDER — FUROSEMIDE 10 MG/ML
40 INJECTION INTRAMUSCULAR; INTRAVENOUS
Status: DISCONTINUED | OUTPATIENT
Start: 2025-01-22 | End: 2025-01-23

## 2025-01-22 RX ORDER — ALBUMIN (HUMAN) 12.5 G/50ML
25 SOLUTION INTRAVENOUS ONCE
Status: COMPLETED | OUTPATIENT
Start: 2025-01-22 | End: 2025-01-22

## 2025-01-22 RX ORDER — AZITHROMYCIN 250 MG/1
500 TABLET, FILM COATED ORAL EVERY 24 HOURS
Status: CANCELLED | OUTPATIENT
Start: 2025-01-22 | End: 2025-01-22

## 2025-01-22 RX ADMIN — INSULIN LISPRO 2 UNITS: 100 INJECTION, SOLUTION INTRAVENOUS; SUBCUTANEOUS at 16:26

## 2025-01-22 RX ADMIN — LEVALBUTEROL HYDROCHLORIDE 1.25 MG: 1.25 SOLUTION RESPIRATORY (INHALATION) at 19:49

## 2025-01-22 RX ADMIN — VASOPRESSIN 0.04 UNITS/MIN: 20 INJECTION INTRAVENOUS at 06:40

## 2025-01-22 RX ADMIN — MELATONIN 3 MG: 3 TAB ORAL at 21:00

## 2025-01-22 RX ADMIN — HYDROCORTISONE SODIUM SUCCINATE 100 MG: 100 INJECTION, POWDER, FOR SOLUTION INTRAMUSCULAR; INTRAVENOUS at 14:17

## 2025-01-22 RX ADMIN — Medication 50 MCG/HR: at 06:48

## 2025-01-22 RX ADMIN — CEFTRIAXONE 2000 MG: 10 INJECTION, POWDER, FOR SOLUTION INTRAVENOUS at 20:51

## 2025-01-22 RX ADMIN — VALPROIC ACID 750 MG: 500 SOLUTION ORAL at 09:00

## 2025-01-22 RX ADMIN — HYDROCORTISONE SODIUM SUCCINATE 100 MG: 100 INJECTION, POWDER, FOR SOLUTION INTRAMUSCULAR; INTRAVENOUS at 21:02

## 2025-01-22 RX ADMIN — FUROSEMIDE 40 MG: 10 INJECTION, SOLUTION INTRAVENOUS at 16:15

## 2025-01-22 RX ADMIN — LEVALBUTEROL HYDROCHLORIDE 1.25 MG: 1.25 SOLUTION RESPIRATORY (INHALATION) at 08:13

## 2025-01-22 RX ADMIN — IPRATROPIUM BROMIDE 0.5 MG: 0.5 SOLUTION RESPIRATORY (INHALATION) at 19:49

## 2025-01-22 RX ADMIN — FENTANYL CITRATE 50 MCG: 50 INJECTION INTRAMUSCULAR; INTRAVENOUS at 11:29

## 2025-01-22 RX ADMIN — PHENYLEPHRINE HYDROCHLORIDE 120 MCG/MIN: 50 INJECTION INTRAVENOUS at 12:20

## 2025-01-22 RX ADMIN — LEVOTHYROXINE SODIUM 175 MCG: 0.05 TABLET ORAL at 05:16

## 2025-01-22 RX ADMIN — BENZTROPINE MESYLATE 2 MG: 1 TABLET ORAL at 08:50

## 2025-01-22 RX ADMIN — ENOXAPARIN SODIUM 40 MG: 40 INJECTION SUBCUTANEOUS at 08:41

## 2025-01-22 RX ADMIN — IPRATROPIUM BROMIDE 0.5 MG: 0.5 SOLUTION RESPIRATORY (INHALATION) at 13:28

## 2025-01-22 RX ADMIN — LACOSAMIDE ORAL SOLUTION 200 MG: 10 SOLUTION ORAL at 21:01

## 2025-01-22 RX ADMIN — Medication 20 MG: at 08:41

## 2025-01-22 RX ADMIN — VASOPRESSIN 0.04 UNITS/MIN: 20 INJECTION INTRAVENOUS at 15:53

## 2025-01-22 RX ADMIN — AZITHROMYCIN DIHYDRATE 500 MG: 250 TABLET ORAL at 20:45

## 2025-01-22 RX ADMIN — LEVALBUTEROL HYDROCHLORIDE 1.25 MG: 1.25 SOLUTION RESPIRATORY (INHALATION) at 13:28

## 2025-01-22 RX ADMIN — CHLORHEXIDINE GLUCONATE 15 ML: 1.2 RINSE ORAL at 20:45

## 2025-01-22 RX ADMIN — PROPOFOL 20 MCG/KG/MIN: 10 INJECTION, EMULSION INTRAVENOUS at 12:53

## 2025-01-22 RX ADMIN — PHENYLEPHRINE HYDROCHLORIDE 110 MCG/MIN: 50 INJECTION INTRAVENOUS at 06:06

## 2025-01-22 RX ADMIN — CYANOCOBALAMIN TAB 500 MCG 1000 MCG: 500 TAB at 08:41

## 2025-01-22 RX ADMIN — PROPOFOL 20 MCG/KG/MIN: 10 INJECTION, EMULSION INTRAVENOUS at 17:39

## 2025-01-22 RX ADMIN — INSULIN LISPRO 4 UNITS: 100 INJECTION, SOLUTION INTRAVENOUS; SUBCUTANEOUS at 09:00

## 2025-01-22 RX ADMIN — PROPOFOL 10 MCG/KG/MIN: 10 INJECTION, EMULSION INTRAVENOUS at 02:25

## 2025-01-22 RX ADMIN — INSULIN LISPRO 4 UNITS: 100 INJECTION, SOLUTION INTRAVENOUS; SUBCUTANEOUS at 10:39

## 2025-01-22 RX ADMIN — IPRATROPIUM BROMIDE 0.5 MG: 0.5 SOLUTION RESPIRATORY (INHALATION) at 08:13

## 2025-01-22 RX ADMIN — LACOSAMIDE ORAL SOLUTION 150 MG: 10 SOLUTION ORAL at 08:42

## 2025-01-22 RX ADMIN — POTASSIUM PHOSPHATE 6 MMOL: 236; 224 INJECTION, SOLUTION INTRAVENOUS at 09:26

## 2025-01-22 RX ADMIN — OLANZAPINE 10 MG: 5 TABLET, FILM COATED ORAL at 21:01

## 2025-01-22 RX ADMIN — CHLORHEXIDINE GLUCONATE 15 ML: 1.2 RINSE ORAL at 08:42

## 2025-01-22 RX ADMIN — OSELTAMIVIR PHOSPHATE 75 MG: 6 POWDER, FOR SUSPENSION ORAL at 09:01

## 2025-01-22 RX ADMIN — HYDROCORTISONE SODIUM SUCCINATE 100 MG: 100 INJECTION, POWDER, FOR SOLUTION INTRAMUSCULAR; INTRAVENOUS at 05:16

## 2025-01-22 RX ADMIN — BENZTROPINE MESYLATE 2 MG: 1 TABLET ORAL at 17:39

## 2025-01-22 RX ADMIN — ALBUMIN (HUMAN) 25 G: 0.25 INJECTION, SOLUTION INTRAVENOUS at 08:41

## 2025-01-22 RX ADMIN — INSULIN LISPRO 2 UNITS: 100 INJECTION, SOLUTION INTRAVENOUS; SUBCUTANEOUS at 22:09

## 2025-01-22 RX ADMIN — VALPROIC ACID 750 MG: 500 SOLUTION ORAL at 20:51

## 2025-01-22 RX ADMIN — OSELTAMIVIR PHOSPHATE 75 MG: 6 POWDER, FOR SUSPENSION ORAL at 20:47

## 2025-01-22 NOTE — RESPIRATORY THERAPY NOTE
01/21/25 2322   Vent Information   Vent ID [id number]   Vent type Berman C3   Berman Vent Mode (S)CMV   SpO2 100 %   (S)CMV Settings   Resp Rate (BPM) 18 BPM   VT (mL) 400 mL   FIO2 (%) 60 %   PEEP (cmH2O) 8 cmH2O   I:E Ratio 1:2.3   Insp Time (%) 1 %   Flow Trigger (LPM) 2   Humidification Heater   Heater Temperature (Set) 98.6 °F (37 °C)   (S)CMV Actuals   Resp Rate (BPM) 19 BPM   VT (mL) 414   MV 8   MAP (cmH2O) 12 cmH2O   Peak Pressure (cmH2O) 23 cmH2O   I:E Ratio (Obs) 1:2.1   Heater Temperature (Obs) 98.6 °F (37 °C)   (S)CMV Alarms   High Peak Pressure (cmH2O) 50   Low Pressure (cmH2O) 5 cm H2O   High Resp Rate (BPM) 50 BPM   Low Resp Rate (BPM) 5 BPM   High MV (L/min) 20 L/min   Low MV (L/min) 3 L/min   High VT (mL) 1000 mL   Low VT (mL) 250 mL   Apnea Time (s) 20 S   Maintenance   Alarm (pink) cable attached No   Resuscitation bag with peep valve at bedside Yes   Water bag changed No   Circuit changed No     RT Ventilator Management Note  Esperanza Prajapati 54 y.o. female MRN: [id number]  Unit/Bed#: ICU 15 Encounter: [id number]          Physical Exam:

## 2025-01-22 NOTE — CASE MANAGEMENT
Case Management Assessment & Discharge Planning Note    Patient name Esperanza Prajapati  Location ICU 15/ICU 15 MRN [id number]  : 1970 Date 2025       Current Admission Date: 2025  Current Admission Diagnosis:Acute respiratory failure with hypoxia (HCC)   Patient Active Problem List    Diagnosis Date Noted Date Diagnosed    Viral sepsis (HCC) 2025     Toxic metabolic encephalopathy 2025     Fall at home, initial encounter 2025     Influenza A 2025     Acute respiratory failure with hypoxia (HCC) 2025     Decreased urination 10/23/2024     Dermatitis of face 2024     Impaired ability to manage medication regime 10/09/2023     Bilateral lower extremity edema 2023     h/o VALERIE (acute kidney injury) (HCC) 2023     Tachycardia 10/03/2022     Gout 2022     Schizoaffective disorder (HCC) 2022     Transient ischemic attack 2022     Vitamin B12 deficiency 2022     Dyslipidemia 2022     PCOS (polycystic ovarian syndrome) 2020     Obesity (BMI 30-39.9) 2017     Vitamin D deficiency 2016     Cognitive developmental delay 2014     Obstructive sleep apnea 2013     Focal epilepsy (HCC) 2013     History of thyroid cancer 2013     Proteinuria 2011     Postoperative hypothyroidism 06/10/2011     Family history of diabetes mellitus 06/10/2011     Family history of malignant neoplasm of breast 06/10/2011     Intellectual disability 06/10/2011       LOS (days): 2  Geometric Mean LOS (GMLOS) (days): 4.9  Days to GMLOS:2.3     OBJECTIVE:    Risk of Unplanned Readmission Score: 21.57         Current admission status: Inpatient       Preferred Pharmacy:   [facility] - [place] - [address]  [address] [place] [address]  Phone: [phone] Fax: [phone]    Primary Care Provider: Foster Biswas MD    Primary Insurance: [employer] [facility]  Secondary Insurance:     ASSESSMENT:  Active Health Care Proxies       Layne Palm Health Care Representative - Sister   Primary Phone: [phone] (Mobile)                 Advance Directives  Does patient have a Health Care POA?: No  Was patient offered paperwork?: No (intubated)  Does patient currently have a Health Care decision maker?: Yes, please see Health Care Proxy section  Does patient have Advance Directives?: No  Was patient offered paperwork?: No (intubated)  Primary Contact: Layne Palm (sister) [phone]    Patient Information  Mental Status: Intubated  During Assessment patient was accompanied by: Not accompanied during assessment  Assessment information provided by::  (chart review)  Support Systems: Self, Family members, Friend  County of Residence: [place]  What [facility] do you live in?: [place]  Living Arrangements: Lives w/ Parent(s)         Social Determinants of Health (SDOH)      Flowsheet Row Most Recent Value   Housing Stability    In the last 12 months, was there a time when you were not able to pay the mortgage or rent on time? Pt Unable  [intubated]   At any time in the past 12 months, were you homeless or living in a shelter (including now)? Pt Unable   Transportation Needs    In the past 12 months, has lack of transportation kept you from medical appointments or from getting medications? Pt Unable   In the past 12 months, has lack of transportation kept you from meetings, work, or from getting things needed for daily living? Pt Unable   Food Insecurity    Within the past 12 months, you worried that your food would run out before you got the money to buy more. Pt Unable   Within the past 12 months, the food you bought just didn't last and you didn't have money to get more. Pt Unable   Utilities    In the past 12 months has the electric, gas, oil, or water company threatened to shut off services in your home? Pt Unable            DISCHARGE DETAILS:        Contacts  Patient Contacts: Layne Palm (sister) [phone]  Reason/Outcome: Continuity of Care, Discharge Planning, Emergency Contact       Additional Comments: Patient is currently intubated and unaccompanied.  CM will reach out to family for dischrge planning and will continue to follow the patient for intake and dischrge planning.

## 2025-01-22 NOTE — PLAN OF CARE
Problem: Potential for Falls  Goal: Patient will remain free of falls  Description: INTERVENTIONS:  - Educate patient/family on patient safety including physical limitations  - Instruct patient to call for assistance with activity   - Consult OT/PT to assist with strengthening/mobility   - Keep Call bell within reach  - Keep bed low and locked with side rails adjusted as appropriate  - Keep care items and personal belongings within reach  - Initiate and maintain comfort rounds  - Make Fall Risk Sign visible to staff  - Offer Toileting every 2 Hours, in advance of need  - Initiate/Maintain bed alarm  - Obtain necessary fall risk management equipment  - Apply yellow socks and bracelet for high fall risk patients  - Consider moving patient to room near nurses station  Outcome: Progressing     Problem: Prexisting or High Potential for Compromised Skin Integrity  Goal: Skin integrity is maintained or improved  Description: INTERVENTIONS:  - Identify patients at risk for skin breakdown  - Assess and monitor skin integrity  - Assess and monitor nutrition and hydration status  - Monitor labs   - Assess for incontinence   - Turn and reposition patient  - Assist with mobility/ambulation  - Relieve pressure over bony prominences  - Avoid friction and shearing  - Provide appropriate hygiene as needed including keeping skin clean and dry  - Evaluate need for skin moisturizer/barrier cream  - Collaborate with interdisciplinary team   - Patient/family teaching  - Consider wound care consult   Outcome: Progressing     Problem: PAIN - ADULT  Goal: Verbalizes/displays adequate comfort level or baseline comfort level  Description: Interventions:  - Encourage patient to monitor pain and request assistance  - Assess pain using appropriate pain scale  - Administer analgesics based on type and severity of pain and evaluate response  - Implement non-pharmacological measures as appropriate and evaluate response  - Consider cultural and  social influences on pain and pain management  - Notify physician/advanced practitioner if interventions unsuccessful or patient reports new pain  Outcome: Progressing     Problem: INFECTION - ADULT  Goal: Absence or prevention of progression during hospitalization  Description: INTERVENTIONS:  - Assess and monitor for signs and symptoms of infection  - Monitor lab/diagnostic results  - Monitor all insertion sites, i.e. indwelling lines, tubes, and drains  - Monitor endotracheal if appropriate and nasal secretions for changes in amount and color  - [place] appropriate cooling/warming therapies per order  - Administer medications as ordered  - Instruct and encourage patient and family to use good hand hygiene technique  - Identify and instruct in appropriate isolation precautions for identified infection/condition  Outcome: Progressing     Problem: SAFETY ADULT  Goal: Patient will remain free of falls  Description: INTERVENTIONS:  - Educate patient/family on patient safety including physical limitations  - Instruct patient to call for assistance with activity   - Consult OT/PT to assist with strengthening/mobility   - Keep Call bell within reach  - Keep bed low and locked with side rails adjusted as appropriate  - Keep care items and personal belongings within reach  - Initiate and maintain comfort rounds  - Make Fall Risk Sign visible to staff  - Offer Toileting every 2 Hours, in advance of need  - Initiate/Maintain bed alarm  - Obtain necessary fall risk management equipment  - Apply yellow socks and bracelet for high fall risk patients  - Consider moving patient to room near nurses station  Outcome: Progressing  Goal: Maintain or return to baseline ADL function  Description: INTERVENTIONS:  -  Assess patient's ability to carry out ADLs; assess patient's baseline for ADL function and identify physical deficits which impact ability to perform ADLs (bathing, care of mouth/teeth, toileting, grooming, dressing,  etc.)  - Assess/evaluate cause of self-care deficits   - Assess range of motion  - Assess patient's mobility; develop plan if impaired  - Assess patient's need for assistive devices and provide as appropriate  - Encourage maximum independence but intervene and supervise when necessary  - Involve family in performance of ADLs  - Assess for home care needs following discharge   - Consider OT consult to assist with ADL evaluation and planning for discharge  - Provide patient education as appropriate  Outcome: Progressing  Goal: Maintains/Returns to pre admission functional level  Description: INTERVENTIONS:  - Perform AM-PAC 6 Click Basic Mobility/ Daily Activity assessment daily.  - Set and communicate daily mobility goal to care team and patient/family/caregiver.   - Collaborate with rehabilitation services on mobility goals if consulted  - Perform Range of Motion 3 times a day.  - Reposition patient every 2 hours.  - Dangle patient 3 times a day  - Stand patient 3 times a day  - Ambulate patient 3 times a day  - Out of bed to chair 3 times a day   - Out of bed for meals 3 times a day  - Out of bed for toileting  - Record patient progress and toleration of activity level   Outcome: Progressing     Problem: DISCHARGE PLANNING  Goal: Discharge to home or other facility with appropriate resources  Description: INTERVENTIONS:  - Identify barriers to discharge w/patient and caregiver  - Arrange for needed discharge resources and transportation as appropriate  - Identify discharge learning needs (meds, wound care, etc.)  - Arrange for interpretive services to assist at discharge as needed  - Refer to Case Management Department for coordinating discharge planning if the patient needs post-hospital services based on physician/advanced practitioner order or complex needs related to functional status, cognitive ability, or social support system  Outcome: Progressing     Problem: NEUROSENSORY - ADULT  Goal: Achieves stable or  improved neurological status  Description: INTERVENTIONS  - Monitor and report changes in neurological status  - Monitor vital signs such as temperature, blood pressure, glucose, and any other labs ordered   - Initiate measures to prevent increased intracranial pressure  - Monitor for seizure activity and implement precautions if appropriate      Outcome: Progressing  Goal: Remains free of injury related to seizures activity  Description: INTERVENTIONS  - Maintain airway, patient safety  and administer oxygen as ordered  - Monitor patient for seizure activity, document and report duration and description of seizure to physician/advanced practitioner  - If seizure occurs,  ensure patient safety during seizure  - Reorient patient post seizure  - Seizure pads on all 4 side rails  - Instruct patient/family to notify RN of any seizure activity including if an aura is experienced  - Instruct patient/family to call for assistance with activity based on nursing assessment  - Administer anti-seizure medications if ordered    Outcome: Progressing  Goal: Achieves maximal functionality and self care  Description: INTERVENTIONS  - Monitor swallowing and airway patency with patient fatigue and changes in neurological status  - Encourage and assist patient to increase activity and self care.   - Encourage visually impaired, hearing impaired and aphasic patients to use assistive/communication devices  Outcome: Progressing     Problem: RESPIRATORY - ADULT  Goal: Achieves optimal ventilation and oxygenation  Description: INTERVENTIONS:  - Assess for changes in respiratory status  - Assess for changes in mentation and behavior  - Position to facilitate oxygenation and minimize respiratory effort  - Oxygen administered by appropriate delivery if ordered  - Initiate smoking cessation education as indicated  - Encourage broncho-pulmonary hygiene including cough, deep breathe, Incentive Spirometry  - Assess the need for suctioning and  aspirate as needed  - Assess and instruct to report SOB or any respiratory difficulty  - Respiratory Therapy support as indicated  Outcome: Progressing     Problem: GENITOURINARY - ADULT  Goal: Maintains or returns to baseline urinary function  Description: INTERVENTIONS:  - Assess urinary function  - Encourage oral fluids to ensure adequate hydration if ordered  - Administer IV fluids as ordered to ensure adequate hydration  - Administer ordered medications as needed  - Offer frequent toileting  - Follow urinary retention protocol if ordered  Outcome: Progressing  Goal: Absence of urinary retention  Description: INTERVENTIONS:  - Assess patient’s ability to void and empty bladder  - Monitor I/O  - Bladder scan as needed  - Discuss with physician/AP medications to alleviate retention as needed  - Discuss catheterization for long term situations as appropriate  Outcome: Progressing     Problem: METABOLIC, FLUID AND ELECTROLYTES - ADULT  Goal: Electrolytes maintained within normal limits  Description: INTERVENTIONS:  - Monitor labs and assess patient for signs and symptoms of electrolyte imbalances  - Administer electrolyte replacement as ordered  - Monitor response to electrolyte replacements, including repeat lab results as appropriate  - Instruct patient on fluid and nutrition as appropriate  Outcome: Progressing  Goal: Fluid balance maintained  Description: INTERVENTIONS:  - Monitor labs   - Monitor I/O and WT  - Instruct patient on fluid and nutrition as appropriate  - Assess for signs & symptoms of volume excess or deficit  Outcome: Progressing     Problem: HEMATOLOGIC - ADULT  Goal: Maintains hematologic stability  Description: INTERVENTIONS  - Assess for signs and symptoms of bleeding or hemorrhage  - Monitor labs  - Administer supportive blood products/factors as ordered and appropriate  Outcome: Progressing     Problem: MUSCULOSKELETAL - ADULT  Goal: Maintain or return mobility to safest level of  function  Description: INTERVENTIONS:  - Assess patient's ability to carry out ADLs; assess patient's baseline for ADL function and identify physical deficits which impact ability to perform ADLs (bathing, care of mouth/teeth, toileting, grooming, dressing, etc.)  - Assess/evaluate cause of self-care deficits   - Assess range of motion  - Assess patient's mobility  - Assess patient's need for assistive devices and provide as appropriate  - Encourage maximum independence but intervene and supervise when necessary  - Involve family in performance of ADLs  - Assess for home care needs following discharge   - Consider OT consult to assist with ADL evaluation and planning for discharge  - Provide patient education as appropriate  Outcome: Progressing  Goal: Maintain proper alignment of affected body part  Description: INTERVENTIONS:  - Support, maintain and protect limb and body alignment  - Provide patient/ family with appropriate education  Outcome: Progressing     Problem: SAFETY,RESTRAINT: NV/NON-SELF DESTRUCTIVE BEHAVIOR  Goal: Remains free of harm/injury (restraint for non violent/non self-detsructive behavior)  Description: INTERVENTIONS:  - Instruct patient/family regarding restraint use   - Assess and monitor physiologic and psychological status   - Provide interventions and comfort measures to meet assessed patient needs   - Identify and implement measures to help patient regain control  - Assess readiness for release of restraint   Outcome: Progressing  Goal: Returns to optimal restraint-free functioning  Description: INTERVENTIONS:  - Assess the patient's behavior and symptoms that indicate continued need for restraint  - Identify and implement measures to help patient regain control  - Assess readiness for release of restraint   Outcome: Progressing     Problem: Nutrition/Hydration-ADULT  Goal: Nutrient/Hydration intake appropriate for improving, restoring or maintaining nutritional needs  Description:  Monitor and assess patient's nutrition/hydration status for malnutrition. Collaborate with interdisciplinary team and initiate plan and interventions as ordered.  Monitor patient's weight and dietary intake as ordered or per policy. Utilize nutrition screening tool and intervene as necessary. Determine patient's food preferences and provide high-protein, high-caloric foods as appropriate.     INTERVENTIONS:  - Monitor oral intake, urinary output, labs, and treatment plans  - Assess nutrition and hydration status and recommend course of action  - Evaluate amount of meals eaten  - Assist patient with eating if necessary   - Allow adequate time for meals  - Recommend/ encourage appropriate diets, oral nutritional supplements, and vitamin/mineral supplements  - Order, calculate, and assess calorie counts as needed  - Recommend, monitor, and adjust tube feedings and TPN/PPN based on assessed needs  - Assess need for intravenous fluids  - Provide specific nutrition/hydration education as appropriate  - Include patient/family/caregiver in decisions related to nutrition  Outcome: Progressing

## 2025-01-22 NOTE — PHYSICAL THERAPY NOTE
PHYSICAL THERAPY NOTE          Patient Name: Esperanza Prajapati  Today's Date: 1/22/2025 01/22/25 0746   PT Last Visit   PT Visit Date 01/22/25   Note Type   Note type Cancelled Session;Evaluation   Cancel Reasons Intubated/sedated   Additional Comments Pt remains intubated. Will defer PT eval at this time. WIll continue to follow as medically appropriate.     Dario Marino

## 2025-01-22 NOTE — PLAN OF CARE
Problem: Potential for Falls  Goal: Patient will remain free of falls  Description: INTERVENTIONS:  - Educate patient/family on patient safety including physical limitations  - Instruct patient to call for assistance with activity   - Consult OT/PT to assist with strengthening/mobility   - Keep Call bell within reach  - Keep bed low and locked with side rails adjusted as appropriate  - Keep care items and personal belongings within reach  - Initiate and maintain comfort rounds  - Make Fall Risk Sign visible to staff  - Offer Toileting every 2 Hours, in advance of need  - Initiate/Maintain bed alarm  - Obtain necessary fall risk management equipment:   - Apply yellow socks and bracelet for high fall risk patients  - Consider moving patient to room near nurses station  Outcome: Progressing     Problem: Prexisting or High Potential for Compromised Skin Integrity  Goal: Skin integrity is maintained or improved  Description: INTERVENTIONS:  - Identify patients at risk for skin breakdown  - Assess and monitor skin integrity  - Assess and monitor nutrition and hydration status  - Monitor labs   - Assess for incontinence   - Turn and reposition patient  - Assist with mobility/ambulation  - Relieve pressure over bony prominences  - Avoid friction and shearing  - Provide appropriate hygiene as needed including keeping skin clean and dry  - Evaluate need for skin moisturizer/barrier cream  - Collaborate with interdisciplinary team   - Patient/family teaching  - Consider wound care consult   Outcome: Progressing     Problem: PAIN - ADULT  Goal: Verbalizes/displays adequate comfort level or baseline comfort level  Description: Interventions:  - Encourage patient to monitor pain and request assistance  - Assess pain using appropriate pain scale  - Administer analgesics based on type and severity of pain and evaluate response  - Implement non-pharmacological measures as appropriate and evaluate response  - Consider cultural and  social influences on pain and pain management  - Notify physician/advanced practitioner if interventions unsuccessful or patient reports new pain  Outcome: Progressing     Problem: INFECTION - ADULT  Goal: Absence or prevention of progression during hospitalization  Description: INTERVENTIONS:  - Assess and monitor for signs and symptoms of infection  - Monitor lab/diagnostic results  - Monitor all insertion sites, i.e. indwelling lines, tubes, and drains  - Monitor endotracheal if appropriate and nasal secretions for changes in amount and color  - [place] appropriate cooling/warming therapies per order  - Administer medications as ordered  - Instruct and encourage patient and family to use good hand hygiene technique  - Identify and instruct in appropriate isolation precautions for identified infection/condition  Outcome: Progressing     Problem: SAFETY ADULT  Goal: Patient will remain free of falls  Description: INTERVENTIONS:  - Educate patient/family on patient safety including physical limitations  - Instruct patient to call for assistance with activity   - Consult OT/PT to assist with strengthening/mobility   - Keep Call bell within reach  - Keep bed low and locked with side rails adjusted as appropriate  - Keep care items and personal belongings within reach  - Initiate and maintain comfort rounds  - Make Fall Risk Sign visible to staff  - Offer Toileting every 2 Hours, in advance of need  - Initiate/Maintain bed alarm  - Obtain necessary fall risk management equipment:   - Apply yellow socks and bracelet for high fall risk patients  - Consider moving patient to room near nurses station  Outcome: Progressing  Goal: Maintain or return to baseline ADL function  Description: INTERVENTIONS:  -  Assess patient's ability to carry out ADLs; assess patient's baseline for ADL function and identify physical deficits which impact ability to perform ADLs (bathing, care of mouth/teeth, toileting, grooming, dressing,  etc.)  - Assess/evaluate cause of self-care deficits   - Assess range of motion  - Assess patient's mobility; develop plan if impaired  - Assess patient's need for assistive devices and provide as appropriate  - Encourage maximum independence but intervene and supervise when necessary  - Involve family in performance of ADLs  - Assess for home care needs following discharge   - Consider OT consult to assist with ADL evaluation and planning for discharge  - Provide patient education as appropriate  Outcome: Progressing  Goal: Maintains/Returns to pre admission functional level  Description: INTERVENTIONS:  - Perform AM-PAC 6 Click Basic Mobility/ Daily Activity assessment daily.  - Set and communicate daily mobility goal to care team and patient/family/caregiver.   - Collaborate with rehabilitation services on mobility goals if consulted  - Perform Range of Motion 3 times a day.  - Reposition patient every 2 hours.  - Dangle patient 3 times a day  - Stand patient 3 times a day  - Ambulate patient 3 times a day  - Out of bed to chair 3 times a day   - Out of bed for meals 3 times a day  - Out of bed for toileting  - Record patient progress and toleration of activity level   Outcome: Progressing     Problem: DISCHARGE PLANNING  Goal: Discharge to home or other facility with appropriate resources  Description: INTERVENTIONS:  - Identify barriers to discharge w/patient and caregiver  - Arrange for needed discharge resources and transportation as appropriate  - Identify discharge learning needs (meds, wound care, etc.)  - Arrange for interpretive services to assist at discharge as needed  - Refer to Case Management Department for coordinating discharge planning if the patient needs post-hospital services based on physician/advanced practitioner order or complex needs related to functional status, cognitive ability, or social support system  Outcome: Progressing     Problem: NEUROSENSORY - ADULT  Goal: Achieves stable or  improved neurological status  Description: INTERVENTIONS  - Monitor and report changes in neurological status  - Monitor vital signs such as temperature, blood pressure, glucose, and any other labs ordered   - Initiate measures to prevent increased intracranial pressure  - Monitor for seizure activity and implement precautions if appropriate      Outcome: Progressing  Goal: Remains free of injury related to seizures activity  Description: INTERVENTIONS  - Maintain airway, patient safety  and administer oxygen as ordered  - Monitor patient for seizure activity, document and report duration and description of seizure to physician/advanced practitioner  - If seizure occurs,  ensure patient safety during seizure  - Reorient patient post seizure  - Seizure pads on all 4 side rails  - Instruct patient/family to notify RN of any seizure activity including if an aura is experienced  - Instruct patient/family to call for assistance with activity based on nursing assessment  - Administer anti-seizure medications if ordered    Outcome: Progressing  Goal: Achieves maximal functionality and self care  Description: INTERVENTIONS  - Monitor swallowing and airway patency with patient fatigue and changes in neurological status  - Encourage and assist patient to increase activity and self care.   - Encourage visually impaired, hearing impaired and aphasic patients to use assistive/communication devices  Outcome: Progressing     Problem: RESPIRATORY - ADULT  Goal: Achieves optimal ventilation and oxygenation  Description: INTERVENTIONS:  - Assess for changes in respiratory status  - Assess for changes in mentation and behavior  - Position to facilitate oxygenation and minimize respiratory effort  - Oxygen administered by appropriate delivery if ordered  - Initiate smoking cessation education as indicated  - Encourage broncho-pulmonary hygiene including cough, deep breathe, Incentive Spirometry  - Assess the need for suctioning and  aspirate as needed  - Assess and instruct to report SOB or any respiratory difficulty  - Respiratory Therapy support as indicated  Outcome: Progressing     Problem: GENITOURINARY - ADULT  Goal: Maintains or returns to baseline urinary function  Description: INTERVENTIONS:  - Assess urinary function  - Encourage oral fluids to ensure adequate hydration if ordered  - Administer IV fluids as ordered to ensure adequate hydration  - Administer ordered medications as needed  - Offer frequent toileting  - Follow urinary retention protocol if ordered  Outcome: Progressing  Goal: Absence of urinary retention  Description: INTERVENTIONS:  - Assess patient’s ability to void and empty bladder  - Monitor I/O  - Bladder scan as needed  - Discuss with physician/AP medications to alleviate retention as needed  - Discuss catheterization for long term situations as appropriate  Outcome: Progressing     Problem: METABOLIC, FLUID AND ELECTROLYTES - ADULT  Goal: Electrolytes maintained within normal limits  Description: INTERVENTIONS:  - Monitor labs and assess patient for signs and symptoms of electrolyte imbalances  - Administer electrolyte replacement as ordered  - Monitor response to electrolyte replacements, including repeat lab results as appropriate  - Instruct patient on fluid and nutrition as appropriate  Outcome: Progressing  Goal: Fluid balance maintained  Description: INTERVENTIONS:  - Monitor labs   - Monitor I/O and WT  - Instruct patient on fluid and nutrition as appropriate  - Assess for signs & symptoms of volume excess or deficit  Outcome: Progressing     Problem: HEMATOLOGIC - ADULT  Goal: Maintains hematologic stability  Description: INTERVENTIONS  - Assess for signs and symptoms of bleeding or hemorrhage  - Monitor labs  - Administer supportive blood products/factors as ordered and appropriate  Outcome: Progressing     Problem: MUSCULOSKELETAL - ADULT  Goal: Maintain or return mobility to safest level of  function  Description: INTERVENTIONS:  - Assess patient's ability to carry out ADLs; assess patient's baseline for ADL function and identify physical deficits which impact ability to perform ADLs (bathing, care of mouth/teeth, toileting, grooming, dressing, etc.)  - Assess/evaluate cause of self-care deficits   - Assess range of motion  - Assess patient's mobility  - Assess patient's need for assistive devices and provide as appropriate  - Encourage maximum independence but intervene and supervise when necessary  - Involve family in performance of ADLs  - Assess for home care needs following discharge   - Consider OT consult to assist with ADL evaluation and planning for discharge  - Provide patient education as appropriate  Outcome: Progressing  Goal: Maintain proper alignment of affected body part  Description: INTERVENTIONS:  - Support, maintain and protect limb and body alignment  - Provide patient/ family with appropriate education  Outcome: Progressing     Problem: SAFETY,RESTRAINT: NV/NON-SELF DESTRUCTIVE BEHAVIOR  Goal: Remains free of harm/injury (restraint for non violent/non self-detsructive behavior)  Description: INTERVENTIONS:  - Instruct patient/family regarding restraint use   - Assess and monitor physiologic and psychological status   - Provide interventions and comfort measures to meet assessed patient needs   - Identify and implement measures to help patient regain control  - Assess readiness for release of restraint   Outcome: Progressing  Goal: Returns to optimal restraint-free functioning  Description: INTERVENTIONS:  - Assess the patient's behavior and symptoms that indicate continued need for restraint  - Identify and implement measures to help patient regain control  - Assess readiness for release of restraint   Outcome: Progressing     Problem: Nutrition/Hydration-ADULT  Goal: Nutrient/Hydration intake appropriate for improving, restoring or maintaining nutritional needs  Description:  Monitor and assess patient's nutrition/hydration status for malnutrition. Collaborate with interdisciplinary team and initiate plan and interventions as ordered.  Monitor patient's weight and dietary intake as ordered or per policy. Utilize nutrition screening tool and intervene as necessary. Determine patient's food preferences and provide high-protein, high-caloric foods as appropriate.     INTERVENTIONS:  - Monitor oral intake, urinary output, labs, and treatment plans  - Assess nutrition and hydration status and recommend course of action  - Evaluate amount of meals eaten  - Assist patient with eating if necessary   - Allow adequate time for meals  - Recommend/ encourage appropriate diets, oral nutritional supplements, and vitamin/mineral supplements  - Order, calculate, and assess calorie counts as needed  - Recommend, monitor, and adjust tube feedings and TPN/PPN based on assessed needs  - Assess need for intravenous fluids  - Provide specific nutrition/hydration education as appropriate  - Include patient/family/caregiver in decisions related to nutrition  Outcome: Progressing

## 2025-01-22 NOTE — PROGRESS NOTES
Progress Note - Critical Care/ICU   Name: Esperanza Prajapati 54 y.o. female I MRN: [id number]  Unit/Bed#: ICU 15 I Date of Admission: 1/19/2025   Date of Service: 1/22/2025 I Hospital Day: 2      Assessment & Plan  Acute respiratory failure with hypoxia (HCC)  Due to Flu A, viral sepsis - consider multifocal aspiration PNA  Possible ARDS, could be superimposed bacterial pneumonia in the background of flu  CT C/A/P negative on admission   Procal negative on admission, then a significant elevation  CXR showed b/l opacities - aspiration cannot be excluded at this time   Continue adebayo. Nebs   Continue ceftriaxone, azithromycin (started 1/20)  Continue Tamiflu  Currently on cmv  Sputum cx if able, MRSA pending  Legionella , Streptococcus pneumonia unremarkable    Currently patient is on norepinephrine, vasopressin, phenylephrine drip  Currently patient is on propofol, fentanyl drip as sedatives  Focal epilepsy (HCC)  Follows with [facility] neurology   Focal epilepsy manifests as simple partial, complex partial, and generalized tonic-clonic seizures   Hx of b/l postural and action tremor per neurology notes  Home regimen: vimpat 150 daily, vimpat 200mg qHS, Depakote 750mg BID  Valproic level therapeutic 1/20  Low suspicion for any breakthrough seizures, not post-ictal on exam and is interactive  Vimpat transitioned to IV, depakote via oG-tube  Viral sepsis (HCC)  Due to Flu A + on 1/19   SIRS on admission with tachycardia, tachypnea, leukopenia, fever   Lactic negative, no hypotension on admission   Overnight 1/21 with borderline hypotensive, responsive to 1L bolus  Abx with ceftriaxone and azithromycin  Bcx negative for 48 hours, U/a negative, MRSA  Cognitive developmental delay  Due to head injury at age of 10 months   Postoperative hypothyroidism  Stage I papillary thyroid cancer s/p total thyroidectomy and ablation in 2013  Post-operative hypothyroidism   Levothyroxine via OG tube  Schizoaffective disorder  ([facility])  Home regimen: zyprexa 10mg daily, trazodone 50mg PRN, invega 6mg daily   Zyprexa transitioned to IM due to NPO, trazodone and invega on hold   Tachycardia  In the setting of agitation, hypoxia, viral sepsis, fever  Sinus tach with occasional PVCs to 150s   CT C/A/P negative on admission   Trial nighttime zyprexa   Consider CTA if persistent    EKG as needed  Toxic metabolic encephalopathy  Due to viral sepsis, hypoxia, and infection  Hx of developmental delay although at baseline she is A&O x 3  CTH on admission negative   Do not suspect breakthrough seizures  Supportive care at this time  Fall at home, initial encounter  Presented with fall, + head strike   CTH negative   Bed alarm/fall precautions  Hyponatremia (Resolved: 1/22/2025)  Likely in the setting of poor PO intake   Na on admission 132  Continue to trend BMP   Influenza A  Positive on 1/19  Complicated by viral sepsis, see plan below  Tamiflu via oG-tube    Disposition: Critical care    ICU Core Measures     Vented Patient  VAP Bundle  VAP bundle ordered     A: Assess, Prevent, and Manage Pain Has pain been assessed? NA  Need for changes to pain regimen? No   B: Both Spontaneous Awakening Trials (SATs) and Spontaneous Breathing Trials (SBTs) Plan to perform spontaneous awakening trial today? Yes   Plan to perform spontaneous breathing trial today? Yes   Obvious barriers to extubation? Yes   C: Choice of Sedation RASS Goal: -4 Deep Sedation or 0 Alert and Calm  Need for changes to sedation or analgesia regimen? No   D: Delirium CAM-ICU: Positive   E: Early Mobility  Plan for early mobility? NA   F: Family Engagement Plan for family engagement today? Yes       Antibiotic Review: Patient on appropriate coverage based on culture data. , Awaiting culture results. , and Continue broad spectrum secondary to severity of illness.     Review of Invasive Devices:    Harris Plan: Continue for accurate I/O monitoring for 48 hours and Voiding trial after  improvement in ambulation   Central access plan: Patient has multiple central venous catheters.  Medications requiring central line Hemodynamic monitoring  Mercedes Plan: Keep arterial line for hemodynamic monitoring, frequent ABGs, and frequent labs    Prophylaxis:  VTE VTE covered by:  enoxaparin, Subcutaneous, 40 mg at 01/21/25 0845       Stress Ulcer  covered byomeprazole (PRILOSEC) suspension 2 mg/mL [[id number]]         24 Hour Events : Yesterday on 1/21, patient's SpO2 was dropped in 70s and patient was having acute worsening altered mental status over intubation was performed in the emergent setting.  Patient was started on norepinephrine, vasopressin, phenylephrine to maintain blood pressure.  Subjective   Review of Systems: See HPI for Review of Systems    Objective :                   Vitals I/O      Most Recent Min/Max in 24hrs   Temp 99 °F (37.2 °C) Temp  Min: 99 °F (37.2 °C)  Max: 103.3 °F (39.6 °C)   Pulse 84 Pulse  Min: 83  Max: 164   Resp 17 Resp  Min: 17  Max: 26   /85 BP  Min: 66/38  Max: 123/62   O2 Sat 97 % SpO2  Min: 88 %  Max: 100 %      Intake/Output Summary (Last 24 hours) at 1/22/2025 0834  Last data filed at 1/22/2025 0600  Gross per 24 hour   Intake 1574.85 ml   Output 1385 ml   Net 189.85 ml       Diet Enteral/Parenteral; Tube Feeding No Oral Diet; Vital High Protein; Continuous; 20; 20; Every 6 hours    Invasive Monitoring           Physical Exam   Physical Exam  Vitals and nursing note reviewed.   Eyes:      Pupils: Pupils are equal, round, and reactive to light.   Skin:     General: Skin is warm and dry.      Capillary Refill: Capillary refill takes less than 2 seconds.   HENT:      Head: Normocephalic and atraumatic.   Cardiovascular:      Rate and Rhythm: Regular rhythm. Tachycardia present.      Pulses: Normal pulses.      Heart sounds: Normal heart sounds.   Musculoskeletal:         General: Normal range of motion.      Right lower leg: Trace Edema present.      Left lower leg:  Trace Edema present.   Abdominal: General: There is no distension.      Palpations: Abdomen is soft.      Tenderness: There is no abdominal tenderness.   Constitutional:       Appearance: She is well-developed and well-nourished. She is ill-appearing.      Interventions: She is sedated and intubated.      Comments: A-line  Central venous line       Pulmonary:      Effort: Pulmonary effort is normal. She is intubated.      Breath sounds: Rhonchi and rales present.   Secretions are abnormal .Neurological:      Mental Status: She is somnolent.      Comments: Withdraws to pain, does not follow commands, intermittently interactive, tracks across the room.    Genitourinary/Anorectal:  Harris present.        Diagnostic Studies        Lab Results: I have reviewed the following results:     Medications:  Scheduled PRN   Albumin 25%, 25 g, Once  azithromycin, 500 mg, Q24H  benztropine, 2 mg, BID  cefTRIAXone, 2,000 mg, Q24H  chlorhexidine, 15 mL, Q12H MOSHE  cyanocobalamin, 1,000 mcg, Daily  enoxaparin, 40 mg, Daily  hydrocortisone sodium succinate, 100 mg, Q8H MOSHE  insulin lispro, 2-12 Units, TID AC  insulin lispro, 2-12 Units, HS  ipratropium, 0.5 mg, TID  lacosamide, 150 mg, Daily  lacosamide, 200 mg, HS  levalbuterol, 1.25 mg, TID  levothyroxine, 175 mcg, Early Morning  melatonin, 3 mg, HS  OLANZapine, 10 mg, HS  omeprazole (PRILOSEC) suspension 2 mg/mL, 20 mg, Daily  oseltamivir, 75 mg, Q12H MOSHE  [Held by provider] paliperidone, 6 mg, QAM  valproic acid, 750 mg, Q12H MOSHE      Acetaminophen, 650 mg, Q4H PRN  aluminum-magnesium hydroxide-simethicone, 30 mL, Q6H PRN  fentaNYL, 50 mcg, Q1H PRN  ondansetron, 4 mg, Q6H PRN       Continuous    fentaNYL, 50 mcg/hr, Last Rate: 50 mcg/hr (01/22/25 0648)  norepinephrine, 1-30 mcg/min, Last Rate: 4 mcg/min (01/22/25 0648)  phenylephine,  mcg/min, Last Rate: 120 mcg/min (01/22/25 0647)  propofol, 5-50 mcg/kg/min, Last Rate: 10 mcg/kg/min (01/22/25 0225)  vasopressin, 0.04  Units/min, Last Rate: 0.04 Units/min (01/22/25 0640)         Labs:   CBC    Recent Labs     01/21/25 0422 01/22/25  0441   WBC 3.25* 6.41   HGB 11.3* 10.5*   HCT 35.4 32.3*   * 120*   BANDSPCT  --  33*     BMP    Recent Labs     01/21/25  0555 01/22/25  0441   SODIUM 135 136   K 4.0 4.3    104   CO2 26 25   AGAP 4 7   BUN 16 23   CREATININE 0.92 1.16   CALCIUM 7.6* 8.4       Coags    No recent results     Additional Electrolytes  Recent Labs     01/21/25 0422 01/21/25  0555 01/22/25  0441   MG  --  1.8* 2.3   PHOS  --  2.7 2.5*   CAIONIZED 1.00*  --  1.20          Blood Gas    Recent Labs     01/21/25  1115   PHART 7.284*   YHE3AKW 52.8*   PO2ART 97.2   ZAO2YAX 24.5   BEART -2.6   SOURCE Radial, Right     Recent Labs     01/21/25  0903 01/21/25  1115   PHVEN 7.280*  --    SFL7MQW 51.4*  --    PO2VEN 150.0*  --    [id number] 23.6*  --    BEVEN -3.5  --    Z7IAFEH 97.3*  --    SOURCE  --  Radial, Right    LFTs  Recent Labs     01/21/25  0555 01/22/25  0441   ALT 8 10   AST 16 32   ALKPHOS 29* 22*   ALB 2.8* 2.8*   TBILI 0.27 0.32       Infectious  Recent Labs     01/21/25 0422   PROCALCITONI 1.97*     Glucose  Recent Labs     01/21/25  0555 01/22/25  0441   GLUC 102 222*

## 2025-01-22 NOTE — OCCUPATIONAL THERAPY NOTE
Occupational Therapy         Patient Name: Esperanza Prajapati  Today's Date: 1/22/2025      Pt remains intubated. Will continue OT intervention when medically appropriate. Mert Dumont

## 2025-01-23 ENCOUNTER — APPOINTMENT (INPATIENT)
Dept: RADIOLOGY | Facility: HOSPITAL | Age: 55
DRG: 870 | End: 2025-01-23
Payer: COMMERCIAL

## 2025-01-23 ENCOUNTER — TELEPHONE (OUTPATIENT)
Dept: NEUROLOGY | Facility: CLINIC | Age: 55
End: 2025-01-23

## 2025-01-23 LAB
ALBUMIN SERPL BCG-MCNC: 2.9 G/DL (ref 3.5–5)
ALP SERPL-CCNC: 20 U/L (ref 34–104)
ALT SERPL W P-5'-P-CCNC: 10 U/L (ref 7–52)
ANION GAP SERPL CALCULATED.3IONS-SCNC: 8 MMOL/L (ref 4–13)
ANISOCYTOSIS BLD QL SMEAR: PRESENT
AST SERPL W P-5'-P-CCNC: 20 U/L (ref 13–39)
BASOPHILS # BLD MANUAL: 0 THOUSAND/UL (ref 0–0.1)
BASOPHILS NFR MAR MANUAL: 0 % (ref 0–1)
BILIRUB SERPL-MCNC: 0.31 MG/DL (ref 0.2–1)
BUN SERPL-MCNC: 44 MG/DL (ref 5–25)
CALCIUM ALBUM COR SERPL-MCNC: 9.5 MG/DL (ref 8.3–10.1)
CALCIUM SERPL-MCNC: 8.6 MG/DL (ref 8.4–10.2)
CHLORIDE SERPL-SCNC: 102 MMOL/L (ref 96–108)
CO2 SERPL-SCNC: 26 MMOL/L (ref 21–32)
CREAT SERPL-MCNC: 1.19 MG/DL (ref 0.6–1.3)
DOHLE BOD BLD QL SMEAR: PRESENT
EOSINOPHIL # BLD MANUAL: 0 THOUSAND/UL (ref 0–0.4)
EOSINOPHIL NFR BLD MANUAL: 0 % (ref 0–6)
ERYTHROCYTE [DISTWIDTH] IN BLOOD BY AUTOMATED COUNT: 14.2 % (ref 11.6–15.1)
GFR SERPL CREATININE-BSD FRML MDRD: 52 ML/MIN/1.73SQ M
GLUCOSE SERPL-MCNC: 133 MG/DL (ref 65–140)
GLUCOSE SERPL-MCNC: 154 MG/DL (ref 65–140)
GLUCOSE SERPL-MCNC: 160 MG/DL (ref 65–140)
GLUCOSE SERPL-MCNC: 164 MG/DL (ref 65–140)
GLUCOSE SERPL-MCNC: 167 MG/DL (ref 65–140)
HCT VFR BLD AUTO: 28.5 % (ref 34.8–46.1)
HGB BLD-MCNC: 9.4 G/DL (ref 11.5–15.4)
LG PLATELETS BLD QL SMEAR: PRESENT
LYMPHOCYTES # BLD AUTO: 1.11 THOUSAND/UL (ref 0.6–4.47)
LYMPHOCYTES # BLD AUTO: 18 % (ref 14–44)
MAGNESIUM SERPL-MCNC: 2.6 MG/DL (ref 1.9–2.7)
MCH RBC QN AUTO: 32.5 PG (ref 26.8–34.3)
MCHC RBC AUTO-ENTMCNC: 33 G/DL (ref 31.4–37.4)
MCV RBC AUTO: 99 FL (ref 82–98)
MONOCYTES # BLD AUTO: 0.62 THOUSAND/UL (ref 0–1.22)
MONOCYTES NFR BLD: 10 % (ref 4–12)
MYELOCYTE ABSOLUTE CT: 0.06 THOUSAND/UL (ref 0–0.1)
MYELOCYTES NFR BLD MANUAL: 1 % (ref 0–1)
NEUTROPHILS # BLD MANUAL: 4.39 THOUSAND/UL (ref 1.85–7.62)
NEUTS BAND NFR BLD MANUAL: 16 % (ref 0–8)
NEUTS SEG NFR BLD AUTO: 55 % (ref 43–75)
PHOSPHATE SERPL-MCNC: 2.4 MG/DL (ref 2.7–4.5)
PLATELET # BLD AUTO: 116 THOUSANDS/UL (ref 149–390)
PLATELET BLD QL SMEAR: ABNORMAL
PMV BLD AUTO: 10.8 FL (ref 8.9–12.7)
POTASSIUM SERPL-SCNC: 3.3 MMOL/L (ref 3.5–5.3)
POTASSIUM SERPL-SCNC: 4 MMOL/L (ref 3.5–5.3)
PROT SERPL-MCNC: 6.4 G/DL (ref 6.4–8.4)
RBC # BLD AUTO: 2.89 MILLION/UL (ref 3.81–5.12)
RBC MORPH BLD: PRESENT
SODIUM SERPL-SCNC: 136 MMOL/L (ref 135–147)
WBC # BLD AUTO: 6.19 THOUSAND/UL (ref 4.31–10.16)

## 2025-01-23 PROCEDURE — 85007 BL SMEAR W/DIFF WBC COUNT: CPT

## 2025-01-23 PROCEDURE — 83735 ASSAY OF MAGNESIUM: CPT

## 2025-01-23 PROCEDURE — 94640 AIRWAY INHALATION TREATMENT: CPT

## 2025-01-23 PROCEDURE — 80053 COMPREHEN METABOLIC PANEL: CPT

## 2025-01-23 PROCEDURE — 71045 X-RAY EXAM CHEST 1 VIEW: CPT

## 2025-01-23 PROCEDURE — 94003 VENT MGMT INPAT SUBQ DAY: CPT

## 2025-01-23 PROCEDURE — 84100 ASSAY OF PHOSPHORUS: CPT

## 2025-01-23 PROCEDURE — 82948 REAGENT STRIP/BLOOD GLUCOSE: CPT

## 2025-01-23 PROCEDURE — 94760 N-INVAS EAR/PLS OXIMETRY 1: CPT

## 2025-01-23 PROCEDURE — 94150 VITAL CAPACITY TEST: CPT

## 2025-01-23 PROCEDURE — 84132 ASSAY OF SERUM POTASSIUM: CPT

## 2025-01-23 PROCEDURE — 85027 COMPLETE CBC AUTOMATED: CPT

## 2025-01-23 PROCEDURE — 99291 CRITICAL CARE FIRST HOUR: CPT | Performed by: INTERNAL MEDICINE

## 2025-01-23 RX ORDER — ALBUMIN (HUMAN) 12.5 G/50ML
25 SOLUTION INTRAVENOUS ONCE
Status: COMPLETED | OUTPATIENT
Start: 2025-01-23 | End: 2025-01-23

## 2025-01-23 RX ORDER — FUROSEMIDE 10 MG/ML
40 INJECTION INTRAMUSCULAR; INTRAVENOUS
Status: DISCONTINUED | OUTPATIENT
Start: 2025-01-24 | End: 2025-01-24

## 2025-01-23 RX ORDER — POTASSIUM CHLORIDE 29.8 MG/ML
40 INJECTION INTRAVENOUS ONCE
Status: COMPLETED | OUTPATIENT
Start: 2025-01-23 | End: 2025-01-23

## 2025-01-23 RX ORDER — FUROSEMIDE 10 MG/ML
40 INJECTION INTRAMUSCULAR; INTRAVENOUS ONCE
Status: DISCONTINUED | OUTPATIENT
Start: 2025-01-23 | End: 2025-01-23

## 2025-01-23 RX ORDER — FENTANYL CITRATE-0.9 % NACL/PF 10 MCG/ML
100 PLASTIC BAG, INJECTION (ML) INTRAVENOUS CONTINUOUS
Status: DISCONTINUED | OUTPATIENT
Start: 2025-01-23 | End: 2025-01-30

## 2025-01-23 RX ORDER — METOLAZONE 5 MG/1
5 TABLET ORAL ONCE
Status: COMPLETED | OUTPATIENT
Start: 2025-01-23 | End: 2025-01-23

## 2025-01-23 RX ORDER — DEXMEDETOMIDINE HYDROCHLORIDE 4 UG/ML
.1-.7 INJECTION, SOLUTION INTRAVENOUS
Status: DISCONTINUED | OUTPATIENT
Start: 2025-01-23 | End: 2025-01-24

## 2025-01-23 RX ORDER — FUROSEMIDE 10 MG/ML
40 INJECTION INTRAMUSCULAR; INTRAVENOUS ONCE
Status: COMPLETED | OUTPATIENT
Start: 2025-01-23 | End: 2025-01-23

## 2025-01-23 RX ORDER — PROPOFOL 10 MG/ML
INJECTION, EMULSION INTRAVENOUS
Status: COMPLETED
Start: 2025-01-23 | End: 2025-01-23

## 2025-01-23 RX ORDER — PROPOFOL 10 MG/ML
5-50 INJECTION, EMULSION INTRAVENOUS
Status: DISCONTINUED | OUTPATIENT
Start: 2025-01-23 | End: 2025-01-29

## 2025-01-23 RX ADMIN — ALBUMIN (HUMAN) 25 G: 0.25 INJECTION, SOLUTION INTRAVENOUS at 07:08

## 2025-01-23 RX ADMIN — BENZTROPINE MESYLATE 2 MG: 1 TABLET ORAL at 08:36

## 2025-01-23 RX ADMIN — POTASSIUM CHLORIDE 40 MEQ: 29.8 INJECTION, SOLUTION INTRAVENOUS at 16:28

## 2025-01-23 RX ADMIN — FENTANYL CITRATE 50 MCG: 50 INJECTION INTRAMUSCULAR; INTRAVENOUS at 11:55

## 2025-01-23 RX ADMIN — OSELTAMIVIR PHOSPHATE 75 MG: 6 POWDER, FOR SUSPENSION ORAL at 08:38

## 2025-01-23 RX ADMIN — OLANZAPINE 10 MG: 5 TABLET, FILM COATED ORAL at 21:35

## 2025-01-23 RX ADMIN — CYANOCOBALAMIN TAB 500 MCG 1000 MCG: 500 TAB at 08:36

## 2025-01-23 RX ADMIN — LACOSAMIDE ORAL SOLUTION 200 MG: 10 SOLUTION ORAL at 21:15

## 2025-01-23 RX ADMIN — PROPOFOL 20 MCG/KG/MIN: 10 INJECTION, EMULSION INTRAVENOUS at 22:46

## 2025-01-23 RX ADMIN — IPRATROPIUM BROMIDE 0.5 MG: 0.5 SOLUTION RESPIRATORY (INHALATION) at 13:16

## 2025-01-23 RX ADMIN — HYDROCORTISONE SODIUM SUCCINATE 100 MG: 100 INJECTION, POWDER, FOR SOLUTION INTRAMUSCULAR; INTRAVENOUS at 13:54

## 2025-01-23 RX ADMIN — METOLAZONE 5 MG: 5 TABLET ORAL at 13:54

## 2025-01-23 RX ADMIN — Medication 50 MCG/HR: at 17:38

## 2025-01-23 RX ADMIN — FUROSEMIDE 40 MG: 10 INJECTION, SOLUTION INTRAVENOUS at 15:06

## 2025-01-23 RX ADMIN — MELATONIN 3 MG: 3 TAB ORAL at 21:35

## 2025-01-23 RX ADMIN — PROPOFOL 5 MCG/KG/MIN: 10 INJECTION, EMULSION INTRAVENOUS at 17:36

## 2025-01-23 RX ADMIN — INSULIN LISPRO 2 UNITS: 100 INJECTION, SOLUTION INTRAVENOUS; SUBCUTANEOUS at 08:00

## 2025-01-23 RX ADMIN — BENZTROPINE MESYLATE 2 MG: 1 TABLET ORAL at 17:07

## 2025-01-23 RX ADMIN — IPRATROPIUM BROMIDE 0.5 MG: 0.5 SOLUTION RESPIRATORY (INHALATION) at 20:23

## 2025-01-23 RX ADMIN — CEFTRIAXONE 2000 MG: 10 INJECTION, POWDER, FOR SOLUTION INTRAVENOUS at 21:55

## 2025-01-23 RX ADMIN — LACOSAMIDE ORAL SOLUTION 150 MG: 10 SOLUTION ORAL at 08:35

## 2025-01-23 RX ADMIN — PHENYLEPHRINE HYDROCHLORIDE 25 MCG/MIN: 50 INJECTION INTRAVENOUS at 00:17

## 2025-01-23 RX ADMIN — OSELTAMIVIR PHOSPHATE 75 MG: 6 POWDER, FOR SUSPENSION ORAL at 21:35

## 2025-01-23 RX ADMIN — LEVOTHYROXINE SODIUM 175 MCG: 0.05 TABLET ORAL at 05:15

## 2025-01-23 RX ADMIN — ENOXAPARIN SODIUM 40 MG: 40 INJECTION SUBCUTANEOUS at 08:36

## 2025-01-23 RX ADMIN — CHLORHEXIDINE GLUCONATE 15 ML: 1.2 RINSE ORAL at 08:35

## 2025-01-23 RX ADMIN — PROPOFOL 10 MCG/KG/MIN: 10 INJECTION, EMULSION INTRAVENOUS at 05:53

## 2025-01-23 RX ADMIN — HYDROCORTISONE SODIUM SUCCINATE 100 MG: 100 INJECTION, POWDER, FOR SOLUTION INTRAMUSCULAR; INTRAVENOUS at 05:16

## 2025-01-23 RX ADMIN — FUROSEMIDE 40 MG: 10 INJECTION, SOLUTION INTRAVENOUS at 08:35

## 2025-01-23 RX ADMIN — CHLORHEXIDINE GLUCONATE 15 ML: 1.2 RINSE ORAL at 20:53

## 2025-01-23 RX ADMIN — POTASSIUM PHOSPHATE 6 MMOL: 236; 224 INJECTION, SOLUTION INTRAVENOUS at 08:16

## 2025-01-23 RX ADMIN — INSULIN LISPRO 2 UNITS: 100 INJECTION, SOLUTION INTRAVENOUS; SUBCUTANEOUS at 22:56

## 2025-01-23 RX ADMIN — FENTANYL CITRATE 50 MCG: 50 INJECTION INTRAMUSCULAR; INTRAVENOUS at 08:25

## 2025-01-23 RX ADMIN — DEXMEDETOMIDINE HYDROCHLORIDE 0.2 MCG/KG/HR: 4 INJECTION, SOLUTION INTRAVENOUS at 10:56

## 2025-01-23 RX ADMIN — LEVALBUTEROL HYDROCHLORIDE 1.25 MG: 1.25 SOLUTION RESPIRATORY (INHALATION) at 08:08

## 2025-01-23 RX ADMIN — Medication 20 MG: at 08:49

## 2025-01-23 RX ADMIN — IPRATROPIUM BROMIDE 0.5 MG: 0.5 SOLUTION RESPIRATORY (INHALATION) at 08:08

## 2025-01-23 RX ADMIN — Medication 50 MCG/HR: at 03:24

## 2025-01-23 RX ADMIN — LEVALBUTEROL HYDROCHLORIDE 1.25 MG: 1.25 SOLUTION RESPIRATORY (INHALATION) at 13:16

## 2025-01-23 RX ADMIN — LEVALBUTEROL HYDROCHLORIDE 1.25 MG: 1.25 SOLUTION RESPIRATORY (INHALATION) at 20:23

## 2025-01-23 RX ADMIN — VALPROIC ACID 750 MG: 500 SOLUTION ORAL at 08:48

## 2025-01-23 RX ADMIN — VASOPRESSIN 0.04 UNITS/MIN: 20 INJECTION INTRAVENOUS at 00:17

## 2025-01-23 RX ADMIN — HYDROCORTISONE SODIUM SUCCINATE 100 MG: 100 INJECTION, POWDER, FOR SOLUTION INTRAMUSCULAR; INTRAVENOUS at 22:47

## 2025-01-23 RX ADMIN — VALPROIC ACID 750 MG: 500 SOLUTION ORAL at 21:14

## 2025-01-23 RX ADMIN — INSULIN LISPRO 2 UNITS: 100 INJECTION, SOLUTION INTRAVENOUS; SUBCUTANEOUS at 10:57

## 2025-01-23 NOTE — CASE MANAGEMENT
Case Management Discharge Planning Note    Patient name Esperanza Prajapati  Location ICU 15/ICU 15 MRN [id number]  : 1970 Date 2025       Current Admission Date: 2025  Current Admission Diagnosis:Acute respiratory failure with hypoxia (HCC)   Patient Active Problem List    Diagnosis Date Noted Date Diagnosed    Viral sepsis (HCC) 2025     Toxic metabolic encephalopathy 2025     Fall at home, initial encounter 2025     Influenza A 2025     Acute respiratory failure with hypoxia (HCC) 2025     Decreased urination 10/23/2024     Dermatitis of face 2024     Impaired ability to manage medication regime 10/09/2023     Bilateral lower extremity edema 2023     h/o VALERIE (acute kidney injury) (HCC) 2023     Tachycardia 10/03/2022     Gout 2022     Schizoaffective disorder (HCC) 2022     Transient ischemic attack 2022     Vitamin B12 deficiency 2022     Dyslipidemia 2022     PCOS (polycystic ovarian syndrome) 2020     Obesity (BMI 30-39.9) 2017     Vitamin D deficiency 2016     Cognitive developmental delay 2014     Obstructive sleep apnea 2013     Focal epilepsy (HCC) 2013     History of thyroid cancer 2013     Proteinuria 2011     Postoperative hypothyroidism 06/10/2011     Family history of diabetes mellitus 06/10/2011     Family history of malignant neoplasm of breast 06/10/2011     Intellectual disability 06/10/2011       LOS (days): 3  Geometric Mean LOS (GMLOS) (days): 4.9  Days to GMLOS:1.3     OBJECTIVE:  Risk of Unplanned Readmission Score: 24.65         Current admission status: Inpatient   Preferred Pharmacy:   [facility] - [place] - [address]  [address] [place] [address]  Phone: [phone] Fax: [phone]    Primary Care Provider: Foster Biswas MD    Primary Insurance: [employer] [facility]  Secondary Insurance:     DISCHARGE DETAILS:       Additional Comments: Patient is currently intubated and unaccompanied.  CM will reach out to family for discharge planning once PT consultation is completed and will continue to follow the patient for intake and discharge planning.

## 2025-01-23 NOTE — OCCUPATIONAL THERAPY NOTE
Occupational Therapy         Patient Name: Esperanza Prajapati  Today's Date: 1/23/2025 01/23/25 0728   OT Last Visit   OT Visit Date 01/23/25   Note Type   Note type Cancelled Session;Evaluation   Cancel Reasons Intubated/sedated     Shereen Seymour

## 2025-01-23 NOTE — PLAN OF CARE
Problem: Potential for Falls  Goal: Patient will remain free of falls  Description: INTERVENTIONS:  - Educate patient/family on patient safety including physical limitations  - Instruct patient to call for assistance with activity   - Consult OT/PT to assist with strengthening/mobility   - Keep Call bell within reach  - Keep bed low and locked with side rails adjusted as appropriate  - Keep care items and personal belongings within reach  - Initiate and maintain comfort rounds  - Make Fall Risk Sign visible to staff  - Offer Toileting every 2 Hours, in advance of need  - Initiate/Maintain bed alarm  - Obtain necessary fall risk management equipment  - Apply yellow socks and bracelet for high fall risk patients  - Consider moving patient to room near nurses station  Outcome: Progressing     Problem: Prexisting or High Potential for Compromised Skin Integrity  Goal: Skin integrity is maintained or improved  Description: INTERVENTIONS:  - Identify patients at risk for skin breakdown  - Assess and monitor skin integrity  - Assess and monitor nutrition and hydration status  - Monitor labs   - Assess for incontinence   - Turn and reposition patient  - Assist with mobility/ambulation  - Relieve pressure over bony prominences  - Avoid friction and shearing  - Provide appropriate hygiene as needed including keeping skin clean and dry  - Evaluate need for skin moisturizer/barrier cream  - Collaborate with interdisciplinary team   - Patient/family teaching  - Consider wound care consult   Outcome: Progressing     Problem: PAIN - ADULT  Goal: Verbalizes/displays adequate comfort level or baseline comfort level  Description: Interventions:  - Encourage patient to monitor pain and request assistance  - Assess pain using appropriate pain scale  - Administer analgesics based on type and severity of pain and evaluate response  - Implement non-pharmacological measures as appropriate and evaluate response  - Consider cultural and  social influences on pain and pain management  - Notify physician/advanced practitioner if interventions unsuccessful or patient reports new pain  Outcome: Progressing     Problem: INFECTION - ADULT  Goal: Absence or prevention of progression during hospitalization  Description: INTERVENTIONS:  - Assess and monitor for signs and symptoms of infection  - Monitor lab/diagnostic results  - Monitor all insertion sites, i.e. indwelling lines, tubes, and drains  - Monitor endotracheal if appropriate and nasal secretions for changes in amount and color  - [place] appropriate cooling/warming therapies per order  - Administer medications as ordered  - Instruct and encourage patient and family to use good hand hygiene technique  - Identify and instruct in appropriate isolation precautions for identified infection/condition  Outcome: Progressing     Problem: SAFETY ADULT  Goal: Patient will remain free of falls  Description: INTERVENTIONS:  - Educate patient/family on patient safety including physical limitations  - Instruct patient to call for assistance with activity   - Consult OT/PT to assist with strengthening/mobility   - Keep Call bell within reach  - Keep bed low and locked with side rails adjusted as appropriate  - Keep care items and personal belongings within reach  - Initiate and maintain comfort rounds  - Make Fall Risk Sign visible to staff  - Offer Toileting every 2 Hours, in advance of need  - Initiate/Maintain bed alarm  - Obtain necessary fall risk management equipment  - Apply yellow socks and bracelet for high fall risk patients  - Consider moving patient to room near nurses station  Outcome: Progressing  Goal: Maintain or return to baseline ADL function  Description: INTERVENTIONS:  -  Assess patient's ability to carry out ADLs; assess patient's baseline for ADL function and identify physical deficits which impact ability to perform ADLs (bathing, care of mouth/teeth, toileting, grooming, dressing,  etc.)  - Assess/evaluate cause of self-care deficits   - Assess range of motion  - Assess patient's mobility; develop plan if impaired  - Assess patient's need for assistive devices and provide as appropriate  - Encourage maximum independence but intervene and supervise when necessary  - Involve family in performance of ADLs  - Assess for home care needs following discharge   - Consider OT consult to assist with ADL evaluation and planning for discharge  - Provide patient education as appropriate  Outcome: Progressing  Goal: Maintains/Returns to pre admission functional level  Description: INTERVENTIONS:  - Perform AM-PAC 6 Click Basic Mobility/ Daily Activity assessment daily.  - Set and communicate daily mobility goal to care team and patient/family/caregiver.   - Collaborate with rehabilitation services on mobility goals if consulted  - Perform Range of Motion 3 times a day.  - Reposition patient every 2 hours.  - Dangle patient 3 times a day  - Stand patient 3 times a day  - Ambulate patient 3 times a day  - Out of bed to chair 3 times a day   - Out of bed for meals 3 times a day  - Out of bed for toileting  - Record patient progress and toleration of activity level   Outcome: Progressing     Problem: DISCHARGE PLANNING  Goal: Discharge to home or other facility with appropriate resources  Description: INTERVENTIONS:  - Identify barriers to discharge w/patient and caregiver  - Arrange for needed discharge resources and transportation as appropriate  - Identify discharge learning needs (meds, wound care, etc.)  - Arrange for interpretive services to assist at discharge as needed  - Refer to Case Management Department for coordinating discharge planning if the patient needs post-hospital services based on physician/advanced practitioner order or complex needs related to functional status, cognitive ability, or social support system  Outcome: Progressing     Problem: NEUROSENSORY - ADULT  Goal: Achieves stable or  improved neurological status  Description: INTERVENTIONS  - Monitor and report changes in neurological status  - Monitor vital signs such as temperature, blood pressure, glucose, and any other labs ordered   - Initiate measures to prevent increased intracranial pressure  - Monitor for seizure activity and implement precautions if appropriate      Outcome: Progressing  Goal: Remains free of injury related to seizures activity  Description: INTERVENTIONS  - Maintain airway, patient safety  and administer oxygen as ordered  - Monitor patient for seizure activity, document and report duration and description of seizure to physician/advanced practitioner  - If seizure occurs,  ensure patient safety during seizure  - Reorient patient post seizure  - Seizure pads on all 4 side rails  - Instruct patient/family to notify RN of any seizure activity including if an aura is experienced  - Instruct patient/family to call for assistance with activity based on nursing assessment  - Administer anti-seizure medications if ordered    Outcome: Progressing  Goal: Achieves maximal functionality and self care  Description: INTERVENTIONS  - Monitor swallowing and airway patency with patient fatigue and changes in neurological status  - Encourage and assist patient to increase activity and self care.   - Encourage visually impaired, hearing impaired and aphasic patients to use assistive/communication devices  Outcome: Progressing     Problem: RESPIRATORY - ADULT  Goal: Achieves optimal ventilation and oxygenation  Description: INTERVENTIONS:  - Assess for changes in respiratory status  - Assess for changes in mentation and behavior  - Position to facilitate oxygenation and minimize respiratory effort  - Oxygen administered by appropriate delivery if ordered  - Initiate smoking cessation education as indicated  - Encourage broncho-pulmonary hygiene including cough, deep breathe, Incentive Spirometry  - Assess the need for suctioning and  aspirate as needed  - Assess and instruct to report SOB or any respiratory difficulty  - Respiratory Therapy support as indicated  Outcome: Progressing     Problem: GENITOURINARY - ADULT  Goal: Maintains or returns to baseline urinary function  Description: INTERVENTIONS:  - Assess urinary function  - Encourage oral fluids to ensure adequate hydration if ordered  - Administer IV fluids as ordered to ensure adequate hydration  - Administer ordered medications as needed  - Offer frequent toileting  - Follow urinary retention protocol if ordered  Outcome: Progressing  Goal: Absence of urinary retention  Description: INTERVENTIONS:  - Assess patient’s ability to void and empty bladder  - Monitor I/O  - Bladder scan as needed  - Discuss with physician/AP medications to alleviate retention as needed  - Discuss catheterization for long term situations as appropriate  Outcome: Progressing     Problem: METABOLIC, FLUID AND ELECTROLYTES - ADULT  Goal: Electrolytes maintained within normal limits  Description: INTERVENTIONS:  - Monitor labs and assess patient for signs and symptoms of electrolyte imbalances  - Administer electrolyte replacement as ordered  - Monitor response to electrolyte replacements, including repeat lab results as appropriate  - Instruct patient on fluid and nutrition as appropriate  Outcome: Progressing  Goal: Fluid balance maintained  Description: INTERVENTIONS:  - Monitor labs   - Monitor I/O and WT  - Instruct patient on fluid and nutrition as appropriate  - Assess for signs & symptoms of volume excess or deficit  Outcome: Progressing     Problem: HEMATOLOGIC - ADULT  Goal: Maintains hematologic stability  Description: INTERVENTIONS  - Assess for signs and symptoms of bleeding or hemorrhage  - Monitor labs  - Administer supportive blood products/factors as ordered and appropriate  Outcome: Progressing     Problem: MUSCULOSKELETAL - ADULT  Goal: Maintain or return mobility to safest level of  function  Description: INTERVENTIONS:  - Assess patient's ability to carry out ADLs; assess patient's baseline for ADL function and identify physical deficits which impact ability to perform ADLs (bathing, care of mouth/teeth, toileting, grooming, dressing, etc.)  - Assess/evaluate cause of self-care deficits   - Assess range of motion  - Assess patient's mobility  - Assess patient's need for assistive devices and provide as appropriate  - Encourage maximum independence but intervene and supervise when necessary  - Involve family in performance of ADLs  - Assess for home care needs following discharge   - Consider OT consult to assist with ADL evaluation and planning for discharge  - Provide patient education as appropriate  Outcome: Progressing  Goal: Maintain proper alignment of affected body part  Description: INTERVENTIONS:  - Support, maintain and protect limb and body alignment  - Provide patient/ family with appropriate education  Outcome: Progressing     Problem: SAFETY,RESTRAINT: NV/NON-SELF DESTRUCTIVE BEHAVIOR  Goal: Remains free of harm/injury (restraint for non violent/non self-detsructive behavior)  Description: INTERVENTIONS:  - Instruct patient/family regarding restraint use   - Assess and monitor physiologic and psychological status   - Provide interventions and comfort measures to meet assessed patient needs   - Identify and implement measures to help patient regain control  - Assess readiness for release of restraint   Outcome: Progressing  Goal: Returns to optimal restraint-free functioning  Description: INTERVENTIONS:  - Assess the patient's behavior and symptoms that indicate continued need for restraint  - Identify and implement measures to help patient regain control  - Assess readiness for release of restraint   Outcome: Progressing     Problem: Nutrition/Hydration-ADULT  Goal: Nutrient/Hydration intake appropriate for improving, restoring or maintaining nutritional needs  Description:  Monitor and assess patient's nutrition/hydration status for malnutrition. Collaborate with interdisciplinary team and initiate plan and interventions as ordered.  Monitor patient's weight and dietary intake as ordered or per policy. Utilize nutrition screening tool and intervene as necessary. Determine patient's food preferences and provide high-protein, high-caloric foods as appropriate.     INTERVENTIONS:  - Monitor oral intake, urinary output, labs, and treatment plans  - Assess nutrition and hydration status and recommend course of action  - Evaluate amount of meals eaten  - Assist patient with eating if necessary   - Allow adequate time for meals  - Recommend/ encourage appropriate diets, oral nutritional supplements, and vitamin/mineral supplements  - Order, calculate, and assess calorie counts as needed  - Recommend, monitor, and adjust tube feedings and TPN/PPN based on assessed needs  - Assess need for intravenous fluids  - Provide specific nutrition/hydration education as appropriate  - Include patient/family/caregiver in decisions related to nutrition  Outcome: Progressing

## 2025-01-23 NOTE — RESPIRATORY THERAPY NOTE
01/22/25 1948   Vent Information   [place] Vent Mode (S)CMV   SpO2 95 %   (S)CMV Settings   Resp Rate (BPM) 18 BPM   VT (mL) 400 mL   FIO2 (%) 50 %   PEEP (cmH2O) 8 cmH2O   I:E Ratio 1:2.3   Insp Time (%) 1 %   Flow Trigger (LPM) 2   Humidification Heater   Heater Temperature (Set) 98.6 °F (37 °C)   (S)CMV Actuals   Resp Rate (BPM) 18 BPM   VT (mL) 392   MV 7.3   MAP (cmH2O) 11 cmH2O   Peak Pressure (cmH2O) 23 cmH2O   I:E Ratio (Obs) 1:2.3   Heater Temperature (Obs) 98.6 °F (37 °C)   Static Compliance (mL/cmH20) 33.8 mL/cmH2O   Plateau Pressure (cm H2O) 20.8 cm H2O   (S)CMV Alarms   High Peak Pressure (cmH2O) 50   Low Pressure (cmH2O) 5 cm H2O   High Resp Rate (BPM) 50 BPM   Low Resp Rate (BPM) 5 BPM   High MV (L/min) 20 L/min   Low MV (L/min) 3 L/min   High VT (mL) 1000 mL   Low VT (mL) 250 mL   Apnea Time (s) 20 S   Maintenance   Alarm (pink) cable attached No   Resuscitation bag with peep valve at bedside Yes   Water bag changed No   Circuit changed No   ETT  Hi-Lo;Cuffed 8 mm   Placement Date/Time: 01/21/25 (c) 8520   Type: Hi-Lo;Cuffed  Tube Size: 8 mm  Location: Oral  Insertion attempts: 1  Placement Verification: Chest x-ray  Secured at (cm): 26   Secured at (cm) 25   Measured from Lips   Secured Location Right   Secured by Commercial tube briseno   HI-LO Suction  Intermittent suction   HI-LO Secretions Scant;Blood tinged   HI-LO Intervention Patent     RT Ventilator Management Note  Esperanza Prajapati 54 y.o. female MRN: [id number]  Unit/Bed#: ICU 15 Encounter: [id number]

## 2025-01-23 NOTE — PHYSICAL THERAPY NOTE
PHYSICAL THERAPY NOTE          Patient Name: Esperanza Prajapati  Today's Date: 1/23/2025 01/23/25 1004   PT Last Visit   PT Visit Date 01/23/25   Note Type   Note type Cancelled Session   Cancel Reasons Intubated/sedated   Additional Comments Pt remains intubated and sedated.  At this time will d/c PT orders and request new orders when medically appropriate for evaluation.  Recommend repositioning and PROM for preventative purposes.  Please advise with new orders when appropriate.     Kaitlyn Prajapati, PT

## 2025-01-23 NOTE — PLAN OF CARE
Problem: Potential for Falls  Goal: Patient will remain free of falls  Description: INTERVENTIONS:  - Educate patient/family on patient safety including physical limitations  - Instruct patient to call for assistance with activity   - Consult OT/PT to assist with strengthening/mobility   - Keep Call bell within reach  - Keep bed low and locked with side rails adjusted as appropriate  - Keep care items and personal belongings within reach  - Initiate and maintain comfort rounds  - Make Fall Risk Sign visible to staff  - Offer Toileting every 2 Hours, in advance of need  - Initiate/Maintain bed alarm  - Obtain necessary fall risk management equipment:   - Apply yellow socks and bracelet for high fall risk patients  - Consider moving patient to room near nurses station  Outcome: Progressing     Problem: Prexisting or High Potential for Compromised Skin Integrity  Goal: Skin integrity is maintained or improved  Description: INTERVENTIONS:  - Identify patients at risk for skin breakdown  - Assess and monitor skin integrity  - Assess and monitor nutrition and hydration status  - Monitor labs   - Assess for incontinence   - Turn and reposition patient  - Assist with mobility/ambulation  - Relieve pressure over bony prominences  - Avoid friction and shearing  - Provide appropriate hygiene as needed including keeping skin clean and dry  - Evaluate need for skin moisturizer/barrier cream  - Collaborate with interdisciplinary team   - Patient/family teaching  - Consider wound care consult   Outcome: Progressing     Problem: PAIN - ADULT  Goal: Verbalizes/displays adequate comfort level or baseline comfort level  Description: Interventions:  - Encourage patient to monitor pain and request assistance  - Assess pain using appropriate pain scale  - Administer analgesics based on type and severity of pain and evaluate response  - Implement non-pharmacological measures as appropriate and evaluate response  - Consider cultural and  social influences on pain and pain management  - Notify physician/advanced practitioner if interventions unsuccessful or patient reports new pain  Outcome: Progressing     Problem: INFECTION - ADULT  Goal: Absence or prevention of progression during hospitalization  Description: INTERVENTIONS:  - Assess and monitor for signs and symptoms of infection  - Monitor lab/diagnostic results  - Monitor all insertion sites, i.e. indwelling lines, tubes, and drains  - Monitor endotracheal if appropriate and nasal secretions for changes in amount and color  - [place] appropriate cooling/warming therapies per order  - Administer medications as ordered  - Instruct and encourage patient and family to use good hand hygiene technique  - Identify and instruct in appropriate isolation precautions for identified infection/condition  Outcome: Progressing     Problem: SAFETY ADULT  Goal: Patient will remain free of falls  Description: INTERVENTIONS:  - Educate patient/family on patient safety including physical limitations  - Instruct patient to call for assistance with activity   - Consult OT/PT to assist with strengthening/mobility   - Keep Call bell within reach  - Keep bed low and locked with side rails adjusted as appropriate  - Keep care items and personal belongings within reach  - Initiate and maintain comfort rounds  - Make Fall Risk Sign visible to staff  - Offer Toileting every 2 Hours, in advance of need  - Initiate/Maintain bed alarm  - Obtain necessary fall risk management equipment:   - Apply yellow socks and bracelet for high fall risk patients  - Consider moving patient to room near nurses station  Outcome: Progressing  Goal: Maintain or return to baseline ADL function  Description: INTERVENTIONS:  -  Assess patient's ability to carry out ADLs; assess patient's baseline for ADL function and identify physical deficits which impact ability to perform ADLs (bathing, care of mouth/teeth, toileting, grooming, dressing,  etc.)  - Assess/evaluate cause of self-care deficits   - Assess range of motion  - Assess patient's mobility; develop plan if impaired  - Assess patient's need for assistive devices and provide as appropriate  - Encourage maximum independence but intervene and supervise when necessary  - Involve family in performance of ADLs  - Assess for home care needs following discharge   - Consider OT consult to assist with ADL evaluation and planning for discharge  - Provide patient education as appropriate  Outcome: Progressing  Goal: Maintains/Returns to pre admission functional level  Description: INTERVENTIONS:  - Perform AM-PAC 6 Click Basic Mobility/ Daily Activity assessment daily.  - Set and communicate daily mobility goal to care team and patient/family/caregiver.   - Collaborate with rehabilitation services on mobility goals if consulted  - Perform Range of Motion 3 times a day.  - Reposition patient every 2 hours.  - Dangle patient 3 times a day  - Stand patient 3 times a day  - Ambulate patient 3 times a day  - Out of bed to chair 3 times a day   - Out of bed for meals 3 times a day  - Out of bed for toileting  - Record patient progress and toleration of activity level   Outcome: Progressing     Problem: DISCHARGE PLANNING  Goal: Discharge to home or other facility with appropriate resources  Description: INTERVENTIONS:  - Identify barriers to discharge w/patient and caregiver  - Arrange for needed discharge resources and transportation as appropriate  - Identify discharge learning needs (meds, wound care, etc.)  - Arrange for interpretive services to assist at discharge as needed  - Refer to Case Management Department for coordinating discharge planning if the patient needs post-hospital services based on physician/advanced practitioner order or complex needs related to functional status, cognitive ability, or social support system  Outcome: Progressing     Problem: NEUROSENSORY - ADULT  Goal: Achieves stable or  improved neurological status  Description: INTERVENTIONS  - Monitor and report changes in neurological status  - Monitor vital signs such as temperature, blood pressure, glucose, and any other labs ordered   - Initiate measures to prevent increased intracranial pressure  - Monitor for seizure activity and implement precautions if appropriate      Outcome: Progressing  Goal: Remains free of injury related to seizures activity  Description: INTERVENTIONS  - Maintain airway, patient safety  and administer oxygen as ordered  - Monitor patient for seizure activity, document and report duration and description of seizure to physician/advanced practitioner  - If seizure occurs,  ensure patient safety during seizure  - Reorient patient post seizure  - Seizure pads on all 4 side rails  - Instruct patient/family to notify RN of any seizure activity including if an aura is experienced  - Instruct patient/family to call for assistance with activity based on nursing assessment  - Administer anti-seizure medications if ordered    Outcome: Progressing  Goal: Achieves maximal functionality and self care  Description: INTERVENTIONS  - Monitor swallowing and airway patency with patient fatigue and changes in neurological status  - Encourage and assist patient to increase activity and self care.   - Encourage visually impaired, hearing impaired and aphasic patients to use assistive/communication devices  Outcome: Progressing     Problem: RESPIRATORY - ADULT  Goal: Achieves optimal ventilation and oxygenation  Description: INTERVENTIONS:  - Assess for changes in respiratory status  - Assess for changes in mentation and behavior  - Position to facilitate oxygenation and minimize respiratory effort  - Oxygen administered by appropriate delivery if ordered  - Initiate smoking cessation education as indicated  - Encourage broncho-pulmonary hygiene including cough, deep breathe, Incentive Spirometry  - Assess the need for suctioning and  aspirate as needed  - Assess and instruct to report SOB or any respiratory difficulty  - Respiratory Therapy support as indicated  Outcome: Progressing     Problem: GENITOURINARY - ADULT  Goal: Maintains or returns to baseline urinary function  Description: INTERVENTIONS:  - Assess urinary function  - Encourage oral fluids to ensure adequate hydration if ordered  - Administer IV fluids as ordered to ensure adequate hydration  - Administer ordered medications as needed  - Offer frequent toileting  - Follow urinary retention protocol if ordered  Outcome: Progressing  Goal: Absence of urinary retention  Description: INTERVENTIONS:  - Assess patient’s ability to void and empty bladder  - Monitor I/O  - Bladder scan as needed  - Discuss with physician/AP medications to alleviate retention as needed  - Discuss catheterization for long term situations as appropriate  Outcome: Progressing     Problem: METABOLIC, FLUID AND ELECTROLYTES - ADULT  Goal: Electrolytes maintained within normal limits  Description: INTERVENTIONS:  - Monitor labs and assess patient for signs and symptoms of electrolyte imbalances  - Administer electrolyte replacement as ordered  - Monitor response to electrolyte replacements, including repeat lab results as appropriate  - Instruct patient on fluid and nutrition as appropriate  Outcome: Progressing  Goal: Fluid balance maintained  Description: INTERVENTIONS:  - Monitor labs   - Monitor I/O and WT  - Instruct patient on fluid and nutrition as appropriate  - Assess for signs & symptoms of volume excess or deficit  Outcome: Progressing     Problem: HEMATOLOGIC - ADULT  Goal: Maintains hematologic stability  Description: INTERVENTIONS  - Assess for signs and symptoms of bleeding or hemorrhage  - Monitor labs  - Administer supportive blood products/factors as ordered and appropriate  Outcome: Progressing     Problem: MUSCULOSKELETAL - ADULT  Goal: Maintain or return mobility to safest level of  function  Description: INTERVENTIONS:  - Assess patient's ability to carry out ADLs; assess patient's baseline for ADL function and identify physical deficits which impact ability to perform ADLs (bathing, care of mouth/teeth, toileting, grooming, dressing, etc.)  - Assess/evaluate cause of self-care deficits   - Assess range of motion  - Assess patient's mobility  - Assess patient's need for assistive devices and provide as appropriate  - Encourage maximum independence but intervene and supervise when necessary  - Involve family in performance of ADLs  - Assess for home care needs following discharge   - Consider OT consult to assist with ADL evaluation and planning for discharge  - Provide patient education as appropriate  Outcome: Progressing  Goal: Maintain proper alignment of affected body part  Description: INTERVENTIONS:  - Support, maintain and protect limb and body alignment  - Provide patient/ family with appropriate education  Outcome: Progressing     Problem: SAFETY,RESTRAINT: NV/NON-SELF DESTRUCTIVE BEHAVIOR  Goal: Remains free of harm/injury (restraint for non violent/non self-detsructive behavior)  Description: INTERVENTIONS:  - Instruct patient/family regarding restraint use   - Assess and monitor physiologic and psychological status   - Provide interventions and comfort measures to meet assessed patient needs   - Identify and implement measures to help patient regain control  - Assess readiness for release of restraint   Outcome: Progressing  Goal: Returns to optimal restraint-free functioning  Description: INTERVENTIONS:  - Assess the patient's behavior and symptoms that indicate continued need for restraint  - Identify and implement measures to help patient regain control  - Assess readiness for release of restraint   Outcome: Progressing     Problem: Nutrition/Hydration-ADULT  Goal: Nutrient/Hydration intake appropriate for improving, restoring or maintaining nutritional needs  Description:  Monitor and assess patient's nutrition/hydration status for malnutrition. Collaborate with interdisciplinary team and initiate plan and interventions as ordered.  Monitor patient's weight and dietary intake as ordered or per policy. Utilize nutrition screening tool and intervene as necessary. Determine patient's food preferences and provide high-protein, high-caloric foods as appropriate.     INTERVENTIONS:  - Monitor oral intake, urinary output, labs, and treatment plans  - Assess nutrition and hydration status and recommend course of action  - Evaluate amount of meals eaten  - Assist patient with eating if necessary   - Allow adequate time for meals  - Recommend/ encourage appropriate diets, oral nutritional supplements, and vitamin/mineral supplements  - Order, calculate, and assess calorie counts as needed  - Recommend, monitor, and adjust tube feedings and TPN/PPN based on assessed needs  - Assess need for intravenous fluids  - Provide specific nutrition/hydration education as appropriate  - Include patient/family/caregiver in decisions related to nutrition  Outcome: Progressing

## 2025-01-23 NOTE — PROGRESS NOTES
Progress Note - Critical Care/ICU   Name: Esperanza Prajapati 54 y.o. female I MRN: [id number]  Unit/Bed#: ICU 15 I Date of Admission: 1/19/2025   Date of Service: 1/23/2025 I Hospital Day: 3      Assessment & Plan  Acute respiratory failure with hypoxia (HCC)  Due to Flu A, viral sepsis - consider multifocal aspiration PNA  Possible ARDS, could be superimposed bacterial pneumonia in the background of flu  CT C/A/P negative on admission   Procal negative on admission, then a significant elevation  CXR showed b/l opacities - aspiration cannot be excluded at this time   Continue adebayo. Nebs   Continue ceftriaxone, azithromycin (started 1/20)  Continue Tamiflu  Currently on [facility]  Sputum cx follow-up  MRSA negative  Legionella , Streptococcus pneumonia unremarkable    Currently patient is on vasopressin  Currently patient is on propofol, fentanyl drip as sedatives  Focal epilepsy (HCC)  Follows with [facility] neurology   Focal epilepsy manifests as simple partial, complex partial, and generalized tonic-clonic seizures   Hx of b/l postural and action tremor per neurology notes  Home regimen: vimpat 150 daily, vimpat 200mg qHS, Depakote 750mg BID  Valproic level therapeutic 1/20  Low suspicion for any breakthrough seizures, not post-ictal on exam and is interactive  Vimpat transitioned to IV, depakote via oG-tube  Viral sepsis (HCC)  Due to Flu A + on 1/19   SIRS on admission with tachycardia, tachypnea, leukopenia, fever   Lactic negative, no hypotension on admission   Overnight 1/21 with borderline hypotensive, responsive to 1L bolus  Abx with ceftriaxone and azithromycin  Bcx negative for 72 hours, U/a negative, MRSA negative  Cognitive developmental delay  Due to head injury at age of 10 months   Postoperative hypothyroidism  Stage I papillary thyroid cancer s/p total thyroidectomy and ablation in 2013  Post-operative hypothyroidism   Levothyroxine via OG tube  Schizoaffective disorder (HCC)  Home regimen: zyprexa  10mg daily, trazodone 50mg PRN, invega 6mg daily   Zyprexa transitioned to IM due to NPO, trazodone and paliperidone on hold   Tachycardia  In the setting of agitation, hypoxia, viral sepsis, fever  Sinus tach with occasional PVCs to 150s   CT C/A/P negative on admission   Trial nighttime zyprexa   Consider CTA if persistent    EKG as needed  Toxic metabolic encephalopathy  Due to viral sepsis, hypoxia, and infection  Hx of developmental delay although at baseline she is A&O x 3  CTH on admission negative   Do not suspect breakthrough seizures  Supportive care at this time  Fall at home, initial encounter  Presented with fall, + head strike   CTH negative   Bed alarm/fall precautions  Influenza A  Positive on 1/19  Complicated by viral sepsis, see plan below  Tamiflu via oG-tube    Disposition: Critical care    ICU Core Measures     Vented Patient  VAP Bundle  VAP bundle ordered     A: Assess, Prevent, and Manage Pain Has pain been assessed? NA  Need for changes to pain regimen? No   B: Both Spontaneous Awakening Trials (SATs) and Spontaneous Breathing Trials (SBTs) Plan to perform spontaneous awakening trial today? Yes   Plan to perform spontaneous breathing trial today? Yes   Obvious barriers to extubation? No   C: Choice of Sedation RASS Goal: -4 Deep Sedation or 0 Alert and Calm  Need for changes to sedation or analgesia regimen? No   D: Delirium CAM-ICU: Positive   E: Early Mobility  Plan for early mobility? NA   F: Family Engagement Plan for family engagement today? Yes       Antibiotic Review: Patient on appropriate coverage based on culture data. , Awaiting culture results. , and Continue broad spectrum secondary to severity of illness.     Review of Invasive Devices:    Harris Plan: Continue for accurate I/O monitoring for 48 hours and Voiding trial after improvement in ambulation   Central access plan: Patient has multiple central venous catheters.  Medications requiring central line Hemodynamic  monitoring  Mercedes Plan: Keep arterial line for hemodynamic monitoring, frequent ABGs, and frequent labs    Prophylaxis:  VTE VTE covered by:  enoxaparin, Subcutaneous, 40 mg at 01/22/25 0841       Stress Ulcer  covered byomeprazole (PRILOSEC) suspension 2 mg/mL [[id number]]         24 Hour Events : No significant overnight events.  Subjective   Review of Systems: See HPI for Review of Systems    Objective :                   Vitals I/O      Most Recent Min/Max in 24hrs   Temp 98.7 °F (37.1 °C) Temp  Min: 98 °F (36.7 °C)  Max: 99 °F (37.2 °C)   Pulse 62 Pulse  Min: 62  Max: 100   Resp 18 Resp  Min: 17  Max: 30   BP 98/66 BP  Min: 95/66  Max: 135/88   O2 Sat 94 % SpO2  Min: 86 %  Max: 95 %      Intake/Output Summary (Last 24 hours) at 1/23/2025 0821  Last data filed at 1/23/2025 0800  Gross per 24 hour   Intake 1762.05 ml   Output 1065 ml   Net 697.05 ml       Diet Enteral/Parenteral; Tube Feeding No Oral Diet; Vital High Protein; Continuous; 20; 20; Every 6 hours    Invasive Monitoring           Physical Exam   Physical Exam  Vitals and nursing note reviewed.   Cardiovascular:      Rate and Rhythm: Regular rhythm. Tachycardia present.      Heart sounds: Normal heart sounds.   Musculoskeletal:      Right lower leg: Trace Edema present.      Left lower leg: Trace Edema present.   Constitutional:       Appearance: She is ill-appearing.      Interventions: She is sedated and intubated.      Comments: obese   Pulmonary:      Effort: No accessory muscle usage, respiratory distress or accessory muscle usage. She is intubated.      Breath sounds: Rales present. No wheezing or rhonchi.   Secretions are normal.Neurological:      Mental Status: She is unresponsive and disoriented to person.   Genitourinary/Anorectal:  Harris present.        Diagnostic Studies        Lab Results: I have reviewed the following results:     Medications:  Scheduled PRN   azithromycin, 500 mg, Q24H  benztropine, 2 mg, BID  cefTRIAXone, 2,000 mg,  Q24H  chlorhexidine, 15 mL, Q12H MOSHE  cyanocobalamin, 1,000 mcg, Daily  enoxaparin, 40 mg, Daily  furosemide, 40 mg, BID (diuretic)  hydrocortisone sodium succinate, 100 mg, Q8H MOSHE  insulin lispro, 2-12 Units, TID AC  insulin lispro, 2-12 Units, HS  ipratropium, 0.5 mg, TID  lacosamide, 150 mg, Daily  lacosamide, 200 mg, HS  levalbuterol, 1.25 mg, TID  levothyroxine, 175 mcg, Early Morning  melatonin, 3 mg, HS  OLANZapine, 10 mg, HS  omeprazole (PRILOSEC) suspension 2 mg/mL, 20 mg, Daily  oseltamivir, 75 mg, Q12H MOSHE  [Held by provider] paliperidone, 6 mg, QAM  potassium phosphate, 6 mmol, Once  valproic acid, 750 mg, Q12H MOSHE      Acetaminophen, 650 mg, Q4H PRN  aluminum-magnesium hydroxide-simethicone, 30 mL, Q6H PRN  fentaNYL, 50 mcg, Q1H PRN  ondansetron, 4 mg, Q6H PRN       Continuous    fentaNYL, 50 mcg/hr, Last Rate: Stopped (01/23/25 0817)  norepinephrine, 1-30 mcg/min, Last Rate: Stopped (01/22/25 1252)  phenylephine,  mcg/min, Last Rate: Stopped (01/23/25 0230)  propofol, 5-50 mcg/kg/min, Last Rate: Stopped (01/23/25 0818)  vasopressin, 0.04 Units/min, Last Rate: Stopped (01/23/25 0820)         Labs:   CBC    Recent Labs     01/22/25 0441 01/23/25  0518   WBC 6.41 6.19   HGB 10.5* 9.4*   HCT 32.3* 28.5*   * 116*   BANDSPCT 33* 16*     BMP    Recent Labs     01/22/25 2144 01/23/25  0518   SODIUM 136 136   K 4.1 4.0    102   CO2 23 26   AGAP 10 8   BUN 34* 44*   CREATININE 1.16 1.19   CALCIUM 8.1* 8.6       Coags    No recent results     Additional Electrolytes  Recent Labs     01/22/25 0441 01/22/25 2144 01/23/25  0518   MG 2.3 2.4 2.6   PHOS 2.5* 3.1 2.4*   CAIONIZED 1.20  --   --           Blood Gas    Recent Labs     01/21/25  1115   PHART 7.284*   DSN1EMU 52.8*   PO2ART 97.2   GCA8GNI 24.5   BEART -2.6   SOURCE Radial, Right     Recent Labs     01/21/25  0903 01/21/25  1115   PHVEN 7.280*  --    QDD0YEM 51.4*  --    PO2VEN 150.0*  --    LIW2RCC 23.6*  --    BEVEN -3.5  --     [id number] 97.3*  --    SOURCE  --  Radial, Right    LFTs  Recent Labs     01/22/25  0441 01/23/25  0518   ALT 10 10   AST 32 20   ALKPHOS 22* 20*   ALB 2.8* 2.9*   TBILI 0.32 0.31       Infectious  No recent results  Glucose  Recent Labs     01/22/25  0441 01/22/25  2144 01/23/25  0518   GLUC 222* 181* 167*

## 2025-01-23 NOTE — TELEPHONE ENCOUNTER
Lvm stating that we spoke before and they said they will call back to r/s and havent done so yet and she is still currently admitted so I am cancelling her appointment as of now and they can call back to r/s when she is out of the hospital and doing well. Provided office call back number.

## 2025-01-24 ENCOUNTER — APPOINTMENT (INPATIENT)
Dept: RADIOLOGY | Facility: HOSPITAL | Age: 55
DRG: 870 | End: 2025-01-24
Payer: COMMERCIAL

## 2025-01-24 ENCOUNTER — APPOINTMENT (INPATIENT)
Dept: GASTROENTEROLOGY | Facility: HOSPITAL | Age: 55
DRG: 870 | End: 2025-01-24
Payer: COMMERCIAL

## 2025-01-24 LAB
ANION GAP SERPL CALCULATED.3IONS-SCNC: 10 MMOL/L (ref 4–13)
BACTERIA SPT RESP CULT: NO GROWTH
BASE EXCESS BLDA CALC-SCNC: 10.2 MMOL/L
BASE EXCESS BLDA CALC-SCNC: 8.3 MMOL/L
BASOPHILS # BLD AUTO: 0.01 THOUSANDS/ΜL (ref 0–0.1)
BASOPHILS NFR BLD AUTO: 0 % (ref 0–1)
BUN SERPL-MCNC: 55 MG/DL (ref 5–25)
CALCIUM SERPL-MCNC: 8.8 MG/DL (ref 8.4–10.2)
CHLORIDE SERPL-SCNC: 103 MMOL/L (ref 96–108)
CO2 SERPL-SCNC: 30 MMOL/L (ref 21–32)
CREAT SERPL-MCNC: 1.18 MG/DL (ref 0.6–1.3)
EOSINOPHIL # BLD AUTO: 0 THOUSAND/ΜL (ref 0–0.61)
EOSINOPHIL NFR BLD AUTO: 0 % (ref 0–6)
ERYTHROCYTE [DISTWIDTH] IN BLOOD BY AUTOMATED COUNT: 14.6 % (ref 11.6–15.1)
GFR SERPL CREATININE-BSD FRML MDRD: 53 ML/MIN/1.73SQ M
GLUCOSE SERPL-MCNC: 140 MG/DL (ref 65–140)
GLUCOSE SERPL-MCNC: 144 MG/DL (ref 65–140)
GLUCOSE SERPL-MCNC: 147 MG/DL (ref 65–140)
GLUCOSE SERPL-MCNC: 153 MG/DL (ref 65–140)
GLUCOSE SERPL-MCNC: 165 MG/DL (ref 65–140)
GRAM STN SPEC: NORMAL
GRAM STN SPEC: NORMAL
HCO3 BLDA-SCNC: 32.6 MMOL/L (ref 22–28)
HCO3 BLDA-SCNC: 34.2 MMOL/L (ref 22–28)
HCT VFR BLD AUTO: 29.2 % (ref 34.8–46.1)
HGB BLD-MCNC: 10.1 G/DL (ref 11.5–15.4)
HOROWITZ INDEX BLDA+IHG-RTO: 70 MM[HG]
IMM GRANULOCYTES # BLD AUTO: >0.5 THOUSAND/UL (ref 0–0.2)
IMM GRANULOCYTES NFR BLD AUTO: 8 % (ref 0–2)
LYMPHOCYTES # BLD AUTO: 0.95 THOUSANDS/ΜL (ref 0.6–4.47)
LYMPHOCYTES NFR BLD AUTO: 12 % (ref 14–44)
MAGNESIUM SERPL-MCNC: 2.7 MG/DL (ref 1.9–2.7)
MCH RBC QN AUTO: 33.6 PG (ref 26.8–34.3)
MCHC RBC AUTO-ENTMCNC: 34.6 G/DL (ref 31.4–37.4)
MCV RBC AUTO: 97 FL (ref 82–98)
MONOCYTES # BLD AUTO: 0.59 THOUSAND/ΜL (ref 0.17–1.22)
MONOCYTES NFR BLD AUTO: 7 % (ref 4–12)
NEUTROPHILS # BLD AUTO: 5.89 THOUSANDS/ΜL (ref 1.85–7.62)
NEUTS SEG NFR BLD AUTO: 73 % (ref 43–75)
NRBC BLD AUTO-RTO: 0 /100 WBCS
O2 CT BLDA-SCNC: 14.6 ML/DL (ref 16–23)
O2 CT BLDA-SCNC: 14.9 ML/DL (ref 16–23)
OXYHGB MFR BLDA: 90.9 % (ref 94–97)
OXYHGB MFR BLDA: 91.3 % (ref 94–97)
PCO2 BLDA: 43.7 MM HG (ref 36–44)
PCO2 BLDA: 44.4 MM HG (ref 36–44)
PEEP RESPIRATORY: 8 CM[H2O]
PH BLDA: 7.48 [PH] (ref 7.35–7.45)
PH BLDA: 7.51 [PH] (ref 7.35–7.45)
PHOSPHATE SERPL-MCNC: 2.4 MG/DL (ref 2.7–4.5)
PLATELET # BLD AUTO: 140 THOUSANDS/UL (ref 149–390)
PMV BLD AUTO: 10.6 FL (ref 8.9–12.7)
PO2 BLDA: 57.7 MM HG (ref 75–129)
PO2 BLDA: 63.6 MM HG (ref 75–129)
POTASSIUM SERPL-SCNC: 2.9 MMOL/L (ref 3.5–5.3)
POTASSIUM SERPL-SCNC: 2.9 MMOL/L (ref 3.5–5.3)
POTASSIUM SERPL-SCNC: 3.5 MMOL/L (ref 3.5–5.3)
RBC # BLD AUTO: 3.01 MILLION/UL (ref 3.81–5.12)
SODIUM SERPL-SCNC: 143 MMOL/L (ref 135–147)
SPECIMEN SOURCE: ABNORMAL
SPECIMEN SOURCE: ABNORMAL
VENT AC: 18
VENT- AC: AC
VT SETTING VENT: 400 ML
WBC # BLD AUTO: 8.07 THOUSAND/UL (ref 4.31–10.16)

## 2025-01-24 PROCEDURE — 71045 X-RAY EXAM CHEST 1 VIEW: CPT

## 2025-01-24 PROCEDURE — 84100 ASSAY OF PHOSPHORUS: CPT

## 2025-01-24 PROCEDURE — 85027 COMPLETE CBC AUTOMATED: CPT

## 2025-01-24 PROCEDURE — 94640 AIRWAY INHALATION TREATMENT: CPT

## 2025-01-24 PROCEDURE — 83735 ASSAY OF MAGNESIUM: CPT

## 2025-01-24 PROCEDURE — 0BJ08ZZ INSPECTION OF TRACHEOBRONCHIAL TREE, VIA NATURAL OR ARTIFICIAL OPENING ENDOSCOPIC: ICD-10-PCS | Performed by: INTERNAL MEDICINE

## 2025-01-24 PROCEDURE — 94760 N-INVAS EAR/PLS OXIMETRY 1: CPT

## 2025-01-24 PROCEDURE — 31505 DIAGNOSTIC LARYNGOSCOPY: CPT

## 2025-01-24 PROCEDURE — 87205 SMEAR GRAM STAIN: CPT

## 2025-01-24 PROCEDURE — 82948 REAGENT STRIP/BLOOD GLUCOSE: CPT

## 2025-01-24 PROCEDURE — 94150 VITAL CAPACITY TEST: CPT

## 2025-01-24 PROCEDURE — 80048 BASIC METABOLIC PNL TOTAL CA: CPT

## 2025-01-24 PROCEDURE — 82805 BLOOD GASES W/O2 SATURATION: CPT

## 2025-01-24 PROCEDURE — 87070 CULTURE OTHR SPECIMN AEROBIC: CPT

## 2025-01-24 PROCEDURE — 84132 ASSAY OF SERUM POTASSIUM: CPT

## 2025-01-24 PROCEDURE — 94003 VENT MGMT INPAT SUBQ DAY: CPT

## 2025-01-24 PROCEDURE — 99291 CRITICAL CARE FIRST HOUR: CPT | Performed by: INTERNAL MEDICINE

## 2025-01-24 RX ORDER — INSULIN LISPRO 100 [IU]/ML
2-12 INJECTION, SOLUTION INTRAVENOUS; SUBCUTANEOUS
Status: DISCONTINUED | OUTPATIENT
Start: 2025-01-24 | End: 2025-01-24

## 2025-01-24 RX ORDER — POTASSIUM CHLORIDE 29.8 MG/ML
40 INJECTION INTRAVENOUS ONCE
Status: COMPLETED | OUTPATIENT
Start: 2025-01-24 | End: 2025-01-24

## 2025-01-24 RX ORDER — FENTANYL CITRATE 50 UG/ML
100 INJECTION, SOLUTION INTRAMUSCULAR; INTRAVENOUS ONCE
Refills: 0 | Status: COMPLETED | OUTPATIENT
Start: 2025-01-24 | End: 2025-01-24

## 2025-01-24 RX ORDER — POTASSIUM CHLORIDE 14.9 MG/ML
20 INJECTION INTRAVENOUS ONCE
Status: DISCONTINUED | OUTPATIENT
Start: 2025-01-24 | End: 2025-01-24

## 2025-01-24 RX ORDER — MIDAZOLAM HYDROCHLORIDE 2 MG/2ML
INJECTION, SOLUTION INTRAMUSCULAR; INTRAVENOUS
Status: COMPLETED
Start: 2025-01-24 | End: 2025-01-24

## 2025-01-24 RX ORDER — POTASSIUM CHLORIDE 1500 MG/1
60 TABLET, EXTENDED RELEASE ORAL ONCE
Status: DISCONTINUED | OUTPATIENT
Start: 2025-01-24 | End: 2025-01-24

## 2025-01-24 RX ORDER — POTASSIUM CHLORIDE 20MEQ/15ML
20 LIQUID (ML) ORAL ONCE
Status: COMPLETED | OUTPATIENT
Start: 2025-01-24 | End: 2025-01-25

## 2025-01-24 RX ORDER — FUROSEMIDE 10 MG/ML
20 INJECTION INTRAMUSCULAR; INTRAVENOUS ONCE
Status: COMPLETED | OUTPATIENT
Start: 2025-01-24 | End: 2025-01-24

## 2025-01-24 RX ORDER — LORAZEPAM 2 MG/ML
0.5 INJECTION INTRAMUSCULAR ONCE
Status: COMPLETED | OUTPATIENT
Start: 2025-01-24 | End: 2025-01-24

## 2025-01-24 RX ORDER — INSULIN LISPRO 100 [IU]/ML
2-12 INJECTION, SOLUTION INTRAVENOUS; SUBCUTANEOUS EVERY 6 HOURS
Status: DISCONTINUED | OUTPATIENT
Start: 2025-01-24 | End: 2025-01-31

## 2025-01-24 RX ORDER — ACETAMINOPHEN 650 MG/20.3ML
975 SUSPENSION ORAL EVERY 6 HOURS PRN
Status: DISCONTINUED | OUTPATIENT
Start: 2025-01-24 | End: 2025-02-08 | Stop reason: HOSPADM

## 2025-01-24 RX ORDER — ACETAZOLAMIDE 500 MG/5ML
500 INJECTION, POWDER, LYOPHILIZED, FOR SOLUTION INTRAVENOUS ONCE
Status: COMPLETED | OUTPATIENT
Start: 2025-01-24 | End: 2025-01-24

## 2025-01-24 RX ORDER — LIDOCAINE HYDROCHLORIDE 10 MG/ML
INJECTION, SOLUTION EPIDURAL; INFILTRATION; INTRACAUDAL; PERINEURAL
Status: COMPLETED
Start: 2025-01-24 | End: 2025-01-24

## 2025-01-24 RX ORDER — INSULIN LISPRO 100 [IU]/ML
2-12 INJECTION, SOLUTION INTRAVENOUS; SUBCUTANEOUS EVERY 6 HOURS
Status: DISCONTINUED | OUTPATIENT
Start: 2025-01-24 | End: 2025-01-24

## 2025-01-24 RX ORDER — MIDAZOLAM HYDROCHLORIDE 2 MG/2ML
2 INJECTION, SOLUTION INTRAMUSCULAR; INTRAVENOUS ONCE
Status: COMPLETED | OUTPATIENT
Start: 2025-01-24 | End: 2025-01-24

## 2025-01-24 RX ORDER — POTASSIUM CHLORIDE 20MEQ/15ML
60 LIQUID (ML) ORAL ONCE
Status: COMPLETED | OUTPATIENT
Start: 2025-01-24 | End: 2025-01-24

## 2025-01-24 RX ORDER — POTASSIUM CHLORIDE 14.9 MG/ML
20 INJECTION INTRAVENOUS ONCE
Status: COMPLETED | OUTPATIENT
Start: 2025-01-24 | End: 2025-01-24

## 2025-01-24 RX ORDER — ACETAMINOPHEN 160 MG/5ML
325 SUSPENSION ORAL ONCE
Status: COMPLETED | OUTPATIENT
Start: 2025-01-24 | End: 2025-01-24

## 2025-01-24 RX ORDER — POTASSIUM CHLORIDE 1500 MG/1
40 TABLET, EXTENDED RELEASE ORAL ONCE
Status: DISCONTINUED | OUTPATIENT
Start: 2025-01-24 | End: 2025-01-24

## 2025-01-24 RX ORDER — POTASSIUM CHLORIDE 29.8 MG/ML
40 INJECTION INTRAVENOUS ONCE
Status: COMPLETED | OUTPATIENT
Start: 2025-01-24 | End: 2025-01-25

## 2025-01-24 RX ORDER — POTASSIUM CHLORIDE 29.8 MG/ML
40 INJECTION INTRAVENOUS ONCE
Status: DISCONTINUED | OUTPATIENT
Start: 2025-01-24 | End: 2025-01-24

## 2025-01-24 RX ORDER — POTASSIUM CHLORIDE 1500 MG/1
20 TABLET, EXTENDED RELEASE ORAL ONCE
Status: DISCONTINUED | OUTPATIENT
Start: 2025-01-24 | End: 2025-01-24

## 2025-01-24 RX ADMIN — OSELTAMIVIR PHOSPHATE 75 MG: 6 POWDER, FOR SUSPENSION ORAL at 08:35

## 2025-01-24 RX ADMIN — FUROSEMIDE 40 MG: 10 INJECTION, SOLUTION INTRAVENOUS at 05:08

## 2025-01-24 RX ADMIN — FENTANYL CITRATE 50 MCG: 50 INJECTION INTRAMUSCULAR; INTRAVENOUS at 17:13

## 2025-01-24 RX ADMIN — VALPROIC ACID 750 MG: 500 SOLUTION ORAL at 23:13

## 2025-01-24 RX ADMIN — MELATONIN 3 MG: 3 TAB ORAL at 21:33

## 2025-01-24 RX ADMIN — PROPOFOL 15 MCG/KG/MIN: 10 INJECTION, EMULSION INTRAVENOUS at 05:12

## 2025-01-24 RX ADMIN — VALPROIC ACID 750 MG: 500 SOLUTION ORAL at 08:35

## 2025-01-24 RX ADMIN — MIDAZOLAM 2 MG: 1 INJECTION INTRAMUSCULAR; INTRAVENOUS at 15:22

## 2025-01-24 RX ADMIN — LEVALBUTEROL HYDROCHLORIDE 1.25 MG: 1.25 SOLUTION RESPIRATORY (INHALATION) at 19:29

## 2025-01-24 RX ADMIN — HYDROCORTISONE SODIUM SUCCINATE 100 MG: 100 INJECTION, POWDER, FOR SOLUTION INTRAMUSCULAR; INTRAVENOUS at 13:25

## 2025-01-24 RX ADMIN — OSELTAMIVIR PHOSPHATE 75 MG: 6 POWDER, FOR SUSPENSION ORAL at 21:34

## 2025-01-24 RX ADMIN — BENZTROPINE MESYLATE 2 MG: 1 TABLET ORAL at 18:20

## 2025-01-24 RX ADMIN — LORAZEPAM 0.5 MG: 2 INJECTION INTRAMUSCULAR; INTRAVENOUS at 16:27

## 2025-01-24 RX ADMIN — LEVALBUTEROL HYDROCHLORIDE 1.25 MG: 1.25 SOLUTION RESPIRATORY (INHALATION) at 07:22

## 2025-01-24 RX ADMIN — PROPOFOL 15 MCG/KG/MIN: 10 INJECTION, EMULSION INTRAVENOUS at 13:35

## 2025-01-24 RX ADMIN — POTASSIUM CHLORIDE 40 MEQ: 29.8 INJECTION, SOLUTION INTRAVENOUS at 08:18

## 2025-01-24 RX ADMIN — CEFTRIAXONE 2000 MG: 10 INJECTION, POWDER, FOR SOLUTION INTRAVENOUS at 21:33

## 2025-01-24 RX ADMIN — LACOSAMIDE ORAL SOLUTION 150 MG: 10 SOLUTION ORAL at 08:22

## 2025-01-24 RX ADMIN — POTASSIUM CHLORIDE 40 MEQ: 29.8 INJECTION, SOLUTION INTRAVENOUS at 16:48

## 2025-01-24 RX ADMIN — CHLORHEXIDINE GLUCONATE 15 ML: 1.2 RINSE ORAL at 21:34

## 2025-01-24 RX ADMIN — LEVOTHYROXINE SODIUM 175 MCG: 0.05 TABLET ORAL at 05:12

## 2025-01-24 RX ADMIN — LEVALBUTEROL HYDROCHLORIDE 1.25 MG: 1.25 SOLUTION RESPIRATORY (INHALATION) at 14:01

## 2025-01-24 RX ADMIN — IPRATROPIUM BROMIDE 0.5 MG: 0.5 SOLUTION RESPIRATORY (INHALATION) at 19:29

## 2025-01-24 RX ADMIN — ACETAMINOPHEN 650 MG: 650 SUSPENSION ORAL at 20:14

## 2025-01-24 RX ADMIN — ENOXAPARIN SODIUM 40 MG: 40 INJECTION SUBCUTANEOUS at 08:23

## 2025-01-24 RX ADMIN — POTASSIUM CHLORIDE 20 MEQ: 14.9 INJECTION, SOLUTION INTRAVENOUS at 12:15

## 2025-01-24 RX ADMIN — IPRATROPIUM BROMIDE 0.5 MG: 0.5 SOLUTION RESPIRATORY (INHALATION) at 07:22

## 2025-01-24 RX ADMIN — POTASSIUM CHLORIDE 60 MEQ: 20 SOLUTION ORAL at 16:49

## 2025-01-24 RX ADMIN — HYDROCORTISONE SODIUM SUCCINATE 100 MG: 100 INJECTION, POWDER, FOR SOLUTION INTRAMUSCULAR; INTRAVENOUS at 21:34

## 2025-01-24 RX ADMIN — Medication 50 MCG/HR: at 04:23

## 2025-01-24 RX ADMIN — BENZTROPINE MESYLATE 2 MG: 1 TABLET ORAL at 08:24

## 2025-01-24 RX ADMIN — Medication 20 MG: at 08:23

## 2025-01-24 RX ADMIN — FENTANYL CITRATE 50 MCG: 50 INJECTION INTRAMUSCULAR; INTRAVENOUS at 01:29

## 2025-01-24 RX ADMIN — MIDAZOLAM HYDROCHLORIDE 2 MG: 2 INJECTION, SOLUTION INTRAMUSCULAR; INTRAVENOUS at 15:22

## 2025-01-24 RX ADMIN — INSULIN LISPRO 2 UNITS: 100 INJECTION, SOLUTION INTRAVENOUS; SUBCUTANEOUS at 23:13

## 2025-01-24 RX ADMIN — ACETAMINOPHEN 325 MG: 650 SUSPENSION ORAL at 23:14

## 2025-01-24 RX ADMIN — FUROSEMIDE 20 MG: 10 INJECTION, SOLUTION INTRAVENOUS at 13:35

## 2025-01-24 RX ADMIN — SODIUM PHOSPHATE, MONOBASIC, MONOHYDRATE AND SODIUM PHOSPHATE, DIBASIC, ANHYDROUS 6 MMOL: 142; 276 INJECTION, SOLUTION INTRAVENOUS at 08:34

## 2025-01-24 RX ADMIN — POTASSIUM CHLORIDE 20 MEQ: 14.9 INJECTION, SOLUTION INTRAVENOUS at 18:46

## 2025-01-24 RX ADMIN — CYANOCOBALAMIN TAB 500 MCG 1000 MCG: 500 TAB at 08:23

## 2025-01-24 RX ADMIN — IPRATROPIUM BROMIDE 0.5 MG: 0.5 SOLUTION RESPIRATORY (INHALATION) at 14:01

## 2025-01-24 RX ADMIN — ACETAZOLAMIDE 500 MG: 500 INJECTION, POWDER, LYOPHILIZED, FOR SOLUTION INTRAVENOUS at 13:25

## 2025-01-24 RX ADMIN — LIDOCAINE HYDROCHLORIDE: 10 INJECTION, SOLUTION EPIDURAL; INFILTRATION; INTRACAUDAL; PERINEURAL at 16:34

## 2025-01-24 RX ADMIN — PROPOFOL 10 MCG/KG/MIN: 10 INJECTION, EMULSION INTRAVENOUS at 19:04

## 2025-01-24 RX ADMIN — LACOSAMIDE ORAL SOLUTION 200 MG: 10 SOLUTION ORAL at 21:33

## 2025-01-24 RX ADMIN — HYDROCORTISONE SODIUM SUCCINATE 100 MG: 100 INJECTION, POWDER, FOR SOLUTION INTRAMUSCULAR; INTRAVENOUS at 05:08

## 2025-01-24 RX ADMIN — CHLORHEXIDINE GLUCONATE 15 ML: 1.2 RINSE ORAL at 08:21

## 2025-01-24 RX ADMIN — FENTANYL CITRATE 100 MCG: 50 INJECTION INTRAMUSCULAR; INTRAVENOUS at 15:23

## 2025-01-24 RX ADMIN — OLANZAPINE 10 MG: 5 TABLET, FILM COATED ORAL at 21:33

## 2025-01-24 NOTE — PLAN OF CARE
Problem: Potential for Falls  Goal: Patient will remain free of falls  Description: INTERVENTIONS:  - Educate patient/family on patient safety including physical limitations  - Instruct patient to call for assistance with activity   - Consult OT/PT to assist with strengthening/mobility   - Keep Call bell within reach  - Keep bed low and locked with side rails adjusted as appropriate  - Keep care items and personal belongings within reach  - Initiate and maintain comfort rounds  - Make Fall Risk Sign visible to staff  - Offer Toileting every 2 Hours, in advance of need  - Initiate/Maintain Bed alarm  - Obtain necessary fall risk management equipment  - Apply yellow socks and bracelet for high fall risk patients  - Consider moving patient to room near nurses station  Outcome: Progressing     Problem: Prexisting or High Potential for Compromised Skin Integrity  Goal: Skin integrity is maintained or improved  Description: INTERVENTIONS:  - Identify patients at risk for skin breakdown  - Assess and monitor skin integrity  - Assess and monitor nutrition and hydration status  - Monitor labs   - Assess for incontinence   - Turn and reposition patient  - Assist with mobility/ambulation  - Relieve pressure over bony prominences  - Avoid friction and shearing  - Provide appropriate hygiene as needed including keeping skin clean and dry  - Evaluate need for skin moisturizer/barrier cream  - Collaborate with interdisciplinary team   - Patient/family teaching  - Consider wound care consult   Outcome: Progressing     Problem: PAIN - ADULT  Goal: Verbalizes/displays adequate comfort level or baseline comfort level  Description: Interventions:  - Encourage patient to monitor pain and request assistance  - Assess pain using appropriate pain scale  - Administer analgesics based on type and severity of pain and evaluate response  - Implement non-pharmacological measures as appropriate and evaluate response  - Consider cultural and  social influences on pain and pain management  - Notify physician/advanced practitioner if interventions unsuccessful or patient reports new pain  Outcome: Progressing     Problem: INFECTION - ADULT  Goal: Absence or prevention of progression during hospitalization  Description: INTERVENTIONS:  - Assess and monitor for signs and symptoms of infection  - Monitor lab/diagnostic results  - Monitor all insertion sites, i.e. indwelling lines, tubes, and drains  - Monitor endotracheal if appropriate and nasal secretions for changes in amount and color  - [place] appropriate cooling/warming therapies per order  - Administer medications as ordered  - Instruct and encourage patient and family to use good hand hygiene technique  - Identify and instruct in appropriate isolation precautions for identified infection/condition  Outcome: Progressing     Problem: SAFETY ADULT  Goal: Patient will remain free of falls  Description: INTERVENTIONS:  - Educate patient/family on patient safety including physical limitations  - Instruct patient to call for assistance with activity   - Consult OT/PT to assist with strengthening/mobility   - Keep Call bell within reach  - Keep bed low and locked with side rails adjusted as appropriate  - Keep care items and personal belongings within reach  - Initiate and maintain comfort rounds  - Make Fall Risk Sign visible to staff  - Offer Toileting every 2 Hours, in advance of need  - Initiate/Maintain Bed alarm  - Obtain necessary fall risk management equipment  - Apply yellow socks and bracelet for high fall risk patients  - Consider moving patient to room near nurses station  Outcome: Progressing  Goal: Maintain or return to baseline ADL function  Description: INTERVENTIONS:  -  Assess patient's ability to carry out ADLs; assess patient's baseline for ADL function and identify physical deficits which impact ability to perform ADLs (bathing, care of mouth/teeth, toileting, grooming, dressing,  etc.)  - Assess/evaluate cause of self-care deficits   - Assess range of motion  - Assess patient's mobility; develop plan if impaired  - Assess patient's need for assistive devices and provide as appropriate  - Encourage maximum independence but intervene and supervise when necessary  - Involve family in performance of ADLs  - Assess for home care needs following discharge   - Consider OT consult to assist with ADL evaluation and planning for discharge  - Provide patient education as appropriate  Outcome: Progressing  Goal: Maintains/Returns to pre admission functional level  Description: INTERVENTIONS:  - Perform AM-PAC 6 Click Basic Mobility/ Daily Activity assessment daily.  - Set and communicate daily mobility goal to care team and patient/family/caregiver.   - Collaborate with rehabilitation services on mobility goals if consulted  - Perform Range of Motion 3 times a day.  - Reposition patient every 2 hours.  - Dangle patient 3 times a day  - Stand patient 3 times a day  - Ambulate patient 3 times a day  - Out of bed to chair 3 times a day   - Out of bed for meals 3 times a day  - Out of bed for toileting  - Record patient progress and toleration of activity level   Outcome: Progressing     Problem: DISCHARGE PLANNING  Goal: Discharge to home or other facility with appropriate resources  Description: INTERVENTIONS:  - Identify barriers to discharge w/patient and caregiver  - Arrange for needed discharge resources and transportation as appropriate  - Identify discharge learning needs (meds, wound care, etc.)  - Arrange for interpretive services to assist at discharge as needed  - Refer to Case Management Department for coordinating discharge planning if the patient needs post-hospital services based on physician/advanced practitioner order or complex needs related to functional status, cognitive ability, or social support system  Outcome: Progressing     Problem: NEUROSENSORY - ADULT  Goal: Achieves stable or  improved neurological status  Description: INTERVENTIONS  - Monitor and report changes in neurological status  - Monitor vital signs such as temperature, blood pressure, glucose, and any other labs ordered   - Initiate measures to prevent increased intracranial pressure  - Monitor for seizure activity and implement precautions if appropriate      Outcome: Progressing  Goal: Remains free of injury related to seizures activity  Description: INTERVENTIONS  - Maintain airway, patient safety  and administer oxygen as ordered  - Monitor patient for seizure activity, document and report duration and description of seizure to physician/advanced practitioner  - If seizure occurs,  ensure patient safety during seizure  - Reorient patient post seizure  - Seizure pads on all 4 side rails  - Instruct patient/family to notify RN of any seizure activity including if an aura is experienced  - Instruct patient/family to call for assistance with activity based on nursing assessment  - Administer anti-seizure medications if ordered    Outcome: Progressing  Goal: Achieves maximal functionality and self care  Description: INTERVENTIONS  - Monitor swallowing and airway patency with patient fatigue and changes in neurological status  - Encourage and assist patient to increase activity and self care.   - Encourage visually impaired, hearing impaired and aphasic patients to use assistive/communication devices  Outcome: Progressing     Problem: RESPIRATORY - ADULT  Goal: Achieves optimal ventilation and oxygenation  Description: INTERVENTIONS:  - Assess for changes in respiratory status  - Assess for changes in mentation and behavior  - Position to facilitate oxygenation and minimize respiratory effort  - Oxygen administered by appropriate delivery if ordered  - Initiate smoking cessation education as indicated  - Encourage broncho-pulmonary hygiene including cough, deep breathe, Incentive Spirometry  - Assess the need for suctioning and  aspirate as needed  - Assess and instruct to report SOB or any respiratory difficulty  - Respiratory Therapy support as indicated  Outcome: Progressing     Problem: GENITOURINARY - ADULT  Goal: Maintains or returns to baseline urinary function  Description: INTERVENTIONS:  - Assess urinary function  - Encourage oral fluids to ensure adequate hydration if ordered  - Administer IV fluids as ordered to ensure adequate hydration  - Administer ordered medications as needed  - Offer frequent toileting  - Follow urinary retention protocol if ordered  Outcome: Progressing  Goal: Absence of urinary retention  Description: INTERVENTIONS:  - Assess patient’s ability to void and empty bladder  - Monitor I/O  - Bladder scan as needed  - Discuss with physician/AP medications to alleviate retention as needed  - Discuss catheterization for long term situations as appropriate  Outcome: Progressing     Problem: METABOLIC, FLUID AND ELECTROLYTES - ADULT  Goal: Electrolytes maintained within normal limits  Description: INTERVENTIONS:  - Monitor labs and assess patient for signs and symptoms of electrolyte imbalances  - Administer electrolyte replacement as ordered  - Monitor response to electrolyte replacements, including repeat lab results as appropriate  - Instruct patient on fluid and nutrition as appropriate  Outcome: Progressing  Goal: Fluid balance maintained  Description: INTERVENTIONS:  - Monitor labs   - Monitor I/O and WT  - Instruct patient on fluid and nutrition as appropriate  - Assess for signs & symptoms of volume excess or deficit  Outcome: Progressing     Problem: HEMATOLOGIC - ADULT  Goal: Maintains hematologic stability  Description: INTERVENTIONS  - Assess for signs and symptoms of bleeding or hemorrhage  - Monitor labs  - Administer supportive blood products/factors as ordered and appropriate  Outcome: Progressing     Problem: MUSCULOSKELETAL - ADULT  Goal: Maintain or return mobility to safest level of  function  Description: INTERVENTIONS:  - Assess patient's ability to carry out ADLs; assess patient's baseline for ADL function and identify physical deficits which impact ability to perform ADLs (bathing, care of mouth/teeth, toileting, grooming, dressing, etc.)  - Assess/evaluate cause of self-care deficits   - Assess range of motion  - Assess patient's mobility  - Assess patient's need for assistive devices and provide as appropriate  - Encourage maximum independence but intervene and supervise when necessary  - Involve family in performance of ADLs  - Assess for home care needs following discharge   - Consider OT consult to assist with ADL evaluation and planning for discharge  - Provide patient education as appropriate  Outcome: Progressing  Goal: Maintain proper alignment of affected body part  Description: INTERVENTIONS:  - Support, maintain and protect limb and body alignment  - Provide patient/ family with appropriate education  Outcome: Progressing     Problem: SAFETY,RESTRAINT: NV/NON-SELF DESTRUCTIVE BEHAVIOR  Goal: Remains free of harm/injury (restraint for non violent/non self-detsructive behavior)  Description: INTERVENTIONS:  - Instruct patient/family regarding restraint use   - Assess and monitor physiologic and psychological status   - Provide interventions and comfort measures to meet assessed patient needs   - Identify and implement measures to help patient regain control  - Assess readiness for release of restraint   Outcome: Progressing  Goal: Returns to optimal restraint-free functioning  Description: INTERVENTIONS:  - Assess the patient's behavior and symptoms that indicate continued need for restraint  - Identify and implement measures to help patient regain control  - Assess readiness for release of restraint   Outcome: Progressing     Problem: Nutrition/Hydration-ADULT  Goal: Nutrient/Hydration intake appropriate for improving, restoring or maintaining nutritional needs  Description:  Monitor and assess patient's nutrition/hydration status for malnutrition. Collaborate with interdisciplinary team and initiate plan and interventions as ordered.  Monitor patient's weight and dietary intake as ordered or per policy. Utilize nutrition screening tool and intervene as necessary. Determine patient's food preferences and provide high-protein, high-caloric foods as appropriate.     INTERVENTIONS:  - Monitor oral intake, urinary output, labs, and treatment plans  - Assess nutrition and hydration status and recommend course of action  - Evaluate amount of meals eaten  - Assist patient with eating if necessary   - Allow adequate time for meals  - Recommend/ encourage appropriate diets, oral nutritional supplements, and vitamin/mineral supplements  - Order, calculate, and assess calorie counts as needed  - Recommend, monitor, and adjust tube feedings and TPN/PPN based on assessed needs  - Assess need for intravenous fluids  - Provide specific nutrition/hydration education as appropriate  - Include patient/family/caregiver in decisions related to nutrition  Outcome: Progressing

## 2025-01-24 NOTE — PLAN OF CARE
Problem: Potential for Falls  Goal: Patient will remain free of falls  Description: INTERVENTIONS:  - Educate patient/family on patient safety including physical limitations  - Instruct patient to call for assistance with activity   - Consult OT/PT to assist with strengthening/mobility   - Keep Call bell within reach  - Keep bed low and locked with side rails adjusted as appropriate  - Keep care items and personal belongings within reach  - Initiate and maintain comfort rounds  - Make Fall Risk Sign visible to staff  - Offer Toileting every 2 Hours, in advance of need  - Initiate/Maintain bed alarm  - Apply yellow socks and bracelet for high fall risk patients  - Consider moving patient to room near nurses station  Outcome: Progressing     Problem: Prexisting or High Potential for Compromised Skin Integrity  Goal: Skin integrity is maintained or improved  Description: INTERVENTIONS:  - Identify patients at risk for skin breakdown  - Assess and monitor skin integrity  - Assess and monitor nutrition and hydration status  - Monitor labs   - Assess for incontinence   - Turn and reposition patient  - Assist with mobility/ambulation  - Relieve pressure over bony prominences  - Avoid friction and shearing  - Provide appropriate hygiene as needed including keeping skin clean and dry  - Evaluate need for skin moisturizer/barrier cream  - Collaborate with interdisciplinary team   - Patient/family teaching  - Consider wound care consult   Outcome: Progressing     Problem: PAIN - ADULT  Goal: Verbalizes/displays adequate comfort level or baseline comfort level  Description: Interventions:  - Encourage patient to monitor pain and request assistance  - Assess pain using appropriate pain scale  - Administer analgesics based on type and severity of pain and evaluate response  - Implement non-pharmacological measures as appropriate and evaluate response  - Consider cultural and social influences on pain and pain management  -  Notify physician/advanced practitioner if interventions unsuccessful or patient reports new pain  Outcome: Progressing     Problem: INFECTION - ADULT  Goal: Absence or prevention of progression during hospitalization  Description: INTERVENTIONS:  - Assess and monitor for signs and symptoms of infection  - Monitor lab/diagnostic results  - Monitor all insertion sites, i.e. indwelling lines, tubes, and drains  - Monitor endotracheal if appropriate and nasal secretions for changes in amount and color  - Ramona appropriate cooling/warming therapies per order  - Administer medications as ordered  - Instruct and encourage patient and family to use good hand hygiene technique  - Identify and instruct in appropriate isolation precautions for identified infection/condition  Outcome: Progressing     Problem: SAFETY ADULT  Goal: Patient will remain free of falls  Description: INTERVENTIONS:  - Educate patient/family on patient safety including physical limitations  - Instruct patient to call for assistance with activity   - Consult OT/PT to assist with strengthening/mobility   - Keep Call bell within reach  - Keep bed low and locked with side rails adjusted as appropriate  - Keep care items and personal belongings within reach  - Initiate and maintain comfort rounds  - Make Fall Risk Sign visible to staff  - Offer Toileting every 2 Hours, in advance of need  - Initiate/Maintain bed alarm  - Apply yellow socks and bracelet for high fall risk patients  - Consider moving patient to room near nurses station  Outcome: Progressing  Goal: Maintain or return to baseline ADL function  Description: INTERVENTIONS:  -  Assess patient's ability to carry out ADLs; assess patient's baseline for ADL function and identify physical deficits which impact ability to perform ADLs (bathing, care of mouth/teeth, toileting, grooming, dressing, etc.)  - Assess/evaluate cause of self-care deficits   - Assess range of motion  - Assess patient's  mobility; develop plan if impaired  - Assess patient's need for assistive devices and provide as appropriate  - Encourage maximum independence but intervene and supervise when necessary  - Involve family in performance of ADLs  - Assess for home care needs following discharge   - Consider OT consult to assist with ADL evaluation and planning for discharge  - Provide patient education as appropriate  Outcome: Progressing  Goal: Maintains/Returns to pre admission functional level  Description: INTERVENTIONS:  - Perform AM-PAC 6 Click Basic Mobility/ Daily Activity assessment daily.  - Set and communicate daily mobility goal to care team and patient/family/caregiver.   - Collaborate with rehabilitation services on mobility goals if consulted  - Reposition patient every 2 hours.  - Out of bed for toileting  - Record patient progress and toleration of activity level   Outcome: Progressing     Problem: DISCHARGE PLANNING  Goal: Discharge to home or other facility with appropriate resources  Description: INTERVENTIONS:  - Identify barriers to discharge w/patient and caregiver  - Arrange for needed discharge resources and transportation as appropriate  - Identify discharge learning needs (meds, wound care, etc.)  - Arrange for interpretive services to assist at discharge as needed  - Refer to Case Management Department for coordinating discharge planning if the patient needs post-hospital services based on physician/advanced practitioner order or complex needs related to functional status, cognitive ability, or social support system  Outcome: Progressing     Problem: NEUROSENSORY - ADULT  Goal: Achieves stable or improved neurological status  Description: INTERVENTIONS  - Monitor and report changes in neurological status  - Monitor vital signs such as temperature, blood pressure, glucose, and any other labs ordered   - Initiate measures to prevent increased intracranial pressure  - Monitor for seizure activity and implement  precautions if appropriate      Outcome: Progressing  Goal: Remains free of injury related to seizures activity  Description: INTERVENTIONS  - Maintain airway, patient safety  and administer oxygen as ordered  - Monitor patient for seizure activity, document and report duration and description of seizure to physician/advanced practitioner  - If seizure occurs,  ensure patient safety during seizure  - Reorient patient post seizure  - Seizure pads on all 4 side rails  - Instruct patient/family to notify RN of any seizure activity including if an aura is experienced  - Instruct patient/family to call for assistance with activity based on nursing assessment  - Administer anti-seizure medications if ordered    Outcome: Progressing  Goal: Achieves maximal functionality and self care  Description: INTERVENTIONS  - Monitor swallowing and airway patency with patient fatigue and changes in neurological status  - Encourage and assist patient to increase activity and self care.   - Encourage visually impaired, hearing impaired and aphasic patients to use assistive/communication devices  Outcome: Progressing     Problem: RESPIRATORY - ADULT  Goal: Achieves optimal ventilation and oxygenation  Description: INTERVENTIONS:  - Assess for changes in respiratory status  - Assess for changes in mentation and behavior  - Position to facilitate oxygenation and minimize respiratory effort  - Oxygen administered by appropriate delivery if ordered  - Initiate smoking cessation education as indicated  - Encourage broncho-pulmonary hygiene including cough, deep breathe, Incentive Spirometry  - Assess the need for suctioning and aspirate as needed  - Assess and instruct to report SOB or any respiratory difficulty  - Respiratory Therapy support as indicated  Outcome: Progressing     Problem: GENITOURINARY - ADULT  Goal: Maintains or returns to baseline urinary function  Description: INTERVENTIONS:  - Assess urinary function  - Encourage oral  fluids to ensure adequate hydration if ordered  - Administer IV fluids as ordered to ensure adequate hydration  - Administer ordered medications as needed  - Offer frequent toileting  - Follow urinary retention protocol if ordered  Outcome: Progressing  Goal: Absence of urinary retention  Description: INTERVENTIONS:  - Assess patient’s ability to void and empty bladder  - Monitor I/O  - Bladder scan as needed  - Discuss with physician/AP medications to alleviate retention as needed  - Discuss catheterization for long term situations as appropriate  Outcome: Progressing     Problem: METABOLIC, FLUID AND ELECTROLYTES - ADULT  Goal: Electrolytes maintained within normal limits  Description: INTERVENTIONS:  - Monitor labs and assess patient for signs and symptoms of electrolyte imbalances  - Administer electrolyte replacement as ordered  - Monitor response to electrolyte replacements, including repeat lab results as appropriate  - Instruct patient on fluid and nutrition as appropriate  Outcome: Progressing  Goal: Fluid balance maintained  Description: INTERVENTIONS:  - Monitor labs   - Monitor I/O and WT  - Instruct patient on fluid and nutrition as appropriate  - Assess for signs & symptoms of volume excess or deficit  Outcome: Progressing     Problem: HEMATOLOGIC - ADULT  Goal: Maintains hematologic stability  Description: INTERVENTIONS  - Assess for signs and symptoms of bleeding or hemorrhage  - Monitor labs  - Administer supportive blood products/factors as ordered and appropriate  Outcome: Progressing     Problem: MUSCULOSKELETAL - ADULT  Goal: Maintain or return mobility to safest level of function  Description: INTERVENTIONS:  - Assess patient's ability to carry out ADLs; assess patient's baseline for ADL function and identify physical deficits which impact ability to perform ADLs (bathing, care of mouth/teeth, toileting, grooming, dressing, etc.)  - Assess/evaluate cause of self-care deficits   - Assess range  of motion  - Assess patient's mobility  - Assess patient's need for assistive devices and provide as appropriate  - Encourage maximum independence but intervene and supervise when necessary  - Involve family in performance of ADLs  - Assess for home care needs following discharge   - Consider OT consult to assist with ADL evaluation and planning for discharge  - Provide patient education as appropriate  Outcome: Progressing  Goal: Maintain proper alignment of affected body part  Description: INTERVENTIONS:  - Support, maintain and protect limb and body alignment  - Provide patient/ family with appropriate education  Outcome: Progressing     Problem: SAFETY,RESTRAINT: NV/NON-SELF DESTRUCTIVE BEHAVIOR  Goal: Remains free of harm/injury (restraint for non violent/non self-detsructive behavior)  Description: INTERVENTIONS:  - Instruct patient/family regarding restraint use   - Assess and monitor physiologic and psychological status   - Provide interventions and comfort measures to meet assessed patient needs   - Identify and implement measures to help patient regain control  - Assess readiness for release of restraint   Outcome: Progressing  Goal: Returns to optimal restraint-free functioning  Description: INTERVENTIONS:  - Assess the patient's behavior and symptoms that indicate continued need for restraint  - Identify and implement measures to help patient regain control  - Assess readiness for release of restraint   Outcome: Progressing     Problem: Nutrition/Hydration-ADULT  Goal: Nutrient/Hydration intake appropriate for improving, restoring or maintaining nutritional needs  Description: Monitor and assess patient's nutrition/hydration status for malnutrition. Collaborate with interdisciplinary team and initiate plan and interventions as ordered.  Monitor patient's weight and dietary intake as ordered or per policy. Utilize nutrition screening tool and intervene as necessary. Determine patient's food preferences  and provide high-protein, high-caloric foods as appropriate.     INTERVENTIONS:  - Monitor oral intake, urinary output, labs, and treatment plans  - Assess nutrition and hydration status and recommend course of action  - Evaluate amount of meals eaten  - Assist patient with eating if necessary   - Allow adequate time for meals  - Recommend/ encourage appropriate diets, oral nutritional supplements, and vitamin/mineral supplements  - Order, calculate, and assess calorie counts as needed  - Recommend, monitor, and adjust tube feedings and TPN/PPN based on assessed needs  - Assess need for intravenous fluids  - Provide specific nutrition/hydration education as appropriate  - Include patient/family/caregiver in decisions related to nutrition  Outcome: Progressing

## 2025-01-24 NOTE — PROGRESS NOTES
Progress Note - Critical Care/ICU   Name: Esperanza Prajapati 54 y.o. female I MRN: [id number]  Unit/Bed#: ICU 15 I Date of Admission: 1/19/2025   Date of Service: 1/24/2025 I Hospital Day: 4      Assessment & Plan  Acute respiratory failure with hypoxia (HCC)  Due to Flu A, viral sepsis - consider multifocal aspiration PNA  Possible ARDS, could be superimposed bacterial pneumonia in the background of flu  CT C/A/P negative on admission   Procal negative on admission, then a significant elevation  CXR showed b/l opacities - aspiration cannot be excluded at this time   Continue adebayo. Nebs   Continue ceftriaxone, azithromycin (started 1/20)  Continue Tamiflu  Currently on [facility]  Sputum cx follow-up  MRSA negative  Legionella , Streptococcus pneumonia unremarkable    Currently patient is off vasopressin, phenylephrine, norepinephrine  Currently patient is on propofol, fentanyl drip as sedatives    Yesterday, patient was given Lasix 40 g twice daily IV and patient is significant urine output with significant negative hide  Patient received 40 mg IV Lasix in the morning  of 1/24.    Focal epilepsy (HCC)  Follows with [facility] neurology   Focal epilepsy manifests as simple partial, complex partial, and generalized tonic-clonic seizures   Hx of b/l postural and action tremor per neurology notes  Home regimen: vimpat 150 daily, vimpat 200mg qHS, Depakote 750mg BID  Valproic level therapeutic 1/20  Low suspicion for any breakthrough seizures, not post-ictal on exam and is interactive  Vimpat transitioned to IV, depakote via oG-tube  Viral sepsis (HCC)  Due to Flu A + on 1/19   SIRS on admission with tachycardia, tachypnea, leukopenia, fever   Lactic negative, no hypotension on admission   Overnight 1/21 with borderline hypotensive, responsive to 1L bolus  Abx with ceftriaxone and azithromycin  Bcx negative for 72 hours, U/a negative, MRSA negative  Cognitive developmental delay  Due to head injury at age of 10 months    Postoperative hypothyroidism  Stage I papillary thyroid cancer s/p total thyroidectomy and ablation in 2013  Post-operative hypothyroidism   Levothyroxine via OG tube  Schizoaffective disorder (HCC)  Home regimen: zyprexa 10mg daily, trazodone 50mg PRN, invega 6mg daily   Zyprexa transitioned to IM due to NPO, trazodone and paliperidone on hold   Tachycardia  In the setting of agitation, hypoxia, viral sepsis, fever  Sinus tach with occasional PVCs to 150s   CT C/A/P negative on admission   Trial nighttime zyprexa   Consider CTA if persistent    EKG as needed  Toxic metabolic encephalopathy  Due to viral sepsis, hypoxia, and infection  Hx of developmental delay although at baseline she is A&O x 3  CTH on admission negative   Do not suspect breakthrough seizures  Supportive care at this time  Fall at home, initial encounter  Presented with fall, + head strike   CTH negative   Bed alarm/fall precautions  Influenza A  Positive on 1/19  Complicated by viral sepsis, see plan below  Tamiflu via oG-tube    Disposition: Critical care    ICU Core Measures     Vented Patient  VAP Bundle  VAP bundle ordered     A: Assess, Prevent, and Manage Pain Has pain been assessed? NA  Need for changes to pain regimen? Yes   B: Both Spontaneous Awakening Trials (SATs) and Spontaneous Breathing Trials (SBTs) Plan to perform spontaneous awakening trial today? Yes   Plan to perform spontaneous breathing trial today? Yes   Obvious barriers to extubation? No   C: Choice of Sedation RASS Goal: -4 Deep Sedation or 0 Alert and Calm  Need for changes to sedation or analgesia regimen? No   D: Delirium CAM-ICU: Positive   E: Early Mobility  Plan for early mobility? NA   F: Family Engagement Plan for family engagement today? Yes       Antibiotic Review: Awaiting culture results.  and Continue broad spectrum secondary to severity of illness.     Review of Invasive Devices:    Harris Plan: Voiding trial after improvement in ambulation   Central access  plan: Medications requiring central line  Mercedes Plan: Keep arterial line for hemodynamic monitoring, frequent ABGs, and frequent labs    Prophylaxis:  VTE VTE covered by:  enoxaparin, Subcutaneous, 40 mg at 01/23/25 0836       Stress Ulcer  covered byomeprazole (PRILOSEC) suspension 2 mg/mL [[id number]]         24 Hour Events : Lasix 40 mg twice daily IV on 1/23 and patient was significant amount of urine.  Subjective   Review of Systems: See HPI for Review of Systems    Objective :                   Vitals I/O      Most Recent Min/Max in 24hrs   Temp (!) 97.4 °F (36.3 °C) Temp  Min: 97.4 °F (36.3 °C)  Max: 99 °F (37.2 °C)   Pulse 68 Pulse  Min: 62  Max: 118   Resp 19 Resp  Min: 14  Max: 59   /71 BP  Min: 93/62  Max: 131/91   O2 Sat (!) 89 % SpO2  Min: 87 %  Max: 96 %      Intake/Output Summary (Last 24 hours) at 1/24/2025 0800  Last data filed at 1/24/2025 0600  Gross per 24 hour   Intake 599.18 ml   Output 5000 ml   Net -4400.82 ml       Diet Enteral/Parenteral; Tube Feeding No Oral Diet; Vital High Protein; Continuous; 20; 20; Every 6 hours    Invasive Monitoring           Physical Exam   Physical Exam  Vitals and nursing note reviewed.   Cardiovascular:      Rate and Rhythm: Normal rate and regular rhythm.      Heart sounds: Normal heart sounds.   Musculoskeletal:      Right lower leg: Trace Edema present.      Left lower leg: Trace Edema present.   Constitutional:       Appearance: She is ill-appearing.      Interventions: She is sedated and intubated.      Comments: obese   Pulmonary:      Effort: No accessory muscle usage, respiratory distress or accessory muscle usage. She is intubated.      Breath sounds: Rales present. No wheezing or rhonchi.   Secretions are normal.Neurological:      Mental Status: She is unresponsive, disoriented to person, disoriented to place, disoriented to time and disoriented to situation.   Genitourinary/Anorectal:  Harris present.        Diagnostic Studies        Lab Results:  I have reviewed the following results:     Medications:  Scheduled PRN   benztropine, 2 mg, BID  cefTRIAXone, 2,000 mg, Q24H  chlorhexidine, 15 mL, Q12H MOSHE  cyanocobalamin, 1,000 mcg, Daily  enoxaparin, 40 mg, Daily  hydrocortisone sodium succinate, 100 mg, Q8H MOSHE  insulin lispro, 2-12 Units, TID AC  insulin lispro, 2-12 Units, HS  ipratropium, 0.5 mg, TID  lacosamide, 150 mg, Daily  lacosamide, 200 mg, HS  levalbuterol, 1.25 mg, TID  levothyroxine, 175 mcg, Early Morning  melatonin, 3 mg, HS  OLANZapine, 10 mg, HS  omeprazole (PRILOSEC) suspension 2 mg/mL, 20 mg, Daily  oseltamivir, 75 mg, Q12H MOSHE  [Held by provider] paliperidone, 6 mg, QAM  potassium chloride, 20 mEq, Once  potassium chloride, 40 mEq, Once  sodium phosphate, 6 mmol, Once  valproic acid, 750 mg, Q12H MOSHE      Acetaminophen, 650 mg, Q4H PRN  aluminum-magnesium hydroxide-simethicone, 30 mL, Q6H PRN  fentaNYL, 50 mcg, Q1H PRN  ondansetron, 4 mg, Q6H PRN       Continuous    dexmedetomidine, 0.1-0.7 mcg/kg/hr, Last Rate: Stopped (01/23/25 1945)  fentaNYL, 50 mcg/hr, Last Rate: 50 mcg/hr (01/24/25 0423)  norepinephrine, 1-30 mcg/min, Last Rate: Stopped (01/22/25 1252)  phenylephine,  mcg/min, Last Rate: Stopped (01/23/25 0230)  propofol, 5-50 mcg/kg/min, Last Rate: 15 mcg/kg/min (01/24/25 0512)  vasopressin, 0.04 Units/min, Last Rate: Stopped (01/23/25 0820)         Labs:   CBC    Recent Labs     01/23/25 0518 01/24/25 0428   WBC 6.19 8.07   HGB 9.4* 10.1*   HCT 28.5* 29.2*   * 140*   BANDSPCT 16*  --      BMP    Recent Labs     01/23/25 0518 01/23/25  1506 01/24/25  0428   SODIUM 136  --  143   K 4.0 3.3* 2.9*     --  103   CO2 26  --  30   AGAP 8  --  10   BUN 44*  --  55*   CREATININE 1.19  --  1.18   CALCIUM 8.6  --  8.8       Coags    No recent results     Additional Electrolytes  Recent Labs     01/23/25  0518 01/24/25  0428   MG 2.6 2.7   PHOS 2.4* 2.4*          Blood Gas    No recent results  No recent results  LFTs  Recent Labs     01/23/25  0518   ALT 10   AST 20   ALKPHOS 20*   ALB 2.9*   TBILI 0.31       Infectious  No recent results  Glucose  Recent Labs     01/22/25  2144 01/23/25  0518 01/24/25  0428   GLUC 181* 167* 153*

## 2025-01-24 NOTE — PROGRESS NOTES
Nutrition    no propofol running at this time, increase as able to tube feed goal.   Vital HP @ 55 mL/hr x 24 hr.   Provides:  1320 Kcal, 115 g protein, 1104 mL free water.  Additional water flush per provider.

## 2025-01-24 NOTE — CASE MANAGEMENT
Case Management Discharge Planning Note    Patient name Esperanza Prajapati  Location ICU 15/ICU 15 MRN [id number]  : 1970 Date 2025       Current Admission Date: 2025  Current Admission Diagnosis:Acute respiratory failure with hypoxia (HCC)   Patient Active Problem List    Diagnosis Date Noted Date Diagnosed    Viral sepsis (HCC) 2025     Toxic metabolic encephalopathy 2025     Fall at home, initial encounter 2025     Influenza A 2025     Acute respiratory failure with hypoxia (HCC) 2025     Decreased urination 10/23/2024     Dermatitis of face 2024     Impaired ability to manage medication regime 10/09/2023     Bilateral lower extremity edema 2023     h/o VALERIE (acute kidney injury) (HCC) 2023     Tachycardia 10/03/2022     Gout 2022     Schizoaffective disorder (HCC) 2022     Transient ischemic attack 2022     Vitamin B12 deficiency 2022     Dyslipidemia 2022     PCOS (polycystic ovarian syndrome) 2020     Obesity (BMI 30-39.9) 2017     Vitamin D deficiency 2016     Cognitive developmental delay 2014     Obstructive sleep apnea 2013     Focal epilepsy (HCC) 2013     History of thyroid cancer 2013     Proteinuria 2011     Postoperative hypothyroidism 06/10/2011     Family history of diabetes mellitus 06/10/2011     Family history of malignant neoplasm of breast 06/10/2011     Intellectual disability 06/10/2011       LOS (days): 4  Geometric Mean LOS (GMLOS) (days): 4.9  Days to GMLOS:0.3     OBJECTIVE:  Risk of Unplanned Readmission Score: 25.92         Current admission status: Inpatient   Preferred Pharmacy:   [facility] [place] - [address]  [address] [place] [address]  Phone: [phone] Fax: [phone]    Primary Care Provider: Foster Biswas MD    Primary Insurance: [employer]  [facility]  Secondary Insurance:     DISCHARGE DETAILS:        Additional Comments: Patient is currently intubated and unaccompanied.  CM will reach out to family for discharge planning once PT consultation is completed and will continue to follow the patient for intake and discharge planning.

## 2025-01-25 LAB
ALBUMIN SERPL BCG-MCNC: 3 G/DL (ref 3.5–5)
ALP SERPL-CCNC: 24 U/L (ref 34–104)
ALT SERPL W P-5'-P-CCNC: 18 U/L (ref 7–52)
ANION GAP SERPL CALCULATED.3IONS-SCNC: 7 MMOL/L (ref 4–13)
AST SERPL W P-5'-P-CCNC: 26 U/L (ref 13–39)
BACTERIA BLD CULT: NORMAL
BACTERIA BLD CULT: NORMAL
BASOPHILS # BLD MANUAL: 0 THOUSAND/UL (ref 0–0.1)
BASOPHILS NFR MAR MANUAL: 0 % (ref 0–1)
BILIRUB SERPL-MCNC: 0.32 MG/DL (ref 0.2–1)
BUN SERPL-MCNC: 63 MG/DL (ref 5–25)
CALCIUM ALBUM COR SERPL-MCNC: 9 MG/DL (ref 8.3–10.1)
CALCIUM SERPL-MCNC: 8.2 MG/DL (ref 8.4–10.2)
CHLORIDE SERPL-SCNC: 107 MMOL/L (ref 96–108)
CO2 SERPL-SCNC: 33 MMOL/L (ref 21–32)
CREAT SERPL-MCNC: 1.21 MG/DL (ref 0.6–1.3)
EOSINOPHIL # BLD MANUAL: 0 THOUSAND/UL (ref 0–0.4)
EOSINOPHIL NFR BLD MANUAL: 0 % (ref 0–6)
ERYTHROCYTE [DISTWIDTH] IN BLOOD BY AUTOMATED COUNT: 15.6 % (ref 11.6–15.1)
GFR SERPL CREATININE-BSD FRML MDRD: 51 ML/MIN/1.73SQ M
GLUCOSE SERPL-MCNC: 129 MG/DL (ref 65–140)
GLUCOSE SERPL-MCNC: 142 MG/DL (ref 65–140)
GLUCOSE SERPL-MCNC: 143 MG/DL (ref 65–140)
GLUCOSE SERPL-MCNC: 203 MG/DL (ref 65–140)
HCT VFR BLD AUTO: 29.4 % (ref 34.8–46.1)
HGB BLD-MCNC: 9.8 G/DL (ref 11.5–15.4)
LYMPHOCYTES # BLD AUTO: 2.11 THOUSAND/UL (ref 0.6–4.47)
LYMPHOCYTES # BLD AUTO: 26 % (ref 14–44)
MAGNESIUM SERPL-MCNC: 2.7 MG/DL (ref 1.9–2.7)
MCH RBC QN AUTO: 32.6 PG (ref 26.8–34.3)
MCHC RBC AUTO-ENTMCNC: 33.3 G/DL (ref 31.4–37.4)
MCV RBC AUTO: 98 FL (ref 82–98)
METAMYELOCYTE ABSOLUTE CT: 0.08 THOUSAND/UL (ref 0–0.1)
METAMYELOCYTES NFR BLD MANUAL: 1 % (ref 0–1)
MONOCYTES # BLD AUTO: 1.54 THOUSAND/UL (ref 0–1.22)
MONOCYTES NFR BLD: 19 % (ref 4–12)
MYELOCYTE ABSOLUTE CT: 0.41 THOUSAND/UL (ref 0–0.1)
MYELOCYTES NFR BLD MANUAL: 5 % (ref 0–1)
NEUTROPHILS # BLD MANUAL: 3.97 THOUSAND/UL (ref 1.85–7.62)
NEUTS BAND NFR BLD MANUAL: 1 % (ref 0–8)
NEUTS SEG NFR BLD AUTO: 48 % (ref 43–75)
PHOSPHATE SERPL-MCNC: 2.6 MG/DL (ref 2.7–4.5)
PLATELET # BLD AUTO: 162 THOUSANDS/UL (ref 149–390)
PLATELET BLD QL SMEAR: ADEQUATE
PMV BLD AUTO: 10.6 FL (ref 8.9–12.7)
POTASSIUM SERPL-SCNC: 3.7 MMOL/L (ref 3.5–5.3)
PROT SERPL-MCNC: 6.6 G/DL (ref 6.4–8.4)
RBC # BLD AUTO: 3.01 MILLION/UL (ref 3.81–5.12)
RBC MORPH BLD: NORMAL
SODIUM SERPL-SCNC: 147 MMOL/L (ref 135–147)
WBC # BLD AUTO: 8.1 THOUSAND/UL (ref 4.31–10.16)

## 2025-01-25 PROCEDURE — 93005 ELECTROCARDIOGRAM TRACING: CPT

## 2025-01-25 PROCEDURE — 83735 ASSAY OF MAGNESIUM: CPT

## 2025-01-25 PROCEDURE — 94003 VENT MGMT INPAT SUBQ DAY: CPT

## 2025-01-25 PROCEDURE — 99291 CRITICAL CARE FIRST HOUR: CPT | Performed by: INTERNAL MEDICINE

## 2025-01-25 PROCEDURE — 94640 AIRWAY INHALATION TREATMENT: CPT

## 2025-01-25 PROCEDURE — 85027 COMPLETE CBC AUTOMATED: CPT

## 2025-01-25 PROCEDURE — 82948 REAGENT STRIP/BLOOD GLUCOSE: CPT

## 2025-01-25 PROCEDURE — 80053 COMPREHEN METABOLIC PANEL: CPT

## 2025-01-25 PROCEDURE — 94150 VITAL CAPACITY TEST: CPT

## 2025-01-25 PROCEDURE — 84100 ASSAY OF PHOSPHORUS: CPT

## 2025-01-25 PROCEDURE — 85007 BL SMEAR W/DIFF WBC COUNT: CPT

## 2025-01-25 PROCEDURE — 94760 N-INVAS EAR/PLS OXIMETRY 1: CPT

## 2025-01-25 RX ORDER — POTASSIUM CHLORIDE 29.8 MG/ML
40 INJECTION INTRAVENOUS ONCE
Status: COMPLETED | OUTPATIENT
Start: 2025-01-25 | End: 2025-01-26

## 2025-01-25 RX ORDER — FUROSEMIDE 10 MG/ML
20 INJECTION INTRAMUSCULAR; INTRAVENOUS
Status: DISCONTINUED | OUTPATIENT
Start: 2025-01-25 | End: 2025-01-27

## 2025-01-25 RX ORDER — ALBUMIN (HUMAN) 12.5 G/50ML
25 SOLUTION INTRAVENOUS ONCE
Status: COMPLETED | OUTPATIENT
Start: 2025-01-25 | End: 2025-01-25

## 2025-01-25 RX ORDER — LORAZEPAM 2 MG/ML
2 INJECTION INTRAMUSCULAR ONCE
Status: COMPLETED | OUTPATIENT
Start: 2025-01-25 | End: 2025-01-25

## 2025-01-25 RX ADMIN — LEVALBUTEROL HYDROCHLORIDE 1.25 MG: 1.25 SOLUTION RESPIRATORY (INHALATION) at 19:36

## 2025-01-25 RX ADMIN — ACETAMINOPHEN 975 MG: 650 SUSPENSION ORAL at 14:15

## 2025-01-25 RX ADMIN — HYDROCORTISONE SODIUM SUCCINATE 100 MG: 100 INJECTION, POWDER, FOR SOLUTION INTRAMUSCULAR; INTRAVENOUS at 13:38

## 2025-01-25 RX ADMIN — Medication 50 MCG/HR: at 00:36

## 2025-01-25 RX ADMIN — VALPROIC ACID 750 MG: 500 SOLUTION ORAL at 21:29

## 2025-01-25 RX ADMIN — LEVALBUTEROL HYDROCHLORIDE 1.25 MG: 1.25 SOLUTION RESPIRATORY (INHALATION) at 08:04

## 2025-01-25 RX ADMIN — IPRATROPIUM BROMIDE 0.5 MG: 0.5 SOLUTION RESPIRATORY (INHALATION) at 14:17

## 2025-01-25 RX ADMIN — PROPOFOL 10 MCG/KG/MIN: 10 INJECTION, EMULSION INTRAVENOUS at 05:42

## 2025-01-25 RX ADMIN — FUROSEMIDE 20 MG: 10 INJECTION, SOLUTION INTRAVENOUS at 13:38

## 2025-01-25 RX ADMIN — HYDROCORTISONE SODIUM SUCCINATE 100 MG: 100 INJECTION, POWDER, FOR SOLUTION INTRAMUSCULAR; INTRAVENOUS at 05:43

## 2025-01-25 RX ADMIN — MELATONIN 3 MG: 3 TAB ORAL at 21:26

## 2025-01-25 RX ADMIN — PROPOFOL 15 MCG/KG/MIN: 10 INJECTION, EMULSION INTRAVENOUS at 16:53

## 2025-01-25 RX ADMIN — Medication 50 MCG/HR: at 20:14

## 2025-01-25 RX ADMIN — ENOXAPARIN SODIUM 40 MG: 40 INJECTION SUBCUTANEOUS at 08:09

## 2025-01-25 RX ADMIN — VALPROIC ACID 750 MG: 500 SOLUTION ORAL at 08:09

## 2025-01-25 RX ADMIN — LEVALBUTEROL HYDROCHLORIDE 1.25 MG: 1.25 SOLUTION RESPIRATORY (INHALATION) at 14:17

## 2025-01-25 RX ADMIN — POTASSIUM CHLORIDE 40 MEQ: 29.8 INJECTION, SOLUTION INTRAVENOUS at 08:46

## 2025-01-25 RX ADMIN — SODIUM PHOSPHATE, MONOBASIC, MONOHYDRATE AND SODIUM PHOSPHATE, DIBASIC, ANHYDROUS 6 MMOL: 142; 276 INJECTION, SOLUTION INTRAVENOUS at 09:23

## 2025-01-25 RX ADMIN — OLANZAPINE 10 MG: 5 TABLET, FILM COATED ORAL at 21:26

## 2025-01-25 RX ADMIN — LEVOTHYROXINE SODIUM 175 MCG: 0.05 TABLET ORAL at 05:43

## 2025-01-25 RX ADMIN — CYANOCOBALAMIN TAB 500 MCG 1000 MCG: 500 TAB at 08:09

## 2025-01-25 RX ADMIN — CHLORHEXIDINE GLUCONATE 15 ML: 1.2 RINSE ORAL at 08:09

## 2025-01-25 RX ADMIN — POTASSIUM CHLORIDE 40 MEQ: 29.8 INJECTION, SOLUTION INTRAVENOUS at 00:13

## 2025-01-25 RX ADMIN — LACOSAMIDE ORAL SOLUTION 150 MG: 10 SOLUTION ORAL at 08:09

## 2025-01-25 RX ADMIN — POTASSIUM CHLORIDE 20 MEQ: 20 SOLUTION ORAL at 00:12

## 2025-01-25 RX ADMIN — IPRATROPIUM BROMIDE 0.5 MG: 0.5 SOLUTION RESPIRATORY (INHALATION) at 19:36

## 2025-01-25 RX ADMIN — BENZTROPINE MESYLATE 2 MG: 1 TABLET ORAL at 18:33

## 2025-01-25 RX ADMIN — HYDROCORTISONE SODIUM SUCCINATE 100 MG: 100 INJECTION, POWDER, FOR SOLUTION INTRAMUSCULAR; INTRAVENOUS at 21:26

## 2025-01-25 RX ADMIN — IPRATROPIUM BROMIDE 0.5 MG: 0.5 SOLUTION RESPIRATORY (INHALATION) at 08:04

## 2025-01-25 RX ADMIN — BENZTROPINE MESYLATE 2 MG: 1 TABLET ORAL at 08:10

## 2025-01-25 RX ADMIN — Medication 20 MG: at 08:09

## 2025-01-25 RX ADMIN — ALBUMIN (HUMAN) 25 G: 0.25 INJECTION, SOLUTION INTRAVENOUS at 08:46

## 2025-01-25 RX ADMIN — INSULIN LISPRO 4 UNITS: 100 INJECTION, SOLUTION INTRAVENOUS; SUBCUTANEOUS at 18:36

## 2025-01-25 RX ADMIN — CEFTRIAXONE 2000 MG: 10 INJECTION, POWDER, FOR SOLUTION INTRAVENOUS at 21:26

## 2025-01-25 RX ADMIN — LACOSAMIDE ORAL SOLUTION 200 MG: 10 SOLUTION ORAL at 21:26

## 2025-01-25 RX ADMIN — CHLORHEXIDINE GLUCONATE 15 ML: 1.2 RINSE ORAL at 21:26

## 2025-01-25 RX ADMIN — LORAZEPAM 2 MG: 2 INJECTION INTRAMUSCULAR; INTRAVENOUS at 19:10

## 2025-01-25 NOTE — PLAN OF CARE
Problem: Potential for Falls  Goal: Patient will remain free of falls  Description: INTERVENTIONS:  - Educate patient/family on patient safety including physical limitations  - Instruct patient to call for assistance with activity   - Consult OT/PT to assist with strengthening/mobility   - Keep Call bell within reach  - Keep bed low and locked with side rails adjusted as appropriate  - Keep care items and personal belongings within reach  - Initiate and maintain comfort rounds  - Make Fall Risk Sign visible to staff  - Offer Toileting every 2 Hours, in advance of need  - Initiate/Maintain Bed alarm  - Obtain necessary fall risk management equipment  - Apply yellow socks and bracelet for high fall risk patients  - Consider moving patient to room near nurses station  Outcome: Progressing     Problem: Prexisting or High Potential for Compromised Skin Integrity  Goal: Skin integrity is maintained or improved  Description: INTERVENTIONS:  - Identify patients at risk for skin breakdown  - Assess and monitor skin integrity  - Assess and monitor nutrition and hydration status  - Monitor labs   - Assess for incontinence   - Turn and reposition patient  - Assist with mobility/ambulation  - Relieve pressure over bony prominences  - Avoid friction and shearing  - Provide appropriate hygiene as needed including keeping skin clean and dry  - Evaluate need for skin moisturizer/barrier cream  - Collaborate with interdisciplinary team   - Patient/family teaching  - Consider wound care consult   Outcome: Progressing     Problem: PAIN - ADULT  Goal: Verbalizes/displays adequate comfort level or baseline comfort level  Description: Interventions:  - Encourage patient to monitor pain and request assistance  - Assess pain using appropriate pain scale  - Administer analgesics based on type and severity of pain and evaluate response  - Implement non-pharmacological measures as appropriate and evaluate response  - Consider cultural and  social influences on pain and pain management  - Notify physician/advanced practitioner if interventions unsuccessful or patient reports new pain  Outcome: Progressing     Problem: INFECTION - ADULT  Goal: Absence or prevention of progression during hospitalization  Description: INTERVENTIONS:  - Assess and monitor for signs and symptoms of infection  - Monitor lab/diagnostic results  - Monitor all insertion sites, i.e. indwelling lines, tubes, and drains  - Monitor endotracheal if appropriate and nasal secretions for changes in amount and color  - [place] appropriate cooling/warming therapies per order  - Administer medications as ordered  - Instruct and encourage patient and family to use good hand hygiene technique  - Identify and instruct in appropriate isolation precautions for identified infection/condition  Outcome: Progressing     Problem: SAFETY ADULT  Goal: Patient will remain free of falls  Description: INTERVENTIONS:  - Educate patient/family on patient safety including physical limitations  - Instruct patient to call for assistance with activity   - Consult OT/PT to assist with strengthening/mobility   - Keep Call bell within reach  - Keep bed low and locked with side rails adjusted as appropriate  - Keep care items and personal belongings within reach  - Initiate and maintain comfort rounds  - Make Fall Risk Sign visible to staff  - Offer Toileting every 2 Hours, in advance of need  - Initiate/Maintain Bed alarm  - Obtain necessary fall risk management equipment  - Apply yellow socks and bracelet for high fall risk patients  - Consider moving patient to room near nurses station  Outcome: Progressing  Goal: Maintain or return to baseline ADL function  Description: INTERVENTIONS:  -  Assess patient's ability to carry out ADLs; assess patient's baseline for ADL function and identify physical deficits which impact ability to perform ADLs (bathing, care of mouth/teeth, toileting, grooming, dressing,  etc.)  - Assess/evaluate cause of self-care deficits   - Assess range of motion  - Assess patient's mobility; develop plan if impaired  - Assess patient's need for assistive devices and provide as appropriate  - Encourage maximum independence but intervene and supervise when necessary  - Involve family in performance of ADLs  - Assess for home care needs following discharge   - Consider OT consult to assist with ADL evaluation and planning for discharge  - Provide patient education as appropriate  Outcome: Progressing  Goal: Maintains/Returns to pre admission functional level  Description: INTERVENTIONS:  - Perform AM-PAC 6 Click Basic Mobility/ Daily Activity assessment daily.  - Set and communicate daily mobility goal to care team and patient/family/caregiver.   - Collaborate with rehabilitation services on mobility goals if consulted  - Perform Range of Motion 3 times a day.  - Reposition patient every 2 hours.  - Dangle patient 3 times a day  - Stand patient 3 times a day  - Ambulate patient 3 times a day  - Out of bed to chair 3 times a day   - Out of bed for meals 3 times a day  - Out of bed for toileting  - Record patient progress and toleration of activity level   Outcome: Progressing     Problem: DISCHARGE PLANNING  Goal: Discharge to home or other facility with appropriate resources  Description: INTERVENTIONS:  - Identify barriers to discharge w/patient and caregiver  - Arrange for needed discharge resources and transportation as appropriate  - Identify discharge learning needs (meds, wound care, etc.)  - Arrange for interpretive services to assist at discharge as needed  - Refer to Case Management Department for coordinating discharge planning if the patient needs post-hospital services based on physician/advanced practitioner order or complex needs related to functional status, cognitive ability, or social support system  Outcome: Progressing     Problem: NEUROSENSORY - ADULT  Goal: Achieves stable or  improved neurological status  Description: INTERVENTIONS  - Monitor and report changes in neurological status  - Monitor vital signs such as temperature, blood pressure, glucose, and any other labs ordered   - Initiate measures to prevent increased intracranial pressure  - Monitor for seizure activity and implement precautions if appropriate      Outcome: Progressing  Goal: Remains free of injury related to seizures activity  Description: INTERVENTIONS  - Maintain airway, patient safety  and administer oxygen as ordered  - Monitor patient for seizure activity, document and report duration and description of seizure to physician/advanced practitioner  - If seizure occurs,  ensure patient safety during seizure  - Reorient patient post seizure  - Seizure pads on all 4 side rails  - Instruct patient/family to notify RN of any seizure activity including if an aura is experienced  - Instruct patient/family to call for assistance with activity based on nursing assessment  - Administer anti-seizure medications if ordered    Outcome: Progressing  Goal: Achieves maximal functionality and self care  Description: INTERVENTIONS  - Monitor swallowing and airway patency with patient fatigue and changes in neurological status  - Encourage and assist patient to increase activity and self care.   - Encourage visually impaired, hearing impaired and aphasic patients to use assistive/communication devices  Outcome: Progressing     Problem: RESPIRATORY - ADULT  Goal: Achieves optimal ventilation and oxygenation  Description: INTERVENTIONS:  - Assess for changes in respiratory status  - Assess for changes in mentation and behavior  - Position to facilitate oxygenation and minimize respiratory effort  - Oxygen administered by appropriate delivery if ordered  - Initiate smoking cessation education as indicated  - Encourage broncho-pulmonary hygiene including cough, deep breathe, Incentive Spirometry  - Assess the need for suctioning and  aspirate as needed  - Assess and instruct to report SOB or any respiratory difficulty  - Respiratory Therapy support as indicated  Outcome: Progressing     Problem: GENITOURINARY - ADULT  Goal: Maintains or returns to baseline urinary function  Description: INTERVENTIONS:  - Assess urinary function  - Encourage oral fluids to ensure adequate hydration if ordered  - Administer IV fluids as ordered to ensure adequate hydration  - Administer ordered medications as needed  - Offer frequent toileting  - Follow urinary retention protocol if ordered  Outcome: Progressing  Goal: Absence of urinary retention  Description: INTERVENTIONS:  - Assess patient’s ability to void and empty bladder  - Monitor I/O  - Bladder scan as needed  - Discuss with physician/AP medications to alleviate retention as needed  - Discuss catheterization for long term situations as appropriate  Outcome: Progressing     Problem: METABOLIC, FLUID AND ELECTROLYTES - ADULT  Goal: Electrolytes maintained within normal limits  Description: INTERVENTIONS:  - Monitor labs and assess patient for signs and symptoms of electrolyte imbalances  - Administer electrolyte replacement as ordered  - Monitor response to electrolyte replacements, including repeat lab results as appropriate  - Instruct patient on fluid and nutrition as appropriate  Outcome: Progressing  Goal: Fluid balance maintained  Description: INTERVENTIONS:  - Monitor labs   - Monitor I/O and WT  - Instruct patient on fluid and nutrition as appropriate  - Assess for signs & symptoms of volume excess or deficit  Outcome: Progressing     Problem: HEMATOLOGIC - ADULT  Goal: Maintains hematologic stability  Description: INTERVENTIONS  - Assess for signs and symptoms of bleeding or hemorrhage  - Monitor labs  - Administer supportive blood products/factors as ordered and appropriate  Outcome: Progressing     Problem: MUSCULOSKELETAL - ADULT  Goal: Maintain or return mobility to safest level of  function  Description: INTERVENTIONS:  - Assess patient's ability to carry out ADLs; assess patient's baseline for ADL function and identify physical deficits which impact ability to perform ADLs (bathing, care of mouth/teeth, toileting, grooming, dressing, etc.)  - Assess/evaluate cause of self-care deficits   - Assess range of motion  - Assess patient's mobility  - Assess patient's need for assistive devices and provide as appropriate  - Encourage maximum independence but intervene and supervise when necessary  - Involve family in performance of ADLs  - Assess for home care needs following discharge   - Consider OT consult to assist with ADL evaluation and planning for discharge  - Provide patient education as appropriate  Outcome: Progressing  Goal: Maintain proper alignment of affected body part  Description: INTERVENTIONS:  - Support, maintain and protect limb and body alignment  - Provide patient/ family with appropriate education  Outcome: Progressing     Problem: SAFETY,RESTRAINT: NV/NON-SELF DESTRUCTIVE BEHAVIOR  Goal: Remains free of harm/injury (restraint for non violent/non self-detsructive behavior)  Description: INTERVENTIONS:  - Instruct patient/family regarding restraint use   - Assess and monitor physiologic and psychological status   - Provide interventions and comfort measures to meet assessed patient needs   - Identify and implement measures to help patient regain control  - Assess readiness for release of restraint   Outcome: Progressing  Goal: Returns to optimal restraint-free functioning  Description: INTERVENTIONS:  - Assess the patient's behavior and symptoms that indicate continued need for restraint  - Identify and implement measures to help patient regain control  - Assess readiness for release of restraint   Outcome: Progressing     Problem: Nutrition/Hydration-ADULT  Goal: Nutrient/Hydration intake appropriate for improving, restoring or maintaining nutritional needs  Description:  Monitor and assess patient's nutrition/hydration status for malnutrition. Collaborate with interdisciplinary team and initiate plan and interventions as ordered.  Monitor patient's weight and dietary intake as ordered or per policy. Utilize nutrition screening tool and intervene as necessary. Determine patient's food preferences and provide high-protein, high-caloric foods as appropriate.     INTERVENTIONS:  - Monitor oral intake, urinary output, labs, and treatment plans  - Assess nutrition and hydration status and recommend course of action  - Evaluate amount of meals eaten  - Assist patient with eating if necessary   - Allow adequate time for meals  - Recommend/ encourage appropriate diets, oral nutritional supplements, and vitamin/mineral supplements  - Order, calculate, and assess calorie counts as needed  - Recommend, monitor, and adjust tube feedings and TPN/PPN based on assessed needs  - Assess need for intravenous fluids  - Provide specific nutrition/hydration education as appropriate  - Include patient/family/caregiver in decisions related to nutrition  Outcome: Progressing

## 2025-01-25 NOTE — PROGRESS NOTES
Progress Note - Critical Care/ICU   Name: Esperanza Prajapati 54 y.o. female I MRN: [id number]  Unit/Bed#: ICU 15 I Date of Admission: 1/19/2025   Date of Service: 1/25/2025 I Hospital Day: 5      Assessment & Plan  Acute respiratory failure with hypoxia (HCC)  Due to Flu A, viral sepsis - consider multifocal aspiration PNA  Possible ARDS, could be superimposed bacterial pneumonia in the background of flu  CT C/A/P negative on admission   Procal negative on admission, then a significant elevation  CXR showed b/l opacities - aspiration cannot be excluded at this time   Continue adebayo. Nebs   Continue ceftriaxone,  Patient completed 5 days course of Tamiflu  Patient completed 3 days course of azithromycin  Currently on [facility]  Sputum cx negative  On 1/24, patient underwent bronchoscopic and it was unremarkable  Follow-up with bronchoscopy culture and sensitivity  MRSA negative  Legionella , Streptococcus pneumonia unremarkable    Currently patient is off vasopressin, phenylephrine, norepinephrine  Currently patient is on propofol, fentanyl drip as sedatives    Lasix 10 mg IV can be consideredand net I/O is -700 to 1 L  Focal epilepsy (HCC)  Follows with [facility] neurology   Focal epilepsy manifests as simple partial, complex partial, and generalized tonic-clonic seizures   Hx of b/l postural and action tremor per neurology notes  Home regimen: vimpat 150 daily, vimpat 200mg qHS, Depakote 750mg BID  Valproic level therapeutic 1/20  Low suspicion for any breakthrough seizures, not post-ictal on exam and is interactive  Vimpat transitioned to IV, depakote via oG-tube  Viral sepsis (HCC)  Due to Flu A + on 1/19   SIRS on admission with tachycardia, tachypnea, leukopenia, fever   Lactic negative, no hypotension on admission   Overnight 1/21 with borderline hypotensive, responsive to 1L bolus  Abx with ceftriaxone  Completed 5-day course of Tamiflu  Completed 3 days course of azithromycin  Bcx negative for 96 hours, U/a  negative, MRSA negative  Cognitive developmental delay  Due to head injury at age of 10 months   Postoperative hypothyroidism  Stage I papillary thyroid cancer s/p total thyroidectomy and ablation in 2013  Post-operative hypothyroidism   Levothyroxine via OG tube  Schizoaffective disorder (HCC)  Home regimen: zyprexa 10mg daily, trazodone 50mg PRN, invega 6mg daily   Zyprexa transitioned to IM due to NPO, trazodone and paliperidone on hold   Tachycardia  In the setting of agitation, hypoxia, viral sepsis, fever  Sinus tach with occasional PVCs to 150s   CT C/A/P negative on admission   Trial nighttime zyprexa   Consider CTA if persistent    EKG as needed  Toxic metabolic encephalopathy  Due to viral sepsis, hypoxia, and infection  Hx of developmental delay although at baseline she is A&O x 3  CTH on admission negative   Do not suspect breakthrough seizures  Supportive care at this time  Fall at home, initial encounter  Presented with fall, + head strike   CTH negative   Bed alarm/fall precautions  Influenza A  Positive on 1/19  Complicated by viral sepsis, see plan below  Tamiflu via oG-tube completed    Disposition: Critical care    ICU Core Measures     Vented Patient  VAP Bundle  VAP bundle ordered     A: Assess, Prevent, and Manage Pain Has pain been assessed? NA  Need for changes to pain regimen? No   B: Both Spontaneous Awakening Trials (SATs) and Spontaneous Breathing Trials (SBTs) Plan to perform spontaneous awakening trial today? Yes   Plan to perform spontaneous breathing trial today? Modified and patient remained on sedation other than precedex  Obvious barriers to extubation? Yes   C: Choice of Sedation RASS Goal: -4 Deep Sedation or 0 Alert and Calm  Need for changes to sedation or analgesia regimen? No   D: Delirium CAM-ICU: Positive   E: Early Mobility  Plan for early mobility? NA   F: Family Engagement Plan for family engagement today? Yes       Antibiotic Review: Patient on appropriate coverage  based on culture data. , Awaiting culture results. , and Continue broad spectrum secondary to severity of illness.     Review of Invasive Devices:    [place] Plan: Voiding trial after improvement in ambulation   Central access plan: Medications requiring central line Hemodynamic monitoring  [place] Plan: Keep arterial line for hemodynamic monitoring, frequent ABGs, and frequent labs    Prophylaxis:  VTE VTE covered by:  enoxaparin, Subcutaneous, 40 mg at 01/25/25 0809       Stress Ulcer  covered byomeprazole (PRILOSEC) suspension 2 mg/mL [[id number]]         24 Hour Events : Patient had temperature of 101, 102.  Bronchoscopy was performed yesterday.  Lasix 20 mg IV once was given yesterday and patient had significant I/O.  Subjective   Review of Systems: See HPI for Review of Systems    Objective :                   Vitals I/O      Most Recent Min/Max in 24hrs   Temp (!) 101.8 °F (38.8 °C) Temp  Min: 98.8 °F (37.1 °C)  Max: 102 °F (38.9 °C)   Pulse 76 Pulse  Min: 70  Max: 110   Resp 19 Resp  Min: 17  Max: 32   /68 BP  Min: 97/58  Max: 120/67   O2 Sat 91 % SpO2  Min: 87 %  Max: 95 %      Intake/Output Summary (Last 24 hours) at 1/25/2025 0833  Last data filed at 1/25/2025 0800  Gross per 24 hour   Intake 1592.7 ml   Output 1700 ml   Net -107.3 ml       Diet Enteral/Parenteral; Tube Feeding No Oral Diet; Vital High Protein; Continuous; 55; 20; Every 6 hours    Invasive Monitoring           Physical Exam   Physical Exam  Vitals and nursing note reviewed.   Cardiovascular:      Rate and Rhythm: Normal rate and regular rhythm.      Heart sounds: Normal heart sounds.   Musculoskeletal:      Right lower leg: Trace Edema present.      Left lower leg: Trace Edema present.   Constitutional:       Appearance: She is ill-appearing.      Interventions: She is sedated and intubated.      Comments: obese   Pulmonary:      Effort: No accessory muscle usage, respiratory distress or accessory muscle usage. She is intubated.       Breath sounds: Rales present. No wheezing or rhonchi.   Secretions are normal.Neurological:      Mental Status: She is unresponsive, disoriented to person, disoriented to place, disoriented to time and disoriented to situation.   Genitourinary/Anorectal:  Harris present.        Diagnostic Studies        Lab Results: I have reviewed the following results:     Medications:  Scheduled PRN   Albumin 25%, 25 g, Once  benztropine, 2 mg, BID  cefTRIAXone, 2,000 mg, Q24H  chlorhexidine, 15 mL, Q12H MOSHE  cyanocobalamin, 1,000 mcg, Daily  enoxaparin, 40 mg, Daily  hydrocortisone sodium succinate, 100 mg, Q8H MOSHE  insulin lispro, 2-12 Units, Q6H  ipratropium, 0.5 mg, TID  lacosamide, 150 mg, Daily  lacosamide, 200 mg, HS  levalbuterol, 1.25 mg, TID  levothyroxine, 175 mcg, Early Morning  melatonin, 3 mg, HS  OLANZapine, 10 mg, HS  omeprazole (PRILOSEC) suspension 2 mg/mL, 20 mg, Daily  [Held by provider] paliperidone, 6 mg, QAM  potassium chloride, 40 mEq, Once  sodium phosphate, 6 mmol, Once  valproic acid, 750 mg, Q12H MOSHE      Acetaminophen, 975 mg, Q6H PRN  aluminum-magnesium hydroxide-simethicone, 30 mL, Q6H PRN  fentaNYL, 50 mcg, Q1H PRN  ondansetron, 4 mg, Q6H PRN       Continuous    fentaNYL, 50 mcg/hr, Last Rate: 50 mcg/hr (01/25/25 0036)  propofol, 5-50 mcg/kg/min, Last Rate: 10 mcg/kg/min (01/25/25 0542)         Labs:   CBC    Recent Labs     01/24/25 0428 01/25/25  0544   WBC 8.07 8.10   HGB 10.1* 9.8*   HCT 29.2* 29.4*   * 162   BANDSPCT  --  1     BMP    Recent Labs     01/24/25 0428 01/24/25  1546 01/24/25  2141 01/25/25  0544   SODIUM 143  --   --  147   K 2.9*   < > 3.5 3.7     --   --  107   CO2 30  --   --  33*   AGAP 10  --   --  7   BUN 55*  --   --  63*   CREATININE 1.18  --   --  1.21   CALCIUM 8.8  --   --  8.2*    < > = values in this interval not displayed.       Coags    No recent results     Additional Electrolytes  Recent Labs     01/24/25  0428 01/25/25  0544   MG 2.7 2.7   PHOS 2.4*  2.6*          Blood Gas    Recent Labs     01/24/25 2141   PHART 7.484*   DWQ1ORL 44.4*   PO2ART 63.6*   VSQ3FEK 32.6*   BEART 8.3   SOURCE Line, Arterial     Recent Labs     01/24/25 2141   SOURCE Line, Arterial    LFTs  Recent Labs     01/25/25  0544   ALT 18   AST 26   ALKPHOS 24*   ALB 3.0*   TBILI 0.32       Infectious  No recent results  Glucose  Recent Labs     01/24/25  0428 01/25/25  0544   GLUC 153* 129

## 2025-01-26 PROBLEM — R00.0 TACHYCARDIA: Status: RESOLVED | Noted: 2022-10-03 | Resolved: 2025-01-26

## 2025-01-26 LAB
ANION GAP SERPL CALCULATED.3IONS-SCNC: 10 MMOL/L (ref 4–13)
BACTERIA BRONCH AEROBE CULT: NORMAL
BASOPHILS # BLD AUTO: 0.09 THOUSANDS/ΜL (ref 0–0.1)
BASOPHILS NFR BLD AUTO: 1 % (ref 0–1)
BUN SERPL-MCNC: 64 MG/DL (ref 5–25)
CALCIUM SERPL-MCNC: 8.7 MG/DL (ref 8.4–10.2)
CHLORIDE SERPL-SCNC: 102 MMOL/L (ref 96–108)
CO2 SERPL-SCNC: 36 MMOL/L (ref 21–32)
CREAT SERPL-MCNC: 0.99 MG/DL (ref 0.6–1.3)
EOSINOPHIL # BLD AUTO: 0 THOUSAND/ΜL (ref 0–0.61)
EOSINOPHIL NFR BLD AUTO: 0 % (ref 0–6)
ERYTHROCYTE [DISTWIDTH] IN BLOOD BY AUTOMATED COUNT: 15.4 % (ref 11.6–15.1)
GFR SERPL CREATININE-BSD FRML MDRD: 65 ML/MIN/1.73SQ M
GLUCOSE SERPL-MCNC: 176 MG/DL (ref 65–140)
GLUCOSE SERPL-MCNC: 189 MG/DL (ref 65–140)
GLUCOSE SERPL-MCNC: 196 MG/DL (ref 65–140)
GLUCOSE SERPL-MCNC: 199 MG/DL (ref 65–140)
GLUCOSE SERPL-MCNC: 235 MG/DL (ref 65–140)
GRAM STN SPEC: NORMAL
HCT VFR BLD AUTO: 31.7 % (ref 34.8–46.1)
HGB BLD-MCNC: 10.4 G/DL (ref 11.5–15.4)
IMM GRANULOCYTES # BLD AUTO: >0.5 THOUSAND/UL (ref 0–0.2)
IMM GRANULOCYTES NFR BLD AUTO: 26 % (ref 0–2)
LYMPHOCYTES # BLD AUTO: 1.08 THOUSANDS/ΜL (ref 0.6–4.47)
LYMPHOCYTES NFR BLD AUTO: 13 % (ref 14–44)
MAGNESIUM SERPL-MCNC: 2.8 MG/DL (ref 1.9–2.7)
MCH RBC QN AUTO: 32.1 PG (ref 26.8–34.3)
MCHC RBC AUTO-ENTMCNC: 32.8 G/DL (ref 31.4–37.4)
MCV RBC AUTO: 98 FL (ref 82–98)
MONOCYTES # BLD AUTO: 1.6 THOUSAND/ΜL (ref 0.17–1.22)
MONOCYTES NFR BLD AUTO: 19 % (ref 4–12)
NEUTROPHILS # BLD AUTO: 3.32 THOUSANDS/ΜL (ref 1.85–7.62)
NEUTS SEG NFR BLD AUTO: 41 % (ref 43–75)
NRBC BLD AUTO-RTO: 1 /100 WBCS
PHOSPHATE SERPL-MCNC: 3.5 MG/DL (ref 2.7–4.5)
PLATELET # BLD AUTO: 143 THOUSANDS/UL (ref 149–390)
PMV BLD AUTO: 9.8 FL (ref 8.9–12.7)
POTASSIUM SERPL-SCNC: 2.8 MMOL/L (ref 3.5–5.3)
POTASSIUM SERPL-SCNC: 3.5 MMOL/L (ref 3.5–5.3)
RBC # BLD AUTO: 3.24 MILLION/UL (ref 3.81–5.12)
SODIUM SERPL-SCNC: 148 MMOL/L (ref 135–147)
WBC # BLD AUTO: 8.26 THOUSAND/UL (ref 4.31–10.16)

## 2025-01-26 PROCEDURE — 83735 ASSAY OF MAGNESIUM: CPT

## 2025-01-26 PROCEDURE — 94003 VENT MGMT INPAT SUBQ DAY: CPT

## 2025-01-26 PROCEDURE — 99291 CRITICAL CARE FIRST HOUR: CPT | Performed by: INTERNAL MEDICINE

## 2025-01-26 PROCEDURE — 80048 BASIC METABOLIC PNL TOTAL CA: CPT

## 2025-01-26 PROCEDURE — 94640 AIRWAY INHALATION TREATMENT: CPT

## 2025-01-26 PROCEDURE — 85027 COMPLETE CBC AUTOMATED: CPT

## 2025-01-26 PROCEDURE — 84100 ASSAY OF PHOSPHORUS: CPT

## 2025-01-26 PROCEDURE — 84132 ASSAY OF SERUM POTASSIUM: CPT

## 2025-01-26 PROCEDURE — 82948 REAGENT STRIP/BLOOD GLUCOSE: CPT

## 2025-01-26 PROCEDURE — 94760 N-INVAS EAR/PLS OXIMETRY 1: CPT

## 2025-01-26 RX ORDER — POTASSIUM CHLORIDE 20MEQ/15ML
40 LIQUID (ML) ORAL ONCE
Status: COMPLETED | OUTPATIENT
Start: 2025-01-26 | End: 2025-01-26

## 2025-01-26 RX ORDER — POTASSIUM CHLORIDE 14.9 MG/ML
20 INJECTION INTRAVENOUS ONCE
Status: COMPLETED | OUTPATIENT
Start: 2025-01-26 | End: 2025-01-26

## 2025-01-26 RX ORDER — FUROSEMIDE 10 MG/ML
20 INJECTION INTRAMUSCULAR; INTRAVENOUS ONCE
Status: COMPLETED | OUTPATIENT
Start: 2025-01-26 | End: 2025-01-26

## 2025-01-26 RX ADMIN — HYDROCORTISONE SODIUM SUCCINATE 100 MG: 100 INJECTION, POWDER, FOR SOLUTION INTRAMUSCULAR; INTRAVENOUS at 21:20

## 2025-01-26 RX ADMIN — LACOSAMIDE ORAL SOLUTION 150 MG: 10 SOLUTION ORAL at 09:19

## 2025-01-26 RX ADMIN — PROPOFOL 15 MCG/KG/MIN: 10 INJECTION, EMULSION INTRAVENOUS at 18:09

## 2025-01-26 RX ADMIN — ACETAMINOPHEN 975 MG: 650 SUSPENSION ORAL at 04:28

## 2025-01-26 RX ADMIN — PROPOFOL 5 MCG/KG/MIN: 10 INJECTION, EMULSION INTRAVENOUS at 00:23

## 2025-01-26 RX ADMIN — CHLORHEXIDINE GLUCONATE 15 ML: 1.2 RINSE ORAL at 21:20

## 2025-01-26 RX ADMIN — POTASSIUM CHLORIDE 40 MEQ: 20 SOLUTION ORAL at 05:57

## 2025-01-26 RX ADMIN — LEVOTHYROXINE SODIUM 175 MCG: 0.05 TABLET ORAL at 05:56

## 2025-01-26 RX ADMIN — IPRATROPIUM BROMIDE 0.5 MG: 0.5 SOLUTION RESPIRATORY (INHALATION) at 13:29

## 2025-01-26 RX ADMIN — FUROSEMIDE 20 MG: 10 INJECTION, SOLUTION INTRAVENOUS at 08:57

## 2025-01-26 RX ADMIN — BENZTROPINE MESYLATE 2 MG: 1 TABLET ORAL at 18:08

## 2025-01-26 RX ADMIN — CYANOCOBALAMIN TAB 500 MCG 1000 MCG: 500 TAB at 09:18

## 2025-01-26 RX ADMIN — IPRATROPIUM BROMIDE 0.5 MG: 0.5 SOLUTION RESPIRATORY (INHALATION) at 19:43

## 2025-01-26 RX ADMIN — LEVALBUTEROL HYDROCHLORIDE 1.25 MG: 1.25 SOLUTION RESPIRATORY (INHALATION) at 07:46

## 2025-01-26 RX ADMIN — Medication 50 MCG/HR: at 18:43

## 2025-01-26 RX ADMIN — LEVALBUTEROL HYDROCHLORIDE 1.25 MG: 1.25 SOLUTION RESPIRATORY (INHALATION) at 13:29

## 2025-01-26 RX ADMIN — HYDROCORTISONE SODIUM SUCCINATE 100 MG: 100 INJECTION, POWDER, FOR SOLUTION INTRAMUSCULAR; INTRAVENOUS at 05:56

## 2025-01-26 RX ADMIN — OLANZAPINE 10 MG: 5 TABLET, FILM COATED ORAL at 21:19

## 2025-01-26 RX ADMIN — ENOXAPARIN SODIUM 40 MG: 40 INJECTION SUBCUTANEOUS at 09:19

## 2025-01-26 RX ADMIN — INSULIN LISPRO 2 UNITS: 100 INJECTION, SOLUTION INTRAVENOUS; SUBCUTANEOUS at 13:08

## 2025-01-26 RX ADMIN — INSULIN LISPRO 4 UNITS: 100 INJECTION, SOLUTION INTRAVENOUS; SUBCUTANEOUS at 00:22

## 2025-01-26 RX ADMIN — VALPROIC ACID 750 MG: 500 SOLUTION ORAL at 21:35

## 2025-01-26 RX ADMIN — INSULIN LISPRO 2 UNITS: 100 INJECTION, SOLUTION INTRAVENOUS; SUBCUTANEOUS at 18:09

## 2025-01-26 RX ADMIN — POTASSIUM CHLORIDE 20 MEQ: 14.9 INJECTION, SOLUTION INTRAVENOUS at 07:45

## 2025-01-26 RX ADMIN — POTASSIUM CHLORIDE 20 MEQ: 14.9 INJECTION, SOLUTION INTRAVENOUS at 14:45

## 2025-01-26 RX ADMIN — POTASSIUM CHLORIDE 20 MEQ: 14.9 INJECTION, SOLUTION INTRAVENOUS at 09:51

## 2025-01-26 RX ADMIN — LACOSAMIDE ORAL SOLUTION 200 MG: 10 SOLUTION ORAL at 21:19

## 2025-01-26 RX ADMIN — IPRATROPIUM BROMIDE 0.5 MG: 0.5 SOLUTION RESPIRATORY (INHALATION) at 07:46

## 2025-01-26 RX ADMIN — CHLORHEXIDINE GLUCONATE 15 ML: 1.2 RINSE ORAL at 09:18

## 2025-01-26 RX ADMIN — FUROSEMIDE 20 MG: 10 INJECTION, SOLUTION INTRAVENOUS at 15:07

## 2025-01-26 RX ADMIN — VALPROIC ACID 750 MG: 500 SOLUTION ORAL at 09:50

## 2025-01-26 RX ADMIN — Medication 20 MG: at 09:19

## 2025-01-26 RX ADMIN — POTASSIUM CHLORIDE 20 MEQ: 14.9 INJECTION, SOLUTION INTRAVENOUS at 05:57

## 2025-01-26 RX ADMIN — INSULIN LISPRO 2 UNITS: 100 INJECTION, SOLUTION INTRAVENOUS; SUBCUTANEOUS at 05:56

## 2025-01-26 RX ADMIN — BENZTROPINE MESYLATE 2 MG: 1 TABLET ORAL at 09:18

## 2025-01-26 RX ADMIN — LEVALBUTEROL HYDROCHLORIDE 1.25 MG: 1.25 SOLUTION RESPIRATORY (INHALATION) at 19:44

## 2025-01-26 RX ADMIN — MELATONIN 3 MG: 3 TAB ORAL at 21:19

## 2025-01-26 RX ADMIN — POTASSIUM CHLORIDE 40 MEQ: 20 SOLUTION ORAL at 14:44

## 2025-01-26 RX ADMIN — FUROSEMIDE 20 MG: 10 INJECTION, SOLUTION INTRAVENOUS at 01:29

## 2025-01-26 RX ADMIN — HYDROCORTISONE SODIUM SUCCINATE 100 MG: 100 INJECTION, POWDER, FOR SOLUTION INTRAMUSCULAR; INTRAVENOUS at 13:07

## 2025-01-26 NOTE — PROGRESS NOTES
Progress Note - Critical Care/ICU   Name: Esperanza Prajapati 54 y.o. female I MRN: [id number]  Unit/Bed#: ICU 15 I Date of Admission: 1/19/2025   Date of Service: 1/26/2025 I Hospital Day: 6       Assessment & Plan  Acute respiratory failure with hypoxia (HCC)  Due to Flu A, viral sepsis - consider multifocal aspiration PNA  Possible ARDS, could be superimposed bacterial pneumonia in the background of flu  CT C/A/P negative on admission   Procal negative on admission, then a significant elevation  CXR showed b/l opacities - aspiration cannot be excluded at this time   Continue adebayo. Nebs   Continue ceftriaxone,  Patient completed 5 days course of Tamiflu  Patient completed 3 days course of azithromycin  Currently on [facility]  Sputum cx negative  On 1/24, patient underwent bronchoscopic and it was unremarkable  Follow-up with bronchoscopy culture and sensitivity  MRSA negative  Legionella , Streptococcus pneumonia unremarkable    Currently patient is off vasopressin, phenylephrine, norepinephrine  Currently patient is on propofol, fentanyl drip as sedatives  Still requiring 10 PEEP FiO2 60%  1/25 20 mg Lasix BID, 1/26 early morning given another 20 lasix to further make patient net negative to see if this improves oxygenation     Focal epilepsy (HCC)  Follows with [facility] neurology   Focal epilepsy manifests as simple partial, complex partial, and generalized tonic-clonic seizures   Hx of b/l postural and action tremor per neurology notes  Home regimen: vimpat 150 daily, vimpat 200mg qHS, Depakote 750mg BID  Valproic level therapeutic 1/20  Low suspicion for any breakthrough seizures, not post-ictal on exam and is interactive  Vimpat transitioned to IV, depakote via oG-tube  1/25 upward gaze and tremors, patient did appear to open eyes to voice though, gave 2mg ativan with resolve in symptoms   Consider EEG if reoccurance  Viral sepsis (HCC)  Due to Flu A + on 1/19   SIRS on admission with tachycardia, tachypnea,  leukopenia, fever   Lactic negative, no hypotension on admission   Overnight 1/21 with borderline hypotensive, responsive to 1L bolus  Abx with ceftriaxone  Completed 5-day course of Tamiflu  Completed 3 days course of azithromycin  Bcx negative for 96 hours, U/a negative, MRSA negative  Cognitive developmental delay  Due to head injury at age of 10 months    Postoperative hypothyroidism  Stage I papillary thyroid cancer s/p total thyroidectomy and ablation in 2013  Post-operative hypothyroidism   Levothyroxine via OG tube  Schizoaffective disorder (HCC)  Home regimen: zyprexa 10mg daily, trazodone 50mg PRN, invega 6mg daily   Zyprexa transitioned to IM due to NPO, trazodone and paliperidone on hold   Tachycardia (Resolved: 1/26/2025)  In the setting of agitation, hypoxia, viral sepsis, fever  Sinus tach with occasional PVCs to 150s   CT C/A/P negative on admission   Trial nighttime zyprexa   EKG as needed  Toxic metabolic encephalopathy  Due to viral sepsis, hypoxia, and infection  Hx of developmental delay although at baseline she is A&O x 3  CTH on admission negative   Do not suspect breakthrough seizures  Supportive care at this time  Fall at home, initial encounter  Presented with fall, + head strike   CTH negative   Bed alarm/fall precautions  Influenza A  Positive on 1/19  Complicated by viral sepsis, see plan below  Tamiflu via oG-tube completed    Disposition: Critical care    ICU Core Measures     Vented Patient  VAP Bundle  VAP bundle ordered     A: Assess, Prevent, and Manage Pain Has pain been assessed? Yes  Need for changes to pain regimen? No   B: Both Spontaneous Awakening Trials (SATs) and Spontaneous Breathing Trials (SBTs) Plan to perform spontaneous awakening trial today? Yes   Plan to perform spontaneous breathing trial today? Yes      C: Choice of Sedation RASS Goal: 0 Alert and Calm  Need for changes to sedation or analgesia regimen? No   D: Delirium CAM-ICU: intubation    E: Early Mobility   Plan for early mobility? Yes   F: Family Engagement Plan for family engagement today? Yes       Review of Invasive Devices:    Steven Plan: Continue for accurate I/O monitoring for 48 hours    Mercedes Plan: Keep arterial line for frequent ABGs    Prophylaxis:  VTE VTE covered by:  enoxaparin, Subcutaneous, 40 mg at 01/25/25 0809       Stress Ulcer  covered byomeprazole (PRILOSEC) suspension 2 mg/mL [[id number]]         24 Hour Events :     1/25 upward gaze and tremors, patient did appear to open eyes to voice though, gave 2mg ativan with resolve in symptoms, Consider EEG if reoccurrence    20 mg Lasix BID, 1/26 early morning given another 20 lasix to further make patient net negative to see if this improves oxygenation     Subjective   Review of Systems: Review of Systems   Unable to perform ROS: Intubated       Objective :                   Vitals I/O      Most Recent Min/Max in 24hrs   Temp 97.8 °F (36.6 °C) Temp  Min: 97.8 °F (36.6 °C)  Max: 102 °F (38.9 °C)   Pulse 66 Pulse  Min: 56  Max: 110   Resp 19 Resp  Min: 15  Max: 27   /72 BP  Min: 103/60  Max: 165/87   O2 Sat (!) 88 % SpO2  Min: 88 %  Max: 96 %      Intake/Output Summary (Last 24 hours) at 1/26/2025 0117  Last data filed at 1/26/2025 0021  Gross per 24 hour   Intake 2106.89 ml   Output 2125 ml   Net -18.11 ml       Diet Enteral/Parenteral; Tube Feeding No Oral Diet; Vital High Protein; Continuous; 55; 20; Every 6 hours    Invasive Monitoring   Arterial Line  Mercedes /68  Arterial Line BP  Min: 116/62  Max: 192/96   MAP 90 mmHg  Arterial Line MAP (mmHg)  Min: 82 mmHg  Max: 128 mmHg           Physical Exam   Physical Exam  Vitals reviewed.   Eyes:      General: Gaze aligned appropriately.   HENT:      Head: Normocephalic and atraumatic.   Cardiovascular:      Rate and Rhythm: Normal rate and regular rhythm.   Musculoskeletal:      Cervical back: Normal range of motion.   Abdominal: General: Abdomen is protuberant. There is no distension.    Constitutional:       Appearance: She is obese. She is not toxic-appearing or diaphoretic.      Interventions: She is intubated.   Pulmonary:      Effort: She is intubated.      Breath sounds: No wheezing.   Neurological:      General: No focal deficit present.      Motor: No abnormal muscle tone.      Comments: Occasional tremors , withdrawls to pain           Diagnostic Studies        Lab Results: I have reviewed the following results:     Medications:  Scheduled PRN   benztropine, 2 mg, BID  chlorhexidine, 15 mL, Q12H MOSHE  cyanocobalamin, 1,000 mcg, Daily  enoxaparin, 40 mg, Daily  furosemide, 20 mg, BID (diuretic)  hydrocortisone sodium succinate, 100 mg, Q8H MOSHE  insulin lispro, 2-12 Units, Q6H  ipratropium, 0.5 mg, TID  lacosamide, 150 mg, Daily  lacosamide, 200 mg, HS  levalbuterol, 1.25 mg, TID  levothyroxine, 175 mcg, Early Morning  melatonin, 3 mg, HS  OLANZapine, 10 mg, HS  omeprazole (PRILOSEC) suspension 2 mg/mL, 20 mg, Daily  [Held by provider] paliperidone, 6 mg, QAM  valproic acid, 750 mg, Q12H MOSHE      Acetaminophen, 975 mg, Q6H PRN  aluminum-magnesium hydroxide-simethicone, 30 mL, Q6H PRN  fentaNYL, 50 mcg, Q1H PRN  ondansetron, 4 mg, Q6H PRN       Continuous    fentaNYL, 50 mcg/hr, Last Rate: 50 mcg/hr (01/25/25 2014)  propofol, 5-50 mcg/kg/min, Last Rate: 5 mcg/kg/min (01/26/25 0023)         Labs:   CBC    Recent Labs     01/24/25 0428 01/25/25  0544   WBC 8.07 8.10   HGB 10.1* 9.8*   HCT 29.2* 29.4*   * 162   BANDSPCT  --  1     BMP    Recent Labs     01/24/25 0428 01/24/25  1546 01/24/25  2141 01/25/25  0544   SODIUM 143  --   --  147   K 2.9*   < > 3.5 3.7     --   --  107   CO2 30  --   --  33*   AGAP 10  --   --  7   BUN 55*  --   --  63*   CREATININE 1.18  --   --  1.21   CALCIUM 8.8  --   --  8.2*    < > = values in this interval not displayed.       Coags    No recent results     Additional Electrolytes  Recent Labs     01/24/25  0428 01/25/25  0544   MG 2.7 2.7   PHOS  2.4* 2.6*          Blood Gas    Recent Labs     01/24/25 2141   PHART 7.484*   PZX5KEX 44.4*   PO2ART 63.6*   POY4YAP 32.6*   BEART 8.3   SOURCE Line, Arterial     Recent Labs     01/24/25 2141   SOURCE Line, Arterial    LFTs  Recent Labs     01/25/25  0544   ALT 18   AST 26   ALKPHOS 24*   ALB 3.0*   TBILI 0.32       Infectious  No recent results  Glucose  Recent Labs     01/24/25  0428 01/25/25  0544   GLUC 153* 129

## 2025-01-26 NOTE — PLAN OF CARE
Problem: Potential for Falls  Goal: Patient will remain free of falls  Description: INTERVENTIONS:  - Educate patient/family on patient safety including physical limitations  - Instruct patient to call for assistance with activity   - Consult OT/PT to assist with strengthening/mobility   - Keep Call bell within reach  - Keep bed low and locked with side rails adjusted as appropriate  - Keep care items and personal belongings within reach  - Initiate and maintain comfort rounds  - Make Fall Risk Sign visible to staff  - Offer Toileting every  Hours, in advance of need  - Initiate/Maintain alarm  - Obtain necessary fall risk management equipment:   - Apply yellow socks and bracelet for high fall risk patients  - Consider moving patient to room near nurses station  Outcome: Progressing     Problem: Prexisting or High Potential for Compromised Skin Integrity  Goal: Skin integrity is maintained or improved  Description: INTERVENTIONS:  - Identify patients at risk for skin breakdown  - Assess and monitor skin integrity  - Assess and monitor nutrition and hydration status  - Monitor labs   - Assess for incontinence   - Turn and reposition patient  - Assist with mobility/ambulation  - Relieve pressure over bony prominences  - Avoid friction and shearing  - Provide appropriate hygiene as needed including keeping skin clean and dry  - Evaluate need for skin moisturizer/barrier cream  - Collaborate with interdisciplinary team   - Patient/family teaching  - Consider wound care consult   Outcome: Progressing     Problem: PAIN - ADULT  Goal: Verbalizes/displays adequate comfort level or baseline comfort level  Description: Interventions:  - Encourage patient to monitor pain and request assistance  - Assess pain using appropriate pain scale  - Administer analgesics based on type and severity of pain and evaluate response  - Implement non-pharmacological measures as appropriate and evaluate response  - Consider cultural and  social influences on pain and pain management  - Notify physician/advanced practitioner if interventions unsuccessful or patient reports new pain  Outcome: Progressing     Problem: INFECTION - ADULT  Goal: Absence or prevention of progression during hospitalization  Description: INTERVENTIONS:  - Assess and monitor for signs and symptoms of infection  - Monitor lab/diagnostic results  - Monitor all insertion sites, i.e. indwelling lines, tubes, and drains  - Monitor endotracheal if appropriate and nasal secretions for changes in amount and color  - [place] appropriate cooling/warming therapies per order  - Administer medications as ordered  - Instruct and encourage patient and family to use good hand hygiene technique  - Identify and instruct in appropriate isolation precautions for identified infection/condition  Outcome: Progressing     Problem: SAFETY ADULT  Goal: Patient will remain free of falls  Description: INTERVENTIONS:  - Educate patient/family on patient safety including physical limitations  - Instruct patient to call for assistance with activity   - Consult OT/PT to assist with strengthening/mobility   - Keep Call bell within reach  - Keep bed low and locked with side rails adjusted as appropriate  - Keep care items and personal belongings within reach  - Initiate and maintain comfort rounds  - Make Fall Risk Sign visible to staff  - Offer Toileting every  Hours, in advance of need  - Initiate/Maintain alarm  - Obtain necessary fall risk management equipment:   - Apply yellow socks and bracelet for high fall risk patients  - Consider moving patient to room near nurses station  Outcome: Progressing  Goal: Maintain or return to baseline ADL function  Description: INTERVENTIONS:  -  Assess patient's ability to carry out ADLs; assess patient's baseline for ADL function and identify physical deficits which impact ability to perform ADLs (bathing, care of mouth/teeth, toileting, grooming, dressing, etc.)  -  Assess/evaluate cause of self-care deficits   - Assess range of motion  - Assess patient's mobility; develop plan if impaired  - Assess patient's need for assistive devices and provide as appropriate  - Encourage maximum independence but intervene and supervise when necessary  - Involve family in performance of ADLs  - Assess for home care needs following discharge   - Consider OT consult to assist with ADL evaluation and planning for discharge  - Provide patient education as appropriate  Outcome: Progressing  Goal: Maintains/Returns to pre admission functional level  Description: INTERVENTIONS:  - Perform AM-PAC 6 Click Basic Mobility/ Daily Activity assessment daily.  - Set and communicate daily mobility goal to care team and patient/family/caregiver.   - Collaborate with rehabilitation services on mobility goals if consulted  - Perform Range of Motion  times a day.  - Reposition patient every  hours.  - Dangle patient  times a day  - Stand patient  times a day  - Ambulate patient  times a day  - Out of bed to chair  times a day   - Out of bed for meals  times a day  - Out of bed for toileting  - Record patient progress and toleration of activity level   Outcome: Progressing     Problem: DISCHARGE PLANNING  Goal: Discharge to home or other facility with appropriate resources  Description: INTERVENTIONS:  - Identify barriers to discharge w/patient and caregiver  - Arrange for needed discharge resources and transportation as appropriate  - Identify discharge learning needs (meds, wound care, etc.)  - Arrange for interpretive services to assist at discharge as needed  - Refer to Case Management Department for coordinating discharge planning if the patient needs post-hospital services based on physician/advanced practitioner order or complex needs related to functional status, cognitive ability, or social support system  Outcome: Progressing     Problem: NEUROSENSORY - ADULT  Goal: Achieves stable or improved  neurological status  Description: INTERVENTIONS  - Monitor and report changes in neurological status  - Monitor vital signs such as temperature, blood pressure, glucose, and any other labs ordered   - Initiate measures to prevent increased intracranial pressure  - Monitor for seizure activity and implement precautions if appropriate      Outcome: Progressing  Goal: Remains free of injury related to seizures activity  Description: INTERVENTIONS  - Maintain airway, patient safety  and administer oxygen as ordered  - Monitor patient for seizure activity, document and report duration and description of seizure to physician/advanced practitioner  - If seizure occurs,  ensure patient safety during seizure  - Reorient patient post seizure  - Seizure pads on all 4 side rails  - Instruct patient/family to notify RN of any seizure activity including if an aura is experienced  - Instruct patient/family to call for assistance with activity based on nursing assessment  - Administer anti-seizure medications if ordered    Outcome: Progressing  Goal: Achieves maximal functionality and self care  Description: INTERVENTIONS  - Monitor swallowing and airway patency with patient fatigue and changes in neurological status  - Encourage and assist patient to increase activity and self care.   - Encourage visually impaired, hearing impaired and aphasic patients to use assistive/communication devices  Outcome: Progressing     Problem: RESPIRATORY - ADULT  Goal: Achieves optimal ventilation and oxygenation  Description: INTERVENTIONS:  - Assess for changes in respiratory status  - Assess for changes in mentation and behavior  - Position to facilitate oxygenation and minimize respiratory effort  - Oxygen administered by appropriate delivery if ordered  - Initiate smoking cessation education as indicated  - Encourage broncho-pulmonary hygiene including cough, deep breathe, Incentive Spirometry  - Assess the need for suctioning and aspirate as  needed  - Assess and instruct to report SOB or any respiratory difficulty  - Respiratory Therapy support as indicated  Outcome: Progressing     Problem: GENITOURINARY - ADULT  Goal: Maintains or returns to baseline urinary function  Description: INTERVENTIONS:  - Assess urinary function  - Encourage oral fluids to ensure adequate hydration if ordered  - Administer IV fluids as ordered to ensure adequate hydration  - Administer ordered medications as needed  - Offer frequent toileting  - Follow urinary retention protocol if ordered  Outcome: Progressing  Goal: Absence of urinary retention  Description: INTERVENTIONS:  - Assess patient’s ability to void and empty bladder  - Monitor I/O  - Bladder scan as needed  - Discuss with physician/AP medications to alleviate retention as needed  - Discuss catheterization for long term situations as appropriate  Outcome: Progressing     Problem: METABOLIC, FLUID AND ELECTROLYTES - ADULT  Goal: Electrolytes maintained within normal limits  Description: INTERVENTIONS:  - Monitor labs and assess patient for signs and symptoms of electrolyte imbalances  - Administer electrolyte replacement as ordered  - Monitor response to electrolyte replacements, including repeat lab results as appropriate  - Instruct patient on fluid and nutrition as appropriate  Outcome: Progressing  Goal: Fluid balance maintained  Description: INTERVENTIONS:  - Monitor labs   - Monitor I/O and WT  - Instruct patient on fluid and nutrition as appropriate  - Assess for signs & symptoms of volume excess or deficit  Outcome: Progressing     Problem: HEMATOLOGIC - ADULT  Goal: Maintains hematologic stability  Description: INTERVENTIONS  - Assess for signs and symptoms of bleeding or hemorrhage  - Monitor labs  - Administer supportive blood products/factors as ordered and appropriate  Outcome: Progressing     Problem: MUSCULOSKELETAL - ADULT  Goal: Maintain or return mobility to safest level of function  Description:  INTERVENTIONS:  - Assess patient's ability to carry out ADLs; assess patient's baseline for ADL function and identify physical deficits which impact ability to perform ADLs (bathing, care of mouth/teeth, toileting, grooming, dressing, etc.)  - Assess/evaluate cause of self-care deficits   - Assess range of motion  - Assess patient's mobility  - Assess patient's need for assistive devices and provide as appropriate  - Encourage maximum independence but intervene and supervise when necessary  - Involve family in performance of ADLs  - Assess for home care needs following discharge   - Consider OT consult to assist with ADL evaluation and planning for discharge  - Provide patient education as appropriate  Outcome: Progressing  Goal: Maintain proper alignment of affected body part  Description: INTERVENTIONS:  - Support, maintain and protect limb and body alignment  - Provide patient/ family with appropriate education  Outcome: Progressing     Problem: SAFETY,RESTRAINT: NV/NON-SELF DESTRUCTIVE BEHAVIOR  Goal: Remains free of harm/injury (restraint for non violent/non self-detsructive behavior)  Description: INTERVENTIONS:  - Instruct patient/family regarding restraint use   - Assess and monitor physiologic and psychological status   - Provide interventions and comfort measures to meet assessed patient needs   - Identify and implement measures to help patient regain control  - Assess readiness for release of restraint   Outcome: Progressing  Goal: Returns to optimal restraint-free functioning  Description: INTERVENTIONS:  - Assess the patient's behavior and symptoms that indicate continued need for restraint  - Identify and implement measures to help patient regain control  - Assess readiness for release of restraint   Outcome: Progressing     Problem: Nutrition/Hydration-ADULT  Goal: Nutrient/Hydration intake appropriate for improving, restoring or maintaining nutritional needs  Description: Monitor and assess patient's  nutrition/hydration status for malnutrition. Collaborate with interdisciplinary team and initiate plan and interventions as ordered.  Monitor patient's weight and dietary intake as ordered or per policy. Utilize nutrition screening tool and intervene as necessary. Determine patient's food preferences and provide high-protein, high-caloric foods as appropriate.     INTERVENTIONS:  - Monitor oral intake, urinary output, labs, and treatment plans  - Assess nutrition and hydration status and recommend course of action  - Evaluate amount of meals eaten  - Assist patient with eating if necessary   - Allow adequate time for meals  - Recommend/ encourage appropriate diets, oral nutritional supplements, and vitamin/mineral supplements  - Order, calculate, and assess calorie counts as needed  - Recommend, monitor, and adjust tube feedings and TPN/PPN based on assessed needs  - Assess need for intravenous fluids  - Provide specific nutrition/hydration education as appropriate  - Include patient/family/caregiver in decisions related to nutrition  Outcome: Progressing     Problem: COPING  Goal: Pt/Family able to verbalize concerns and demonstrate effective coping strategies  Description: INTERVENTIONS:  - Assist patient/family to identify coping skills, available support systems and cultural and spiritual values  - Provide emotional support, including active listening and acknowledgement of concerns of patient and caregivers  - Reduce environmental stimuli, as able  - Provide patient education  - Assess for spiritual pain/suffering and initiate spiritual care, including notification of Pastoral Care or dottie based community as needed  - Assess effectiveness of coping strategies  Outcome: Progressing  Goal: Will report anxiety at manageable levels  Description: INTERVENTIONS:  - Administer medication as ordered  - Teach and encourage coping skills  - Provide emotional support  - Assess patient/family for anxiety and ability to  cope  Outcome: Progressing

## 2025-01-27 ENCOUNTER — APPOINTMENT (INPATIENT)
Dept: RADIOLOGY | Facility: HOSPITAL | Age: 55
DRG: 870 | End: 2025-01-27
Payer: COMMERCIAL

## 2025-01-27 PROBLEM — E87.0 HYPERNATREMIA: Status: ACTIVE | Noted: 2025-01-27

## 2025-01-27 PROBLEM — R73.9 HYPERGLYCEMIA: Status: ACTIVE | Noted: 2025-01-27

## 2025-01-27 LAB
ANION GAP SERPL CALCULATED.3IONS-SCNC: 10 MMOL/L (ref 4–13)
ATRIAL RATE: 59 BPM
ATRIAL RATE: 72 BPM
BASOPHILS # BLD AUTO: 0.05 THOUSANDS/ΜL (ref 0–0.1)
BASOPHILS NFR BLD AUTO: 1 % (ref 0–1)
BUN SERPL-MCNC: 76 MG/DL (ref 5–25)
CA-I BLD-SCNC: 1.07 MMOL/L (ref 1.12–1.32)
CALCIUM SERPL-MCNC: 8.6 MG/DL (ref 8.4–10.2)
CHLORIDE SERPL-SCNC: 103 MMOL/L (ref 96–108)
CO2 SERPL-SCNC: 35 MMOL/L (ref 21–32)
CREAT SERPL-MCNC: 0.94 MG/DL (ref 0.6–1.3)
EOSINOPHIL # BLD AUTO: 0 THOUSAND/ΜL (ref 0–0.61)
EOSINOPHIL NFR BLD AUTO: 0 % (ref 0–6)
ERYTHROCYTE [DISTWIDTH] IN BLOOD BY AUTOMATED COUNT: 15.5 % (ref 11.6–15.1)
GFR SERPL CREATININE-BSD FRML MDRD: 69 ML/MIN/1.73SQ M
GLUCOSE SERPL-MCNC: 119 MG/DL (ref 65–140)
GLUCOSE SERPL-MCNC: 169 MG/DL (ref 65–140)
GLUCOSE SERPL-MCNC: 169 MG/DL (ref 65–140)
GLUCOSE SERPL-MCNC: 203 MG/DL (ref 65–140)
GLUCOSE SERPL-MCNC: 218 MG/DL (ref 65–140)
HCT VFR BLD AUTO: 32.5 % (ref 34.8–46.1)
HGB BLD-MCNC: 10.6 G/DL (ref 11.5–15.4)
IMM GRANULOCYTES # BLD AUTO: >0.5 THOUSAND/UL (ref 0–0.2)
IMM GRANULOCYTES NFR BLD AUTO: 23 % (ref 0–2)
LYMPHOCYTES # BLD AUTO: 1.41 THOUSANDS/ΜL (ref 0.6–4.47)
LYMPHOCYTES NFR BLD AUTO: 17 % (ref 14–44)
MAGNESIUM SERPL-MCNC: 3.2 MG/DL (ref 1.9–2.7)
MCH RBC QN AUTO: 32.2 PG (ref 26.8–34.3)
MCHC RBC AUTO-ENTMCNC: 32.6 G/DL (ref 31.4–37.4)
MCV RBC AUTO: 99 FL (ref 82–98)
MONOCYTES # BLD AUTO: 1.09 THOUSAND/ΜL (ref 0.17–1.22)
MONOCYTES NFR BLD AUTO: 13 % (ref 4–12)
NEUTROPHILS # BLD AUTO: 4.05 THOUSANDS/ΜL (ref 1.85–7.62)
NEUTS SEG NFR BLD AUTO: 46 % (ref 43–75)
NRBC BLD AUTO-RTO: 1 /100 WBCS
P AXIS: -37 DEGREES
P AXIS: 52 DEGREES
PHOSPHATE SERPL-MCNC: 3.6 MG/DL (ref 2.7–4.5)
PLATELET # BLD AUTO: 179 THOUSANDS/UL (ref 149–390)
PMV BLD AUTO: 10.2 FL (ref 8.9–12.7)
POTASSIUM SERPL-SCNC: 3.3 MMOL/L (ref 3.5–5.3)
PR INTERVAL: 158 MS
PR INTERVAL: 175 MS
QRS AXIS: 62 DEGREES
QRS AXIS: 66 DEGREES
QRSD INTERVAL: 71 MS
QRSD INTERVAL: 79 MS
QT INTERVAL: 421 MS
QT INTERVAL: 592 MS
QTC INTERVAL: 417 MS
QTC INTERVAL: 649 MS
RBC # BLD AUTO: 3.29 MILLION/UL (ref 3.81–5.12)
SODIUM SERPL-SCNC: 148 MMOL/L (ref 135–147)
T WAVE AXIS: 106 DEGREES
T WAVE AXIS: 31 DEGREES
VENTRICULAR RATE: 59 BPM
VENTRICULAR RATE: 72 BPM
WBC # BLD AUTO: 8.56 THOUSAND/UL (ref 4.31–10.16)

## 2025-01-27 PROCEDURE — 94003 VENT MGMT INPAT SUBQ DAY: CPT

## 2025-01-27 PROCEDURE — 84100 ASSAY OF PHOSPHORUS: CPT

## 2025-01-27 PROCEDURE — 71045 X-RAY EXAM CHEST 1 VIEW: CPT

## 2025-01-27 PROCEDURE — 94640 AIRWAY INHALATION TREATMENT: CPT

## 2025-01-27 PROCEDURE — 83735 ASSAY OF MAGNESIUM: CPT

## 2025-01-27 PROCEDURE — 80048 BASIC METABOLIC PNL TOTAL CA: CPT

## 2025-01-27 PROCEDURE — 93010 ELECTROCARDIOGRAM REPORT: CPT | Performed by: STUDENT IN AN ORGANIZED HEALTH CARE EDUCATION/TRAINING PROGRAM

## 2025-01-27 PROCEDURE — 93010 ELECTROCARDIOGRAM REPORT: CPT | Performed by: INTERNAL MEDICINE

## 2025-01-27 PROCEDURE — 94760 N-INVAS EAR/PLS OXIMETRY 1: CPT

## 2025-01-27 PROCEDURE — 82948 REAGENT STRIP/BLOOD GLUCOSE: CPT

## 2025-01-27 PROCEDURE — 85027 COMPLETE CBC AUTOMATED: CPT

## 2025-01-27 PROCEDURE — 99291 CRITICAL CARE FIRST HOUR: CPT | Performed by: INTERNAL MEDICINE

## 2025-01-27 PROCEDURE — 94150 VITAL CAPACITY TEST: CPT

## 2025-01-27 PROCEDURE — 82330 ASSAY OF CALCIUM: CPT

## 2025-01-27 PROCEDURE — 80165 DIPROPYLACETIC ACID FREE: CPT

## 2025-01-27 RX ORDER — POTASSIUM CHLORIDE 20MEQ/15ML
40 LIQUID (ML) ORAL ONCE
Status: COMPLETED | OUTPATIENT
Start: 2025-01-27 | End: 2025-01-27

## 2025-01-27 RX ORDER — GUAIFENESIN 600 MG/1
600 TABLET, EXTENDED RELEASE ORAL EVERY 12 HOURS SCHEDULED
Status: DISCONTINUED | OUTPATIENT
Start: 2025-01-27 | End: 2025-01-27

## 2025-01-27 RX ORDER — POTASSIUM CHLORIDE 14.9 MG/ML
20 INJECTION INTRAVENOUS ONCE
Status: COMPLETED | OUTPATIENT
Start: 2025-01-27 | End: 2025-01-27

## 2025-01-27 RX ORDER — DEXTROSE MONOHYDRATE 50 MG/ML
1000 INJECTION, SOLUTION INTRAVENOUS ONCE
Status: COMPLETED | OUTPATIENT
Start: 2025-01-27 | End: 2025-01-27

## 2025-01-27 RX ORDER — SODIUM CHLORIDE FOR INHALATION 3 %
4 VIAL, NEBULIZER (ML) INHALATION
Status: DISPENSED | OUTPATIENT
Start: 2025-01-27 | End: 2025-01-29

## 2025-01-27 RX ADMIN — CYANOCOBALAMIN TAB 500 MCG 1000 MCG: 500 TAB at 09:23

## 2025-01-27 RX ADMIN — CHLORHEXIDINE GLUCONATE 15 ML: 1.2 RINSE ORAL at 22:10

## 2025-01-27 RX ADMIN — SODIUM CHLORIDE SOLN NEBU 3% 4 ML: 3 NEBU SOLN at 20:14

## 2025-01-27 RX ADMIN — ENOXAPARIN SODIUM 40 MG: 40 INJECTION SUBCUTANEOUS at 09:24

## 2025-01-27 RX ADMIN — POTASSIUM CHLORIDE 20 MEQ: 14.9 INJECTION, SOLUTION INTRAVENOUS at 09:12

## 2025-01-27 RX ADMIN — FENTANYL CITRATE 50 MCG: 50 INJECTION INTRAMUSCULAR; INTRAVENOUS at 11:34

## 2025-01-27 RX ADMIN — CHLORHEXIDINE GLUCONATE 15 ML: 1.2 RINSE ORAL at 09:24

## 2025-01-27 RX ADMIN — POTASSIUM CHLORIDE 20 MEQ: 14.9 INJECTION, SOLUTION INTRAVENOUS at 05:33

## 2025-01-27 RX ADMIN — IPRATROPIUM BROMIDE 0.5 MG: 0.5 SOLUTION RESPIRATORY (INHALATION) at 20:14

## 2025-01-27 RX ADMIN — BENZTROPINE MESYLATE 2 MG: 1 TABLET ORAL at 09:23

## 2025-01-27 RX ADMIN — VALPROIC ACID 750 MG: 500 SOLUTION ORAL at 21:45

## 2025-01-27 RX ADMIN — VALPROIC ACID 750 MG: 500 SOLUTION ORAL at 09:26

## 2025-01-27 RX ADMIN — LEVALBUTEROL HYDROCHLORIDE 1.25 MG: 1.25 SOLUTION RESPIRATORY (INHALATION) at 13:24

## 2025-01-27 RX ADMIN — IPRATROPIUM BROMIDE 0.5 MG: 0.5 SOLUTION RESPIRATORY (INHALATION) at 07:36

## 2025-01-27 RX ADMIN — Medication 20 MG: at 09:26

## 2025-01-27 RX ADMIN — PROPOFOL 15 MCG/KG/MIN: 10 INJECTION, EMULSION INTRAVENOUS at 14:15

## 2025-01-27 RX ADMIN — LEVALBUTEROL HYDROCHLORIDE 1.25 MG: 1.25 SOLUTION RESPIRATORY (INHALATION) at 07:36

## 2025-01-27 RX ADMIN — IPRATROPIUM BROMIDE 0.5 MG: 0.5 SOLUTION RESPIRATORY (INHALATION) at 13:25

## 2025-01-27 RX ADMIN — INSULIN LISPRO 4 UNITS: 100 INJECTION, SOLUTION INTRAVENOUS; SUBCUTANEOUS at 00:43

## 2025-01-27 RX ADMIN — OLANZAPINE 10 MG: 5 TABLET, FILM COATED ORAL at 22:11

## 2025-01-27 RX ADMIN — Medication 50 MCG/HR: at 11:46

## 2025-01-27 RX ADMIN — MELATONIN 3 MG: 3 TAB ORAL at 22:11

## 2025-01-27 RX ADMIN — DEXTROSE 1000 ML: 5 SOLUTION INTRAVENOUS at 14:16

## 2025-01-27 RX ADMIN — FUROSEMIDE 20 MG: 10 INJECTION, SOLUTION INTRAVENOUS at 08:56

## 2025-01-27 RX ADMIN — BENZTROPINE MESYLATE 2 MG: 1 TABLET ORAL at 18:04

## 2025-01-27 RX ADMIN — INSULIN LISPRO 2 UNITS: 100 INJECTION, SOLUTION INTRAVENOUS; SUBCUTANEOUS at 06:07

## 2025-01-27 RX ADMIN — PROPOFOL 15 MCG/KG/MIN: 10 INJECTION, EMULSION INTRAVENOUS at 21:45

## 2025-01-27 RX ADMIN — POTASSIUM CHLORIDE 40 MEQ: 20 SOLUTION ORAL at 05:33

## 2025-01-27 RX ADMIN — LACOSAMIDE ORAL SOLUTION 150 MG: 10 SOLUTION ORAL at 09:24

## 2025-01-27 RX ADMIN — ACETAMINOPHEN 975 MG: 650 SUSPENSION ORAL at 12:09

## 2025-01-27 RX ADMIN — LACOSAMIDE ORAL SOLUTION 200 MG: 10 SOLUTION ORAL at 22:10

## 2025-01-27 RX ADMIN — INSULIN LISPRO 2 UNITS: 100 INJECTION, SOLUTION INTRAVENOUS; SUBCUTANEOUS at 18:03

## 2025-01-27 RX ADMIN — LEVOTHYROXINE SODIUM 175 MCG: 0.05 TABLET ORAL at 05:33

## 2025-01-27 RX ADMIN — PROPOFOL 15 MCG/KG/MIN: 10 INJECTION, EMULSION INTRAVENOUS at 01:22

## 2025-01-27 RX ADMIN — LEVALBUTEROL HYDROCHLORIDE 1.25 MG: 1.25 SOLUTION RESPIRATORY (INHALATION) at 20:14

## 2025-01-27 NOTE — CASE MANAGEMENT
Case Management Discharge Planning Note    Patient name Esperanza Prajapati  Location ICU 15/ICU 15 MRN [id number]  : 1970 Date 2025       Current Admission Date: 2025  Current Admission Diagnosis:Acute respiratory failure with hypoxia (HCC)   Patient Active Problem List    Diagnosis Date Noted Date Diagnosed    Hypernatremia 2025     Hyperglycemia 2025     Viral sepsis (HCC) 2025     Toxic metabolic encephalopathy 2025     Fall at home, initial encounter 2025     Influenza A 2025     Acute respiratory failure with hypoxia (HCC) 2025     Decreased urination 10/23/2024     Dermatitis of face 2024     Impaired ability to manage medication regime 10/09/2023     Bilateral lower extremity edema 2023     h/o VALERIE (acute kidney injury) (HCC) 2023     Gout 2022     Schizoaffective disorder (HCC) 2022     Transient ischemic attack 2022     Vitamin B12 deficiency 2022     Dyslipidemia 2022     PCOS (polycystic ovarian syndrome) 2020     Obesity (BMI 30-39.9) 2017     Vitamin D deficiency 2016     Cognitive developmental delay 2014     Obstructive sleep apnea 2013     Focal epilepsy (HCC) 2013     History of thyroid cancer 2013     Proteinuria 2011     Postoperative hypothyroidism 06/10/2011     Family history of diabetes mellitus 06/10/2011     Family history of malignant neoplasm of breast 06/10/2011     Intellectual disability 06/10/2011       LOS (days): 7  Geometric Mean LOS (GMLOS) (days): 4.9  Days to GMLOS:-2.7     OBJECTIVE:  Risk of Unplanned Readmission Score: 24.85         Current admission status: Inpatient   Preferred Pharmacy:   [facility] - [place] - [address]  [address] [place] [address]  Phone: [phone] Fax: [phone]    Primary Care Provider: Foster Biswas MD    Primary Insurance: [employer]  [facility] REP  Secondary Insurance:     DISCHARGE DETAILS:        Additional Comments: Patient is currently intubated and unaccompanied.  CM will reach out to family for discharge planning once PT consultation is completed and will continue to follow the patient for intake and discharge planning.

## 2025-01-27 NOTE — UTILIZATION REVIEW
Continued Stay Review    Date: 01/27/25                          Current Patient Class: Inpatient  Current Level of Care: Critical Care    HPI:54 y.o. female initially admitted on 1/20 for acute respiratory failure w/ hypoxia.      Assessment/Plan: Exam: dry mucous membranes, b/l LE 2+ edema, intubated. Plan: SSI w/ BG checks q6h, nebs, continue current meds, IV fentanyl gtt, IV propofol gtt, wean vent as able. NPO, tube feeds, Aspiration precautions: HOB to 30-45° at all times, sit upright 90° when eating or drinking and for 1 hour afterwards, oral care QID, suction and supplemental O2 set up at bedside. Telemetry, continuous pulse ox. Q4h neuro checks, echo pending.     Medications:   Scheduled Medications:  benztropine, 2 mg, Oral, BID  chlorhexidine, 15 mL, Mouth/Throat, Q12H MOSHE  cyanocobalamin, 1,000 mcg, Oral, Daily  enoxaparin, 40 mg, Subcutaneous, Daily  furosemide, 20 mg, Intravenous, BID  insulin lispro, 2-12 Units, Subcutaneous, Q6H  ipratropium, 0.5 mg, Nebulization, TID  lacosamide, 150 mg, Oral, Daily  lacosamide, 200 mg, Oral, HS  levalbuterol, 1.25 mg, Nebulization, TID  levothyroxine, 175 mcg, Oral, Early Morning  melatonin, 3 mg, Oral, HS  OLANZapine, 10 mg, Oral, HS  omeprazole, 20 mg, Oral, Daily  sodium chloride, 4 mL, Nebulization, Q6H  valproic acid, 750 mg, Oral, Q12H MOSHE    Continuous IV Infusions:  fentaNYL, 50 mcg/hr, Intravenous, Continuous  propofol, 5-50 mcg/kg/min, Intravenous, Titrated    PRN Meds:  Acetaminophen, 975 mg, Oral, Q6H PRN; 1/27 x1  aluminum-magnesium hydroxide-simethicone, 30 mL, Oral, Q6H PRN  fentaNYL, 50 mcg, Intravenous, Q1H PRN; 1/27 x1  ondansetron, 4 mg, Intravenous, Q6H PRN  potassium chloride, 20 mEq, Intravenous; 1/27 x2  potassium chloride, 40 mEq, Oral; 1/27 x1    dextrose 5 % infusion 1,000 mL  Dose: 1,000 mL Freq: Once Route: IV  Start: 01/27/25 1345 End: 01/27/25 1416      Discharge Plan: TBD      Vital Signs (last 3 days)       Date/Time Temp Pulse Resp  BP MAP (mmHg) Arterial Line BP MAP SpO2 FiO2 (%) O2 Device Luisana Coma Scale Score    01/27/25 1430 99 °F (37.2 °C) -- -- -- -- -- -- -- -- -- --    01/27/25 1400 -- 80 18 -- -- 98/54 70 mmHg 91 % -- -- --    01/27/25 1326 -- -- -- -- -- -- -- 90 % -- -- --    01/27/25 1300 -- 80 18 -- -- 110/62 78 mmHg 91 % -- -- --    01/27/25 1209 -- -- -- -- -- -- -- -- -- -- 9    01/27/25 1200 -- 80 18 -- -- 114/66 84 mmHg 90 % -- -- --    01/27/25 1145 100.9 °F (38.3 °C) -- -- -- -- -- -- -- -- -- --    01/27/25 1134 -- 116 30 -- -- 176/98 132 mmHg 91 % -- -- --    01/27/25 1131 -- 108 23 -- -- 214/38 112 mmHg 91 % -- -- --    01/27/25 1100 -- 82 18 -- -- 132/74 96 mmHg 91 % -- -- --    01/27/25 1000 -- 90 20 -- -- 140/80 104 mmHg 92 % -- -- --    01/27/25 0936 99.4 °F (37.4 °C) -- -- -- -- -- -- -- -- -- --    01/27/25 0900 -- 76 19 -- -- 106/58 78 mmHg 89 % -- -- --    01/27/25 0858 -- -- -- -- -- -- -- -- -- -- 10    01/27/25 0800 -- 76 19 -- -- 104/54 72 mmHg 91 % -- -- --    01/27/25 0737 -- -- -- -- -- -- -- 90 % -- -- --    01/27/25 0700 -- 70 18 -- -- 106/56 76 mmHg 92 % -- -- --    01/27/25 0600 -- 68 18 -- -- 104/56 74 mmHg 91 % -- -- --    01/27/25 0500 -- 64 18 -- -- 110/54 74 mmHg 92 % -- -- --    01/27/25 0431 -- -- -- -- -- -- -- -- -- -- 7 01/27/25 0400 98.7 °F (37.1 °C) 64 18 -- -- 114/60 80 mmHg 92 % -- -- --    01/27/25 0322 -- -- -- -- -- -- -- 91 % -- -- --    01/27/25 0300 -- 54 18 -- -- 108/56 76 mmHg 92 % -- -- --    01/27/25 0200 -- 62 18 -- -- 108/56 76 mmHg 92 % 100 -- --    01/27/25 0100 -- 68 19 -- -- 114/56 76 mmHg 88 % -- -- --    01/27/25 0000 99.5 °F (37.5 °C) 78 18 -- -- 118/60 80 mmHg 91 % -- -- 7 01/26/25 2322 -- -- -- -- -- -- -- 91 % -- -- --    01/26/25 2200 -- 88 18 -- -- 112/60 78 mmHg 92 % -- -- --    01/26/25 2100 -- 84 17 95/46 63 110/58 78 mmHg 91 % -- -- --    01/26/25 2027 99.2 °F (37.3 °C) 88 17 -- -- 116/60 80 mmHg 93 % -- -- 7 01/26/25 2000 -- 90 17 94/50 71 110/58 76  mmHg 92 % -- Ventilator --    01/26/25 1944 -- -- -- -- -- -- -- 91 % -- -- --    01/26/25 1900 -- 82 17 97/50 68 116/60 80 mmHg 93 % -- -- --    01/26/25 1810 99.4 °F (37.4 °C) -- -- -- -- -- -- -- -- -- --    01/26/25 1800 -- 78 18 106/64 81 112/58 78 mmHg 92 % -- -- --    01/26/25 1700 -- 78 18 110/62 79 114/60 80 mmHg 92 % -- -- --    01/26/25 1625 98.9 °F (37.2 °C) -- -- -- -- -- -- -- -- -- 7    01/26/25 1600 -- 82 18 112/69 85 114/62 82 mmHg 91 % -- -- --    01/26/25 1559 -- -- -- -- -- -- -- 91 % -- -- --    01/26/25 1500 -- 92 18 -- -- 122/68 90 mmHg 92 % -- -- --    01/26/25 1400 -- 102 18 122/76 90 134/70 94 mmHg 94 % -- -- --    01/26/25 1333 -- 102 24 -- -- 178/88 126 mmHg 95 % -- -- --    01/26/25 1329 -- -- -- -- -- -- -- 92 % -- -- --    01/26/25 1327 -- -- -- -- -- -- -- 93 % -- -- --    01/26/25 1307 98.1 °F (36.7 °C) -- -- -- -- -- -- -- -- -- --    01/26/25 1300 -- 82 19 165/95 119 140/68 102 mmHg 96 % -- -- --    01/26/25 1233 -- -- -- -- -- -- -- -- -- -- 11    01/26/25 1200 -- 66 18 137/84 102 158/76 106 mmHg 91 % -- -- --    01/26/25 1100 -- 68 18 114/64 88 132/68 90 mmHg 92 % -- -- --    01/26/25 1000 -- 96 19 150/77 107 164/86 118 mmHg 94 % -- -- --    01/26/25 0956 -- -- -- -- -- -- -- -- -- -- --    01/26/25 0900 -- 86 18 131/76 96 152/80 106 mmHg 93 % -- -- --    01/26/25 0858 99 °F (37.2 °C) 80 18 -- -- 144/74 100 mmHg 92 % -- -- 7    01/26/25 0800 -- 82 18 109/64 84 136/70 92 mmHg 92 % -- -- --    01/26/25 0754 -- -- -- -- -- -- -- 91 % -- -- --    01/26/25 0746 -- -- -- -- -- -- -- 90 % -- -- --    01/26/25 0700 -- 78 18 111/63 84 134/72 94 mmHg 90 % -- -- --    01/26/25 0630 -- 86 18 -- -- 148/76 102 mmHg 94 % -- -- --    01/26/25 0600 100.1 °F (37.8 °C) 82 20 117/75 91 130/70 90 mmHg 91 % -- -- --    01/26/25 0530 -- 80 20 -- -- 130/68 88 mmHg 92 % -- -- --    01/26/25 0500 -- 86 15 131/69 89 144/74 98 mmHg 94 % -- -- --    01/26/25 0430 -- 76 19 -- -- 128/68 86 mmHg 88 % -- -- --     01/26/25 0428 -- 76 19 -- -- 126/66 86 mmHg 88 % -- -- --    01/26/25 0418 -- -- -- -- -- -- -- -- -- -- 7 01/26/25 0400 100.3 °F (37.9 °C) 82 20 117/66 84 132/68 88 mmHg 91 % -- -- --    01/26/25 0330 -- 78 18 -- -- 130/64 86 mmHg 93 % -- -- --    01/26/25 0321 -- -- -- -- -- -- -- 93 % -- -- --    01/26/25 0300 -- 74 24 127/71 90 138/68 88 mmHg 89 % -- -- --    01/26/25 0100 -- 66 19 128/72 89 140/68 90 mmHg 88 % -- -- --    01/26/25 0000 -- 66 19 108/64 81 120/62 82 mmHg 89 % -- -- --    01/25/25 2320 -- -- -- -- -- -- -- -- -- -- 7 01/25/25 2307 -- -- -- -- -- -- -- 91 % -- -- --    01/25/25 2300 -- 66 19 107/63 86 116/62 82 mmHg 91 % -- Ventilator --    01/25/25 2245 97.8 °F (36.6 °C) -- -- -- -- -- -- -- -- -- --    01/25/25 2200 -- 66 18 108/65 82 118/62 82 mmHg 92 % -- -- --    01/25/25 2100 -- 68 18 104/65 82 118/64 82 mmHg 88 % -- -- --    01/25/25 2014 -- 82 18 129/70 88 152/76 98 mmHg 94 % -- -- --    01/25/25 2000 -- 98 25 129/70 88 192/96 128 mmHg 96 % -- -- --    01/25/25 1936 -- -- -- -- -- -- -- 93 % -- -- --    01/25/25 1916 -- -- -- -- -- -- -- -- -- -- 4    01/25/25 1915 -- 84 19 165/87 115 166/82 112 mmHg 95 % -- Ventilator --    01/25/25 1900 -- 56 19 138/81 100 160/72 98 mmHg 95 % -- -- --    01/25/25 1800 -- 56 19 119/71 88 134/64 86 mmHg 93 % -- -- --    01/25/25 1700 -- 70 18 119/70 88 130/62 84 mmHg 92 % -- -- --    01/25/25 1620 -- -- -- -- -- -- -- 93 % -- -- --    01/25/25 1600 -- 100 27 153/96 121 164/74 108 mmHg 95 % -- -- --    01/25/25 1534 -- -- -- -- -- -- -- -- -- -- 4 01/25/25 1500 100 °F (37.8 °C) 90 18 124/74 91 128/64 84 mmHg 94 % -- Ventilator --    01/25/25 1430 -- 94 15 -- -- 140/68 90 mmHg 94 % -- -- --    01/25/25 1417 -- -- -- -- -- -- -- 91 % -- -- --    01/25/25 1415 -- -- -- -- -- -- -- -- -- -- --    01/25/25 1400 -- 82 18 138/82 102 142/72 94 mmHg 93 % -- -- --    01/25/25 1300 -- 76 19 136/78 98 140/66 92 mmHg 94 % -- -- 3    01/25/25 1236 -- -- -- --  -- -- -- 93 % -- -- --    01/25/25 1230 -- 74 18 -- -- 140/68 92 mmHg 94 % -- -- --    01/25/25 1200 -- 76 18 139/82 102 144/68 94 mmHg 94 % -- -- --    01/25/25 1130 -- 76 19 -- -- 132/64 86 mmHg 92 % -- -- --    01/25/25 1100 100.3 °F (37.9 °C) 76 19 129/76 93 132/66 88 mmHg 91 % -- -- --    01/25/25 1030 -- 76 19 -- -- 134/68 90 mmHg 91 % -- -- --    01/25/25 1000 -- 86 18 109/65 81 126/62 82 mmHg 92 % -- -- --    01/25/25 0900 -- 94 18 117/73 86 132/68 88 mmHg 95 % -- -- --    01/25/25 0830 -- 110 16 -- -- 142/72 96 mmHg 95 % -- -- --    01/25/25 0804 -- -- -- -- -- -- -- 91 % -- -- --    01/25/25 0800 101.8 °F (38.8 °C) 76 19 111/68 82 132/74 92 mmHg 91 % -- Ventilator 3    01/25/25 0700 -- 78 19 114/68 81 130/72 90 mmHg 91 % -- -- --    01/25/25 0530 -- 78 19 -- -- 126/70 88 mmHg 92 % -- -- --    01/25/25 0500 -- 84 20 103/60 74 126/68 86 mmHg 92 % -- -- --    01/25/25 0430 -- 86 19 -- -- 126/70 88 mmHg 92 % -- -- --    01/25/25 0400 -- 92 17 107/68 81 128/72 90 mmHg 94 % -- -- 5    01/25/25 0332 -- -- -- -- -- -- -- 93 % -- -- --    01/25/25 0330 -- 90 18 -- -- 130/72 90 mmHg 93 % -- -- --    01/25/25 0318 101.4 °F (38.6 °C) -- -- -- -- -- -- -- -- -- --    01/25/25 0300 -- 94 19 113/61 79 132/74 92 mmHg 94 % -- -- --    01/25/25 0223 102 °F (38.9 °C) -- -- -- -- -- -- -- -- -- --    01/25/25 0100 -- 84 19 114/67 82 122/60 78 mmHg 92 % -- -- --    01/25/25 0046 -- -- -- -- -- -- -- 91 % -- -- --    01/25/25 0030 -- 82 19 -- -- 124/60 80 mmHg 92 % -- -- --    01/25/25 0000 -- 84 18 116/68 84 124/58 78 mmHg 92 % -- Ventilator 5    01/24/25 2330 -- 82 20 -- -- 120/60 80 mmHg 90 % -- -- --    01/24/25 2300 100 °F (37.8 °C) 82 18 108/67 82 116/58 76 mmHg 91 % -- Ventilator --    01/24/25 2230 -- 90 17 -- -- 118/62 80 mmHg 92 % -- -- --    01/24/25 2200 -- 88 18 120/67 86 130/62 84 mmHg 95 % -- Ventilator --    01/24/25 2159 101.6 °F (38.7 °C) -- -- -- -- -- -- -- -- -- --    01/24/25 2130 -- 86 17 -- -- 122/66 84  mmHg 92 % -- -- --    01/24/25 2100 -- 98 18 106/61 76 116/62 82 mmHg 93 % -- -- --    01/24/25 2030 -- 96 20 -- -- 112/58 76 mmHg 90 % -- -- --    01/24/25 2000 -- 96 21 97/58 76 110/56 74 mmHg 91 % -- Ventilator 5    01/24/25 1940 100.9 °F (38.3 °C) -- -- -- -- -- -- -- -- -- --    01/24/25 1930 -- 88 19 -- -- 116/60 78 mmHg 91 % -- -- --    01/24/25 1929 -- -- -- -- -- -- -- 91 % -- -- --    01/24/25 1900 -- 90 20 99/60 72 116/62 80 mmHg 91 % -- -- --    01/24/25 1830 -- 110 32 -- -- 152/84 110 mmHg 87 % -- -- --    01/24/25 1800 -- 86 19 100/62 75 106/52 70 mmHg 90 % -- -- --    01/24/25 1730 -- 88 19 -- -- 106/52 70 mmHg 89 % -- -- --    01/24/25 1713 -- -- -- -- -- -- -- -- -- -- --    01/24/25 1700 -- 94 18 99/67 76 110/56 72 mmHg 90 % -- -- --    01/24/25 1630 -- 90 18 -- -- 116/58 76 mmHg 91 % -- -- --    01/24/25 1600 99.9 °F (37.7 °C) 92 18 112/74 86 124/60 80 mmHg 93 % -- -- 5    01/24/25 1530 -- 90 18 -- -- 114/56 74 mmHg 93 % -- -- --    01/24/25 1523 -- -- -- -- -- -- -- -- -- -- --    01/24/25 1500 -- 92 19 97/60 71 108/50 68 mmHg 94 % -- -- --    01/24/25 1402 -- -- -- -- -- -- -- 91 % -- -- --    01/24/25 1400 -- 76 19 109/66 80 112/56 74 mmHg 91 % -- -- --    01/24/25 1330 -- 78 19 -- -- 112/56 74 mmHg 89 % -- -- --    01/24/25 1300 -- 80 20 104/66 79 110/56 74 mmHg 90 % -- -- --    01/24/25 1230 -- 82 19 -- -- 112/56 74 mmHg 92 % -- -- --    01/24/25 1200 -- 88 19 119/60 79 114/56 76 mmHg 92 % -- Ventilator 8    01/24/25 1100 98.8 °F (37.1 °C) 78 19 109/62 76 116/60 78 mmHg 91 % -- -- --    01/24/25 1000 -- 70 18 99/61 74 116/58 76 mmHg 87 % -- -- --    01/24/25 0930 -- 72 18 -- -- 116/58 76 mmHg 89 % -- -- --    01/24/25 0900 -- 80 20 102/65 75 118/60 80 mmHg 91 % -- -- --    01/24/25 0830 -- 78 19 -- -- 118/62 80 mmHg 91 % -- -- --    01/24/25 0800 -- 72 18 -- -- 118/60 78 mmHg 91 % -- -- --    01/24/25 0745 -- -- -- -- -- -- -- -- -- -- 6    01/24/25 0730 -- 68 19 -- -- 118/62 82 mmHg 89 % --  Ventilator --    01/24/25 0723 97.4 °F (36.3 °C) -- -- -- -- -- -- 88 % -- -- --    01/24/25 0700 -- 62 20 -- -- 112/58 76 mmHg 87 % -- -- --    01/24/25 0600 -- 74 19 -- -- 126/66 86 mmHg 95 % -- -- --    01/24/25 0500 -- 88 19 -- -- 126/76 92 mmHg 88 % -- -- --    01/24/25 0430 -- -- -- -- -- -- -- -- -- -- 4 01/24/25 0423 -- -- -- -- -- -- -- -- -- -- --    01/24/25 0400 -- -- -- -- -- -- -- -- -- Ventilator --    01/24/25 0315 -- -- -- -- -- -- -- 91 % -- -- --    01/24/25 0310 98.5 °F (36.9 °C) -- -- -- -- -- -- -- -- -- --    01/24/25 0200 -- 80 19 -- -- 130/70 92 mmHg 94 % -- -- --    01/24/25 0129 -- -- -- -- -- -- -- -- -- -- --    01/24/25 0027 -- -- -- -- -- -- -- 91 % -- -- --    01/24/25 0025 -- -- -- -- -- -- -- -- -- -- 4    01/24/25 0000 -- 72 19 -- -- 120/62 80 mmHg 90 % -- Ventilator --          Weight (last 2 days)       Date/Time Weight    01/27/25 0600 111 (244.05)            Pertinent Labs/Diagnostic Results:   Radiology:  XR chest portable ICU   Final Interpretation by Lexus Jack MD (01/27 1036)      The left base is obscured due to combination of small effusion/atelectasis/consolidation      No worsening seen   No new consolidation   Tubes and lines in appropriate position        Cardiology:  ECG 12 lead   Final Result by Joselito Wellington MD (01/27 1117)   Atrial flutter   Baseline artifact   T wave abnormality, consider lateral ischemia   Prolonged QT   Abnormal ECG      Confirmed by Joselito Wellington ([id number]) on 1/27/2025 11:17:49 AM      ECG 12 lead   Final Result by Rito Serrato DO (01/27 0952)   Sinus bradycardia   Nonspecific T wave abnormality   Abnormal ECG      Confirmed by Rito Serrato ([id number]) on 1/27/2025 9:52:01 AM        Results from last 7 days   Lab Units 01/27/25  0446 01/26/25  0435 01/25/25  0544 01/24/25  0428 01/23/25  0518 01/22/25  0441   WBC Thousand/uL 8.56 8.26 8.10 8.07 6.19 6.41   HEMOGLOBIN g/dL 10.6* 10.4* 9.8* 10.1* 9.4* 10.5*   HEMATOCRIT %  32.5* 31.7* 29.4* 29.2* 28.5* 32.3*   PLATELETS Thousands/uL 179 143* 162 140* 116* 120*   TOTAL NEUT ABS Thousands/µL 4.05 3.32  --  5.89  --   --    BANDS PCT %  --   --  1  --  16* 33*         Results from last 7 days   Lab Units 01/27/25  0446 01/26/25  1316 01/26/25  0435 01/25/25  0544 01/24/25  2141 01/24/25  1546 01/24/25  0428 01/23/25  1506 01/23/25  0518 01/22/25  2144 01/22/25  0441 01/21/25  0555 01/21/25  0422   SODIUM mmol/L 148*  --  148* 147  --   --  143  --  136   < > 136   < >  --    POTASSIUM mmol/L 3.3* 3.5 2.8* 3.7 3.5   < > 2.9*   < > 4.0   < > 4.3   < >  --    CHLORIDE mmol/L 103  --  102 107  --   --  103  --  102   < > 104   < >  --    CO2 mmol/L 35*  --  36* 33*  --   --  30  --  26   < > 25   < >  --    ANION GAP mmol/L 10  --  10 7  --   --  10  --  8   < > 7   < >  --    BUN mg/dL 76*  --  64* 63*  --   --  55*  --  44*   < > 23   < >  --    CREATININE mg/dL 0.94  --  0.99 1.21  --   --  1.18  --  1.19   < > 1.16   < >  --    EGFR ml/min/1.73sq m 69  --  65 51  --   --  53  --  52   < > 54   < >  --    CALCIUM mg/dL 8.6  --  8.7 8.2*  --   --  8.8  --  8.6   < > 8.4   < >  --    CALCIUM, IONIZED mmol/L 1.07*  --   --   --   --   --   --   --   --   --  1.20  --  1.00*   MAGNESIUM mg/dL 3.2*  --  2.8* 2.7  --   --  2.7  --  2.6   < > 2.3   < >  --    PHOSPHORUS mg/dL 3.6  --  3.5 2.6*  --   --  2.4*  --  2.4*   < > 2.5*   < >  --     < > = values in this interval not displayed.     Results from last 7 days   Lab Units 01/25/25  0544 01/23/25  0518 01/22/25  0441 01/21/25  0555   AST U/L 26 20 32 16   ALT U/L 18 10 10 8   ALK PHOS U/L 24* 20* 22* 29*   TOTAL PROTEIN g/dL 6.6 6.4 6.6 6.5   ALBUMIN g/dL 3.0* 2.9* 2.8* 2.8*   TOTAL BILIRUBIN mg/dL 0.32 0.31 0.32 0.27     Results from last 7 days   Lab Units 01/27/25  1208 01/27/25  0545 01/27/25  0036 01/26/25  1739 01/26/25  1223 01/26/25  0555 01/26/25  0013 01/25/25  1835 01/25/25  1222 01/25/25  0537 01/24/25  2312 01/24/25  1750   POC  GLUCOSE mg/dl 119 169* 203* 199* 176* 189* 235* 203* 143* 142* 165* 144*     Results from last 7 days   Lab Units 01/27/25  0446 01/26/25  0435 01/25/25  0544 01/24/25  0428 01/23/25  0518 01/22/25  2144 01/22/25  0441 01/21/25  0555   GLUCOSE RANDOM mg/dL 218* 196* 129 153* 167* 181* 222* 102      Results from last 7 days   Lab Units 01/24/25  2141 01/24/25  1206 01/21/25  1115 01/21/25  0903   PH ART  7.484* 7.512* 7.284*  --    PCO2 ART mm Hg 44.4* 43.7 52.8*  --    PO2 ART mm Hg 63.6* 57.7* 97.2  --    HCO3 ART mmol/L 32.6* 34.2* 24.5  --    BASE EXC ART mmol/L 8.3 10.2 -2.6  --    O2 CONTENT ART mL/dL 14.9* 14.6* 97.4*  --    O2 HGB, ARTERIAL % 91.3* 90.9* 96.6  --    ABG SOURCE  Line, Arterial Line, Arterial Radial, Right  --    NON VENT TYPE HFNC   --   --   --  HFNC Flow   HFNC FLOW LPM   --   --   --  15     Results from last 7 days   Lab Units 01/21/25  0903   PH JUEN  7.280*   PCO2 JUNE mm Hg 51.4*   PO2 JUNE mm Hg 150.0*   HCO3 JUNE mmol/L 23.6*   BASE EXC JUNE mmol/L -3.5   O2 CONTENT JUNE ml/dL 17.6   O2 HGB, VENOUS % 97.3*                             Results from last 7 days   Lab Units 01/21/25  0422   PROCALCITONIN ng/ml 1.97*     Results from last 7 days   Lab Units 01/22/25  0854 01/22/25  0441 01/21/25  1047   LACTIC ACID mmol/L 2.4* 2.3* 1.4      Results from last 7 days   Lab Units 01/21/25  1841   STREP PNEUMONIAE ANTIGEN, URINE  Negative   LEGIONELLA URINARY ANTIGEN  Negative      Results from last 7 days   Lab Units 01/24/25  1501 01/22/25  1622   SPUTUM CULTURE   --  No growth   GRAM STAIN RESULT  Rare Epithelial Cells  Rare Disintegrating polys  No bacteria seen 3+ Polys  No organisms seen        Network Utilization Review Department  ATTENTION: Please call with any questions or concerns to [phone] and carefully listen to the prompts so that you are directed to the right person. All voicemails are confidential.   For Discharge needs, contact Care Management DC Support Team at  [phone] opt. [phone]  Send all requests for admission clinical reviews, approved or denied determinations and any other requests to dedicated fax number below belonging to the campus where the patient is receiving treatment. List of dedicated fax numbers for the Facilities:  FACILITY NAME UR FAX NUMBER   ADMISSION DENIALS (Administrative/Medical Necessity) [phone]   DISCHARGE SUPPORT TEAM (NETWORK) [phone]   PARENT CHILD HEALTH (Maternity/NICU/Pediatrics) [phone]   [facility] [phone]   [facility] [phone]   [facility] [phone]   [facility] [phone]   [facility] [phone]   [facility] [phone]   [facility] [phone]   [facility] [phone]   [facility] [phone]   [facility] [phone]   [facility] [phone]   [facility] [phone]

## 2025-01-27 NOTE — PROGRESS NOTES
Progress Note - Critical Care/ICU   Name: Esperanza Prajapati 54 y.o. female I MRN: [id number]  Unit/Bed#: ICU 15 I Date of Admission: 1/19/2025   Date of Service: 1/27/2025 I Hospital Day: 7      Assessment & Plan  Acute respiratory failure with hypoxia (HCC)  Due to Flu A, viral sepsis - consider multifocal aspiration PNA  Possible ARDS, could be superimposed bacterial pneumonia in the background of flu  CT C/A/P negative on admission   Procal negative on admission, then a significant elevation  CXR showed b/l opacities - aspiration cannot be excluded at this time   Continue adebayo. Nebs   Continue ceftriaxone,  Patient completed 5 days course of Tamiflu, 3 days course of azithromycin  Currently patient is on propofol, fentanyl drip as sedatives  Still requiring 10 PEEP FiO2 90%  There is a concern for ARDS, will consider additional workup for the same (ECHO). VS worsening pleural effusion    Focal epilepsy (HCC)  Follows with [facility] neurology   Focal epilepsy manifests as simple partial, complex partial, and generalized tonic-clonic seizures   Hx of b/l postural and action tremor per neurology notes  Home regimen: vimpat 150 daily, vimpat 200mg qHS, Depakote 750mg BID  Valproic level therapeutic 1/20  Low suspicion for any breakthrough seizures, not post-ictal on exam and is interactive  Vimpat transitioned to IV, depakote via oG-tube  1/25 upward gaze and tremors, patient did appear to open eyes to voice though, gave 2mg ativan with resolve in symptoms   Consider EEG if reoccurance  Viral sepsis (HCC)  Due to Flu A + on 1/19   SIRS on admission with tachycardia, tachypnea, leukopenia, fever   Lactic negative, no hypotension on admission   Overnight 1/21 with borderline hypotensive, responsive to 1L bolus  Abx with ceftriaxone  Completed 5-day course of Tamiflu  Completed 3 days course of azithromycin   Cognitive developmental delay  Due to head injury at age of 10 months    Postoperative  hypothyroidism  Stage I papillary thyroid cancer s/p total thyroidectomy and ablation in 2013  Post-operative hypothyroidism   Levothyroxine via OG tube  Schizoaffective disorder (HCC)  Home regimen: zyprexa 10mg daily, trazodone 50mg PRN, invega 6mg daily   Zyprexa transitioned to IM due to NPO, trazodone and paliperidone on hold   Toxic metabolic encephalopathy  Due to viral sepsis, hypoxia, and infection  Hx of developmental delay although at baseline she is A&O x 3  CTH on admission negative   Do not suspect breakthrough seizures  Supportive care at this time.  Fall at home, initial encounter  Presented with fall, + head strike   CTH negative   Bed alarm/fall precautions  Hypernatremia  Recent Labs     01/25/25  0544 01/26/25  0435 01/27/25  0446   SODIUM 147 148* 148*   Corrected fro hyperglycemia: 150  FWD 4lt  Benefits vs risks of IV fluids given suspected concern fro reduceed cardiac function  Hyperglycemia  Non diabetic patient  Last A1c of 5.5  Lab Results   Component Value Date    GLUC 218 (H) 01/27/2025    GLUC 196 (H) 01/26/2025    GLUC 129 01/25/2025    Continue with the sliding scale insulin  Disposition: Critical care    ICU Core Measures     Vented Patient  VAP Bundle  VAP bundle ordered     A: Assess, Prevent, and Manage Pain Has pain been assessed? Yes  Need for changes to pain regimen? No   B: Both Spontaneous Awakening Trials (SATs) and Spontaneous Breathing Trials (SBTs) Plan to perform spontaneous awakening trial today? Yes   Plan to perform spontaneous breathing trial today? Yes   Obvious barriers to extubation? Yes   C: Choice of Sedation RASS Goal: 0 Alert and Calm  Need for changes to sedation or analgesia regimen? No   D: Delirium CAM-ICU: Negative   E: Early Mobility  Plan for early mobility? No   F: Family Engagement Plan for family engagement today? Yes       Review of Invasive Devices:    Steven Plan: Continue for accurate I/O monitoring for 48 hours    Mercedes Plan: Keep arterial line  for hemodynamic monitoring    Prophylaxis:  VTE VTE covered by:  enoxaparin, Subcutaneous, 40 mg at 25 0924       Stress Ulcer  covered byomeprazole (PRILOSEC) suspension 2 mg/mL [[id number]]         24 Hour Events : No significant ON events  Subjective       Objective :                   Vitals I/O      Most Recent Min/Max in 24hrs   Temp 99.4 °F (37.4 °C) Temp  Min: 98.1 °F (36.7 °C)  Max: 99.5 °F (37.5 °C)   Pulse 76 Pulse  Min: 54  Max: 102   Resp 19 Resp  Min: 17  Max: 24   BP (!) 95/46 BP  Min: 94/50  Max: 165/95   O2 Sat (!) 89 % SpO2  Min: 88 %  Max: 96 %      Intake/Output Summary (Last 24 hours) at 2025 0952  Last data filed at 2025 0931  Gross per 24 hour   Intake 1714.14 ml   Output 2320 ml   Net -605.86 ml       Diet Enteral/Parenteral; Tube Feeding No Oral Diet; Vital High Protein; Continuous; 55; 20; Every 6 hours    Invasive Monitoring           Physical Exam   Physical Exam  Vitals and nursing note reviewed.   Eyes:      General: No dysconjugate gazeNo visual field deficit.     Extraocular Movements: Extraocular movements intact.      Pupils: Pupils are equal, round, and reactive to light.   Skin:     General: Skin is warm and dry.   HENT:      Head: Normocephalic and atraumatic.      Right Ear: Hearing normal.      Left Ear: Hearing normal.      Nose: No congestion or rhinorrhea.      Mouth/Throat:      Mouth: Mucous membranes are dry.      Pharynx: No oropharyngeal exudate.   Cardiovascular:      Rate and Rhythm: Normal rate and regular rhythm.      Heart sounds: No murmur heard.     No friction rub. No gallop.   Musculoskeletal:      Right lower le+ Edema present.      Left lower le+ Edema present.   Abdominal: General: Bowel sounds are normal.      Palpations: Abdomen is soft.      Tenderness: There is no abdominal tenderness.   Constitutional:       General: She is not in acute distress.     Appearance: She is well-developed. She is ill-appearing and toxic-appearing.       Interventions: She is sedated and intubated.   Pulmonary:      Effort: Pulmonary effort is normal. No respiratory distress. She is intubated.      Breath sounds: Normal breath sounds. No stridor. No wheezing, rhonchi or rales.   Chest:      Chest wall: No tenderness.   Neurological:      Motor: No motor deficit.      Comments: Gcs 4t   Genitourinary/Anorectal:  Harris present.        Diagnostic Studies        Lab Results: I have reviewed the following results:     Medications:  Scheduled PRN   benztropine, 2 mg, BID  chlorhexidine, 15 mL, Q12H MOSHE  cyanocobalamin, 1,000 mcg, Daily  enoxaparin, 40 mg, Daily  furosemide, 20 mg, BID (diuretic)  insulin lispro, 2-12 Units, Q6H  ipratropium, 0.5 mg, TID  lacosamide, 150 mg, Daily  lacosamide, 200 mg, HS  levalbuterol, 1.25 mg, TID  levothyroxine, 175 mcg, Early Morning  melatonin, 3 mg, HS  OLANZapine, 10 mg, HS  omeprazole (PRILOSEC) suspension 2 mg/mL, 20 mg, Daily  [Held by provider] paliperidone, 6 mg, QAM  potassium chloride, 20 mEq, Once  valproic acid, 750 mg, Q12H MOSHE      Acetaminophen, 975 mg, Q6H PRN  aluminum-magnesium hydroxide-simethicone, 30 mL, Q6H PRN  fentaNYL, 50 mcg, Q1H PRN  ondansetron, 4 mg, Q6H PRN       Continuous    fentaNYL, 50 mcg/hr, Last Rate: Stopped (01/27/25 0855)  propofol, 5-50 mcg/kg/min, Last Rate: Stopped (01/27/25 0855)         Labs:   CBC    Recent Labs     01/26/25 0435 01/27/25  0446   WBC 8.26 8.56   HGB 10.4* 10.6*   HCT 31.7* 32.5*   * 179     BMP    Recent Labs     01/26/25  0435 01/26/25  1316 01/27/25  0446   SODIUM 148*  --  148*   K 2.8* 3.5 3.3*     --  103   CO2 36*  --  35*   AGAP 10  --  10   BUN 64*  --  76*   CREATININE 0.99  --  0.94   CALCIUM 8.7  --  8.6       Coags    No recent results     Additional Electrolytes  Recent Labs     01/26/25  0435 01/27/25  0446   MG 2.8* 3.2*   PHOS 3.5 3.6   CAIONIZED  --  1.07*          Blood Gas    No recent results  No recent results LFTs  No recent results     Infectious  No recent results  Glucose  Recent Labs     01/26/25  0435 01/27/25  0446   GLUC 196* 218*

## 2025-01-27 NOTE — PLAN OF CARE
Problem: Potential for Falls  Goal: Patient will remain free of falls  Description: INTERVENTIONS:  - Educate patient/family on patient safety including physical limitations  - Instruct patient to call for assistance with activity   - Consult OT/PT to assist with strengthening/mobility   - Keep Call bell within reach  - Keep bed low and locked with side rails adjusted as appropriate  - Keep care items and personal belongings within reach  - Initiate and maintain comfort rounds  - Make Fall Risk Sign visible to staff  - Offer Toileting every  Hours, in advance of need  - Initiate/Maintain alarm  - Obtain necessary fall risk management equipment:   - Apply yellow socks and bracelet for high fall risk patients  - Consider moving patient to room near nurses station  Outcome: Progressing     Problem: Prexisting or High Potential for Compromised Skin Integrity  Goal: Skin integrity is maintained or improved  Description: INTERVENTIONS:  - Identify patients at risk for skin breakdown  - Assess and monitor skin integrity  - Assess and monitor nutrition and hydration status  - Monitor labs   - Assess for incontinence   - Turn and reposition patient  - Assist with mobility/ambulation  - Relieve pressure over bony prominences  - Avoid friction and shearing  - Provide appropriate hygiene as needed including keeping skin clean and dry  - Evaluate need for skin moisturizer/barrier cream  - Collaborate with interdisciplinary team   - Patient/family teaching  - Consider wound care consult   Outcome: Progressing     Problem: PAIN - ADULT  Goal: Verbalizes/displays adequate comfort level or baseline comfort level  Description: Interventions:  - Encourage patient to monitor pain and request assistance  - Assess pain using appropriate pain scale  - Administer analgesics based on type and severity of pain and evaluate response  - Implement non-pharmacological measures as appropriate and evaluate response  - Consider cultural and  social influences on pain and pain management  - Notify physician/advanced practitioner if interventions unsuccessful or patient reports new pain  Outcome: Progressing     Problem: INFECTION - ADULT  Goal: Absence or prevention of progression during hospitalization  Description: INTERVENTIONS:  - Assess and monitor for signs and symptoms of infection  - Monitor lab/diagnostic results  - Monitor all insertion sites, i.e. indwelling lines, tubes, and drains  - Monitor endotracheal if appropriate and nasal secretions for changes in amount and color  - [place] appropriate cooling/warming therapies per order  - Administer medications as ordered  - Instruct and encourage patient and family to use good hand hygiene technique  - Identify and instruct in appropriate isolation precautions for identified infection/condition  Outcome: Progressing     Problem: SAFETY ADULT  Goal: Patient will remain free of falls  Description: INTERVENTIONS:  - Educate patient/family on patient safety including physical limitations  - Instruct patient to call for assistance with activity   - Consult OT/PT to assist with strengthening/mobility   - Keep Call bell within reach  - Keep bed low and locked with side rails adjusted as appropriate  - Keep care items and personal belongings within reach  - Initiate and maintain comfort rounds  - Make Fall Risk Sign visible to staff  - Offer Toileting every  Hours, in advance of need  - Initiate/Maintain alarm  - Obtain necessary fall risk management equipment:   - Apply yellow socks and bracelet for high fall risk patients  - Consider moving patient to room near nurses station  Outcome: Progressing  Goal: Maintain or return to baseline ADL function  Description: INTERVENTIONS:  -  Assess patient's ability to carry out ADLs; assess patient's baseline for ADL function and identify physical deficits which impact ability to perform ADLs (bathing, care of mouth/teeth, toileting, grooming, dressing, etc.)  -  Assess/evaluate cause of self-care deficits   - Assess range of motion  - Assess patient's mobility; develop plan if impaired  - Assess patient's need for assistive devices and provide as appropriate  - Encourage maximum independence but intervene and supervise when necessary  - Involve family in performance of ADLs  - Assess for home care needs following discharge   - Consider OT consult to assist with ADL evaluation and planning for discharge  - Provide patient education as appropriate  Outcome: Progressing  Goal: Maintains/Returns to pre admission functional level  Description: INTERVENTIONS:  - Perform AM-PAC 6 Click Basic Mobility/ Daily Activity assessment daily.  - Set and communicate daily mobility goal to care team and patient/family/caregiver.   - Collaborate with rehabilitation services on mobility goals if consulted  - Perform Range of Motion  times a day.  - Reposition patient every  hours.  - Dangle patient  times a day  - Stand patient  times a day  - Ambulate patient  times a day  - Out of bed to chair  times a day   - Out of bed for meals  times a day  - Out of bed for toileting  - Record patient progress and toleration of activity level   Outcome: Progressing     Problem: DISCHARGE PLANNING  Goal: Discharge to home or other facility with appropriate resources  Description: INTERVENTIONS:  - Identify barriers to discharge w/patient and caregiver  - Arrange for needed discharge resources and transportation as appropriate  - Identify discharge learning needs (meds, wound care, etc.)  - Arrange for interpretive services to assist at discharge as needed  - Refer to Case Management Department for coordinating discharge planning if the patient needs post-hospital services based on physician/advanced practitioner order or complex needs related to functional status, cognitive ability, or social support system  Outcome: Progressing     Problem: NEUROSENSORY - ADULT  Goal: Achieves stable or improved  neurological status  Description: INTERVENTIONS  - Monitor and report changes in neurological status  - Monitor vital signs such as temperature, blood pressure, glucose, and any other labs ordered   - Initiate measures to prevent increased intracranial pressure  - Monitor for seizure activity and implement precautions if appropriate      Outcome: Progressing  Goal: Remains free of injury related to seizures activity  Description: INTERVENTIONS  - Maintain airway, patient safety  and administer oxygen as ordered  - Monitor patient for seizure activity, document and report duration and description of seizure to physician/advanced practitioner  - If seizure occurs,  ensure patient safety during seizure  - Reorient patient post seizure  - Seizure pads on all 4 side rails  - Instruct patient/family to notify RN of any seizure activity including if an aura is experienced  - Instruct patient/family to call for assistance with activity based on nursing assessment  - Administer anti-seizure medications if ordered    Outcome: Progressing  Goal: Achieves maximal functionality and self care  Description: INTERVENTIONS  - Monitor swallowing and airway patency with patient fatigue and changes in neurological status  - Encourage and assist patient to increase activity and self care.   - Encourage visually impaired, hearing impaired and aphasic patients to use assistive/communication devices  Outcome: Progressing     Problem: RESPIRATORY - ADULT  Goal: Achieves optimal ventilation and oxygenation  Description: INTERVENTIONS:  - Assess for changes in respiratory status  - Assess for changes in mentation and behavior  - Position to facilitate oxygenation and minimize respiratory effort  - Oxygen administered by appropriate delivery if ordered  - Initiate smoking cessation education as indicated  - Encourage broncho-pulmonary hygiene including cough, deep breathe, Incentive Spirometry  - Assess the need for suctioning and aspirate as  needed  - Assess and instruct to report SOB or any respiratory difficulty  - Respiratory Therapy support as indicated  Outcome: Progressing     Problem: GENITOURINARY - ADULT  Goal: Maintains or returns to baseline urinary function  Description: INTERVENTIONS:  - Assess urinary function  - Encourage oral fluids to ensure adequate hydration if ordered  - Administer IV fluids as ordered to ensure adequate hydration  - Administer ordered medications as needed  - Offer frequent toileting  - Follow urinary retention protocol if ordered  Outcome: Progressing  Goal: Absence of urinary retention  Description: INTERVENTIONS:  - Assess patient’s ability to void and empty bladder  - Monitor I/O  - Bladder scan as needed  - Discuss with physician/AP medications to alleviate retention as needed  - Discuss catheterization for long term situations as appropriate  Outcome: Progressing     Problem: METABOLIC, FLUID AND ELECTROLYTES - ADULT  Goal: Electrolytes maintained within normal limits  Description: INTERVENTIONS:  - Monitor labs and assess patient for signs and symptoms of electrolyte imbalances  - Administer electrolyte replacement as ordered  - Monitor response to electrolyte replacements, including repeat lab results as appropriate  - Instruct patient on fluid and nutrition as appropriate  Outcome: Progressing  Goal: Fluid balance maintained  Description: INTERVENTIONS:  - Monitor labs   - Monitor I/O and WT  - Instruct patient on fluid and nutrition as appropriate  - Assess for signs & symptoms of volume excess or deficit  Outcome: Progressing     Problem: HEMATOLOGIC - ADULT  Goal: Maintains hematologic stability  Description: INTERVENTIONS  - Assess for signs and symptoms of bleeding or hemorrhage  - Monitor labs  - Administer supportive blood products/factors as ordered and appropriate  Outcome: Progressing     Problem: MUSCULOSKELETAL - ADULT  Goal: Maintain or return mobility to safest level of function  Description:  INTERVENTIONS:  - Assess patient's ability to carry out ADLs; assess patient's baseline for ADL function and identify physical deficits which impact ability to perform ADLs (bathing, care of mouth/teeth, toileting, grooming, dressing, etc.)  - Assess/evaluate cause of self-care deficits   - Assess range of motion  - Assess patient's mobility  - Assess patient's need for assistive devices and provide as appropriate  - Encourage maximum independence but intervene and supervise when necessary  - Involve family in performance of ADLs  - Assess for home care needs following discharge   - Consider OT consult to assist with ADL evaluation and planning for discharge  - Provide patient education as appropriate  Outcome: Progressing  Goal: Maintain proper alignment of affected body part  Description: INTERVENTIONS:  - Support, maintain and protect limb and body alignment  - Provide patient/ family with appropriate education  Outcome: Progressing     Problem: SAFETY,RESTRAINT: NV/NON-SELF DESTRUCTIVE BEHAVIOR  Goal: Remains free of harm/injury (restraint for non violent/non self-detsructive behavior)  Description: INTERVENTIONS:  - Instruct patient/family regarding restraint use   - Assess and monitor physiologic and psychological status   - Provide interventions and comfort measures to meet assessed patient needs   - Identify and implement measures to help patient regain control  - Assess readiness for release of restraint   Outcome: Progressing  Goal: Returns to optimal restraint-free functioning  Description: INTERVENTIONS:  - Assess the patient's behavior and symptoms that indicate continued need for restraint  - Identify and implement measures to help patient regain control  - Assess readiness for release of restraint   Outcome: Progressing     Problem: Nutrition/Hydration-ADULT  Goal: Nutrient/Hydration intake appropriate for improving, restoring or maintaining nutritional needs  Description: Monitor and assess patient's  nutrition/hydration status for malnutrition. Collaborate with interdisciplinary team and initiate plan and interventions as ordered.  Monitor patient's weight and dietary intake as ordered or per policy. Utilize nutrition screening tool and intervene as necessary. Determine patient's food preferences and provide high-protein, high-caloric foods as appropriate.     INTERVENTIONS:  - Monitor oral intake, urinary output, labs, and treatment plans  - Assess nutrition and hydration status and recommend course of action  - Evaluate amount of meals eaten  - Assist patient with eating if necessary   - Allow adequate time for meals  - Recommend/ encourage appropriate diets, oral nutritional supplements, and vitamin/mineral supplements  - Order, calculate, and assess calorie counts as needed  - Recommend, monitor, and adjust tube feedings and TPN/PPN based on assessed needs  - Assess need for intravenous fluids  - Provide specific nutrition/hydration education as appropriate  - Include patient/family/caregiver in decisions related to nutrition  Outcome: Progressing     Problem: COPING  Goal: Pt/Family able to verbalize concerns and demonstrate effective coping strategies  Description: INTERVENTIONS:  - Assist patient/family to identify coping skills, available support systems and cultural and spiritual values  - Provide emotional support, including active listening and acknowledgement of concerns of patient and caregivers  - Reduce environmental stimuli, as able  - Provide patient education  - Assess for spiritual pain/suffering and initiate spiritual care, including notification of Pastoral Care or dottie based community as needed  - Assess effectiveness of coping strategies  Outcome: Progressing  Goal: Will report anxiety at manageable levels  Description: INTERVENTIONS:  - Administer medication as ordered  - Teach and encourage coping skills  - Provide emotional support  - Assess patient/family for anxiety and ability to  cope  Outcome: Progressing

## 2025-01-28 ENCOUNTER — APPOINTMENT (INPATIENT)
Dept: NON INVASIVE DIAGNOSTICS | Facility: HOSPITAL | Age: 55
DRG: 870 | End: 2025-01-28
Payer: COMMERCIAL

## 2025-01-28 DIAGNOSIS — F25.9 SCHIZOAFFECTIVE DISORDER (HCC): Chronic | ICD-10-CM

## 2025-01-28 LAB
ANION GAP SERPL CALCULATED.3IONS-SCNC: 7 MMOL/L (ref 4–13)
ANISOCYTOSIS BLD QL SMEAR: PRESENT
AORTIC ROOT: 3.3 CM
AORTIC VALVE MEAN VELOCITY: 9.9 M/S
ASCENDING AORTA: 2.9 CM
AV AREA BY CONTINUOUS VTI: 2 CM2
AV AREA PEAK VELOCITY: 1.9 CM2
AV LVOT MEAN GRADIENT: 1 MMHG
AV LVOT PEAK GRADIENT: 2 MMHG
AV MEAN PRESS GRAD SYS DOP V1V2: 5 MMHG
AV ORIFICE AREA US: 1.95 CM2
AV PEAK GRADIENT: 8 MMHG
AV VELOCITY RATIO: 0.51
AV VMAX SYS DOP: 1.44 M/S
BASOPHILS # BLD MANUAL: 0 THOUSAND/UL (ref 0–0.1)
BASOPHILS NFR MAR MANUAL: 0 % (ref 0–1)
BSA FOR ECHO PROCEDURE: 2.25 M2
BUN SERPL-MCNC: 62 MG/DL (ref 5–25)
CA-I BLD-SCNC: 1.05 MMOL/L (ref 1.12–1.32)
CALCIUM SERPL-MCNC: 8.2 MG/DL (ref 8.4–10.2)
CHLORIDE SERPL-SCNC: 102 MMOL/L (ref 96–108)
CO2 SERPL-SCNC: 36 MMOL/L (ref 21–32)
CREAT SERPL-MCNC: 0.82 MG/DL (ref 0.6–1.3)
DOP CALC AO VTI: 24.6 CM
DOP CALC LVOT AREA: 3.8 CM2
DOP CALC LVOT CARDIAC INDEX: 1.64 L/MIN/M2
DOP CALC LVOT CARDIAC OUTPUT: 3.69 L/MIN
DOP CALC LVOT DIAMETER: 2.2 CM
DOP CALC LVOT PEAK VEL VTI: 12.64 CM
DOP CALC LVOT PEAK VEL: 0.73 M/S
DOP CALC LVOT STROKE INDEX: 21.3 ML/M2
DOP CALC LVOT STROKE VOLUME: 48
E WAVE DECELERATION TIME: 164 MS
E/A RATIO: 0.95
EOSINOPHIL # BLD MANUAL: 0 THOUSAND/UL (ref 0–0.4)
EOSINOPHIL NFR BLD MANUAL: 0 % (ref 0–6)
ERYTHROCYTE [DISTWIDTH] IN BLOOD BY AUTOMATED COUNT: 15.8 % (ref 11.6–15.1)
FRACTIONAL SHORTENING: 28 (ref 28–44)
GFR SERPL CREATININE-BSD FRML MDRD: 82 ML/MIN/1.73SQ M
GLUCOSE SERPL-MCNC: 109 MG/DL (ref 65–140)
GLUCOSE SERPL-MCNC: 123 MG/DL (ref 65–140)
GLUCOSE SERPL-MCNC: 129 MG/DL (ref 65–140)
GLUCOSE SERPL-MCNC: 178 MG/DL (ref 65–140)
GLUCOSE SERPL-MCNC: 78 MG/DL (ref 65–140)
GLUCOSE SERPL-MCNC: 94 MG/DL (ref 65–140)
GLUCOSE SERPL-MCNC: 99 MG/DL (ref 65–140)
HCT VFR BLD AUTO: 30.7 % (ref 34.8–46.1)
HGB BLD-MCNC: 10 G/DL (ref 11.5–15.4)
INTERVENTRICULAR SEPTUM IN DIASTOLE (PARASTERNAL SHORT AXIS VIEW): 1.2 CM
INTERVENTRICULAR SEPTUM: 1.2 CM (ref 0.6–1.1)
LAAS-AP2: 16.6 CM2
LAAS-AP4: 16 CM2
LEFT ATRIUM SIZE: 3.4 CM
LEFT ATRIUM VOLUME (MOD BIPLANE): 42 ML
LEFT ATRIUM VOLUME INDEX (MOD BIPLANE): 18.7 ML/M2
LEFT INTERNAL DIMENSION IN SYSTOLE: 3.6 CM (ref 2.1–4)
LEFT VENTRICULAR INTERNAL DIMENSION IN DIASTOLE: 5 CM (ref 3.5–6)
LEFT VENTRICULAR POSTERIOR WALL IN END DIASTOLE: 1.1 CM
LEFT VENTRICULAR STROKE VOLUME: 65 ML
LV EF US.2D.A4C+ESTIMATED: 48 %
LVSV (TEICH): 65 ML
LYMPHOCYTES # BLD AUTO: 3.72 THOUSAND/UL (ref 0.6–4.47)
LYMPHOCYTES # BLD AUTO: 38 % (ref 14–44)
MAGNESIUM SERPL-MCNC: 2.7 MG/DL (ref 1.9–2.7)
MCH RBC QN AUTO: 32.3 PG (ref 26.8–34.3)
MCHC RBC AUTO-ENTMCNC: 32.6 G/DL (ref 31.4–37.4)
MCV RBC AUTO: 99 FL (ref 82–98)
MONOCYTES # BLD AUTO: 0.88 THOUSAND/UL (ref 0–1.22)
MONOCYTES NFR BLD: 9 % (ref 4–12)
MV E'TISSUE VEL-LAT: 12 CM/S
MV E'TISSUE VEL-SEP: 8 CM/S
MV PEAK A VEL: 0.64 M/S
MV PEAK E VEL: 61 CM/S
NEUTROPHILS # BLD MANUAL: 5.19 THOUSAND/UL (ref 1.85–7.62)
NEUTS BAND NFR BLD MANUAL: 1 % (ref 0–8)
NEUTS SEG NFR BLD AUTO: 52 % (ref 43–75)
PLATELET # BLD AUTO: 210 THOUSANDS/UL (ref 149–390)
PLATELET BLD QL SMEAR: ADEQUATE
PMV BLD AUTO: 9.4 FL (ref 8.9–12.7)
POTASSIUM SERPL-SCNC: 3.3 MMOL/L (ref 3.5–5.3)
RBC # BLD AUTO: 3.1 MILLION/UL (ref 3.81–5.12)
RBC MORPH BLD: PRESENT
RIGHT ATRIAL 2D VOLUME: 45 ML
RIGHT ATRIUM AREA SYSTOLE A4C: 17.4 CM2
RIGHT VENTRICLE ID DIMENSION: 3.2 CM
SL CV LEFT ATRIUM LENGTH A2C: 4.9 CM
SL CV LV EF: 52
SL CV PED ECHO LEFT VENTRICLE DIASTOLIC VOLUME (MOD BIPLANE) 2D: 121 ML
SL CV PED ECHO LEFT VENTRICLE SYSTOLIC VOLUME (MOD BIPLANE) 2D: 56 ML
SODIUM SERPL-SCNC: 145 MMOL/L (ref 135–147)
TRICUSPID ANNULAR PLANE SYSTOLIC EXCURSION: 1.5 CM
WBC # BLD AUTO: 9.79 THOUSAND/UL (ref 4.31–10.16)

## 2025-01-28 PROCEDURE — 82948 REAGENT STRIP/BLOOD GLUCOSE: CPT

## 2025-01-28 PROCEDURE — 94760 N-INVAS EAR/PLS OXIMETRY 1: CPT

## 2025-01-28 PROCEDURE — 94150 VITAL CAPACITY TEST: CPT

## 2025-01-28 PROCEDURE — 94640 AIRWAY INHALATION TREATMENT: CPT

## 2025-01-28 PROCEDURE — 99233 SBSQ HOSP IP/OBS HIGH 50: CPT | Performed by: INTERNAL MEDICINE

## 2025-01-28 PROCEDURE — 94003 VENT MGMT INPAT SUBQ DAY: CPT

## 2025-01-28 PROCEDURE — 85007 BL SMEAR W/DIFF WBC COUNT: CPT

## 2025-01-28 PROCEDURE — 93306 TTE W/DOPPLER COMPLETE: CPT | Performed by: INTERNAL MEDICINE

## 2025-01-28 PROCEDURE — 93306 TTE W/DOPPLER COMPLETE: CPT

## 2025-01-28 PROCEDURE — 80048 BASIC METABOLIC PNL TOTAL CA: CPT

## 2025-01-28 PROCEDURE — 83735 ASSAY OF MAGNESIUM: CPT

## 2025-01-28 PROCEDURE — 82330 ASSAY OF CALCIUM: CPT

## 2025-01-28 PROCEDURE — 85027 COMPLETE CBC AUTOMATED: CPT

## 2025-01-28 RX ORDER — CALCIUM GLUCONATE 20 MG/ML
2 INJECTION, SOLUTION INTRAVENOUS ONCE
Status: COMPLETED | OUTPATIENT
Start: 2025-01-28 | End: 2025-01-28

## 2025-01-28 RX ORDER — OLANZAPINE 10 MG/1
10 TABLET ORAL
Qty: 30 TABLET | Refills: 0 | Status: ON HOLD | OUTPATIENT
Start: 2025-01-28 | End: 2025-02-27

## 2025-01-28 RX ORDER — AMOXICILLIN 250 MG
2 CAPSULE ORAL 2 TIMES DAILY
Status: DISCONTINUED | OUTPATIENT
Start: 2025-01-28 | End: 2025-02-08 | Stop reason: HOSPADM

## 2025-01-28 RX ORDER — POLYETHYLENE GLYCOL 3350 17 G/17G
17 POWDER, FOR SOLUTION ORAL DAILY PRN
Status: DISCONTINUED | OUTPATIENT
Start: 2025-01-28 | End: 2025-02-08 | Stop reason: HOSPADM

## 2025-01-28 RX ORDER — BENZTROPINE MESYLATE 2 MG/1
2 TABLET ORAL 2 TIMES DAILY
Qty: 60 TABLET | Refills: 0 | Status: ON HOLD | OUTPATIENT
Start: 2025-01-28

## 2025-01-28 RX ORDER — POTASSIUM CHLORIDE 14.9 MG/ML
20 INJECTION INTRAVENOUS ONCE
Status: COMPLETED | OUTPATIENT
Start: 2025-01-28 | End: 2025-01-28

## 2025-01-28 RX ADMIN — LEVALBUTEROL HYDROCHLORIDE 1.25 MG: 1.25 SOLUTION RESPIRATORY (INHALATION) at 13:55

## 2025-01-28 RX ADMIN — OLANZAPINE 10 MG: 5 TABLET, FILM COATED ORAL at 22:10

## 2025-01-28 RX ADMIN — BENZTROPINE MESYLATE 2 MG: 1 TABLET ORAL at 18:29

## 2025-01-28 RX ADMIN — VALPROIC ACID 750 MG: 500 SOLUTION ORAL at 10:09

## 2025-01-28 RX ADMIN — PROPOFOL 20 MCG/KG/MIN: 10 INJECTION, EMULSION INTRAVENOUS at 21:54

## 2025-01-28 RX ADMIN — SODIUM CHLORIDE SOLN NEBU 3% 4 ML: 3 NEBU SOLN at 08:12

## 2025-01-28 RX ADMIN — LEVALBUTEROL HYDROCHLORIDE 1.25 MG: 1.25 SOLUTION RESPIRATORY (INHALATION) at 19:50

## 2025-01-28 RX ADMIN — LEVOTHYROXINE SODIUM 175 MCG: 0.05 TABLET ORAL at 06:09

## 2025-01-28 RX ADMIN — FENTANYL CITRATE 50 MCG: 50 INJECTION INTRAMUSCULAR; INTRAVENOUS at 15:22

## 2025-01-28 RX ADMIN — CHLORHEXIDINE GLUCONATE 15 ML: 1.2 RINSE ORAL at 10:01

## 2025-01-28 RX ADMIN — POTASSIUM CHLORIDE 20 MEQ: 14.9 INJECTION, SOLUTION INTRAVENOUS at 10:02

## 2025-01-28 RX ADMIN — BENZTROPINE MESYLATE 2 MG: 1 TABLET ORAL at 10:01

## 2025-01-28 RX ADMIN — IPRATROPIUM BROMIDE 0.5 MG: 0.5 SOLUTION RESPIRATORY (INHALATION) at 08:12

## 2025-01-28 RX ADMIN — CALCIUM GLUCONATE 2 G: 20 INJECTION, SOLUTION INTRAVENOUS at 10:09

## 2025-01-28 RX ADMIN — POTASSIUM CHLORIDE 20 MEQ: 14.9 INJECTION, SOLUTION INTRAVENOUS at 12:33

## 2025-01-28 RX ADMIN — LACOSAMIDE ORAL SOLUTION 150 MG: 10 SOLUTION ORAL at 10:02

## 2025-01-28 RX ADMIN — Medication 50 MCG/HR: at 08:30

## 2025-01-28 RX ADMIN — LEVALBUTEROL HYDROCHLORIDE 1.25 MG: 1.25 SOLUTION RESPIRATORY (INHALATION) at 08:12

## 2025-01-28 RX ADMIN — IPRATROPIUM BROMIDE 0.5 MG: 0.5 SOLUTION RESPIRATORY (INHALATION) at 19:50

## 2025-01-28 RX ADMIN — CYANOCOBALAMIN TAB 500 MCG 1000 MCG: 500 TAB at 10:01

## 2025-01-28 RX ADMIN — PROPOFOL 15 MCG/KG/MIN: 10 INJECTION, EMULSION INTRAVENOUS at 08:30

## 2025-01-28 RX ADMIN — SENNOSIDES AND DOCUSATE SODIUM 2 TABLET: 8.6; 5 TABLET ORAL at 18:28

## 2025-01-28 RX ADMIN — CHLORHEXIDINE GLUCONATE 15 ML: 1.2 RINSE ORAL at 21:53

## 2025-01-28 RX ADMIN — IPRATROPIUM BROMIDE 0.5 MG: 0.5 SOLUTION RESPIRATORY (INHALATION) at 13:55

## 2025-01-28 RX ADMIN — SODIUM CHLORIDE SOLN NEBU 3% 4 ML: 3 NEBU SOLN at 13:55

## 2025-01-28 RX ADMIN — ENOXAPARIN SODIUM 40 MG: 40 INJECTION SUBCUTANEOUS at 10:02

## 2025-01-28 RX ADMIN — LACOSAMIDE ORAL SOLUTION 200 MG: 10 SOLUTION ORAL at 22:10

## 2025-01-28 RX ADMIN — INSULIN LISPRO 2 UNITS: 100 INJECTION, SOLUTION INTRAVENOUS; SUBCUTANEOUS at 00:54

## 2025-01-28 RX ADMIN — SODIUM CHLORIDE SOLN NEBU 3% 4 ML: 3 NEBU SOLN at 03:46

## 2025-01-28 RX ADMIN — VALPROIC ACID 750 MG: 500 SOLUTION ORAL at 22:09

## 2025-01-28 RX ADMIN — SENNOSIDES AND DOCUSATE SODIUM 2 TABLET: 8.6; 5 TABLET ORAL at 10:03

## 2025-01-28 RX ADMIN — SODIUM CHLORIDE SOLN NEBU 3% 4 ML: 3 NEBU SOLN at 19:50

## 2025-01-28 RX ADMIN — POLYETHYLENE GLYCOL 3350 17 G: 17 POWDER, FOR SOLUTION ORAL at 10:09

## 2025-01-28 RX ADMIN — PROPOFOL 15 MCG/KG/MIN: 10 INJECTION, EMULSION INTRAVENOUS at 14:40

## 2025-01-28 NOTE — PROGRESS NOTES
Progress Note - Critical Care/ICU   Name: Esperanza Prajapati 54 y.o. female I MRN: [id number]  Unit/Bed#: ICU 15 I Date of Admission: 1/19/2025   Date of Service: 1/28/2025 I Hospital Day: 8      Assessment & Plan  Acute respiratory failure with hypoxia (HCC)  Due to Flu A, viral sepsis - consider multifocal aspiration PNA  Possible ARDS, could be superimposed bacterial pneumonia in the background of flu  CT C/A/P negative on admission   CXR showed b/l opacities - aspiration cannot be excluded at this time   Continue adebayo. Nebs   Patient completed 5 days course of Tamiflu, 3 days course of azithromycin  There is a concern for ARDS, will consider additional workup for the same (ECHO).  Currently on SIMV, 400/18/10/70%.  Saturating at 95%.  Wean as able.  Change sedation from propofol to Precedex once patient is closer to extubation.    Focal epilepsy (HCC)  Follows with [facility] neurology   Focal epilepsy manifests as simple partial, complex partial, and generalized tonic-clonic seizures   Hx of b/l postural and action tremor per neurology notes  Home regimen: vimpat 150 daily, vimpat 200mg qHS, Depakote 750mg BID  Viral sepsis (HCC)  Due to Flu A + on 1/19   SIRS on admission with tachycardia, tachypnea, leukopenia, fever   Lactic negative, no hypotension on admission   Overnight 1/21 with borderline hypotensive, responsive to 1L bolus  Monitor off of antiviral, antibiotics.  Cognitive developmental delay  Due to head injury at age of 10 months    Postoperative hypothyroidism  Stage I papillary thyroid cancer s/p total thyroidectomy and ablation in 2013  Post-operative hypothyroidism   Levothyroxine via OG tube  Schizoaffective disorder (HCC)  Home regimen: zyprexa 10mg daily, trazodone 50mg PRN, invega 6mg daily   Zyprexa transitioned to IM due to NPO, trazodone and paliperidone on hold   Toxic metabolic encephalopathy  Due to viral sepsis, hypoxia, and infection  Hx of developmental delay although at  baseline she is A&O x 3  CTH on admission negative   Do not suspect breakthrough seizures  Supportive care at this time.  Fall at home, initial encounter  Presented with fall, + head strike   CTH negative   Bed alarm/fall precautions  Hypernatremia  Recent Labs     01/26/25  0435 01/27/25  0446 01/28/25  0508   SODIUM 148* 148* 145   IV fluids given, free water flushes increased on 1/27.  Sodium better on 1/28.  Continue to monitor.  Hyperglycemia  Non diabetic patient  Last A1c of 5.5  Lab Results   Component Value Date    GLUC 129 01/28/2025    GLUC 218 (H) 01/27/2025    GLUC 196 (H) 01/26/2025    Continue with the sliding scale insulin  Influenza A  Positive on 1/19  Complicated by viral sepsis, see plan below  Tamiflu via oG-tube completed    Disposition: Critical care    ICU Core Measures     Vented Patient  VAP Bundle  VAP bundle ordered     A: Assess, Prevent, and Manage Pain Has pain been assessed? Yes  Need for changes to pain regimen? No   B: Both Spontaneous Awakening Trials (SATs) and Spontaneous Breathing Trials (SBTs) Plan to perform spontaneous awakening trial today? Yes   Plan to perform spontaneous breathing trial today? Yes   Obvious barriers to extubation? Yes   C: Choice of Sedation RASS Goal: 0 Alert and Calm  Need for changes to sedation or analgesia regimen? No   D: Delirium CAM-ICU: Negative   E: Early Mobility  Plan for early mobility? NA   F: Family Engagement Plan for family engagement today? Yes       Review of Invasive Devices:    Steven Plan: Continue for accurate I/O monitoring for 48 hours    Mercedes Plan: Keep arterial line for hemodynamic monitoring    Prophylaxis:  VTE VTE covered by:  enoxaparin, Subcutaneous, 40 mg at 01/27/25 0924       Stress Ulcer  not ordered         24 Hour Events : No overnight events reported.  Subjective       Objective :                   Vitals I/O      Most Recent Min/Max in 24hrs   Temp 99.4 °F (37.4 °C) Temp  Min: 97.5 °F (36.4 °C)  Max: 100.9 °F (38.3  °C)   Pulse 77 Pulse  Min: 62  Max: 116   Resp 19 Resp  Min: 18  Max: 30   /72 BP  Min: 114/72  Max: 117/74   O2 Sat 93 % SpO2  Min: 89 %  Max: 96 %      Intake/Output Summary (Last 24 hours) at 2025 0847  Last data filed at 2025 0601  Gross per 24 hour   Intake 2071.14 ml   Output 1820 ml   Net 251.14 ml       Diet Enteral/Parenteral; Tube Feeding No Oral Diet; Vital High Protein; Continuous; 55; 200; Water; Every 6 hours    Invasive Monitoring           Physical Exam   Physical Exam  Vitals and nursing note reviewed.   Eyes:      General: No dysconjugate gazeNo visual field deficit.     Extraocular Movements: Extraocular movements intact.      Pupils: Pupils are equal, round, and reactive to light.   Skin:     General: Skin is warm and dry.   HENT:      Head: Normocephalic and atraumatic.      Right Ear: Hearing normal.      Left Ear: Hearing normal.      Nose: No congestion or rhinorrhea.      Mouth/Throat:      Mouth: Mucous membranes are dry.      Pharynx: No oropharyngeal exudate.   Cardiovascular:      Rate and Rhythm: Normal rate and regular rhythm.      Heart sounds: No murmur heard.     No friction rub. No gallop.   Musculoskeletal:      Right lower le+ Edema present.      Left lower le+ Edema present.   Abdominal: General: Bowel sounds are normal.      Palpations: Abdomen is soft.      Tenderness: There is no abdominal tenderness.   Constitutional:       General: She is not in acute distress.     Appearance: She is well-developed. She is ill-appearing.      Interventions: She is intubated.   Pulmonary:      Effort: Pulmonary effort is normal. No respiratory distress. She is intubated.      Breath sounds: Normal breath sounds. No stridor. No wheezing, rhonchi or rales.   Chest:      Chest wall: No tenderness.   Neurological:      General: No focal deficit present.      Mental Status: She is alert and oriented to person, place and time. Mental status is at baseline.      Sensory:  Sensation is intact.      Motor: No motor deficit.   Genitourinary/Anorectal:  Harris present.        Diagnostic Studies        Lab Results: I have reviewed the following results:     Medications:  Scheduled PRN   benztropine, 2 mg, BID  calcium gluconate, 2 g, Once  chlorhexidine, 15 mL, Q12H MOSHE  cyanocobalamin, 1,000 mcg, Daily  enoxaparin, 40 mg, Daily  insulin lispro, 2-12 Units, Q6H  ipratropium, 0.5 mg, TID  lacosamide, 150 mg, Daily  lacosamide, 200 mg, HS  levalbuterol, 1.25 mg, TID  levothyroxine, 175 mcg, Early Morning  melatonin, 3 mg, HS  OLANZapine, 10 mg, HS  [Held by provider] paliperidone, 6 mg, QAM  potassium chloride, 20 mEq, Once   Followed by  potassium chloride, 20 mEq, Once  sodium chloride, 4 mL, Q6H  valproic acid, 750 mg, Q12H MOSHE      Acetaminophen, 975 mg, Q6H PRN  aluminum-magnesium hydroxide-simethicone, 30 mL, Q6H PRN  fentaNYL, 50 mcg, Q1H PRN  ondansetron, 4 mg, Q6H PRN       Continuous    fentaNYL, 50 mcg/hr, Last Rate: 50 mcg/hr (01/28/25 0830)  propofol, 5-50 mcg/kg/min, Last Rate: 15 mcg/kg/min (01/28/25 0830)         Labs:   CBC    Recent Labs     01/27/25  0446 01/28/25  0508   WBC 8.56 9.79   HGB 10.6* 10.0*   HCT 32.5* 30.7*    210   BANDSPCT  --  1     BMP    Recent Labs     01/27/25  0446 01/28/25  0508   SODIUM 148* 145   K 3.3* 3.3*    102   CO2 35* 36*   AGAP 10 7   BUN 76* 62*   CREATININE 0.94 0.82   CALCIUM 8.6 8.2*       Coags    No recent results     Additional Electrolytes  Recent Labs     01/27/25  0446 01/28/25  0508   MG 3.2* 2.7   PHOS 3.6  --    CAIONIZED 1.07* 1.05*          Blood Gas    No recent results  No recent results LFTs  No recent results    Infectious  No recent results  Glucose  Recent Labs     01/27/25  0446 01/28/25  0508   GLUC 218* 129

## 2025-01-28 NOTE — CASE MANAGEMENT
Case Management Discharge Planning Note    Patient name Esperanza Prajapati  Location ICU 15/ICU 15 MRN [id number]  : 1970 Date 2025       Current Admission Date: 2025  Current Admission Diagnosis:Acute respiratory failure with hypoxia (HCC)   Patient Active Problem List    Diagnosis Date Noted Date Diagnosed    Hypernatremia 2025     Hyperglycemia 2025     Viral sepsis (HCC) 2025     Toxic metabolic encephalopathy 2025     Fall at home, initial encounter 2025     Influenza A 2025     Acute respiratory failure with hypoxia (HCC) 2025     Decreased urination 10/23/2024     Dermatitis of face 2024     Impaired ability to manage medication regime 10/09/2023     Bilateral lower extremity edema 2023     h/o VALERIE (acute kidney injury) (HCC) 2023     Gout 2022     Schizoaffective disorder (HCC) 2022     Transient ischemic attack 2022     Vitamin B12 deficiency 2022     Dyslipidemia 2022     PCOS (polycystic ovarian syndrome) 2020     Obesity (BMI 30-39.9) 2017     Vitamin D deficiency 2016     Cognitive developmental delay 2014     Obstructive sleep apnea 2013     Focal epilepsy (HCC) 2013     History of thyroid cancer 2013     Proteinuria 2011     Postoperative hypothyroidism 06/10/2011     Family history of diabetes mellitus 06/10/2011     Family history of malignant neoplasm of breast 06/10/2011     Intellectual disability 06/10/2011       LOS (days): 8  Geometric Mean LOS (GMLOS) (days): 4.9  Days to GMLOS:-3.8     OBJECTIVE:  Risk of Unplanned Readmission Score: 28.18         Current admission status: Inpatient   Preferred Pharmacy:   [facility] - [place] - [address]  [address] [place] [address]  Phone: [phone] Fax: [phone]    Primary Care Provider: Foster Biswas MD    Primary Insurance: [employer]  [facility] REP  Secondary Insurance:     DISCHARGE DETAILS:       Additional Comments: Patient is currently intubated and unaccompanied.  CM will reach out to family for discharge planning once PT consultation is completed and will continue to follow the patient for intake and discharge planning.

## 2025-01-29 PROBLEM — J18.9 PNEUMONIA OF BOTH LUNGS DUE TO INFECTIOUS ORGANISM: Status: ACTIVE | Noted: 2025-01-29

## 2025-01-29 LAB
ANION GAP SERPL CALCULATED.3IONS-SCNC: 5 MMOL/L (ref 4–13)
BASOPHILS # BLD AUTO: 0.04 THOUSANDS/ΜL (ref 0–0.1)
BASOPHILS NFR BLD AUTO: 0 % (ref 0–1)
BUN SERPL-MCNC: 50 MG/DL (ref 5–25)
CA-I BLD-SCNC: 1.11 MMOL/L (ref 1.12–1.32)
CALCIUM SERPL-MCNC: 8.7 MG/DL (ref 8.4–10.2)
CHLORIDE SERPL-SCNC: 103 MMOL/L (ref 96–108)
CO2 SERPL-SCNC: 34 MMOL/L (ref 21–32)
CREAT SERPL-MCNC: 0.78 MG/DL (ref 0.6–1.3)
EOSINOPHIL # BLD AUTO: 0.16 THOUSAND/ΜL (ref 0–0.61)
EOSINOPHIL NFR BLD AUTO: 2 % (ref 0–6)
ERYTHROCYTE [DISTWIDTH] IN BLOOD BY AUTOMATED COUNT: 15.3 % (ref 11.6–15.1)
GFR SERPL CREATININE-BSD FRML MDRD: 87 ML/MIN/1.73SQ M
GLUCOSE SERPL-MCNC: 111 MG/DL (ref 65–140)
GLUCOSE SERPL-MCNC: 90 MG/DL (ref 65–140)
GLUCOSE SERPL-MCNC: 96 MG/DL (ref 65–140)
HCT VFR BLD AUTO: 32 % (ref 34.8–46.1)
HGB BLD-MCNC: 10.3 G/DL (ref 11.5–15.4)
IMM GRANULOCYTES # BLD AUTO: >0.5 THOUSAND/UL (ref 0–0.2)
IMM GRANULOCYTES NFR BLD AUTO: 8 % (ref 0–2)
LYMPHOCYTES # BLD AUTO: 2.46 THOUSANDS/ΜL (ref 0.6–4.47)
LYMPHOCYTES NFR BLD AUTO: 24 % (ref 14–44)
MCH RBC QN AUTO: 32.4 PG (ref 26.8–34.3)
MCHC RBC AUTO-ENTMCNC: 32.2 G/DL (ref 31.4–37.4)
MCV RBC AUTO: 101 FL (ref 82–98)
MONOCYTES # BLD AUTO: 0.87 THOUSAND/ΜL (ref 0.17–1.22)
MONOCYTES NFR BLD AUTO: 8 % (ref 4–12)
NEUTROPHILS # BLD AUTO: 5.95 THOUSANDS/ΜL (ref 1.85–7.62)
NEUTS SEG NFR BLD AUTO: 58 % (ref 43–75)
NRBC BLD AUTO-RTO: 0 /100 WBCS
PLATELET # BLD AUTO: 294 THOUSANDS/UL (ref 149–390)
PMV BLD AUTO: 9.2 FL (ref 8.9–12.7)
POTASSIUM SERPL-SCNC: 4.3 MMOL/L (ref 3.5–5.3)
RBC # BLD AUTO: 3.18 MILLION/UL (ref 3.81–5.12)
SODIUM SERPL-SCNC: 142 MMOL/L (ref 135–147)
TRIGL SERPL-MCNC: 517 MG/DL (ref ?–150)
WBC # BLD AUTO: 10.34 THOUSAND/UL (ref 4.31–10.16)

## 2025-01-29 PROCEDURE — 94150 VITAL CAPACITY TEST: CPT

## 2025-01-29 PROCEDURE — 99291 CRITICAL CARE FIRST HOUR: CPT | Performed by: INTERNAL MEDICINE

## 2025-01-29 PROCEDURE — 82330 ASSAY OF CALCIUM: CPT | Performed by: NURSE PRACTITIONER

## 2025-01-29 PROCEDURE — 82948 REAGENT STRIP/BLOOD GLUCOSE: CPT

## 2025-01-29 PROCEDURE — 84478 ASSAY OF TRIGLYCERIDES: CPT | Performed by: NURSE PRACTITIONER

## 2025-01-29 PROCEDURE — 80048 BASIC METABOLIC PNL TOTAL CA: CPT | Performed by: NURSE PRACTITIONER

## 2025-01-29 PROCEDURE — 94640 AIRWAY INHALATION TREATMENT: CPT

## 2025-01-29 PROCEDURE — 94760 N-INVAS EAR/PLS OXIMETRY 1: CPT

## 2025-01-29 PROCEDURE — 94003 VENT MGMT INPAT SUBQ DAY: CPT

## 2025-01-29 PROCEDURE — 85027 COMPLETE CBC AUTOMATED: CPT | Performed by: NURSE PRACTITIONER

## 2025-01-29 RX ORDER — DEXMEDETOMIDINE HYDROCHLORIDE 4 UG/ML
.1-.7 INJECTION, SOLUTION INTRAVENOUS
Status: DISCONTINUED | OUTPATIENT
Start: 2025-01-29 | End: 2025-01-31

## 2025-01-29 RX ADMIN — LACOSAMIDE ORAL SOLUTION 200 MG: 10 SOLUTION ORAL at 21:32

## 2025-01-29 RX ADMIN — LEVALBUTEROL HYDROCHLORIDE 1.25 MG: 1.25 SOLUTION RESPIRATORY (INHALATION) at 13:32

## 2025-01-29 RX ADMIN — FENTANYL CITRATE 50 MCG: 50 INJECTION INTRAMUSCULAR; INTRAVENOUS at 16:13

## 2025-01-29 RX ADMIN — LEVALBUTEROL HYDROCHLORIDE 1.25 MG: 1.25 SOLUTION RESPIRATORY (INHALATION) at 20:47

## 2025-01-29 RX ADMIN — Medication 100 MCG/HR: at 16:13

## 2025-01-29 RX ADMIN — LEVALBUTEROL HYDROCHLORIDE 1.25 MG: 1.25 SOLUTION RESPIRATORY (INHALATION) at 07:13

## 2025-01-29 RX ADMIN — IPRATROPIUM BROMIDE 0.5 MG: 0.5 SOLUTION RESPIRATORY (INHALATION) at 13:32

## 2025-01-29 RX ADMIN — ENOXAPARIN SODIUM 40 MG: 40 INJECTION SUBCUTANEOUS at 08:40

## 2025-01-29 RX ADMIN — BENZTROPINE MESYLATE 2 MG: 1 TABLET ORAL at 08:41

## 2025-01-29 RX ADMIN — LACOSAMIDE ORAL SOLUTION 150 MG: 10 SOLUTION ORAL at 08:40

## 2025-01-29 RX ADMIN — BENZTROPINE MESYLATE 2 MG: 1 TABLET ORAL at 18:11

## 2025-01-29 RX ADMIN — Medication 50 MCG/HR: at 00:38

## 2025-01-29 RX ADMIN — SENNOSIDES AND DOCUSATE SODIUM 2 TABLET: 8.6; 5 TABLET ORAL at 08:40

## 2025-01-29 RX ADMIN — SENNOSIDES AND DOCUSATE SODIUM 2 TABLET: 8.6; 5 TABLET ORAL at 18:11

## 2025-01-29 RX ADMIN — CHLORHEXIDINE GLUCONATE 15 ML: 1.2 RINSE ORAL at 08:40

## 2025-01-29 RX ADMIN — LEVOTHYROXINE SODIUM 175 MCG: 0.05 TABLET ORAL at 05:46

## 2025-01-29 RX ADMIN — VALPROIC ACID 750 MG: 500 SOLUTION ORAL at 08:55

## 2025-01-29 RX ADMIN — CHLORHEXIDINE GLUCONATE 15 ML: 1.2 RINSE ORAL at 21:33

## 2025-01-29 RX ADMIN — CYANOCOBALAMIN TAB 500 MCG 1000 MCG: 500 TAB at 08:40

## 2025-01-29 RX ADMIN — VALPROIC ACID 750 MG: 500 SOLUTION ORAL at 21:33

## 2025-01-29 RX ADMIN — OLANZAPINE 10 MG: 5 TABLET, FILM COATED ORAL at 21:33

## 2025-01-29 RX ADMIN — IPRATROPIUM BROMIDE 0.5 MG: 0.5 SOLUTION RESPIRATORY (INHALATION) at 07:13

## 2025-01-29 RX ADMIN — PROPOFOL 20 MCG/KG/MIN: 10 INJECTION, EMULSION INTRAVENOUS at 03:08

## 2025-01-29 RX ADMIN — FENTANYL CITRATE 50 MCG: 50 INJECTION INTRAMUSCULAR; INTRAVENOUS at 19:02

## 2025-01-29 RX ADMIN — IPRATROPIUM BROMIDE 0.5 MG: 0.5 SOLUTION RESPIRATORY (INHALATION) at 20:47

## 2025-01-29 RX ADMIN — DEXMEDETOMIDINE HYDROCHLORIDE 0.2 MCG/KG/HR: 4 INJECTION, SOLUTION INTRAVENOUS at 20:27

## 2025-01-29 NOTE — PLAN OF CARE
Problem: INFECTION - ADULT  Goal: Absence or prevention of progression during hospitalization  Description: INTERVENTIONS:  - Assess and monitor for signs and symptoms of infection  - Monitor lab/diagnostic results  - Monitor all insertion sites, i.e. indwelling lines, tubes, and drains  - Monitor endotracheal if appropriate and nasal secretions for changes in amount and color  - [place] appropriate cooling/warming therapies per order  - Administer medications as ordered  - Instruct and encourage patient and family to use good hand hygiene technique  - Identify and instruct in appropriate isolation precautions for identified infection/condition  Outcome: Progressing     Problem: NEUROSENSORY - ADULT  Goal: Achieves stable or improved neurological status  Description: INTERVENTIONS  - Monitor and report changes in neurological status  - Monitor vital signs such as temperature, blood pressure, glucose, and any other labs ordered   - Initiate measures to prevent increased intracranial pressure  - Monitor for seizure activity and implement precautions if appropriate      Outcome: Progressing  Goal: Remains free of injury related to seizures activity  Description: INTERVENTIONS  - Maintain airway, patient safety  and administer oxygen as ordered  - Monitor patient for seizure activity, document and report duration and description of seizure to physician/advanced practitioner  - If seizure occurs,  ensure patient safety during seizure  - Reorient patient post seizure  - Seizure pads on all 4 side rails  - Instruct patient/family to notify RN of any seizure activity including if an aura is experienced  - Instruct patient/family to call for assistance with activity based on nursing assessment  - Administer anti-seizure medications if ordered    Outcome: Progressing  Goal: Achieves maximal functionality and self care  Description: INTERVENTIONS  - Monitor swallowing and airway patency with patient fatigue and changes in  neurological status  - Encourage and assist patient to increase activity and self care.   - Encourage visually impaired, hearing impaired and aphasic patients to use assistive/communication devices  Outcome: Progressing     Problem: RESPIRATORY - ADULT  Goal: Achieves optimal ventilation and oxygenation  Description: INTERVENTIONS:  - Assess for changes in respiratory status  - Assess for changes in mentation and behavior  - Position to facilitate oxygenation and minimize respiratory effort  - Oxygen administered by appropriate delivery if ordered  - Initiate smoking cessation education as indicated  - Encourage broncho-pulmonary hygiene including cough, deep breathe, Incentive Spirometry  - Assess the need for suctioning and aspirate as needed  - Assess and instruct to report SOB or any respiratory difficulty  - Respiratory Therapy support as indicated  Outcome: Progressing     Problem: GENITOURINARY - ADULT  Goal: Maintains or returns to baseline urinary function  Description: INTERVENTIONS:  - Assess urinary function  - Encourage oral fluids to ensure adequate hydration if ordered  - Administer IV fluids as ordered to ensure adequate hydration  - Administer ordered medications as needed  - Offer frequent toileting  - Follow urinary retention protocol if ordered  Outcome: Progressing  Goal: Absence of urinary retention  Description: INTERVENTIONS:  - Assess patient’s ability to void and empty bladder  - Monitor I/O  - Bladder scan as needed  - Discuss with physician/AP medications to alleviate retention as needed  - Discuss catheterization for long term situations as appropriate  Outcome: Progressing     Problem: METABOLIC, FLUID AND ELECTROLYTES - ADULT  Goal: Electrolytes maintained within normal limits  Description: INTERVENTIONS:  - Monitor labs and assess patient for signs and symptoms of electrolyte imbalances  - Administer electrolyte replacement as ordered  - Monitor response to electrolyte replacements,  including repeat lab results as appropriate  - Instruct patient on fluid and nutrition as appropriate  Outcome: Progressing  Goal: Fluid balance maintained  Description: INTERVENTIONS:  - Monitor labs   - Monitor I/O and WT  - Instruct patient on fluid and nutrition as appropriate  - Assess for signs & symptoms of volume excess or deficit  Outcome: Progressing

## 2025-01-29 NOTE — PROGRESS NOTES
Progress Note - Critical Care   Name: Esperanza Prajapati 54 y.o. female I MRN: [id number]  Unit/Bed#: ICU 15 I Date of Admission: 1/19/2025   Date of Service: 1/29/2025 I Hospital Day: 9    Assessment & Plan  Acute respiratory failure with hypoxia (HCC)  Due to Flu A, viral sepsis - consider multifocal aspiration PNA  Cont vent support, wean FiO2 as able  TG noted to be high, need to d/c Propofol. Cont. Fentanyl drip and adjust for RASS -1  Focal epilepsy (HCC)  Follows with [facility] neurology   Focal epilepsy manifests as simple partial, complex partial, and generalized tonic-clonic seizures   Hx of b/l postural and action tremor per neurology notes  Cont. O/P meds: vimpat 150 daily, vimpat 200mg qHS, Depakote 750mg BID  Viral sepsis (HCC)  Due to Flu A + on 1/19   SIRS on admission with tachycardia, tachypnea, leukopenia, fever   Completed Oseltamivir  Cognitive developmental delay  Due to head injury at age of 10 months    Postoperative hypothyroidism  Stage I papillary thyroid cancer s/p total thyroidectomy and ablation in 2013  Post-operative hypothyroidism   Levothyroxine via OG tube  Schizoaffective disorder (HCC)  Home regimen: zyprexa 10mg daily, trazodone 50mg PRN, invega 6mg daily   Zyprexa transitioned to IM due to NPO, trazodone and paliperidone on hold   Toxic metabolic encephalopathy  Due to viral sepsis, hypoxia, and infection  Hx of developmental delay although at baseline she is A&O x 3 but does not always follow commands    Fall at home, initial encounter  Presented with fall, + head strike   CTH negative   Bed alarm/fall precautions  Hypernatremia  Recent Labs     01/27/25  0446 01/28/25  0508 01/29/25  0427   SODIUM 148* 145 142   Improved.  Lower free water flushes  Hyperglycemia  Non diabetic patient  Last A1c of 5.5  Lab Results   Component Value Date    GLUC 90 01/29/2025    GLUC 129 01/28/2025    GLUC 218 (H) 01/27/2025   Has not needed insulin, d/c insulin and sliding  scale  Influenza A  Positive on 1/19  Complicated by viral sepsis, see plan above  Tamiflu completed    Pneumonia of both lungs due to infectious organism  - may have superimposed pneumonia vs bacterial pneumonia  Continue adebayo. Nebs   Patient completed 5 days course of Tamiflu, 3 days course of azithromycin  Adjust sedation as above.    TF Vital high protein not available in hospital, ask nutrition for a substitute TF.   Give Miralax today.  Goal I=O, diurese PRN.    Discussed on rounds.  Critically ill, at risk of death and decompensation. Guarded prognosis.    Critical Care Time Statement: Upon my evaluation, this patient had a high probability of imminent or life-threatening deterioration due to respiratory failure, pneumonia, encephalopathy, which required my direct attention, intervention, and personal management.  I spent a total of 35 minutes directly providing critical care services, including interpretation of complex medical databases, evaluating for the presence of life-threatening injuries or illnesses, management of organ system failure(s) , complex medical decision making (to support/prevent further life-threatening deterioration)., interpretation of hemodynamic data, titration of continuous IV medications (drips), ventilator management, and managing enteral or parenteral nutritional support. This time is exclusive of procedures, teaching, treating other patients, family meetings, and any prior time recorded by providers other than myself.       24 Hour Events : No overnight events.  Subjective : Found lying in bed. Awakens, comfortable.    Propofol 10 mcg/kg/min  Fentanyl 50 mcg/hr    Objective :  Temp:  [98.5 °F (36.9 °C)-98.6 °F (37 °C)] 98.6 °F (37 °C)  HR:  [66-92] 80  BP: ()/(58-78) 93/64  Resp:  [17-23] 17  SpO2:  [90 %-95 %] 91 %    400/18/+10/55    Physical Exam  Gen: WDWN, nad  HEENT: eyes closed;   CVS: Regular  LUNGS: assisted breathing, clear b/l bs  ABD: soft, obese  EXT: no c/c  +edema  NEURO: awakens, not following commands  I/O even      Lab Results: I have reviewed the following results:   .     01/29/25  0405 01/29/25  0422 01/29/25  0427   WBC  --  10.34*  --    HGB  --  10.3*  --    HCT  --  32.0*  --    PLT  --  294  --    SODIUM  --   --  142   K  --   --  4.3   CL  --   --  103   CO2  --   --  34*   BUN  --   --  50*   CREATININE  --   --  0.78   GLUC  --   --  90   CAIONIZED 1.11*  --   --      Labs per my review reveal normal renal function; leukocytosis, anemia stable        Other Study Results Review: Other studies reviewed include: ECHO 1/28/25: EF normal, diastolic function normal

## 2025-01-30 ENCOUNTER — APPOINTMENT (INPATIENT)
Dept: RADIOLOGY | Facility: HOSPITAL | Age: 55
DRG: 870 | End: 2025-01-30
Payer: COMMERCIAL

## 2025-01-30 DIAGNOSIS — G40.019 PARTIAL IDIOPATHIC EPILEPSY WITH SEIZURES OF LOCALIZED ONSET, INTRACTABLE, WITHOUT STATUS EPILEPTICUS (HCC): ICD-10-CM

## 2025-01-30 LAB
ALBUMIN SERPL BCG-MCNC: 3.2 G/DL (ref 3.5–5)
ALP SERPL-CCNC: 29 U/L (ref 34–104)
ALT SERPL W P-5'-P-CCNC: 19 U/L (ref 7–52)
ANION GAP SERPL CALCULATED.3IONS-SCNC: 6 MMOL/L (ref 4–13)
ANISOCYTOSIS BLD QL SMEAR: PRESENT
AST SERPL W P-5'-P-CCNC: 33 U/L (ref 13–39)
BASO STIPL BLD QL SMEAR: PRESENT
BASOPHILS # BLD MANUAL: 0 THOUSAND/UL (ref 0–0.1)
BASOPHILS NFR MAR MANUAL: 0 % (ref 0–1)
BILIRUB SERPL-MCNC: 0.65 MG/DL (ref 0.2–1)
BUN SERPL-MCNC: 45 MG/DL (ref 5–25)
CALCIUM ALBUM COR SERPL-MCNC: 9.5 MG/DL (ref 8.3–10.1)
CALCIUM SERPL-MCNC: 8.9 MG/DL (ref 8.4–10.2)
CHLORIDE SERPL-SCNC: 104 MMOL/L (ref 96–108)
CO2 SERPL-SCNC: 32 MMOL/L (ref 21–32)
CREAT SERPL-MCNC: 1 MG/DL (ref 0.6–1.3)
EOSINOPHIL # BLD MANUAL: 0.22 THOUSAND/UL (ref 0–0.4)
EOSINOPHIL NFR BLD MANUAL: 2 % (ref 0–6)
ERYTHROCYTE [DISTWIDTH] IN BLOOD BY AUTOMATED COUNT: 15.4 % (ref 11.6–15.1)
GFR SERPL CREATININE-BSD FRML MDRD: 64 ML/MIN/1.73SQ M
GLUCOSE SERPL-MCNC: 105 MG/DL (ref 65–140)
GLUCOSE SERPL-MCNC: 108 MG/DL (ref 65–140)
GLUCOSE SERPL-MCNC: 146 MG/DL (ref 65–140)
GLUCOSE SERPL-MCNC: 88 MG/DL (ref 65–140)
HCT VFR BLD AUTO: 31 % (ref 34.8–46.1)
HGB BLD-MCNC: 10 G/DL (ref 11.5–15.4)
LG PLATELETS BLD QL SMEAR: PRESENT
LYMPHOCYTES # BLD AUTO: 1.51 THOUSAND/UL (ref 0.6–4.47)
LYMPHOCYTES # BLD AUTO: 14 % (ref 14–44)
MACROCYTES BLD QL AUTO: PRESENT
MAGNESIUM SERPL-MCNC: 2.4 MG/DL (ref 1.9–2.7)
MCH RBC QN AUTO: 32.8 PG (ref 26.8–34.3)
MCHC RBC AUTO-ENTMCNC: 32.3 G/DL (ref 31.4–37.4)
MCV RBC AUTO: 102 FL (ref 82–98)
METAMYELOCYTE ABSOLUTE CT: 0.11 THOUSAND/UL (ref 0–0.1)
METAMYELOCYTES NFR BLD MANUAL: 1 % (ref 0–1)
MONOCYTES # BLD AUTO: 0.65 THOUSAND/UL (ref 0–1.22)
MONOCYTES NFR BLD: 6 % (ref 4–12)
MYELOCYTE ABSOLUTE CT: 0.11 THOUSAND/UL (ref 0–0.1)
MYELOCYTES NFR BLD MANUAL: 1 % (ref 0–1)
NEUTROPHILS # BLD MANUAL: 8.18 THOUSAND/UL (ref 1.85–7.62)
NEUTS BAND NFR BLD MANUAL: 1 % (ref 0–8)
NEUTS SEG NFR BLD AUTO: 75 % (ref 43–75)
PLATELET # BLD AUTO: 272 THOUSANDS/UL (ref 149–390)
PLATELET BLD QL SMEAR: ADEQUATE
PMV BLD AUTO: 9.3 FL (ref 8.9–12.7)
POLYCHROMASIA BLD QL SMEAR: PRESENT
POTASSIUM SERPL-SCNC: 4.6 MMOL/L (ref 3.5–5.3)
PROT SERPL-MCNC: 6.7 G/DL (ref 6.4–8.4)
RBC # BLD AUTO: 3.05 MILLION/UL (ref 3.81–5.12)
RBC MORPH BLD: PRESENT
SODIUM SERPL-SCNC: 142 MMOL/L (ref 135–147)
TOXIC GRANULES BLD QL SMEAR: PRESENT
WBC # BLD AUTO: 10.76 THOUSAND/UL (ref 4.31–10.16)

## 2025-01-30 PROCEDURE — 80053 COMPREHEN METABOLIC PANEL: CPT

## 2025-01-30 PROCEDURE — 83735 ASSAY OF MAGNESIUM: CPT

## 2025-01-30 PROCEDURE — 94640 AIRWAY INHALATION TREATMENT: CPT

## 2025-01-30 PROCEDURE — 85027 COMPLETE CBC AUTOMATED: CPT

## 2025-01-30 PROCEDURE — 94003 VENT MGMT INPAT SUBQ DAY: CPT

## 2025-01-30 PROCEDURE — 94002 VENT MGMT INPAT INIT DAY: CPT

## 2025-01-30 PROCEDURE — 85007 BL SMEAR W/DIFF WBC COUNT: CPT

## 2025-01-30 PROCEDURE — 94669 MECHANICAL CHEST WALL OSCILL: CPT

## 2025-01-30 PROCEDURE — 94664 DEMO&/EVAL PT USE INHALER: CPT

## 2025-01-30 PROCEDURE — 99291 CRITICAL CARE FIRST HOUR: CPT | Performed by: INTERNAL MEDICINE

## 2025-01-30 PROCEDURE — 71045 X-RAY EXAM CHEST 1 VIEW: CPT

## 2025-01-30 PROCEDURE — 82948 REAGENT STRIP/BLOOD GLUCOSE: CPT

## 2025-01-30 PROCEDURE — 94668 MNPJ CHEST WALL SBSQ: CPT

## 2025-01-30 PROCEDURE — 94760 N-INVAS EAR/PLS OXIMETRY 1: CPT

## 2025-01-30 RX ORDER — ALBUMIN (HUMAN) 12.5 G/50ML
25 SOLUTION INTRAVENOUS ONCE
Status: COMPLETED | OUTPATIENT
Start: 2025-01-30 | End: 2025-01-30

## 2025-01-30 RX ORDER — SODIUM CHLORIDE FOR INHALATION 3 %
4 VIAL, NEBULIZER (ML) INHALATION
Status: DISCONTINUED | OUTPATIENT
Start: 2025-01-30 | End: 2025-01-30

## 2025-01-30 RX ORDER — ACETYLCYSTEINE 200 MG/ML
3 SOLUTION ORAL; RESPIRATORY (INHALATION)
Status: DISCONTINUED | OUTPATIENT
Start: 2025-01-30 | End: 2025-01-31

## 2025-01-30 RX ORDER — LEVALBUTEROL INHALATION SOLUTION 1.25 MG/3ML
1.25 SOLUTION RESPIRATORY (INHALATION) EVERY 6 HOURS PRN
Status: DISCONTINUED | OUTPATIENT
Start: 2025-01-30 | End: 2025-02-06

## 2025-01-30 RX ORDER — LACOSAMIDE 200 MG/1
200 TABLET ORAL
Qty: 30 TABLET | Refills: 2 | Status: ON HOLD | OUTPATIENT
Start: 2025-01-30

## 2025-01-30 RX ADMIN — VALPROIC ACID 750 MG: 500 SOLUTION ORAL at 08:42

## 2025-01-30 RX ADMIN — LACOSAMIDE ORAL SOLUTION 150 MG: 10 SOLUTION ORAL at 08:42

## 2025-01-30 RX ADMIN — LEVALBUTEROL HYDROCHLORIDE 1.25 MG: 1.25 SOLUTION RESPIRATORY (INHALATION) at 13:15

## 2025-01-30 RX ADMIN — ACETYLCYSTEINE 600 MG: 200 SOLUTION ORAL; RESPIRATORY (INHALATION) at 23:55

## 2025-01-30 RX ADMIN — LEVALBUTEROL HYDROCHLORIDE 1.25 MG: 1.25 SOLUTION RESPIRATORY (INHALATION) at 07:09

## 2025-01-30 RX ADMIN — IPRATROPIUM BROMIDE 0.5 MG: 0.5 SOLUTION RESPIRATORY (INHALATION) at 13:15

## 2025-01-30 RX ADMIN — SENNOSIDES AND DOCUSATE SODIUM 2 TABLET: 8.6; 5 TABLET ORAL at 08:42

## 2025-01-30 RX ADMIN — LEVALBUTEROL HYDROCHLORIDE 1.25 MG: 1.25 SOLUTION RESPIRATORY (INHALATION) at 20:30

## 2025-01-30 RX ADMIN — ENOXAPARIN SODIUM 40 MG: 40 INJECTION SUBCUTANEOUS at 08:42

## 2025-01-30 RX ADMIN — LEVOTHYROXINE SODIUM 175 MCG: 0.05 TABLET ORAL at 05:35

## 2025-01-30 RX ADMIN — LEVALBUTEROL HYDROCHLORIDE 1.25 MG: 1.25 SOLUTION RESPIRATORY (INHALATION) at 17:08

## 2025-01-30 RX ADMIN — CHLORHEXIDINE GLUCONATE 15 ML: 1.2 RINSE ORAL at 21:48

## 2025-01-30 RX ADMIN — IPRATROPIUM BROMIDE 0.5 MG: 0.5 SOLUTION RESPIRATORY (INHALATION) at 20:30

## 2025-01-30 RX ADMIN — ACETYLCYSTEINE 600 MG: 200 SOLUTION ORAL; RESPIRATORY (INHALATION) at 17:08

## 2025-01-30 RX ADMIN — BENZTROPINE MESYLATE 2 MG: 1 TABLET ORAL at 08:59

## 2025-01-30 RX ADMIN — CHLORHEXIDINE GLUCONATE 15 ML: 1.2 RINSE ORAL at 08:42

## 2025-01-30 RX ADMIN — LEVALBUTEROL HYDROCHLORIDE 1.25 MG: 1.25 SOLUTION RESPIRATORY (INHALATION) at 23:55

## 2025-01-30 RX ADMIN — IPRATROPIUM BROMIDE 0.5 MG: 0.5 SOLUTION RESPIRATORY (INHALATION) at 07:09

## 2025-01-30 RX ADMIN — ALBUMIN (HUMAN) 25 G: 0.25 INJECTION, SOLUTION INTRAVENOUS at 07:20

## 2025-01-30 RX ADMIN — Medication 100 MCG/HR: at 02:17

## 2025-01-30 RX ADMIN — DEXMEDETOMIDINE HYDROCHLORIDE 0.4 MCG/KG/HR: 4 INJECTION, SOLUTION INTRAVENOUS at 12:01

## 2025-01-30 RX ADMIN — CYANOCOBALAMIN TAB 500 MCG 1000 MCG: 500 TAB at 08:42

## 2025-01-30 RX ADMIN — POLYETHYLENE GLYCOL 3350 17 G: 17 POWDER, FOR SOLUTION ORAL at 07:20

## 2025-01-30 NOTE — PROGRESS NOTES
Progress Note - Critical Care/ICU   Name: Esperanza Prajapati 54 y.o. female I MRN: [id number]  Unit/Bed#: ICU 15 I Date of Admission: 1/19/2025   Date of Service: 1/30/2025 I Hospital Day: 10      Assessment & Plan  Acute respiratory failure with hypoxia (HCC)  Due to Flu A, viral sepsis - consider multifocal aspiration PNA  Cont vent support, wean FiO2 as able  TG noted to be high, need to d/c Propofol. Cont. Fentanyl drip  Was started on precedex. Adjust rates, to maintain a RAAS of -1   Focal epilepsy (HCC)  Follows with [facility] neurology   Focal epilepsy manifests as simple partial, complex partial, and generalized tonic-clonic seizures   Hx of b/l postural and action tremor per neurology notes  Cont. O/P meds: vimpat 150 daily, vimpat 200mg qHS, Depakote 750mg BID  Viral sepsis (HCC)  Due to Flu A + on 1/19   SIRS on admission with tachycardia, tachypnea, leukopenia, fever   Completed Oseltamivir  Cognitive developmental delay  Due to head injury at age of 10 months    Postoperative hypothyroidism  Stage I papillary thyroid cancer s/p total thyroidectomy and ablation in 2013  Post-operative hypothyroidism   Levothyroxine via OG tube  Schizoaffective disorder (HCC)  Home regimen: zyprexa 10mg daily, trazodone 50mg PRN, invega 6mg daily   Zyprexa transitioned to IM due to NPO, trazodone and paliperidone on hold   Toxic metabolic encephalopathy  Due to viral sepsis, hypoxia, and infection  Hx of developmental delay although at baseline she is A&O x 3 but does not always follow commands    Fall at home, initial encounter  Presented with fall, + head strike   CTH negative   Bed alarm/fall precautions  Hypernatremia  Recent Labs     01/28/25  0508 01/29/25  0427 01/30/25  0441   SODIUM 145 142 142   Improved.  Lower free water flushes  Hyperglycemia  Non diabetic patient  Last A1c of 5.5  Lab Results   Component Value Date    GLUC 108 01/30/2025    GLUC 90 01/29/2025    GLUC 129 01/28/2025   Has not needed  insulin, d/c insulin and sliding scale  Influenza A  Positive on 1/19  Complicated by viral sepsis, see plan above  Tamiflu completed    Pneumonia of both lungs due to infectious organism  - may have superimposed pneumonia vs bacterial pneumonia  Continue adebayo. Nebs   Patient completed 5 days course of Tamiflu, 3 days course of azithromycin  Adjust sedation as above.    TF Vital high protein not available in hospital, ask nutrition for a substitute TF.   Give Miralax today.  Goal I=O, diurese PRN.    Discussed on rounds.  Critically ill, at risk of death and decompensation. Guarded prognosis.    Critical Care Time Statement: Upon my evaluation, this patient had a high probability of imminent or life-threatening deterioration due to respiratory failure, pneumonia, encephalopathy, which required my direct attention, intervention, and personal management.  I spent a total of 35 minutes directly providing critical care services, including interpretation of complex medical databases, evaluating for the presence of life-threatening injuries or illnesses, management of organ system failure(s) , complex medical decision making (to support/prevent further life-threatening deterioration)., interpretation of hemodynamic data, titration of continuous IV medications (drips), ventilator management, and managing enteral or parenteral nutritional support. This time is exclusive of procedures, teaching, treating other patients, family meetings, and any prior time recorded by providers other than myself.     Disposition: Critical care    ICU Core Measures     Vented Patient  VAP Bundle  VAP bundle ordered     A: Assess, Prevent, and Manage Pain Has pain been assessed? Yes  Need for changes to pain regimen? No   B: Both Spontaneous Awakening Trials (SATs) and Spontaneous Breathing Trials (SBTs) Plan to perform spontaneous awakening trial today? Yes   Plan to perform spontaneous breathing trial today? Yes   Obvious barriers to  extubation? No   C: Choice of Sedation RASS Goal: -1 Drowsy or 0 Alert and Calm  Need for changes to sedation or analgesia regimen? Yes   D: Delirium CAM-ICU: Negative   E: Early Mobility  Plan for early mobility? Yes   F: Family Engagement Plan for family engagement today? Yes       Review of Invasive Devices:    Harris Plan: Continue for accurate I/O monitoring for 48 hours        Prophylaxis:  VTE VTE covered by:  enoxaparin, Subcutaneous, 40 mg at 01/30/25 0842       Stress Ulcer  not ordered         24 Hour Events : No acute overnight event noted. Patient was agitated, for which precedex was initiated briefly.  Subjective       Objective :                   Vitals I/O      Most Recent Min/Max in 24hrs   Temp 99.4 °F (37.4 °C) Temp  Min: 99.2 °F (37.3 °C)  Max: 100.5 °F (38.1 °C)   Pulse 98 Pulse  Min: 90  Max: 122   Resp 19 Resp  Min: 17  Max: 22   BP (!) 84/58 BP  Min: 81/58  Max: 135/77   O2 Sat 93 % SpO2  Min: 92 %  Max: 98 %      Intake/Output Summary (Last 24 hours) at 1/30/2025 0916  Last data filed at 1/30/2025 0911  Gross per 24 hour   Intake 1015.72 ml   Output 1355 ml   Net -339.28 ml       Diet Enteral/Parenteral; Tube Feeding No Oral Diet; Jevity 1.2 Jaguar; Continuous; 50; Prosource Protein Liquid - One Packet; BID; 200; Water; Every 6 hours    Invasive Monitoring           Physical Exam   Physical Exam  Vitals and nursing note reviewed.   Eyes:      General: No dysconjugate gazeNo visual field deficit.     Extraocular Movements: Extraocular movements intact.      Pupils: Pupils are equal, round, and reactive to light.   Skin:     General: Skin is warm and dry.   HENT:      Head: Normocephalic and atraumatic.      Right Ear: Hearing normal.      Left Ear: Hearing normal.      Nose: No congestion or rhinorrhea.      Mouth/Throat:      Mouth: Mucous membranes are dry.      Pharynx: No oropharyngeal exudate.   Cardiovascular:      Rate and Rhythm: Regular rhythm. Tachycardia present.      Heart sounds: No  murmur heard.     No friction rub. No gallop.   Musculoskeletal:      Right lower le+ Edema present.      Left lower le+ Edema present.   Abdominal: General: Bowel sounds are normal.      Palpations: Abdomen is soft.      Tenderness: There is no abdominal tenderness.   Constitutional:       General: She is in acute distress.      Appearance: She is well-developed. She is ill-appearing.      Interventions: She is intubated and restrained.   Pulmonary:      Effort: Pulmonary effort is normal. No respiratory distress. She is intubated.      Breath sounds: Normal breath sounds. No stridor. No wheezing, rhonchi or rales.   Chest:      Chest wall: No tenderness.   Neurological:      General: No focal deficit present.      Mental Status: Mental status is at baseline. She is calm.      Sensory: Sensation is intact.      Motor: No motor deficit.   Genitourinary/Anorectal:  Harris present.        Diagnostic Studies        Lab Results: I have reviewed the following results:     Medications:  Scheduled PRN   benztropine, 2 mg, BID  chlorhexidine, 15 mL, Q12H MOSHE  cyanocobalamin, 1,000 mcg, Daily  enoxaparin, 40 mg, Daily  insulin lispro, 2-12 Units, Q6H  ipratropium, 0.5 mg, TID  lacosamide, 150 mg, Daily  lacosamide, 200 mg, HS  levalbuterol, 1.25 mg, TID  levothyroxine, 175 mcg, Early Morning  OLANZapine, 10 mg, HS  [Held by provider] paliperidone, 6 mg, QAM  senna-docusate sodium, 2 tablet, BID  valproic acid, 750 mg, Q12H MOSHE      Acetaminophen, 975 mg, Q6H PRN  aluminum-magnesium hydroxide-simethicone, 30 mL, Q6H PRN  fentaNYL, 50 mcg, Q1H PRN  ondansetron, 4 mg, Q6H PRN  polyethylene glycol, 17 g, Daily PRN       Continuous    dexmedetomidine, 0.1-0.7 mcg/kg/hr, Last Rate: Stopped (25 0518)  fentaNYL, 100 mcg/hr, Last Rate: Stopped (25 0710)         Labs:   CBC    Recent Labs     25  0422 25  0403   WBC 10.34* 10.76*   HGB 10.3* 10.0*   HCT 32.0* 31.0*    272   BANDSPCT  --  1      BMP    Recent Labs     01/29/25  0427 01/30/25  0441   SODIUM 142 142   K 4.3 4.6    104   CO2 34* 32   AGAP 5 6   BUN 50* 45*   CREATININE 0.78 1.00   CALCIUM 8.7 8.9       Coags    No recent results     Additional Electrolytes  Recent Labs     01/29/25  0405 01/30/25  0441   MG  --  2.4   CAIONIZED 1.11*  --           Blood Gas    No recent results  No recent results LFTs  Recent Labs     01/30/25  0441   ALT 19   AST 33   ALKPHOS 29*   ALB 3.2*   TBILI 0.65       Infectious  No recent results  Glucose  Recent Labs     01/29/25  0427 01/30/25  0441   GLUC 90 108

## 2025-01-30 NOTE — TELEPHONE ENCOUNTER
01/15/2025 09/30/2024 Lacosamide (Tablet) 30.0 30 150 MG NA [facility] Medicare 4 / 5 PA   1 82225 01/11/2025 09/30/2024 Lacosamide (Tablet) 30.0 30 200 MG NA [facility]

## 2025-01-31 ENCOUNTER — APPOINTMENT (INPATIENT)
Dept: RADIOLOGY | Facility: HOSPITAL | Age: 55
DRG: 870 | End: 2025-01-31
Payer: COMMERCIAL

## 2025-01-31 LAB
ANION GAP SERPL CALCULATED.3IONS-SCNC: 7 MMOL/L (ref 4–13)
BASOPHILS # BLD AUTO: 0.05 THOUSANDS/ΜL (ref 0–0.1)
BASOPHILS NFR BLD AUTO: 0 % (ref 0–1)
BUN SERPL-MCNC: 31 MG/DL (ref 5–25)
CA-I BLD-SCNC: 1.15 MMOL/L (ref 1.12–1.32)
CALCIUM SERPL-MCNC: 8.2 MG/DL (ref 8.4–10.2)
CHLORIDE SERPL-SCNC: 110 MMOL/L (ref 96–108)
CO2 SERPL-SCNC: 24 MMOL/L (ref 21–32)
CREAT SERPL-MCNC: 0.74 MG/DL (ref 0.6–1.3)
EOSINOPHIL # BLD AUTO: 0.07 THOUSAND/ΜL (ref 0–0.61)
EOSINOPHIL NFR BLD AUTO: 0 % (ref 0–6)
ERYTHROCYTE [DISTWIDTH] IN BLOOD BY AUTOMATED COUNT: 14.7 % (ref 11.6–15.1)
GFR SERPL CREATININE-BSD FRML MDRD: 93 ML/MIN/1.73SQ M
GLUCOSE SERPL-MCNC: 118 MG/DL (ref 65–140)
GLUCOSE SERPL-MCNC: 118 MG/DL (ref 65–140)
GLUCOSE SERPL-MCNC: 93 MG/DL (ref 65–140)
GLUCOSE SERPL-MCNC: 95 MG/DL (ref 65–140)
HCT VFR BLD AUTO: 33.8 % (ref 34.8–46.1)
HGB BLD-MCNC: 11 G/DL (ref 11.5–15.4)
IMM GRANULOCYTES # BLD AUTO: 0.28 THOUSAND/UL (ref 0–0.2)
IMM GRANULOCYTES NFR BLD AUTO: 2 % (ref 0–2)
LYMPHOCYTES # BLD AUTO: 1.07 THOUSANDS/ΜL (ref 0.6–4.47)
LYMPHOCYTES NFR BLD AUTO: 6 % (ref 14–44)
MAGNESIUM SERPL-MCNC: 2.1 MG/DL (ref 1.9–2.7)
MCH RBC QN AUTO: 32.7 PG (ref 26.8–34.3)
MCHC RBC AUTO-ENTMCNC: 32.5 G/DL (ref 31.4–37.4)
MCV RBC AUTO: 101 FL (ref 82–98)
MONOCYTES # BLD AUTO: 0.94 THOUSAND/ΜL (ref 0.17–1.22)
MONOCYTES NFR BLD AUTO: 5 % (ref 4–12)
NEUTROPHILS # BLD AUTO: 16.73 THOUSANDS/ΜL (ref 1.85–7.62)
NEUTS SEG NFR BLD AUTO: 87 % (ref 43–75)
NRBC BLD AUTO-RTO: 0 /100 WBCS
PHOSPHATE SERPL-MCNC: 3.1 MG/DL (ref 2.7–4.5)
PLATELET # BLD AUTO: 293 THOUSANDS/UL (ref 149–390)
PMV BLD AUTO: 9.1 FL (ref 8.9–12.7)
POTASSIUM SERPL-SCNC: 4.2 MMOL/L (ref 3.5–5.3)
RBC # BLD AUTO: 3.36 MILLION/UL (ref 3.81–5.12)
SODIUM SERPL-SCNC: 141 MMOL/L (ref 135–147)
WBC # BLD AUTO: 19.14 THOUSAND/UL (ref 4.31–10.16)

## 2025-01-31 PROCEDURE — 83735 ASSAY OF MAGNESIUM: CPT | Performed by: NURSE PRACTITIONER

## 2025-01-31 PROCEDURE — 94668 MNPJ CHEST WALL SBSQ: CPT

## 2025-01-31 PROCEDURE — 99291 CRITICAL CARE FIRST HOUR: CPT | Performed by: INTERNAL MEDICINE

## 2025-01-31 PROCEDURE — 93005 ELECTROCARDIOGRAM TRACING: CPT

## 2025-01-31 PROCEDURE — 94003 VENT MGMT INPAT SUBQ DAY: CPT

## 2025-01-31 PROCEDURE — 85025 COMPLETE CBC W/AUTO DIFF WBC: CPT | Performed by: NURSE PRACTITIONER

## 2025-01-31 PROCEDURE — 94664 DEMO&/EVAL PT USE INHALER: CPT

## 2025-01-31 PROCEDURE — 82330 ASSAY OF CALCIUM: CPT | Performed by: NURSE PRACTITIONER

## 2025-01-31 PROCEDURE — 80048 BASIC METABOLIC PNL TOTAL CA: CPT | Performed by: NURSE PRACTITIONER

## 2025-01-31 PROCEDURE — 94669 MECHANICAL CHEST WALL OSCILL: CPT

## 2025-01-31 PROCEDURE — 94640 AIRWAY INHALATION TREATMENT: CPT

## 2025-01-31 PROCEDURE — 94002 VENT MGMT INPAT INIT DAY: CPT

## 2025-01-31 PROCEDURE — 71045 X-RAY EXAM CHEST 1 VIEW: CPT

## 2025-01-31 PROCEDURE — 94760 N-INVAS EAR/PLS OXIMETRY 1: CPT

## 2025-01-31 PROCEDURE — 82948 REAGENT STRIP/BLOOD GLUCOSE: CPT

## 2025-01-31 PROCEDURE — 84100 ASSAY OF PHOSPHORUS: CPT | Performed by: NURSE PRACTITIONER

## 2025-01-31 PROCEDURE — C9254 INJECTION, LACOSAMIDE: HCPCS | Performed by: NURSE PRACTITIONER

## 2025-01-31 RX ORDER — SODIUM CHLORIDE FOR INHALATION 3 %
4 VIAL, NEBULIZER (ML) INHALATION
Status: COMPLETED | OUTPATIENT
Start: 2025-01-31 | End: 2025-02-02

## 2025-01-31 RX ORDER — GUAIFENESIN 600 MG/1
600 TABLET, EXTENDED RELEASE ORAL EVERY 12 HOURS SCHEDULED
Status: DISCONTINUED | OUTPATIENT
Start: 2025-01-31 | End: 2025-01-31

## 2025-01-31 RX ORDER — ACETYLCYSTEINE 200 MG/ML
3 SOLUTION ORAL; RESPIRATORY (INHALATION)
Status: DISCONTINUED | OUTPATIENT
Start: 2025-01-31 | End: 2025-02-01

## 2025-01-31 RX ORDER — OLANZAPINE 10 MG/2ML
10 INJECTION, POWDER, FOR SOLUTION INTRAMUSCULAR
Status: DISCONTINUED | OUTPATIENT
Start: 2025-01-31 | End: 2025-02-02

## 2025-01-31 RX ORDER — LACOSAMIDE 10 MG/ML
200 INJECTION, SOLUTION INTRAVENOUS
Status: DISCONTINUED | OUTPATIENT
Start: 2025-01-31 | End: 2025-02-04

## 2025-01-31 RX ORDER — IPRATROPIUM BROMIDE AND ALBUTEROL SULFATE 2.5; .5 MG/3ML; MG/3ML
3 SOLUTION RESPIRATORY (INHALATION)
Status: DISCONTINUED | OUTPATIENT
Start: 2025-01-31 | End: 2025-02-06

## 2025-01-31 RX ORDER — LACOSAMIDE 10 MG/ML
150 INJECTION, SOLUTION INTRAVENOUS EVERY 24 HOURS
Status: DISCONTINUED | OUTPATIENT
Start: 2025-01-31 | End: 2025-02-04

## 2025-01-31 RX ORDER — SODIUM CHLORIDE FOR INHALATION 3 %
4 VIAL, NEBULIZER (ML) INHALATION EVERY 4 HOURS
Status: DISCONTINUED | OUTPATIENT
Start: 2025-01-31 | End: 2025-01-31

## 2025-01-31 RX ORDER — BENZTROPINE MESYLATE 1 MG/ML
2 INJECTION, SOLUTION INTRAMUSCULAR; INTRAVENOUS 2 TIMES DAILY
Status: DISCONTINUED | OUTPATIENT
Start: 2025-01-31 | End: 2025-02-04

## 2025-01-31 RX ADMIN — OLANZAPINE 10 MG: 10 INJECTION, POWDER, FOR SOLUTION INTRAMUSCULAR at 22:02

## 2025-01-31 RX ADMIN — LACOSAMIDE 200 MG: 10 INJECTION INTRAVENOUS at 21:56

## 2025-01-31 RX ADMIN — ACETYLCYSTEINE 600 MG: 200 SOLUTION ORAL; RESPIRATORY (INHALATION) at 07:48

## 2025-01-31 RX ADMIN — BENZTROPINE MESYLATE 2 MG: 1 INJECTION INTRAMUSCULAR; INTRAVENOUS at 17:15

## 2025-01-31 RX ADMIN — IPRATROPIUM BROMIDE AND ALBUTEROL SULFATE 3 ML: 2.5; .5 SOLUTION RESPIRATORY (INHALATION) at 13:29

## 2025-01-31 RX ADMIN — SODIUM CHLORIDE SOLN NEBU 3% 4 ML: 3 NEBU SOLN at 20:15

## 2025-01-31 RX ADMIN — CHLORHEXIDINE GLUCONATE 15 ML: 1.2 RINSE ORAL at 08:38

## 2025-01-31 RX ADMIN — LEVALBUTEROL HYDROCHLORIDE 1.25 MG: 1.25 SOLUTION RESPIRATORY (INHALATION) at 07:48

## 2025-01-31 RX ADMIN — SODIUM CHLORIDE 750 MG: 9 INJECTION, SOLUTION INTRAVENOUS at 15:09

## 2025-01-31 RX ADMIN — CHLORHEXIDINE GLUCONATE 15 ML: 1.2 RINSE ORAL at 20:16

## 2025-01-31 RX ADMIN — ACETYLCYSTEINE 600 MG: 200 SOLUTION ORAL; RESPIRATORY (INHALATION) at 13:29

## 2025-01-31 RX ADMIN — IPRATROPIUM BROMIDE AND ALBUTEROL SULFATE 3 ML: 2.5; .5 SOLUTION RESPIRATORY (INHALATION) at 20:15

## 2025-01-31 RX ADMIN — LACOSAMIDE 150 MG: 10 INJECTION INTRAVENOUS at 08:47

## 2025-01-31 RX ADMIN — SODIUM CHLORIDE 750 MG: 9 INJECTION, SOLUTION INTRAVENOUS at 04:31

## 2025-01-31 RX ADMIN — IPRATROPIUM BROMIDE 0.5 MG: 0.5 SOLUTION RESPIRATORY (INHALATION) at 07:48

## 2025-01-31 RX ADMIN — SODIUM CHLORIDE SOLN NEBU 3% 4 ML: 3 NEBU SOLN at 13:29

## 2025-01-31 RX ADMIN — ACETYLCYSTEINE 600 MG: 200 SOLUTION ORAL; RESPIRATORY (INHALATION) at 20:15

## 2025-01-31 RX ADMIN — BENZTROPINE MESYLATE 2 MG: 1 INJECTION INTRAMUSCULAR; INTRAVENOUS at 08:43

## 2025-01-31 RX ADMIN — ENOXAPARIN SODIUM 40 MG: 40 INJECTION SUBCUTANEOUS at 08:39

## 2025-01-31 NOTE — PLAN OF CARE
Problem: NEUROSENSORY - ADULT  Goal: Achieves stable or improved neurological status  Description: INTERVENTIONS  - Monitor and report changes in neurological status  - Monitor vital signs such as temperature, blood pressure, glucose, and any other labs ordered   - Initiate measures to prevent increased intracranial pressure  - Monitor for seizure activity and implement precautions if appropriate      Outcome: Progressing  Goal: Remains free of injury related to seizures activity  Description: INTERVENTIONS  - Maintain airway, patient safety  and administer oxygen as ordered  - Monitor patient for seizure activity, document and report duration and description of seizure to physician/advanced practitioner  - If seizure occurs,  ensure patient safety during seizure  - Reorient patient post seizure  - Seizure pads on all 4 side rails  - Instruct patient/family to notify RN of any seizure activity including if an aura is experienced  - Instruct patient/family to call for assistance with activity based on nursing assessment  - Administer anti-seizure medications if ordered    Outcome: Progressing  Goal: Achieves maximal functionality and self care  Description: INTERVENTIONS  - Monitor swallowing and airway patency with patient fatigue and changes in neurological status  - Encourage and assist patient to increase activity and self care.   - Encourage visually impaired, hearing impaired and aphasic patients to use assistive/communication devices  Outcome: Progressing     Problem: RESPIRATORY - ADULT  Goal: Achieves optimal ventilation and oxygenation  Description: INTERVENTIONS:  - Assess for changes in respiratory status  - Assess for changes in mentation and behavior  - Position to facilitate oxygenation and minimize respiratory effort  - Oxygen administered by appropriate delivery if ordered  - Initiate smoking cessation education as indicated  - Encourage broncho-pulmonary hygiene including cough, deep breathe,  Incentive Spirometry  - Assess the need for suctioning and aspirate as needed  - Assess and instruct to report SOB or any respiratory difficulty  - Respiratory Therapy support as indicated  Outcome: Progressing     Problem: METABOLIC, FLUID AND ELECTROLYTES - ADULT  Goal: Electrolytes maintained within normal limits  Description: INTERVENTIONS:  - Monitor labs and assess patient for signs and symptoms of electrolyte imbalances  - Administer electrolyte replacement as ordered  - Monitor response to electrolyte replacements, including repeat lab results as appropriate  - Instruct patient on fluid and nutrition as appropriate  Outcome: Progressing  Goal: Fluid balance maintained  Description: INTERVENTIONS:  - Monitor labs   - Monitor I/O and WT  - Instruct patient on fluid and nutrition as appropriate  - Assess for signs & symptoms of volume excess or deficit  Outcome: Progressing

## 2025-01-31 NOTE — PROGRESS NOTES
Progress Note - Critical Care/ICU   Name: Esperanza Prajapati 54 y.o. female I MRN: [id number]  Unit/Bed#: ICU 15 I Date of Admission: 1/19/2025   Date of Service: 1/31/2025 I Hospital Day: 11      Assessment & Plan  Acute respiratory failure with hypoxia (HCC)  Due to Flu A, viral sepsis - consider multifocal aspiration PNA  TG noted to be high, need to d/c Propofol. Cont. Fentanyl drip  Was started on precedex. Adjust rates, to maintain a RAAS of -1  Extubated yesterday, required hi-flow 40L to maintain saturations at 91-92%.   Patient having pain on coughing and difficulty expelling mucus; added mucinex.  Focal epilepsy (HCC)  Follows with [facility] neurology   Focal epilepsy manifests as simple partial, complex partial, and generalized tonic-clonic seizures   Hx of b/l postural and action tremor per neurology notes  Cont. O/P meds: vimpat 150 daily, vimpat 200mg qHS, Depakote 750mg BID  Viral sepsis (HCC)  Due to Flu A + on 1/19   SIRS on admission with tachycardia, tachypnea, leukopenia, fever   Completed Oseltamivir  Cognitive developmental delay  Due to head injury at age of 10 months    Postoperative hypothyroidism  Stage I papillary thyroid cancer s/p total thyroidectomy and ablation in 2013  Post-operative hypothyroidism   Levothyroxine via OG tube  Schizoaffective disorder (HCC)  Home regimen: zyprexa 10mg daily, trazodone 50mg PRN, invega 6mg daily   Invega held due to unable to crush for enteral tube  Toxic metabolic encephalopathy  Due to viral sepsis, hypoxia, and infection  Hx of developmental delay although at baseline she is A&O x 3 but does not always follow commands    Fall at home, initial encounter  Presented with fall, + head strike   CTH negative   Bed alarm/fall precautions  Hypernatremia  Recent Labs     01/29/25  0427 01/30/25  0441 01/31/25  0457   SODIUM 142 142 141   Improved.  Lower free water flushes  Hyperglycemia  Non diabetic patient  Last A1c of 5.5  Lab Results   Component  Value Date    GLUC 95 01/31/2025    GLUC 108 01/30/2025    GLUC 90 01/29/2025   Has not needed insulin, d/c insulin and sliding scale  Influenza A  Positive on 1/19  Complicated by viral sepsis, see plan above  Tamiflu completed    Pneumonia of both lungs due to infectious organism  - may have superimposed pneumonia vs bacterial pneumonia  Continue adebayo. Nebs   Patient completed 5 days course of Tamiflu, 3 days course of azithromycin  Adjust sedation as above.    TF Vital high protein not available in hospital, ask nutrition for a substitute TF.   Give Miralax today.  Goal I=O, diurese PRN.    Discussed on rounds.  Critically ill, at risk of death and decompensation. Guarded prognosis.    Critical Care Time Statement: Upon my evaluation, this patient had a high probability of imminent or life-threatening deterioration due to respiratory failure, pneumonia, encephalopathy, which required my direct attention, intervention, and personal management.  I spent a total of 35 minutes directly providing critical care services, including interpretation of complex medical databases, evaluating for the presence of life-threatening injuries or illnesses, management of organ system failure(s) , complex medical decision making (to support/prevent further life-threatening deterioration)., interpretation of hemodynamic data, titration of continuous IV medications (drips), ventilator management, and managing enteral or parenteral nutritional support. This time is exclusive of procedures, teaching, treating other patients, family meetings, and any prior time recorded by providers other than myself.     Disposition: Critical care    ICU Core Measures     A: Assess, Prevent, and Manage Pain Has pain been assessed? Yes  Need for changes to pain regimen? No   B: Both SAT/SAT  N/A   C: Choice of Sedation RASS Goal: +1 Restless  Need for changes to sedation or analgesia regimen? Yes   D: Delirium CAM-ICU: Negative   E: Early Mobility  Plan  for early mobility? Yes   F: Family Engagement Plan for family engagement today? Yes       Review of Invasive Devices:    Steven Plan: Continue for accurate I/O monitoring for 48 hours        Prophylaxis:  VTE VTE covered by:  enoxaparin, Subcutaneous, 40 mg at 01/31/25 0839       Stress Ulcer  not ordered         24 Hour Events : Patient requiring continued hi-flow overnight to maintain saturation.  Subjective       Objective :                   Vitals I/O      Most Recent Min/Max in 24hrs   Temp 99.1 °F (37.3 °C) Temp  Min: 98.3 °F (36.8 °C)  Max: 99.4 °F (37.4 °C)   Pulse (!) 124 Pulse  Min: 106  Max: 148   Resp (!) 28 Resp  Min: 18  Max: 51   /82 BP  Min: 96/83  Max: 158/63   O2 Sat 96 % SpO2  Min: 88 %  Max: 99 %      Intake/Output Summary (Last 24 hours) at 1/31/2025 0844  Last data filed at 1/31/2025 0601  Gross per 24 hour   Intake 183.71 ml   Output 2075 ml   Net -1891.29 ml       Diet Enteral/Parenteral; Tube Feeding No Oral Diet; Jevity 1.2 Jaguar; Continuous; 50; Prosource Protein Liquid - One Packet; BID; 200; Water; Every 6 hours    Invasive Monitoring           Physical Exam   Physical Exam  HENT:      Head: Normocephalic.   Cardiovascular:      Rate and Rhythm: Regular rhythm. Tachycardia present.      Pulses: Normal pulses.      Heart sounds: Normal heart sounds.   Musculoskeletal:      Right lower leg: No edema.      Left lower leg: No edema.   Abdominal:      Palpations: Abdomen is soft.      Tenderness: There is no abdominal tenderness. There is no guarding.   Constitutional:       General: She is in acute distress.      Appearance: She is well-developed.   Pulmonary:      Effort: Pulmonary effort is normal. No accessory muscle usage or accessory muscle usage.      Breath sounds: Rhonchi present.   Neurological:      Mental Status: She is oriented to person, place and time.          Diagnostic Studies        Lab Results: I have reviewed the following results:     Medications:  Scheduled PRN    acetylcysteine, 3 mL, Q8H  benztropine, 2 mg, BID  chlorhexidine, 15 mL, Q12H MOSHE  cyanocobalamin, 1,000 mcg, Daily  enoxaparin, 40 mg, Daily  guaiFENesin, 600 mg, Q12H MOSHE  insulin lispro, 2-12 Units, Q6H  ipratropium, 0.5 mg, TID  lacosamide, 150 mg, Q24H  lacosamide, 200 mg, HS  levalbuterol, 1.25 mg, TID  levothyroxine, 175 mcg, Early Morning  OLANZapine, 10 mg, HS  [Held by provider] paliperidone, 6 mg, QAM  senna-docusate sodium, 2 tablet, BID  valproate sodium, 750 mg, Q12H      Acetaminophen, 975 mg, Q6H PRN  aluminum-magnesium hydroxide-simethicone, 30 mL, Q6H PRN  levalbuterol, 1.25 mg, Q6H PRN  ondansetron, 4 mg, Q6H PRN  polyethylene glycol, 17 g, Daily PRN       Continuous    dexmedetomidine, 0.1-0.7 mcg/kg/hr, Last Rate: Stopped (01/30/25 1500)         Labs:   CBC    Recent Labs     01/30/25 0403 01/31/25  0457   WBC 10.76* 19.14*   HGB 10.0* 11.0*   HCT 31.0* 33.8*    293   BANDSPCT 1  --      BMP    Recent Labs     01/30/25 0441 01/31/25  0457   SODIUM 142 141   K 4.6 4.2    110*   CO2 32 24   AGAP 6 7   BUN 45* 31*   CREATININE 1.00 0.74   CALCIUM 8.9 8.2*       Coags    No recent results     Additional Electrolytes  Recent Labs     01/30/25 0441 01/31/25  0457   MG 2.4 2.1   PHOS  --  3.1   CAIONIZED  --  1.15          Blood Gas    No recent results  No recent results LFTs  Recent Labs     01/30/25 0441   ALT 19   AST 33   ALKPHOS 29*   ALB 3.2*   TBILI 0.65       Infectious  No recent results  Glucose  Recent Labs     01/30/25 0441 01/31/25  0457   GLUC 108 95

## 2025-01-31 NOTE — CASE MANAGEMENT
Case Management Discharge Planning Note    Patient name Esperanza Prajapati  Location ICU 15/ICU 15 MRN [id number]  : 1970 Date 2025       Current Admission Date: 2025  Current Admission Diagnosis:Acute respiratory failure with hypoxia (HCC)   Patient Active Problem List    Diagnosis Date Noted Date Diagnosed    Pneumonia of both lungs due to infectious organism 2025     Hypernatremia 2025     Hyperglycemia 2025     Viral sepsis (HCC) 2025     Toxic metabolic encephalopathy 2025     Fall at home, initial encounter 2025     Influenza A 2025     Acute respiratory failure with hypoxia (HCC) 2025     Decreased urination 10/23/2024     Dermatitis of face 2024     Impaired ability to manage medication regime 10/09/2023     Bilateral lower extremity edema 2023     h/o VALERIE (acute kidney injury) (HCC) 2023     Gout 2022     Schizoaffective disorder (HCC) 2022     Transient ischemic attack 2022     Vitamin B12 deficiency 2022     Dyslipidemia 2022     PCOS (polycystic ovarian syndrome) 2020     Obesity (BMI 30-39.9) 2017     Vitamin D deficiency 2016     Cognitive developmental delay 2014     Obstructive sleep apnea 2013     Focal epilepsy (HCC) 2013     History of thyroid cancer 2013     Proteinuria 2011     Postoperative hypothyroidism 06/10/2011     Family history of diabetes mellitus 06/10/2011     Family history of malignant neoplasm of breast 06/10/2011     Intellectual disability 06/10/2011       LOS (days): 11  Geometric Mean LOS (GMLOS) (days): 12.8  Days to GMLOS:1.2     OBJECTIVE:  Risk of Unplanned Readmission Score: 27.73         Current admission status: Inpatient   [facility] Pharmacy:   [facility] [place] - [address]  [address] [place] [address]  Phone: [phone] Fax: [phone]    Primary Care Provider:  Foster Biswas MD    Primary Insurance: [facility]  Secondary Insurance:     DISCHARGE DETAILS:    Discharge planning discussed with:: Late entry - family  Freedom of Choice: Yes  Comments - Freedom of Choice: Sister would like STR if possible, discussed the process for STR referrals after patient is evaluated by PT.  CM contacted family/caregiver?: Yes (at the bedside)  Were Treatment Team discharge recommendations reviewed with patient/caregiver?: No (no recommendations at the moment)        Contacts  Patient Contacts: Layne Palm (sister) [phone]  Relationship to Patient:: Family  Contact Method: Phone  Phone Number: Buddy aPlm (Brother) [phone] (M)  Reason/Outcome: Continuity of Care, Discharge Planning, Emergency Contact        Discharge Destination Plan:: Short Term Rehab          Additional Comments: Late entry ( CM net with the family at 5 pm on 1/30/2025) CM spoke with the patient's family regarding the discharge plans now the patient is extubated. Sister Layne expressed the wish for the patient to go to STR prior to being discharged home. DM reviewed the process for STR referrals and the need for PT consult for evaluation. Sister expressed concerns for the patient not taking her prescribed medications as indicated. Patient was assisted by her nephew with her medications and supervision but the nephew now works. CM discussed the possibility of having non skilled HHA services. CM to provide list to family. and place referral for OP CM.

## 2025-01-31 NOTE — UTILIZATION REVIEW
Continued Stay Review    Date: 01/31/25                          Current Patient Class: Inpatient  Current Level of Care: Critical Care    HPI:54 y.o. female initially admitted on 1/19 for acute respiratory failure w/ hypoxia.      Assessment/Plan: Pt extubated yesterday, requiring continue HFNC 40 L/min for SpO2 91-92%. Exam: tachycardic, rhonchi. WBC 19.14. Plan: remains in critical care; IM olanzapine HS, hypertonic saline, continue current meds, bowel regimen, if unable to take PO tomorrow will place NGT for nutrition; Trend labs, replete electrolytes as needed. Maintain patel. Guarded prognosis, remains at high risk for decompensation and death. Remains high risk for reintubation.     Medications:   Scheduled Medications:  acetylcysteine, 3 mL, Nebulization, TID  benztropine, 2 mg, Intramuscular, BID  chlorhexidine, 15 mL, Mouth/Throat, Q12H MOSHE  cyanocobalamin, 1,000 mcg, Oral, Daily  enoxaparin, 40 mg, Subcutaneous, Daily  ipratropium-albuterol, 3 mL, Nebulization, TID  lacosamide, 150 mg, Intravenous, Q24H  lacosamide, 200 mg, Intravenous, HS  levothyroxine, 175 mcg, Oral, Early Morning  OLANZapine, 10 mg, Intramuscular, HS  [Held by provider] paliperidone, 6 mg, Oral, QAM  senna-docusate sodium, 2 tablet, Oral, BID  sodium chloride, 4 mL, Nebulization, TID  valproate sodium, 750 mg, Intravenous, Q12H    Continuous IV Infusions:  dexmedeTOMIDine (Precedex) 400 mcg in sodium chloride 0.9% 100 mL  Rate: 2.8-19.4 mL/hr Dose: 0.1-0.7 mcg/kg/hr  Weight Dosing Info: 111 kg  Freq: Titrated Route: IV   Start: 01/29/25 2000 End: 01/31/25 1104    PRN Meds:  Acetaminophen, 975 mg, Oral, Q6H PRN  aluminum-magnesium hydroxide-simethicone, 30 mL, Oral, Q6H PRN  levalbuterol, 1.25 mg, Nebulization, Q6H PRN; 1/30 x2  ondansetron, 4 mg, Intravenous, Q6H PRN  polyethylene glycol, 17 g, Oral, Daily PRN      Discharge Plan: TBD    Vital Signs (last 3 days)       Date/Time Temp Pulse Resp BP MAP (mmHg) Arterial Line BP MAP SpO2  FiO2 (%) Calculated FIO2 (%) - Nasal Cannula O2 Flow Rate (L/min) Nasal Cannula O2 Flow Rate (L/min) O2 Device O2 Interface Device Patient Position - Orthostatic VS Luisana Coma Scale Score Pain    01/31/25 1329 -- -- -- -- -- -- -- 96 % -- -- -- -- -- HFNC prongs -- -- --    01/31/25 1300 -- 126 35 109/66 83 -- -- 97 % -- -- -- -- High flow nasal cannula -- -- -- --    01/31/25 1230 -- -- -- -- -- -- -- -- -- -- -- -- -- -- -- 14 --    01/31/25 1200 -- 132 47 121/71 92 -- -- 97 % -- -- -- -- Face tent -- -- -- --    01/31/25 1157 100.2 °F (37.9 °C) 130 37 -- -- -- -- 97 % -- -- -- -- Face tent -- -- -- --    01/31/25 1148 101.3 °F (38.5 °C) -- -- -- -- -- -- -- -- -- -- -- -- -- -- -- --    01/31/25 1100 -- 130 26 114/64 79 -- -- 97 % -- -- -- -- -- -- -- -- --    01/31/25 1043 -- -- -- -- -- -- -- 100 % 70 -- 15 L/min -- Face tent HFNC prongs -- -- --    01/31/25 1000 -- 124 33 118/74 80 -- -- 100 % -- -- -- -- -- -- -- -- --    01/31/25 0900 -- 140 34 132/79 96 -- -- 93 % -- -- -- -- -- -- -- -- --    01/31/25 0830 -- -- -- -- -- -- -- -- -- -- -- -- -- -- -- 14 No Pain    01/31/25 0800 99.1 °F (37.3 °C) 124 28 140/82 108 -- -- 96 % -- -- -- -- -- -- -- -- --    01/31/25 0748 -- -- -- -- -- -- -- 91 % -- -- -- -- -- HFNC prongs -- -- --    01/31/25 0600 -- 124 18 158/63 93 -- -- 95 % -- -- -- -- -- -- -- -- --    01/31/25 0530 -- 124 24 133/69 101 -- -- 92 % -- -- -- -- -- -- -- -- --    01/31/25 0400 99.4 °F (37.4 °C) -- -- -- -- -- -- -- -- -- -- -- -- -- -- 14 --    01/31/25 0323 -- 126 21 -- -- -- -- 97 % -- -- -- -- -- HFNC prongs -- -- --    01/31/25 0315 -- 148 33 102/73 87 -- -- 93 % -- -- -- -- -- -- -- -- --    01/31/25 0215 -- 122 25 117/74 105 -- -- 91 % -- -- -- -- -- -- -- -- --    01/31/25 0115 -- 124 22 118/68 88 -- -- 94 % -- -- -- -- -- -- -- -- --    01/31/25 0015 -- 126 27 141/73 111 -- -- 93 % -- -- -- -- -- -- -- -- --    01/31/25 0000 99 °F (37.2 °C) -- -- -- -- -- -- -- -- -- -- -- -- --  -- 14 --    01/30/25 2355 -- -- -- -- -- -- -- 94 % 60 -- 40 L/min -- High flow nasal cannula HFNC prongs -- -- --    01/30/25 2315 -- 118 37 158/69 94 -- -- 96 % -- -- -- -- -- -- -- -- --    01/30/25 2215 -- 116 32 150/83 109 -- -- 95 % -- -- -- -- -- -- -- -- --    01/30/25 2109 -- 120 30 139/89 109 -- -- 98 % 60 -- 40 L/min -- High flow nasal cannula HFNC prongs -- -- --    01/30/25 1955 -- -- -- -- -- -- -- -- -- -- -- -- -- -- -- 14 --    01/30/25 1915 -- 118 -- 138/85 101 -- -- 94 % -- -- -- -- -- -- -- -- --    01/30/25 1900 98.4 °F (36.9 °C) -- -- -- -- -- -- -- -- -- -- -- -- -- -- -- --    01/30/25 1802 -- 122 37 134/68 95 -- -- 89 % -- -- -- -- -- -- -- -- --    01/30/25 1745 -- -- -- -- -- -- -- -- -- -- -- -- High flow nasal cannula -- -- -- --    01/30/25 1708 -- -- -- -- -- -- -- 91 % -- -- -- -- -- -- -- -- --    01/30/25 1700 -- 108 25 133/78 110 -- -- 92 % -- -- -- -- -- -- -- -- --    01/30/25 1645 -- 106 51 137/78 98 -- -- 90 % -- -- -- -- -- -- -- -- --    01/30/25 1635 98.3 °F (36.8 °C) -- -- -- -- -- -- -- -- -- -- -- -- -- -- -- --    01/30/25 1600 -- -- -- -- -- -- -- -- -- -- -- -- -- -- -- 14 --    01/30/25 1530 -- -- -- -- -- -- -- 98 % -- -- -- -- -- HFNC prongs -- -- --    01/30/25 1445 -- 108 38 115/77 88 -- -- 99 % -- -- -- -- -- -- -- -- --    01/30/25 1355 -- 110 30 128/77 88 -- -- 94 % -- -- -- -- -- HFNC prongs -- -- --    01/30/25 1345 -- 112 20 128/77 88 -- -- 91 % -- -- -- -- -- -- -- -- --    01/30/25 1315 -- -- -- -- -- -- -- 93 % -- -- -- -- -- HFNC prongs -- -- --    01/30/25 1245 -- 108 21 117/73 86 -- -- 96 % -- -- -- -- -- -- -- -- --    01/30/25 1200 -- -- -- -- -- -- -- -- -- -- -- -- -- -- -- 10 --    01/30/25 1145 -- 120 30 119/62 84 -- -- 97 % -- -- -- -- -- -- -- -- --    01/30/25 1048 -- -- -- -- -- -- -- 91 % -- -- -- -- -- -- -- -- --    01/30/25 1045 -- 114 19 117/72 90 -- -- 88 % -- -- -- -- -- -- -- -- --    01/30/25 1000 -- 118 27 106/61 79 -- -- 94 % -- --  -- -- -- -- -- -- --    01/30/25 0915 -- -- -- -- -- -- -- 93 % -- -- -- -- -- -- -- -- --    01/30/25 0900 -- 114 23 105/60 83 -- -- 94 % -- -- -- -- -- -- -- -- --    01/30/25 0845 99.3 °F (37.4 °C) 112 24 96/83 89 -- -- 95 % -- -- -- -- -- -- -- -- --    01/30/25 0800 -- 112 22 101/66 75 -- -- 94 % -- -- -- -- -- -- -- 10 --    01/30/25 0730 -- 98 19 84/58 65 -- -- 95 % -- -- -- -- -- -- -- -- --    01/30/25 0709 -- -- -- -- -- -- -- 94 % -- -- -- -- -- -- -- -- --    01/30/25 0624 -- 92 18 91/64 71 -- -- 95 % -- -- -- -- -- -- -- -- --    01/30/25 0608 -- 92 19 81/58 68 -- -- -- -- -- -- -- -- -- -- -- --    01/30/25 0500 -- 90 18 89/59 69 -- -- 94 % -- -- -- -- -- -- -- -- --    01/30/25 0415 99.4 °F (37.4 °C) 94 18 93/55 67 -- -- 95 % -- -- -- -- -- -- -- -- --    01/30/25 0400 -- 94 19 91/56 67 -- -- 94 % -- -- -- -- -- -- -- 10 --    01/30/25 0315 -- -- -- 132/77 96 -- -- 97 % -- -- -- -- -- -- -- -- --    01/30/25 0230 -- 94 19 92/53 67 -- -- 94 % -- -- -- -- -- -- -- -- --    01/30/25 0100 99.3 °F (37.4 °C) -- -- -- -- -- -- -- -- -- -- -- -- -- -- -- --    01/30/25 0045 -- 94 19 99/54 69 -- -- 95 % -- -- -- -- -- -- -- -- --    01/30/25 0000 -- -- -- -- -- -- -- -- -- -- -- -- -- -- -- 10 --    01/29/25 2340 -- 94 18 91/60 68 -- -- 92 % -- -- -- -- -- -- Lying -- --    01/29/25 2245 -- 94 19 92/58 66 -- -- 92 % -- -- -- -- -- -- -- -- --    01/29/25 2215 -- 96 19 94/59 69 -- -- 92 % -- -- -- -- -- -- -- -- --    01/29/25 2000 -- -- -- -- -- -- -- 98 % -- -- -- -- -- -- -- -- --    01/29/25 1937 -- -- -- -- -- -- -- -- -- -- -- -- -- -- -- 10 --    01/29/25 1913 -- 114 19 99/55 70 -- -- 94 % -- -- -- -- -- -- Lying -- --    01/29/25 1902 99.2 °F (37.3 °C) -- -- -- -- -- -- -- -- -- -- -- -- -- -- -- Med Not Given for Pain - for MAR use only    01/29/25 1812 -- 112 19 97/58 66 -- -- 93 % -- -- -- -- -- -- -- -- --    01/29/25 1811 -- -- -- -- -- -- -- -- -- -- -- -- -- -- -- -- Med Not Given for Pain - for  MAR use only    01/29/25 1745 -- 120 19 119/65 79 -- -- 93 % -- -- -- -- -- -- -- -- --    01/29/25 1645 -- 102 20 86/58 68 -- -- 92 % -- -- -- -- -- -- -- -- --    01/29/25 1613 -- -- -- -- -- -- -- -- -- -- -- -- -- -- -- -- Med Not Given for Pain - for MAR use only    01/29/25 1600 -- -- -- -- -- -- -- -- -- -- -- -- -- -- -- 10 --    01/29/25 1536 100.5 °F (38.1 °C) -- -- -- -- -- -- -- -- -- -- -- -- -- -- -- --    01/29/25 1445 -- 122 19 135/77 96 -- -- 92 % -- -- -- -- -- -- -- -- --    01/29/25 1345 -- 104 22 101/76 86 -- -- 92 % -- -- -- -- -- -- -- -- --    01/29/25 1333 -- -- -- -- -- -- -- 94 % -- -- -- -- -- -- -- -- --    01/29/25 1245 -- 96 17 102/66 79 -- -- 93 % -- -- -- -- -- -- -- -- --    01/29/25 1211 99.4 °F (37.4 °C) -- -- -- -- -- -- -- -- -- -- -- -- -- -- -- --    01/29/25 1200 -- -- -- -- -- -- -- -- -- -- -- -- -- -- -- 10 --    01/29/25 1145 -- 90 18 98/62 74 -- -- 92 % -- -- -- -- -- -- -- -- --    01/29/25 1045 -- 90 17 101/64 75 -- -- 92 % -- -- -- -- -- -- -- -- --    01/29/25 0945 -- 98 17 114/74 95 -- -- 92 % -- -- -- -- -- -- -- -- --    01/29/25 0845 -- 92 18 97/65 77 -- -- 91 % -- -- -- -- -- -- -- -- --    01/29/25 0800 -- -- -- -- -- -- -- -- -- -- -- -- -- -- -- 10 --    01/29/25 0745 -- 92 18 99/67 78 -- -- 91 % -- -- -- -- -- -- -- -- --    01/29/25 0721 98.6 °F (37 °C) -- -- -- -- -- -- -- -- -- -- -- -- -- -- -- --    01/29/25 0714 -- -- -- -- -- -- -- 91 % -- -- -- -- -- -- -- -- --    01/29/25 0645 -- 80 17 93/64 73 -- -- 91 % -- -- -- -- -- -- -- -- --    01/29/25 0430 -- 82 18 89/63 71 -- -- 93 % -- -- -- -- -- -- -- -- --    01/29/25 0400 -- 84 18 -- -- 100/68 80 mmHg -- -- -- -- -- -- -- -- 10 --    01/29/25 0336 -- -- -- -- -- -- -- 92 % -- -- -- -- -- -- -- -- --    01/29/25 0315 -- 84 18 105/78 87 -- -- 94 % -- -- -- -- -- -- -- -- --    01/29/25 0245 -- 84 18 91/69 77 82/74 78 mmHg 94 % -- -- -- -- -- -- -- -- --    01/29/25 0130 -- 80 17 -- -- 80/56 66 mmHg 93  % -- -- -- -- -- -- Lying -- --    01/29/25 0122 98.5 °F (36.9 °C) -- -- -- -- -- -- -- -- -- -- -- -- -- -- -- --    01/29/25 0000 -- 82 17 90/58 74 -- -- 92 % -- -- -- -- -- -- -- 10 --    01/28/25 2345 -- 82 18 87/63 71 -- -- 92 % -- -- -- -- -- -- -- -- --    01/28/25 2307 -- -- -- -- -- -- -- 91 % -- -- -- -- -- -- -- -- --    01/28/25 2200 -- 88 18 94/65 75 -- -- 93 % -- -- -- -- -- -- -- -- --    01/28/25 2100 -- 88 18 106/66 77 -- -- 92 % -- -- -- -- -- -- -- -- --    01/28/25 2015 -- 84 18 -- -- 100/58 74 mmHg 92 % -- -- -- -- -- -- -- -- --    01/28/25 2008 -- -- -- -- -- -- -- 90 % -- -- -- -- -- -- -- -- --    01/28/25 2006 98.5 °F (36.9 °C) -- -- -- -- -- -- -- -- -- -- -- -- -- -- -- --    01/28/25 2000 -- -- -- -- -- -- -- -- -- -- -- -- -- -- -- 10 --    01/28/25 1952 -- -- -- -- -- -- -- 91 % -- -- -- -- -- -- -- -- --    01/28/25 1949 -- -- -- -- -- -- -- 91 % -- -- -- -- -- -- -- -- --    01/28/25 1900 -- 74 18 -- -- 90/56 70 mmHg 91 % -- -- -- -- -- -- -- -- --    01/28/25 1800 -- 74 18 -- -- 92/56 70 mmHg 91 % -- -- -- -- -- -- -- -- --    01/28/25 1700 -- 74 18 -- -- 96/58 72 mmHg 91 % -- -- -- -- -- -- -- -- --    01/28/25 1600 -- 74 18 -- -- 90/54 68 mmHg 91 % -- -- -- -- -- -- -- 10 --    01/28/25 1522 -- -- -- -- -- -- -- -- -- -- -- -- -- -- -- -- Med Not Given for Pain - for MAR use only    01/28/25 1500 -- 92 23 112/64 86 118/70 90 mmHg 94 % -- -- -- -- -- -- -- -- --    01/28/25 1400 -- 78 18 -- -- 122/74 92 mmHg 95 % -- -- -- -- -- -- -- -- --    01/28/25 1357 -- -- -- -- -- -- -- 90 % -- -- -- -- -- -- -- -- --    01/28/25 1300 -- 66 18 -- -- 94/56 72 mmHg 90 % -- -- -- -- -- -- -- -- --    01/28/25 1200 -- 66 18 -- -- 96/58 74 mmHg 92 % -- -- -- -- -- -- -- 9 --    01/28/25 1100 -- 68 18 -- -- 102/60 76 mmHg 92 % -- -- -- -- -- -- -- -- --    01/28/25 1000 -- 72 18 -- -- 100/60 76 mmHg 91 % -- -- -- -- -- -- -- -- --    01/28/25 0900 -- 82 18 -- -- 100/60 76 mmHg 92 % -- -- -- -- --  -- -- -- --    01/28/25 0830 -- -- -- -- -- -- -- -- -- -- -- -- -- -- -- -- Med Not Given for Pain - for MAR use only    01/28/25 0813 -- -- -- -- -- -- -- 93 % -- -- -- -- -- -- -- -- --    01/28/25 0809 -- -- -- -- -- -- -- 91 % -- 60 -- 10 L/min Mid flow nasal cannula -- -- -- --    01/28/25 0800 -- 76 18 -- -- 114/72 88 mmHg 93 % -- -- -- -- -- -- -- 9 --    01/28/25 0733 -- 77 -- 114/72 -- -- -- -- -- -- -- -- -- -- -- -- --    01/28/25 0700 -- 76 19 -- -- 100/62 78 mmHg 92 % -- -- -- -- -- -- -- -- --    01/28/25 0600 -- 77 19 117/74 -- 106/68 82 mmHg 92 % -- -- -- -- -- -- -- -- --    01/28/25 0500 -- 84 18 -- -- 106/68 82 mmHg 93 % -- -- -- -- -- -- -- -- --    01/28/25 0425 -- -- -- -- -- -- -- -- -- -- -- -- -- -- -- 9 --    01/28/25 0400 -- 78 19 -- -- 108/64 80 mmHg 96 % -- -- -- -- -- -- -- -- --    01/28/25 0346 -- -- -- -- -- -- -- 95 % -- -- -- -- -- -- -- -- --    01/28/25 0340 -- -- -- -- -- -- -- 96 % -- -- -- -- -- -- -- -- --    01/28/25 0300 -- 76 19 -- -- 102/56 72 mmHg 92 % -- -- -- -- -- -- -- -- --    01/28/25 0233 99.4 °F (37.4 °C) 74 18 -- -- 102/58 72 mmHg 91 % -- -- -- -- -- -- -- -- --    01/28/25 0200 -- 76 19 -- -- 98/56 70 mmHg 90 % -- -- -- -- -- -- -- -- --    01/28/25 0100 -- 80 20 -- -- 116/64 82 mmHg 93 % -- -- -- -- -- -- -- -- --    01/28/25 0015 -- -- -- -- -- -- -- 94 % -- -- -- -- -- -- -- -- --    01/28/25 0014 -- -- -- -- -- -- -- -- -- -- -- -- -- -- -- 9 --    01/28/25 0000 98.5 °F (36.9 °C) 74 18 -- -- 98/54 70 mmHg 94 % -- -- -- -- -- -- -- -- --          Weight (last 2 days)       None            Pertinent Labs/Diagnostic Results:   Radiology:  XR chest portable ICU   Final Interpretation by Hipolito Payne MD (01/31 1129)      Unchanged bibasilar opacities, atelectasis versus pneumonia.            Workstation performed: [id number]         XR chest portable ICU   Final Interpretation by Semaj Meng MD (01/30 2175)      Small left pleural effusion. Bibasilar  linear opacities likely represent atelectasis. Pneumonia is to be determined clinical grounds.            Workstation performed: [id number]         XR chest portable ICU   Final Interpretation by Lexus Jack MD (01/27 1036)      The left base is obscured due to combination of small effusion/atelectasis/consolidation      No worsening seen   No new consolidation   Tubes and lines in appropriate position      Workstation performed: [id number]         XR chest portable ICU   Final Interpretation by Kaycee Manriquez MD (01/24 1230)      Moderate pulmonary edema with moderate pleural effusions and bibasilar atelectasis. Pneumonia not excluded in the appropriate clinical setting.            Workstation performed: [id number]         XR chest portable ICU   Final Interpretation by Edward Bruner MD (01/23 0832)      Stable congestion with bilateral effusions and bibasilar airspace opacity.            Workstation performed: [id number]         XR chest portable   Final Interpretation by Pavan Marroquin MD (01/22 0803)      Lines and tubes appear satisfactory.   Pulmonary edema pattern with pleural effusions. Low lung volumes. Pneumonia not entirely excluded in the appropriate clinical situation, particularly in the left lower lobe.            Workstation performed: [id number]         XR chest portable ICU   Final Interpretation by Kaycee Manriquez MD (01/21 8153)      Right jugular catheter in right brachiocephalic vein with no pneumothorax.      Persistent pulmonary edema.      Dense consolidation in the left lower lobe which could be due to pneumonia in the appropriate clinical setting.            Workstation performed: [id number]         XR chest portable ICU   Final Interpretation by Kaycee Manriquez MD (01/21 4298)      ET tube 4 cm above the dennis.      Worsening pulmonary edema      Dense left lower lobe consolidation with air bronchograms which could be due to pneumonia in the  appropriate clinical setting.            Workstation performed: [id number]         XR chest portable   Final Interpretation by Kaycee Manriquez MD (01/21 4168)      Low lung volumes with bibasilar opacity with loss of the medial left diaphragm which could be due to atelectasis. Pneumonia not excluded in the appropriate clinical setting.            Workstation performed: [id number]         CT head without contrast   Final Interpretation by Jsoe Kingston MD (01/20 0117)      No acute intracranial abnormality.                  Workstation performed: [id number]         CT chest abdomen pelvis w contrast   Final Interpretation by Jose Kingston MD (01/20 0205)      No acute findings in the chest, abdomen or pelvis, noting images degraded by motion artifact.               Workstation performed: [id number]         CT cervical spine without contrast   Final Interpretation by Jose Kingston MD (01/20 0138)      1.  No cervical spine fracture or traumatic malalignment, noting images are degraded by motion artifact.   2.  Mildly prominent bilateral level 2 lymph nodes, nonspecific.                  Workstation performed: [id number]           Cardiology:  Echo complete w/ contrast if indicated   Final Result by Rito Serrato DO (01/28 4229)        Left Ventricle: Left ventricular cavity size is normal. There is mild    concentric hypertrophy. The left ventricular ejection fraction is 52% by    visual estimation. Systolic function is low normal. Wall motion is normal.    Diastolic function is normal.     Right Ventricle: Right ventricular cavity size is normal. Systolic    function is normal.     Mitral Valve: There is trace regurgitation.     Tricuspid Valve: Pulmonary artery systolic pressures cannot be    estimated due to lack of tricuspid regurgitation jet.     There is no study for comparison.         ECG 12 lead   Final Result by Joselito Wellington MD (01/27 1117)   Atrial flutter   Baseline artifact   T wave  abnormality, consider lateral ischemia   Prolonged QT   Abnormal ECG      Confirmed by Joselito Wellington ([id number]) on 1/27/2025 11:17:49 AM      ECG 12 lead   Final Result by Rito Serrato DO (01/27 0952)   Sinus bradycardia   Nonspecific T wave abnormality   Abnormal ECG      Confirmed by Rito Serrato ([id number]) on 1/27/2025 9:52:01 AM      ECG 12 lead   Final Result by Rito Serrato DO (01/22 1031)   Sinus tachycardia   ST & T wave abnormality, consider inferolateral ischemia   Abnormal ECG      Confirmed by Rito Serrato ([id number]) on 1/22/2025 10:31:00 AM      ECG 12 lead   Final Result by Major Rubin DO (01/20 1223)   Sinus tachycardia with significant  Baseline artifact      Abnormal ECG      Confirmed by Major Rubin ([id number]) on 1/20/2025 12:23:46 PM      ECG 12 lead   Final Result by Major Rubin DO (01/20 1221)   Likely  Sinus tachycardia Baseline artifact   Abnormal ECG      Confirmed by Major Rubin ([id number]) on 1/20/2025 12:21:39 PM      ECG 12 lead   Final Result by Tavon Bolton MD (01/20 0033)   Sinus tachycardia   Otherwise normal ECG   When compared with ECG of 19-Jan-2025 21:57, (unconfirmed)   No significant change was found   Confirmed by Tavon Bolton ([id number]) on 1/20/2025 12:33:43 AM      ECG 12 lead   Final Result by Tavon Bolton MD (01/20 0037)   Sinus tachycardia   Otherwise normal ECG   When compared with ECG of 30-May-2024 10:26,   Vent. rate has increased by  53 bpm   Non-specific change in ST segment in Inferior leads   Confirmed by Tavon Bolton ([id number]) on 1/20/2025 12:37:51 AM        GI:  Bronchoscopy   Final Result by Forrest Garrett MD (01/24 1936)   The lower trachea, main dennis, left main stem, DONNY, lingula, LLL, right    main stem, RUL, bronchus intermedius, RML and RLL appeared normal.   Minimal, thin and clear secretions present         RECOMMENDATION:   Follow up bronchoscopy          Attestation:      I was the critical care attending during  the entire procedure by bedside    helping and supervising on 1/24/2025      Forrest Garrett MD                 Results from last 7 days   Lab Units 01/31/25 0457 01/30/25 0403 01/29/25  0422 01/28/25  0508 01/27/25  0446 01/26/25  0435 01/25/25  0544   WBC Thousand/uL 19.14* 10.76* 10.34* 9.79 8.56   < > 8.10   HEMOGLOBIN g/dL 11.0* 10.0* 10.3* 10.0* 10.6*   < > 9.8*   HEMATOCRIT % 33.8* 31.0* 32.0* 30.7* 32.5*   < > 29.4*   PLATELETS Thousands/uL 293 272 294 210 179   < > 162   TOTAL NEUT ABS Thousands/µL 16.73*  --  5.95  --  4.05   < >  --    BANDS PCT %  --  1  --  1  --   --  1    < > = values in this interval not displayed.         Results from last 7 days   Lab Units 01/31/25 0457 01/30/25 0441 01/29/25 0427 01/29/25  0405 01/28/25  0508 01/27/25  0446 01/26/25  1316 01/26/25  0435 01/25/25  0544   SODIUM mmol/L 141 142 142  --  145 148*  --  148* 147   POTASSIUM mmol/L 4.2 4.6 4.3  --  3.3* 3.3*   < > 2.8* 3.7   CHLORIDE mmol/L 110* 104 103  --  102 103  --  102 107   CO2 mmol/L 24 32 34*  --  36* 35*  --  36* 33*   ANION GAP mmol/L 7 6 5  --  7 10  --  10 7   BUN mg/dL 31* 45* 50*  --  62* 76*  --  64* 63*   CREATININE mg/dL 0.74 1.00 0.78  --  0.82 0.94  --  0.99 1.21   EGFR ml/min/1.73sq m 93 64 87  --  82 69  --  65 51   CALCIUM mg/dL 8.2* 8.9 8.7  --  8.2* 8.6  --  8.7 8.2*   CALCIUM, IONIZED mmol/L 1.15  --   --  1.11* 1.05* 1.07*  --   --   --    MAGNESIUM mg/dL 2.1 2.4  --   --  2.7 3.2*  --  2.8* 2.7   PHOSPHORUS mg/dL 3.1  --   --   --   --  3.6  --  3.5 2.6*    < > = values in this interval not displayed.     Results from last 7 days   Lab Units 01/30/25  0441 01/25/25  0544   AST U/L 33 26   ALT U/L 19 18   ALK PHOS U/L 29* 24*   TOTAL PROTEIN g/dL 6.7 6.6   ALBUMIN g/dL 3.2* 3.0*   TOTAL BILIRUBIN mg/dL 0.65 0.32     Results from last 7 days   Lab Units 01/31/25  1147 01/31/25  0014 01/30/25  1805 01/30/25  1305 01/30/25  0008 01/29/25  1808 01/29/25  1211 01/28/25  2348 01/28/25  2117  01/28/25  1813 01/28/25  1241 01/28/25  0557   POC GLUCOSE mg/dl 118 93 88 105 146* 111 96 109 94 78 99 123     Results from last 7 days   Lab Units 01/31/25  0457 01/30/25  0441 01/29/25  0427 01/28/25  0508 01/27/25  0446 01/26/25  0435 01/25/25  0544   GLUCOSE RANDOM mg/dL 95 108 90 129 218* 196* 129      Results from last 7 days   Lab Units 01/24/25  2141   PH ART  7.484*   PCO2 ART mm Hg 44.4*   PO2 ART mm Hg 63.6*   HCO3 ART mmol/L 32.6*   BASE EXC ART mmol/L 8.3   O2 CONTENT ART mL/dL 14.9*   O2 HGB, ARTERIAL % 91.3*   ABG SOURCE  Line, Arterial      Results from last 7 days   Lab Units 01/24/25  1501   GRAM STAIN RESULT  Rare Epithelial Cells  Rare Disintegrating polys  No bacteria seen       Network Utilization Review Department  ATTENTION: Please call with any questions or concerns to [phone] and carefully listen to the prompts so that you are directed to the right person. All voicemails are confidential.   For Discharge needs, contact Care Management DC Support Team at [phone] opt. [phone]  Send all requests for admission clinical reviews, approved or denied determinations and any other requests to dedicated fax number below belonging to the campus where the patient is receiving treatment. List of dedicated fax numbers for the Facilities:  FACILITY NAME UR FAX NUMBER   ADMISSION DENIALS (Administrative/Medical Necessity) [phone]   DISCHARGE SUPPORT TEAM (NETWORK) [phone]   PARENT CHILD HEALTH (Maternity/NICU/Pediatrics) [phone]   [facility] [phone]   [facility] [phone]   [facility] [phone]   [facility] [phone]   [facility] [phone]   [facility] [phone]   [facility] [phone]   [facility] [phone]   [facility]  HEART [place] [phone]   [facility] [phone]   [facility] [phone]   [facility] [phone]

## 2025-01-31 NOTE — PLAN OF CARE
Problem: Potential for Falls  Goal: Patient will remain free of falls  Description: INTERVENTIONS:  - Educate patient/family on patient safety including physical limitations  - Instruct patient to call for assistance with activity   - Consult OT/PT to assist with strengthening/mobility   - Keep Call bell within reach  - Keep bed low and locked with side rails adjusted as appropriate  - Keep care items and personal belongings within reach  - Initiate and maintain comfort rounds  - Make Fall Risk Sign visible to staff  - Apply yellow socks and bracelet for high fall risk patients  - Consider moving patient to room near nurses station  Outcome: Progressing     Problem: Prexisting or High Potential for Compromised Skin Integrity  Goal: Skin integrity is maintained or improved  Description: INTERVENTIONS:  - Identify patients at risk for skin breakdown  - Assess and monitor skin integrity  - Assess and monitor nutrition and hydration status  - Monitor labs   - Assess for incontinence   - Turn and reposition patient  - Assist with mobility/ambulation  - Relieve pressure over bony prominences  - Avoid friction and shearing  - Provide appropriate hygiene as needed including keeping skin clean and dry  - Evaluate need for skin moisturizer/barrier cream  - Collaborate with interdisciplinary team   - Patient/family teaching  - Consider wound care consult   Outcome: Progressing     Problem: PAIN - ADULT  Goal: Verbalizes/displays adequate comfort level or baseline comfort level  Description: Interventions:  - Encourage patient to monitor pain and request assistance  - Assess pain using appropriate pain scale  - Administer analgesics based on type and severity of pain and evaluate response  - Implement non-pharmacological measures as appropriate and evaluate response  - Consider cultural and social influences on pain and pain management  - Notify physician/advanced practitioner if interventions unsuccessful or patient reports  new pain  Outcome: Progressing     Problem: INFECTION - ADULT  Goal: Absence or prevention of progression during hospitalization  Description: INTERVENTIONS:  - Assess and monitor for signs and symptoms of infection  - Monitor lab/diagnostic results  - Monitor all insertion sites, i.e. indwelling lines, tubes, and drains  - Monitor endotracheal if appropriate and nasal secretions for changes in amount and color  - [place] appropriate cooling/warming therapies per order  - Administer medications as ordered  - Instruct and encourage patient and family to use good hand hygiene technique  - Identify and instruct in appropriate isolation precautions for identified infection/condition  Outcome: Progressing     Problem: SAFETY ADULT  Goal: Patient will remain free of falls  Description: INTERVENTIONS:  - Educate patient/family on patient safety including physical limitations  - Instruct patient to call for assistance with activity   - Consult OT/PT to assist with strengthening/mobility   - Keep Call bell within reach  - Keep bed low and locked with side rails adjusted as appropriate  - Keep care items and personal belongings within reach  - Initiate and maintain comfort rounds  - Make Fall Risk Sign visible to staff  - Apply yellow socks and bracelet for high fall risk patients  - Consider moving patient to room near nurses station  Outcome: Progressing  Goal: Maintain or return to baseline ADL function  Description: INTERVENTIONS:  -  Assess patient's ability to carry out ADLs; assess patient's baseline for ADL function and identify physical deficits which impact ability to perform ADLs (bathing, care of mouth/teeth, toileting, grooming, dressing, etc.)  - Assess/evaluate cause of self-care deficits   - Assess range of motion  - Assess patient's mobility; develop plan if impaired  - Assess patient's need for assistive devices and provide as appropriate  - Encourage maximum independence but intervene and supervise when  necessary  - Involve family in performance of ADLs  - Assess for home care needs following discharge   - Consider OT consult to assist with ADL evaluation and planning for discharge  - Provide patient education as appropriate  Outcome: Progressing  Goal: Maintains/Returns to pre admission functional level  Description: INTERVENTIONS:  - Perform AM-PAC 6 Click Basic Mobility/ Daily Activity assessment daily.  - Set and communicate daily mobility goal to care team and patient/family/caregiver.   - Collaborate with rehabilitation services on mobility goals if consulted  - Out of bed for toileting  - Record patient progress and toleration of activity level   Outcome: Progressing     Problem: NEUROSENSORY - ADULT  Goal: Achieves stable or improved neurological status  Description: INTERVENTIONS  - Monitor and report changes in neurological status  - Monitor vital signs such as temperature, blood pressure, glucose, and any other labs ordered   - Initiate measures to prevent increased intracranial pressure  - Monitor for seizure activity and implement precautions if appropriate      Outcome: Progressing  Goal: Remains free of injury related to seizures activity  Description: INTERVENTIONS  - Maintain airway, patient safety  and administer oxygen as ordered  - Monitor patient for seizure activity, document and report duration and description of seizure to physician/advanced practitioner  - If seizure occurs,  ensure patient safety during seizure  - Reorient patient post seizure  - Seizure pads on all 4 side rails  - Instruct patient/family to notify RN of any seizure activity including if an aura is experienced  - Instruct patient/family to call for assistance with activity based on nursing assessment  - Administer anti-seizure medications if ordered    Outcome: Progressing  Goal: Achieves maximal functionality and self care  Description: INTERVENTIONS  - Monitor swallowing and airway patency with patient fatigue and  changes in neurological status  - Encourage and assist patient to increase activity and self care.   - Encourage visually impaired, hearing impaired and aphasic patients to use assistive/communication devices  Outcome: Progressing     Problem: DISCHARGE PLANNING  Goal: Discharge to home or other facility with appropriate resources  Description: INTERVENTIONS:  - Identify barriers to discharge w/patient and caregiver  - Arrange for needed discharge resources and transportation as appropriate  - Identify discharge learning needs (meds, wound care, etc.)  - Arrange for interpretive services to assist at discharge as needed  - Refer to Case Management Department for coordinating discharge planning if the patient needs post-hospital services based on physician/advanced practitioner order or complex needs related to functional status, cognitive ability, or social support system  Outcome: Progressing

## 2025-01-31 NOTE — CASE MANAGEMENT
Case Management Discharge Planning Note    Patient name Esperanza Prajapati  Location ICU 15/ICU 15 MRN [id number]  : 1970 Date 2025       Current Admission Date: 2025  Current Admission Diagnosis:Acute respiratory failure with hypoxia (HCC)   Patient Active Problem List    Diagnosis Date Noted Date Diagnosed    Pneumonia of both lungs due to infectious organism 2025     Hypernatremia 2025     Hyperglycemia 2025     Viral sepsis (HCC) 2025     Toxic metabolic encephalopathy 2025     Fall at home, initial encounter 2025     Influenza A 2025     Acute respiratory failure with hypoxia (HCC) 2025     Decreased urination 10/23/2024     Dermatitis of face 2024     Impaired ability to manage medication regime 10/09/2023     Bilateral lower extremity edema 2023     h/o VALERIE (acute kidney injury) (HCC) 2023     Gout 2022     Schizoaffective disorder (HCC) 2022     Transient ischemic attack 2022     Vitamin B12 deficiency 2022     Dyslipidemia 2022     PCOS (polycystic ovarian syndrome) 2020     Obesity (BMI 30-39.9) 2017     Vitamin D deficiency 2016     Cognitive developmental delay 2014     Obstructive sleep apnea 2013     Focal epilepsy (HCC) 2013     History of thyroid cancer 2013     Proteinuria 2011     Postoperative hypothyroidism 06/10/2011     Family history of diabetes mellitus 06/10/2011     Family history of malignant neoplasm of breast 06/10/2011     Intellectual disability 06/10/2011       LOS (days): 11  Geometric Mean LOS (GMLOS) (days): 12.8  Days to GMLOS:1.2     OBJECTIVE:  Risk of Unplanned Readmission Score: 27.73         Current admission status: Inpatient   Preferred Pharmacy:   [facility] - [place] - [address]  [address] [place] [address]  Phone: [phone] Fax: [phone]    Primary Care Provider:  Foster Biswas MD    Primary Insurance: [facility]  Secondary Insurance:     DISCHARGE DETAILS:    Discharge planning discussed with:: Late entry - family  Freedom of Choice: Yes  Comments - Freedom of Choice: Sister would like STR if possible, discussed the process for STR referrals after patient is evaluated by PT.  CM contacted family/caregiver?: Yes (at the bedside)  Were Treatment Team discharge recommendations reviewed with patient/caregiver?: No (no recommendations at the moment)        Contacts  Patient Contacts: Layne Palm (sister) [phone]  Relationship to Patient:: Family  Contact Method: Phone  Phone Number: Buddy Palm (Brother) [phone] (M)  Reason/Outcome: Continuity of Care, Discharge Planning, Emergency Contact       Discharge Destination Plan:: Short Term Rehab         Additional Comments: CM left resources for the family which included the list of non-skilled in-home service providers in addition to several [facility] agencies for family to choose from. Family was also instructed to contact the insurance provider to inquire about insurance based care  in addition to inquiry about HHA services and hours provided. CM department will continue to follow the patient through hospital discharge.

## 2025-01-31 NOTE — PROGRESS NOTES
Nutrition plan:   Advance diet as medically able, texture per slp;     If not able to start PO diet initiate Jevity 1.5cal at 30 ml/hr and advance 10 ml q8hr or as tolerated to goal rate 60 ml/hr ( will provide 2160kcal, 90g protein, 1095ml free water)   free water flushes: 150ml q6hr or per md     Nutrition will continue to follow up as per policy    Ann Olmstead MS RD LDN [facility]

## 2025-02-01 LAB
ALBUMIN SERPL BCG-MCNC: 3.2 G/DL (ref 3.5–5)
ALP SERPL-CCNC: 33 U/L (ref 34–104)
ALT SERPL W P-5'-P-CCNC: 19 U/L (ref 7–52)
ANION GAP SERPL CALCULATED.3IONS-SCNC: 5 MMOL/L (ref 4–13)
AST SERPL W P-5'-P-CCNC: 20 U/L (ref 13–39)
BASOPHILS # BLD AUTO: 0.01 THOUSANDS/ΜL (ref 0–0.1)
BASOPHILS NFR BLD AUTO: 0 % (ref 0–1)
BILIRUB SERPL-MCNC: 0.87 MG/DL (ref 0.2–1)
BUN SERPL-MCNC: 38 MG/DL (ref 5–25)
CALCIUM ALBUM COR SERPL-MCNC: 9.4 MG/DL (ref 8.3–10.1)
CALCIUM SERPL-MCNC: 8.8 MG/DL (ref 8.4–10.2)
CHLORIDE SERPL-SCNC: 107 MMOL/L (ref 96–108)
CO2 SERPL-SCNC: 29 MMOL/L (ref 21–32)
CREAT SERPL-MCNC: 0.81 MG/DL (ref 0.6–1.3)
EOSINOPHIL # BLD AUTO: 0.11 THOUSAND/ΜL (ref 0–0.61)
EOSINOPHIL NFR BLD AUTO: 1 % (ref 0–6)
ERYTHROCYTE [DISTWIDTH] IN BLOOD BY AUTOMATED COUNT: 14.7 % (ref 11.6–15.1)
GFR SERPL CREATININE-BSD FRML MDRD: 83 ML/MIN/1.73SQ M
GLUCOSE SERPL-MCNC: 91 MG/DL (ref 65–140)
HCT VFR BLD AUTO: 28.9 % (ref 34.8–46.1)
HGB BLD-MCNC: 9.1 G/DL (ref 11.5–15.4)
IMM GRANULOCYTES # BLD AUTO: 0.08 THOUSAND/UL (ref 0–0.2)
IMM GRANULOCYTES NFR BLD AUTO: 1 % (ref 0–2)
LYMPHOCYTES # BLD AUTO: 1.61 THOUSANDS/ΜL (ref 0.6–4.47)
LYMPHOCYTES NFR BLD AUTO: 16 % (ref 14–44)
MAGNESIUM SERPL-MCNC: 2.6 MG/DL (ref 1.9–2.7)
MCH RBC QN AUTO: 32.2 PG (ref 26.8–34.3)
MCHC RBC AUTO-ENTMCNC: 31.5 G/DL (ref 31.4–37.4)
MCV RBC AUTO: 102 FL (ref 82–98)
MONOCYTES # BLD AUTO: 0.62 THOUSAND/ΜL (ref 0.17–1.22)
MONOCYTES NFR BLD AUTO: 6 % (ref 4–12)
NEUTROPHILS # BLD AUTO: 7.71 THOUSANDS/ΜL (ref 1.85–7.62)
NEUTS SEG NFR BLD AUTO: 76 % (ref 43–75)
NRBC BLD AUTO-RTO: 0 /100 WBCS
PLATELET # BLD AUTO: 263 THOUSANDS/UL (ref 149–390)
PMV BLD AUTO: 8.9 FL (ref 8.9–12.7)
POTASSIUM SERPL-SCNC: 4 MMOL/L (ref 3.5–5.3)
PROT SERPL-MCNC: 7 G/DL (ref 6.4–8.4)
RBC # BLD AUTO: 2.83 MILLION/UL (ref 3.81–5.12)
SODIUM SERPL-SCNC: 141 MMOL/L (ref 135–147)
WBC # BLD AUTO: 10.14 THOUSAND/UL (ref 4.31–10.16)

## 2025-02-01 PROCEDURE — 97530 THERAPEUTIC ACTIVITIES: CPT

## 2025-02-01 PROCEDURE — 97163 PT EVAL HIGH COMPLEX 45 MIN: CPT

## 2025-02-01 PROCEDURE — 94003 VENT MGMT INPAT SUBQ DAY: CPT

## 2025-02-01 PROCEDURE — 85025 COMPLETE CBC W/AUTO DIFF WBC: CPT

## 2025-02-01 PROCEDURE — 92610 EVALUATE SWALLOWING FUNCTION: CPT

## 2025-02-01 PROCEDURE — 83735 ASSAY OF MAGNESIUM: CPT

## 2025-02-01 PROCEDURE — 94668 MNPJ CHEST WALL SBSQ: CPT

## 2025-02-01 PROCEDURE — 94760 N-INVAS EAR/PLS OXIMETRY 1: CPT

## 2025-02-01 PROCEDURE — 99291 CRITICAL CARE FIRST HOUR: CPT | Performed by: INTERNAL MEDICINE

## 2025-02-01 PROCEDURE — 97535 SELF CARE MNGMENT TRAINING: CPT

## 2025-02-01 PROCEDURE — C9254 INJECTION, LACOSAMIDE: HCPCS | Performed by: NURSE PRACTITIONER

## 2025-02-01 PROCEDURE — 94640 AIRWAY INHALATION TREATMENT: CPT

## 2025-02-01 PROCEDURE — 80053 COMPREHEN METABOLIC PANEL: CPT

## 2025-02-01 PROCEDURE — 97167 OT EVAL HIGH COMPLEX 60 MIN: CPT

## 2025-02-01 RX ORDER — SODIUM CHLORIDE, SODIUM GLUCONATE, SODIUM ACETATE, POTASSIUM CHLORIDE, MAGNESIUM CHLORIDE, SODIUM PHOSPHATE, DIBASIC, AND POTASSIUM PHOSPHATE .53; .5; .37; .037; .03; .012; .00082 G/100ML; G/100ML; G/100ML; G/100ML; G/100ML; G/100ML; G/100ML
500 INJECTION, SOLUTION INTRAVENOUS ONCE
Status: COMPLETED | OUTPATIENT
Start: 2025-02-01 | End: 2025-02-01

## 2025-02-01 RX ORDER — BISACODYL 10 MG
10 SUPPOSITORY, RECTAL RECTAL ONCE
Status: COMPLETED | OUTPATIENT
Start: 2025-02-01 | End: 2025-02-01

## 2025-02-01 RX ORDER — ALBUMIN (HUMAN) 12.5 G/50ML
25 SOLUTION INTRAVENOUS ONCE
Status: COMPLETED | OUTPATIENT
Start: 2025-02-01 | End: 2025-02-01

## 2025-02-01 RX ADMIN — LACOSAMIDE 150 MG: 10 INJECTION INTRAVENOUS at 09:29

## 2025-02-01 RX ADMIN — IPRATROPIUM BROMIDE AND ALBUTEROL SULFATE 3 ML: 2.5; .5 SOLUTION RESPIRATORY (INHALATION) at 13:30

## 2025-02-01 RX ADMIN — BISACODYL 10 MG: 10 SUPPOSITORY RECTAL at 09:29

## 2025-02-01 RX ADMIN — SODIUM CHLORIDE SOLN NEBU 3% 4 ML: 3 NEBU SOLN at 13:30

## 2025-02-01 RX ADMIN — IPRATROPIUM BROMIDE AND ALBUTEROL SULFATE 3 ML: 2.5; .5 SOLUTION RESPIRATORY (INHALATION) at 07:12

## 2025-02-01 RX ADMIN — SODIUM CHLORIDE, SODIUM GLUCONATE, SODIUM ACETATE, POTASSIUM CHLORIDE, MAGNESIUM CHLORIDE, SODIUM PHOSPHATE, DIBASIC, AND POTASSIUM PHOSPHATE 500 ML: .53; .5; .37; .037; .03; .012; .00082 INJECTION, SOLUTION INTRAVENOUS at 00:31

## 2025-02-01 RX ADMIN — LACOSAMIDE 200 MG: 10 INJECTION INTRAVENOUS at 21:34

## 2025-02-01 RX ADMIN — CHLORHEXIDINE GLUCONATE 15 ML: 1.2 RINSE ORAL at 21:34

## 2025-02-01 RX ADMIN — SODIUM CHLORIDE SOLN NEBU 3% 4 ML: 3 NEBU SOLN at 19:55

## 2025-02-01 RX ADMIN — IPRATROPIUM BROMIDE AND ALBUTEROL SULFATE 3 ML: 2.5; .5 SOLUTION RESPIRATORY (INHALATION) at 19:55

## 2025-02-01 RX ADMIN — SODIUM CHLORIDE SOLN NEBU 3% 4 ML: 3 NEBU SOLN at 07:12

## 2025-02-01 RX ADMIN — ALBUMIN (HUMAN) 25 G: 0.25 INJECTION, SOLUTION INTRAVENOUS at 11:35

## 2025-02-01 RX ADMIN — SODIUM CHLORIDE 750 MG: 9 INJECTION, SOLUTION INTRAVENOUS at 17:06

## 2025-02-01 RX ADMIN — SODIUM CHLORIDE 750 MG: 9 INJECTION, SOLUTION INTRAVENOUS at 04:12

## 2025-02-01 RX ADMIN — CHLORHEXIDINE GLUCONATE 15 ML: 1.2 RINSE ORAL at 09:29

## 2025-02-01 RX ADMIN — ENOXAPARIN SODIUM 40 MG: 40 INJECTION SUBCUTANEOUS at 09:29

## 2025-02-01 NOTE — PLAN OF CARE
Problem: Potential for Falls  Goal: Patient will remain free of falls  Description: INTERVENTIONS:  - Educate patient/family on patient safety including physical limitations  - Instruct patient to call for assistance with activity   - Consult OT/PT to assist with strengthening/mobility   - Keep Call bell within reach  - Keep bed low and locked with side rails adjusted as appropriate  - Keep care items and personal belongings within reach  - Initiate and maintain comfort rounds  - Make Fall Risk Sign visible to staff  - Offer Toileting every 2 Hours, in advance of need  - Initiate/Maintain bed alarm  - Obtain necessary fall risk management equipment:   - Apply yellow socks and bracelet for high fall risk patients  - Consider moving patient to room near nurses station  Outcome: Progressing     Problem: Prexisting or High Potential for Compromised Skin Integrity  Goal: Skin integrity is maintained or improved  Description: INTERVENTIONS:  - Identify patients at risk for skin breakdown  - Assess and monitor skin integrity  - Assess and monitor nutrition and hydration status  - Monitor labs   - Assess for incontinence   - Turn and reposition patient  - Assist with mobility/ambulation  - Relieve pressure over bony prominences  - Avoid friction and shearing  - Provide appropriate hygiene as needed including keeping skin clean and dry  - Evaluate need for skin moisturizer/barrier cream  - Collaborate with interdisciplinary team   - Patient/family teaching  - Consider wound care consult   Outcome: Progressing     Problem: PAIN - ADULT  Goal: Verbalizes/displays adequate comfort level or baseline comfort level  Description: Interventions:  - Encourage patient to monitor pain and request assistance  - Assess pain using appropriate pain scale  - Administer analgesics based on type and severity of pain and evaluate response  - Implement non-pharmacological measures as appropriate and evaluate response  - Consider cultural and  social influences on pain and pain management  - Notify physician/advanced practitioner if interventions unsuccessful or patient reports new pain  Outcome: Progressing     Problem: INFECTION - ADULT  Goal: Absence or prevention of progression during hospitalization  Description: INTERVENTIONS:  - Assess and monitor for signs and symptoms of infection  - Monitor lab/diagnostic results  - Monitor all insertion sites, i.e. indwelling lines, tubes, and drains  - Monitor endotracheal if appropriate and nasal secretions for changes in amount and color  - [place] appropriate cooling/warming therapies per order  - Administer medications as ordered  - Instruct and encourage patient and family to use good hand hygiene technique  - Identify and instruct in appropriate isolation precautions for identified infection/condition  Outcome: Progressing     Problem: SAFETY ADULT  Goal: Patient will remain free of falls  Description: INTERVENTIONS:  - Educate patient/family on patient safety including physical limitations  - Instruct patient to call for assistance with activity   - Consult OT/PT to assist with strengthening/mobility   - Keep Call bell within reach  - Keep bed low and locked with side rails adjusted as appropriate  - Keep care items and personal belongings within reach  - Initiate and maintain comfort rounds  - Make Fall Risk Sign visible to staff  - Offer Toileting every 2 Hours, in advance of need  - Initiate/Maintain bed alarm  - Obtain necessary fall risk management equipment:   - Apply yellow socks and bracelet for high fall risk patients  - Consider moving patient to room near nurses station  Outcome: Progressing  Goal: Maintain or return to baseline ADL function  Description: INTERVENTIONS:  -  Assess patient's ability to carry out ADLs; assess patient's baseline for ADL function and identify physical deficits which impact ability to perform ADLs (bathing, care of mouth/teeth, toileting, grooming, dressing,  etc.)  - Assess/evaluate cause of self-care deficits   - Assess range of motion  - Assess patient's mobility; develop plan if impaired  - Assess patient's need for assistive devices and provide as appropriate  - Encourage maximum independence but intervene and supervise when necessary  - Involve family in performance of ADLs  - Assess for home care needs following discharge   - Consider OT consult to assist with ADL evaluation and planning for discharge  - Provide patient education as appropriate  Outcome: Progressing  Goal: Maintains/Returns to pre admission functional level  Description: INTERVENTIONS:  - Perform AM-PAC 6 Click Basic Mobility/ Daily Activity assessment daily.  - Set and communicate daily mobility goal to care team and patient/family/caregiver.   - Collaborate with rehabilitation services on mobility goals if consulted  - Perform Range of Motion 4 times a day.  - Reposition patient every 2 hours.    - Out of bed for toileting  - Record patient progress and toleration of activity level   Outcome: Progressing     Problem: DISCHARGE PLANNING  Goal: Discharge to home or other facility with appropriate resources  Description: INTERVENTIONS:  - Identify barriers to discharge w/patient and caregiver  - Arrange for needed discharge resources and transportation as appropriate  - Identify discharge learning needs (meds, wound care, etc.)  - Arrange for interpretive services to assist at discharge as needed  - Refer to Case Management Department for coordinating discharge planning if the patient needs post-hospital services based on physician/advanced practitioner order or complex needs related to functional status, cognitive ability, or social support system  Outcome: Progressing     Problem: NEUROSENSORY - ADULT  Goal: Achieves stable or improved neurological status  Description: INTERVENTIONS  - Monitor and report changes in neurological status  - Monitor vital signs such as temperature, blood pressure,  glucose, and any other labs ordered   - Initiate measures to prevent increased intracranial pressure  - Monitor for seizure activity and implement precautions if appropriate      Outcome: Progressing  Goal: Remains free of injury related to seizures activity  Description: INTERVENTIONS  - Maintain airway, patient safety  and administer oxygen as ordered  - Monitor patient for seizure activity, document and report duration and description of seizure to physician/advanced practitioner  - If seizure occurs,  ensure patient safety during seizure  - Reorient patient post seizure  - Seizure pads on all 4 side rails  - Instruct patient/family to notify RN of any seizure activity including if an aura is experienced  - Instruct patient/family to call for assistance with activity based on nursing assessment  - Administer anti-seizure medications if ordered    Outcome: Progressing  Goal: Achieves maximal functionality and self care  Description: INTERVENTIONS  - Monitor swallowing and airway patency with patient fatigue and changes in neurological status  - Encourage and assist patient to increase activity and self care.   - Encourage visually impaired, hearing impaired and aphasic patients to use assistive/communication devices  Outcome: Progressing     Problem: RESPIRATORY - ADULT  Goal: Achieves optimal ventilation and oxygenation  Description: INTERVENTIONS:  - Assess for changes in respiratory status  - Assess for changes in mentation and behavior  - Position to facilitate oxygenation and minimize respiratory effort  - Oxygen administered by appropriate delivery if ordered  - Initiate smoking cessation education as indicated  - Encourage broncho-pulmonary hygiene including cough, deep breathe, Incentive Spirometry  - Assess the need for suctioning and aspirate as needed  - Assess and instruct to report SOB or any respiratory difficulty  - Respiratory Therapy support as indicated  Outcome: Progressing     Problem:  GENITOURINARY - ADULT  Goal: Maintains or returns to baseline urinary function  Description: INTERVENTIONS:  - Assess urinary function  - Encourage oral fluids to ensure adequate hydration if ordered  - Administer IV fluids as ordered to ensure adequate hydration  - Administer ordered medications as needed  - Offer frequent toileting  - Follow urinary retention protocol if ordered  Outcome: Progressing  Goal: Absence of urinary retention  Description: INTERVENTIONS:  - Assess patient’s ability to void and empty bladder  - Monitor I/O  - Bladder scan as needed  - Discuss with physician/AP medications to alleviate retention as needed  - Discuss catheterization for long term situations as appropriate  Outcome: Progressing     Problem: METABOLIC, FLUID AND ELECTROLYTES - ADULT  Goal: Electrolytes maintained within normal limits  Description: INTERVENTIONS:  - Monitor labs and assess patient for signs and symptoms of electrolyte imbalances  - Administer electrolyte replacement as ordered  - Monitor response to electrolyte replacements, including repeat lab results as appropriate  - Instruct patient on fluid and nutrition as appropriate  Outcome: Progressing  Goal: Fluid balance maintained  Description: INTERVENTIONS:  - Monitor labs   - Monitor I/O and WT  - Instruct patient on fluid and nutrition as appropriate  - Assess for signs & symptoms of volume excess or deficit  Outcome: Progressing     Problem: HEMATOLOGIC - ADULT  Goal: Maintains hematologic stability  Description: INTERVENTIONS  - Assess for signs and symptoms of bleeding or hemorrhage  - Monitor labs  - Administer supportive blood products/factors as ordered and appropriate  Outcome: Progressing     Problem: MUSCULOSKELETAL - ADULT  Goal: Maintain or return mobility to safest level of function  Description: INTERVENTIONS:  - Assess patient's ability to carry out ADLs; assess patient's baseline for ADL function and identify physical deficits which impact  ability to perform ADLs (bathing, care of mouth/teeth, toileting, grooming, dressing, etc.)  - Assess/evaluate cause of self-care deficits   - Assess range of motion  - Assess patient's mobility  - Assess patient's need for assistive devices and provide as appropriate  - Encourage maximum independence but intervene and supervise when necessary  - Involve family in performance of ADLs  - Assess for home care needs following discharge   - Consider OT consult to assist with ADL evaluation and planning for discharge  - Provide patient education as appropriate  Outcome: Progressing  Goal: Maintain proper alignment of affected body part  Description: INTERVENTIONS:  - Support, maintain and protect limb and body alignment  - Provide patient/ family with appropriate education  Outcome: Progressing     Problem: SAFETY,RESTRAINT: NV/NON-SELF DESTRUCTIVE BEHAVIOR  Goal: Remains free of harm/injury (restraint for non violent/non self-detsructive behavior)  Description: INTERVENTIONS:  - Instruct patient/family regarding restraint use   - Assess and monitor physiologic and psychological status   - Provide interventions and comfort measures to meet assessed patient needs   - Identify and implement measures to help patient regain control  - Assess readiness for release of restraint   Outcome: Progressing  Goal: Returns to optimal restraint-free functioning  Description: INTERVENTIONS:  - Assess the patient's behavior and symptoms that indicate continued need for restraint  - Identify and implement measures to help patient regain control  - Assess readiness for release of restraint   Outcome: Progressing     Problem: Nutrition/Hydration-ADULT  Goal: Nutrient/Hydration intake appropriate for improving, restoring or maintaining nutritional needs  Description: Monitor and assess patient's nutrition/hydration status for malnutrition. Collaborate with interdisciplinary team and initiate plan and interventions as ordered.  Monitor  patient's weight and dietary intake as ordered or per policy. Utilize nutrition screening tool and intervene as necessary. Determine patient's food preferences and provide high-protein, high-caloric foods as appropriate.     INTERVENTIONS:  - Monitor oral intake, urinary output, labs, and treatment plans  - Assess nutrition and hydration status and recommend course of action  - Evaluate amount of meals eaten  - Assist patient with eating if necessary   - Allow adequate time for meals  - Recommend/ encourage appropriate diets, oral nutritional supplements, and vitamin/mineral supplements  - Order, calculate, and assess calorie counts as needed  - Recommend, monitor, and adjust tube feedings and TPN/PPN based on assessed needs  - Assess need for intravenous fluids  - Provide specific nutrition/hydration education as appropriate  - Include patient/family/caregiver in decisions related to nutrition  Outcome: Progressing     Problem: COPING  Goal: Pt/Family able to verbalize concerns and demonstrate effective coping strategies  Description: INTERVENTIONS:  - Assist patient/family to identify coping skills, available support systems and cultural and spiritual values  - Provide emotional support, including active listening and acknowledgement of concerns of patient and caregivers  - Reduce environmental stimuli, as able  - Provide patient education  - Assess for spiritual pain/suffering and initiate spiritual care, including notification of Pastoral Care or dottie based community as needed  - Assess effectiveness of coping strategies  Outcome: Progressing  Goal: Will report anxiety at manageable levels  Description: INTERVENTIONS:  - Administer medication as ordered  - Teach and encourage coping skills  - Provide emotional support  - Assess patient/family for anxiety and ability to cope  Outcome: Progressing

## 2025-02-01 NOTE — PROGRESS NOTES
Progress Note - Critical Care/ICU   Name: Esperanza Prajapati 54 y.o. female I MRN: [id number]  Unit/Bed#: ICU 15 I Date of Admission: 1/19/2025   Date of Service: 2/1/2025 I Hospital Day: 12      Assessment & Plan  Acute respiratory failure with hypoxia (HCC)  Due to Flu A, viral sepsis - consider multifocal aspiration PNA  TG noted to be high, need to d/c Propofol. Cont. Fentanyl drip  Was started on precedex. Adjust rates, to maintain a RAAS of -1  Extubated yesterday, required hi-flow 40L to maintain saturations at 91-92%.   Patient having difficulty coughing up mucus; added hypertonic inhalation solution.  On Bipap; weaned down to 70% overnight.  Continue to wean as tolerated.  Focal epilepsy (HCC)  Follows with [facility] neurology   Focal epilepsy manifests as simple partial, complex partial, and generalized tonic-clonic seizures   Hx of b/l postural and action tremor per neurology notes  Cont. O/P meds: vimpat 150 daily, vimpat 200mg qHS, Depakote 750mg BID  Viral sepsis (HCC)  Due to Flu A + on 1/19   SIRS on admission with tachycardia, tachypnea, leukopenia, fever   Completed Oseltamivir  Cognitive developmental delay  Due to head injury at age of 10 months    Postoperative hypothyroidism  Stage I papillary thyroid cancer s/p total thyroidectomy and ablation in 2013  Post-operative hypothyroidism   Levothyroxine via OG tube  Schizoaffective disorder (HCC)  Home regimen: zyprexa 10mg daily, trazodone 50mg PRN, invega 6mg daily   Invega held due to unable to crush for enteral tube  Toxic metabolic encephalopathy  Due to viral sepsis, hypoxia, and infection  Hx of developmental delay although at baseline she is A&O x 3 but does not always follow commands    Fall at home, initial encounter  Presented with fall, + head strike   CTH negative   Bed alarm/fall precautions  Hypernatremia  Recent Labs     01/30/25  0441 01/31/25  0457 02/01/25  0444   SODIUM 142 141 141   Improved.  Lower free water  flushes  Hyperglycemia  Non diabetic patient  Last A1c of 5.5  Lab Results   Component Value Date    GLUC 91 02/01/2025    GLUC 95 01/31/2025    GLUC 108 01/30/2025   Has not needed insulin, d/c insulin and sliding scale  Influenza A  Positive on 1/19  Complicated by viral sepsis, see plan above  Tamiflu completed    Pneumonia of both lungs due to infectious organism  - may have superimposed pneumonia vs bacterial pneumonia  Continue adebayo. Nebs   Patient completed 5 days course of Tamiflu, 3 days course of azithromycin  Adjust sedation as above.    TF Vital high protein not available in hospital, ask nutrition for a substitute TF.   Give Miralax today.  Goal I=O, diurese PRN.    Discussed on rounds.  Critically ill, at risk of death and decompensation. Guarded prognosis.    Critical Care Time Statement: Upon my evaluation, this patient had a high probability of imminent or life-threatening deterioration due to respiratory failure, pneumonia, encephalopathy, which required my direct attention, intervention, and personal management.  I spent a total of 35 minutes directly providing critical care services, including interpretation of complex medical databases, evaluating for the presence of life-threatening injuries or illnesses, management of organ system failure(s) , complex medical decision making (to support/prevent further life-threatening deterioration)., interpretation of hemodynamic data, titration of continuous IV medications (drips), ventilator management, and managing enteral or parenteral nutritional support. This time is exclusive of procedures, teaching, treating other patients, family meetings, and any prior time recorded by providers other than myself.     Disposition: Critical care    ICU Core Measures     A: Assess, Prevent, and Manage Pain Has pain been assessed? Yes  Need for changes to pain regimen? No   B: Both SAT/SAT  N/A   C: Choice of Sedation RASS Goal: 0 Alert and Calm  Need for changes to  sedation or analgesia regimen? NA   D: Delirium CAM-ICU: Negative   E: Early Mobility  Plan for early mobility? Yes   F: Family Engagement Plan for family engagement today? Yes       Review of Invasive Devices:    Harris Plan: Voiding trial after improvement in ambulation         Prophylaxis:  VTE VTE covered by:  enoxaparin, Subcutaneous, 40 mg at 01/31/25 0839       Stress Ulcer  not ordered         24 Hour Events : Patient placed on bipap to maintain saturations.  Weaned down to 70% overnight.  Subjective       Objective :                   Vitals I/O      Most Recent Min/Max in 24hrs   Temp 98.4 °F (36.9 °C) Temp  Min: 98.4 °F (36.9 °C)  Max: 101.3 °F (38.5 °C)   Pulse 84 Pulse  Min: 84  Max: 146   Resp 15 Resp  Min: 14  Max: 47   BP 94/57 BP  Min: 82/50  Max: 132/79   O2 Sat 94 % SpO2  Min: 85 %  Max: 100 %      Intake/Output Summary (Last 24 hours) at 2/1/2025 0804  Last data filed at 2/1/2025 0615  Gross per 24 hour   Intake 550 ml   Output 990 ml   Net -440 ml       Diet NPO    Invasive Monitoring           Physical Exam   Physical Exam  Skin:     General: Skin is warm.      Coloration: Skin is not jaundiced.      Findings: No wound.   HENT:      Head: Normocephalic.   Cardiovascular:      Rate and Rhythm: Normal rate and regular rhythm.      Heart sounds: Normal heart sounds.   Musculoskeletal:         General: No swelling.      Right lower leg: No edema.      Left lower leg: No edema.   Abdominal:      Palpations: Abdomen is soft.      Tenderness: There is no abdominal tenderness. There is no guarding.   Constitutional:       General: She is not in acute distress.     Appearance: She is well-developed.   Pulmonary:      Effort: Pulmonary effort is normal.      Breath sounds: Rhonchi present.   Neurological:      Mental Status: She is alert.   Genitourinary/Anorectal:  Harris present.        Diagnostic Studies        Lab Results: I have reviewed the following results:     Medications:  Scheduled PRN    acetylcysteine, 3 mL, TID  benztropine, 2 mg, BID  chlorhexidine, 15 mL, Q12H MOSHE  cyanocobalamin, 1,000 mcg, Daily  enoxaparin, 40 mg, Daily  ipratropium-albuterol, 3 mL, TID  lacosamide, 150 mg, Q24H  lacosamide, 200 mg, HS  levothyroxine, 175 mcg, Early Morning  OLANZapine, 10 mg, HS  [Held by provider] paliperidone, 6 mg, QAM  senna-docusate sodium, 2 tablet, BID  sodium chloride, 4 mL, TID  valproate sodium, 750 mg, Q12H      Acetaminophen, 975 mg, Q6H PRN  aluminum-magnesium hydroxide-simethicone, 30 mL, Q6H PRN  levalbuterol, 1.25 mg, Q6H PRN  ondansetron, 4 mg, Q6H PRN  polyethylene glycol, 17 g, Daily PRN       Continuous          Labs:   CBC    Recent Labs     01/31/25 0457 02/01/25  0444   WBC 19.14* 10.14   HGB 11.0* 9.1*   HCT 33.8* 28.9*    263     BMP    Recent Labs     01/31/25 0457 02/01/25  0444   SODIUM 141 141   K 4.2 4.0   * 107   CO2 24 29   AGAP 7 5   BUN 31* 38*   CREATININE 0.74 0.81   CALCIUM 8.2* 8.8       Coags    No recent results     Additional Electrolytes  Recent Labs     01/31/25 0457 02/01/25  0444   MG 2.1 2.6   PHOS 3.1  --    CAIONIZED 1.15  --           Blood Gas    No recent results  No recent results LFTs  Recent Labs     02/01/25  0444   ALT 19   AST 20   ALKPHOS 33*   ALB 3.2*   TBILI 0.87       Infectious  No recent results  Glucose  Recent Labs     01/31/25 0457 02/01/25  0444   GLUC 95 91

## 2025-02-01 NOTE — SPEECH THERAPY NOTE
Speech Language/Pathology  Speech/Language Pathology  Assessment    Patient Name: Esperanza Prajapati  Today's Date: 2/1/2025     Problem List  Principal Problem:    Acute respiratory failure with hypoxia (HCC)  Active Problems:    Cognitive developmental delay    Postoperative hypothyroidism    Focal epilepsy (HCC)    Schizoaffective disorder (HCC)    Viral sepsis (HCC)    Toxic metabolic encephalopathy    Fall at home, initial encounter    Influenza A    Hypernatremia    Hyperglycemia    Pneumonia of both lungs due to infectious organism    Past Medical History  Past Medical History:   Diagnosis Date    Cancer (HCC)     thyroid cancer    Disease of thyroid gland     Hallucination     Hyperactivity (behavior)     Last Assessed: 11/7/2014     Hyperlipidemia     Hyponatremia 01/20/2025    Obesity     Psychosis (HCC)     Seizures (HCC)      Past Surgical History  Past Surgical History:   Procedure Laterality Date    OTHER SURGICAL HISTORY      Removal of urinary calculus     OTHER SURGICAL HISTORY      Rhinologic Surgery     SD OPEN TX PHALANGEAL SHAFT FRACTURE PROX/MIDDLE EA Left 8/3/2023    Procedure: CLOSED REDUCTION PERCUTANEOUS PINNING - LEFT RING FINGER;  Surgeon: Fransisco Escalante MD;  [facility]: [facility] MAIN [facility];  Service: Orthopedics    TOTAL THYROIDECTOMY      LAst Assessed: 11/7/2014      Bedside Swallow Evaluation:    Summary:  Pt presented w/ mod/severe oropharyngeal dysphagia. Recently intubated 1/21-1/31 and currently on midflow. Pt not previously seen by speech therapy, however, barium swallow (2022) with concerns of oropharyngeal dysphagia and with hx of hiatal hernias. Trials of ice chips, thins tsp, NTL cup/tsp, and puree initiated. Prolonged oral holding of all consistencies. Severely delayed and uncoordinated pharyngeal swallow. Immediate wet/weak coughing with thins tsp, nectar thick cup, and puree. Increased s/s of aspiration noted with ongoing trials. Suspect pt with oropharyngeal dysphagia  at baseline due to medical hx. Will continue to f/u as able/appropriate.    Recommendations:  Diet: NPO  Meds: Non oral  Oral care: Frequent 3-4x/day  Aspiration precautions  Reflux precautions  Therapy Prognosis: Guarded  Prognosis considerations: prolonged intubation, cognition  Frequency: 3-5x/wk as able/appropriate    Consider consult w/:  Rehab  Nutrition    Goal(s):  Pt will tolerate least restrictive diet w/out s/s aspiration or oral/pharyngeal difficulties.     Dysphagia LTG  -Patient will demonstrate safe and effective oral intake (without overt s/s significant oral/pharyngeal dysphagia including s/s penetration or aspiration) for the highest appropriate diet level.     Short Term Goals:    -Patient will tolerate trials of upgraded food and/or liquid texture with no significant s/s of oral or pharyngeal dysphagia including aspiration across 1-3 diagnostic sessions     -Patient will comply with a Video/Modified Barium Swallow study if indicated for more complete assessment of swallowing anatomy/physiology/aspiration risk and to assess efficacy of treatment techniques      H&P/Admit info/ pertinent provider notes: (PMH noted above)  Chief Complaint:      Esperanza Prajapati is a 54 y.o. female with a PMH as above who presents with c/o altered mental status.  Patient is somnolent and confused, unable to obtain HPI.  No family present at bedside, attempted to call, no answer.      ED note:  As per family, patient was ill approximately 2 weeks ago with some coughing. This has persisted. She has been acting more confused over the last 2 days that they described as some brain fog and say that yesterday she put on her pants backwards which is something she typically would not do. Today, when she was ambulating up the stairs she had a witnessed fall and struck her head on the wall. She has been acting increasingly confused since this fall. She is not known to have any fevers. She typically is able to hold a  conversation and is alert and oriented. She typically ambulates on her own. Since this event she is oriented only to her name but not to time or place       Special Studies:  Chest XR 1/31/25:  IMPRESSION:     Unchanged bibasilar opacities, atelectasis versus pneumonia.    Barium Swallow 3/10/22:  IMPRESSION:     Small hiatal hernia.  Otherwise, normal examination.     If there is concern for oropharyngeal dysphagia, consider video barium swallow with speech therapy.    Previous MBS:  None found on Epic    Patient's goal: None stated     Reason for consult:  R/o aspiration  Determine safest and least restrictive diet  Failed nursing dysphagia assessment  Change in mental status    Precautions:  Aspiration    Food Allergies: NKFA   Current Diet: NPO   Premorbid diet: Presumed regular/thin   O2 requirement: MFNC   Social/Prior living Home with family   Voice/Speech: WNL   Follows commands: 1 Step   Cognitive status: Impaired at baseline      [facility]:  Dentition: Natural  Generalized upper and lower bilateral facial weakness   Oral care     Esophageal stage:  H/o hiatal hernia    Aspiration precautions posted    Results d/w:  Pt, nursing

## 2025-02-01 NOTE — PHYSICAL THERAPY NOTE
PT EVALUATION & TREATMENT    Pt. Name: Esperanza Prajapati  Pt. Age: 54 y.o.  MRN: [id number]  LENGTH OF STAY: 12      Admitting Diagnoses:   Influenza A [J10.1]  Fever [R50.9]  Acute encephalopathy [G93.40]  Acute hypoxic respiratory failure (HCC) [J96.01]    Past Medical History:   Diagnosis Date    Cancer (HCC)     thyroid cancer    Disease of thyroid gland     Hallucination     Hyperactivity (behavior)     Last Assessed: 11/7/2014     Hyperlipidemia     Hyponatremia 01/20/2025    Obesity     Psychosis (HCC)     Seizures (HCC)        Past Surgical History:   Procedure Laterality Date    OTHER SURGICAL HISTORY      Removal of urinary calculus     OTHER SURGICAL HISTORY      Rhinologic Surgery     AZ OPEN TX PHALANGEAL SHAFT FRACTURE PROX/MIDDLE EA Left 8/3/2023    Procedure: CLOSED REDUCTION PERCUTANEOUS PINNING - LEFT RING FINGER;  Surgeon: Fransisco Escalante MD;  Location: [facility] MAIN OR;  Service: Orthopedics    TOTAL THYROIDECTOMY      LAst Assessed: 11/7/2014       Imaging Studies:  XR chest portable ICU   Final Result by Hipolito Payne MD (01/31 4067)      Unchanged bibasilar opacities, atelectasis versus pneumonia.            Workstation performed: [id number]         XR chest portable ICU   Final Result by Semaj Meng MD (01/30 1425)      Small left pleural effusion. Bibasilar linear opacities likely represent atelectasis. Pneumonia is to be determined clinical grounds.            Workstation performed: [id number]         XR chest portable ICU   Final Result by Lexus Jack MD (01/27 1036)      The left base is obscured due to combination of small effusion/atelectasis/consolidation      No worsening seen   No new consolidation   Tubes and lines in appropriate position      Workstation performed: [id number]         XR chest portable ICU   Final Result by Kaycee Manriquez MD (01/24 1230)      Moderate pulmonary edema with moderate pleural effusions and bibasilar atelectasis.  Pneumonia not excluded in the appropriate clinical setting.            Workstation performed: [id number]         XR chest portable ICU   Final Result by Edward Bruner MD (01/23 0832)      Stable congestion with bilateral effusions and bibasilar airspace opacity.            Workstation performed: [id number]         XR chest portable   Final Result by Pavan Marroquin MD (01/22 0803)      Lines and tubes appear satisfactory.   Pulmonary edema pattern with pleural effusions. Low lung volumes. Pneumonia not entirely excluded in the appropriate clinical situation, particularly in the left lower lobe.            Workstation performed: [id number]         XR chest portable ICU   Final Result by Kaycee Manriquez MD (01/21 1549)      Right jugular catheter in right brachiocephalic vein with no pneumothorax.      Persistent pulmonary edema.      Dense consolidation in the left lower lobe which could be due to pneumonia in the appropriate clinical setting.            Workstation performed: [id number]         XR chest portable ICU   Final Result by Kaycee Manriquez MD (01/21 3971)      ET tube 4 cm above the dennis.      Worsening pulmonary edema      Dense left lower lobe consolidation with air bronchograms which could be due to pneumonia in the appropriate clinical setting.            Workstation performed: [id number]         XR chest portable   Final Result by Kaycee Manriquez MD (01/21 1143)      Low lung volumes with bibasilar opacity with loss of the medial left diaphragm which could be due to atelectasis. Pneumonia not excluded in the appropriate clinical setting.            Workstation performed: [id number]         CT head without contrast   Final Result by Jose Kingston MD (01/20 0117)      No acute intracranial abnormality.                  Workstation performed: [id number]         CT chest abdomen pelvis w contrast   Final Result by Jose Kingston MD (01/20 0205)      No acute findings in the chest,  abdomen or pelvis, noting images degraded by motion artifact.               Workstation performed: [id number]         CT cervical spine without contrast   Final Result by Jose Kingston MD (01/20 0138)      1.  No cervical spine fracture or traumatic malalignment, noting images are degraded by motion artifact.   2.  Mildly prominent bilateral level 2 lymph nodes, nonspecific.                  Workstation performed: [id number]              02/01/25 1052   PT Last Visit   PT Visit Date 02/01/25   Note Type   Note type Evaluation   Pain Assessment   Pain Assessment Tool FLACC   Pain Rating: FLACC (Rest) - Face 0   Pain Rating: FLACC (Rest) - Legs 0   Pain Rating: FLACC (Rest) - Activity 0   Pain Rating: FLACC (Rest) - Cry 0   Pain Rating: FLACC (Rest) - Consolability 0   Score: FLACC (Rest) 0   Pain Rating: FLACC (Activity) - Face 0   Pain Rating: FLACC (Activity) - Legs 0   Pain Rating: FLACC (Activity) - Activity 0   Pain Rating: FLACC (Activity) - Cry 0   Pain Rating: FLACC (Activity) - Consolability 0   Score: FLACC (Activity) 0   Restrictions/Precautions   Weight Bearing Precautions Per Order No   Other Precautions Cognitive;Chair Alarm;Bed Alarm;Multiple lines;Telemetry;O2;Fall Risk  (40L HFNC)   Home Living   Type of Home House   Home Layout Two level;1/2 bath on main level;Bed/bath upstairs;Stairs to enter without rails;Other (Comment)  (0STE (front); 4STE (back); 16 steps to 2nd flr bed/bath)   Bathroom Shower/Tub Tub/shower unit   Bathroom Toilet Standard   Home Equipment   (no DME)   Additional Comments (S)  pt poor historian; above information gathered from PT kerial in 2022   Prior Function   Level of [place] Independent with functional mobility;Independent with ADLs  (ambulates w/o AD)   Lives With Family  (mother)   Receives Help From Family   Falls in the last 6 months 1 to 4  (1x, leading to this admission)   Vocational On disability   Comments (S)  (-) ; pt poor historian; above information  gathered from PT eval in 2022   General   Additional Pertinent History developmental delay   Family/Caregiver Present No   Cognition   Overall Cognitive Status Impaired   Arousal/Participation Alert   Orientation Level Oriented to person;Oriented to place;Oriented to time   Following Commands Follows one step commands with increased time or repetition   Comments cooperative; impulsive   Subjective   Subjective RN cleared pt for PT/OT evals.   RUE Assessment   RUE Assessment   (refer to OT)   LUE Assessment   LUE Assessment   (refer to OT)   RLE Assessment   RLE Assessment X  (pt difficulty following commands; appears at least 3-/5 as observed functionally)   LLE Assessment   LLE Assessment X  (pt difficulty following commands; appears at least 3-/5 as observed functionally)   Bed Mobility   Rolling R 2  Maximal assistance   Additional items Assist x 2;Bedrails;Increased time required;Verbal cues;LE management   Rolling L 2  Maximal assistance   Additional items Assist x 2;Bedrails;Increased time required;Verbal cues;LE management   Supine to Sit   (supine to long sitting modAx2)   Additional Comments cues for techniques & safety; (+) bowel incontinence hence provided pericare; after pericare & repositioning in bed pt attempting to get OOB hence trial long sitting & pt able to perform supine to long sitting w/ modAx2. Pt able to tolerate further mobility after IE. Please see additional tx below for details.   Transfers   Sit to Stand Unable to assess   Stand to Sit Unable to assess   Additional Comments unable to assess during IE but pt able to perform further mobility after IE; please see additional tx below for details   Ambulation/Elevation   Gait Assistance Not tested   Ambulation/Elevation Additional Comments unable to assess during IE but pt able to perform further mobility after IE; please see additional tx below for details   Balance   Static Sitting Fair -   Dynamic Sitting Poor +   Static Standing Poor  (w/  RW)   Dynamic Standing Poor -  (w/ RW)   Ambulatory Poor -  (w/ RW)   Endurance Deficit   Endurance Deficit Yes   Endurance Deficit Description weakness; fatigue   Activity Tolerance   Activity Tolerance Patient limited by fatigue;Treatment limited secondary to medical complications (Comment)   Medical Staff Made Aware OTR [place]   Nurse Made Aware yes   Assessment   Prognosis Good   Problem List Decreased strength;Decreased endurance;Impaired balance;Decreased mobility;Decreased cognition;Impaired judgement;Decreased safety awareness;Obesity   Assessment Pt. 54 y.o.female admitted for Acute respiratory failure with hypoxia (HCC) w/ Influenza A; Acute encephalopathy; sepsis & pneumonia. Pt intubated on 1/21/25 & extubated on 1/30/25. Pt currently on 40L HFNC. Pt referred to PT for mobility assessment & D/C planning. Please see above for information re: home set-up & PLOF as well as objective findings during PT assessment. On eval, pt functioning below baseline hence will continue skilled PT to improve function & safety. Pt was incontinent of bowels upon arrival hence majority of time of eval was performing pericare, rolling & repositioning in bed. Pt require maxAx2 for rolling in bed & dependent x2 to boost to HOB + cues for techniques & safety. After pericare, pt started attempting to get OOB hence assess long sitting. Pt able to perform supine to long sitting w/ modAx2. Pt able to tolerate further mobility after IE. Please see additional tx below for details.  The patient's AM-PAC Basic Mobility Inpatient Short Form Raw Score is 6. A Raw score of less than or equal to 16 suggests the patient may benefit from discharge to post-acute rehabilitation services. Please also refer to the recommendation of the Physical Therapist for safe discharge planning. From PT standpoint, will recommend Level I (maximun resource intensity) rehab services at D/C. No dizziness reported t/o session. SpO2 while supine 77% w/ HFNC. Pt w/  "low BPs in supine & as follows: 83/62; 96/70; 96/67. Nsg staff most recent vital signs as follows: /66   Pulse 84   Temp 98.4 °F (36.9 °C) (Axillary)   Resp 14   Ht 5' 9\" (1.753 m)   Wt 106 kg (232 lb 12.9 oz)   LMP  (LMP Unknown)   SpO2 99%   BMI 34.38 kg/m² . At end of session, pt back in bed in stable condition, call bell & phone in reach, bed alarm activated, all lines intact. Fall precautions reinforced w/ good understanding. CM to follow. Nsg staff to continue to mobilized pt via quick move device vs payton lift. RN made aware. Co-eval was necessary to complete this PT eval for the pt's best interest given pt's medical acuity & complexity.   Goals   Patient Goals none stated 2* to impaired cognition   STG Expiration Date 02/15/25   Short Term Goal #1 Goals to be met in 14 days; pt will be able to: 1) inc strength & balance by 1/2 grade to improve overall functional mobility & dec fall risk; 2) inc bed mobility to minAx1 for pt to be able to get in/OOB safely w/ proper techniques 100% of the time, to dec caregiver burden & safely function at home; 3) inc transfers to minAx1 for pt to transition safely from one surface to another w/o % of the time, to dec caregiver burden & safely function at home; 4) inc amb w/ RW approx. >150' w/ minAx1 for pt to ambulate household distances w/o any % of the time, to dec caregiver burden & safely function at home; 5) negotiate stairs w/ minAx1 for inc safety during stair mgt inside/outside of home & dec caregiver burden; 6) pt/caregiver ed   PT Treatment Day 1   Plan   Treatment/Interventions Functional transfer training;LE strengthening/ROM;Elevations;Therapeutic exercise;Endurance training;Patient/family training;Bed mobility;Gait training;Spoke to nursing;OT   PT Frequency 3-5x/wk   Discharge Recommendation   Rehab Resource Intensity Level, PT I (Maximum Resource Intensity)   AM-PAC Basic Mobility Inpatient   Turning in Flat Bed Without Bedrails 1 "   Lying on Back to Sitting on Edge of Flat Bed Without Bedrails 1   Moving Bed to Chair 1   Standing Up From Chair Using Arms 1   Walk in Room 1   Climb 3-5 Stairs With Railing 1   Basic Mobility Inpatient Raw Score 6   Turning Head Towards Sound 3   Follow Simple Instructions 2   Low Function Basic Mobility Raw Score  11   Low Function Basic Mobility Standardized Score  16.55   [facility] Highest Level Of Mobility   -HL Goal 2: Bed activities/Dependent transfer   -HL Achieved 2: Bed activities/Dependent transfer   Additional Treatment Session   Start Time 1040   End Time 1052   Treatment Assessment After IE, pt able to tolerate further mobility. Pt require modAx2 for supine<>sit, sit<>stand & amb trial w/ RW. Pt w/ poor standing & amb tolerance. Pt able to perform standing & lateral stepping to HOB w/ RW 3x as pt only able to stand approx. 3-4 seconds then have to sit. Spo2 improved 84-96% upon sitting at EOB & standing. HFNC maintained t/o session. Pt assisted back in bed at end of session. All needs in reach. All lines intact. Bed alarm activated. Placed pt w/ bed in chair position which is tolerating well. Will continue PT per POC. Nsg staff may use quick move device for OOB to recliner & ideally w/ payton sling in chair in case pt will not be able to stand from recliner. RN made aware.   Equipment Use RW   Additional Treatment Day 1   End of Consult   Patient Position at End of Consult Supine;Bed/Chair alarm activated;All needs within reach   End of Consult Comments Pt in stable condition. All needs in reach. All lines intact. Bed alarm activated.   Hx/personal factors: co-morbidities, inaccessible home, mutliple lines, telemetry, use of AD, dec cognition, h/o of falls, fall risk, assist w/ ADL's, obesity, and O2  Examination: dec mobility, dec balance, dec endurance, dec amb, risk for falls, dec cognition  Clinical: unpredictable (ongoing medical status, abnormal lab values, and risk for  falls)  Complexity: high    Dario Marino

## 2025-02-01 NOTE — PLAN OF CARE
Problem: Potential for Falls  Goal: Patient will remain free of falls  Description: INTERVENTIONS:  - Educate patient/family on patient safety including physical limitations  - Instruct patient to call for assistance with activity   - Consult OT/PT to assist with strengthening/mobility   - Keep Call bell within reach  - Keep bed low and locked with side rails adjusted as appropriate  - Keep care items and personal belongings within reach  - Initiate and maintain comfort rounds  - Make Fall Risk Sign visible to staff  - Offer Toileting every 2 Hours, in advance of need  - Initiate/Maintain bed alarm  - Obtain necessary fall risk management equipment:   - Apply yellow socks and bracelet for high fall risk patients  - Consider moving patient to room near nurses station  Outcome: Progressing     Problem: Prexisting or High Potential for Compromised Skin Integrity  Goal: Skin integrity is maintained or improved  Description: INTERVENTIONS:  - Identify patients at risk for skin breakdown  - Assess and monitor skin integrity  - Assess and monitor nutrition and hydration status  - Monitor labs   - Assess for incontinence   - Turn and reposition patient  - Assist with mobility/ambulation  - Relieve pressure over bony prominences  - Avoid friction and shearing  - Provide appropriate hygiene as needed including keeping skin clean and dry  - Evaluate need for skin moisturizer/barrier cream  - Collaborate with interdisciplinary team   - Patient/family teaching  - Consider wound care consult   Outcome: Progressing     Problem: PAIN - ADULT  Goal: Verbalizes/displays adequate comfort level or baseline comfort level  Description: Interventions:  - Encourage patient to monitor pain and request assistance  - Assess pain using appropriate pain scale  - Administer analgesics based on type and severity of pain and evaluate response  - Implement non-pharmacological measures as appropriate and evaluate response  - Consider cultural and  social influences on pain and pain management  - Notify physician/advanced practitioner if interventions unsuccessful or patient reports new pain  Outcome: Progressing     Problem: INFECTION - ADULT  Goal: Absence or prevention of progression during hospitalization  Description: INTERVENTIONS:  - Assess and monitor for signs and symptoms of infection  - Monitor lab/diagnostic results  - Monitor all insertion sites, i.e. indwelling lines, tubes, and drains  - Monitor endotracheal if appropriate and nasal secretions for changes in amount and color  - [place] appropriate cooling/warming therapies per order  - Administer medications as ordered  - Instruct and encourage patient and family to use good hand hygiene technique  - Identify and instruct in appropriate isolation precautions for identified infection/condition  Outcome: Progressing     Problem: SAFETY ADULT  Goal: Patient will remain free of falls  Description: INTERVENTIONS:  - Educate patient/family on patient safety including physical limitations  - Instruct patient to call for assistance with activity   - Consult OT/PT to assist with strengthening/mobility   - Keep Call bell within reach  - Keep bed low and locked with side rails adjusted as appropriate  - Keep care items and personal belongings within reach  - Initiate and maintain comfort rounds  - Make Fall Risk Sign visible to staff  - Offer Toileting every 2 Hours, in advance of need  - Initiate/Maintain bed alarm  - Obtain necessary fall risk management equipment:   - Apply yellow socks and bracelet for high fall risk patients  - Consider moving patient to room near nurses station  Outcome: Progressing  Goal: Maintain or return to baseline ADL function  Description: INTERVENTIONS:  -  Assess patient's ability to carry out ADLs; assess patient's baseline for ADL function and identify physical deficits which impact ability to perform ADLs (bathing, care of mouth/teeth, toileting, grooming, dressing,  etc.)  - Assess/evaluate cause of self-care deficits   - Assess range of motion  - Assess patient's mobility; develop plan if impaired  - Assess patient's need for assistive devices and provide as appropriate  - Encourage maximum independence but intervene and supervise when necessary  - Involve family in performance of ADLs  - Assess for home care needs following discharge   - Consider OT consult to assist with ADL evaluation and planning for discharge  - Provide patient education as appropriate  Outcome: Progressing  Goal: Maintains/Returns to pre admission functional level  Description: INTERVENTIONS:  - Perform AM-PAC 6 Click Basic Mobility/ Daily Activity assessment daily.  - Set and communicate daily mobility goal to care team and patient/family/caregiver.   - Collaborate with rehabilitation services on mobility goals if consulted  - Perform Range of Motion 4 times a day.  - Reposition patient every 2 hours.    - Out of bed for toileting  - Record patient progress and toleration of activity level   Outcome: Progressing     Problem: DISCHARGE PLANNING  Goal: Discharge to home or other facility with appropriate resources  Description: INTERVENTIONS:  - Identify barriers to discharge w/patient and caregiver  - Arrange for needed discharge resources and transportation as appropriate  - Identify discharge learning needs (meds, wound care, etc.)  - Arrange for interpretive services to assist at discharge as needed  - Refer to Case Management Department for coordinating discharge planning if the patient needs post-hospital services based on physician/advanced practitioner order or complex needs related to functional status, cognitive ability, or social support system  Outcome: Progressing     Problem: NEUROSENSORY - ADULT  Goal: Achieves stable or improved neurological status  Description: INTERVENTIONS  - Monitor and report changes in neurological status  - Monitor vital signs such as temperature, blood pressure,  glucose, and any other labs ordered   - Initiate measures to prevent increased intracranial pressure  - Monitor for seizure activity and implement precautions if appropriate      Outcome: Progressing  Goal: Remains free of injury related to seizures activity  Description: INTERVENTIONS  - Maintain airway, patient safety  and administer oxygen as ordered  - Monitor patient for seizure activity, document and report duration and description of seizure to physician/advanced practitioner  - If seizure occurs,  ensure patient safety during seizure  - Reorient patient post seizure  - Seizure pads on all 4 side rails  - Instruct patient/family to notify RN of any seizure activity including if an aura is experienced  - Instruct patient/family to call for assistance with activity based on nursing assessment  - Administer anti-seizure medications if ordered    Outcome: Progressing  Goal: Achieves maximal functionality and self care  Description: INTERVENTIONS  - Monitor swallowing and airway patency with patient fatigue and changes in neurological status  - Encourage and assist patient to increase activity and self care.   - Encourage visually impaired, hearing impaired and aphasic patients to use assistive/communication devices  Outcome: Progressing     Problem: RESPIRATORY - ADULT  Goal: Achieves optimal ventilation and oxygenation  Description: INTERVENTIONS:  - Assess for changes in respiratory status  - Assess for changes in mentation and behavior  - Position to facilitate oxygenation and minimize respiratory effort  - Oxygen administered by appropriate delivery if ordered  - Initiate smoking cessation education as indicated  - Encourage broncho-pulmonary hygiene including cough, deep breathe, Incentive Spirometry  - Assess the need for suctioning and aspirate as needed  - Assess and instruct to report SOB or any respiratory difficulty  - Respiratory Therapy support as indicated  Outcome: Progressing     Problem:  GENITOURINARY - ADULT  Goal: Maintains or returns to baseline urinary function  Description: INTERVENTIONS:  - Assess urinary function  - Encourage oral fluids to ensure adequate hydration if ordered  - Administer IV fluids as ordered to ensure adequate hydration  - Administer ordered medications as needed  - Offer frequent toileting  - Follow urinary retention protocol if ordered  Outcome: Progressing  Goal: Absence of urinary retention  Description: INTERVENTIONS:  - Assess patient’s ability to void and empty bladder  - Monitor I/O  - Bladder scan as needed  - Discuss with physician/AP medications to alleviate retention as needed  - Discuss catheterization for long term situations as appropriate  Outcome: Progressing     Problem: METABOLIC, FLUID AND ELECTROLYTES - ADULT  Goal: Electrolytes maintained within normal limits  Description: INTERVENTIONS:  - Monitor labs and assess patient for signs and symptoms of electrolyte imbalances  - Administer electrolyte replacement as ordered  - Monitor response to electrolyte replacements, including repeat lab results as appropriate  - Instruct patient on fluid and nutrition as appropriate  Outcome: Progressing  Goal: Fluid balance maintained  Description: INTERVENTIONS:  - Monitor labs   - Monitor I/O and WT  - Instruct patient on fluid and nutrition as appropriate  - Assess for signs & symptoms of volume excess or deficit  Outcome: Progressing     Problem: HEMATOLOGIC - ADULT  Goal: Maintains hematologic stability  Description: INTERVENTIONS  - Assess for signs and symptoms of bleeding or hemorrhage  - Monitor labs  - Administer supportive blood products/factors as ordered and appropriate  Outcome: Progressing     Problem: MUSCULOSKELETAL - ADULT  Goal: Maintain or return mobility to safest level of function  Description: INTERVENTIONS:  - Assess patient's ability to carry out ADLs; assess patient's baseline for ADL function and identify physical deficits which impact  ability to perform ADLs (bathing, care of mouth/teeth, toileting, grooming, dressing, etc.)  - Assess/evaluate cause of self-care deficits   - Assess range of motion  - Assess patient's mobility  - Assess patient's need for assistive devices and provide as appropriate  - Encourage maximum independence but intervene and supervise when necessary  - Involve family in performance of ADLs  - Assess for home care needs following discharge   - Consider OT consult to assist with ADL evaluation and planning for discharge  - Provide patient education as appropriate  Outcome: Progressing  Goal: Maintain proper alignment of affected body part  Description: INTERVENTIONS:  - Support, maintain and protect limb and body alignment  - Provide patient/ family with appropriate education  Outcome: Progressing     Problem: SAFETY,RESTRAINT: NV/NON-SELF DESTRUCTIVE BEHAVIOR  Goal: Remains free of harm/injury (restraint for non violent/non self-detsructive behavior)  Description: INTERVENTIONS:  - Instruct patient/family regarding restraint use   - Assess and monitor physiologic and psychological status   - Provide interventions and comfort measures to meet assessed patient needs   - Identify and implement measures to help patient regain control  - Assess readiness for release of restraint   Outcome: Progressing  Goal: Returns to optimal restraint-free functioning  Description: INTERVENTIONS:  - Assess the patient's behavior and symptoms that indicate continued need for restraint  - Identify and implement measures to help patient regain control  - Assess readiness for release of restraint   Outcome: Progressing     Problem: Nutrition/Hydration-ADULT  Goal: Nutrient/Hydration intake appropriate for improving, restoring or maintaining nutritional needs  Description: Monitor and assess patient's nutrition/hydration status for malnutrition. Collaborate with interdisciplinary team and initiate plan and interventions as ordered.  Monitor  patient's weight and dietary intake as ordered or per policy. Utilize nutrition screening tool and intervene as necessary. Determine patient's food preferences and provide high-protein, high-caloric foods as appropriate.     INTERVENTIONS:  - Monitor oral intake, urinary output, labs, and treatment plans  - Assess nutrition and hydration status and recommend course of action  - Evaluate amount of meals eaten  - Assist patient with eating if necessary   - Allow adequate time for meals  - Recommend/ encourage appropriate diets, oral nutritional supplements, and vitamin/mineral supplements  - Order, calculate, and assess calorie counts as needed  - Recommend, monitor, and adjust tube feedings and TPN/PPN based on assessed needs  - Assess need for intravenous fluids  - Provide specific nutrition/hydration education as appropriate  - Include patient/family/caregiver in decisions related to nutrition  Outcome: Progressing     Problem: COPING  Goal: Pt/Family able to verbalize concerns and demonstrate effective coping strategies  Description: INTERVENTIONS:  - Assist patient/family to identify coping skills, available support systems and cultural and spiritual values  - Provide emotional support, including active listening and acknowledgement of concerns of patient and caregivers  - Reduce environmental stimuli, as able  - Provide patient education  - Assess for spiritual pain/suffering and initiate spiritual care, including notification of Pastoral Care or dottie based community as needed  - Assess effectiveness of coping strategies  Outcome: Progressing  Goal: Will report anxiety at manageable levels  Description: INTERVENTIONS:  - Administer medication as ordered  - Teach and encourage coping skills  - Provide emotional support  - Assess patient/family for anxiety and ability to cope  Outcome: Progressing

## 2025-02-01 NOTE — PLAN OF CARE
Problem: PHYSICAL THERAPY ADULT  Goal: Performs mobility at highest level of function for planned discharge setting.  See evaluation for individualized goals.  Description: Treatment/Interventions: Functional transfer training, LE strengthening/ROM, Elevations, Therapeutic exercise, Endurance training, Patient/family training, Bed mobility, Gait training, Spoke to nursing, OT          See flowsheet documentation for full assessment, interventions and recommendations.  Note: Prognosis: Good  Problem List: Decreased strength, Decreased endurance, Impaired balance, Decreased mobility, Decreased cognition, Impaired judgement, Decreased safety awareness, Obesity  Assessment: Pt. 54 y.o.female admitted for Acute respiratory failure with hypoxia (HCC) w/ Influenza A; Acute encephalopathy; sepsis & pneumonia. Pt intubated on 1/21/25 & extubated on 1/30/25. Pt currently on 40L HFNC. Pt referred to PT for mobility assessment & D/C planning. Please see above for information re: home set-up & PLOF as well as objective findings during PT assessment. On eval, pt functioning below baseline hence will continue skilled PT to improve function & safety. Pt was incontinent of bowels upon arrival hence majority of time of eval was performing pericare, rolling & repositioning in bed. Pt require maxAx2 for rolling in bed & dependent x2 to boost to HOB + cues for techniques & safety. After pericare, pt started attempting to get OOB hence assess long sitting. Pt able to perform supine to long sitting w/ modAx2. Pt able to tolerate further mobility after IE. Please see additional tx below for details.  The patient's AM-PAC Basic Mobility Inpatient Short Form Raw Score is 6. A Raw score of less than or equal to 16 suggests the patient may benefit from discharge to post-acute rehabilitation services. Please also refer to the recommendation of the Physical Therapist for safe discharge planning. From PT standpoint, will recommend Level I  "(maximun resource intensity) rehab services at D/C. No dizziness reported t/o session. SpO2 while supine 77% w/ HFNC. Pt w/ low BPs in supine & as follows: 83/62; 96/70; 96/67. Nsg staff most recent vital signs as follows: /66   Pulse 84   Temp 98.4 °F (36.9 °C) (Axillary)   Resp 14   Ht 5' 9\" (1.753 m)   Wt 106 kg (232 lb 12.9 oz)   LMP  (LMP Unknown)   SpO2 99%   BMI 34.38 kg/m² . At end of session, pt back in bed in stable condition, call bell & phone in reach, bed alarm activated, all lines intact. Fall precautions reinforced w/ good understanding. CM to follow. Nsg staff to continue to mobilized pt via quick move device vs payton lift. RN made aware. Co-eval was necessary to complete this PT eval for the pt's best interest given pt's medical acuity & complexity.     Additional Treatment Session   Start Time 1040   End Time 1052   Treatment Assessment After IE, pt able to tolerate further mobility. Pt require modAx2 for supine<>sit, sit<>stand & amb trial w/ RW. Pt w/ poor standing & amb tolerance. Pt able to perform standing & lateral stepping to HOB w/ RW 3x as pt only able to stand approx. 3-4 seconds then have to sit. Spo2 improved 84-96% upon sitting at EOB & standing. HFNC maintained t/o session. Pt assisted back in bed at end of session. All needs in reach. All lines intact. Bed alarm activated. Placed pt w/ bed in chair position which is tolerating well. Will continue PT per POC. Nsg staff may use quick move device for OOB to recliner & ideally w/ payton sling in chair in case pt will not be able to stand from recliner. RN made aware.        Rehab Resource Intensity Level, PT: I (Maximum Resource Intensity)    See flowsheet documentation for full assessment.        "

## 2025-02-01 NOTE — OCCUPATIONAL THERAPY NOTE
Occupational Therapy Evaluation     Patient Name: Esperanza Prajapati  Today's Date: 2/1/2025  Problem List  Principal Problem:    Acute respiratory failure with hypoxia (HCC)  Active Problems:    Cognitive developmental delay    Postoperative hypothyroidism    Focal epilepsy (HCC)    Schizoaffective disorder (HCC)    Viral sepsis (HCC)    Toxic metabolic encephalopathy    Fall at home, initial encounter    Influenza A    Hypernatremia    Hyperglycemia    Pneumonia of both lungs due to infectious organism    Past Medical History  Past Medical History:   Diagnosis Date    Cancer (HCC)     thyroid cancer    Disease of thyroid gland     Hallucination     Hyperactivity (behavior)     Last Assessed: 11/7/2014     Hyperlipidemia     Hyponatremia 01/20/2025    Obesity     Psychosis (HCC)     Seizures (HCC)      Past Surgical History  Past Surgical History:   Procedure Laterality Date    OTHER SURGICAL HISTORY      Removal of urinary calculus     OTHER SURGICAL HISTORY      Rhinologic Surgery     [place] OPEN TX PHALANGEAL SHAFT FRACTURE PROX/MIDDLE EA Left 8/3/2023    Procedure: CLOSED REDUCTION PERCUTANEOUS PINNING - LEFT RING FINGER;  Surgeon: Fransisco Escalante MD;  Location: [facility];  Service: Orthopedics    TOTAL THYROIDECTOMY      LAst Assessed: 11/7/2014 02/01/25 1053   OT Last Visit   OT Visit Date 02/01/25   Note Type   Note type Screen   Pain Assessment   Pain Assessment Tool FLACC   Pain Rating: FLACC (Rest) - Face 0   Pain Rating: FLACC (Rest) - Legs 0   Pain Rating: FLACC (Rest) - Activity 0   Pain Rating: FLACC (Rest) - Cry 0   Pain Rating: FLACC (Rest) - Consolability 0   Score: FLACC (Rest) 0   Pain Rating: FLACC (Activity) - Face 0   Pain Rating: FLACC (Activity) - Legs 0   Pain Rating: FLACC (Activity) - Activity 0   Pain Rating: FLACC (Activity) - Cry 0   Pain Rating: FLACC (Activity) - Consolability 0   Score: FLACC (Activity) 0   Restrictions/Precautions   Weight Bearing Precautions  "Per Order No   Other Precautions Cognitive;Chair Alarm;Bed Alarm;Multiple lines;Telemetry;Fall Risk;O2;Aspiration  (40HFNC, patel cath)   Home Living   Type of Home House   Home Layout Two level;1/2 bath on main level;Performs ADLs on one level;Able to live on main level with bedroom/bathroom  (0STE front, 4STE back, 16steps 2nd floor bed/bath)   Bathroom Shower/Tub Tub/shower unit   Bathroom Toilet Standard   Bathroom Accessibility Accessible   Additional Comments pt poor historian unable to provide all history   Prior Function   Level of [place] Independent with ADLs;Independent with functional mobility;Independent with IADLS   Lives With Family;Other (Comment)  (mother)   Receives Help From Family   IADLs Independent with meal prep;Family/Friend/Other provides meals   Falls in the last 6 months 1 to 4   Vocational On disability   Comments questionable historian, information taskend form previous chart reviews   Lifestyle   Autonomy per pt independent w/ ADLs, independent w/ functional transfers and mobility w/ no AD, independent w/ light IADLs   Reciprocal Relationships mother   Service to Others on disability   Intrinsic Gratification watch tv   Subjective   Subjective \"I need to go to the bathroom\"   ADL   Where Assessed Chair   Eating Assistance 3  Moderate Assistance   Grooming Assistance 3  Moderate Assistance   UB Bathing Assistance 3  Moderate Assistance   LB Bathing Assistance 1  Total Assistance   UB Dressing Assistance 2  Maximal Assistance   LB Dressing Assistance 1  Total Assistance   Toileting Assistance  1  Total Assistance   Toileting Deficit Setup;Verbal cueing;Supervison/safety;Increased time to complete;Clothing management down;Clothing management up;Perineal hygiene  (incontinent of bowel movement)   Functional Assistance 2  Maximal Assistance   Bed Mobility   Rolling R 2  Maximal assistance   Additional items Assist x 2;Bedrails;Increased time required;Verbal cues;LE management   Rolling " L 2  Maximal assistance   Additional items Assist x 2;Increased time required;Verbal cues;LE management;Bedrails   Supine to Sit 3  Moderate assistance   Additional items Assist x 2;Increased time required;Verbal cues;Bedrails;HOB elevated  (long sitting in bed)   Additional Comments cues for positioning and techniques; pt w/ max assist x2 repositioning in bed and then pt w/ attempting to get OOB adn further tx note completed after to assess   Transfers   Sit to Stand Unable to assess   Stand to Sit Unable to assess   Additional Comments able to assess in additional tx session   Balance   Static Sitting Fair -   Dynamic Sitting Poor +   Activity Tolerance   Activity Tolerance Patient limited by fatigue;Patient limited by pain;Treatment limited secondary to medical complications (Comment)  (dyspnea, hypotension)   Medical Staff Made Aware PT Dario: Pt seen for co-session with skilled Physical therapist 2* clinically unstable presentation, medical complexity, new precautions, performance deficits/functional limitations, impaired cognition/safety awareness, limited activity tolerance and present impairments which are a regression from patient patient's baseline and impacting overall occupational performance   Nurse Made Aware appropriate to see per Ivana CHEN   RUE Assessment   RUE Assessment   (limited elevation, grossly 3/5,  3+/5)   RUE Strength   RUE Overall Strength Deficits   R Shoulder Flexion 3-/5   R Shoulder Extension 3-/5   R Elbow Flexion 3/5   R Elbow Extension 3/5   LUE Assessment   LUE Assessment   (limited elevation grossly 3/5)   LUE Strength   LUE Overall Strength Deficits   L Shoulder Flexion 3-/5   L Shoulder Extension 3-/5   L Elbow Flexion 3/5   L Elbow Extension 3/5   Hand Function   Gross Motor Coordination Impaired  (increased time)   Fine Motor Coordination Impaired   Hand Function Comments edema noted   Sensation   Light Touch No apparent deficits   Vision - Complex Assessment   Ocular  Range of Motion Intact   Cognition   Overall Cognitive Status Impaired   Arousal/Participation Responsive;Cooperative   Attention Attends with cues to redirect   Orientation Level Oriented to person;Oriented to place;Oriented to time;Disoriented to situation   Memory Decreased short term memory;Decreased recall of precautions;Decreased recall of recent events   Following Commands Follows one step commands with increased time or repetition   Comments pt pleasant and cooperative, decreased insight into medical conditions, slightly impulsive; pt w/ developmental delay   Assessment   Limitation Decreased ADL status;Decreased Safe judgement during ADL;Decreased UE strength;Decreased cognition;Decreased endurance;Decreased self-care trans;Decreased high-level ADLs;Decreased UE ROM;Decreased fine motor control   Prognosis Good;Fair   Assessment Pt is a 54 y.o. female seen for OT evaluation s/p admit to [facility] on 1/19/2025 w/ Acute respiratory failure with hypoxia (HCC); sepsis and pneumonia. Pt intubated on 1/21/2025 and extubated 1/30/2025 to bipap and no HFNC 40L.   Comorbidities affecting pt's functional performance at time of assessment include: viral sepsis due to FLU A+, focal epilepsy, cognitive developmental delay, schizoaffective disorder, toxic metabolic encephalopathy, fall, hypernatremia. CT spine: No cervical spine fracture or traumatic malalignment CT head: No acute intracranial abnormality.   Personal factors affecting pt at time of IE include:steps to enter environment, limited home support, difficulty performing ADLS, difficulty performing IADLS , limited insight into deficits, flat affect, and decreased initiation and engagement . Prior to admission, pt was living w/mother per chart review and was: per pt independent w/ ADLs, independent w/ functional transfers and mobility w/ no AD, independent w/ light IADLs. Upon evaluation: MOD assist grooming, MOD-MAX assist UB ADLs, total assist LB ADLs, total assist  toileting, MAX assist x2 rolling in bed multiple times for hygiene cleanup, MOD assist x2 long sitting in bed w/ bed rails 45sec tolerance Pt requires 2* the following deficits impacting occupational performance: dyspnea on exertion on 40L HFNC (77-90%), impaired UE AROM, decreased strength and endurance, fall risk, impaired core strength and trunk control, impaired functional reach, impaired cognition, multiple lines, impaired coordination, increased fatigue, slightly impulsive, decreased insight into medical conditions, decreased activity tolerance, b/l UE edema.  Pt to benefit from continued skilled OT tx while in the hospital to address deficits as defined above and maximize level of functional independence w ADL's and functional mobility. Occupational Performance areas to address include: grooming, bathing/shower, toilet hygiene, dressing, functional mobility, clothing management, cleaning, and meal prep. From OT standpoint, recommendation at time of d/c would be level 1 maximum resources.   The patient's raw score on the AM-PAC Daily Activity Inpatient Short Form is 10. A raw score of less than 19 suggests the patient may benefit from discharge to post-acute rehabilitation services. Please refer to the recommendation of the Occupational Therapist for safe discharge planning.   Goals   Patient Goals none stated at this time 2* impaired cognition   LTG Time Frame 10-14   Long Term Goal please see below goals   Plan   Treatment Interventions ADL retraining;Functional transfer training;UE strengthening/ROM;Endurance training;Cognitive reorientation;Patient/family training;Equipment evaluation/education;Compensatory technique education;Energy conservation;Activityengagement   Goal Expiration Date 02/15/25   OT Frequency 3-5x/wk   Discharge Recommendation   Rehab Resource Intensity Level, OT I (Maximum Resource Intensity)   -PAC Daily Activity Inpatient   Lower Body Dressing 1   Bathing 2   Toileting 1   Upper  Body Dressing 2   Grooming 2   Eating 2   Daily Activity Raw Score 10   Turning Head Towards Sound 3   Follow Simple Instructions 3   Low Function Daily Activity Raw Score 16   Low Function Daily Activity Standardized Score  27.65   AM-PAC Applied Cognition Inpatient   Following a Speech/Presentation 2   Understanding Ordinary Conversation 3   Taking Medications 1   Remembering Where Things Are Placed or Put Away 1   Remembering List of 4-5 Errands 1   Taking Care of Complicated Tasks 1   Applied Cognition Raw Score 9   Applied Cognition Standardized Score 22.48   Modified Shiela Scale   Modified Shiela Scale 4   Additional Treatment Session   Start Time 1043   End Time 1053   Treatment Assessment Pt seen for treatment session after initial evaluation as pt attempting OOB. Pt w/ MOD assist x2 supine>sit bed mobility w/ increased time to complete. Pt w/ Poor + sitting balance initially and then improved to Fair - sitting tolerance. Pt w/ MOD assist x2 sit>stand trials w/ 20sec standing tolerance per 4 trials. Pt w/ MOD assist x2 side steps several steps w/ RW and LE buckling and cues for upright posture. Pt w/ MOD assist x 2 sit>supine bed mobility. Pt repositioned in modified chair position in bed, w/ UEs elevated on pillows. Will trial quick move next session for OOB. Educated pt to perform UE exercises hand squeezes, elbow flexion/extension, wrist flexion/extension t/o day. Pt w/ improvement in BP and SPO2 while upright w/ BP: 94/60 EOB and return to bed: 96/67 and sPO2 on 40HFNC 84/96%. Pt continues to be limited due to impaired UE AROM, decreased strength and endurance, fall risk, impaired core strength and trunk control, impaired functional reach, impaired cognition, multiple lines, impaired coordination, increased fatigue, slightly impulsive, decreased insight into medical conditions, decreased activity tolerance, b/l UE edema, dyspnea on exertion. Recommend level I maximum resources.   The patient's raw score  on the AM-PAC Daily Activity Inpatient Short Form is 10. A raw score of less than 19 suggests the patient may benefit from discharge to post-acute rehabilitation services. Please refer to the recommendation of the Occupational Therapist for safe discharge planning.   Additional Treatment Day 1   End of Consult   Education Provided Yes   Patient Position at End of Consult Bed/Chair alarm activated;All needs within reach  (modified chair position in bed)   Nurse Communication Nurse aware of consult     Occupational Therapy Goals to be met in 10-14 days:  1) Pt will improve activity tolerance to G for 30 min txment sessions to enhance ADLs  2) Pt will complete ADLs/self care w/ supervision   3) Pt will complete toileting w/ supervision w/ G hygiene/thoroughness using DME PRN  4) Pt will improve functional transfers on/off all surfaces using DME PRN w/ G balance/safety including toileting w/ supervision  5) Pt will improve fx'l mobility during I/ADl/leisure tasks using DME PRN w/ g balance/safety w/ min assist  6) Pt will engage in ongoing cognitive assessment w/ G participation to A w/ safe d/c planning/recommendations  7) Pt will demonstrate G carryover of pt/caregiver education and training as appropriate w/ mod I  w/ G tolerance  8) Pt will engage in depression screen/leisure interest checklist w/ G participation to monitor s/s depression and ID 3 positive coping strategies to A w/ emotional regulation and management  9) Pt will demonstrate 100% carryover of E.C. techniques w/ mod I t/o fx'l I/ADL/leisure tasks w/o cues s/p skilled education  10) Pt will demonstrate improved bed mobility to min assist to enhance ADLs  11) Pt will engage in activity configuration activity w/ G participation and mod I to increase time management skills and improve participation in a structured routine to improve overall quality of life  12) Pt will demonstrate improved standing tolerance to 3-5 minutes during functional tasks w/ Fair  dynamic standing balance to enhance ADL performance  13) Pt will demonstrate improved b/l UE strength by 1 MMT grade to enhance ADLS and functional transfers   Documentation completed by: Regina Vizcarra MS, OTR/L

## 2025-02-01 NOTE — PLAN OF CARE
Problem: OCCUPATIONAL THERAPY ADULT  Goal: Performs self-care activities at highest level of function for planned discharge setting.  See evaluation for individualized goals.  Description: Treatment Interventions: ADL retraining, Functional transfer training, UE strengthening/ROM, Endurance training, Cognitive reorientation, Patient/family training, Equipment evaluation/education, Compensatory technique education, Energy conservation, Activityengagement          See flowsheet documentation for full assessment, interventions and recommendations.   Note: Limitation: Decreased ADL status, Decreased Safe judgement during ADL, Decreased UE strength, Decreased cognition, Decreased endurance, Decreased self-care trans, Decreased high-level ADLs, Decreased UE ROM, Decreased fine motor control  Prognosis: Good, Fair  Assessment: Pt is a 54 y.o. female seen for OT evaluation s/p admit to [facility] on 1/19/2025 w/ Acute respiratory failure with hypoxia (HCC); sepsis and pneumonia. Pt intubated on 1/21/2025 and extubated 1/30/2025 to bipap and no HFNC 40L.   Comorbidities affecting pt's functional performance at time of assessment include: viral sepsis due to FLU A+, focal epilepsy, cognitive developmental delay, schizoaffective disorder, toxic metabolic encephalopathy, fall, hypernatremia. CT spine: No cervical spine fracture or traumatic malalignment CT head: No acute intracranial abnormality.   Personal factors affecting pt at time of IE include:steps to enter environment, limited home support, difficulty performing ADLS, difficulty performing IADLS , limited insight into deficits, flat affect, and decreased initiation and engagement . Prior to admission, pt was living w/mother per chart review and was: per pt independent w/ ADLs, independent w/ functional transfers and mobility w/ no AD, independent w/ light IADLs. Upon evaluation: MOD assist grooming, MOD-MAX assist UB ADLs, total assist LB ADLs, total assist toileting, MAX  assist x2 rolling in bed multiple times for hygiene cleanup, MOD assist x2 long sitting in bed w/ bed rails 45sec tolerance Pt requires 2* the following deficits impacting occupational performance: dyspnea on exertion on 40L HFNC (77-90%), impaired UE AROM, decreased strength and endurance, fall risk, impaired core strength and trunk control, impaired functional reach, impaired cognition, multiple lines, impaired coordination, increased fatigue, slightly impulsive, decreased insight into medical conditions, decreased activity tolerance, b/l UE edema.  Pt to benefit from continued skilled OT tx while in the hospital to address deficits as defined above and maximize level of functional independence w ADL's and functional mobility. Occupational Performance areas to address include: grooming, bathing/shower, toilet hygiene, dressing, functional mobility, clothing management, cleaning, and meal prep. From OT standpoint, recommendation at time of d/c would be level 1 maximum resources.   The patient's raw score on the AM-PAC Daily Activity Inpatient Short Form is 10. A raw score of less than 19 suggests the patient may benefit from discharge to post-acute rehabilitation services. Please refer to the recommendation of the Occupational Therapist for safe discharge planning.     Rehab Resource Intensity Level, OT: I (Maximum Resource Intensity)

## 2025-02-02 PROCEDURE — 99232 SBSQ HOSP IP/OBS MODERATE 35: CPT | Performed by: INTERNAL MEDICINE

## 2025-02-02 PROCEDURE — 94640 AIRWAY INHALATION TREATMENT: CPT

## 2025-02-02 PROCEDURE — 94664 DEMO&/EVAL PT USE INHALER: CPT

## 2025-02-02 PROCEDURE — 94669 MECHANICAL CHEST WALL OSCILL: CPT

## 2025-02-02 PROCEDURE — 94760 N-INVAS EAR/PLS OXIMETRY 1: CPT

## 2025-02-02 PROCEDURE — 94668 MNPJ CHEST WALL SBSQ: CPT

## 2025-02-02 PROCEDURE — NC001 PR NO CHARGE: Performed by: INTERNAL MEDICINE

## 2025-02-02 PROCEDURE — C9254 INJECTION, LACOSAMIDE: HCPCS | Performed by: NURSE PRACTITIONER

## 2025-02-02 RX ORDER — OLANZAPINE 10 MG/1
10 TABLET ORAL
Status: DISCONTINUED | OUTPATIENT
Start: 2025-02-02 | End: 2025-02-08 | Stop reason: HOSPADM

## 2025-02-02 RX ADMIN — CHLORHEXIDINE GLUCONATE 15 ML: 1.2 RINSE ORAL at 09:19

## 2025-02-02 RX ADMIN — LACOSAMIDE 150 MG: 10 INJECTION INTRAVENOUS at 09:20

## 2025-02-02 RX ADMIN — IPRATROPIUM BROMIDE AND ALBUTEROL SULFATE 3 ML: 2.5; .5 SOLUTION RESPIRATORY (INHALATION) at 07:15

## 2025-02-02 RX ADMIN — ENOXAPARIN SODIUM 40 MG: 40 INJECTION SUBCUTANEOUS at 09:19

## 2025-02-02 RX ADMIN — MELATONIN 6 MG: 3 TAB ORAL at 22:04

## 2025-02-02 RX ADMIN — IPRATROPIUM BROMIDE AND ALBUTEROL SULFATE 3 ML: 2.5; .5 SOLUTION RESPIRATORY (INHALATION) at 13:24

## 2025-02-02 RX ADMIN — OLANZAPINE 10 MG: 10 TABLET, FILM COATED ORAL at 22:04

## 2025-02-02 RX ADMIN — IPRATROPIUM BROMIDE AND ALBUTEROL SULFATE 3 ML: 2.5; .5 SOLUTION RESPIRATORY (INHALATION) at 21:30

## 2025-02-02 RX ADMIN — SODIUM CHLORIDE SOLN NEBU 3% 4 ML: 3 NEBU SOLN at 07:15

## 2025-02-02 RX ADMIN — LACOSAMIDE 200 MG: 10 INJECTION INTRAVENOUS at 22:04

## 2025-02-02 RX ADMIN — CHLORHEXIDINE GLUCONATE 15 ML: 1.2 RINSE ORAL at 22:03

## 2025-02-02 RX ADMIN — SODIUM CHLORIDE 750 MG: 9 INJECTION, SOLUTION INTRAVENOUS at 04:29

## 2025-02-02 RX ADMIN — SODIUM CHLORIDE 750 MG: 9 INJECTION, SOLUTION INTRAVENOUS at 16:50

## 2025-02-02 NOTE — PROGRESS NOTES
Progress Note - Critical Care/ICU   Name: Esperanza Prajapati 54 y.o. female I MRN: [id number]  Unit/Bed#: ICU 15 I Date of Admission: 1/19/2025   Date of Service: 2/2/2025 I Hospital Day: 13       Critical Care Interval Transfer Note:    Brief Hospital Summary:  patient was brought into the ICU for acute respiratory failure secondary to viral pneumonia with superadded bacterial pneumonia.  Over the course in ICU, she was intubated and was then gradually weaned off of the ventilator.  Over the course of last 3 days, she remains extubated, was briefly on BiPAP and currently is on Vapotherm.  In the ICU, she has also developed some degree of critical illness myopathy.    Barriers to discharge:   She requires PT, OT, speech eval.  Optimization of her respiratory status and weaning from Vapotherm.     Consults: IP CONSULT TO NUTRITION SERVICES      Harris Plan: Harris to be removed. Order has been placed       Patient seen and evaluated by Critical Care today and deemed to be appropriate for transfer to Stepdown Level 2. Spoke to Dr. Mark Clements from [facility] to accept transfer. Critical care can be contacted via SecureChat with any questions or concerns. Please use the Critical Care AP Role in Secure Chat for any provider inquires until the patient is transferred out of the ICU or until tomorrow at 0600.

## 2025-02-02 NOTE — PROGRESS NOTES
Progress Note - Critical Care/ICU   Name: Esperanza Prajapati 54 y.o. female I MRN: [id number]  Unit/Bed#: ICU 15 I Date of Admission: 1/19/2025   Date of Service: 2/2/2025 I Hospital Day: 13      Assessment & Plan  Acute respiratory failure with hypoxia (HCC)  Due to Flu A, viral sepsis - consider multifocal aspiration PNA  TG noted to be high, need to d/c Propofol. Cont. Fentanyl drip  Was started on precedex. Adjust rates, to maintain a RAAS of -1  Currently on Vapotherm, 40 L 50% maintaining saturation of 95%.  Patient having difficulty coughing up mucus; added hypertonic inhalation solution.  Continue to wean as tolerated.  Focal epilepsy (HCC)  Follows with [facility] neurology   Focal epilepsy manifests as simple partial, complex partial, and generalized tonic-clonic seizures   Hx of b/l postural and action tremor per neurology notes  Cont. O/P meds: vimpat 150 daily, vimpat 200mg qHS, Depakote 750mg BID  Viral sepsis (HCC)    Acute event resolved.    Due to Flu A + on 1/19   SIRS on admission with tachycardia, tachypnea, leukopenia, fever   Completed Oseltamivir  Cognitive developmental delay  Due to head injury at age of 10 months    Postoperative hypothyroidism  Stage I papillary thyroid cancer s/p total thyroidectomy and ablation in 2013  Post-operative hypothyroidism   Levothyroxine via OG tube  Schizoaffective disorder (HCC)  Home regimen: zyprexa 10mg daily, trazodone 50mg PRN, invega 6mg daily   Invega held due to unable to crush for enteral tube  Hyperglycemia  Non diabetic patient  Last A1c of 5.5  Lab Results   Component Value Date    GLUC 91 02/01/2025    GLUC 95 01/31/2025    GLUC 108 01/30/2025   Has not needed insulin, d/c insulin and sliding scale  Pneumonia of both lungs due to infectious organism  - may have superimposed pneumonia vs bacterial pneumonia  Continue adebayo. Nebs   Patient completed 5 days course of Tamiflu, 3 days course of azithromycin  Adjust sedation as above.    TF Vital  high protein not available in hospital, ask nutrition for a substitute TF.   Give Miralax today.  Goal I=O, diurese PRN.    Discussed on rounds.  Critically ill, at risk of death and decompensation. Guarded prognosis.    Critical Care Time Statement: Upon my evaluation, this patient had a high probability of imminent or life-threatening deterioration due to respiratory failure, pneumonia, encephalopathy, which required my direct attention, intervention, and personal management.  I spent a total of 35 minutes directly providing critical care services, including interpretation of complex medical databases, evaluating for the presence of life-threatening injuries or illnesses, management of organ system failure(s) , complex medical decision making (to support/prevent further life-threatening deterioration)., interpretation of hemodynamic data, titration of continuous IV medications (drips), ventilator management, and managing enteral or parenteral nutritional support. This time is exclusive of procedures, teaching, treating other patients, family meetings, and any prior time recorded by providers other than myself.     Disposition: Med Surg with Telemetry    ICU Core Measures     A: Assess, Prevent, and Manage Pain Has pain been assessed? Yes  Need for changes to pain regimen? No   B: Both SAT/SAT  N/A   C: Choice of Sedation RASS Goal: 0 Alert and Calm  Need for changes to sedation or analgesia regimen? No   D: Delirium CAM-ICU: Negative   E: Early Mobility  Plan for early mobility? Yes   F: Family Engagement Plan for family engagement today? Yes       Review of Invasive Devices:    Harris Plan: Continue for accurate I/O monitoring for 48 hours        Prophylaxis:  VTE VTE covered by:  enoxaparin, Subcutaneous, 40 mg at 02/01/25 0929       Stress Ulcer  not ordered         24 Hour Events : No overnight events reported.  Patient was calm, cooperative at the time of examination.  She does not endorse any cough, shortness  of breath.  Does not endorse any other ongoing issues.  Subjective       Objective :                   Vitals I/O      Most Recent Min/Max in 24hrs   Temp 98.5 °F (36.9 °C) Temp  Min: 97.9 °F (36.6 °C)  Max: 99 °F (37.2 °C)   Pulse 86 Pulse  Min: 84  Max: 98   Resp 22 Resp  Min: 13  Max: 28   BP 97/68 BP  Min: 80/68  Max: 110/61   O2 Sat 92 % SpO2  Min: 84 %  Max: 100 %      Intake/Output Summary (Last 24 hours) at 2025 0838  Last data filed at 2025 0501  Gross per 24 hour   Intake 0 ml   Output 745 ml   Net -745 ml       Diet NPO    Invasive Monitoring           Physical Exam   Physical Exam  Vitals and nursing note reviewed.   Eyes:      General: No dysconjugate gazeNo visual field deficit.     Extraocular Movements: Extraocular movements intact.      Pupils: Pupils are equal, round, and reactive to light.   Skin:     General: Skin is warm and dry.   HENT:      Head: Normocephalic and atraumatic.      Right Ear: Hearing normal.      Left Ear: Hearing normal.      Nose: No congestion or rhinorrhea.      Mouth/Throat:      Mouth: Mucous membranes are dry.      Pharynx: No oropharyngeal exudate.   Cardiovascular:      Rate and Rhythm: Normal rate and regular rhythm.      Heart sounds: No murmur heard.     No friction rub. No gallop.   Musculoskeletal:      Right lower le+ Edema present.      Left lower le+ Edema present.   Abdominal: General: Bowel sounds are normal.      Palpations: Abdomen is soft.      Tenderness: There is no abdominal tenderness.   Constitutional:       General: She is not in acute distress.     Appearance: She is well-developed. She is ill-appearing.   Pulmonary:      Effort: Pulmonary effort is normal. No respiratory distress.      Breath sounds: Normal breath sounds. No stridor. No wheezing, rhonchi or rales.   Secretions are abnormal .Chest:      Chest wall: No tenderness.   Neurological:      General: No focal deficit present.      Mental Status: She is alert and oriented to  person, place and time. Mental status is at baseline.      Sensory: Sensation is intact.      Motor: No motor deficit.   Genitourinary/Anorectal:  Harris present.        Diagnostic Studies        Lab Results: I have reviewed the following results:     Medications:  Scheduled PRN   [Held by provider] benztropine, 2 mg, BID  chlorhexidine, 15 mL, Q12H MOSHE  cyanocobalamin, 1,000 mcg, Daily  enoxaparin, 40 mg, Daily  ipratropium-albuterol, 3 mL, TID  lacosamide, 150 mg, Q24H  lacosamide, 200 mg, HS  levothyroxine, 175 mcg, Early Morning  [Held by provider] OLANZapine, 10 mg, HS  [Held by provider] paliperidone, 6 mg, QAM  senna-docusate sodium, 2 tablet, BID  valproate sodium, 750 mg, Q12H      Acetaminophen, 975 mg, Q6H PRN  aluminum-magnesium hydroxide-simethicone, 30 mL, Q6H PRN  levalbuterol, 1.25 mg, Q6H PRN  ondansetron, 4 mg, Q6H PRN  polyethylene glycol, 17 g, Daily PRN       Continuous          Labs:   CBC    Recent Labs     02/01/25  0444   WBC 10.14   HGB 9.1*   HCT 28.9*        BMP    Recent Labs     02/01/25 0444   SODIUM 141   K 4.0      CO2 29   AGAP 5   BUN 38*   CREATININE 0.81   CALCIUM 8.8       Coags    No recent results     Additional Electrolytes  Recent Labs     02/01/25  0444   MG 2.6          Blood Gas    No recent results  No recent results LFTs  Recent Labs     02/01/25 0444   ALT 19   AST 20   ALKPHOS 33*   ALB 3.2*   TBILI 0.87       Infectious  No recent results  Glucose  Recent Labs     02/01/25  0444   GLUC 91

## 2025-02-03 PROCEDURE — C9254 INJECTION, LACOSAMIDE: HCPCS | Performed by: NURSE PRACTITIONER

## 2025-02-03 PROCEDURE — 94760 N-INVAS EAR/PLS OXIMETRY 1: CPT

## 2025-02-03 PROCEDURE — 94668 MNPJ CHEST WALL SBSQ: CPT

## 2025-02-03 PROCEDURE — 99232 SBSQ HOSP IP/OBS MODERATE 35: CPT | Performed by: STUDENT IN AN ORGANIZED HEALTH CARE EDUCATION/TRAINING PROGRAM

## 2025-02-03 PROCEDURE — 99232 SBSQ HOSP IP/OBS MODERATE 35: CPT | Performed by: INTERNAL MEDICINE

## 2025-02-03 PROCEDURE — 94640 AIRWAY INHALATION TREATMENT: CPT

## 2025-02-03 RX ORDER — ALBUMIN HUMAN 50 G/1000ML
25 SOLUTION INTRAVENOUS ONCE
Status: COMPLETED | OUTPATIENT
Start: 2025-02-03 | End: 2025-02-03

## 2025-02-03 RX ADMIN — LACOSAMIDE 200 MG: 10 INJECTION INTRAVENOUS at 21:45

## 2025-02-03 RX ADMIN — CHLORHEXIDINE GLUCONATE 15 ML: 1.2 RINSE ORAL at 09:07

## 2025-02-03 RX ADMIN — IPRATROPIUM BROMIDE AND ALBUTEROL SULFATE 3 ML: 2.5; .5 SOLUTION RESPIRATORY (INHALATION) at 20:38

## 2025-02-03 RX ADMIN — OLANZAPINE 10 MG: 10 TABLET, FILM COATED ORAL at 21:45

## 2025-02-03 RX ADMIN — IPRATROPIUM BROMIDE AND ALBUTEROL SULFATE 3 ML: 2.5; .5 SOLUTION RESPIRATORY (INHALATION) at 07:54

## 2025-02-03 RX ADMIN — ENOXAPARIN SODIUM 40 MG: 40 INJECTION SUBCUTANEOUS at 09:08

## 2025-02-03 RX ADMIN — SENNOSIDES AND DOCUSATE SODIUM 2 TABLET: 8.6; 5 TABLET ORAL at 09:09

## 2025-02-03 RX ADMIN — LACOSAMIDE 150 MG: 10 INJECTION INTRAVENOUS at 09:09

## 2025-02-03 RX ADMIN — SENNOSIDES AND DOCUSATE SODIUM 2 TABLET: 8.6; 5 TABLET ORAL at 17:19

## 2025-02-03 RX ADMIN — IPRATROPIUM BROMIDE AND ALBUTEROL SULFATE 3 ML: 2.5; .5 SOLUTION RESPIRATORY (INHALATION) at 13:51

## 2025-02-03 RX ADMIN — CHLORHEXIDINE GLUCONATE 15 ML: 1.2 RINSE ORAL at 21:45

## 2025-02-03 RX ADMIN — SODIUM CHLORIDE 750 MG: 9 INJECTION, SOLUTION INTRAVENOUS at 03:28

## 2025-02-03 RX ADMIN — SODIUM CHLORIDE 750 MG: 9 INJECTION, SOLUTION INTRAVENOUS at 16:00

## 2025-02-03 RX ADMIN — ALBUMIN (HUMAN) 25 G: 12.5 INJECTION, SOLUTION INTRAVENOUS at 03:28

## 2025-02-03 RX ADMIN — CYANOCOBALAMIN TAB 500 MCG 1000 MCG: 500 TAB at 09:08

## 2025-02-03 NOTE — UTILIZATION REVIEW
Continued Stay Review    Date: 2/3/2025                          Current Patient Class: Inpatient  Current Level of Care: SD2 ICU    HPI:54 y.o. female initially admitted on 1/20/2025     Assessment/Plan: To ICU for acute respiratory failure secondary to viral pneumonia with superadded bacterial pneumonia. Over the course in ICU, she was intubated then gradually weaned off of the ventilator. Over the course of last 3 days, remains extubated, briefly on BiPAP and currently is on 4 L HFNC Vapo therm , currently w wheezing. Tremor in RUE  Encourage OOB as tolerated, incentive spirometry  -Continue pulmonary toilet with hypertonic saline, vest therapy  -Continue to wean supplemental oxygen, maintain SpO2 > 94%  -Ambulatory O2 evaluation prior to discharge  S/P Viral sepsis from FLU A completed TEJAL Flu & superimposed bacterial PNA completed antibx  On clear liq diet; SPEECH to eval for PO   Medications:   Scheduled Medications:  [Held by provider] benztropine, 2 mg, Intramuscular, BID  chlorhexidine, 15 mL, Mouth/Throat, Q12H MOSHE  cyanocobalamin, 1,000 mcg, Oral, Daily  enoxaparin, 40 mg, Subcutaneous, Daily  ipratropium-albuterol, 3 mL, Nebulization, TID  lacosamide, 150 mg, Intravenous, Q24H  lacosamide, 200 mg, Intravenous, HS  levothyroxine, 175 mcg, Oral, Early Morning  melatonin, 6 mg, Oral, HS  OLANZapine, 10 mg, Oral, HS  [Held by provider] paliperidone, 6 mg, Oral, QAM  senna-docusate sodium, 2 tablet, Oral, BID  valproate sodium, 750 mg, Intravenous, Q12H      Continuous IV Infusions:     PRN Meds:  Acetaminophen, 975 mg, Oral, Q6H PRN  aluminum-magnesium hydroxide-simethicone, 30 mL, Oral, Q6H PRN  levalbuterol, 1.25 mg, Nebulization, Q6H PRN  ondansetron, 4 mg, Intravenous, Q6H PRN  polyethylene glycol, 17 g, Oral, Daily PRN      Discharge Plan: TBD    Vital Signs (last 3 days)       Date/Time Temp Pulse Resp BP MAP (mmHg) SpO2 FiO2 (%) Calculated FIO2 (%) - Nasal Cannula O2 Flow Rate (L/min) Nasal Cannula  O2 Flow Rate (L/min) O2 Device O2 Interface Device Patient Position - Orthostatic VS [place] Coma Scale Score Pain    02/03/25 1139 -- 74 16 94/69 78 96 % -- -- -- -- -- -- -- -- --    02/03/25 0930 -- 86 24 92/67 75 93 % -- -- -- -- -- -- -- -- --    02/03/25 0800 97.7 °F (36.5 °C) -- -- -- -- -- -- -- -- -- -- -- -- 15 No Pain    02/03/25 0700 -- 70 14 -- -- 98 % -- 36 -- 4 L/min -- -- -- -- --    02/03/25 0643 97.5 °F (36.4 °C) -- -- -- -- -- -- -- -- -- -- -- -- -- --    02/03/25 0600 -- 68 12 82/55 64 98 % -- -- -- -- -- -- -- -- --    02/03/25 0400 -- 70 13 83/58 77 99 % -- -- -- -- -- -- -- -- --    02/03/25 0300 -- 72 15 74/54 62 97 % -- -- -- -- -- -- -- -- --    02/03/25 0200 -- 72 13 77/52 63 99 % -- -- -- -- -- -- -- -- --    02/03/25 0100 -- 76 15 85/56 63 97 % -- -- -- -- -- -- -- -- --    02/03/25 0040 -- -- -- -- -- -- -- -- -- -- -- -- -- -- No Pain    02/03/25 0000 -- 74 14 93/61 70 99 % -- -- -- -- -- -- -- -- --    02/02/25 2300 -- 78 15 109/73 84 99 % -- -- -- -- -- -- -- -- --    02/02/25 2200 -- 82 17 97/53 73 95 % -- -- -- -- -- -- -- -- --    02/02/25 2130 -- -- -- -- -- 97 % -- -- -- -- -- -- -- -- --    02/02/25 2000 97.3 °F (36.3 °C) 84 23 118/78 90 98 % -- 44 -- 6 L/min Mid flow nasal cannula -- -- -- --    02/02/25 1900 -- 88 22 106/78 90 97 % -- -- -- -- -- -- -- -- --    02/02/25 1800 -- 92 22 125/74 100 98 % -- -- -- -- -- -- -- -- --    02/02/25 1700 -- 88 18 114/66 88 97 % -- -- -- -- -- -- -- -- --    02/02/25 1615 -- -- -- -- -- -- -- -- -- -- -- -- -- -- No Pain    02/02/25 1600 -- 88 25 115/72 90 97 % -- -- -- -- -- -- -- -- --    02/02/25 1502 98.2 °F (36.8 °C) 92 18 117/70 82 96 % -- -- -- -- -- -- -- -- --    02/02/25 1500 -- 92 18 117/70 82 96 % -- -- -- -- -- -- -- -- --    02/02/25 1400 -- 92 18 112/72 86 97 % -- -- -- -- -- -- -- -- --    02/02/25 1329 -- -- -- -- -- -- -- 52 -- 8 L/min Mid flow nasal cannula -- -- -- --    02/02/25 1325 -- -- -- -- -- 92 % -- -- -- --  -- -- -- -- --    02/02/25 1300 -- 90 20 106/62 81 92 % -- -- -- -- -- -- -- -- --    02/02/25 1200 -- 88 21 100/63 73 91 % -- -- -- -- -- -- -- -- --    02/02/25 1100 -- 84 20 111/53 69 92 % -- -- -- -- -- -- -- -- --    02/02/25 1000 -- 88 20 97/62 75 96 % -- -- -- -- -- -- -- -- --    02/02/25 0900 -- 86 13 92/58 69 95 % -- -- -- -- -- -- -- -- No Pain    02/02/25 0837 98.5 °F (36.9 °C) -- -- -- -- -- -- -- -- -- -- -- -- -- --    02/02/25 0800 -- 88 19 97/63 74 91 % -- -- -- -- -- -- -- -- --    02/02/25 0717 -- -- -- -- -- 92 % -- -- -- -- -- HFNC prongs -- -- --    02/02/25 0700 -- 86 22 97/68 75 93 % -- -- -- -- -- -- -- -- --    02/02/25 0600 -- 86 13 110/61 74 92 % -- -- -- -- -- -- -- -- --    02/02/25 0500 -- 86 22 106/57 74 93 % -- -- -- -- -- -- -- -- --    02/02/25 0421 -- -- -- -- -- -- -- -- -- -- -- -- -- 13 --    02/02/25 0400 -- 84 16 92/53 75 95 % -- -- -- -- -- -- -- -- --    02/02/25 0353 -- -- -- -- -- 93 % 50 -- 40 L/min -- High flow nasal cannula -- -- -- --    02/02/25 0037 -- -- -- -- -- -- -- -- -- -- -- -- -- 13 --    02/02/25 0000 99 °F (37.2 °C) 86 18 95/65 75 94 % -- -- -- -- -- -- -- -- --    02/01/25 2320 -- -- -- -- -- 96 % 50 -- 40 L/min -- High flow nasal cannula -- -- -- --    02/01/25 2148 -- -- -- -- -- -- -- -- -- -- -- -- -- 13 No Pain    02/01/25 2100 -- 86 20 99/68 77 93 % -- -- -- -- -- -- -- -- --    02/01/25 2053 98.3 °F (36.8 °C) -- -- -- -- -- -- -- -- -- -- -- -- -- --    02/01/25 2000 -- 84 19 95/71 79 84 % -- -- -- -- -- -- -- -- --    02/01/25 1955 -- -- -- -- -- 92 % 50 -- 40 L/min -- High flow nasal cannula -- -- -- --    02/01/25 1800 -- 94 28 106/60 78 95 % -- -- -- -- -- -- -- -- --    02/01/25 1700 -- 86 19 91/63 72 97 % -- -- -- -- -- -- -- -- --    02/01/25 1600 -- 84 16 96/68 79 96 % -- -- -- -- -- -- -- -- --    02/01/25 1506 98.4 °F (36.9 °C) 90 23 89/68 74 96 % 50 -- 40 L/min -- High flow nasal cannula -- Lying 13 No Pain    02/01/25 1500 -- 90 18  89/68 74 100 % -- -- -- -- -- -- -- -- --    02/01/25 1400 -- 90 18 97/62 72 96 % -- -- -- -- -- -- -- -- --    02/01/25 1332 97.9 °F (36.6 °C) -- -- -- -- 96 % -- -- -- -- -- HFNC prongs -- -- --    02/01/25 1300 -- 98 22 99/77 83 96 % -- -- -- -- -- -- -- -- --    02/01/25 1240 -- -- -- -- -- -- -- -- -- -- -- -- -- 13 --    02/01/25 1200 -- 98 18 87/56 66 97 % 70 -- 40 L/min -- High flow nasal cannula -- Lying -- --    02/01/25 1100 -- 96 -- 92/58 69 93 % -- -- -- -- -- -- -- -- --    02/01/25 1053 -- 88 -- 96/67 78 90 % -- -- -- -- -- -- -- -- --    02/01/25 1000 -- 94 23 80/68 73 93 % -- -- -- -- -- -- -- -- --    02/01/25 0930 -- -- -- -- -- -- -- -- -- -- -- -- -- 13 --    02/01/25 0913 -- -- -- -- -- 99 % -- -- -- -- -- HFNC prongs -- -- --    02/01/25 0900 -- 84 14 106/66 79 98 % 70 -- -- -- BiPAP -- Lying -- --    02/01/25 0800 -- 86 19 94/57 67 93 % -- -- -- -- -- -- -- -- --    02/01/25 0716 -- -- -- -- -- 94 % -- -- -- -- -- Face mask -- -- --    02/01/25 0700 -- 84 15 94/57 64 95 % -- -- -- -- -- -- -- -- --    02/01/25 0600 -- 86 15 82/50 59 97 % 70 -- -- -- BiPAP -- Lying -- --    02/01/25 0500 -- 88 14 100/51 56 94 % -- -- -- -- -- -- -- -- --    02/01/25 0416 -- -- -- -- -- -- -- -- -- -- -- -- -- 14 --    02/01/25 0400 -- 90 17 96/54 62 91 % 70 -- -- -- BiPAP -- Lying -- --    02/01/25 0348 -- -- -- -- -- 93 % -- -- -- -- -- Full face mask -- -- --    02/01/25 0300 -- 90 15 99/59 70 97 % -- -- -- -- -- -- -- -- --    02/01/25 0201 98.4 °F (36.9 °C) -- -- -- -- -- -- -- -- -- -- -- -- -- --    02/01/25 0200 -- 90 17 103/69 85 100 % -- -- -- -- -- -- -- -- --    02/01/25 0100 -- 100 30 95/69 77 97 % -- -- -- -- -- -- -- -- --    02/01/25 0014 -- -- -- -- -- -- -- -- -- -- -- -- -- 14 No Pain    02/01/25 0000 -- 100 21 93/61 70 96 % 70 -- -- -- BiPAP -- Lying -- --    01/31/25 2300 -- 104 24 109/66 85 96 % -- -- -- -- -- -- -- -- --    01/31/25 2200 -- 104 21 103/66 75 97 % -- -- -- -- -- -- -- -- --     01/31/25 2100 -- 104 20 105/73 84 96 % -- -- -- -- -- -- -- -- --    01/31/25 2021 -- -- -- -- -- 93 % 80 -- -- -- BiPAP Full face mask -- -- --    01/31/25 2017 -- -- -- -- -- -- -- -- -- -- -- -- -- 14 No Pain    01/31/25 2015 -- -- -- -- -- 95 % -- -- -- -- -- -- -- -- --    01/31/25 2000 -- 110 25 91/66 74 98 % -- -- -- -- -- -- -- -- --    01/31/25 1900 99.4 °F (37.4 °C) 114 31 120/79 92 96 % 100 -- -- -- BiPAP -- Lying -- --    01/31/25 1845 -- 114 25 105/62 75 94 % -- -- -- -- -- -- -- -- --    01/31/25 1806 -- 120 35 -- 78 93 % -- -- -- -- BiPAP -- -- -- --    01/31/25 1700 100.1 °F (37.8 °C) 124 35 103/62 74 97 % -- -- -- -- BiPAP -- -- -- --    01/31/25 1630 -- -- -- -- -- -- -- -- -- -- -- -- -- 14 No Pain    01/31/25 1600 -- 124 28 108/63 76 96 % -- -- -- -- -- -- -- -- --    01/31/25 1536 -- -- -- -- -- 97 % -- -- -- -- -- Full face mask -- -- --    01/31/25 1500 -- 132 23 110/63 73 95 % -- -- -- -- BiPAP -- -- -- --    01/31/25 1456 100.9 °F (38.3 °C) 134 24 -- -- 95 % -- -- -- -- -- -- -- -- --    01/31/25 1433 -- -- -- -- -- 93 % -- -- -- -- -- Full face mask -- -- --    01/31/25 1400 -- 146 31 107/55 73 85 % -- -- -- -- -- -- -- -- --    01/31/25 1329 -- -- -- -- -- 96 % -- -- -- -- -- HFNC prongs -- -- --    01/31/25 1300 -- 126 35 109/66 83 97 % -- -- -- -- High flow nasal cannula -- -- -- --    01/31/25 1230 -- -- -- -- -- -- -- -- -- -- -- -- -- 14 --    01/31/25 1200 -- 132 47 121/71 92 97 % -- -- -- -- Face tent -- -- -- --    01/31/25 1157 100.2 °F (37.9 °C) 130 37 -- -- 97 % -- -- -- -- Face tent -- -- -- --    01/31/25 1148 101.3 °F (38.5 °C) -- -- -- -- -- -- -- -- -- -- -- -- -- --    01/31/25 1100 -- 130 26 114/64 79 97 % -- -- -- -- -- -- -- -- --    01/31/25 1043 -- -- -- -- -- 100 % 70 -- 15 L/min -- Face tent HFNC prongs -- -- --    01/31/25 1000 -- 124 33 118/74 80 100 % -- -- -- -- -- -- -- -- --    01/31/25 0900 -- 140 34 132/79 96 93 % -- -- -- -- -- -- -- -- --    01/31/25  0830 -- -- -- -- -- -- -- -- -- -- -- -- -- 14 No Pain    01/31/25 0800 99.1 °F (37.3 °C) 124 28 140/82 108 96 % -- -- -- -- -- -- -- -- --    01/31/25 0748 -- -- -- -- -- 91 % -- -- -- -- -- HFNC prongs -- -- --    01/31/25 0600 -- 124 18 158/63 93 95 % -- -- -- -- -- -- -- -- --    01/31/25 0530 -- 124 24 133/69 101 92 % -- -- -- -- -- -- -- -- --    01/31/25 0400 99.4 °F (37.4 °C) -- -- -- -- -- -- -- -- -- -- -- -- 14 --    01/31/25 0323 -- 126 21 -- -- 97 % -- -- -- -- -- HFNC prongs -- -- --    01/31/25 0315 -- 148 33 102/73 87 93 % -- -- -- -- -- -- -- -- --    01/31/25 0215 -- 122 25 117/74 105 91 % -- -- -- -- -- -- -- -- --    01/31/25 0115 -- 124 22 118/68 88 94 % -- -- -- -- -- -- -- -- --    01/31/25 0015 -- 126 27 141/73 111 93 % -- -- -- -- -- -- -- -- --    01/31/25 0000 99 °F (37.2 °C) -- -- -- -- -- -- -- -- -- -- -- -- 14 --          Weight (last 2 days)       Date/Time Weight    02/02/25 0600 105 (231.48)    02/01/25 0600 106 (232.81)            Pertinent Labs/Diagnostic Results:   Radiology:  XR chest portable ICU   Final Interpretation by Hipolito Payne MD (01/31 0929)      Unchanged bibasilar opacities, atelectasis versus pneumonia.         XR chest portable ICU   Final Interpretation by Semaj Meng MD (01/30 1425)      Small left pleural effusion. Bibasilar linear opacities likely represent atelectasis. Pneumonia is to be determined clinical grounds.         XR chest portable ICU   Final Interpretation by Lexus Jack MD (01/27 1036)      The left base is obscured due to combination of small effusion/atelectasis/consolidation      No worsening seen   No new consolidation   Tubes and lines in appropriate position      Workstation performed: [id number]         XR chest portable ICU   Final Interpretation by Kaycee Manriquez MD (01/24 1230)      Moderate pulmonary edema with moderate pleural effusions and bibasilar atelectasis. Pneumonia not excluded in the appropriate clinical  setting.            Workstation performed: [id number]         XR chest portable ICU   Final Interpretation by Edward Bruner MD (01/23 0832)      Stable congestion with bilateral effusions and bibasilar airspace opacity.            Workstation performed: [id number]         XR chest portable   Final Interpretation by Pavan Marroquin MD (01/22 0803)      Lines and tubes appear satisfactory.   Pulmonary edema pattern with pleural effusions. Low lung volumes. Pneumonia not entirely excluded in the appropriate clinical situation, particularly in the left lower lobe.            Workstation performed: [id number]         XR chest portable ICU   Final Interpretation by Kaycee Manriquez MD (01/21 7719)      Right jugular catheter in right brachiocephalic vein with no pneumothorax.      Persistent pulmonary edema.      Dense consolidation in the left lower lobe which could be due to pneumonia in the appropriate clinical setting.            Workstation performed: [id number]         XR chest portable ICU   Final Interpretation by Kaycee Manriquez MD (01/21 1711)      ET tube 4 cm above the dennis.      Worsening pulmonary edema      Dense left lower lobe consolidation with air bronchograms which could be due to pneumonia in the appropriate clinical setting.            Workstation performed: [id number]         XR chest portable   Final Interpretation by Kaycee Manriquez MD (01/21 7533)      Low lung volumes with bibasilar opacity with loss of the medial left diaphragm which could be due to atelectasis. Pneumonia not excluded in the appropriate clinical setting.            Workstation performed: [id number]         CT head without contrast   Final Interpretation by Jose Kingston MD (01/20 0117)      No acute intracranial abnormality.               CT chest abdomen pelvis w contrast   Final Interpretation by Jose Kingston MD (01/20 0205)      No acute findings in the chest, abdomen or pelvis, noting images  degraded by motion artifact.         CT cervical spine without contrast   Final Interpretation by Jose Kingston MD (01/20 0138)      1.  No cervical spine fracture or traumatic malalignment, noting images are degraded by motion artifact.   2.  Mildly prominent bilateral level 2 lymph nodes, nonspecific.        Cardiology:  Echo complete w/ contrast if indicated   Final Result by Rito Serrato DO (01/28 1515)        Left Ventricle: Left ventricular cavity size is normal. There is mild    concentric hypertrophy. The left ventricular ejection fraction is 52% by    visual estimation. Systolic function is low normal. Wall motion is normal.    Diastolic function is normal.     Right Ventricle: Right ventricular cavity size is normal. Systolic    function is normal.     Mitral Valve: There is trace regurgitation.     Tricuspid Valve: Pulmonary artery systolic pressures cannot be    estimated due to lack of tricuspid regurgitation jet.     There is no study for comparison.         ECG 12 lead   Final Result by Joselito Wellington MD (01/27 1117)   Atrial flutter   Baseline artifact   T wave abnormality, consider lateral ischemia   Prolonged QT   Abnormal ECG      Confirmed by Joselito Wellington ([id number]) on 1/27/2025 11:17:49 AM      ECG 12 lead   Final Result by Rito Serrato DO (01/27 0912)   Sinus bradycardia   Nonspecific T wave abnormality   Abnormal ECG      Confirmed by Rito Serrato ([id number]) on 1/27/2025 9:52:01 AM      ECG 12 lead   Final Result by Rito Serrato DO (01/22 1031)   Sinus tachycardia   ST & T wave abnormality, consider inferolateral ischemia   Abnormal ECG      Confirmed by Rito Serrato ([id number]) on 1/22/2025 10:31:00 AM      ECG 12 lead   Final Result by Major Rubin DO (01/20 1223)   Sinus tachycardia with significant  Baseline artifact      Abnormal ECG      Confirmed by Major Rubin ([id number]) on 1/20/2025 12:23:46 PM      ECG 12 lead   Final Result by Major Rubin DO  (01/20 1221)   Likely  Sinus tachycardia Baseline artifact   Abnormal ECG      Confirmed by Major Rubin ([id number]) on 1/20/2025 12:21:39 PM      ECG 12 lead   Final Result by Tavon Bolton MD (01/20 0033)   Sinus tachycardia   Otherwise normal ECG   When compared with ECG of 19-Jan-2025 21:57, (unconfirmed)   No significant change was found   Confirmed by Tavon Bolton ([id number]) on 1/20/2025 12:33:43 AM      ECG 12 lead   Final Result by Tavon Bolton MD (01/20 0037)   Sinus tachycardia   Otherwise normal ECG   When compared with ECG of 30-May-2024 10:26,   Vent. rate has increased by  53 bpm   Non-specific change in ST segment in Inferior leads   Confirmed by Tavon Bolton ([id number]) on 1/20/2025 12:37:51 AM        GI:  Bronchoscopy   Final Result by Forrest Garrett MD (01/24 1936)   The lower trachea, main dennis, left main stem, DONNY, lingula, LLL, right    main stem, RUL, bronchus intermedius, RML and RLL appeared normal.   Minimal, thin and clear secretions present         RECOMMENDATION:   Follow up bronchoscopy          Attestation:      I was the critical care attending during the entire procedure by bedside    helping and supervising on 1/24/2025      Forrest Garrett MD                 Results from last 7 days   Lab Units 02/01/25 0444 01/31/25 0457 01/30/25 0403 01/29/25 0422 01/28/25  0508   WBC Thousand/uL 10.14 19.14* 10.76* 10.34* 9.79   HEMOGLOBIN g/dL 9.1* 11.0* 10.0* 10.3* 10.0*   HEMATOCRIT % 28.9* 33.8* 31.0* 32.0* 30.7*   PLATELETS Thousands/uL 263 293 272 294 210   TOTAL NEUT ABS Thousands/µL 7.71* 16.73*  --  5.95  --    BANDS PCT %  --   --  1  --  1         Results from last 7 days   Lab Units 02/01/25 0444 01/31/25 0457 01/30/25  0441 01/29/25  0427 01/29/25  0405 01/28/25  0508   SODIUM mmol/L 141 141 142 142  --  145   POTASSIUM mmol/L 4.0 4.2 4.6 4.3  --  3.3*   CHLORIDE mmol/L 107 110* 104 103  --  102   CO2 mmol/L 29 24 32 34*  --  36*   ANION GAP mmol/L 5 7 6 5  --  7   BUN  "mg/dL 38* 31* 45* 50*  --  62*   CREATININE mg/dL 0.81 0.74 1.00 0.78  --  0.82   EGFR ml/min/1.73sq m 83 93 64 87  --  82   CALCIUM mg/dL 8.8 8.2* 8.9 8.7  --  8.2*   CALCIUM, IONIZED mmol/L  --  1.15  --   --  1.11* 1.05*   MAGNESIUM mg/dL 2.6 2.1 2.4  --   --  2.7   PHOSPHORUS mg/dL  --  3.1  --   --   --   --      Results from last 7 days   Lab Units 02/01/25  0444 01/30/25  0441   AST U/L 20 33   ALT U/L 19 19   ALK PHOS U/L 33* 29*   TOTAL PROTEIN g/dL 7.0 6.7   ALBUMIN g/dL 3.2* 3.2*   TOTAL BILIRUBIN mg/dL 0.87 0.65     Results from last 7 days   Lab Units 01/31/25  1559 01/31/25  1147 01/31/25  0014 01/30/25  1805 01/30/25  1305 01/30/25  0008 01/29/25  1808 01/29/25  1211 01/28/25  2348 01/28/25  2117 01/28/25  1813 01/28/25  1241   POC GLUCOSE mg/dl 118 118 93 88 105 146* 111 96 109 94 78 99     Results from last 7 days   Lab Units 02/01/25  0444 01/31/25  0457 01/30/25  0441 01/29/25  0427 01/28/25  0508   GLUCOSE RANDOM mg/dL 91 95 108 90 129             No results found for: \"BETA-HYDROXYBUTYRATE\"           Network Utilization Review Department  ATTENTION: Please call with any questions or concerns to [phone] and carefully listen to the prompts so that you are directed to the right person. All voicemails are confidential.   For Discharge needs, contact Care Management DC Support Team at [phone] opt. [phone]  Send all requests for admission clinical reviews, approved or denied determinations and any other requests to dedicated fax number below belonging to the campus where the patient is receiving treatment. List of dedicated fax numbers for the Facilities:  FACILITY NAME UR FAX NUMBER   ADMISSION DENIALS (Administrative/Medical Necessity) [phone]   DISCHARGE SUPPORT TEAM (NETWORK) [phone]   PARENT CHILD HEALTH (Maternity/NICU/Pediatrics) [phone]   [facility] [phone]   [facility] [phone]   [facility] [place] [phone]   [facility] [phone]   Critical access hospital [phone]   [facility] [phone]   [facility] [phone]   [facility] [phone]   [facility] [phone]   [facility] [phone]   [facility] [phone]   [facility] [phone]

## 2025-02-03 NOTE — PLAN OF CARE
Problem: Potential for Falls  Goal: Patient will remain free of falls  Description: INTERVENTIONS:  - Educate patient/family on patient safety including physical limitations  - Instruct patient to call for assistance with activity   - Consult OT/PT to assist with strengthening/mobility   - Keep Call bell within reach  - Keep bed low and locked with side rails adjusted as appropriate  - Keep care items and personal belongings within reach  - Initiate and maintain comfort rounds  - Make Fall Risk Sign visible to staff  - Offer Toileting every 2 Hours, in advance of need  - Initiate/Maintain bed alarm  - Obtain necessary fall risk management equipment:   - Apply yellow socks and bracelet for high fall risk patients  - Consider moving patient to room near nurses station  Outcome: Progressing     Problem: Prexisting or High Potential for Compromised Skin Integrity  Goal: Skin integrity is maintained or improved  Description: INTERVENTIONS:  - Identify patients at risk for skin breakdown  - Assess and monitor skin integrity  - Assess and monitor nutrition and hydration status  - Monitor labs   - Assess for incontinence   - Turn and reposition patient  - Assist with mobility/ambulation  - Relieve pressure over bony prominences  - Avoid friction and shearing  - Provide appropriate hygiene as needed including keeping skin clean and dry  - Evaluate need for skin moisturizer/barrier cream  - Collaborate with interdisciplinary team   - Patient/family teaching  - Consider wound care consult   Outcome: Progressing     Problem: PAIN - ADULT  Goal: Verbalizes/displays adequate comfort level or baseline comfort level  Description: Interventions:  - Encourage patient to monitor pain and request assistance  - Assess pain using appropriate pain scale  - Administer analgesics based on type and severity of pain and evaluate response  - Implement non-pharmacological measures as appropriate and evaluate response  - Consider cultural and  social influences on pain and pain management  - Notify physician/advanced practitioner if interventions unsuccessful or patient reports new pain  Outcome: Progressing     Problem: INFECTION - ADULT  Goal: Absence or prevention of progression during hospitalization  Description: INTERVENTIONS:  - Assess and monitor for signs and symptoms of infection  - Monitor lab/diagnostic results  - Monitor all insertion sites, i.e. indwelling lines, tubes, and drains  - Monitor endotracheal if appropriate and nasal secretions for changes in amount and color  - Sunshine appropriate cooling/warming therapies per order  - Administer medications as ordered  - Instruct and encourage patient and family to use good hand hygiene technique  - Identify and instruct in appropriate isolation precautions for identified infection/condition  Outcome: Progressing     Problem: SAFETY ADULT  Goal: Patient will remain free of falls  Description: INTERVENTIONS:  - Educate patient/family on patient safety including physical limitations  - Instruct patient to call for assistance with activity   - Consult OT/PT to assist with strengthening/mobility   - Keep Call bell within reach  - Keep bed low and locked with side rails adjusted as appropriate  - Keep care items and personal belongings within reach  - Initiate and maintain comfort rounds  - Make Fall Risk Sign visible to staff  - Offer Toileting every 2 Hours, in advance of need  - Initiate/Maintain bed alarm  - Obtain necessary fall risk management equipment:   - Apply yellow socks and bracelet for high fall risk patients  - Consider moving patient to room near nurses station  Outcome: Progressing  Goal: Maintain or return to baseline ADL function  Description: INTERVENTIONS:  -  Assess patient's ability to carry out ADLs; assess patient's baseline for ADL function and identify physical deficits which impact ability to perform ADLs (bathing, care of mouth/teeth, toileting, grooming, dressing,  etc.)  - Assess/evaluate cause of self-care deficits   - Assess range of motion  - Assess patient's mobility; develop plan if impaired  - Assess patient's need for assistive devices and provide as appropriate  - Encourage maximum independence but intervene and supervise when necessary  - Involve family in performance of ADLs  - Assess for home care needs following discharge   - Consider OT consult to assist with ADL evaluation and planning for discharge  - Provide patient education as appropriate  Outcome: Progressing  Goal: Maintains/Returns to pre admission functional level  Description: INTERVENTIONS:  - Perform AM-PAC 6 Click Basic Mobility/ Daily Activity assessment daily.  - Set and communicate daily mobility goal to care team and patient/family/caregiver.   - Collaborate with rehabilitation services on mobility goals if consulted  - Perform Range of Motion 4 times a day.  - Reposition patient every 2 hours.    - Out of bed for toileting  - Record patient progress and toleration of activity level   Outcome: Progressing     Problem: DISCHARGE PLANNING  Goal: Discharge to home or other facility with appropriate resources  Description: INTERVENTIONS:  - Identify barriers to discharge w/patient and caregiver  - Arrange for needed discharge resources and transportation as appropriate  - Identify discharge learning needs (meds, wound care, etc.)  - Arrange for interpretive services to assist at discharge as needed  - Refer to Case Management Department for coordinating discharge planning if the patient needs post-hospital services based on physician/advanced practitioner order or complex needs related to functional status, cognitive ability, or social support system  Outcome: Progressing     Problem: NEUROSENSORY - ADULT  Goal: Achieves stable or improved neurological status  Description: INTERVENTIONS  - Monitor and report changes in neurological status  - Monitor vital signs such as temperature, blood pressure,  glucose, and any other labs ordered   - Initiate measures to prevent increased intracranial pressure  - Monitor for seizure activity and implement precautions if appropriate      Outcome: Progressing  Goal: Remains free of injury related to seizures activity  Description: INTERVENTIONS  - Maintain airway, patient safety  and administer oxygen as ordered  - Monitor patient for seizure activity, document and report duration and description of seizure to physician/advanced practitioner  - If seizure occurs,  ensure patient safety during seizure  - Reorient patient post seizure  - Seizure pads on all 4 side rails  - Instruct patient/family to notify RN of any seizure activity including if an aura is experienced  - Instruct patient/family to call for assistance with activity based on nursing assessment  - Administer anti-seizure medications if ordered    Outcome: Progressing  Goal: Achieves maximal functionality and self care  Description: INTERVENTIONS  - Monitor swallowing and airway patency with patient fatigue and changes in neurological status  - Encourage and assist patient to increase activity and self care.   - Encourage visually impaired, hearing impaired and aphasic patients to use assistive/communication devices  Outcome: Progressing     Problem: RESPIRATORY - ADULT  Goal: Achieves optimal ventilation and oxygenation  Description: INTERVENTIONS:  - Assess for changes in respiratory status  - Assess for changes in mentation and behavior  - Position to facilitate oxygenation and minimize respiratory effort  - Oxygen administered by appropriate delivery if ordered  - Initiate smoking cessation education as indicated  - Encourage broncho-pulmonary hygiene including cough, deep breathe, Incentive Spirometry  - Assess the need for suctioning and aspirate as needed  - Assess and instruct to report SOB or any respiratory difficulty  - Respiratory Therapy support as indicated  Outcome: Progressing     Problem:  GENITOURINARY - ADULT  Goal: Maintains or returns to baseline urinary function  Description: INTERVENTIONS:  - Assess urinary function  - Encourage oral fluids to ensure adequate hydration if ordered  - Administer IV fluids as ordered to ensure adequate hydration  - Administer ordered medications as needed  - Offer frequent toileting  - Follow urinary retention protocol if ordered  Outcome: Progressing  Goal: Absence of urinary retention  Description: INTERVENTIONS:  - Assess patient’s ability to void and empty bladder  - Monitor I/O  - Bladder scan as needed  - Discuss with physician/AP medications to alleviate retention as needed  - Discuss catheterization for long term situations as appropriate  Outcome: Progressing     Problem: METABOLIC, FLUID AND ELECTROLYTES - ADULT  Goal: Electrolytes maintained within normal limits  Description: INTERVENTIONS:  - Monitor labs and assess patient for signs and symptoms of electrolyte imbalances  - Administer electrolyte replacement as ordered  - Monitor response to electrolyte replacements, including repeat lab results as appropriate  - Instruct patient on fluid and nutrition as appropriate  Outcome: Progressing  Goal: Fluid balance maintained  Description: INTERVENTIONS:  - Monitor labs   - Monitor I/O and WT  - Instruct patient on fluid and nutrition as appropriate  - Assess for signs & symptoms of volume excess or deficit  Outcome: Progressing     Problem: HEMATOLOGIC - ADULT  Goal: Maintains hematologic stability  Description: INTERVENTIONS  - Assess for signs and symptoms of bleeding or hemorrhage  - Monitor labs  - Administer supportive blood products/factors as ordered and appropriate  Outcome: Progressing     Problem: MUSCULOSKELETAL - ADULT  Goal: Maintain or return mobility to safest level of function  Description: INTERVENTIONS:  - Assess patient's ability to carry out ADLs; assess patient's baseline for ADL function and identify physical deficits which impact  ability to perform ADLs (bathing, care of mouth/teeth, toileting, grooming, dressing, etc.)  - Assess/evaluate cause of self-care deficits   - Assess range of motion  - Assess patient's mobility  - Assess patient's need for assistive devices and provide as appropriate  - Encourage maximum independence but intervene and supervise when necessary  - Involve family in performance of ADLs  - Assess for home care needs following discharge   - Consider OT consult to assist with ADL evaluation and planning for discharge  - Provide patient education as appropriate  Outcome: Progressing  Goal: Maintain proper alignment of affected body part  Description: INTERVENTIONS:  - Support, maintain and protect limb and body alignment  - Provide patient/ family with appropriate education  Outcome: Progressing     Problem: SAFETY,RESTRAINT: NV/NON-SELF DESTRUCTIVE BEHAVIOR  Goal: Remains free of harm/injury (restraint for non violent/non self-detsructive behavior)  Description: INTERVENTIONS:  - Instruct patient/family regarding restraint use   - Assess and monitor physiologic and psychological status   - Provide interventions and comfort measures to meet assessed patient needs   - Identify and implement measures to help patient regain control  - Assess readiness for release of restraint   Outcome: Progressing  Goal: Returns to optimal restraint-free functioning  Description: INTERVENTIONS:  - Assess the patient's behavior and symptoms that indicate continued need for restraint  - Identify and implement measures to help patient regain control  - Assess readiness for release of restraint   Outcome: Progressing     Problem: Nutrition/Hydration-ADULT  Goal: Nutrient/Hydration intake appropriate for improving, restoring or maintaining nutritional needs  Description: Monitor and assess patient's nutrition/hydration status for malnutrition. Collaborate with interdisciplinary team and initiate plan and interventions as ordered.  Monitor  patient's weight and dietary intake as ordered or per policy. Utilize nutrition screening tool and intervene as necessary. Determine patient's food preferences and provide high-protein, high-caloric foods as appropriate.     INTERVENTIONS:  - Monitor oral intake, urinary output, labs, and treatment plans  - Assess nutrition and hydration status and recommend course of action  - Evaluate amount of meals eaten  - Assist patient with eating if necessary   - Allow adequate time for meals  - Recommend/ encourage appropriate diets, oral nutritional supplements, and vitamin/mineral supplements  - Order, calculate, and assess calorie counts as needed  - Recommend, monitor, and adjust tube feedings and TPN/PPN based on assessed needs  - Assess need for intravenous fluids  - Provide specific nutrition/hydration education as appropriate  - Include patient/family/caregiver in decisions related to nutrition  Outcome: Progressing     Problem: COPING  Goal: Pt/Family able to verbalize concerns and demonstrate effective coping strategies  Description: INTERVENTIONS:  - Assist patient/family to identify coping skills, available support systems and cultural and spiritual values  - Provide emotional support, including active listening and acknowledgement of concerns of patient and caregivers  - Reduce environmental stimuli, as able  - Provide patient education  - Assess for spiritual pain/suffering and initiate spiritual care, including notification of Pastoral Care or dottie based community as needed  - Assess effectiveness of coping strategies  Outcome: Progressing  Goal: Will report anxiety at manageable levels  Description: INTERVENTIONS:  - Administer medication as ordered  - Teach and encourage coping skills  - Provide emotional support  - Assess patient/family for anxiety and ability to cope  Outcome: Progressing

## 2025-02-03 NOTE — PROGRESS NOTES
Progress Note - Pulmonology   Name: Esperanza Prajapati 54 y.o. female I MRN: [id number]  Unit/Bed#: ICU 15 I Date of Admission: 1/19/2025   Date of Service: 2/3/2025 I Hospital Day: 14    Assessment & Plan  Acute respiratory failure with hypoxia (HCC)  -Due to influenza A, viral sepsis, multifocal aspiration pneumonia  -Intubated 1/21-1/30 for worsening hypoxia and hypercapnia  -S/p bronchoscopy 1/24 with mixed carmen, unremarkable  -She was extubated to HFNC on 1/30. She has been slowly coming down on her HFNC requirements  -She is currently on 4L supplemental oxygen  -Encourage OOB as tolerated, incentive spirometry  -Continue pulmonary toilet with hypertonic saline, vest therapy  -Continue to wean supplemental oxygen, maintain SpO2 > 94%  -Ambulatory O2 evaluation prior to discharge  Viral sepsis (HCC)  -Due to Flu A + on 1/19   -SIRS on admission with tachycardia, tachypnea, leukopenia, fever   -Completed Oseltamivir  Pneumonia of both lungs due to infectious organism  -May have superimposed pneumonia vs. Bacterial pneumonia  -Completed 5 days of tamiflu and 3 days of azithromycin  Influenza A  -Flu A + on 1/19   -Complicated by viral sepsis. SIRS on admission with tachycardia, tachypnea, leukopenia, fever   -Completed Oseltamivir  Focal epilepsy (HCC)    Schizoaffective disorder (HCC)    Cognitive developmental delay    Postoperative hypothyroidism      24 Hour Events : No events.   Subjective : Patient examined at bedside this morning in the ICU.     Objective :  Temp:  [97.3 °F (36.3 °C)-98.2 °F (36.8 °C)] 97.7 °F (36.5 °C)  HR:  [68-92] 70  BP: ()/(52-78) 82/55  Resp:  [12-25] 14  SpO2:  [91 %-99 %] 98 %  O2 Device: Mid flow nasal cannula  Nasal Cannula O2 Flow Rate (L/min):  [4 L/min-8 L/min] 4 L/min    Physical Exam  Constitutional:       General: She is not in acute distress.     Appearance: She is obese. She is not ill-appearing.   HENT:      Head: Normocephalic and atraumatic.   Eyes:       Pupils: Pupils are equal, round, and reactive to light.   Cardiovascular:      Rate and Rhythm: Normal rate and regular rhythm.      Heart sounds: Normal heart sounds.   Pulmonary:      Effort: Pulmonary effort is normal.      Breath sounds: Wheezing present. No rhonchi or rales.      Comments: On NC.   Abdominal:      General: Bowel sounds are normal.      Palpations: Abdomen is soft.   Musculoskeletal:      Right lower leg: No edema.      Left lower leg: No edema.   Neurological:      General: No focal deficit present.      Mental Status: She is alert and oriented to person, place, and time.      Comments: Tremor in RUE   Psychiatric:         Mood and Affect: Mood normal.         Behavior: Behavior normal.           Lab Results: I have reviewed the following results:   No new results in last 24 hours.  ABG: No new results in last 24 hours.    Imaging Results Review: I reviewed radiology reports from this admission including: chest xray and CT chest.  Other Study Results Review: EKG was reviewed.   PFT Results Reviewed: Not on file    Arabella Palacios MD  Pulmonary & Critical Care Medicine Fellow PGY-4  [facility]

## 2025-02-03 NOTE — PROGRESS NOTES
Progress Note - Hospitalist   Name: Esperanza Prajapati 54 y.o. female I MRN: [id number]  Unit/Bed#: ICU 15 I Date of Admission: 1/19/2025   Date of Service: 2/3/2025 I Hospital Day: 14    Assessment & Plan  Acute respiratory failure with hypoxia (HCC)  Due to influenza, possible multifocal aspiration pneumonia.  S/p bronchoscopy 1/24. Mixed carmen  Required intubation 1/21/2025 - 1/30/2025, and was extubated to HFNC.  Appreciate pulmonary recommendations  Continue weaning off as tolerated  Viral sepsis (HCC)  Due to Influenza  Toxic metabolic encephalopathy  Due to viral sepsis, hypoxia, Influenza.   Continue supportive care  Influenza A  Completed tamiflu  Fall at home, initial encounter  Presented after a fall which resulted in a head strike. 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 Takes prn, Olanzapine 10 mg at bedtime   1/11/25: Refills denied by outpatient psychiatrist.  Needs follow-up appt; last visit in October  Hypernatremia  Resolved    Recent Labs     02/01/25  0444   SODIUM 141           Hyperglycemia    Pneumonia of both lungs due to infectious organism  Completed 7 days of cephalosporins, 3 days of azithromycin, and Tamiflu    VTE Pharmacologic Prophylaxis: VTE Score: 4 Moderate Risk (Score 3-4) - Pharmacological DVT Prophylaxis Ordered: enoxaparin (Lovenox).    Mobility:   Basic Mobility Inpatient Raw  Score: 8  -[facility] Goal: 3: Sit at edge of bed  -HLM Achieved: 1: Laying in bed    Discussions with Specialists or Other Care Team Provider: pulmonary, speech    Education and Discussions with Family / Patient: patient    Current Length of Stay: 14 day(s)  Current Patient Status: Inpatient   Certification Statement: The patient will continue to require additional inpatient hospital stay due to hypoxia, speech eval, dispo planning  Discharge Plan: 48-72 hours    Code Status: Level 1 - Full Code    Subjective   Patient seen and examined. No new complaints overnight.    Objective   Vitals:   Temp (24hrs), Av.7 °F (36.5 °C), Min:97.3 °F (36.3 °C), Max:98.2 °F (36.8 °C)    Temp:  [97.3 °F (36.3 °C)-98.2 °F (36.8 °C)] 98.2 °F (36.8 °C)  HR:  [68-88] 74  Resp:  [12-24] 16  BP: ()/(52-78) 94/69  SpO2:  [93 %-99 %] 96 %  Body mass index is 34.18 kg/m².     Input and Output Summary (last 24 hours):     Intake/Output Summary (Last 24 hours) at 2/3/2025 1812  Last data filed at 2/3/2025 1600  Gross per 24 hour   Intake 410 ml   Output 780 ml   Net -370 ml       Physical Exam  Vitals reviewed.   HENT:      Head: Normocephalic.      Nose: Nose normal.      Mouth/Throat:      Mouth: Mucous membranes are moist.   Eyes:      General: No scleral icterus.  Cardiovascular:      Rate and Rhythm: Normal rate and regular rhythm.   Pulmonary:      Effort: Pulmonary effort is normal. No respiratory distress.      Breath sounds: Wheezing present.   Abdominal:      General: There is no distension.      Palpations: Abdomen is soft.      Tenderness: There is no abdominal tenderness.   Skin:     General: Skin is warm.   Neurological:      Mental Status: She is alert.   Psychiatric:         Mood and Affect: Mood normal.         Behavior: Behavior normal.       Lines/Drains:              Lab Results: I have reviewed the following results:   Results from last 7 days   Lab Units 25  0444 25  0457 25  0403 25  0422  01/28/25  0508   WBC Thousand/uL 10.14 19.14* 10.76*   < > 9.79   HEMOGLOBIN g/dL 9.1* 11.0* 10.0*   < > 10.0*   PLATELETS Thousands/uL 263 293 272   < > 210   MCV fL 102* 101* 102*   < > 99*   TOTAL NEUT ABS Thousand/uL  --   --  8.18*  --  5.19   BANDS PCT %  --   --  1  --  1    < > = values in this interval not displayed.     Results from last 7 days   Lab Units 02/01/25  0444 01/31/25  0457 01/30/25  0441   SODIUM mmol/L 141 141 142   POTASSIUM mmol/L 4.0 4.2 4.6   CHLORIDE mmol/L 107 110* 104   CO2 mmol/L 29 24 32   ANION GAP mmol/L 5 7 6   BUN mg/dL 38* 31* 45*   CREATININE mg/dL 0.81 0.74 1.00   CALCIUM mg/dL 8.8 8.2* 8.9   ALBUMIN g/dL 3.2*  --  3.2*   TOTAL BILIRUBIN mg/dL 0.87  --  0.65   ALK PHOS U/L 33*  --  29*   ALT U/L 19  --  19   AST U/L 20  --  33   EGFR ml/min/1.73sq m 83 93 64   GLUCOSE RANDOM mg/dL 91 95 108     Results from last 7 days   Lab Units 02/01/25  0444 01/31/25  0457 01/30/25  0441 01/28/25  0508   MAGNESIUM mg/dL 2.6 2.1 2.4 2.7   PHOSPHORUS mg/dL  --  3.1  --   --                       Results from last 7 days   Lab Units 01/31/25  1559 01/31/25  1147 01/31/25  0014 01/30/25  1805 01/30/25  1305 01/30/25  0008 01/29/25  1808 01/29/25  1211 01/28/25  2348 01/28/25  2117 01/28/25  1813 01/28/25  1241   POC GLUCOSE mg/dl 118 118 93 88 105 146* 111 96 109 94 78 99               Recent Cultures (last 7 days):         Imaging:  Reviewed radiology reports from this admission: imaging since admission    Last 24 Hours Medication List:     Current Facility-Administered Medications:     Acetaminophen (TYLENOL) oral suspension 975 mg, Q6H PRN    aluminum-magnesium hydroxide-simethicone (MAALOX) oral suspension 30 mL, Q6H PRN    [Held by provider] benztropine (COGENTIN) injection 2 mg, BID    chlorhexidine (PERIDEX) 0.12 % oral rinse 15 mL, Q12H MOSHE    cyanocobalamin (VITAMIN B-12) tablet 1,000 mcg, Daily    enoxaparin (LOVENOX) subcutaneous injection 40 mg, Daily    ipratropium-albuterol  (DUO-NEB) 0.5-2.5 mg/3 mL inhalation solution 3 mL, TID    lacosamide (VIMPAT) injection 150 mg, Q24H    lacosamide (VIMPAT) injection 200 mg, HS    levalbuterol (XOPENEX) inhalation solution 1.25 mg, Q6H PRN    levothyroxine tablet 175 mcg, Early Morning    melatonin tablet 6 mg, HS    OLANZapine (ZyPREXA) tablet 10 mg, HS    ondansetron (ZOFRAN) injection 4 mg, Q6H PRN    [Held by provider] paliperidone (INVEGA) 24 hr tablet 6 mg, QAM    polyethylene glycol (MIRALAX) packet 17 g, Daily PRN    senna-docusate sodium (SENOKOT S) 8.6-50 mg per tablet 2 tablet, BID    valproate (DEPACON) 750 mg in sodium chloride 0.9 % 50 mL IVPB, Q12H, Last Rate: 750 mg (02/03/25 1600)      **Please Note: This note may have been constructed using a voice recognition system.**

## 2025-02-03 NOTE — SPEECH THERAPY NOTE
Speech Language/Pathology    Speech/Language Pathology Progress Note    Patient Name: Esperanza Prajapati  Today's Date: 2/3/2025     Problem List  Principal Problem:    Acute respiratory failure with hypoxia (HCC)  Active Problems:    Cognitive developmental delay    Postoperative hypothyroidism    Focal epilepsy (HCC)    Schizoaffective disorder (HCC)    Viral sepsis (HCC)    Toxic metabolic encephalopathy    Fall at home, initial encounter    Influenza A    Hypernatremia    Hyperglycemia    Pneumonia of both lungs due to infectious organism       Past Medical History  Past Medical History:   Diagnosis Date    Cancer (HCC)     thyroid cancer    Disease of thyroid gland     Hallucination     Hyperactivity (behavior)     Last Assessed: 11/7/2014     Hyperlipidemia     Hyponatremia 01/20/2025    Obesity     Psychosis (HCC)     Seizures (HCC)         Past Surgical History  Past Surgical History:   Procedure Laterality Date    OTHER SURGICAL HISTORY      Removal of urinary calculus     OTHER SURGICAL HISTORY      Rhinologic Surgery     MN OPEN TX PHALANGEAL SHAFT FRACTURE PROX/MIDDLE EA Left 8/3/2023    Procedure: CLOSED REDUCTION PERCUTANEOUS PINNING - LEFT RING FINGER;  Surgeon: Fransisco Escalante MD;  Location: [facility] MAIN OR;  Service: Orthopedics    TOTAL THYROIDECTOMY      LAst Assessed: 11/7/2014     Attempted to see pt. Unable to alert sufficiently for PO. Nurse reported pt was more alert this am and  able to tolerate water w/out incident. Pt currently on clears. Will f/u as able.

## 2025-02-04 LAB — GLUCOSE SERPL-MCNC: 110 MG/DL (ref 65–140)

## 2025-02-04 PROCEDURE — 97116 GAIT TRAINING THERAPY: CPT

## 2025-02-04 PROCEDURE — 94760 N-INVAS EAR/PLS OXIMETRY 1: CPT

## 2025-02-04 PROCEDURE — 97535 SELF CARE MNGMENT TRAINING: CPT

## 2025-02-04 PROCEDURE — 82948 REAGENT STRIP/BLOOD GLUCOSE: CPT

## 2025-02-04 PROCEDURE — 94640 AIRWAY INHALATION TREATMENT: CPT

## 2025-02-04 PROCEDURE — C9254 INJECTION, LACOSAMIDE: HCPCS

## 2025-02-04 PROCEDURE — 99232 SBSQ HOSP IP/OBS MODERATE 35: CPT | Performed by: STUDENT IN AN ORGANIZED HEALTH CARE EDUCATION/TRAINING PROGRAM

## 2025-02-04 PROCEDURE — 97530 THERAPEUTIC ACTIVITIES: CPT

## 2025-02-04 PROCEDURE — 94669 MECHANICAL CHEST WALL OSCILL: CPT

## 2025-02-04 PROCEDURE — 92526 ORAL FUNCTION THERAPY: CPT

## 2025-02-04 PROCEDURE — 99232 SBSQ HOSP IP/OBS MODERATE 35: CPT | Performed by: INTERNAL MEDICINE

## 2025-02-04 RX ORDER — BENZTROPINE MESYLATE 1 MG/1
2 TABLET ORAL 2 TIMES DAILY
Status: DISCONTINUED | OUTPATIENT
Start: 2025-02-04 | End: 2025-02-08 | Stop reason: HOSPADM

## 2025-02-04 RX ORDER — LACOSAMIDE 150 MG/1
150 TABLET ORAL DAILY
Status: DISCONTINUED | OUTPATIENT
Start: 2025-02-05 | End: 2025-02-08 | Stop reason: HOSPADM

## 2025-02-04 RX ADMIN — SENNOSIDES AND DOCUSATE SODIUM 2 TABLET: 8.6; 5 TABLET ORAL at 17:13

## 2025-02-04 RX ADMIN — BENZTROPINE MESYLATE 2 MG: 1 TABLET ORAL at 17:13

## 2025-02-04 RX ADMIN — IPRATROPIUM BROMIDE AND ALBUTEROL SULFATE 3 ML: 2.5; .5 SOLUTION RESPIRATORY (INHALATION) at 07:48

## 2025-02-04 RX ADMIN — IPRATROPIUM BROMIDE AND ALBUTEROL SULFATE 3 ML: 2.5; .5 SOLUTION RESPIRATORY (INHALATION) at 20:12

## 2025-02-04 RX ADMIN — SENNOSIDES AND DOCUSATE SODIUM 2 TABLET: 8.6; 5 TABLET ORAL at 09:38

## 2025-02-04 RX ADMIN — BENZTROPINE MESYLATE 2 MG: 1 TABLET ORAL at 11:22

## 2025-02-04 RX ADMIN — OLANZAPINE 10 MG: 10 TABLET, FILM COATED ORAL at 21:19

## 2025-02-04 RX ADMIN — LEVOTHYROXINE SODIUM 175 MCG: 0.05 TABLET ORAL at 05:13

## 2025-02-04 RX ADMIN — ENOXAPARIN SODIUM 40 MG: 40 INJECTION SUBCUTANEOUS at 09:37

## 2025-02-04 RX ADMIN — SODIUM CHLORIDE 750 MG: 9 INJECTION, SOLUTION INTRAVENOUS at 05:13

## 2025-02-04 RX ADMIN — DIVALPROEX SODIUM 750 MG: 250 TABLET, DELAYED RELEASE ORAL at 21:19

## 2025-02-04 RX ADMIN — IPRATROPIUM BROMIDE AND ALBUTEROL SULFATE 3 ML: 2.5; .5 SOLUTION RESPIRATORY (INHALATION) at 14:11

## 2025-02-04 RX ADMIN — LACOSAMIDE 200 MG: 150 TABLET, FILM COATED ORAL at 21:19

## 2025-02-04 RX ADMIN — CYANOCOBALAMIN TAB 500 MCG 1000 MCG: 500 TAB at 09:38

## 2025-02-04 RX ADMIN — LACOSAMIDE 150 MG: 10 INJECTION INTRAVENOUS at 09:28

## 2025-02-04 NOTE — PLAN OF CARE
Problem: Potential for Falls  Goal: Patient will remain free of falls  Description: INTERVENTIONS:  - Educate patient/family on patient safety including physical limitations  - Instruct patient to call for assistance with activity   - Consult OT/PT to assist with strengthening/mobility   - Keep Call bell within reach  - Keep bed low and locked with side rails adjusted as appropriate  - Keep care items and personal belongings within reach  - Initiate and maintain comfort rounds  - Make Fall Risk Sign visible to staff  - Offer Toileting every 2 Hours, in advance of need  - Initiate/Maintain bed alarm  - Obtain necessary fall risk management equipment:   - Apply yellow socks and bracelet for high fall risk patients  - Consider moving patient to room near nurses station  Outcome: Progressing     Problem: Prexisting or High Potential for Compromised Skin Integrity  Goal: Skin integrity is maintained or improved  Description: INTERVENTIONS:  - Identify patients at risk for skin breakdown  - Assess and monitor skin integrity  - Assess and monitor nutrition and hydration status  - Monitor labs   - Assess for incontinence   - Turn and reposition patient  - Assist with mobility/ambulation  - Relieve pressure over bony prominences  - Avoid friction and shearing  - Provide appropriate hygiene as needed including keeping skin clean and dry  - Evaluate need for skin moisturizer/barrier cream  - Collaborate with interdisciplinary team   - Patient/family teaching  - Consider wound care consult   Outcome: Progressing     Problem: PAIN - ADULT  Goal: Verbalizes/displays adequate comfort level or baseline comfort level  Description: Interventions:  - Encourage patient to monitor pain and request assistance  - Assess pain using appropriate pain scale  - Administer analgesics based on type and severity of pain and evaluate response  - Implement non-pharmacological measures as appropriate and evaluate response  - Consider cultural and  social influences on pain and pain management  - Notify physician/advanced practitioner if interventions unsuccessful or patient reports new pain  Outcome: Progressing     Problem: INFECTION - ADULT  Goal: Absence or prevention of progression during hospitalization  Description: INTERVENTIONS:  - Assess and monitor for signs and symptoms of infection  - Monitor lab/diagnostic results  - Monitor all insertion sites, i.e. indwelling lines, tubes, and drains  - Monitor endotracheal if appropriate and nasal secretions for changes in amount and color  - [place] appropriate cooling/warming therapies per order  - Administer medications as ordered  - Instruct and encourage patient and family to use good hand hygiene technique  - Identify and instruct in appropriate isolation precautions for identified infection/condition  Outcome: Progressing     Problem: SAFETY ADULT  Goal: Patient will remain free of falls  Description: INTERVENTIONS:  - Educate patient/family on patient safety including physical limitations  - Instruct patient to call for assistance with activity   - Consult OT/PT to assist with strengthening/mobility   - Keep Call bell within reach  - Keep bed low and locked with side rails adjusted as appropriate  - Keep care items and personal belongings within reach  - Initiate and maintain comfort rounds  - Make Fall Risk Sign visible to staff  - Offer Toileting every 2 Hours, in advance of need  - Initiate/Maintain bed alarm  - Obtain necessary fall risk management equipment:   - Apply yellow socks and bracelet for high fall risk patients  - Consider moving patient to room near nurses station  Outcome: Progressing  Goal: Maintain or return to baseline ADL function  Description: INTERVENTIONS:  -  Assess patient's ability to carry out ADLs; assess patient's baseline for ADL function and identify physical deficits which impact ability to perform ADLs (bathing, care of mouth/teeth, toileting, grooming, dressing,  etc.)  - Assess/evaluate cause of self-care deficits   - Assess range of motion  - Assess patient's mobility; develop plan if impaired  - Assess patient's need for assistive devices and provide as appropriate  - Encourage maximum independence but intervene and supervise when necessary  - Involve family in performance of ADLs  - Assess for home care needs following discharge   - Consider OT consult to assist with ADL evaluation and planning for discharge  - Provide patient education as appropriate  Outcome: Progressing  Goal: Maintains/Returns to pre admission functional level  Description: INTERVENTIONS:  - Perform AM-PAC 6 Click Basic Mobility/ Daily Activity assessment daily.  - Set and communicate daily mobility goal to care team and patient/family/caregiver.   - Collaborate with rehabilitation services on mobility goals if consulted  - Perform Range of Motion 4 times a day.  - Reposition patient every 2 hours.    - Out of bed for toileting  - Record patient progress and toleration of activity level   Outcome: Progressing     Problem: DISCHARGE PLANNING  Goal: Discharge to home or other facility with appropriate resources  Description: INTERVENTIONS:  - Identify barriers to discharge w/patient and caregiver  - Arrange for needed discharge resources and transportation as appropriate  - Identify discharge learning needs (meds, wound care, etc.)  - Arrange for interpretive services to assist at discharge as needed  - Refer to Case Management Department for coordinating discharge planning if the patient needs post-hospital services based on physician/advanced practitioner order or complex needs related to functional status, cognitive ability, or social support system  Outcome: Progressing     Problem: NEUROSENSORY - ADULT  Goal: Achieves stable or improved neurological status  Description: INTERVENTIONS  - Monitor and report changes in neurological status  - Monitor vital signs such as temperature, blood pressure,  glucose, and any other labs ordered   - Initiate measures to prevent increased intracranial pressure  - Monitor for seizure activity and implement precautions if appropriate      Outcome: Progressing  Goal: Remains free of injury related to seizures activity  Description: INTERVENTIONS  - Maintain airway, patient safety  and administer oxygen as ordered  - Monitor patient for seizure activity, document and report duration and description of seizure to physician/advanced practitioner  - If seizure occurs,  ensure patient safety during seizure  - Reorient patient post seizure  - Seizure pads on all 4 side rails  - Instruct patient/family to notify RN of any seizure activity including if an aura is experienced  - Instruct patient/family to call for assistance with activity based on nursing assessment  - Administer anti-seizure medications if ordered    Outcome: Progressing  Goal: Achieves maximal functionality and self care  Description: INTERVENTIONS  - Monitor swallowing and airway patency with patient fatigue and changes in neurological status  - Encourage and assist patient to increase activity and self care.   - Encourage visually impaired, hearing impaired and aphasic patients to use assistive/communication devices  Outcome: Progressing     Problem: RESPIRATORY - ADULT  Goal: Achieves optimal ventilation and oxygenation  Description: INTERVENTIONS:  - Assess for changes in respiratory status  - Assess for changes in mentation and behavior  - Position to facilitate oxygenation and minimize respiratory effort  - Oxygen administered by appropriate delivery if ordered  - Initiate smoking cessation education as indicated  - Encourage broncho-pulmonary hygiene including cough, deep breathe, Incentive Spirometry  - Assess the need for suctioning and aspirate as needed  - Assess and instruct to report SOB or any respiratory difficulty  - Respiratory Therapy support as indicated  Outcome: Progressing     Problem:  GENITOURINARY - ADULT  Goal: Maintains or returns to baseline urinary function  Description: INTERVENTIONS:  - Assess urinary function  - Encourage oral fluids to ensure adequate hydration if ordered  - Administer IV fluids as ordered to ensure adequate hydration  - Administer ordered medications as needed  - Offer frequent toileting  - Follow urinary retention protocol if ordered  Outcome: Progressing  Goal: Absence of urinary retention  Description: INTERVENTIONS:  - Assess patient’s ability to void and empty bladder  - Monitor I/O  - Bladder scan as needed  - Discuss with physician/AP medications to alleviate retention as needed  - Discuss catheterization for long term situations as appropriate  Outcome: Progressing     Problem: METABOLIC, FLUID AND ELECTROLYTES - ADULT  Goal: Electrolytes maintained within normal limits  Description: INTERVENTIONS:  - Monitor labs and assess patient for signs and symptoms of electrolyte imbalances  - Administer electrolyte replacement as ordered  - Monitor response to electrolyte replacements, including repeat lab results as appropriate  - Instruct patient on fluid and nutrition as appropriate  Outcome: Progressing  Goal: Fluid balance maintained  Description: INTERVENTIONS:  - Monitor labs   - Monitor I/O and WT  - Instruct patient on fluid and nutrition as appropriate  - Assess for signs & symptoms of volume excess or deficit  Outcome: Progressing     Problem: HEMATOLOGIC - ADULT  Goal: Maintains hematologic stability  Description: INTERVENTIONS  - Assess for signs and symptoms of bleeding or hemorrhage  - Monitor labs  - Administer supportive blood products/factors as ordered and appropriate  Outcome: Progressing     Problem: MUSCULOSKELETAL - ADULT  Goal: Maintain or return mobility to safest level of function  Description: INTERVENTIONS:  - Assess patient's ability to carry out ADLs; assess patient's baseline for ADL function and identify physical deficits which impact  ability to perform ADLs (bathing, care of mouth/teeth, toileting, grooming, dressing, etc.)  - Assess/evaluate cause of self-care deficits   - Assess range of motion  - Assess patient's mobility  - Assess patient's need for assistive devices and provide as appropriate  - Encourage maximum independence but intervene and supervise when necessary  - Involve family in performance of ADLs  - Assess for home care needs following discharge   - Consider OT consult to assist with ADL evaluation and planning for discharge  - Provide patient education as appropriate  Outcome: Progressing  Goal: Maintain proper alignment of affected body part  Description: INTERVENTIONS:  - Support, maintain and protect limb and body alignment  - Provide patient/ family with appropriate education  Outcome: Progressing     Problem: SAFETY,RESTRAINT: NV/NON-SELF DESTRUCTIVE BEHAVIOR  Goal: Remains free of harm/injury (restraint for non violent/non self-detsructive behavior)  Description: INTERVENTIONS:  - Instruct patient/family regarding restraint use   - Assess and monitor physiologic and psychological status   - Provide interventions and comfort measures to meet assessed patient needs   - Identify and implement measures to help patient regain control  - Assess readiness for release of restraint   Outcome: Progressing  Goal: Returns to optimal restraint-free functioning  Description: INTERVENTIONS:  - Assess the patient's behavior and symptoms that indicate continued need for restraint  - Identify and implement measures to help patient regain control  - Assess readiness for release of restraint   Outcome: Progressing     Problem: Nutrition/Hydration-ADULT  Goal: Nutrient/Hydration intake appropriate for improving, restoring or maintaining nutritional needs  Description: Monitor and assess patient's nutrition/hydration status for malnutrition. Collaborate with interdisciplinary team and initiate plan and interventions as ordered.  Monitor  patient's weight and dietary intake as ordered or per policy. Utilize nutrition screening tool and intervene as necessary. Determine patient's food preferences and provide high-protein, high-caloric foods as appropriate.     INTERVENTIONS:  - Monitor oral intake, urinary output, labs, and treatment plans  - Assess nutrition and hydration status and recommend course of action  - Evaluate amount of meals eaten  - Assist patient with eating if necessary   - Allow adequate time for meals  - Recommend/ encourage appropriate diets, oral nutritional supplements, and vitamin/mineral supplements  - Order, calculate, and assess calorie counts as needed  - Recommend, monitor, and adjust tube feedings and TPN/PPN based on assessed needs  - Assess need for intravenous fluids  - Provide specific nutrition/hydration education as appropriate  - Include patient/family/caregiver in decisions related to nutrition  Outcome: Progressing     Problem: COPING  Goal: Pt/Family able to verbalize concerns and demonstrate effective coping strategies  Description: INTERVENTIONS:  - Assist patient/family to identify coping skills, available support systems and cultural and spiritual values  - Provide emotional support, including active listening and acknowledgement of concerns of patient and caregivers  - Reduce environmental stimuli, as able  - Provide patient education  - Assess for spiritual pain/suffering and initiate spiritual care, including notification of Pastoral Care or dottie based community as needed  - Assess effectiveness of coping strategies  Outcome: Progressing  Goal: Will report anxiety at manageable levels  Description: INTERVENTIONS:  - Administer medication as ordered  - Teach and encourage coping skills  - Provide emotional support  - Assess patient/family for anxiety and ability to cope  Outcome: Progressing

## 2025-02-04 NOTE — PROGRESS NOTES
"Progress Note - Hospitalist   Name: Esperanza Prajapati 54 y.o. female I MRN: [id number]  Unit/Bed#: E4 -01 I Date of Admission: 1/19/2025   Date of Service: 2/4/2025 I Hospital Day: 15    Assessment & Plan  Acute respiratory failure with hypoxia (HCC)  Due to influenza, possible multifocal aspiration pneumonia.  S/p bronchoscopy 1/24. Mixed carmen  Required intubation 1/21/2025 - 1/30/2025, and was extubated to HFNC. Currently on 2L NC.  Appreciate pulmonary recommendations. Continue weaning off as tolerated  Viral sepsis (HCC)  Due to Influenza  Toxic metabolic encephalopathy  Due to viral sepsis, hypoxia, Influenza.   Continue supportive care  Influenza A  Completed tamiflu  Fall at home, initial encounter  Presented after a fall which resulted in a head strike. CTH without acute abnormalities  Postoperative hypothyroidism  History of papillary thyroid carcinoma in 2013 s/p total thyroidectomy and I-131 ablation  Continue Levothyroxine 175 mcg   Focal epilepsy (HCC)  Lacosamide 150 mg daily in the morning, 200mg at bedtime  On divalproex for mood stabilization 750mg BID  Cognitive developmental delay  History of developmental delay due to childhood head injury at 10months of age  Continue supportive care  Schizoaffective disorder (HCC)  Home regimen: Cogentin 2mg BID for EPS, Depakote 750mg BID for mood lability, Invega 6mg qd for psychosis, Trazodone 50mg QHS for insomnia. Takes prn, Olanzapine 10 mg at bedtime   1/11/25: Refills denied by outpatient psychiatrist.  Needs follow-up appt; last visit in October  Hypernatremia  Resolved    No results for input(s): \"SODIUM\" in the last 72 hours.          Hyperglycemia    Pneumonia of both lungs due to infectious organism  Completed 7 days of cephalosporins, 3 days of azithromycin, and Tamiflu    VTE Pharmacologic Prophylaxis: VTE Score: 4 Moderate Risk (Score 3-4) - Pharmacological DVT Prophylaxis Ordered: enoxaparin (Lovenox).    Mobility:   Basic Mobility " Inpatient Raw Score: 8  -[facility] Goal: 3: Sit at edge of bed  -HLM Achieved: 1: Laying in bed    Discussions with Specialists or Other Care Team Provider: case management    Education and Discussions with Family / Patient: patient, called brother updated, tried to call [facility] to check for home meds no answer    Current Length of Stay: 15 day(s)  Current Patient Status: Inpatient   Certification Statement: The patient will continue to require additional inpatient hospital stay due to awaiting safe discharge plan, diet escalation  Discharge Plan: 24 to 48 hours    Code Status: Level 1 - Full Code    Subjective   Patient seen and examined. No new issues overnight.     Objective   Vitals:   Temp (24hrs), Av.3 °F (36.8 °C), Min:97.9 °F (36.6 °C), Max:98.9 °F (37.2 °C)    Temp:  [97.9 °F (36.6 °C)-98.9 °F (37.2 °C)] 98 °F (36.7 °C)  HR:  [74-95] 84  Resp:  [16-41] 22  BP: ()/(51-76) 94/51  SpO2:  [93 %-96 %] 94 %  Body mass index is 34.18 kg/m².     Input and Output Summary (last 24 hours):     Intake/Output Summary (Last 24 hours) at 2025 1028  Last data filed at 2025 0952  Gross per 24 hour   Intake 360 ml   Output 1550 ml   Net -1190 ml       Physical Exam  Vitals reviewed.   Constitutional:       General: She is not in acute distress.  HENT:      Head: Normocephalic.      Nose: Nose normal.      Mouth/Throat:      Mouth: Mucous membranes are moist.   Eyes:      General: No scleral icterus.  Cardiovascular:      Rate and Rhythm: Normal rate and regular rhythm.   Pulmonary:      Effort: Pulmonary effort is normal. No respiratory distress.      Breath sounds: No wheezing.   Abdominal:      General: There is no distension.      Palpations: Abdomen is soft.      Tenderness: There is no abdominal tenderness.   Skin:     General: Skin is warm.   Neurological:      Mental Status: She is alert.      Motor: Weakness present.   Psychiatric:         Mood and Affect: Mood normal.         Behavior: Behavior normal.        Lines/Drains:              Lab Results: I have reviewed the following results:   Results from last 7 days   Lab Units 02/01/25  0444 01/31/25  0457 01/30/25  0403   WBC Thousand/uL 10.14 19.14* 10.76*   HEMOGLOBIN g/dL 9.1* 11.0* 10.0*   PLATELETS Thousands/uL 263 293 272   MCV fL 102* 101* 102*   TOTAL NEUT ABS Thousand/uL  --   --  8.18*   BANDS PCT %  --   --  1     Results from last 7 days   Lab Units 02/01/25  0444 01/31/25  0457 01/30/25  0441   SODIUM mmol/L 141 141 142   POTASSIUM mmol/L 4.0 4.2 4.6   CHLORIDE mmol/L 107 110* 104   CO2 mmol/L 29 24 32   ANION GAP mmol/L 5 7 6   BUN mg/dL 38* 31* 45*   CREATININE mg/dL 0.81 0.74 1.00   CALCIUM mg/dL 8.8 8.2* 8.9   ALBUMIN g/dL 3.2*  --  3.2*   TOTAL BILIRUBIN mg/dL 0.87  --  0.65   ALK PHOS U/L 33*  --  29*   ALT U/L 19  --  19   AST U/L 20  --  33   EGFR ml/min/1.73sq m 83 93 64   GLUCOSE RANDOM mg/dL 91 95 108     Results from last 7 days   Lab Units 02/01/25  0444 01/31/25  0457 01/30/25  0441   MAGNESIUM mg/dL 2.6 2.1 2.4   PHOSPHORUS mg/dL  --  3.1  --                       Results from last 7 days   Lab Units 02/04/25  0749 01/31/25  1559 01/31/25  1147 01/31/25  0014 01/30/25  1805 01/30/25  1305 01/30/25  0008 01/29/25  1808 01/29/25  1211 01/28/25  2348 01/28/25  2117 01/28/25  1813   POC GLUCOSE mg/dl 110 118 118 93 88 105 146* 111 96 109 94 78               Recent Cultures (last 7 days):         Imaging:  Reviewed radiology reports from this admission: xr chest    Last 24 Hours Medication List:     Current Facility-Administered Medications:     Acetaminophen (TYLENOL) oral suspension 975 mg, Q6H PRN    aluminum-magnesium hydroxide-simethicone (MAALOX) oral suspension 30 mL, Q6H PRN    [Held by provider] benztropine (COGENTIN) injection 2 mg, BID    cyanocobalamin (VITAMIN B-12) tablet 1,000 mcg, Daily    enoxaparin (LOVENOX) subcutaneous injection 40 mg, Daily    ipratropium-albuterol (DUO-NEB) 0.5-2.5 mg/3 mL inhalation solution 3 mL, TID     lacosamide (VIMPAT) injection 150 mg, Q24H    lacosamide (VIMPAT) injection 200 mg, HS    levalbuterol (XOPENEX) inhalation solution 1.25 mg, Q6H PRN    levothyroxine tablet 175 mcg, Early Morning    [Transfer Hold] melatonin tablet 6 mg, HS    [Transfer Hold] OLANZapine (ZyPREXA) tablet 10 mg, HS    ondansetron (ZOFRAN) injection 4 mg, Q6H PRN    [Held by provider] paliperidone (INVEGA) 24 hr tablet 6 mg, QAM    polyethylene glycol (MIRALAX) packet 17 g, Daily PRN    senna-docusate sodium (SENOKOT S) 8.6-50 mg per tablet 2 tablet, BID    valproate (DEPACON) 750 mg in sodium chloride 0.9 % 50 mL IVPB, Q12H, Last Rate: 750 mg (02/04/25 0513)      **Please Note: This note may have been constructed using a voice recognition system.**

## 2025-02-04 NOTE — PROGRESS NOTES
Progress Note - Pulmonology   Name: Esperanza Prajapati 54 y.o. female I MRN: [id number]  Unit/Bed#: E4 -01 I Date of Admission: 1/19/2025   Date of Service: 2/4/2025 I Hospital Day: 15    Assessment & Plan  Acute respiratory failure with hypoxia (HCC)  -Due to influenza A, viral sepsis, multifocal aspiration pneumonia  -Intubated 1/21-1/30 for worsening hypoxia and hypercapnia  -S/p bronchoscopy 1/24 with mixed carmen, unremarkable  -She was extubated to [facility] on 1/30. She has been slowly coming down on her oxygen requirements  -She is currently on 2L supplemental oxygen  -Encourage OOB as tolerated, incentive spirometry  -Continue pulmonary toilet with hypertonic saline, vest therapy  -Continue to wean supplemental oxygen, maintain SpO2 > 94%  -Ambulatory O2 evaluation prior to discharge  Viral sepsis (HCC)  -Due to Flu A + on 1/19   -SIRS on admission with tachycardia, tachypnea, leukopenia, fever   -Completed Oseltamivir  Influenza A  -Flu A + on 1/19   -Complicated by viral sepsis. SIRS on admission with tachycardia, tachypnea, leukopenia, fever   -Completed Oseltamivir  Pneumonia of both lungs due to infectious organism  -May have had superimposed viral vs. Bacterial pneumonia   -Completed 5 days of tamiflu and 3 days of azithromycin  Focal epilepsy (HCC)    Schizoaffective disorder (HCC)    Cognitive developmental delay      24 Hour Events : No events.   Subjective : Patient examined at bedside this morning. She was saturating well on 2L supplemental oxygen. She was more somnolent than yesterday.    Objective :  Temp:  [97.9 °F (36.6 °C)-98.9 °F (37.2 °C)] 98 °F (36.7 °C)  HR:  [74-95] 84  BP: ()/(51-76) 94/51  Resp:  [16-41] 22  SpO2:  [93 %-96 %] 94 %  O2 Device: Nasal cannula  Nasal Cannula O2 Flow Rate (L/min):  [2 L/min-4 L/min] 2 L/min    Physical Exam  Constitutional:       General: She is not in acute distress.     Appearance: She is obese. She is not ill-appearing.   HENT:      Head:  Normocephalic and atraumatic.   Eyes:      Pupils: Pupils are equal, round, and reactive to light.   Cardiovascular:      Rate and Rhythm: Normal rate and regular rhythm.      Heart sounds: Normal heart sounds.   Pulmonary:      Effort: Pulmonary effort is normal.      Breath sounds: Normal breath sounds. No wheezing, rhonchi or rales.      Comments: On 2L NC.   Abdominal:      General: Bowel sounds are normal.      Palpations: Abdomen is soft.   Musculoskeletal:      Right lower leg: No edema.      Left lower leg: No edema.   Neurological:      General: No focal deficit present.      Mental Status: She is alert and oriented to person, place, and time.      Comments: Tremor in RUE   Psychiatric:         Mood and Affect: Mood normal.         Behavior: Behavior normal.           Lab Results: I have reviewed the following results:   No new results in last 24 hours.  ABG: No new results in last 24 hours.    Imaging Results Review: I reviewed radiology reports from this admission including: chest xray and CT chest.  Other Study Results Review: EKG was reviewed.   PFT Results Reviewed: Not on file    Arabella Palacios MD  Pulmonary & Critical Care Medicine Fellow PGY-4  [facility]

## 2025-02-04 NOTE — PLAN OF CARE
"  Problem: PHYSICAL THERAPY ADULT  Goal: Performs mobility at highest level of function for planned discharge setting.  See evaluation for individualized goals.  Description: Treatment/Interventions: Functional transfer training, LE strengthening/ROM, Elevations, Therapeutic exercise, Endurance training, Patient/family training, Bed mobility, Gait training, Spoke to nursing, OT          See flowsheet documentation for full assessment, interventions and recommendations.  Outcome: Progressing  Note: Prognosis: Good  Problem List: Decreased strength, Decreased endurance, Impaired balance, Decreased mobility, Decreased cognition, Impaired judgement, Decreased safety awareness, Obesity  Assessment: Pt seen for PT per POC. Improved mobility & activity tolerance noted this tx session. Pt require minAx1 for most functional mobility w/ RW + cues for techniques & safety. Gait deviations as above but no gross LOB noted. Dec amb tolerance 2* to weakness, fatigue & fear of falling. Pt tolerated above mentioned thera.ex well. Pt require cues for proper exercise techniques and performance.  No SOB & dizziness reported t/o session. Pt on 2L O2 NC t/o session. Please see flowsheet above for objective findings & levels of assistance required for all functional tasks during this tx session. Nsg staff most recent vital signs as follows: /64 (BP Location: Left arm)   Pulse 94   Temp 98 °F (36.7 °C) (Temporal)   Resp 22   Ht 5' 9\" (1.753 m)   Wt 105 kg (231 lb 7.7 oz)   LMP  (LMP Unknown)   SpO2 92%   BMI 34.18 kg/m² . Will continue PT per POC. At end of session, pt back in bed in stable condition, call bell & phone in reach, bed alarm activated. Fall precautions reinforced w/ good understanding. The patient's AM-PAC Basic Mobility Inpatient Short Form Raw Score is 16. A Raw score of less than or equal to 16 suggests the patient may benefit from discharge to post-acute rehabilitation services. Please also refer to the " recommendation of the Physical Therapist for safe discharge planning. From PT standpoint, will continue to recommend Level I (maximun resource intensity) rehab services at [facility]. CM to follow. Nsg staff to continue to mobilized pt (OOB in chair for all meals & ambulate in room) as tolerated to prevent decline in function. Will continue to recommend [facility] for daily amb &/or daily OOB in chair as appropriate. Nsg notified.        Rehab Resource Intensity Level, PT: I (Maximum Resource Intensity)    See flowsheet documentation for full assessment.

## 2025-02-04 NOTE — PLAN OF CARE
Problem: OCCUPATIONAL THERAPY ADULT  Goal: Performs self-care activities at highest level of function for planned discharge setting.  See evaluation for individualized goals.  Description: Treatment Interventions: ADL retraining, Functional transfer training, UE strengthening/ROM, Endurance training, Cognitive reorientation, Patient/family training, Equipment evaluation/education, Compensatory technique education, Energy conservation, Activityengagement          See flowsheet documentation for full assessment, interventions and recommendations.   Outcome: Progressing  Note: Limitation: Decreased ADL status, Decreased Safe judgement during ADL, Decreased UE strength, Decreased cognition, Decreased endurance, Decreased self-care trans, Decreased high-level ADLs, Decreased UE ROM, Decreased fine motor control  Prognosis: Good, Fair  Assessment: Pt seen for 42min tx session with focus on functional balance, functional mobility, ADL status, transfer safety, b/l UE ROM, and cognition. Pt able to tolerate OOB mobility; sitting balance=f/f-, standing balance=f-/p+. Pt required verbal cues/physical assistance to maintain transfer safety. Pt able to demonstrate good b/l UE AROM. B/l UE tremors noted, affecting FMC/self-feeding tasks. Pt demonstrating neend for assistance with her UE and LE ADLs. Cognitive deficits noted--i.e.orientation, memory, problem-solving, judgement/safety. Pt would benefit from inpt rehab to improve her overall level of independence. Will continue. The patient's raw score on the -PAC Daily Activity Inpatient Short Form is 15. A raw score of less than 19 suggests the patient may benefit from discharge to post-acute rehabilitation services. Please refer to the recommendation of the Occupational Therapist for safe discharge planning.     Rehab Resource Intensity Level, OT: I (Maximum Resource Intensity)

## 2025-02-04 NOTE — PHYSICAL THERAPY NOTE
PT PROGRESS NOTE    Name: Esperanza Prajapati  AGE: 54 y.o.  MRN: [id number]  LENGTH OF STAY: 15    Admitting Dx:  Influenza A [J10.1]  Fever [R50.9]  Acute encephalopathy [G93.40]  Acute hypoxic respiratory failure (HCC) [J96.01]      Patient Active Problem List   Diagnosis    Vitamin D deficiency    Cognitive developmental delay    Postoperative hypothyroidism    Focal epilepsy (HCC)    Obesity (BMI 30-39.9)    Obstructive sleep apnea    History of thyroid cancer    Proteinuria    Family history of diabetes mellitus    Family history of malignant neoplasm of breast    PCOS (polycystic ovarian syndrome)    Intellectual disability    Dyslipidemia    Gout    Schizoaffective disorder (HCC)    Transient ischemic attack    Vitamin B12 deficiency    h/o VALERIE (acute kidney injury) (HCC)    Bilateral lower extremity edema    Impaired ability to manage medication regime    Dermatitis of face    Decreased urination    Viral sepsis (HCC)    Toxic metabolic encephalopathy    Fall at home, initial encounter    Influenza A    Acute respiratory failure with hypoxia (HCC)    Hypernatremia    Hyperglycemia    Pneumonia of both lungs due to infectious organism               02/04/25 1608   PT Last Visit   PT Visit Date 02/04/25   Note Type   Note Type Treatment   Pain Assessment   Pain Score No Pain   Restrictions/Precautions   Weight Bearing Precautions Per Order No   Other Precautions Cognitive;Chair Alarm;Bed Alarm;O2;Telemetry;Fall Risk  (2L O2 NC)   General   Chart Reviewed Yes   Response to Previous Treatment Patient reporting fatigue but able to participate.   Family/Caregiver Present No   Cognition   Overall Cognitive Status Impaired   Arousal/Participation Alert   Attention Attends with cues to redirect   Orientation Level Oriented to person;Oriented to place;Oriented to time  (not specific date)   Following Commands Follows one step commands with increased time or repetition   Comments cooperative   Subjective    Subjective Pt agreeable to PT tx after encouragement.   Bed Mobility   Supine to Sit 4  Minimal assistance   Additional items Assist x 1;HOB elevated;Bedrails;Increased time required;Verbal cues;LE management   Sit to Supine 4  Minimal assistance   Additional items Assist x 1;Increased time required;Verbal cues;LE management   Additional Comments cues for techniques & safety   Transfers   Sit to Stand 4  Minimal assistance   Additional items Assist x 1;Bedrails;Increased time required;Verbal cues   Stand to Sit 4  Minimal assistance   Additional items Assist x 1;Bedrails;Increased time required;Verbal cues   Additional Comments w/ RW; cues for techniques & safety; appears anxious; pt verbalized fear of falling   Ambulation/Elevation   Gait pattern Wide WENDY;Decreased foot clearance;Shuffling;Short stride;Excessively slow;Decreased heel strike;Decreased toe off   Gait Assistance 4  Minimal assist   Additional items Assist x 1;Verbal cues;Tactile cues   Assistive Device Rolling walker   Distance 2'x1 lateral steps to HOB + 6'x1 (3' forward & 3' back)   Ambulation/Elevation Additional Comments unsteady gait but no gross LOB noted; tremulous; pt refused to ambulate farther 2* to fear of falling despite encouragement; require seated rest in between amb trial.   Balance   Static Sitting Fair   Dynamic Sitting Fair -   Static Standing Fair -  (w/ RW)   Dynamic Standing Poor +  (w/ RW)   Ambulatory Poor +  (w/ RW)   Endurance Deficit   Endurance Deficit Yes   Endurance Deficit Description weakness; fatigue   Activity Tolerance   Activity Tolerance Patient limited by fatigue;Treatment limited secondary to medical complications (Comment)   Nurse Made Aware yes   Exercises   Hip Flexion Sitting;10 reps;AROM;Bilateral   Knee AROM Long Arc Quad Sitting;10 reps;AROM;Bilateral   Ankle Pumps Sitting;10 reps;AROM;Bilateral   Assessment   Prognosis Good   Problem List Decreased strength;Decreased endurance;Impaired balance;Decreased  "mobility;Decreased cognition;Impaired judgement;Decreased safety awareness;Obesity   Assessment Pt seen for PT per POC. Improved mobility & activity tolerance noted this tx session. Pt require minAx1 for most functional mobility w/ RW + cues for techniques & safety. Gait deviations as above but no gross LOB noted. Dec amb tolerance 2* to weakness, fatigue & fear of falling. Pt tolerated above mentioned thera.ex well. Pt require cues for proper exercise techniques and performance.  No SOB & dizziness reported t/o session. Pt on 2L O2 NC t/o session. Please see flowsheet above for objective findings & levels of assistance required for all functional tasks during this tx session. Nsg staff most recent vital signs as follows: /64 (BP Location: Left arm)   Pulse 94   Temp 98 °F (36.7 °C) (Temporal)   Resp 22   Ht 5' 9\" (1.753 m)   Wt 105 kg (231 lb 7.7 oz)   LMP  (LMP Unknown)   SpO2 92%   BMI 34.18 kg/m² . Will continue PT per POC. At end of session, pt back in bed in stable condition, call bell & phone in reach, bed alarm activated. Fall precautions reinforced w/ good understanding. The patient's AM-PAC Basic Mobility Inpatient Short Form Raw Score is 16. A Raw score of less than or equal to 16 suggests the patient may benefit from discharge to post-acute rehabilitation services. Please also refer to the recommendation of the Physical Therapist for safe discharge planning. From PT standpoint, will continue to recommend Level I (maximun resource intensity) rehab services at [facility]  to follow. Nsg staff to continue to mobilized pt (OOB in chair for all meals & ambulate in room) as tolerated to prevent decline in function. Will continue to recommend [facility] for daily amb &/or daily OOB in chair as appropriate. Nsg notified.   Goals   Patient Goals to get back in bed   STG Expiration Date 02/15/25   PT Treatment Day 2   Plan   Treatment/Interventions Functional transfer training;LE " strengthening/ROM;Elevations;Therapeutic exercise;Endurance training;Patient/family training;Bed mobility;Gait training;Spoke to nursing;OT   Progress Slow progress, decreased activity tolerance   PT Frequency 3-5x/wk   Discharge Recommendation   Rehab Resource Intensity Level, PT I (Maximum Resource Intensity)   Additional Comments restorative for daily OOB in chair   AM-PAC Basic Mobility Inpatient   Turning in Flat Bed Without Bedrails 3   Lying on Back to Sitting on Edge of Flat Bed Without Bedrails 3   Moving Bed to Chair 3   Standing Up From Chair Using Arms 3   Walk in Room 3   Climb 3-5 Stairs With Railing 1   Basic Mobility Inpatient Raw Score 16   Basic Mobility Standardized Score 38.32   [facility] Highest Level Of Mobility   -[facility] Goal 5: Stand one or more mins   -[facility] Achieved 5: Stand (1 or more minutes)   Education   Education Provided Mobility training;Home exercise program;Assistive device   Patient Reinforcement needed   End of Consult   Patient Position at End of Consult Supine;Bed/Chair alarm activated;All needs within reach   End of Consult Comments Pt in stable condition. All needs in reach. Bed alarm activated.   Dario Marino

## 2025-02-04 NOTE — OCCUPATIONAL THERAPY NOTE
Occupational Therapy Progress Note     Patient Name: Esperanza Prajapati  Today's Date: 2/4/2025  Problem List  Principal Problem:    Acute respiratory failure with hypoxia (HCC)  Active Problems:    Cognitive developmental delay    Postoperative hypothyroidism    Focal epilepsy (HCC)    Schizoaffective disorder (HCC)    Viral sepsis (HCC)    Toxic metabolic encephalopathy    Fall at home, initial encounter    Influenza A    Hypernatremia    Hyperglycemia    Pneumonia of both lungs due to infectious organism            02/04/25 0938   Note Type   Note Type Treatment   Pain Assessment   Pain Assessment Tool FLACC   Pain Rating: FLACC (Rest) - Face 0   Pain Rating: FLACC (Rest) - Legs 0   Pain Rating: FLACC (Rest) - Activity 0   Pain Rating: FLACC (Rest) - Cry 0   Pain Rating: FLACC (Rest) - Consolability 0   Score: FLACC (Rest) 0   Restrictions/Precautions   Weight Bearing Precautions Per Order No   Other Precautions Cognitive;Chair Alarm;Bed Alarm;O2   ADL   Where Assessed Edge of bed   Eating Assistance 5  Supervision/Setup   Grooming Assistance 5  Supervision/Setup   UB Bathing Assistance 4  Minimal Assistance   LB Bathing Assistance 2  Maximal Assistance   UB Dressing Assistance 4  Minimal Assistance   LB Dressing Assistance 2  Maximal Assistance   Functional Standing Tolerance   Time 1-2mins   Bed Mobility   Rolling R 4  Minimal assistance   Additional items Assist x 1;Increased time required;Verbal cues;LE management   Rolling L 4  Minimal assistance   Additional items Assist x 1;Increased time required;Verbal cues;LE management   Supine to Sit 4  Minimal assistance   Additional items Assist x 1;Increased time required;Verbal cues;LE management   Transfers   Sit to Stand 3  Moderate assistance   Additional items Assist x 1;Increased time required;Verbal cues   Stand to Sit 4  Minimal assistance   Additional items Assist x 1;Increased time required;Verbal cues   Additional Comments bp's=115/72(EOB),  "SPO2=94% on 2liters of O2   Functional Mobility   Functional Mobility 3  Moderate assistance   Additional Comments x1   Additional items Rolling walker   Therapeutic Exercise - ROM   UE-ROM Yes   Subjective   Subjective \"I normally do things for myself.\"   Cognition   Overall Cognitive Status Impaired   Arousal/Participation Alert   Attention Attends with cues to redirect   Orientation Level Oriented to person;Disoriented to place;Disoriented to time;Disoriented to situation   Memory Decreased short term memory;Decreased recall of recent events;Decreased recall of precautions   Following Commands Follows one step commands without difficulty   Comments hx cognitive deficits ?   Activity Tolerance   Activity Tolerance Patient limited by fatigue   Medical Staff Made Aware nsg, P.T.   Assessment   Assessment Pt seen for 42min tx session with focus on functional balance, functional mobility, ADL status, transfer safety, b/l UE ROM, and cognition. Pt able to tolerate OOB mobility; sitting balance=f/f-, standing balance=f-/p+. Pt required verbal cues/physical assistance to maintain transfer safety. Pt able to demonstrate good b/l UE AROM. B/l UE tremors noted, affecting FMC/self-feeding tasks. Pt demonstrating neend for assistance with her UE and LE ADLs. Cognitive deficits noted--i.e.orientation, memory, problem-solving, judgement/safety. Pt would benefit from inpt rehab to improve her overall level of independence. Will continue. The patient's raw score on the AM-PAC Daily Activity Inpatient Short Form is 15. A raw score of less than 19 suggests the patient may benefit from discharge to post-acute rehabilitation services. Please refer to the recommendation of the Occupational Therapist for safe discharge planning.   Plan   Treatment Interventions ADL retraining;Functional transfer training;UE strengthening/ROM;Endurance training;Cognitive reorientation;Patient/family training;Equipment evaluation/education;Compensatory " technique education;Continued evaluation   Goal Expiration Date 02/15/25   OT Treatment Day 1   OT Frequency 3-5x/wk   Discharge Recommendation   Rehab Resource Intensity Level, OT I (Maximum Resource Intensity)   AM-PAC Daily Activity Inpatient   Lower Body Dressing 2   Bathing 2   Toileting 2   Upper Body Dressing 3   Grooming 3   Eating 3   Daily Activity Raw Score 15   Daily Activity Standardized Score (Calc for Raw Score >=11) 34.69   Turning Head Towards Sound 3   Follow Simple Instructions 3   Low Function Daily Activity Raw Score 21   Low Function Daily Activity Standardized Score  33.59   AM-PAC Applied Cognition Inpatient   Following a Speech/Presentation 3   Understanding Ordinary Conversation 2   Taking Medications 2   Remembering Where Things Are Placed or Put Away 1   Remembering List of 4-5 Errands 1   Taking Care of Complicated Tasks 1   Applied Cognition Raw Score 10   Applied Cognition Standardized Score 24.98   Mert Dumont

## 2025-02-04 NOTE — PLAN OF CARE
Problem: Potential for Falls  Goal: Patient will remain free of falls  Description: INTERVENTIONS:  - Educate patient/family on patient safety including physical limitations  - Instruct patient to call for assistance with activity   - Consult OT/PT to assist with strengthening/mobility   - Keep Call bell within reach  - Keep bed low and locked with side rails adjusted as appropriate  - Keep care items and personal belongings within reach  - Initiate and maintain comfort rounds  - Make Fall Risk Sign visible to staff  - Offer Toileting every 2 Hours, in advance of need  - Initiate/Maintain bed alarm  - Obtain necessary fall risk management equipment:   - Apply yellow socks and bracelet for high fall risk patients  - Consider moving patient to room near nurses station  Outcome: Progressing     Problem: Prexisting or High Potential for Compromised Skin Integrity  Goal: Skin integrity is maintained or improved  Description: INTERVENTIONS:  - Identify patients at risk for skin breakdown  - Assess and monitor skin integrity  - Assess and monitor nutrition and hydration status  - Monitor labs   - Assess for incontinence   - Turn and reposition patient  - Assist with mobility/ambulation  - Relieve pressure over bony prominences  - Avoid friction and shearing  - Provide appropriate hygiene as needed including keeping skin clean and dry  - Evaluate need for skin moisturizer/barrier cream  - Collaborate with interdisciplinary team   - Patient/family teaching  - Consider wound care consult   Outcome: Progressing     Problem: PAIN - ADULT  Goal: Verbalizes/displays adequate comfort level or baseline comfort level  Description: Interventions:  - Encourage patient to monitor pain and request assistance  - Assess pain using appropriate pain scale  - Administer analgesics based on type and severity of pain and evaluate response  - Implement non-pharmacological measures as appropriate and evaluate response  - Consider cultural and  social influences on pain and pain management  - Notify physician/advanced practitioner if interventions unsuccessful or patient reports new pain  Outcome: Progressing     Problem: INFECTION - ADULT  Goal: Absence or prevention of progression during hospitalization  Description: INTERVENTIONS:  - Assess and monitor for signs and symptoms of infection  - Monitor lab/diagnostic results  - Monitor all insertion sites, i.e. indwelling lines, tubes, and drains  - Monitor endotracheal if appropriate and nasal secretions for changes in amount and color  - [place] appropriate cooling/warming therapies per order  - Administer medications as ordered  - Instruct and encourage patient and family to use good hand hygiene technique  - Identify and instruct in appropriate isolation precautions for identified infection/condition  Outcome: Progressing     Problem: SAFETY ADULT  Goal: Patient will remain free of falls  Description: INTERVENTIONS:  - Educate patient/family on patient safety including physical limitations  - Instruct patient to call for assistance with activity   - Consult OT/PT to assist with strengthening/mobility   - Keep Call bell within reach  - Keep bed low and locked with side rails adjusted as appropriate  - Keep care items and personal belongings within reach  - Initiate and maintain comfort rounds  - Make Fall Risk Sign visible to staff  - Offer Toileting every 2 Hours, in advance of need  - Initiate/Maintain bed alarm  - Obtain necessary fall risk management equipment:   - Apply yellow socks and bracelet for high fall risk patients  - Consider moving patient to room near nurses station  Outcome: Progressing  Goal: Maintain or return to baseline ADL function  Description: INTERVENTIONS:  -  Assess patient's ability to carry out ADLs; assess patient's baseline for ADL function and identify physical deficits which impact ability to perform ADLs (bathing, care of mouth/teeth, toileting, grooming, dressing,  etc.)  - Assess/evaluate cause of self-care deficits   - Assess range of motion  - Assess patient's mobility; develop plan if impaired  - Assess patient's need for assistive devices and provide as appropriate  - Encourage maximum independence but intervene and supervise when necessary  - Involve family in performance of ADLs  - Assess for home care needs following discharge   - Consider OT consult to assist with ADL evaluation and planning for discharge  - Provide patient education as appropriate  Outcome: Progressing  Goal: Maintains/Returns to pre admission functional level  Description: INTERVENTIONS:  - Perform AM-PAC 6 Click Basic Mobility/ Daily Activity assessment daily.  - Set and communicate daily mobility goal to care team and patient/family/caregiver.   - Collaborate with rehabilitation services on mobility goals if consulted  - Perform Range of Motion 4 times a day.  - Reposition patient every 2 hours.    - Out of bed for toileting  - Record patient progress and toleration of activity level   Outcome: Progressing     Problem: DISCHARGE PLANNING  Goal: Discharge to home or other facility with appropriate resources  Description: INTERVENTIONS:  - Identify barriers to discharge w/patient and caregiver  - Arrange for needed discharge resources and transportation as appropriate  - Identify discharge learning needs (meds, wound care, etc.)  - Arrange for interpretive services to assist at discharge as needed  - Refer to Case Management Department for coordinating discharge planning if the patient needs post-hospital services based on physician/advanced practitioner order or complex needs related to functional status, cognitive ability, or social support system  Outcome: Progressing     Problem: NEUROSENSORY - ADULT  Goal: Achieves stable or improved neurological status  Description: INTERVENTIONS  - Monitor and report changes in neurological status  - Monitor vital signs such as temperature, blood pressure,  glucose, and any other labs ordered   - Initiate measures to prevent increased intracranial pressure  - Monitor for seizure activity and implement precautions if appropriate      Outcome: Progressing  Goal: Remains free of injury related to seizures activity  Description: INTERVENTIONS  - Maintain airway, patient safety  and administer oxygen as ordered  - Monitor patient for seizure activity, document and report duration and description of seizure to physician/advanced practitioner  - If seizure occurs,  ensure patient safety during seizure  - Reorient patient post seizure  - Seizure pads on all 4 side rails  - Instruct patient/family to notify RN of any seizure activity including if an aura is experienced  - Instruct patient/family to call for assistance with activity based on nursing assessment  - Administer anti-seizure medications if ordered    Outcome: Progressing  Goal: Achieves maximal functionality and self care  Description: INTERVENTIONS  - Monitor swallowing and airway patency with patient fatigue and changes in neurological status  - Encourage and assist patient to increase activity and self care.   - Encourage visually impaired, hearing impaired and aphasic patients to use assistive/communication devices  Outcome: Progressing     Problem: RESPIRATORY - ADULT  Goal: Achieves optimal ventilation and oxygenation  Description: INTERVENTIONS:  - Assess for changes in respiratory status  - Assess for changes in mentation and behavior  - Position to facilitate oxygenation and minimize respiratory effort  - Oxygen administered by appropriate delivery if ordered  - Initiate smoking cessation education as indicated  - Encourage broncho-pulmonary hygiene including cough, deep breathe, Incentive Spirometry  - Assess the need for suctioning and aspirate as needed  - Assess and instruct to report SOB or any respiratory difficulty  - Respiratory Therapy support as indicated  Outcome: Progressing     Problem:  GENITOURINARY - ADULT  Goal: Maintains or returns to baseline urinary function  Description: INTERVENTIONS:  - Assess urinary function  - Encourage oral fluids to ensure adequate hydration if ordered  - Administer IV fluids as ordered to ensure adequate hydration  - Administer ordered medications as needed  - Offer frequent toileting  - Follow urinary retention protocol if ordered  Outcome: Progressing  Goal: Absence of urinary retention  Description: INTERVENTIONS:  - Assess patient’s ability to void and empty bladder  - Monitor I/O  - Bladder scan as needed  - Discuss with physician/AP medications to alleviate retention as needed  - Discuss catheterization for long term situations as appropriate  Outcome: Progressing     Problem: MUSCULOSKELETAL - ADULT  Goal: Maintain or return mobility to safest level of function  Description: INTERVENTIONS:  - Assess patient's ability to carry out ADLs; assess patient's baseline for ADL function and identify physical deficits which impact ability to perform ADLs (bathing, care of mouth/teeth, toileting, grooming, dressing, etc.)  - Assess/evaluate cause of self-care deficits   - Assess range of motion  - Assess patient's mobility  - Assess patient's need for assistive devices and provide as appropriate  - Encourage maximum independence but intervene and supervise when necessary  - Involve family in performance of ADLs  - Assess for home care needs following discharge   - Consider OT consult to assist with ADL evaluation and planning for discharge  - Provide patient education as appropriate  Outcome: Progressing  Goal: Maintain proper alignment of affected body part  Description: INTERVENTIONS:  - Support, maintain and protect limb and body alignment  - Provide patient/ family with appropriate education  Outcome: Progressing     Problem: Nutrition/Hydration-ADULT  Goal: Nutrient/Hydration intake appropriate for improving, restoring or maintaining nutritional needs  Description:  Monitor and assess patient's nutrition/hydration status for malnutrition. Collaborate with interdisciplinary team and initiate plan and interventions as ordered.  Monitor patient's weight and dietary intake as ordered or per policy. Utilize nutrition screening tool and intervene as necessary. Determine patient's food preferences and provide high-protein, high-caloric foods as appropriate.     INTERVENTIONS:  - Monitor oral intake, urinary output, labs, and treatment plans  - Assess nutrition and hydration status and recommend course of action  - Evaluate amount of meals eaten  - Assist patient with eating if necessary   - Allow adequate time for meals  - Recommend/ encourage appropriate diets, oral nutritional supplements, and vitamin/mineral supplements  - Order, calculate, and assess calorie counts as needed  - Recommend, monitor, and adjust tube feedings and TPN/PPN based on assessed needs  - Assess need for intravenous fluids  - Provide specific nutrition/hydration education as appropriate  - Include patient/family/caregiver in decisions related to nutrition  Outcome: Progressing     Problem: COPING  Goal: Pt/Family able to verbalize concerns and demonstrate effective coping strategies  Description: INTERVENTIONS:  - Assist patient/family to identify coping skills, available support systems and cultural and spiritual values  - Provide emotional support, including active listening and acknowledgement of concerns of patient and caregivers  - Reduce environmental stimuli, as able  - Provide patient education  - Assess for spiritual pain/suffering and initiate spiritual care, including notification of Pastoral Care or dottie based community as needed  - Assess effectiveness of coping strategies  Outcome: Progressing  Goal: Will report anxiety at manageable levels  Description: INTERVENTIONS:  - Administer medication as ordered  - Teach and encourage coping skills  - Provide emotional support  - Assess patient/family  for anxiety and ability to cope  Outcome: Progressing         No

## 2025-02-04 NOTE — SPEECH THERAPY NOTE
"Speech Language/Pathology    Speech/Language Pathology Progress Note    Patient Name: Esperanza Prajapati  Today's Date: 2/4/2025     Problem List  Principal Problem:    Acute respiratory failure with hypoxia (HCC)  Active Problems:    Cognitive developmental delay    Postoperative hypothyroidism    Focal epilepsy (HCC)    Schizoaffective disorder (HCC)    Viral sepsis (HCC)    Toxic metabolic encephalopathy    Fall at home, initial encounter    Influenza A    Hypernatremia    Hyperglycemia    Pneumonia of both lungs due to infectious organism       Past Medical History  Past Medical History:   Diagnosis Date    Cancer (HCC)     thyroid cancer    Disease of thyroid gland     Hallucination     Hyperactivity (behavior)     Last Assessed: 11/7/2014     Hyperlipidemia     Hyponatremia 01/20/2025    Obesity     Psychosis (HCC)     Seizures (HCC)         Past Surgical History  Past Surgical History:   Procedure Laterality Date    OTHER SURGICAL HISTORY      Removal of urinary calculus     OTHER SURGICAL HISTORY      Rhinologic Surgery     NV OPEN TX PHALANGEAL SHAFT FRACTURE PROX/MIDDLE EA Left 8/3/2023    Procedure: CLOSED REDUCTION PERCUTANEOUS PINNING - LEFT RING FINGER;  Surgeon: Fransisco Escalante MD;  Location: [facility] MAIN OR;  Service: Orthopedics    TOTAL THYROIDECTOMY      LAst Assessed: 11/7/2014         Subjective:  Pt alert and seated in chair. Placed back in bed by nurse shortly after I arrived.  On RA currently on clears..  Objective:  Pt seen for po tolerance/advancement potential. ST had applesauce, cristi crackers, juice, water, martha pudding. Pt was only agreeable to the chocolate pudding. I asked her several times what types of food she likes and also gave her choice but she did not respond. No dysphonia. When she spoke stated things like \"you left the door open\" and \"it's still open a little bit\". Able to feed self pudding. Bolus control, transfer, and swallow response wnl. No cough or wet vocal " "quality. Pt refused all other items. \"I ate the pudding. I'm done\".   Assessment:  Unable to assess w/ solids due to refusals but tolerated pudding wnl.   Plan/Recommendations:  Advance to puree and thin. Unable to assess w/ solids. Will f/u as able.      "

## 2025-02-05 LAB
ANION GAP SERPL CALCULATED.3IONS-SCNC: 6 MMOL/L (ref 4–13)
BUN SERPL-MCNC: 16 MG/DL (ref 5–25)
CALCIUM SERPL-MCNC: 9.7 MG/DL (ref 8.4–10.2)
CHLORIDE SERPL-SCNC: 104 MMOL/L (ref 96–108)
CO2 SERPL-SCNC: 29 MMOL/L (ref 21–32)
CREAT SERPL-MCNC: 0.67 MG/DL (ref 0.6–1.3)
ERYTHROCYTE [DISTWIDTH] IN BLOOD BY AUTOMATED COUNT: 13.8 % (ref 11.6–15.1)
GFR SERPL CREATININE-BSD FRML MDRD: 101 ML/MIN/1.73SQ M
GLUCOSE SERPL-MCNC: 90 MG/DL (ref 65–140)
HCT VFR BLD AUTO: 29.1 % (ref 34.8–46.1)
HGB BLD-MCNC: 9.5 G/DL (ref 11.5–15.4)
MAGNESIUM SERPL-MCNC: 2.1 MG/DL (ref 1.9–2.7)
MCH RBC QN AUTO: 32.2 PG (ref 26.8–34.3)
MCHC RBC AUTO-ENTMCNC: 32.6 G/DL (ref 31.4–37.4)
MCV RBC AUTO: 99 FL (ref 82–98)
PLATELET # BLD AUTO: 352 THOUSANDS/UL (ref 149–390)
PMV BLD AUTO: 8.9 FL (ref 8.9–12.7)
POTASSIUM SERPL-SCNC: 3.5 MMOL/L (ref 3.5–5.3)
RBC # BLD AUTO: 2.95 MILLION/UL (ref 3.81–5.12)
SODIUM SERPL-SCNC: 139 MMOL/L (ref 135–147)
VALPROATE FREE SERPL-MCNC: 41.8 UG/ML (ref 6–22)
WBC # BLD AUTO: 3.34 THOUSAND/UL (ref 4.31–10.16)

## 2025-02-05 PROCEDURE — 94760 N-INVAS EAR/PLS OXIMETRY 1: CPT

## 2025-02-05 PROCEDURE — 97116 GAIT TRAINING THERAPY: CPT

## 2025-02-05 PROCEDURE — 85027 COMPLETE CBC AUTOMATED: CPT | Performed by: STUDENT IN AN ORGANIZED HEALTH CARE EDUCATION/TRAINING PROGRAM

## 2025-02-05 PROCEDURE — 92526 ORAL FUNCTION THERAPY: CPT

## 2025-02-05 PROCEDURE — 80048 BASIC METABOLIC PNL TOTAL CA: CPT | Performed by: STUDENT IN AN ORGANIZED HEALTH CARE EDUCATION/TRAINING PROGRAM

## 2025-02-05 PROCEDURE — 99232 SBSQ HOSP IP/OBS MODERATE 35: CPT | Performed by: STUDENT IN AN ORGANIZED HEALTH CARE EDUCATION/TRAINING PROGRAM

## 2025-02-05 PROCEDURE — 94640 AIRWAY INHALATION TREATMENT: CPT

## 2025-02-05 PROCEDURE — 97530 THERAPEUTIC ACTIVITIES: CPT

## 2025-02-05 PROCEDURE — 83735 ASSAY OF MAGNESIUM: CPT | Performed by: STUDENT IN AN ORGANIZED HEALTH CARE EDUCATION/TRAINING PROGRAM

## 2025-02-05 RX ADMIN — CYANOCOBALAMIN TAB 500 MCG 1000 MCG: 500 TAB at 08:49

## 2025-02-05 RX ADMIN — LEVOTHYROXINE SODIUM 175 MCG: 0.05 TABLET ORAL at 05:22

## 2025-02-05 RX ADMIN — PALIPERIDONE 6 MG: 3 TABLET, EXTENDED RELEASE ORAL at 08:56

## 2025-02-05 RX ADMIN — SENNOSIDES AND DOCUSATE SODIUM 2 TABLET: 8.6; 5 TABLET ORAL at 08:49

## 2025-02-05 RX ADMIN — IPRATROPIUM BROMIDE AND ALBUTEROL SULFATE 3 ML: 2.5; .5 SOLUTION RESPIRATORY (INHALATION) at 13:37

## 2025-02-05 RX ADMIN — LACOSAMIDE 200 MG: 150 TABLET, FILM COATED ORAL at 21:52

## 2025-02-05 RX ADMIN — SENNOSIDES AND DOCUSATE SODIUM 2 TABLET: 8.6; 5 TABLET ORAL at 17:24

## 2025-02-05 RX ADMIN — IPRATROPIUM BROMIDE AND ALBUTEROL SULFATE 3 ML: 2.5; .5 SOLUTION RESPIRATORY (INHALATION) at 20:31

## 2025-02-05 RX ADMIN — OLANZAPINE 10 MG: 10 TABLET, FILM COATED ORAL at 21:52

## 2025-02-05 RX ADMIN — ENOXAPARIN SODIUM 40 MG: 40 INJECTION SUBCUTANEOUS at 08:49

## 2025-02-05 RX ADMIN — IPRATROPIUM BROMIDE AND ALBUTEROL SULFATE 3 ML: 2.5; .5 SOLUTION RESPIRATORY (INHALATION) at 07:20

## 2025-02-05 RX ADMIN — MELATONIN 6 MG: 3 TAB ORAL at 21:52

## 2025-02-05 RX ADMIN — BENZTROPINE MESYLATE 2 MG: 1 TABLET ORAL at 17:24

## 2025-02-05 RX ADMIN — BENZTROPINE MESYLATE 2 MG: 1 TABLET ORAL at 08:49

## 2025-02-05 RX ADMIN — DIVALPROEX SODIUM 750 MG: 250 TABLET, DELAYED RELEASE ORAL at 08:49

## 2025-02-05 RX ADMIN — DIVALPROEX SODIUM 750 MG: 250 TABLET, DELAYED RELEASE ORAL at 21:51

## 2025-02-05 RX ADMIN — LACOSAMIDE 150 MG: 150 TABLET, FILM COATED ORAL at 08:49

## 2025-02-05 NOTE — PLAN OF CARE
Problem: Potential for Falls  Goal: Patient will remain free of falls  Description: INTERVENTIONS:  - Educate patient/family on patient safety including physical limitations  - Instruct patient to call for assistance with activity   - Consult OT/PT to assist with strengthening/mobility   - Keep Call bell within reach  - Keep bed low and locked with side rails adjusted as appropriate  - Keep care items and personal belongings within reach  - Initiate and maintain comfort rounds  - Make Fall Risk Sign visible to staff  - Offer Toileting every 2 Hours, in advance of need  - Initiate/Maintain bed alarm  - Obtain necessary fall risk management equipment:   - Apply yellow socks and bracelet for high fall risk patients  - Consider moving patient to room near nurses station  Outcome: Progressing     Problem: Prexisting or High Potential for Compromised Skin Integrity  Goal: Skin integrity is maintained or improved  Description: INTERVENTIONS:  - Identify patients at risk for skin breakdown  - Assess and monitor skin integrity  - Assess and monitor nutrition and hydration status  - Monitor labs   - Assess for incontinence   - Turn and reposition patient  - Assist with mobility/ambulation  - Relieve pressure over bony prominences  - Avoid friction and shearing  - Provide appropriate hygiene as needed including keeping skin clean and dry  - Evaluate need for skin moisturizer/barrier cream  - Collaborate with interdisciplinary team   - Patient/family teaching  - Consider wound care consult   Outcome: Progressing     Problem: PAIN - ADULT  Goal: Verbalizes/displays adequate comfort level or baseline comfort level  Description: Interventions:  - Encourage patient to monitor pain and request assistance  - Assess pain using appropriate pain scale  - Administer analgesics based on type and severity of pain and evaluate response  - Implement non-pharmacological measures as appropriate and evaluate response  - Consider cultural and  social influences on pain and pain management  - Notify physician/advanced practitioner if interventions unsuccessful or patient reports new pain  Outcome: Progressing     Problem: INFECTION - ADULT  Goal: Absence or prevention of progression during hospitalization  Description: INTERVENTIONS:  - Assess and monitor for signs and symptoms of infection  - Monitor lab/diagnostic results  - Monitor all insertion sites, i.e. indwelling lines, tubes, and drains  - Monitor endotracheal if appropriate and nasal secretions for changes in amount and color  - Noble appropriate cooling/warming therapies per order  - Administer medications as ordered  - Instruct and encourage patient and family to use good hand hygiene technique  - Identify and instruct in appropriate isolation precautions for identified infection/condition  Outcome: Progressing     Problem: SAFETY ADULT  Goal: Patient will remain free of falls  Description: INTERVENTIONS:  - Educate patient/family on patient safety including physical limitations  - Instruct patient to call for assistance with activity   - Consult OT/PT to assist with strengthening/mobility   - Keep Call bell within reach  - Keep bed low and locked with side rails adjusted as appropriate  - Keep care items and personal belongings within reach  - Initiate and maintain comfort rounds  - Make Fall Risk Sign visible to staff  - Offer Toileting every 2 Hours, in advance of need  - Initiate/Maintain bed alarm  - Obtain necessary fall risk management equipment:   - Apply yellow socks and bracelet for high fall risk patients  - Consider moving patient to room near nurses station  Outcome: Progressing  Goal: Maintain or return to baseline ADL function  Description: INTERVENTIONS:  -  Assess patient's ability to carry out ADLs; assess patient's baseline for ADL function and identify physical deficits which impact ability to perform ADLs (bathing, care of mouth/teeth, toileting, grooming, dressing,  etc.)  - Assess/evaluate cause of self-care deficits   - Assess range of motion  - Assess patient's mobility; develop plan if impaired  - Assess patient's need for assistive devices and provide as appropriate  - Encourage maximum independence but intervene and supervise when necessary  - Involve family in performance of ADLs  - Assess for home care needs following discharge   - Consider OT consult to assist with ADL evaluation and planning for discharge  - Provide patient education as appropriate  Outcome: Progressing  Goal: Maintains/Returns to pre admission functional level  Description: INTERVENTIONS:  - Perform AM-PAC 6 Click Basic Mobility/ Daily Activity assessment daily.  - Set and communicate daily mobility goal to care team and patient/family/caregiver.   - Collaborate with rehabilitation services on mobility goals if consulted  - Perform Range of Motion 4 times a day.  - Reposition patient every 2 hours.    - Out of bed for toileting  - Record patient progress and toleration of activity level   Outcome: Progressing     Problem: DISCHARGE PLANNING  Goal: Discharge to home or other facility with appropriate resources  Description: INTERVENTIONS:  - Identify barriers to discharge w/patient and caregiver  - Arrange for needed discharge resources and transportation as appropriate  - Identify discharge learning needs (meds, wound care, etc.)  - Arrange for interpretive services to assist at discharge as needed  - Refer to Case Management Department for coordinating discharge planning if the patient needs post-hospital services based on physician/advanced practitioner order or complex needs related to functional status, cognitive ability, or social support system  Outcome: Progressing     Problem: NEUROSENSORY - ADULT  Goal: Achieves stable or improved neurological status  Description: INTERVENTIONS  - Monitor and report changes in neurological status  - Monitor vital signs such as temperature, blood pressure,  glucose, and any other labs ordered   - Initiate measures to prevent increased intracranial pressure  - Monitor for seizure activity and implement precautions if appropriate      Outcome: Progressing  Goal: Remains free of injury related to seizures activity  Description: INTERVENTIONS  - Maintain airway, patient safety  and administer oxygen as ordered  - Monitor patient for seizure activity, document and report duration and description of seizure to physician/advanced practitioner  - If seizure occurs,  ensure patient safety during seizure  - Reorient patient post seizure  - Seizure pads on all 4 side rails  - Instruct patient/family to notify RN of any seizure activity including if an aura is experienced  - Instruct patient/family to call for assistance with activity based on nursing assessment  - Administer anti-seizure medications if ordered    Outcome: Progressing  Goal: Achieves maximal functionality and self care  Description: INTERVENTIONS  - Monitor swallowing and airway patency with patient fatigue and changes in neurological status  - Encourage and assist patient to increase activity and self care.   - Encourage visually impaired, hearing impaired and aphasic patients to use assistive/communication devices  Outcome: Progressing     Problem: RESPIRATORY - ADULT  Goal: Achieves optimal ventilation and oxygenation  Description: INTERVENTIONS:  - Assess for changes in respiratory status  - Assess for changes in mentation and behavior  - Position to facilitate oxygenation and minimize respiratory effort  - Oxygen administered by appropriate delivery if ordered  - Initiate smoking cessation education as indicated  - Encourage broncho-pulmonary hygiene including cough, deep breathe, Incentive Spirometry  - Assess the need for suctioning and aspirate as needed  - Assess and instruct to report SOB or any respiratory difficulty  - Respiratory Therapy support as indicated  Outcome: Progressing     Problem:  GENITOURINARY - ADULT  Goal: Maintains or returns to baseline urinary function  Description: INTERVENTIONS:  - Assess urinary function  - Encourage oral fluids to ensure adequate hydration if ordered  - Administer IV fluids as ordered to ensure adequate hydration  - Administer ordered medications as needed  - Offer frequent toileting  - Follow urinary retention protocol if ordered  Outcome: Progressing  Goal: Absence of urinary retention  Description: INTERVENTIONS:  - Assess patient’s ability to void and empty bladder  - Monitor I/O  - Bladder scan as needed  - Discuss with physician/AP medications to alleviate retention as needed  - Discuss catheterization for long term situations as appropriate  Outcome: Progressing     Problem: METABOLIC, FLUID AND ELECTROLYTES - ADULT  Goal: Electrolytes maintained within normal limits  Description: INTERVENTIONS:  - Monitor labs and assess patient for signs and symptoms of electrolyte imbalances  - Administer electrolyte replacement as ordered  - Monitor response to electrolyte replacements, including repeat lab results as appropriate  - Instruct patient on fluid and nutrition as appropriate  Outcome: Progressing  Goal: Fluid balance maintained  Description: INTERVENTIONS:  - Monitor labs   - Monitor I/O and WT  - Instruct patient on fluid and nutrition as appropriate  - Assess for signs & symptoms of volume excess or deficit  Outcome: Progressing     Problem: HEMATOLOGIC - ADULT  Goal: Maintains hematologic stability  Description: INTERVENTIONS  - Assess for signs and symptoms of bleeding or hemorrhage  - Monitor labs  - Administer supportive blood products/factors as ordered and appropriate  Outcome: Progressing     Problem: MUSCULOSKELETAL - ADULT  Goal: Maintain or return mobility to safest level of function  Description: INTERVENTIONS:  - Assess patient's ability to carry out ADLs; assess patient's baseline for ADL function and identify physical deficits which impact  ability to perform ADLs (bathing, care of mouth/teeth, toileting, grooming, dressing, etc.)  - Assess/evaluate cause of self-care deficits   - Assess range of motion  - Assess patient's mobility  - Assess patient's need for assistive devices and provide as appropriate  - Encourage maximum independence but intervene and supervise when necessary  - Involve family in performance of ADLs  - Assess for home care needs following discharge   - Consider OT consult to assist with ADL evaluation and planning for discharge  - Provide patient education as appropriate  Outcome: Progressing  Goal: Maintain proper alignment of affected body part  Description: INTERVENTIONS:  - Support, maintain and protect limb and body alignment  - Provide patient/ family with appropriate education  Outcome: Progressing     Problem: SAFETY,RESTRAINT: NV/NON-SELF DESTRUCTIVE BEHAVIOR  Goal: Remains free of harm/injury (restraint for non violent/non self-detsructive behavior)  Description: INTERVENTIONS:  - Instruct patient/family regarding restraint use   - Assess and monitor physiologic and psychological status   - Provide interventions and comfort measures to meet assessed patient needs   - Identify and implement measures to help patient regain control  - Assess readiness for release of restraint   Outcome: Progressing  Goal: Returns to optimal restraint-free functioning  Description: INTERVENTIONS:  - Assess the patient's behavior and symptoms that indicate continued need for restraint  - Identify and implement measures to help patient regain control  - Assess readiness for release of restraint   Outcome: Progressing     Problem: Nutrition/Hydration-ADULT  Goal: Nutrient/Hydration intake appropriate for improving, restoring or maintaining nutritional needs  Description: Monitor and assess patient's nutrition/hydration status for malnutrition. Collaborate with interdisciplinary team and initiate plan and interventions as ordered.  Monitor  patient's weight and dietary intake as ordered or per policy. Utilize nutrition screening tool and intervene as necessary. Determine patient's food preferences and provide high-protein, high-caloric foods as appropriate.     INTERVENTIONS:  - Monitor oral intake, urinary output, labs, and treatment plans  - Assess nutrition and hydration status and recommend course of action  - Evaluate amount of meals eaten  - Assist patient with eating if necessary   - Allow adequate time for meals  - Recommend/ encourage appropriate diets, oral nutritional supplements, and vitamin/mineral supplements  - Order, calculate, and assess calorie counts as needed  - Recommend, monitor, and adjust tube feedings and TPN/PPN based on assessed needs  - Assess need for intravenous fluids  - Provide specific nutrition/hydration education as appropriate  - Include patient/family/caregiver in decisions related to nutrition  Outcome: Progressing     Problem: COPING  Goal: Pt/Family able to verbalize concerns and demonstrate effective coping strategies  Description: INTERVENTIONS:  - Assist patient/family to identify coping skills, available support systems and cultural and spiritual values  - Provide emotional support, including active listening and acknowledgement of concerns of patient and caregivers  - Reduce environmental stimuli, as able  - Provide patient education  - Assess for spiritual pain/suffering and initiate spiritual care, including notification of Pastoral Care or dottie based community as needed  - Assess effectiveness of coping strategies  Outcome: Progressing  Goal: Will report anxiety at manageable levels  Description: INTERVENTIONS:  - Administer medication as ordered  - Teach and encourage coping skills  - Provide emotional support  - Assess patient/family for anxiety and ability to cope  Outcome: Progressing

## 2025-02-05 NOTE — PLAN OF CARE
Assessment:  Tolerated NDD[facility] soft trial though mastication required on items today. Limited intake of items that required mastication. No s/s w/ liquids.   Plan/Recommendations:  Continue puree w/ thin for now. ST to f/u w/ level 2 upgraded trials as able.

## 2025-02-05 NOTE — UTILIZATION REVIEW
Continued Stay Review    Date: 2-5-25                           Current Patient Class: Inpatient  Current Level of Care: med surg  Certification Statement: The patient will continue to require additional inpatient hospital stay due to awaiting safe discharge plan  Discharge Plan: per case management    HPI:54 y.o. female initially admitted on 1-20-25      Assessment/Plan:     WBC 3.34.  Continue atrovent/ albuterol tid.  Oxygen weaned.  Continue dysphagia diet.        Scheduled Medications:    benztropine, 2 mg, Oral, BID  cyanocobalamin, 1,000 mcg, Oral, Daily  divalproex sodium, 750 mg, Oral, Q12H MOSHE  enoxaparin, 40 mg, Subcutaneous, Daily  ipratropium-albuterol, 3 mL, Nebulization, TID  lacosamide, 150 mg, Oral, Daily  lacosamide, 200 mg, Oral, HS  levothyroxine, 175 mcg, Oral, Early Morning  [Transfer Hold] melatonin, 6 mg, Oral, HS  OLANZapine, 10 mg, Oral, HS  paliperidone, 6 mg, Oral, QAM  senna-docusate sodium, 2 tablet, Oral, BID      Continuous IV Infusions:     PRN Meds:  Acetaminophen, 975 mg, Oral, Q6H PRN  aluminum-magnesium hydroxide-simethicone, 30 mL, Oral, Q6H PRN  levalbuterol, 1.25 mg, Nebulization, Q6H PRN  ondansetron, 4 mg, Intravenous, Q6H PRN  polyethylene glycol, 17 g, Oral, Daily PRN      Discharge Plan: awaiting rehab placement    Vital Signs (last 3 days)       Date/Time Temp Pulse Resp BP MAP  SpO2 Nasal Cannula O2 Flow Rate (L/min) Luisana Coma Scale Score Pain    02/05/25 0800 -- -- -- -- -- -- -- 15 No Pain    02/05/25 0729 98 °F (36.7 °C) 82 22 101/59 74 97 % -- -- --    02/05/25 0720 -- -- -- -- -- 94 % 3 L/min -- --    02/04/25 2316 98 °F (36.7 °C) 92 22 99/53 74 95 % -- -- --    02/04/25 2013 -- -- -- -- -- 92 % -- -- --    02/04/25 2000 -- -- -- -- -- -- -- 15 No Pain    02/04/25 1608 -- -- -- -- -- -- -- -- No Pain    02/04/25 1532 98 °F (36.7 °C) 94 22 111/64 83 92 % 2 L/min -- --    02/04/25 1411 -- -- -- -- -- 95 % 2 L/min -- --    02/04/25 0850 -- -- -- -- -- -- -- 14  No Pain    02/04/25 0748 -- -- -- -- -- 94 % 2 L/min -- --    02/04/25 0746 98 °F (36.7 °C) 84 22 94/51 68 93 % 4 L/min -- --    02/03/25 2345 98.9 °F (37.2 °C) 95 24 111/76 83 93 % -- -- --    02/03/25 2330 -- -- -- -- -- -- -- 14 No Pain    02/03/25 2200 -- -- -- -- -- -- -- 15 No Pain    02/03/25 2116 -- 92 41 107/69 79 95 % -- -- --    02/03/25 2020 97.9 °F (36.6 °C) -- -- -- -- -- -- -- --    02/03/25 1724 -- 80 24 108/66 84 96 % -- -- --    02/03/25 1602 98.2 °F (36.8 °C) -- -- -- -- -- -- -- --    02/03/25 1600 -- -- -- -- -- -- -- 15 No Pain    02/03/25 1200 -- -- -- -- -- -- -- 15 --    02/03/25 1139 -- 74 16 94/69 78 96 % -- -- --    02/03/25 0930 -- 86 24 92/67 75 93 % -- -- --    02/03/25 0800 97.7 °F (36.5 °C) -- -- -- -- -- -- 15 No Pain    02/03/25 0700 -- 70 14 -- -- 98 % 4 L/min -- --    02/03/25 0643 97.5 °F (36.4 °C) -- -- -- -- -- -- -- --    02/03/25 0600 -- 68 12 82/55 64 98 % -- -- --    02/03/25 0400 -- 70 13 83/58 77 99 % -- -- --    02/03/25 0300 -- 72 15 74/54 62 97 % -- -- --    02/03/25 0200 -- 72 13 77/52 63 99 % -- -- --    02/03/25 0100 -- 76 15 85/56 63 97 % -- -- --    02/03/25 0040 -- -- -- -- -- -- -- -- No Pain    02/03/25 0000 -- 74 14 93/61 70 99 % -- -- --    02/02/25 2300 -- 78 15 109/73 84 99 % -- -- --    02/02/25 2200 -- 82 17 97/53 73 95 % -- -- --    02/02/25 2130 -- -- -- -- -- 97 % -- -- --    02/02/25 2000 97.3 °F (36.3 °C) 84 23 118/78 90 98 % 6 L/min  Mid flow nasal cannula -- --    02/02/25 1900 -- 88 22 106/78 90 97 % -- -- --    02/02/25 1800 -- 92 22 125/74 100 98 % -- -- --    02/02/25 1700 -- 88 18 114/66 88 97 % -- -- --    02/02/25 1615 -- -- -- -- -- -- -- -- No Pain    02/02/25 1600 -- 88 25 115/72 90 97 % -- -- --    02/02/25 1502 98.2 °F (36.8 °C) 92 18 117/70 82 96 % -- -- --    02/02/25 1500 -- 92 18 117/70 82 96 % -- -- --    02/02/25 1400 -- 92 18 112/72 86 97 % -- -- --    02/02/25 1329 -- -- -- -- -- -- 8 L/min  Mid flow nasal cannula -- --     02/02/25 1325 -- -- -- -- -- 92 % -- -- --    02/02/25 1300 -- 90 20 106/62 81 92 % -- -- --    02/02/25 1200 -- 88 21 100/63 73 91 % -- -- --    02/02/25 1100 -- 84 20 111/53 69 92 % -- -- --    02/02/25 1000 -- 88 20 97/62 75 96 % -- -- --    02/02/25 0900 -- 86 13 92/58 69 95 % -- -- No Pain    02/02/25 0837 98.5 °F (36.9 °C) -- -- -- -- -- -- -- --    02/02/25 0800 -- 88 19 97/63 74 91 % -- -- --    02/02/25 0717 -- -- -- -- -- 92 % -- -- --    02/02/25 0700 -- 86 22 97/68 75 93 % -- -- --    02/02/25 0600 -- 86 13 110/61 74 92 % -- -- --    02/02/25 0500 -- 86 22 106/57 74 93 % -- -- --    02/02/25 0421 -- -- -- -- -- -- -- 13 --    02/02/25 0400 -- 84 16 92/53 75 95 % -- -- --    02/02/25 0353 -- -- -- -- -- 93 % 40 L/min  High flow nasal cannula -- --    02/02/25 0037 -- -- -- -- -- -- -- 13 --    02/02/25 0000 99 °F (37.2 °C) 86 18 95/65 75 94 % -- -- --       Weight (last 2 days)       None            Pertinent Labs/Diagnostic Results:   Radiology:    Cardiology:    GI:          Results from last 7 days   Lab Units 02/05/25  0511 02/01/25  0444 01/31/25  0457 01/30/25  0403   WBC Thousand/uL 3.34* 10.14 19.14* 10.76*   HEMOGLOBIN g/dL 9.5* 9.1* 11.0* 10.0*   HEMATOCRIT % 29.1* 28.9* 33.8* 31.0*   PLATELETS Thousands/uL 352 263 293 272   TOTAL NEUT ABS Thousands/µL  --  7.71* 16.73*  --    BANDS PCT %  --   --   --  1         Results from last 7 days   Lab Units 02/05/25  0511 02/01/25 0444 01/31/25  0457 01/30/25  0441   SODIUM mmol/L 139 141 141 142   POTASSIUM mmol/L 3.5 4.0 4.2 4.6   CHLORIDE mmol/L 104 107 110* 104   CO2 mmol/L 29 29 24 32   ANION GAP mmol/L 6 5 7 6   BUN mg/dL 16 38* 31* 45*   CREATININE mg/dL 0.67 0.81 0.74 1.00   EGFR ml/min/1.73sq m 101 83 93 64   CALCIUM mg/dL 9.7 8.8 8.2* 8.9   CALCIUM, IONIZED mmol/L  --   --  1.15  --    MAGNESIUM mg/dL 2.1 2.6 2.1 2.4   PHOSPHORUS mg/dL  --   --  3.1  --      Results from last 7 days   Lab Units 02/01/25  0444 01/30/25  0441   AST U/L 20 33    ALT U/L 19 19   ALK PHOS U/L 33* 29*   TOTAL PROTEIN g/dL 7.0 6.7   ALBUMIN g/dL 3.2* 3.2*   TOTAL BILIRUBIN mg/dL 0.87 0.65     Results from last 7 days   Lab Units 02/04/25  0749 01/31/25  1559 01/31/25  1147 01/31/25  0014 01/30/25  1805 01/30/25  1305 01/30/25  0008 01/29/25  1808   POC GLUCOSE mg/dl 110 118 118 93 88 105 146* 111     Results from last 7 days   Lab Units 02/05/25  0511 02/01/25  0444 01/31/25  0457 01/30/25  0441   GLUCOSE RANDOM mg/dL 90 91 95 108         Network Utilization Review Department  ATTENTION: Please call with any questions or concerns to [phone] and carefully listen to the prompts so that you are directed to the right person. All voicemails are confidential.   For Discharge needs, contact Care Management DC Support Team at [phone] opt. [phone]  Send all requests for admission clinical reviews, approved or denied determinations and any other requests to dedicated fax number below belonging to the campus where the patient is receiving treatment. List of dedicated fax numbers for the Facilities:  FACILITY NAME UR FAX NUMBER   ADMISSION DENIALS (Administrative/Medical Necessity) [phone]   DISCHARGE SUPPORT TEAM (NETWORK) [phone]   PARENT CHILD HEALTH (Maternity/NICU/Pediatrics) [phone]   [facility] [phone]   [facility] [phone]   [facility] [phone]   [facility] [phone]   [facility] [phone]   [facility] [phone]   [facility] [phone]   [facility] [phone]   [facility] [phone]   Critical access hospital [phone]   [facility] [phone]   [facility] [phone]

## 2025-02-05 NOTE — RESTORATIVE TECHNICIAN NOTE
Restorative Technician Note      Patient Name: Esperanza Prajapati     Restorative Tech Visit Date: 02/05/25  Note Type: Mobility  Patient Position Upon Consult: Supine  Activity Performed: Ambulated  Assistive Device: Roller walker  Patient Position at End of Consult: All needs within reach; Supine; Bed/Chair alarm activated    ns

## 2025-02-05 NOTE — PROGRESS NOTES
Progress Note - Hospitalist   Name: Esperanza Prajapati 54 y.o. female I MRN: [id number]  Unit/Bed#: E4 -01 I Date of Admission: 1/19/2025   Date of Service: 2/5/2025 I Hospital Day: 16    Assessment & Plan  Acute respiratory failure with hypoxia (HCC)  Due to influenza, possible multifocal aspiration pneumonia.  S/p bronchoscopy 1/24. Mixed carmen  Required intubation 1/21/2025 - 1/30/2025, and was extubated to HFNC. Currently on 2L NC.  Appreciate pulmonary recommendations. Cleared by pulmonary for discharge.   Awaiting placement to rehab, requires optioning.  Viral sepsis (HCC)  Due to Influenza  Toxic metabolic encephalopathy  Due to viral sepsis, hypoxia, Influenza.   Continue supportive care  Influenza A  Completed tamiflu  Fall at home, initial encounter  Presented after a fall which resulted in a head strike. CTH without acute abnormalities  Postoperative hypothyroidism  History of papillary thyroid carcinoma in 2013 s/p total thyroidectomy and I-131 ablation  Continue Levothyroxine 175 mcg   Focal epilepsy (HCC)  Lacosamide 150 mg daily in the morning, 200mg at bedtime  On divalproex for mood stabilization 750mg BID  Cognitive developmental delay  History of developmental delay due to childhood head injury at 10months of age  Continue supportive care  Schizoaffective disorder (HCC)  Home regimen: Cogentin 2mg BID for EPS, Depakote 750mg BID for mood lability, Invega 6mg qd for psychosis, Trazodone 50mg QHS for insomnia. Takes prn, Olanzapine 10 mg at bedtime   1/11/25: Refills denied by outpatient psychiatrist.  Needs follow-up appt; last visit in October  Hypernatremia  Resolved    Recent Labs     02/05/25  0511   SODIUM 139             Hyperglycemia    Pneumonia of both lungs due to infectious organism  Completed 7 days of cephalosporins, 3 days of azithromycin, and Tamiflu    VTE Pharmacologic Prophylaxis: VTE Score: 4 Moderate Risk (Score 3-4) - Pharmacological DVT Prophylaxis Ordered:  enoxaparin (Lovenox).    Mobility:   Basic Mobility Inpatient Raw Score: 16  -HLM Goal: 5: Stand one or more mins  JH-HLM Achieved: 5: Stand (1 or more minutes)    Discussions with Specialists or Other Care Team Provider: case management, pulmonary    Education and Discussions with Family / Patient: patient    Current Length of Stay: 16 day(s)  Current Patient Status: Inpatient   Certification Statement: The patient will continue to require additional inpatient hospital stay due to awaiting safe discharge plan  Discharge Plan: per case managemetn    Code Status: Level 1 - Full Code    Subjective   Patient seen and examined. No new issues or complaints overnight.     Objective   Vitals:   Temp (24hrs), Av °F (36.7 °C), Min:98 °F (36.7 °C), Max:98 °F (36.7 °C)    Temp:  [98 °F (36.7 °C)] 98 °F (36.7 °C)  HR:  [82-94] 82  Resp:  [22] 22  BP: ()/(53-64) 101/59  SpO2:  [92 %-97 %] 97 %  Body mass index is 34.18 kg/m².     Input and Output Summary (last 24 hours):     Intake/Output Summary (Last 24 hours) at 2025 1019  Last data filed at 2025 0550  Gross per 24 hour   Intake 860 ml   Output --   Net 860 ml       Physical Exam  Vitals reviewed.   HENT:      Head: Normocephalic.      Nose: Nose normal.      Mouth/Throat:      Mouth: Mucous membranes are moist.   Eyes:      General: No scleral icterus.  Cardiovascular:      Rate and Rhythm: Normal rate and regular rhythm.   Pulmonary:      Effort: Pulmonary effort is normal. No respiratory distress.      Breath sounds: No wheezing.   Abdominal:      General: There is no distension.      Palpations: Abdomen is soft.      Tenderness: There is no abdominal tenderness.   Skin:     General: Skin is warm.   Neurological:      Mental Status: She is alert.   Psychiatric:         Mood and Affect: Mood normal.         Behavior: Behavior normal.       Lines/Drains:              Lab Results: I have reviewed the following results:   Results from last 7 days   Lab  Units 02/05/25  0511 02/01/25  0444 01/31/25  0457 01/30/25  0403   WBC Thousand/uL 3.34* 10.14 19.14* 10.76*   HEMOGLOBIN g/dL 9.5* 9.1* 11.0* 10.0*   PLATELETS Thousands/uL 352 263 293 272   MCV fL 99* 102* 101* 102*   TOTAL NEUT ABS Thousand/uL  --   --   --  8.18*   BANDS PCT %  --   --   --  1     Results from last 7 days   Lab Units 02/05/25  0511 02/01/25  0444 01/31/25  0457 01/30/25  0441   SODIUM mmol/L 139 141 141 142   POTASSIUM mmol/L 3.5 4.0 4.2 4.6   CHLORIDE mmol/L 104 107 110* 104   CO2 mmol/L 29 29 24 32   ANION GAP mmol/L 6 5 7 6   BUN mg/dL 16 38* 31* 45*   CREATININE mg/dL 0.67 0.81 0.74 1.00   CALCIUM mg/dL 9.7 8.8 8.2* 8.9   ALBUMIN g/dL  --  3.2*  --  3.2*   TOTAL BILIRUBIN mg/dL  --  0.87  --  0.65   ALK PHOS U/L  --  33*  --  29*   ALT U/L  --  19  --  19   AST U/L  --  20  --  33   EGFR ml/min/1.73sq m 101 83 93 64   GLUCOSE RANDOM mg/dL 90 91 95 108     Results from last 7 days   Lab Units 02/05/25  0511 02/01/25  0444 01/31/25  0457 01/30/25  0441   MAGNESIUM mg/dL 2.1 2.6 2.1 2.4   PHOSPHORUS mg/dL  --   --  3.1  --                       Results from last 7 days   Lab Units 02/04/25  0749 01/31/25  1559 01/31/25  1147 01/31/25  0014 01/30/25  1805 01/30/25  1305 01/30/25  0008 01/29/25  1808 01/29/25  1211   POC GLUCOSE mg/dl 110 118 118 93 88 105 146* 111 96               Recent Cultures (last 7 days):         Imaging:  Reviewed radiology reports from this admission: no new imaging    Last 24 Hours Medication List:     Current Facility-Administered Medications:     Acetaminophen (TYLENOL) oral suspension 975 mg, Q6H PRN    aluminum-magnesium hydroxide-simethicone (MAALOX) oral suspension 30 mL, Q6H PRN    benztropine (COGENTIN) tablet 2 mg, BID    cyanocobalamin (VITAMIN B-12) tablet 1,000 mcg, Daily    divalproex sodium (DEPAKOTE) DR tablet 750 mg, Q12H MOSHE    enoxaparin (LOVENOX) subcutaneous injection 40 mg, Daily    ipratropium-albuterol (DUO-NEB) 0.5-2.5 mg/3 mL inhalation  solution 3 mL, TID    lacosamide (VIMPAT) tablet 150 mg, Daily    lacosamide (VIMPAT) tablet 200 mg, HS    levalbuterol (XOPENEX) inhalation solution 1.25 mg, Q6H PRN    levothyroxine tablet 175 mcg, Early Morning    [Transfer Hold] melatonin tablet 6 mg, HS    OLANZapine (ZyPREXA) tablet 10 mg, HS    ondansetron (ZOFRAN) injection 4 mg, Q6H PRN    paliperidone (INVEGA) 24 hr tablet 6 mg, QAM    polyethylene glycol (MIRALAX) packet 17 g, Daily PRN    senna-docusate sodium (SENOKOT S) 8.6-50 mg per tablet 2 tablet, BID      **Please Note: This note may have been constructed using a voice recognition system.**

## 2025-02-05 NOTE — SPEECH THERAPY NOTE
Speech Language/Pathology    Speech/Language Pathology Progress Note    Patient Name: Esperanza Prajapati  Today's Date: 2/5/2025     Problem List  Principal Problem:    Acute respiratory failure with hypoxia (HCC)  Active Problems:    Cognitive developmental delay    Postoperative hypothyroidism    Focal epilepsy (HCC)    Schizoaffective disorder (HCC)    Viral sepsis (HCC)    Toxic metabolic encephalopathy    Fall at home, initial encounter    Influenza A    Hypernatremia    Hyperglycemia    Pneumonia of both lungs due to infectious organism       Past Medical History  Past Medical History:   Diagnosis Date    Cancer (HCC)     thyroid cancer    Disease of thyroid gland     Hallucination     Hyperactivity (behavior)     Last Assessed: 11/7/2014     Hyperlipidemia     Hyponatremia 01/20/2025    Obesity     Psychosis (HCC)     Seizures (HCC)         Past Surgical History  Past Surgical History:   Procedure Laterality Date    OTHER SURGICAL HISTORY      Removal of urinary calculus     OTHER SURGICAL HISTORY      Rhinologic Surgery     NC OPEN TX PHALANGEAL SHAFT FRACTURE PROX/MIDDLE EA Left 8/3/2023    Procedure: CLOSED REDUCTION PERCUTANEOUS PINNING - LEFT RING FINGER;  Surgeon: Fransisco Escalante MD;  Location: [facility] MAIN OR;  Service: Orthopedics    TOTAL THYROIDECTOMY      LAst Assessed: 11/7/2014         Subjective:  Pt alert. Flat affect.Ordered her a trial level 2 tray for lunch. Chose items earlier. ID not request mac and cheese but added to tray.    Objective:  Pt seen for po tolerance and additional recommendations. Sleepy in am per nurse. (Seems to be her norm while here). Upright and feeding self at lunch. Had tuna fish that was very fine/moist. Not really much mastication required, tolerated wfl. Took only a few bites of the mac and cheese, stated she didn't want any more. Fair mastication. Then ate her chocolate ice cream, drank multiple sips of juice and water. No cough or wet vocal quality.    Assessment:  Tolerated NDD2 [facility] soft trial though mastication required on items today. Limited intake of items that required mastication. No s/s w/ liquids.   Plan/Recommendations:  Continue puree w/ thin for now. ST to f/u w/ level 2 upgraded trials as able.

## 2025-02-05 NOTE — PHYSICAL THERAPY NOTE
Physical Therapy Treatment Note     02/05/25 1404   PT Last Visit   PT Visit Date 02/05/25   Note Type   Note Type Treatment   Pain Assessment   Pain Assessment Tool 0-10   Pain Score No Pain   Restrictions/Precautions   Weight Bearing Precautions Per Order No   Other Precautions Cognitive;Bed Alarm;Fall Risk;O2   General   Chart Reviewed Yes   Family/Caregiver Present No   Subjective   Subjective Pt. agreeable to PT   Bed Mobility   Supine to Sit 4  Minimal assistance   Additional items Assist x 1;Bedrails;Increased time required;Verbal cues   Sit to Supine 5  Supervision   Additional items Assist x 1;Increased time required   Transfers   Sit to Stand 5  Supervision   Additional items Assist x 1;Increased time required   Stand to Sit 5  Supervision   Additional items Assist x 1;Impulsive   Stand pivot 5  Supervision   Additional items Assist x 1;Increased time required   Ambulation/Elevation   Gait pattern Wide WENDY;Decreased foot clearance;Short stride;Excessively slow;Inconsistent drea;Decreased heel strike;Decreased toe off   Gait Assistance 4  Minimal assist   Additional items Assist x 1;Verbal cues   Assistive Device Rolling walker   Distance 80ft x 2   Balance   Static Sitting Good   Dynamic Sitting Fair +   Static Standing Fair   Dynamic Standing Fair -   Ambulatory Fair -   Endurance Deficit   Endurance Deficit No   Activity Tolerance   Activity Tolerance Patient tolerated treatment well   Nurse Made Aware yes   Assessment   Prognosis Good   Problem List Decreased endurance;Impaired balance;Decreased mobility;Impaired judgement   Assessment Noted UE tremors. pt. able to ambualte longer distance with no LOB and instability of LEs. Pt. given cues to take longer strides and to increased heel strike. Seated rest between ambulation to rest. Pt. back in bed with all needs within reach and bed alarm on and in supine. Will continue to follow per PT POC   Barriers to Discharge None   Goals   Patient Goals None  reproted   STG Expiration Date 02/15/25   PT Treatment Day 3   Plan   Treatment/Interventions Functional transfer training;Gait training;Bed mobility;Equipment eval/education;Patient/family training;Cognitive reorientation;Spoke to nursing   Progress Progressing toward goals   PT Frequency 3-5x/wk   Discharge Recommendation   Rehab Resource Intensity Level, PT II (Moderate Resource Intensity)   AM-PAC Basic Mobility Inpatient   Turning in Flat Bed Without Bedrails 3   Lying on Back to Sitting on Edge of Flat Bed Without Bedrails 3   Moving Bed to Chair 3   Standing Up From Chair Using Arms 4   Walk in Room 3   Climb 3-5 Stairs With Railing 3   Basic Mobility Inpatient Raw Score 19   Basic Mobility Standardized Score 42.48   Turning Head Towards Sound 4   Follow Simple Instructions 4   Low Function Basic Mobility Raw Score  27   Low Function Basic Mobility Standardized Score  42.6   [facility] Highest Level Of Mobility   -HLM Goal 6: Walk 10 steps or more   -HLM Achieved 7: Walk 25 feet or more   End of Consult   Patient Position at End of Consult Supine;Bed/Chair alarm activated;All needs within reach           Theodora Hennessy PTA    An AM-PAC basic mobility standardized score less than 42.9 suggest the patient may benefit from discharge to post-acute rehab services.

## 2025-02-05 NOTE — PLAN OF CARE
Problem: Potential for Falls  Goal: Patient will remain free of falls  Description: INTERVENTIONS:  - Educate patient/family on patient safety including physical limitations  - Instruct patient to call for assistance with activity   - Consult OT/PT to assist with strengthening/mobility   - Keep Call bell within reach  - Keep bed low and locked with side rails adjusted as appropriate  - Keep care items and personal belongings within reach  - Initiate and maintain comfort rounds  - Make Fall Risk Sign visible to staff  - Offer Toileting every 2 Hours, in advance of need  - Initiate/Maintain bed alarm  - Obtain necessary fall risk management equipment:   - Apply yellow socks and bracelet for high fall risk patients  - Consider moving patient to room near nurses station  Outcome: Progressing     Problem: Prexisting or High Potential for Compromised Skin Integrity  Goal: Skin integrity is maintained or improved  Description: INTERVENTIONS:  - Identify patients at risk for skin breakdown  - Assess and monitor skin integrity  - Assess and monitor nutrition and hydration status  - Monitor labs   - Assess for incontinence   - Turn and reposition patient  - Assist with mobility/ambulation  - Relieve pressure over bony prominences  - Avoid friction and shearing  - Provide appropriate hygiene as needed including keeping skin clean and dry  - Evaluate need for skin moisturizer/barrier cream  - Collaborate with interdisciplinary team   - Patient/family teaching  - Consider wound care consult   Outcome: Progressing     Problem: PAIN - ADULT  Goal: Verbalizes/displays adequate comfort level or baseline comfort level  Description: Interventions:  - Encourage patient to monitor pain and request assistance  - Assess pain using appropriate pain scale  - Administer analgesics based on type and severity of pain and evaluate response  - Implement non-pharmacological measures as appropriate and evaluate response  - Consider cultural and  social influences on pain and pain management  - Notify physician/advanced practitioner if interventions unsuccessful or patient reports new pain  Outcome: Progressing     Problem: INFECTION - ADULT  Goal: Absence or prevention of progression during hospitalization  Description: INTERVENTIONS:  - Assess and monitor for signs and symptoms of infection  - Monitor lab/diagnostic results  - Monitor all insertion sites, i.e. indwelling lines, tubes, and drains  - Monitor endotracheal if appropriate and nasal secretions for changes in amount and color  - [place] appropriate cooling/warming therapies per order  - Administer medications as ordered  - Instruct and encourage patient and family to use good hand hygiene technique  - Identify and instruct in appropriate isolation precautions for identified infection/condition  Outcome: Progressing     Problem: SAFETY ADULT  Goal: Patient will remain free of falls  Description: INTERVENTIONS:  - Educate patient/family on patient safety including physical limitations  - Instruct patient to call for assistance with activity   - Consult OT/PT to assist with strengthening/mobility   - Keep Call bell within reach  - Keep bed low and locked with side rails adjusted as appropriate  - Keep care items and personal belongings within reach  - Initiate and maintain comfort rounds  - Make Fall Risk Sign visible to staff  - Offer Toileting every 2 Hours, in advance of need  - Initiate/Maintain bed alarm  - Obtain necessary fall risk management equipment:   - Apply yellow socks and bracelet for high fall risk patients  - Consider moving patient to room near nurses station  Outcome: Progressing  Goal: Maintain or return to baseline ADL function  Description: INTERVENTIONS:  -  Assess patient's ability to carry out ADLs; assess patient's baseline for ADL function and identify physical deficits which impact ability to perform ADLs (bathing, care of mouth/teeth, toileting, grooming, dressing,  etc.)  - Assess/evaluate cause of self-care deficits   - Assess range of motion  - Assess patient's mobility; develop plan if impaired  - Assess patient's need for assistive devices and provide as appropriate  - Encourage maximum independence but intervene and supervise when necessary  - Involve family in performance of ADLs  - Assess for home care needs following discharge   - Consider OT consult to assist with ADL evaluation and planning for discharge  - Provide patient education as appropriate  Outcome: Progressing  Goal: Maintains/Returns to pre admission functional level  Description: INTERVENTIONS:  - Perform AM-PAC 6 Click Basic Mobility/ Daily Activity assessment daily.  - Set and communicate daily mobility goal to care team and patient/family/caregiver.   - Collaborate with rehabilitation services on mobility goals if consulted  - Perform Range of Motion 4 times a day.  - Reposition patient every 2 hours.    - Out of bed for toileting  - Record patient progress and toleration of activity level   Outcome: Progressing     Problem: DISCHARGE PLANNING  Goal: Discharge to home or other facility with appropriate resources  Description: INTERVENTIONS:  - Identify barriers to discharge w/patient and caregiver  - Arrange for needed discharge resources and transportation as appropriate  - Identify discharge learning needs (meds, wound care, etc.)  - Arrange for interpretive services to assist at discharge as needed  - Refer to Case Management Department for coordinating discharge planning if the patient needs post-hospital services based on physician/advanced practitioner order or complex needs related to functional status, cognitive ability, or social support system  Outcome: Progressing     Problem: NEUROSENSORY - ADULT  Goal: Achieves stable or improved neurological status  Description: INTERVENTIONS  - Monitor and report changes in neurological status  - Monitor vital signs such as temperature, blood pressure,  glucose, and any other labs ordered   - Initiate measures to prevent increased intracranial pressure  - Monitor for seizure activity and implement precautions if appropriate      Outcome: Progressing  Goal: Remains free of injury related to seizures activity  Description: INTERVENTIONS  - Maintain airway, patient safety  and administer oxygen as ordered  - Monitor patient for seizure activity, document and report duration and description of seizure to physician/advanced practitioner  - If seizure occurs,  ensure patient safety during seizure  - Reorient patient post seizure  - Seizure pads on all 4 side rails  - Instruct patient/family to notify RN of any seizure activity including if an aura is experienced  - Instruct patient/family to call for assistance with activity based on nursing assessment  - Administer anti-seizure medications if ordered    Outcome: Progressing  Goal: Achieves maximal functionality and self care  Description: INTERVENTIONS  - Monitor swallowing and airway patency with patient fatigue and changes in neurological status  - Encourage and assist patient to increase activity and self care.   - Encourage visually impaired, hearing impaired and aphasic patients to use assistive/communication devices  Outcome: Progressing     Problem: RESPIRATORY - ADULT  Goal: Achieves optimal ventilation and oxygenation  Description: INTERVENTIONS:  - Assess for changes in respiratory status  - Assess for changes in mentation and behavior  - Position to facilitate oxygenation and minimize respiratory effort  - Oxygen administered by appropriate delivery if ordered  - Initiate smoking cessation education as indicated  - Encourage broncho-pulmonary hygiene including cough, deep breathe, Incentive Spirometry  - Assess the need for suctioning and aspirate as needed  - Assess and instruct to report SOB or any respiratory difficulty  - Respiratory Therapy support as indicated  Outcome: Progressing     Problem:  GENITOURINARY - ADULT  Goal: Maintains or returns to baseline urinary function  Description: INTERVENTIONS:  - Assess urinary function  - Encourage oral fluids to ensure adequate hydration if ordered  - Administer IV fluids as ordered to ensure adequate hydration  - Administer ordered medications as needed  - Offer frequent toileting  - Follow urinary retention protocol if ordered  Outcome: Progressing  Goal: Absence of urinary retention  Description: INTERVENTIONS:  - Assess patient’s ability to void and empty bladder  - Monitor I/O  - Bladder scan as needed  - Discuss with physician/AP medications to alleviate retention as needed  - Discuss catheterization for long term situations as appropriate  Outcome: Progressing     Problem: METABOLIC, FLUID AND ELECTROLYTES - ADULT  Goal: Electrolytes maintained within normal limits  Description: INTERVENTIONS:  - Monitor labs and assess patient for signs and symptoms of electrolyte imbalances  - Administer electrolyte replacement as ordered  - Monitor response to electrolyte replacements, including repeat lab results as appropriate  - Instruct patient on fluid and nutrition as appropriate  Outcome: Progressing  Goal: Fluid balance maintained  Description: INTERVENTIONS:  - Monitor labs   - Monitor I/O and WT  - Instruct patient on fluid and nutrition as appropriate  - Assess for signs & symptoms of volume excess or deficit  Outcome: Progressing     Problem: HEMATOLOGIC - ADULT  Goal: Maintains hematologic stability  Description: INTERVENTIONS  - Assess for signs and symptoms of bleeding or hemorrhage  - Monitor labs  - Administer supportive blood products/factors as ordered and appropriate  Outcome: Progressing     Problem: MUSCULOSKELETAL - ADULT  Goal: Maintain or return mobility to safest level of function  Description: INTERVENTIONS:  - Assess patient's ability to carry out ADLs; assess patient's baseline for ADL function and identify physical deficits which impact  ability to perform ADLs (bathing, care of mouth/teeth, toileting, grooming, dressing, etc.)  - Assess/evaluate cause of self-care deficits   - Assess range of motion  - Assess patient's mobility  - Assess patient's need for assistive devices and provide as appropriate  - Encourage maximum independence but intervene and supervise when necessary  - Involve family in performance of ADLs  - Assess for home care needs following discharge   - Consider OT consult to assist with ADL evaluation and planning for discharge  - Provide patient education as appropriate  Outcome: Progressing  Goal: Maintain proper alignment of affected body part  Description: INTERVENTIONS:  - Support, maintain and protect limb and body alignment  - Provide patient/ family with appropriate education  Outcome: Progressing     Problem: SAFETY,RESTRAINT: NV/NON-SELF DESTRUCTIVE BEHAVIOR  Goal: Remains free of harm/injury (restraint for non violent/non self-detsructive behavior)  Description: INTERVENTIONS:  - Instruct patient/family regarding restraint use   - Assess and monitor physiologic and psychological status   - Provide interventions and comfort measures to meet assessed patient needs   - Identify and implement measures to help patient regain control  - Assess readiness for release of restraint   Outcome: Progressing  Goal: Returns to optimal restraint-free functioning  Description: INTERVENTIONS:  - Assess the patient's behavior and symptoms that indicate continued need for restraint  - Identify and implement measures to help patient regain control  - Assess readiness for release of restraint   Outcome: Progressing     Problem: Nutrition/Hydration-ADULT  Goal: Nutrient/Hydration intake appropriate for improving, restoring or maintaining nutritional needs  Description: Monitor and assess patient's nutrition/hydration status for malnutrition. Collaborate with interdisciplinary team and initiate plan and interventions as ordered.  Monitor  patient's weight and dietary intake as ordered or per policy. Utilize nutrition screening tool and intervene as necessary. Determine patient's food preferences and provide high-protein, high-caloric foods as appropriate.     INTERVENTIONS:  - Monitor oral intake, urinary output, labs, and treatment plans  - Assess nutrition and hydration status and recommend course of action  - Evaluate amount of meals eaten  - Assist patient with eating if necessary   - Allow adequate time for meals  - Recommend/ encourage appropriate diets, oral nutritional supplements, and vitamin/mineral supplements  - Order, calculate, and assess calorie counts as needed  - Recommend, monitor, and adjust tube feedings and TPN/PPN based on assessed needs  - Assess need for intravenous fluids  - Provide specific nutrition/hydration education as appropriate  - Include patient/family/caregiver in decisions related to nutrition  Outcome: Progressing     Problem: COPING  Goal: Pt/Family able to verbalize concerns and demonstrate effective coping strategies  Description: INTERVENTIONS:  - Assist patient/family to identify coping skills, available support systems and cultural and spiritual values  - Provide emotional support, including active listening and acknowledgement of concerns of patient and caregivers  - Reduce environmental stimuli, as able  - Provide patient education  - Assess for spiritual pain/suffering and initiate spiritual care, including notification of Pastoral Care or dottie based community as needed  - Assess effectiveness of coping strategies  Outcome: Progressing  Goal: Will report anxiety at manageable levels  Description: INTERVENTIONS:  - Administer medication as ordered  - Teach and encourage coping skills  - Provide emotional support  - Assess patient/family for anxiety and ability to cope  Outcome: Progressing

## 2025-02-05 NOTE — PLAN OF CARE
Problem: PHYSICAL THERAPY ADULT  Goal: Performs mobility at highest level of function for planned discharge setting.  See evaluation for individualized goals.  Outcome: Progressing  Note: Prognosis: Good  Problem List: Decreased endurance, Impaired balance, Decreased mobility, Impaired judgement  Assessment: Noted UE tremors. pt. able to ambualte longer distance with no LOB and instability of LEs. Pt. given cues to take longer strides and to increased heel strike. Seated rest between ambulation to rest. Pt. back in bed with all needs within reach and bed alarm on and in supine. Will continue to follow per PT POC  Barriers to Discharge: None     Rehab Resource Intensity Level, PT: II (Moderate Resource Intensity)    See flowsheet documentation for full assessment.

## 2025-02-06 LAB
ANION GAP SERPL CALCULATED.3IONS-SCNC: 8 MMOL/L (ref 4–13)
BUN SERPL-MCNC: 14 MG/DL (ref 5–25)
CALCIUM SERPL-MCNC: 9.9 MG/DL (ref 8.4–10.2)
CHLORIDE SERPL-SCNC: 104 MMOL/L (ref 96–108)
CO2 SERPL-SCNC: 29 MMOL/L (ref 21–32)
CREAT SERPL-MCNC: 0.74 MG/DL (ref 0.6–1.3)
ERYTHROCYTE [DISTWIDTH] IN BLOOD BY AUTOMATED COUNT: 14 % (ref 11.6–15.1)
GFR SERPL CREATININE-BSD FRML MDRD: 93 ML/MIN/1.73SQ M
GLUCOSE SERPL-MCNC: 107 MG/DL (ref 65–140)
HCT VFR BLD AUTO: 29.9 % (ref 34.8–46.1)
HGB BLD-MCNC: 10 G/DL (ref 11.5–15.4)
MAGNESIUM SERPL-MCNC: 2.1 MG/DL (ref 1.9–2.7)
MCH RBC QN AUTO: 33.3 PG (ref 26.8–34.3)
MCHC RBC AUTO-ENTMCNC: 33.4 G/DL (ref 31.4–37.4)
MCV RBC AUTO: 100 FL (ref 82–98)
PLATELET # BLD AUTO: 347 THOUSANDS/UL (ref 149–390)
PMV BLD AUTO: 9 FL (ref 8.9–12.7)
POTASSIUM SERPL-SCNC: 3.7 MMOL/L (ref 3.5–5.3)
RBC # BLD AUTO: 3 MILLION/UL (ref 3.81–5.12)
SODIUM SERPL-SCNC: 141 MMOL/L (ref 135–147)
WBC # BLD AUTO: 3.27 THOUSAND/UL (ref 4.31–10.16)

## 2025-02-06 PROCEDURE — 83735 ASSAY OF MAGNESIUM: CPT | Performed by: STUDENT IN AN ORGANIZED HEALTH CARE EDUCATION/TRAINING PROGRAM

## 2025-02-06 PROCEDURE — 97116 GAIT TRAINING THERAPY: CPT

## 2025-02-06 PROCEDURE — 94640 AIRWAY INHALATION TREATMENT: CPT

## 2025-02-06 PROCEDURE — 99232 SBSQ HOSP IP/OBS MODERATE 35: CPT | Performed by: STUDENT IN AN ORGANIZED HEALTH CARE EDUCATION/TRAINING PROGRAM

## 2025-02-06 PROCEDURE — 94760 N-INVAS EAR/PLS OXIMETRY 1: CPT

## 2025-02-06 PROCEDURE — 97530 THERAPEUTIC ACTIVITIES: CPT

## 2025-02-06 PROCEDURE — 80048 BASIC METABOLIC PNL TOTAL CA: CPT | Performed by: STUDENT IN AN ORGANIZED HEALTH CARE EDUCATION/TRAINING PROGRAM

## 2025-02-06 PROCEDURE — 94668 MNPJ CHEST WALL SBSQ: CPT

## 2025-02-06 PROCEDURE — 85027 COMPLETE CBC AUTOMATED: CPT | Performed by: STUDENT IN AN ORGANIZED HEALTH CARE EDUCATION/TRAINING PROGRAM

## 2025-02-06 PROCEDURE — 94669 MECHANICAL CHEST WALL OSCILL: CPT

## 2025-02-06 PROCEDURE — 97535 SELF CARE MNGMENT TRAINING: CPT

## 2025-02-06 RX ORDER — IPRATROPIUM BROMIDE AND ALBUTEROL SULFATE 2.5; .5 MG/3ML; MG/3ML
3 SOLUTION RESPIRATORY (INHALATION) EVERY 8 HOURS PRN
Status: DISCONTINUED | OUTPATIENT
Start: 2025-02-06 | End: 2025-02-08 | Stop reason: HOSPADM

## 2025-02-06 RX ADMIN — IPRATROPIUM BROMIDE AND ALBUTEROL SULFATE 3 ML: 2.5; .5 SOLUTION RESPIRATORY (INHALATION) at 06:41

## 2025-02-06 RX ADMIN — BENZTROPINE MESYLATE 2 MG: 1 TABLET ORAL at 17:30

## 2025-02-06 RX ADMIN — OLANZAPINE 10 MG: 10 TABLET, FILM COATED ORAL at 21:32

## 2025-02-06 RX ADMIN — LEVOTHYROXINE SODIUM 175 MCG: 0.05 TABLET ORAL at 06:06

## 2025-02-06 RX ADMIN — SENNOSIDES AND DOCUSATE SODIUM 2 TABLET: 8.6; 5 TABLET ORAL at 17:30

## 2025-02-06 RX ADMIN — BENZTROPINE MESYLATE 2 MG: 1 TABLET ORAL at 08:26

## 2025-02-06 RX ADMIN — LACOSAMIDE 200 MG: 150 TABLET, FILM COATED ORAL at 21:31

## 2025-02-06 RX ADMIN — MELATONIN 6 MG: 3 TAB ORAL at 21:31

## 2025-02-06 RX ADMIN — DIVALPROEX SODIUM 750 MG: 250 TABLET, DELAYED RELEASE ORAL at 08:26

## 2025-02-06 RX ADMIN — DIVALPROEX SODIUM 750 MG: 250 TABLET, DELAYED RELEASE ORAL at 21:31

## 2025-02-06 RX ADMIN — LACOSAMIDE 150 MG: 150 TABLET, FILM COATED ORAL at 08:26

## 2025-02-06 RX ADMIN — ENOXAPARIN SODIUM 40 MG: 40 INJECTION SUBCUTANEOUS at 08:30

## 2025-02-06 RX ADMIN — PALIPERIDONE 6 MG: 3 TABLET, EXTENDED RELEASE ORAL at 08:28

## 2025-02-06 RX ADMIN — CYANOCOBALAMIN TAB 500 MCG 1000 MCG: 500 TAB at 08:26

## 2025-02-06 RX ADMIN — SENNOSIDES AND DOCUSATE SODIUM 2 TABLET: 8.6; 5 TABLET ORAL at 08:27

## 2025-02-06 NOTE — PLAN OF CARE
Problem: PHYSICAL THERAPY ADULT  Goal: Performs mobility at highest level of function for planned discharge setting.  See evaluation for individualized goals.  Outcome: Progressing  Note: Prognosis: Good  Problem List: Decreased strength, Decreased range of motion, Decreased endurance, Decreased mobility, Impaired judgement  Assessment: Pt. progressign well with voerall mobility. Pt. able to ambulate longer distance this session and was on RA and SPO2 stats noted 95% t/o session. Pt. given cues for LE sequencing for stair negotiation ascending leading with RLE and descended with LLE. Pt. had seated rests between ambulation and stiar negotiation. Pt. seated on chair post sessiojn with all needs within reach and on .5 L O2 as she was at the beginning of the session. No LOB noted t/o session however noted tremors in UEs and unsteadiness in LEs occ. Will continue to follow epr PT POC. Recommended pt. to ambulate with staff on the unit.  Barriers to Discharge: None     Rehab Resource Intensity Level, PT: II (Moderate Resource Intensity)    See flowsheet documentation for full assessment.

## 2025-02-06 NOTE — CASE MANAGEMENT
[facility] received request for authorization from Care Manager.  Authorization request submitted for: Acute Rehab  Facility Name:   [facility] NPI:  [id number]  Facility MD: Ashley DePadua  NPI: [id number]  Authorization initiated by contacting insurance:  [employer] [facility] [employer]  Via: Fax  Clinicals submitted via fax #  [phone]  Pending Reference #: n/a    Care Manager notified: Major Son    Updates to authorization status will be noted in chart. Please reach out to CM for updates on any clinical information.

## 2025-02-06 NOTE — PLAN OF CARE
Problem: Potential for Falls  Goal: Patient will remain free of falls  Description: INTERVENTIONS:  - Educate patient/family on patient safety including physical limitations  - Instruct patient to call for assistance with activity   - Consult OT/PT to assist with strengthening/mobility   - Keep Call bell within reach  - Keep bed low and locked with side rails adjusted as appropriate  - Keep care items and personal belongings within reach  - Initiate and maintain comfort rounds  - Make Fall Risk Sign visible to staff  - Offer Toileting every 2 Hours, in advance of need  - Initiate/Maintain bed alarm  - Obtain necessary fall risk management equipment:   - Apply yellow socks and bracelet for high fall risk patients  - Consider moving patient to room near nurses station  Outcome: Progressing     Problem: Prexisting or High Potential for Compromised Skin Integrity  Goal: Skin integrity is maintained or improved  Description: INTERVENTIONS:  - Identify patients at risk for skin breakdown  - Assess and monitor skin integrity  - Assess and monitor nutrition and hydration status  - Monitor labs   - Assess for incontinence   - Turn and reposition patient  - Assist with mobility/ambulation  - Relieve pressure over bony prominences  - Avoid friction and shearing  - Provide appropriate hygiene as needed including keeping skin clean and dry  - Evaluate need for skin moisturizer/barrier cream  - Collaborate with interdisciplinary team   - Patient/family teaching  - Consider wound care consult   Outcome: Progressing     Problem: PAIN - ADULT  Goal: Verbalizes/displays adequate comfort level or baseline comfort level  Description: Interventions:  - Encourage patient to monitor pain and request assistance  - Assess pain using appropriate pain scale  - Administer analgesics based on type and severity of pain and evaluate response  - Implement non-pharmacological measures as appropriate and evaluate response  - Consider cultural and  social influences on pain and pain management  - Notify physician/advanced practitioner if interventions unsuccessful or patient reports new pain  Outcome: Progressing     Problem: INFECTION - ADULT  Goal: Absence or prevention of progression during hospitalization  Description: INTERVENTIONS:  - Assess and monitor for signs and symptoms of infection  - Monitor lab/diagnostic results  - Monitor all insertion sites, i.e. indwelling lines, tubes, and drains  - Monitor endotracheal if appropriate and nasal secretions for changes in amount and color  - [place] appropriate cooling/warming therapies per order  - Administer medications as ordered  - Instruct and encourage patient and family to use good hand hygiene technique  - Identify and instruct in appropriate isolation precautions for identified infection/condition  Outcome: Progressing     Problem: SAFETY ADULT  Goal: Patient will remain free of falls  Description: INTERVENTIONS:  - Educate patient/family on patient safety including physical limitations  - Instruct patient to call for assistance with activity   - Consult OT/PT to assist with strengthening/mobility   - Keep Call bell within reach  - Keep bed low and locked with side rails adjusted as appropriate  - Keep care items and personal belongings within reach  - Initiate and maintain comfort rounds  - Make Fall Risk Sign visible to staff  - Offer Toileting every 2 Hours, in advance of need  - Initiate/Maintain bed alarm  - Obtain necessary fall risk management equipment:   - Apply yellow socks and bracelet for high fall risk patients  - Consider moving patient to room near nurses station  Outcome: Progressing  Goal: Maintain or return to baseline ADL function  Description: INTERVENTIONS:  -  Assess patient's ability to carry out ADLs; assess patient's baseline for ADL function and identify physical deficits which impact ability to perform ADLs (bathing, care of mouth/teeth, toileting, grooming, dressing,  etc.)  - Assess/evaluate cause of self-care deficits   - Assess range of motion  - Assess patient's mobility; develop plan if impaired  - Assess patient's need for assistive devices and provide as appropriate  - Encourage maximum independence but intervene and supervise when necessary  - Involve family in performance of ADLs  - Assess for home care needs following discharge   - Consider OT consult to assist with ADL evaluation and planning for discharge  - Provide patient education as appropriate  Outcome: Progressing  Goal: Maintains/Returns to pre admission functional level  Description: INTERVENTIONS:  - Perform AM-PAC 6 Click Basic Mobility/ Daily Activity assessment daily.  - Set and communicate daily mobility goal to care team and patient/family/caregiver.   - Collaborate with rehabilitation services on mobility goals if consulted  - Perform Range of Motion 4 times a day.  - Reposition patient every 2 hours.    - Out of bed for toileting  - Record patient progress and toleration of activity level   Outcome: Progressing     Problem: DISCHARGE PLANNING  Goal: Discharge to home or other facility with appropriate resources  Description: INTERVENTIONS:  - Identify barriers to discharge w/patient and caregiver  - Arrange for needed discharge resources and transportation as appropriate  - Identify discharge learning needs (meds, wound care, etc.)  - Arrange for interpretive services to assist at discharge as needed  - Refer to Case Management Department for coordinating discharge planning if the patient needs post-hospital services based on physician/advanced practitioner order or complex needs related to functional status, cognitive ability, or social support system  Outcome: Progressing     Problem: NEUROSENSORY - ADULT  Goal: Achieves stable or improved neurological status  Description: INTERVENTIONS  - Monitor and report changes in neurological status  - Monitor vital signs such as temperature, blood pressure,  glucose, and any other labs ordered   - Initiate measures to prevent increased intracranial pressure  - Monitor for seizure activity and implement precautions if appropriate      Outcome: Progressing  Goal: Remains free of injury related to seizures activity  Description: INTERVENTIONS  - Maintain airway, patient safety  and administer oxygen as ordered  - Monitor patient for seizure activity, document and report duration and description of seizure to physician/advanced practitioner  - If seizure occurs,  ensure patient safety during seizure  - Reorient patient post seizure  - Seizure pads on all 4 side rails  - Instruct patient/family to notify RN of any seizure activity including if an aura is experienced  - Instruct patient/family to call for assistance with activity based on nursing assessment  - Administer anti-seizure medications if ordered    Outcome: Progressing  Goal: Achieves maximal functionality and self care  Description: INTERVENTIONS  - Monitor swallowing and airway patency with patient fatigue and changes in neurological status  - Encourage and assist patient to increase activity and self care.   - Encourage visually impaired, hearing impaired and aphasic patients to use assistive/communication devices  Outcome: Progressing     Problem: RESPIRATORY - ADULT  Goal: Achieves optimal ventilation and oxygenation  Description: INTERVENTIONS:  - Assess for changes in respiratory status  - Assess for changes in mentation and behavior  - Position to facilitate oxygenation and minimize respiratory effort  - Oxygen administered by appropriate delivery if ordered  - Initiate smoking cessation education as indicated  - Encourage broncho-pulmonary hygiene including cough, deep breathe, Incentive Spirometry  - Assess the need for suctioning and aspirate as needed  - Assess and instruct to report SOB or any respiratory difficulty  - Respiratory Therapy support as indicated  Outcome: Progressing     Problem:  GENITOURINARY - ADULT  Goal: Maintains or returns to baseline urinary function  Description: INTERVENTIONS:  - Assess urinary function  - Encourage oral fluids to ensure adequate hydration if ordered  - Administer IV fluids as ordered to ensure adequate hydration  - Administer ordered medications as needed  - Offer frequent toileting  - Follow urinary retention protocol if ordered  Outcome: Progressing  Goal: Absence of urinary retention  Description: INTERVENTIONS:  - Assess patient’s ability to void and empty bladder  - Monitor I/O  - Bladder scan as needed  - Discuss with physician/AP medications to alleviate retention as needed  - Discuss catheterization for long term situations as appropriate  Outcome: Progressing     Problem: METABOLIC, FLUID AND ELECTROLYTES - ADULT  Goal: Electrolytes maintained within normal limits  Description: INTERVENTIONS:  - Monitor labs and assess patient for signs and symptoms of electrolyte imbalances  - Administer electrolyte replacement as ordered  - Monitor response to electrolyte replacements, including repeat lab results as appropriate  - Instruct patient on fluid and nutrition as appropriate  Outcome: Progressing  Goal: Fluid balance maintained  Description: INTERVENTIONS:  - Monitor labs   - Monitor I/O and WT  - Instruct patient on fluid and nutrition as appropriate  - Assess for signs & symptoms of volume excess or deficit  Outcome: Progressing     Problem: HEMATOLOGIC - ADULT  Goal: Maintains hematologic stability  Description: INTERVENTIONS  - Assess for signs and symptoms of bleeding or hemorrhage  - Monitor labs  - Administer supportive blood products/factors as ordered and appropriate  Outcome: Progressing     Problem: MUSCULOSKELETAL - ADULT  Goal: Maintain or return mobility to safest level of function  Description: INTERVENTIONS:  - Assess patient's ability to carry out ADLs; assess patient's baseline for ADL function and identify physical deficits which impact  ability to perform ADLs (bathing, care of mouth/teeth, toileting, grooming, dressing, etc.)  - Assess/evaluate cause of self-care deficits   - Assess range of motion  - Assess patient's mobility  - Assess patient's need for assistive devices and provide as appropriate  - Encourage maximum independence but intervene and supervise when necessary  - Involve family in performance of ADLs  - Assess for home care needs following discharge   - Consider OT consult to assist with ADL evaluation and planning for discharge  - Provide patient education as appropriate  Outcome: Progressing  Goal: Maintain proper alignment of affected body part  Description: INTERVENTIONS:  - Support, maintain and protect limb and body alignment  - Provide patient/ family with appropriate education  Outcome: Progressing     Problem: Nutrition/Hydration-ADULT  Goal: Nutrient/Hydration intake appropriate for improving, restoring or maintaining nutritional needs  Description: Monitor and assess patient's nutrition/hydration status for malnutrition. Collaborate with interdisciplinary team and initiate plan and interventions as ordered.  Monitor patient's weight and dietary intake as ordered or per policy. Utilize nutrition screening tool and intervene as necessary. Determine patient's food preferences and provide high-protein, high-caloric foods as appropriate.     INTERVENTIONS:  - Monitor oral intake, urinary output, labs, and treatment plans  - Assess nutrition and hydration status and recommend course of action  - Evaluate amount of meals eaten  - Assist patient with eating if necessary   - Allow adequate time for meals  - Recommend/ encourage appropriate diets, oral nutritional supplements, and vitamin/mineral supplements  - Order, calculate, and assess calorie counts as needed  - Recommend, monitor, and adjust tube feedings and TPN/PPN based on assessed needs  - Assess need for intravenous fluids  - Provide specific nutrition/hydration  education as appropriate  - Include patient/family/caregiver in decisions related to nutrition  Outcome: Progressing     Problem: COPING  Goal: Pt/Family able to verbalize concerns and demonstrate effective coping strategies  Description: INTERVENTIONS:  - Assist patient/family to identify coping skills, available support systems and cultural and spiritual values  - Provide emotional support, including active listening and acknowledgement of concerns of patient and caregivers  - Reduce environmental stimuli, as able  - Provide patient education  - Assess for spiritual pain/suffering and initiate spiritual care, including notification of Pastoral Care or dottie based community as needed  - Assess effectiveness of coping strategies  Outcome: Progressing  Goal: Will report anxiety at manageable levels  Description: INTERVENTIONS:  - Administer medication as ordered  - Teach and encourage coping skills  - Provide emotional support  - Assess patient/family for anxiety and ability to cope  Outcome: Progressing

## 2025-02-06 NOTE — PHYSICAL THERAPY NOTE
Physical Therapy Treatment Note     02/06/25 1458   PT Last Visit   PT Visit Date 02/06/25   Note Type   Note Type Treatment   Pain Assessment   Pain Assessment Tool 0-10   Pain Score No Pain   Restrictions/Precautions   Weight Bearing Precautions Per Order No   Other Precautions Chair Alarm;Fall Risk;O2   General   Chart Reviewed Yes   Subjective   Subjective Pt. agreeable to PT   Transfers   Sit to Stand 5  Supervision   Additional items Verbal cues;Armrests   Stand to Sit 5  Supervision   Additional items Assist x 1;Armrests   Stand pivot 5  Supervision   Additional items Assist x 1;Increased time required   Ambulation/Elevation   Gait pattern Wide WENDY;Decreased foot clearance;Short stride;Excessively slow;Decreased toe off;Decreased heel strike   Gait Assistance   (CGA/CS)   Additional items Assist x 1;Verbal cues   Assistive Device Rolling walker   Distance 220ft x 2, 10ft x 2   Stair Management Assistance 4  Minimal assist   Additional items Assist x 1;Verbal cues;Increased time required   Stair Management Technique Two rails;Step to pattern;Foreward;Nonreciprocal   Number of Stairs 4   Balance   Static Sitting Good   Dynamic Sitting Fair +   Static Standing Fair   Dynamic Standing Fair -   Ambulatory Fair -   Endurance Deficit   Endurance Deficit No   Activity Tolerance   Activity Tolerance Patient tolerated treatment well   Nurse Made Aware yes   Assessment   Prognosis Good   Problem List Decreased strength;Decreased range of motion;Decreased endurance;Decreased mobility;Impaired judgement   Assessment Pt. progressign well with voerall mobility. Pt. able to ambulate longer distance this session and was on RA and SPO2 stats noted 95% t/o session. Pt. given cues for LE sequencing for stair negotiation ascending leading with RLE and descended with LLE. Pt. had seated rests between ambulation and stiar negotiation. Pt. seated on chair post sessiojn with all needs within reach and on .5 L O2 as she was at the  beginning of the session. No LOB noted t/o session however noted tremors in UEs and unsteadiness in LEs occ. Will continue to follow epr PT POC. Recommended pt. to ambulate with staff on the unit.   Barriers to Discharge None   Goals   Patient Goals None reproted   STG Expiration Date 02/15/25   PT Treatment Day 4   Plan   Treatment/Interventions Functional transfer training;Elevations;Spoke to nursing;Gait training;Equipment eval/education;Patient/family training   Progress Progressing toward goals   PT Frequency 3-5x/wk   Discharge Recommendation   Rehab Resource Intensity Level, PT II (Moderate Resource Intensity)   AM-PAC Basic Mobility Inpatient   Turning in Flat Bed Without Bedrails 3   Lying on Back to Sitting on Edge of Flat Bed Without Bedrails 3   Moving Bed to Chair 4   Standing Up From Chair Using Arms 4   Walk in Room 4   Climb 3-5 Stairs With Railing 3   Basic Mobility Inpatient Raw Score 21   Basic Mobility Standardized Score 45.55   Turning Head Towards Sound 4   Follow Simple Instructions 4   Low Function Basic Mobility Raw Score  29   Low Function Basic Mobility Standardized Score  45.67   [facility] Highest Level Of Mobility   -HLM Goal 6: Walk 10 steps or more   -HLM Achieved 8: Walk 250 feet ot more   End of Consult   Patient Position at End of Consult Bed/Chair alarm activated;All needs within reach;Bedside chair           Theodora Hennessy PTA    An AM-PAC basic mobility standardized score less than 42.9 suggest the patient may benefit from discharge to post-acute rehab services.

## 2025-02-06 NOTE — CASE MANAGEMENT
Case Management Discharge Planning Note    Patient name Esperanza Prajapati  Location [facility] /[facility] -* MRN [id number]  : 1970 Date 2025       Current Admission Date: 2025  Current Admission Diagnosis:Acute respiratory failure with hypoxia (HCC)   Patient Active Problem List    Diagnosis Date Noted Date Diagnosed    Pneumonia of both lungs due to infectious organism 2025     Hypernatremia 2025     Hyperglycemia 2025     Viral sepsis (HCC) 2025     Toxic metabolic encephalopathy 2025     Fall at home, initial encounter 2025     Influenza A 2025     Acute respiratory failure with hypoxia (HCC) 2025     Decreased urination 10/23/2024     Dermatitis of face 2024     Impaired ability to manage medication regime 10/09/2023     Bilateral lower extremity edema 2023     h/o VALERIE (acute kidney injury) (HCC) 2023     Gout 2022     Schizoaffective disorder (HCC) 2022     Transient ischemic attack 2022     Vitamin B12 deficiency 2022     Dyslipidemia 2022     PCOS (polycystic ovarian syndrome) 2020     Obesity (BMI 30-39.9) 2017     Vitamin D deficiency 2016     Cognitive developmental delay 2014     Obstructive sleep apnea 2013     Focal epilepsy (HCC) 2013     History of thyroid cancer 2013     Proteinuria 2011     Postoperative hypothyroidism 06/10/2011     Family history of diabetes mellitus 06/10/2011     Family history of malignant neoplasm of breast 06/10/2011     Intellectual disability 06/10/2011       LOS (days): 17  Geometric Mean LOS (GMLOS) (days): 12.8  Days to GMLOS:-4.7     OBJECTIVE:  Risk of Unplanned Readmission Score: 21.44         Current admission status: Inpatient   Preferred Pharmacy:   [facility] - [place] - [address]  [address] [place] [address]  Phone: [phone] Fax: [phone]    Primary Care  Provider: Foster Biswas MD    Primary Insurance: [facility]  Secondary Insurance:     DISCHARGE DETAILS:                                                    Treatment Team Recommendation: Acute Rehab  Discharge Destination Plan:: Acute Rehab                                         Additional Comments: CM was able to speak with the sister ramona am. She is aware that she waws accepted by [facility] and she agrees to go to Acute rehab as long as patient agrees. She was also made aware that [facility] have not accepted and it would require a furhter search if needed.  She stated acute rehab or she would just take her home.  Auth initiated for SOC 2/7/25.

## 2025-02-06 NOTE — OCCUPATIONAL THERAPY NOTE
Occupational Therapy Progress Note     Patient Name: Esperanza Prajapati  Today's Date: 2/6/2025  Problem List  Principal Problem:    Acute respiratory failure with hypoxia (HCC)  Active Problems:    Cognitive developmental delay    Postoperative hypothyroidism    Focal epilepsy (HCC)    Schizoaffective disorder (HCC)    Viral sepsis (HCC)    Toxic metabolic encephalopathy    Fall at home, initial encounter    Influenza A    Hypernatremia    Hyperglycemia    Pneumonia of both lungs due to infectious organism            02/06/25 0928   Note Type   Note Type Treatment   Pain Assessment   Pain Assessment Tool 0-10   Pain Score No Pain   Pain Rating: FLACC (Rest) - Face 0   Pain Rating: FLACC (Rest) - Legs 0   Pain Rating: FLACC (Rest) - Activity 0   Pain Rating: FLACC (Rest) - Cry 0   Pain Rating: FLACC (Rest) - Consolability 0   Score: FLACC (Rest) 0   Restrictions/Precautions   Weight Bearing Precautions Per Order No   Other Precautions Cognitive;Chair Alarm;Bed Alarm;Fall Risk;O2  (SPO2=89% on RA, on 1 liter of O2)   ADL   Where Assessed Edge of bed   Eating Assistance 5  Supervision/Setup   Grooming Assistance 5  Supervision/Setup   UB Bathing Assistance 4  Minimal Assistance   LB Bathing Assistance 3  Moderate Assistance   UB Dressing Assistance 4  Minimal Assistance   LB Dressing Assistance 3  Moderate Assistance   Functional Standing Tolerance   Time 2-3mins   Bed Mobility   Rolling R 5  Supervision   Additional items Increased time required;Verbal cues;LE management   Rolling L 5  Supervision   Additional items Increased time required;Verbal cues   Supine to Sit 4  Minimal assistance   Additional items Assist x 1;Increased time required;Verbal cues;LE management   Transfers   Sit to Stand 4  Minimal assistance   Additional items Assist x 1;Increased time required;Verbal cues   Stand to Sit 5  Supervision   Additional items Increased time required;Verbal cues   Functional Mobility   Functional Mobility 4   "Minimal assistance   Additional Comments x1   Additional items Rolling walker   Subjective   Subjective \"I need a brush for my hair.\"   Cognition   Overall Cognitive Status Impaired   Arousal/Participation Alert   Attention Attends with cues to redirect   Orientation Level Oriented to person;Oriented to place;Oriented to time   Memory Decreased short term memory;Decreased recall of precautions;Decreased recall of recent events   Following Commands Follows one step commands with increased time or repetition   Comments hx cognitive deficits   Activity Tolerance   Activity Tolerance Other (Comment);Patient limited by fatigue  (cognition)   Medical Staff Made Aware jorge, GINI, MD   Assessment   Assessment Pt seen for 54min tx session with focus on functional balance, functional mobility, ADL status, and cognition. Pt able to tolerate OOB mobility; sitting balance=f+/f, standing balance=f/f-. Pt required verbal cues and occasional physical assistance to maintain transfer safety; functional mobility improving. Pt demonstrating need for assistance with her UE and LE ADLs--verbal cues to completeness/problem-solving tasks; pt states independence with her ADLs premorbidly. Cognitive deficits noted--i.e.problem-solving, memory, higher-level direction following. Pt continues to demonstrate appropriateness for inpt rehab to improve her overall level of independence. Will continue. The patient's raw score on the -PAC Daily Activity Inpatient Short Form is 16. A raw score of less than 19 suggests the patient may benefit from discharge to post-acute rehabilitation services. Please refer to the recommendation of the Occupational Therapist for safe discharge planning.   Plan   Treatment Interventions ADL retraining;Functional transfer training;UE strengthening/ROM;Endurance training;Cognitive reorientation;Patient/family training;Equipment evaluation/education;Compensatory technique education;Continued evaluation   Goal Expiration " Date 02/15/25   OT Treatment Day 2   OT Frequency 3-5x/wk   Discharge Recommendation   Rehab Resource Intensity Level, OT I (Maximum Resource Intensity)   AM-PAC Daily Activity Inpatient   Lower Body Dressing 2   Bathing 2   Toileting 2   Upper Body Dressing 3   Grooming 3   Eating 4   Daily Activity Raw Score 16   Daily Activity Standardized Score (Calc for Raw Score >=11) 35.96   Turning Head Towards Sound 4   Follow Simple Instructions 3   Low Function Daily Activity Raw Score 23   Low Function Daily Activity Standardized Score  35.8   AM-PAC Applied Cognition Inpatient   Following a Speech/Presentation 3   Understanding Ordinary Conversation 3   Taking Medications 2   Remembering Where Things Are Placed or Put Away 2   Remembering List of 4-5 Errands 2   Taking Care of Complicated Tasks 2   Applied Cognition Raw Score 14   Applied Cognition Standardized Score 32.02   Mert Dumont

## 2025-02-06 NOTE — PLAN OF CARE
Problem: Potential for Falls  Goal: Patient will remain free of falls  Description: INTERVENTIONS:  - Educate patient/family on patient safety including physical limitations  - Instruct patient to call for assistance with activity   - Consult OT/PT to assist with strengthening/mobility   - Keep Call bell within reach  - Keep bed low and locked with side rails adjusted as appropriate  - Keep care items and personal belongings within reach  - Initiate and maintain comfort rounds  - Make Fall Risk Sign visible to staff  - Offer Toileting every 2 Hours, in advance of need  - Initiate/Maintain bed alarm  - Obtain necessary fall risk management equipment:   - Apply yellow socks and bracelet for high fall risk patients  - Consider moving patient to room near nurses station  Outcome: Progressing     Problem: Prexisting or High Potential for Compromised Skin Integrity  Goal: Skin integrity is maintained or improved  Description: INTERVENTIONS:  - Identify patients at risk for skin breakdown  - Assess and monitor skin integrity  - Assess and monitor nutrition and hydration status  - Monitor labs   - Assess for incontinence   - Turn and reposition patient  - Assist with mobility/ambulation  - Relieve pressure over bony prominences  - Avoid friction and shearing  - Provide appropriate hygiene as needed including keeping skin clean and dry  - Evaluate need for skin moisturizer/barrier cream  - Collaborate with interdisciplinary team   - Patient/family teaching  - Consider wound care consult   Outcome: Progressing     Problem: PAIN - ADULT  Goal: Verbalizes/displays adequate comfort level or baseline comfort level  Description: Interventions:  - Encourage patient to monitor pain and request assistance  - Assess pain using appropriate pain scale  - Administer analgesics based on type and severity of pain and evaluate response  - Implement non-pharmacological measures as appropriate and evaluate response  - Consider cultural and  social influences on pain and pain management  - Notify physician/advanced practitioner if interventions unsuccessful or patient reports new pain  Outcome: Progressing     Problem: INFECTION - ADULT  Goal: Absence or prevention of progression during hospitalization  Description: INTERVENTIONS:  - Assess and monitor for signs and symptoms of infection  - Monitor lab/diagnostic results  - Monitor all insertion sites, i.e. indwelling lines, tubes, and drains  - Monitor endotracheal if appropriate and nasal secretions for changes in amount and color  - [place] appropriate cooling/warming therapies per order  - Administer medications as ordered  - Instruct and encourage patient and family to use good hand hygiene technique  - Identify and instruct in appropriate isolation precautions for identified infection/condition  Outcome: Progressing     Problem: SAFETY ADULT  Goal: Patient will remain free of falls  Description: INTERVENTIONS:  - Educate patient/family on patient safety including physical limitations  - Instruct patient to call for assistance with activity   - Consult OT/PT to assist with strengthening/mobility   - Keep Call bell within reach  - Keep bed low and locked with side rails adjusted as appropriate  - Keep care items and personal belongings within reach  - Initiate and maintain comfort rounds  - Make Fall Risk Sign visible to staff  - Offer Toileting every 2 Hours, in advance of need  - Initiate/Maintain bed alarm  - Obtain necessary fall risk management equipment:   - Apply yellow socks and bracelet for high fall risk patients  - Consider moving patient to room near nurses station  Outcome: Progressing  Goal: Maintain or return to baseline ADL function  Description: INTERVENTIONS:  -  Assess patient's ability to carry out ADLs; assess patient's baseline for ADL function and identify physical deficits which impact ability to perform ADLs (bathing, care of mouth/teeth, toileting, grooming, dressing,  etc.)  - Assess/evaluate cause of self-care deficits   - Assess range of motion  - Assess patient's mobility; develop plan if impaired  - Assess patient's need for assistive devices and provide as appropriate  - Encourage maximum independence but intervene and supervise when necessary  - Involve family in performance of ADLs  - Assess for home care needs following discharge   - Consider OT consult to assist with ADL evaluation and planning for discharge  - Provide patient education as appropriate  Outcome: Progressing  Goal: Maintains/Returns to pre admission functional level  Description: INTERVENTIONS:  - Perform AM-PAC 6 Click Basic Mobility/ Daily Activity assessment daily.  - Set and communicate daily mobility goal to care team and patient/family/caregiver.   - Collaborate with rehabilitation services on mobility goals if consulted  - Perform Range of Motion 4 times a day.  - Reposition patient every 2 hours.    - Out of bed for toileting  - Record patient progress and toleration of activity level   Outcome: Progressing     Problem: DISCHARGE PLANNING  Goal: Discharge to home or other facility with appropriate resources  Description: INTERVENTIONS:  - Identify barriers to discharge w/patient and caregiver  - Arrange for needed discharge resources and transportation as appropriate  - Identify discharge learning needs (meds, wound care, etc.)  - Arrange for interpretive services to assist at discharge as needed  - Refer to Case Management Department for coordinating discharge planning if the patient needs post-hospital services based on physician/advanced practitioner order or complex needs related to functional status, cognitive ability, or social support system  Outcome: Progressing     Problem: NEUROSENSORY - ADULT  Goal: Achieves stable or improved neurological status  Description: INTERVENTIONS  - Monitor and report changes in neurological status  - Monitor vital signs such as temperature, blood pressure,  glucose, and any other labs ordered   - Initiate measures to prevent increased intracranial pressure  - Monitor for seizure activity and implement precautions if appropriate      Outcome: Progressing  Goal: Remains free of injury related to seizures activity  Description: INTERVENTIONS  - Maintain airway, patient safety  and administer oxygen as ordered  - Monitor patient for seizure activity, document and report duration and description of seizure to physician/advanced practitioner  - If seizure occurs,  ensure patient safety during seizure  - Reorient patient post seizure  - Seizure pads on all 4 side rails  - Instruct patient/family to notify RN of any seizure activity including if an aura is experienced  - Instruct patient/family to call for assistance with activity based on nursing assessment  - Administer anti-seizure medications if ordered    Outcome: Progressing  Goal: Achieves maximal functionality and self care  Description: INTERVENTIONS  - Monitor swallowing and airway patency with patient fatigue and changes in neurological status  - Encourage and assist patient to increase activity and self care.   - Encourage visually impaired, hearing impaired and aphasic patients to use assistive/communication devices  Outcome: Progressing     Problem: RESPIRATORY - ADULT  Goal: Achieves optimal ventilation and oxygenation  Description: INTERVENTIONS:  - Assess for changes in respiratory status  - Assess for changes in mentation and behavior  - Position to facilitate oxygenation and minimize respiratory effort  - Oxygen administered by appropriate delivery if ordered  - Initiate smoking cessation education as indicated  - Encourage broncho-pulmonary hygiene including cough, deep breathe, Incentive Spirometry  - Assess the need for suctioning and aspirate as needed  - Assess and instruct to report SOB or any respiratory difficulty  - Respiratory Therapy support as indicated  Outcome: Progressing     Problem:  GENITOURINARY - ADULT  Goal: Maintains or returns to baseline urinary function  Description: INTERVENTIONS:  - Assess urinary function  - Encourage oral fluids to ensure adequate hydration if ordered  - Administer IV fluids as ordered to ensure adequate hydration  - Administer ordered medications as needed  - Offer frequent toileting  - Follow urinary retention protocol if ordered  Outcome: Progressing  Goal: Absence of urinary retention  Description: INTERVENTIONS:  - Assess patient’s ability to void and empty bladder  - Monitor I/O  - Bladder scan as needed  - Discuss with physician/AP medications to alleviate retention as needed  - Discuss catheterization for long term situations as appropriate  Outcome: Progressing     Problem: METABOLIC, FLUID AND ELECTROLYTES - ADULT  Goal: Electrolytes maintained within normal limits  Description: INTERVENTIONS:  - Monitor labs and assess patient for signs and symptoms of electrolyte imbalances  - Administer electrolyte replacement as ordered  - Monitor response to electrolyte replacements, including repeat lab results as appropriate  - Instruct patient on fluid and nutrition as appropriate  Outcome: Progressing  Goal: Fluid balance maintained  Description: INTERVENTIONS:  - Monitor labs   - Monitor I/O and WT  - Instruct patient on fluid and nutrition as appropriate  - Assess for signs & symptoms of volume excess or deficit  Outcome: Progressing     Problem: HEMATOLOGIC - ADULT  Goal: Maintains hematologic stability  Description: INTERVENTIONS  - Assess for signs and symptoms of bleeding or hemorrhage  - Monitor labs  - Administer supportive blood products/factors as ordered and appropriate  Outcome: Progressing     Problem: MUSCULOSKELETAL - ADULT  Goal: Maintain or return mobility to safest level of function  Description: INTERVENTIONS:  - Assess patient's ability to carry out ADLs; assess patient's baseline for ADL function and identify physical deficits which impact  ability to perform ADLs (bathing, care of mouth/teeth, toileting, grooming, dressing, etc.)  - Assess/evaluate cause of self-care deficits   - Assess range of motion  - Assess patient's mobility  - Assess patient's need for assistive devices and provide as appropriate  - Encourage maximum independence but intervene and supervise when necessary  - Involve family in performance of ADLs  - Assess for home care needs following discharge   - Consider OT consult to assist with ADL evaluation and planning for discharge  - Provide patient education as appropriate  Outcome: Progressing  Goal: Maintain proper alignment of affected body part  Description: INTERVENTIONS:  - Support, maintain and protect limb and body alignment  - Provide patient/ family with appropriate education  Outcome: Progressing     Problem: Nutrition/Hydration-ADULT  Goal: Nutrient/Hydration intake appropriate for improving, restoring or maintaining nutritional needs  Description: Monitor and assess patient's nutrition/hydration status for malnutrition. Collaborate with interdisciplinary team and initiate plan and interventions as ordered.  Monitor patient's weight and dietary intake as ordered or per policy. Utilize nutrition screening tool and intervene as necessary. Determine patient's food preferences and provide high-protein, high-caloric foods as appropriate.     INTERVENTIONS:  - Monitor oral intake, urinary output, labs, and treatment plans  - Assess nutrition and hydration status and recommend course of action  - Evaluate amount of meals eaten  - Assist patient with eating if necessary   - Allow adequate time for meals  - Recommend/ encourage appropriate diets, oral nutritional supplements, and vitamin/mineral supplements  - Order, calculate, and assess calorie counts as needed  - Recommend, monitor, and adjust tube feedings and TPN/PPN based on assessed needs  - Assess need for intravenous fluids  - Provide specific nutrition/hydration  education as appropriate  - Include patient/family/caregiver in decisions related to nutrition  Outcome: Progressing     Problem: COPING  Goal: Pt/Family able to verbalize concerns and demonstrate effective coping strategies  Description: INTERVENTIONS:  - Assist patient/family to identify coping skills, available support systems and cultural and spiritual values  - Provide emotional support, including active listening and acknowledgement of concerns of patient and caregivers  - Reduce environmental stimuli, as able  - Provide patient education  - Assess for spiritual pain/suffering and initiate spiritual care, including notification of Pastoral Care or dottie based community as needed  - Assess effectiveness of coping strategies  Outcome: Progressing  Goal: Will report anxiety at manageable levels  Description: INTERVENTIONS:  - Administer medication as ordered  - Teach and encourage coping skills  - Provide emotional support  - Assess patient/family for anxiety and ability to cope  Outcome: Progressing

## 2025-02-06 NOTE — PLAN OF CARE
Problem: OCCUPATIONAL THERAPY ADULT  Goal: Performs self-care activities at highest level of function for planned discharge setting.  See evaluation for individualized goals.  Description: Treatment Interventions: ADL retraining, Functional transfer training, UE strengthening/ROM, Endurance training, Cognitive reorientation, Patient/family training, Equipment evaluation/education, Compensatory technique education, Energy conservation, Activityengagement          See flowsheet documentation for full assessment, interventions and recommendations.   Outcome: Progressing  Note: Limitation: Decreased ADL status, Decreased Safe judgement during ADL, Decreased UE strength, Decreased cognition, Decreased endurance, Decreased self-care trans, Decreased high-level ADLs, Decreased UE ROM, Decreased fine motor control  Prognosis: Good, Fair  Assessment: Pt seen for 54min tx session with focus on functional balance, functional mobility, ADL status, and cognition. Pt able to tolerate OOB mobility; sitting balance=f+/f, standing balance=f/f-. Pt required verbal cues and occasional physical assistance to maintain transfer safety; functional mobility improving. Pt demonstrating need for assistance with her UE and LE ADLs--verbal cues for completeness/problem-solving tasks; pt states independence with her ADLs premorbidly. Cognitive deficits noted--i.e.problem-solving, memory, higher-level direction following. Pt continues to demonstrate appropriateness for inpt rehab to improve her overall level of independence. Will continue. The patient's raw score on the AM-PAC Daily Activity Inpatient Short Form is 16. A raw score of less than 19 suggests the patient may benefit from discharge to post-acute rehabilitation services. Please refer to the recommendation of the Occupational Therapist for safe discharge planning.     Rehab Resource Intensity Level, OT: I (Maximum Resource Intensity)

## 2025-02-06 NOTE — RESTORATIVE TECHNICIAN NOTE
Restorative Technician Note      Patient Name: Esperanza Prajapati     Restorative Tech Visit Date: 02/06/25  Note Type: Mobility  Patient Position Upon Consult: Bedside chair  Activity Performed: Ambulated  Assistive Device: Roller walker  Patient Position at End of Consult: Bedside chair; All needs within reach; Bed/Chair alarm activated            ns

## 2025-02-06 NOTE — PROGRESS NOTES
Progress Note - Hospitalist   Name: Esperanza Prajapati 54 y.o. female I MRN: [id number]  Unit/Bed#: E4 -01 I Date of Admission: 1/19/2025   Date of Service: 2/6/2025 I Hospital Day: 17    Assessment & Plan  Acute respiratory failure with hypoxia (HCC)  Due to influenza, possible multifocal aspiration pneumonia.  S/p bronchoscopy 1/24. Mixed carmen  Required intubation 1/21/2025 - 1/30/2025, and was extubated to HFNC. Currently on 2L NC.  Appreciate pulmonary recommendations. Cleared by pulmonary for discharge.   Awaiting placement to rehab, requires optioning.  Viral sepsis (HCC)  Due to Influenza  Toxic metabolic encephalopathy  Due to viral sepsis, hypoxia, Influenza.   Continue supportive care  Influenza A  Completed tamiflu  Fall at home, initial encounter  Presented after a fall which resulted in a head strike. CTH without acute abnormalities  Postoperative hypothyroidism  History of papillary thyroid carcinoma in 2013 s/p total thyroidectomy and I-131 ablation  Continue Levothyroxine 175 mcg   Focal epilepsy (HCC)  Lacosamide 150 mg daily in the morning, 200mg at bedtime  On divalproex for mood stabilization 750mg BID  Cognitive developmental delay  History of developmental delay due to childhood head injury at 10months of age  Continue supportive care  Schizoaffective disorder (HCC)  Home regimen: Cogentin 2mg BID for EPS, Depakote 750mg BID for mood lability, Invega 6mg qd for psychosis, Trazodone 50mg QHS for insomnia. Takes prn, Olanzapine 10 mg at bedtime   1/11/25: Refills denied by outpatient psychiatrist.  Needs follow-up appt; last visit in October  Hypernatremia  Resolved    Recent Labs     02/05/25  0511 02/06/25  0552   SODIUM 139 141             Hyperglycemia    Pneumonia of both lungs due to infectious organism  Completed 7 days of cephalosporins, 3 days of azithromycin, and Tamiflu    VTE Pharmacologic Prophylaxis: VTE Score: 4 Moderate Risk (Score 3-4) - Pharmacological DVT  Prophylaxis Ordered: enoxaparin (Lovenox).    Mobility:   Basic Mobility Inpatient Raw Score: 19  JH-HLM Goal: 6: Walk 10 steps or more  JH-HLM Achieved: 7: Walk 25 feet or more    Discussions with Specialists or Other Care Team Provider: case management    Education and Discussions with Family / Patient: patient, called Buddy and Sofia evans no answer.    Current Length of Stay: 17 day(s)  Current Patient Status: Inpatient   Certification Statement: The patient will continue to require additional inpatient hospital stay due to awaiting safe discharge plan  Discharge Plan: [place]    Code Status: Level 1 - Full Code    Subjective   Patient seen and examined. No new issues overnight    Objective   Vitals:   Temp (24hrs), Av.9 °F (36.6 °C), Min:97.6 °F (36.4 °C), Max:98 °F (36.7 °C)    Temp:  [97.6 °F (36.4 °C)-98 °F (36.7 °C)] 98 °F (36.7 °C)  HR:  [] 93  Resp:  [22] 22  BP: (110-115)/(65-75) 115/75  SpO2:  [91 %-95 %] 91 %  Body mass index is 34.18 kg/m².     Input and Output Summary (last 24 hours):     Intake/Output Summary (Last 24 hours) at 2025 0952  Last data filed at 2025 0606  Gross per 24 hour   Intake 1180 ml   Output --   Net 1180 ml       Physical Exam  Vitals reviewed.   HENT:      Head: Normocephalic.      Nose: Nose normal.      Mouth/Throat:      Mouth: Mucous membranes are moist.   Eyes:      General: No scleral icterus.  Cardiovascular:      Rate and Rhythm: Normal rate.   Pulmonary:      Effort: Pulmonary effort is normal. No respiratory distress.   Abdominal:      General: There is no distension.      Palpations: Abdomen is soft.      Tenderness: There is no abdominal tenderness.   Skin:     General: Skin is warm.   Neurological:      Mental Status: She is alert.   Psychiatric:         Mood and Affect: Mood normal.         Behavior: Behavior normal.       Lines/Drains:              Lab Results: I have reviewed the following results:   Results from last 7 days   Lab Units  02/06/25  0552 02/05/25  0511 02/01/25  0444   WBC Thousand/uL 3.27* 3.34* 10.14   HEMOGLOBIN g/dL 10.0* 9.5* 9.1*   PLATELETS Thousands/uL 347 352 263   MCV fL 100* 99* 102*     Results from last 7 days   Lab Units 02/06/25  0552 02/05/25  0511 02/01/25  0444   SODIUM mmol/L 141 139 141   POTASSIUM mmol/L 3.7 3.5 4.0   CHLORIDE mmol/L 104 104 107   CO2 mmol/L 29 29 29   ANION GAP mmol/L 8 6 5   BUN mg/dL 14 16 38*   CREATININE mg/dL 0.74 0.67 0.81   CALCIUM mg/dL 9.9 9.7 8.8   ALBUMIN g/dL  --   --  3.2*   TOTAL BILIRUBIN mg/dL  --   --  0.87   ALK PHOS U/L  --   --  33*   ALT U/L  --   --  19   AST U/L  --   --  20   EGFR ml/min/1.73sq m 93 101 83   GLUCOSE RANDOM mg/dL 107 90 91     Results from last 7 days   Lab Units 02/06/25  0552 02/05/25  0511 02/01/25  0444 01/31/25  0457   MAGNESIUM mg/dL 2.1 2.1 2.6 2.1   PHOSPHORUS mg/dL  --   --   --  3.1                      Results from last 7 days   Lab Units 02/04/25  0749 01/31/25  1559 01/31/25  1147 01/31/25  0014 01/30/25  1805 01/30/25  1305   POC GLUCOSE mg/dl 110 118 118 93 88 105               Recent Cultures (last 7 days):         Imaging:  Reviewed radiology reports from this admission: no new imaging    Last 24 Hours Medication List:     Current Facility-Administered Medications:     Acetaminophen (TYLENOL) oral suspension 975 mg, Q6H PRN    aluminum-magnesium hydroxide-simethicone (MAALOX) oral suspension 30 mL, Q6H PRN    benztropine (COGENTIN) tablet 2 mg, BID    cyanocobalamin (VITAMIN B-12) tablet 1,000 mcg, Daily    divalproex sodium (DEPAKOTE) DR tablet 750 mg, Q12H MOSHE    enoxaparin (LOVENOX) subcutaneous injection 40 mg, Daily    ipratropium-albuterol (DUO-NEB) 0.5-2.5 mg/3 mL inhalation solution 3 mL, TID    lacosamide (VIMPAT) tablet 150 mg, Daily    lacosamide (VIMPAT) tablet 200 mg, HS    levalbuterol (XOPENEX) inhalation solution 1.25 mg, Q6H PRN    levothyroxine tablet 175 mcg, Early Morning    melatonin tablet 6 mg, HS    OLANZapine  (ZyPREXA) tablet 10 mg, HS    ondansetron (ZOFRAN) injection 4 mg, Q6H PRN    paliperidone (INVEGA) 24 hr tablet 6 mg, QAM    polyethylene glycol (MIRALAX) packet 17 g, Daily PRN    senna-docusate sodium (SENOKOT S) 8.6-50 mg per tablet 2 tablet, BID      **Please Note: This note may have been constructed using a voice recognition system.**

## 2025-02-07 PROCEDURE — 94668 MNPJ CHEST WALL SBSQ: CPT

## 2025-02-07 PROCEDURE — 94669 MECHANICAL CHEST WALL OSCILL: CPT

## 2025-02-07 PROCEDURE — 99232 SBSQ HOSP IP/OBS MODERATE 35: CPT | Performed by: STUDENT IN AN ORGANIZED HEALTH CARE EDUCATION/TRAINING PROGRAM

## 2025-02-07 PROCEDURE — 94664 DEMO&/EVAL PT USE INHALER: CPT

## 2025-02-07 RX ADMIN — BENZTROPINE MESYLATE 2 MG: 1 TABLET ORAL at 09:22

## 2025-02-07 RX ADMIN — LACOSAMIDE 200 MG: 150 TABLET, FILM COATED ORAL at 21:48

## 2025-02-07 RX ADMIN — LACOSAMIDE 150 MG: 150 TABLET, FILM COATED ORAL at 09:22

## 2025-02-07 RX ADMIN — SENNOSIDES AND DOCUSATE SODIUM 2 TABLET: 8.6; 5 TABLET ORAL at 17:47

## 2025-02-07 RX ADMIN — CYANOCOBALAMIN TAB 500 MCG 1000 MCG: 500 TAB at 09:22

## 2025-02-07 RX ADMIN — DIVALPROEX SODIUM 750 MG: 250 TABLET, DELAYED RELEASE ORAL at 21:48

## 2025-02-07 RX ADMIN — MELATONIN 6 MG: 3 TAB ORAL at 21:48

## 2025-02-07 RX ADMIN — OLANZAPINE 10 MG: 10 TABLET, FILM COATED ORAL at 21:48

## 2025-02-07 RX ADMIN — ENOXAPARIN SODIUM 40 MG: 40 INJECTION SUBCUTANEOUS at 09:22

## 2025-02-07 RX ADMIN — BENZTROPINE MESYLATE 2 MG: 1 TABLET ORAL at 17:47

## 2025-02-07 RX ADMIN — LEVOTHYROXINE SODIUM 175 MCG: 0.05 TABLET ORAL at 06:28

## 2025-02-07 RX ADMIN — SENNOSIDES AND DOCUSATE SODIUM 2 TABLET: 8.6; 5 TABLET ORAL at 09:26

## 2025-02-07 RX ADMIN — PALIPERIDONE 6 MG: 3 TABLET, EXTENDED RELEASE ORAL at 09:22

## 2025-02-07 RX ADMIN — DIVALPROEX SODIUM 750 MG: 250 TABLET, DELAYED RELEASE ORAL at 09:22

## 2025-02-07 NOTE — CASE MANAGEMENT
[facility] has received APPROVED authorization.  Insurance:   [employer]   Auth obtained via Insurance Rep:  Chuck  Authorization received for: Acute Rehab  Facility: \[facility]\"" ARC   Authorization #: [id number]  Start of Care: 2/7  Next Review Date: 2/16  Continued Stay Care Coordinator: n/a  Submit next review to: [phone]      Care Manager notified: Major Son    Please reach out to CM for updates on any clinical information.

## 2025-02-07 NOTE — CASE MANAGEMENT
Case Management Discharge Planning Note    Patient name Esperanza Prajapati  Location [facility] /E4 -* MRN [id number]  : 1970 Date 2025       Current Admission Date: 2025  Current Admission Diagnosis:Acute respiratory failure with hypoxia (HCC)   Patient Active Problem List    Diagnosis Date Noted Date Diagnosed    Pneumonia of both lungs due to infectious organism 2025     Hypernatremia 2025     Hyperglycemia 2025     Viral sepsis (HCC) 2025     Toxic metabolic encephalopathy 2025     Fall at home, initial encounter 2025     Influenza A 2025     Acute respiratory failure with hypoxia (HCC) 2025     Decreased urination 10/23/2024     Dermatitis of face 2024     Impaired ability to manage medication regime 10/09/2023     Bilateral lower extremity edema 2023     h/o VALERIE (acute kidney injury) (HCC) 2023     Gout 2022     Schizoaffective disorder (HCC) 2022     Transient ischemic attack 2022     Vitamin B12 deficiency 2022     Dyslipidemia 2022     PCOS (polycystic ovarian syndrome) 2020     Obesity (BMI 30-39.9) 2017     Vitamin D deficiency 2016     Cognitive developmental delay 2014     Obstructive sleep apnea 2013     Focal epilepsy (HCC) 2013     History of thyroid cancer 2013     Proteinuria 2011     Postoperative hypothyroidism 06/10/2011     Family history of diabetes mellitus 06/10/2011     Family history of malignant neoplasm of breast 06/10/2011     Intellectual disability 06/10/2011       LOS (days): 18  Geometric Mean LOS (GMLOS) (days): 12.8  Days to GMLOS:-5.6     OBJECTIVE:  Risk of Unplanned Readmission Score: 21.47         Current admission status: Inpatient   Preferred Pharmacy:   [facility] - [place] - [address]  [address] [place] [address]  Phone: [phone] Fax: [phone]    Primary Care  Provider: Foster Biswas MD    Primary Insurance: [facility] REP  Secondary Insurance:     DISCHARGE DETAILS:                                                                                                               Facility Insurance Auth Number: [id number]

## 2025-02-07 NOTE — PLAN OF CARE
Problem: Potential for Falls  Goal: Patient will remain free of falls  Description: INTERVENTIONS:  - Educate patient/family on patient safety including physical limitations  - Instruct patient to call for assistance with activity   - Consult OT/PT to assist with strengthening/mobility   - Keep Call bell within reach  - Keep bed low and locked with side rails adjusted as appropriate  - Keep care items and personal belongings within reach  - Initiate and maintain comfort rounds  - Make Fall Risk Sign visible to staff  - Offer Toileting every 2 Hours, in advance of need  - Initiate/Maintain bed alarm  - Obtain necessary fall risk management equipment:   - Apply yellow socks and bracelet for high fall risk patients  - Consider moving patient to room near nurses station  Outcome: Progressing     Problem: Prexisting or High Potential for Compromised Skin Integrity  Goal: Skin integrity is maintained or improved  Description: INTERVENTIONS:  - Identify patients at risk for skin breakdown  - Assess and monitor skin integrity  - Assess and monitor nutrition and hydration status  - Monitor labs   - Assess for incontinence   - Turn and reposition patient  - Assist with mobility/ambulation  - Relieve pressure over bony prominences  - Avoid friction and shearing  - Provide appropriate hygiene as needed including keeping skin clean and dry  - Evaluate need for skin moisturizer/barrier cream  - Collaborate with interdisciplinary team   - Patient/family teaching  - Consider wound care consult   Outcome: Progressing     Problem: PAIN - ADULT  Goal: Verbalizes/displays adequate comfort level or baseline comfort level  Description: Interventions:  - Encourage patient to monitor pain and request assistance  - Assess pain using appropriate pain scale  - Administer analgesics based on type and severity of pain and evaluate response  - Implement non-pharmacological measures as appropriate and evaluate response  - Consider cultural and  social influences on pain and pain management  - Notify physician/advanced practitioner if interventions unsuccessful or patient reports new pain  Outcome: Progressing     Problem: INFECTION - ADULT  Goal: Absence or prevention of progression during hospitalization  Description: INTERVENTIONS:  - Assess and monitor for signs and symptoms of infection  - Monitor lab/diagnostic results  - Monitor all insertion sites, i.e. indwelling lines, tubes, and drains  - Monitor endotracheal if appropriate and nasal secretions for changes in amount and color  - Dillon appropriate cooling/warming therapies per order  - Administer medications as ordered  - Instruct and encourage patient and family to use good hand hygiene technique  - Identify and instruct in appropriate isolation precautions for identified infection/condition  Outcome: Progressing     Problem: SAFETY ADULT  Goal: Patient will remain free of falls  Description: INTERVENTIONS:  - Educate patient/family on patient safety including physical limitations  - Instruct patient to call for assistance with activity   - Consult OT/PT to assist with strengthening/mobility   - Keep Call bell within reach  - Keep bed low and locked with side rails adjusted as appropriate  - Keep care items and personal belongings within reach  - Initiate and maintain comfort rounds  - Make Fall Risk Sign visible to staff  - Offer Toileting every 2 Hours, in advance of need  - Initiate/Maintain bed alarm  - Obtain necessary fall risk management equipment:   - Apply yellow socks and bracelet for high fall risk patients  - Consider moving patient to room near nurses station  Outcome: Progressing  Goal: Maintain or return to baseline ADL function  Description: INTERVENTIONS:  -  Assess patient's ability to carry out ADLs; assess patient's baseline for ADL function and identify physical deficits which impact ability to perform ADLs (bathing, care of mouth/teeth, toileting, grooming, dressing,  etc.)  - Assess/evaluate cause of self-care deficits   - Assess range of motion  - Assess patient's mobility; develop plan if impaired  - Assess patient's need for assistive devices and provide as appropriate  - Encourage maximum independence but intervene and supervise when necessary  - Involve family in performance of ADLs  - Assess for home care needs following discharge   - Consider OT consult to assist with ADL evaluation and planning for discharge  - Provide patient education as appropriate  Outcome: Progressing  Goal: Maintains/Returns to pre admission functional level  Description: INTERVENTIONS:  - Perform AM-PAC 6 Click Basic Mobility/ Daily Activity assessment daily.  - Set and communicate daily mobility goal to care team and patient/family/caregiver.   - Collaborate with rehabilitation services on mobility goals if consulted  - Perform Range of Motion 4 times a day.  - Reposition patient every 2 hours.    - Out of bed for toileting  - Record patient progress and toleration of activity level   Outcome: Progressing     Problem: DISCHARGE PLANNING  Goal: Discharge to home or other facility with appropriate resources  Description: INTERVENTIONS:  - Identify barriers to discharge w/patient and caregiver  - Arrange for needed discharge resources and transportation as appropriate  - Identify discharge learning needs (meds, wound care, etc.)  - Arrange for interpretive services to assist at discharge as needed  - Refer to Case Management Department for coordinating discharge planning if the patient needs post-hospital services based on physician/advanced practitioner order or complex needs related to functional status, cognitive ability, or social support system  Outcome: Progressing     Problem: NEUROSENSORY - ADULT  Goal: Achieves stable or improved neurological status  Description: INTERVENTIONS  - Monitor and report changes in neurological status  - Monitor vital signs such as temperature, blood pressure,  glucose, and any other labs ordered   - Initiate measures to prevent increased intracranial pressure  - Monitor for seizure activity and implement precautions if appropriate      Outcome: Progressing  Goal: Remains free of injury related to seizures activity  Description: INTERVENTIONS  - Maintain airway, patient safety  and administer oxygen as ordered  - Monitor patient for seizure activity, document and report duration and description of seizure to physician/advanced practitioner  - If seizure occurs,  ensure patient safety during seizure  - Reorient patient post seizure  - Seizure pads on all 4 side rails  - Instruct patient/family to notify RN of any seizure activity including if an aura is experienced  - Instruct patient/family to call for assistance with activity based on nursing assessment  - Administer anti-seizure medications if ordered    Outcome: Progressing  Goal: Achieves maximal functionality and self care  Description: INTERVENTIONS  - Monitor swallowing and airway patency with patient fatigue and changes in neurological status  - Encourage and assist patient to increase activity and self care.   - Encourage visually impaired, hearing impaired and aphasic patients to use assistive/communication devices  Outcome: Progressing     Problem: RESPIRATORY - ADULT  Goal: Achieves optimal ventilation and oxygenation  Description: INTERVENTIONS:  - Assess for changes in respiratory status  - Assess for changes in mentation and behavior  - Position to facilitate oxygenation and minimize respiratory effort  - Oxygen administered by appropriate delivery if ordered  - Initiate smoking cessation education as indicated  - Encourage broncho-pulmonary hygiene including cough, deep breathe, Incentive Spirometry  - Assess the need for suctioning and aspirate as needed  - Assess and instruct to report SOB or any respiratory difficulty  - Respiratory Therapy support as indicated  Outcome: Progressing     Problem:  GENITOURINARY - ADULT  Goal: Maintains or returns to baseline urinary function  Description: INTERVENTIONS:  - Assess urinary function  - Encourage oral fluids to ensure adequate hydration if ordered  - Administer IV fluids as ordered to ensure adequate hydration  - Administer ordered medications as needed  - Offer frequent toileting  - Follow urinary retention protocol if ordered  Outcome: Progressing  Goal: Absence of urinary retention  Description: INTERVENTIONS:  - Assess patient’s ability to void and empty bladder  - Monitor I/O  - Bladder scan as needed  - Discuss with physician/AP medications to alleviate retention as needed  - Discuss catheterization for long term situations as appropriate  Outcome: Progressing     Problem: METABOLIC, FLUID AND ELECTROLYTES - ADULT  Goal: Electrolytes maintained within normal limits  Description: INTERVENTIONS:  - Monitor labs and assess patient for signs and symptoms of electrolyte imbalances  - Administer electrolyte replacement as ordered  - Monitor response to electrolyte replacements, including repeat lab results as appropriate  - Instruct patient on fluid and nutrition as appropriate  Outcome: Progressing  Goal: Fluid balance maintained  Description: INTERVENTIONS:  - Monitor labs   - Monitor I/O and WT  - Instruct patient on fluid and nutrition as appropriate  - Assess for signs & symptoms of volume excess or deficit  Outcome: Progressing     Problem: HEMATOLOGIC - ADULT  Goal: Maintains hematologic stability  Description: INTERVENTIONS  - Assess for signs and symptoms of bleeding or hemorrhage  - Monitor labs  - Administer supportive blood products/factors as ordered and appropriate  Outcome: Progressing     Problem: MUSCULOSKELETAL - ADULT  Goal: Maintain or return mobility to safest level of function  Description: INTERVENTIONS:  - Assess patient's ability to carry out ADLs; assess patient's baseline for ADL function and identify physical deficits which impact  ability to perform ADLs (bathing, care of mouth/teeth, toileting, grooming, dressing, etc.)  - Assess/evaluate cause of self-care deficits   - Assess range of motion  - Assess patient's mobility  - Assess patient's need for assistive devices and provide as appropriate  - Encourage maximum independence but intervene and supervise when necessary  - Involve family in performance of ADLs  - Assess for home care needs following discharge   - Consider OT consult to assist with ADL evaluation and planning for discharge  - Provide patient education as appropriate  Outcome: Progressing  Goal: Maintain proper alignment of affected body part  Description: INTERVENTIONS:  - Support, maintain and protect limb and body alignment  - Provide patient/ family with appropriate education  Outcome: Progressing     Problem: Nutrition/Hydration-ADULT  Goal: Nutrient/Hydration intake appropriate for improving, restoring or maintaining nutritional needs  Description: Monitor and assess patient's nutrition/hydration status for malnutrition. Collaborate with interdisciplinary team and initiate plan and interventions as ordered.  Monitor patient's weight and dietary intake as ordered or per policy. Utilize nutrition screening tool and intervene as necessary. Determine patient's food preferences and provide high-protein, high-caloric foods as appropriate.     INTERVENTIONS:  - Monitor oral intake, urinary output, labs, and treatment plans  - Assess nutrition and hydration status and recommend course of action  - Evaluate amount of meals eaten  - Assist patient with eating if necessary   - Allow adequate time for meals  - Recommend/ encourage appropriate diets, oral nutritional supplements, and vitamin/mineral supplements  - Order, calculate, and assess calorie counts as needed  - Recommend, monitor, and adjust tube feedings and TPN/PPN based on assessed needs  - Assess need for intravenous fluids  - Provide specific nutrition/hydration  education as appropriate  - Include patient/family/caregiver in decisions related to nutrition  Outcome: Progressing     Problem: COPING  Goal: Pt/Family able to verbalize concerns and demonstrate effective coping strategies  Description: INTERVENTIONS:  - Assist patient/family to identify coping skills, available support systems and cultural and spiritual values  - Provide emotional support, including active listening and acknowledgement of concerns of patient and caregivers  - Reduce environmental stimuli, as able  - Provide patient education  - Assess for spiritual pain/suffering and initiate spiritual care, including notification of Pastoral Care or dottie based community as needed  - Assess effectiveness of coping strategies  Outcome: Progressing  Goal: Will report anxiety at manageable levels  Description: INTERVENTIONS:  - Administer medication as ordered  - Teach and encourage coping skills  - Provide emotional support  - Assess patient/family for anxiety and ability to cope  Outcome: Progressing

## 2025-02-07 NOTE — PROGRESS NOTES
Progress Note - Hospitalist   Name: Esperanza Prajapati 54 y.o. female I MRN: [id number]  Unit/Bed#: E4 -01 I Date of Admission: 1/19/2025   Date of Service: 2/7/2025 I Hospital Day: 18    Assessment & Plan  Acute respiratory failure with hypoxia (HCC)  Due to influenza, possible multifocal aspiration pneumonia.  S/p bronchoscopy 1/24. Mixed carmen  Required intubation 1/21/2025 - 1/30/2025, and was extubated to HFNC. Currently on 2L NC.  Appreciate pulmonary recommendations. Cleared by pulmonary for discharge.   Awaiting placement to rehab, hopefully within the next 24 hours. Awaiting bed space availability.  Viral sepsis (HCC)  Due to Influenza  Toxic metabolic encephalopathy  Due to viral sepsis, hypoxia, Influenza.   Continue supportive care  Influenza A  Completed tamiflu  Fall at home, initial encounter  Presented after a fall which resulted in a head strike. CTH without acute abnormalities  Postoperative hypothyroidism  History of papillary thyroid carcinoma in 2013 s/p total thyroidectomy and I-131 ablation  Continue Levothyroxine 175 mcg   Focal epilepsy (HCC)  Lacosamide 150 mg daily in the morning, 200mg at bedtime  On divalproex for mood stabilization 750mg BID  Cognitive developmental delay  History of developmental delay due to childhood head injury at 10months of age  Continue supportive care  Schizoaffective disorder (HCC)  Home regimen: Cogentin 2mg BID for EPS, Depakote 750mg BID for mood lability, Invega 6mg qd for psychosis, Trazodone 50mg QHS for insomnia. Takes prn, Olanzapine 10 mg at bedtime   1/11/25: Refills denied by outpatient psychiatrist.  Needs follow-up appt; last visit in October  Hypernatremia  Resolved    Recent Labs     02/05/25  0511 02/06/25  0552   SODIUM 139 141             Hyperglycemia    Pneumonia of both lungs due to infectious organism  Completed 7 days of cephalosporins, 3 days of azithromycin, and Tamiflu    VTE Pharmacologic Prophylaxis: VTE Score: 4 Moderate  Risk (Score 3-4) - Pharmacological DVT Prophylaxis Ordered: enoxaparin (Lovenox).    Mobility:   Basic Mobility Inpatient Raw Score: 21  JH-HLM Goal: 6: Walk 10 steps or more  JH-HLM Achieved: 6: Walk 10 steps or more    Discussions with Specialists or Other Care Team Provider: case management    Education and Discussions with Family / Patient: patient    Current Length of Stay: 18 day(s)  Current Patient Status: Inpatient   Certification Statement: The patient will continue to require additional inpatient hospital stay due to awaiting safe discharge plan  Discharge Plan: today vs [facility]    Code Status: Level 1 - Full Code    Subjective   Patient seen and examined. No new issues overnight.     Objective   Vitals:   Temp (24hrs), Av.7 °F (36.5 °C), Min:97 °F (36.1 °C), Max:98.5 °F (36.9 °C)    Temp:  [97 °F (36.1 °C)-98.5 °F (36.9 °C)] 98.5 °F (36.9 °C)  HR:  [81-99] 81  Resp:  [20] 20  BP: (104-114)/(60-84) 108/60  SpO2:  [93 %-96 %] 96 %  Body mass index is 34.18 kg/m².     Input and Output Summary (last 24 hours):     Intake/Output Summary (Last 24 hours) at 2025 1250  Last data filed at 2025 1027  Gross per 24 hour   Intake 630 ml   Output --   Net 630 ml       Physical Exam  Vitals reviewed.   Constitutional:       General: She is not in acute distress.  HENT:      Head: Normocephalic.      Nose: Nose normal.      Mouth/Throat:      Mouth: Mucous membranes are moist.   Eyes:      General: No scleral icterus.  Cardiovascular:      Rate and Rhythm: Normal rate and regular rhythm.   Pulmonary:      Effort: Pulmonary effort is normal. No respiratory distress.   Abdominal:      General: There is no distension.      Palpations: Abdomen is soft.      Tenderness: There is no abdominal tenderness.   Skin:     General: Skin is warm.   Neurological:      Mental Status: She is alert.   Psychiatric:         Mood and Affect: Mood normal.         Behavior: Behavior normal.       Lines/Drains:              Lab Results:  I have reviewed the following results:   Results from last 7 days   Lab Units 02/06/25  0552 02/05/25  0511 02/01/25  0444   WBC Thousand/uL 3.27* 3.34* 10.14   HEMOGLOBIN g/dL 10.0* 9.5* 9.1*   PLATELETS Thousands/uL 347 352 263   MCV fL 100* 99* 102*     Results from last 7 days   Lab Units 02/06/25  0552 02/05/25  0511 02/01/25  0444   SODIUM mmol/L 141 139 141   POTASSIUM mmol/L 3.7 3.5 4.0   CHLORIDE mmol/L 104 104 107   CO2 mmol/L 29 29 29   ANION GAP mmol/L 8 6 5   BUN mg/dL 14 16 38*   CREATININE mg/dL 0.74 0.67 0.81   CALCIUM mg/dL 9.9 9.7 8.8   ALBUMIN g/dL  --   --  3.2*   TOTAL BILIRUBIN mg/dL  --   --  0.87   ALK PHOS U/L  --   --  33*   ALT U/L  --   --  19   AST U/L  --   --  20   EGFR ml/min/1.73sq m 93 101 83   GLUCOSE RANDOM mg/dL 107 90 91     Results from last 7 days   Lab Units 02/06/25  0552 02/05/25  0511 02/01/25  0444   MAGNESIUM mg/dL 2.1 2.1 2.6                      Results from last 7 days   Lab Units 02/04/25  0749 01/31/25  1559   POC GLUCOSE mg/dl 110 118               Recent Cultures (last 7 days):         Imaging:  Reviewed radiology reports from this admission: no new imaging    Last 24 Hours Medication List:     Current Facility-Administered Medications:     Acetaminophen (TYLENOL) oral suspension 975 mg, Q6H PRN    aluminum-magnesium hydroxide-simethicone (MAALOX) oral suspension 30 mL, Q6H PRN    benztropine (COGENTIN) tablet 2 mg, BID    cyanocobalamin (VITAMIN B-12) tablet 1,000 mcg, Daily    divalproex sodium (DEPAKOTE) DR tablet 750 mg, Q12H MOSHE    enoxaparin (LOVENOX) subcutaneous injection 40 mg, Daily    ipratropium-albuterol (DUO-NEB) 0.5-2.5 mg/3 mL inhalation solution 3 mL, Q8H PRN    lacosamide (VIMPAT) tablet 150 mg, Daily    lacosamide (VIMPAT) tablet 200 mg, HS    levothyroxine tablet 175 mcg, Early Morning    melatonin tablet 6 mg, HS    OLANZapine (ZyPREXA) tablet 10 mg, HS    ondansetron (ZOFRAN) injection 4 mg, Q6H PRN    paliperidone (INVEGA) 24 hr tablet 6 mg,  QAM    polyethylene glycol (MIRALAX) packet 17 g, Daily PRN    senna-docusate sodium (SENOKOT S) 8.6-50 mg per tablet 2 tablet, BID      **Please Note: This note may have been constructed using a voice recognition system.**

## 2025-02-07 NOTE — CASE MANAGEMENT
Case Management Discharge Planning Note    Patient name Esperanza Prajapati  Location [facility] /E4 -* MRN [id number]  : 1970 Date 2025       Current Admission Date: 2025  Current Admission Diagnosis:Acute respiratory failure with hypoxia (HCC)   Patient Active Problem List    Diagnosis Date Noted Date Diagnosed    Pneumonia of both lungs due to infectious organism 2025     Hypernatremia 2025     Hyperglycemia 2025     Viral sepsis (HCC) 2025     Toxic metabolic encephalopathy 2025     Fall at home, initial encounter 2025     Influenza A 2025     Acute respiratory failure with hypoxia (HCC) 2025     Decreased urination 10/23/2024     Dermatitis of face 2024     Impaired ability to manage medication regime 10/09/2023     Bilateral lower extremity edema 2023     h/o VALERIE (acute kidney injury) (HCC) 2023     Gout 2022     Schizoaffective disorder (HCC) 2022     Transient ischemic attack 2022     Vitamin B12 deficiency 2022     Dyslipidemia 2022     PCOS (polycystic ovarian syndrome) 2020     Obesity (BMI 30-39.9) 2017     Vitamin D deficiency 2016     Cognitive developmental delay 2014     Obstructive sleep apnea 2013     Focal epilepsy (HCC) 2013     History of thyroid cancer 2013     Proteinuria 2011     Postoperative hypothyroidism 06/10/2011     Family history of diabetes mellitus 06/10/2011     Family history of malignant neoplasm of breast 06/10/2011     Intellectual disability 06/10/2011       LOS (days): 18  Geometric Mean LOS (GMLOS) (days): 12.8  Days to GMLOS:-5.8     OBJECTIVE:  Risk of Unplanned Readmission Score: 21.57         Current admission status: Inpatient   Preferred Pharmacy:   [facility] - [place] - [address]  [address] [place] [address]  Phone: [phone] Fax: [phone]    Primary Care  Provider: Foster Biswas MD    Primary Insurance: [facility]  Secondary Insurance:     DISCHARGE DETAILS:                                                                                                 Additional Comments: CM had recieved auth for Acute rehab at [facility].  I spoke to the pt and she is still willing to go to rehab.  [facility] had informed [facility] around 1200 that bed no available until Monday.  CM attempted to call the sister to provide her all this information and only able to leave a VM.  Around 1500 CM informed that a [facility] semiprivate room would be available on 2/8.  2 attempts were made to contact the sister and VMs were left around 1500 and 1650.  Potential dc to [facility] for 2/8.  Need to discuss with sister prior to sending.  Provider made aware.

## 2025-02-07 NOTE — CASE MANAGEMENT
Called [facility] ([phone]) to ensure auth request was received. Spoke to Delfina who stated auth request was received and pending at this time. Delfina stated nurse is requesting updated clinicals. Pending ref: [id number]. Sent updated PT note via fax F: [phone] . CM notified: Major Son

## 2025-02-07 NOTE — CASE MANAGEMENT
Case Management Discharge Planning Note    Patient name Esperanza Prajapati  [facility] /[facility] -* MRN [id number]  : 1970 Date 2025       Current Admission Date: 2025  Current Admission Diagnosis:Acute respiratory failure with hypoxia (HCC)   Patient Active Problem List    Diagnosis Date Noted Date Diagnosed    Pneumonia of both lungs due to infectious organism 2025     Hypernatremia 2025     Hyperglycemia 2025     Viral sepsis (HCC) 2025     Toxic metabolic encephalopathy 2025     Fall at home, initial encounter 2025     Influenza A 2025     Acute respiratory failure with hypoxia (HCC) 2025     Decreased urination 10/23/2024     Dermatitis of face 2024     Impaired ability to manage medication regime 10/09/2023     Bilateral lower extremity edema 2023     h/o VALERIE (acute kidney injury) (HCC) 2023     Gout 2022     Schizoaffective disorder (HCC) 2022     Transient ischemic attack 2022     Vitamin B12 deficiency 2022     Dyslipidemia 2022     PCOS (polycystic ovarian syndrome) 2020     Obesity (BMI 30-39.9) 2017     Vitamin D deficiency 2016     Cognitive developmental delay 2014     Obstructive sleep apnea 2013     Focal epilepsy (HCC) 2013     History of thyroid cancer 2013     Proteinuria 2011     Postoperative hypothyroidism 06/10/2011     Family history of diabetes mellitus 06/10/2011     Family history of malignant neoplasm of breast 06/10/2011     Intellectual disability 06/10/2011       LOS (days): 18  Geometric Mean LOS (GMLOS) (days): 12.8  Days to GMLOS:-5.8     OBJECTIVE:  Risk of Unplanned Readmission Score: 21.57         Current admission status: Inpatient   Preferred Pharmacy:   [facility] - [place] - [address]  [address] [place] [address]  Phone: [phone] Fax: [phone]    Primary Care  Provider: Foster Biswas MD    Primary Insurance: [facility]  Secondary Insurance:     DISCHARGE DETAILS:                                                          Transport at Discharge : Wheelchair van     Number/Name of Dispatcher: Round trip     ETA of Transport (Date): 02/08/25  ETA of Transport (Time): 1030              IMM Given (Date):: 02/07/25 (Imm was reviewed with the sister, Layne and she was informed of their rights to appeal.  she verbalized understanding, signed the forma and copy was given.)        Additional Comments: CM was able to speak with the sister in the room wiht the pt.  they both agree to ARC.  [facility] informed and able to go 2/8/25 to room 269-2.  Transport request placed for 1030.

## 2025-02-08 ENCOUNTER — HOSPITAL ENCOUNTER (INPATIENT)
Facility: HOSPITAL | Age: 55
LOS: 9 days | Discharge: HOME | DRG: 193 | End: 2025-02-17
Attending: PHYSICAL MEDICINE & REHABILITATION | Admitting: PHYSICAL MEDICINE & REHABILITATION
Payer: COMMERCIAL

## 2025-02-08 VITALS
HEART RATE: 87 BPM | HEIGHT: 69 IN | DIASTOLIC BLOOD PRESSURE: 78 MMHG | WEIGHT: 231.48 LBS | OXYGEN SATURATION: 96 % | SYSTOLIC BLOOD PRESSURE: 125 MMHG | BODY MASS INDEX: 34.29 KG/M2 | TEMPERATURE: 97.1 F | RESPIRATION RATE: 20 BRPM

## 2025-02-08 DIAGNOSIS — E03.9 ACQUIRED HYPOTHYROIDISM: ICD-10-CM

## 2025-02-08 DIAGNOSIS — F25.9 SCHIZOAFFECTIVE DISORDER (HCC): Chronic | ICD-10-CM

## 2025-02-08 DIAGNOSIS — D70.9 NEUTROPENIA (HCC): ICD-10-CM

## 2025-02-08 DIAGNOSIS — E53.8 VITAMIN B12 DEFICIENCY: ICD-10-CM

## 2025-02-08 DIAGNOSIS — G40.109 FOCAL EPILEPSY (HCC): ICD-10-CM

## 2025-02-08 DIAGNOSIS — F25.9 SCHIZOAFFECTIVE DISORDER, UNSPECIFIED TYPE (HCC): ICD-10-CM

## 2025-02-08 DIAGNOSIS — F81.9 COGNITIVE DEVELOPMENTAL DELAY: ICD-10-CM

## 2025-02-08 DIAGNOSIS — J96.01 ACUTE RESPIRATORY FAILURE WITH HYPOXIA (HCC): Primary | ICD-10-CM

## 2025-02-08 DIAGNOSIS — G40.019 PARTIAL IDIOPATHIC EPILEPSY WITH SEIZURES OF LOCALIZED ONSET, INTRACTABLE, WITHOUT STATUS EPILEPTICUS (HCC): ICD-10-CM

## 2025-02-08 PROBLEM — Z91.89 AT RISK FOR DEEP VENOUS THROMBOSIS: Status: ACTIVE | Noted: 2025-02-08

## 2025-02-08 PROBLEM — G47.00 INSOMNIA: Status: ACTIVE | Noted: 2025-02-08

## 2025-02-08 PROBLEM — R13.12 OROPHARYNGEAL DYSPHAGIA: Status: ACTIVE | Noted: 2025-02-08

## 2025-02-08 PROCEDURE — 94664 DEMO&/EVAL PT USE INHALER: CPT

## 2025-02-08 PROCEDURE — 94669 MECHANICAL CHEST WALL OSCILL: CPT

## 2025-02-08 PROCEDURE — 99223 1ST HOSP IP/OBS HIGH 75: CPT | Performed by: PHYSICAL MEDICINE & REHABILITATION

## 2025-02-08 PROCEDURE — NC001 PR NO CHARGE: Performed by: PHYSICAL MEDICINE & REHABILITATION

## 2025-02-08 PROCEDURE — 99239 HOSP IP/OBS DSCHRG MGMT >30: CPT | Performed by: STUDENT IN AN ORGANIZED HEALTH CARE EDUCATION/TRAINING PROGRAM

## 2025-02-08 PROCEDURE — 94760 N-INVAS EAR/PLS OXIMETRY 1: CPT

## 2025-02-08 PROCEDURE — G0316 PR PROLONG INPT EVAL ADD15 M: HCPCS | Performed by: PHYSICAL MEDICINE & REHABILITATION

## 2025-02-08 PROCEDURE — 94668 MNPJ CHEST WALL SBSQ: CPT

## 2025-02-08 RX ORDER — BENZTROPINE MESYLATE 2 MG/1
2 TABLET ORAL 2 TIMES DAILY
Status: DISCONTINUED | OUTPATIENT
Start: 2025-02-08 | End: 2025-02-17 | Stop reason: HOSPADM

## 2025-02-08 RX ORDER — AMOXICILLIN 250 MG
2 CAPSULE ORAL 2 TIMES DAILY
Status: DISCONTINUED | OUTPATIENT
Start: 2025-02-08 | End: 2025-02-17 | Stop reason: HOSPADM

## 2025-02-08 RX ORDER — DIVALPROEX SODIUM 250 MG/1
750 TABLET, DELAYED RELEASE ORAL EVERY 12 HOURS SCHEDULED
Status: DISCONTINUED | OUTPATIENT
Start: 2025-02-08 | End: 2025-02-10

## 2025-02-08 RX ORDER — ENOXAPARIN SODIUM 100 MG/ML
40 INJECTION SUBCUTANEOUS DAILY
Status: DISCONTINUED | OUTPATIENT
Start: 2025-02-09 | End: 2025-02-17 | Stop reason: HOSPADM

## 2025-02-08 RX ORDER — LACOSAMIDE 100 MG/1
200 TABLET ORAL
Status: DISCONTINUED | OUTPATIENT
Start: 2025-02-09 | End: 2025-02-17 | Stop reason: HOSPADM

## 2025-02-08 RX ORDER — ONDANSETRON 2 MG/ML
4 INJECTION INTRAMUSCULAR; INTRAVENOUS EVERY 6 HOURS PRN
Status: DISCONTINUED | OUTPATIENT
Start: 2025-02-08 | End: 2025-02-17 | Stop reason: HOSPADM

## 2025-02-08 RX ORDER — LACOSAMIDE 100 MG/1
150 TABLET ORAL DAILY
Status: DISCONTINUED | OUTPATIENT
Start: 2025-02-09 | End: 2025-02-08 | Stop reason: SDUPTHER

## 2025-02-08 RX ORDER — TRAZODONE HYDROCHLORIDE 50 MG/1
50 TABLET, FILM COATED ORAL
Status: ON HOLD
Start: 2025-02-08 | End: 2025-02-14

## 2025-02-08 RX ORDER — MAGNESIUM HYDROXIDE/ALUMINUM HYDROXICE/SIMETHICONE 120; 1200; 1200 MG/30ML; MG/30ML; MG/30ML
30 SUSPENSION ORAL EVERY 6 HOURS PRN
Status: DISCONTINUED | OUTPATIENT
Start: 2025-02-08 | End: 2025-02-17 | Stop reason: HOSPADM

## 2025-02-08 RX ORDER — ACETAMINOPHEN 160 MG/5ML
975 SUSPENSION ORAL EVERY 6 HOURS PRN
Status: DISCONTINUED | OUTPATIENT
Start: 2025-02-08 | End: 2025-02-17 | Stop reason: HOSPADM

## 2025-02-08 RX ORDER — IPRATROPIUM BROMIDE AND ALBUTEROL SULFATE 2.5; .5 MG/3ML; MG/3ML
3 SOLUTION RESPIRATORY (INHALATION) EVERY 8 HOURS PRN
Status: DISCONTINUED | OUTPATIENT
Start: 2025-02-08 | End: 2025-02-17 | Stop reason: HOSPADM

## 2025-02-08 RX ORDER — PALIPERIDONE 6 MG/1
6 TABLET, EXTENDED RELEASE ORAL EVERY MORNING
Status: DISCONTINUED | OUTPATIENT
Start: 2025-02-09 | End: 2025-02-10

## 2025-02-08 RX ORDER — POLYETHYLENE GLYCOL 3350 17 G/17G
17 POWDER, FOR SOLUTION ORAL DAILY PRN
Status: DISCONTINUED | OUTPATIENT
Start: 2025-02-08 | End: 2025-02-17 | Stop reason: HOSPADM

## 2025-02-08 RX ORDER — OLANZAPINE 10 MG/1
10 TABLET ORAL
Status: DISCONTINUED | OUTPATIENT
Start: 2025-02-08 | End: 2025-02-17 | Stop reason: HOSPADM

## 2025-02-08 RX ADMIN — LACOSAMIDE 150 MG: 150 TABLET, FILM COATED ORAL at 08:04

## 2025-02-08 RX ADMIN — OLANZAPINE 10 MG: 10 TABLET, FILM COATED ORAL at 22:53

## 2025-02-08 RX ADMIN — LEVOTHYROXINE SODIUM 175 MCG: 0.05 TABLET ORAL at 06:39

## 2025-02-08 RX ADMIN — PALIPERIDONE 6 MG: 3 TABLET, EXTENDED RELEASE ORAL at 08:05

## 2025-02-08 RX ADMIN — Medication 6 MG: at 22:52

## 2025-02-08 RX ADMIN — BENZTROPINE MESYLATE 2 MG: 1 TABLET ORAL at 08:03

## 2025-02-08 RX ADMIN — SENNOSIDES AND DOCUSATE SODIUM 2 TABLET: 50; 8.6 TABLET ORAL at 17:35

## 2025-02-08 RX ADMIN — CYANOCOBALAMIN TAB 500 MCG 1000 MCG: 500 TAB at 08:03

## 2025-02-08 RX ADMIN — SENNOSIDES AND DOCUSATE SODIUM 2 TABLET: 8.6; 5 TABLET ORAL at 08:03

## 2025-02-08 RX ADMIN — DIVALPROEX SODIUM 750 MG: 250 TABLET, DELAYED RELEASE ORAL at 22:52

## 2025-02-08 RX ADMIN — BENZTROPINE MESYLATE 2 MG: 2 TABLET ORAL at 17:35

## 2025-02-08 RX ADMIN — ENOXAPARIN SODIUM 40 MG: 40 INJECTION SUBCUTANEOUS at 08:05

## 2025-02-08 RX ADMIN — DIVALPROEX SODIUM 750 MG: 250 TABLET, DELAYED RELEASE ORAL at 08:04

## 2025-02-08 NOTE — ASSESSMENT & PLAN NOTE
Acute event resolved.    Due to Flu A + on 1/19   SIRS on admission with tachycardia, tachypnea, leukopenia, fever   Completed Oseltamivir  
"Resolved    No results for input(s): \"SODIUM\" in the last 72 hours.          "
- may have superimposed pneumonia vs bacterial pneumonia  Continue adebayo. Nebs   Patient completed 5 days course of Tamiflu, 3 days course of azithromycin  Adjust sedation as above.    TF Vital high protein not available in hospital, ask nutrition for a substitute TF.   Give Miralax today.  Goal I=O, diurese PRN.    Discussed on rounds.  Critically ill, at risk of death and decompensation. Guarded prognosis.    Critical Care Time Statement: Upon my evaluation, this patient had a high probability of imminent or life-threatening deterioration due to respiratory failure, pneumonia, encephalopathy, which required my direct attention, intervention, and personal management.  I spent a total of 35 minutes directly providing critical care services, including interpretation of complex medical databases, evaluating for the presence of life-threatening injuries or illnesses, management of organ system failure(s) , complex medical decision making (to support/prevent further life-threatening deterioration)., interpretation of hemodynamic data, titration of continuous IV medications (drips), ventilator management, and managing enteral or parenteral nutritional support. This time is exclusive of procedures, teaching, treating other patients, family meetings, and any prior time recorded by providers other than myself.     
-Due to Flu A + on 1/19   -SIRS on admission with tachycardia, tachypnea, leukopenia, fever   -Completed Oseltamivir  
-Due to Flu A + on 1/19   -SIRS on admission with tachycardia, tachypnea, leukopenia, fever   -Completed Oseltamivir  
-Due to influenza A, viral sepsis, multifocal aspiration pneumonia  -Intubated 1/21-1/30 for worsening hypoxia and hypercapnia  -S/p bronchoscopy 1/24 with mixed carmen, unremarkable  -She was extubated to HFNC on 1/30. She has been slowly coming down on her HFNC requirements  -She is currently on 4L supplemental oxygen  -Encourage OOB as tolerated, incentive spirometry  -Continue pulmonary toilet with hypertonic saline, vest therapy  -Continue to wean supplemental oxygen, maintain SpO2 > 94%  -Ambulatory O2 evaluation prior to discharge  
-Due to influenza A, viral sepsis, multifocal aspiration pneumonia  -Intubated 1/21-1/30 for worsening hypoxia and hypercapnia  -S/p bronchoscopy 1/24 with mixed carmen, unremarkable  -She was extubated to HFNC on 1/30. She has been slowly coming down on her oxygen requirements  -She is currently on 2L supplemental oxygen  -Encourage OOB as tolerated, incentive spirometry  -Continue pulmonary toilet with hypertonic saline, vest therapy  -Continue to wean supplemental oxygen, maintain SpO2 > 94%  -Ambulatory O2 evaluation prior to discharge  
-Flu A + on 1/19   -Complicated by viral sepsis. SIRS on admission with tachycardia, tachypnea, leukopenia, fever   -Completed Oseltamivir  
-Flu A + on 1/19   -Complicated by viral sepsis. SIRS on admission with tachycardia, tachypnea, leukopenia, fever   -Completed Oseltamivir  
-May have had superimposed viral vs. Bacterial pneumonia   -Completed 5 days of tamiflu and 3 days of azithromycin  
-May have superimposed pneumonia vs. Bacterial pneumonia  -Completed 5 days of tamiflu and 3 days of azithromycin  
Antiviral tx with Tamiflu   See plan above: Viral sepsis  
Cogentin 2mg BID for EPS  Depakote 750mg BID for mood lability  Invega 6mg qd for psychosis  Trazodone 50mg QHS for insomnia. Takes prn  Olanzapine 10 mg at bedtime   1/11/25: Refills denied by outpatient psychiatrist.  Needs follow-up appt; last visit in October  
Completed 7 days of cephalosporins, 3 days of azithromycin, and Tamiflu  
Completed tamiflu  
Due to Flu A + on 1/19   SIRS on admission with tachycardia, tachypnea, leukopenia, fever   Completed Oseltamivir  
Due to Flu A + on 1/19   SIRS on admission with tachycardia, tachypnea, leukopenia, fever   Lactic negative, no hypotension   Abx with cefepime and azithromycin  Bcx pending, U/a negative   
Due to Flu A + on 1/19   SIRS on admission with tachycardia, tachypnea, leukopenia, fever   Lactic negative, no hypotension on admission   Overnight 1/21 with borderline hypotensive, responsive to 1L bolus  Abx with cefepime and azithromycin  Bcx pending, U/a negative, MRSA  
Due to Flu A + on 1/19   SIRS on admission with tachycardia, tachypnea, leukopenia, fever   Lactic negative, no hypotension on admission   Overnight 1/21 with borderline hypotensive, responsive to 1L bolus  Abx with ceftriaxone  Completed 5-day course of Tamiflu  Completed 3 days course of azithromycin   
Due to Flu A + on 1/19   SIRS on admission with tachycardia, tachypnea, leukopenia, fever   Lactic negative, no hypotension on admission   Overnight 1/21 with borderline hypotensive, responsive to 1L bolus  Abx with ceftriaxone  Completed 5-day course of Tamiflu  Completed 3 days course of azithromycin  Bcx negative for 96 hours, U/a negative, MRSA negative  
Due to Flu A + on 1/19   SIRS on admission with tachycardia, tachypnea, leukopenia, fever   Lactic negative, no hypotension on admission   Overnight 1/21 with borderline hypotensive, responsive to 1L bolus  Abx with ceftriaxone  Completed 5-day course of Tamiflu  Completed 3 days course of azithromycin  Bcx negative for 96 hours, U/a negative, MRSA negative  
Due to Flu A + on 1/19   SIRS on admission with tachycardia, tachypnea, leukopenia, fever   Lactic negative, no hypotension on admission   Overnight 1/21 with borderline hypotensive, responsive to 1L bolus  Abx with ceftriaxone and azithromycin  Bcx negative for 48 hours, U/a negative, MRSA  
Due to Flu A + on 1/19   SIRS on admission with tachycardia, tachypnea, leukopenia, fever   Lactic negative, no hypotension on admission   Overnight 1/21 with borderline hypotensive, responsive to 1L bolus  Abx with ceftriaxone and azithromycin  Bcx negative for 72 hours, U/a negative, MRSA negative  
Due to Flu A + on 1/19   SIRS on admission with tachycardia, tachypnea, leukopenia, fever   Lactic negative, no hypotension on admission   Overnight 1/21 with borderline hypotensive, responsive to 1L bolus  Abx with ceftriaxone and azithromycin  Bcx negative for 72 hours, U/a negative, MRSA negative  
Due to Flu A + on 1/19   SIRS on admission with tachycardia, tachypnea, leukopenia, fever   Lactic negative, no hypotension on admission   Overnight 1/21 with borderline hypotensive, responsive to 1L bolus  Monitor off of antiviral, antibiotics.  
Due to Flu A, viral sepsis - consider aspiration  CT C/A/P negative on admission   Procal negative on admission  Initially on NC - progressed to midflow during the day   CXR showed b/l opacities - aspiration cannot be excluded at this time   Continue adebayo. Nebs   Continue cefepime, azithromycin (started 1/20)  Continue tamiflu once able to take PO   Wean MFNC as tolerated   Check procal tmrw   Sputum cx if able    
Due to Flu A, viral sepsis - consider multifocal aspiration PNA  CT C/A/P negative on admission   Procal negative on admission  Initially on NC - progressed to midflow during the day   CXR showed b/l opacities - aspiration cannot be excluded at this time   Continue adebayo. Nebs   Continue cefepime, azithromycin (started 1/20)  Continue tamiflu once able to take PO   Wean MFNC as tolerated   F/u procal today  Sputum cx if able, MRSA pending  
Due to Flu A, viral sepsis - consider multifocal aspiration PNA  Cont vent support, wean FiO2 as able  TG noted to be high, need to d/c Propofol. Cont. Fentanyl drip  Was started on precedex. Adjust rates, to maintain a RAAS of -1   
Due to Flu A, viral sepsis - consider multifocal aspiration PNA  Cont vent support, wean FiO2 as able  TG noted to be high, need to d/c Propofol. Cont. Fentanyl drip and adjust for RASS -1  
Due to Flu A, viral sepsis - consider multifocal aspiration PNA  Possible ARDS, could be superimposed bacterial pneumonia in the background of flu  CT C/A/P negative on admission   CXR showed b/l opacities - aspiration cannot be excluded at this time   Continue adebaoy. Nebs   Patient completed 5 days course of Tamiflu, 3 days course of azithromycin  There is a concern for ARDS, will consider additional workup for the same (ECHO).  Currently on SIMV, 400/18/10/70%.  Saturating at 95%.  Wean as able.  Change sedation from propofol to Precedex once patient is closer to extubation.    
Due to Flu A, viral sepsis - consider multifocal aspiration PNA  Possible ARDS, could be superimposed bacterial pneumonia in the background of flu  CT C/A/P negative on admission   Procal negative on admission, then a significant elevation  CXR showed b/l opacities - aspiration cannot be excluded at this time   Continue adebayo. Nebs   Continue ceftriaxone,  Patient completed 5 days course of Tamiflu  Patient completed 3 days course of azithromycin  Currently on scmv  Sputum cx negative  On 1/24, patient underwent bronchoscopic and it was unremarkable  Follow-up with bronchoscopy culture and sensitivity  MRSA negative  Legionella , Streptococcus pneumonia unremarkable    Currently patient is off vasopressin, phenylephrine, norepinephrine  Currently patient is on propofol, fentanyl drip as sedatives    Lasix 10 mg IV can be consideredand net I/O is -700 to 1 L  
Due to Flu A, viral sepsis - consider multifocal aspiration PNA  Possible ARDS, could be superimposed bacterial pneumonia in the background of flu  CT C/A/P negative on admission   Procal negative on admission, then a significant elevation  CXR showed b/l opacities - aspiration cannot be excluded at this time   Continue adebayo. Nebs   Continue ceftriaxone,  Patient completed 5 days course of Tamiflu  Patient completed 3 days course of azithromycin  Currently on scmv  Sputum cx negative  On 1/24, patient underwent bronchoscopic and it was unremarkable  Follow-up with bronchoscopy culture and sensitivity  MRSA negative  Legionella , Streptococcus pneumonia unremarkable    Currently patient is off vasopressin, phenylephrine, norepinephrine  Currently patient is on propofol, fentanyl drip as sedatives  Still requiring 10 PEEP FiO2 60%  1/25 20 mg Lasix BID, 1/26 early morning given another 20 lasix to further make patient net negative to see if this improves oxygenation     
Due to Flu A, viral sepsis - consider multifocal aspiration PNA  Possible ARDS, could be superimposed bacterial pneumonia in the background of flu  CT C/A/P negative on admission   Procal negative on admission, then a significant elevation  CXR showed b/l opacities - aspiration cannot be excluded at this time   Continue adebayo. Nebs   Continue ceftriaxone,  Patient completed 5 days course of Tamiflu, 3 days course of azithromycin  Currently patient is on propofol, fentanyl drip as sedatives  Still requiring 10 PEEP FiO2 90%  There is a concern for ARDS, will consider additional workup for the same (ECHO). VS worsening pleural effusion    
Due to Flu A, viral sepsis - consider multifocal aspiration PNA  Possible ARDS, could be superimposed bacterial pneumonia in the background of flu  CT C/A/P negative on admission   Procal negative on admission, then a significant elevation  CXR showed b/l opacities - aspiration cannot be excluded at this time   Continue adebayo. Nebs   Continue ceftriaxone, azithromycin (started 1/20)  Continue Tamiflu  Currently on cmv  Sputum cx if able, MRSA pending  Legionella , Streptococcus pneumonia unremarkable    Currently patient is on norepinephrine, vasopressin, phenylephrine drip  Currently patient is on propofol, fentanyl drip as sedatives  
Due to Flu A, viral sepsis - consider multifocal aspiration PNA  Possible ARDS, could be superimposed bacterial pneumonia in the background of flu  CT C/A/P negative on admission   Procal negative on admission, then a significant elevation  CXR showed b/l opacities - aspiration cannot be excluded at this time   Continue adebayo. Nebs   Continue ceftriaxone, azithromycin (started 1/20)  Continue Tamiflu  Currently on scmv  Sputum cx follow-up  MRSA negative  Legionella , Streptococcus pneumonia unremarkable    Currently patient is off vasopressin, phenylephrine, norepinephrine  Currently patient is on propofol, fentanyl drip as sedatives    Yesterday, patient was given Lasix 40 g twice daily IV and patient is significant urine output with significant negative hide  Patient received 40 mg IV Lasix in the morning  of 1/24.    
Due to Flu A, viral sepsis - consider multifocal aspiration PNA  Possible ARDS, could be superimposed bacterial pneumonia in the background of flu  CT C/A/P negative on admission   Procal negative on admission, then a significant elevation  CXR showed b/l opacities - aspiration cannot be excluded at this time   Continue adebayo. Nebs   Continue ceftriaxone, azithromycin (started 1/20)  Continue Tamiflu  Currently on scmv  Sputum cx follow-up  MRSA negative  Legionella , Streptococcus pneumonia unremarkable    Currently patient is on vasopressin  Currently patient is on propofol, fentanyl drip as sedatives  
Due to Flu A, viral sepsis - consider multifocal aspiration PNA  TG noted to be high, need to d/c Propofol. Cont. Fentanyl drip  Was started on precedex. Adjust rates, to maintain a RAAS of -1  Currently on Vapotherm, 40 L 50% maintaining saturation of 95%.  Patient having difficulty coughing up mucus; added hypertonic inhalation solution.  Continue to wean as tolerated.  
Due to Flu A, viral sepsis - consider multifocal aspiration PNA  TG noted to be high, need to d/c Propofol. Cont. Fentanyl drip  Was started on precedex. Adjust rates, to maintain a RAAS of -1  Extubated yesterday, required hi-flow 40L to maintain saturations at 91-92%.   Patient having difficulty coughing up mucus; added hypertonic inhalation solution.  On Bipap; weaned down to 70% overnight.  Continue to wean as tolerated.  
Due to Flu A, viral sepsis - consider multifocal aspiration PNA  TG noted to be high, need to d/c Propofol. Cont. Fentanyl drip  Was started on precedex. Adjust rates, to maintain a RAAS of -1  Extubated yesterday, required hi-flow 40L to maintain saturations at 91-92%.   Patient having pain on coughing and difficulty expelling mucus; added mucinex.  
Due to Influenza  
Due to head injury at age of 10 months   
Due to influenza, possible multifocal aspiration pneumonia.  S/p bronchoscopy 1/24. Mixed carmen  Required intubation 1/21/2025 - 1/30/2025, and was extubated to HFNC.  Appreciate pulmonary recommendations  Continue weaning off as tolerated  
Due to influenza, possible multifocal aspiration pneumonia.  S/p bronchoscopy 1/24. Mixed carmen  Required intubation 1/21/2025 - 1/30/2025, and was extubated to HFNC. Currently on 2L NC.  Appreciate pulmonary recommendations. Cleared by pulmonary for discharge.   Awaiting placement to rehab, hopefully within the next 24 hours. Awaiting bed space availability.  
Due to influenza, possible multifocal aspiration pneumonia.  S/p bronchoscopy 1/24. Mixed carmen  Required intubation 1/21/2025 - 1/30/2025, and was extubated to HFNC. Currently on 2L NC.  Appreciate pulmonary recommendations. Cleared by pulmonary for discharge.   Awaiting placement to rehab, requires optioning.  
Due to influenza, possible multifocal aspiration pneumonia.  S/p bronchoscopy 1/24. Mixed carmen  Required intubation 1/21/2025 - 1/30/2025, and was extubated to HFNC. Currently on 2L NC.  Appreciate pulmonary recommendations. Cleared by pulmonary for discharge.   Awaiting placement to rehab, requires optioning.  
Due to influenza, possible multifocal aspiration pneumonia.  S/p bronchoscopy 1/24. Mixed carmen  Required intubation 1/21/2025 - 1/30/2025, and was extubated to HFNC. Currently on 2L NC.  Appreciate pulmonary recommendations. Cleared by pulmonary for discharge.   Discharge to rehab today  
Due to influenza, possible multifocal aspiration pneumonia.  S/p bronchoscopy 1/24. Mixed carmen  Required intubation 1/21/2025 - 1/30/2025, and was extubated to HFNC. Currently on 2L NC.  Appreciate pulmonary recommendations. Continue weaning off as tolerated  
Due to viral sepsis, hypoxia, Influenza.   Continue supportive care  
Due to viral sepsis, hypoxia, and infection  Hx of developmental delay although at baseline she is A&O x 3  CTH on admission negative   Do not suspect breakthrough seizures  Supportive care at this time  
Due to viral sepsis, hypoxia, and infection  Hx of developmental delay although at baseline she is A&O x 3  CTH on admission negative   Do not suspect breakthrough seizures  Supportive care at this time.  
Due to viral sepsis, hypoxia, and infection  Hx of developmental delay although at baseline she is A&O x 3  CTH on admission negative   Do not suspect breakthrough seizures  Supportive care at this time.  
Due to viral sepsis, hypoxia, and infection  Hx of developmental delay although at baseline she is A&O x 3 but does not always follow commands    
Fall with head strike  Trauma scan: CTH/CAP/Cspine unremarkable  Neurochecks  PT OT consult  
Family reporting change in mental status.  History of developmental delay although at baseline she is alert and oriented and able to hold a conversation and make her needs known  In setting of viral sepsis/fever/flu A/hypoxia  Coma panel normal  CTh unremarkable  Supportive care.  Monitor closely.  Safety precautions  Expect improvement with supportive care   
Follows with [facility] neurology   Focal epilepsy manifests as simple partial, complex partial, and generalized tonic-clonic seizures   Hx of b/l postural and action tremor per neurology notes  Home regimen: vimpat 150 daily, vimpat 200mg qHS, Depakote 750mg BID  Valproic level therapeutic 1/20  Low suspicion for any breakthrough seizures, not post-ictal on exam and is interactive  Vimpat transitioned to IV, depakote via oG-tube  
Follows with [facility] neurology   Focal epilepsy manifests as simple partial, complex partial, and generalized tonic-clonic seizures   Hx of b/l postural and action tremor per neurology notes  Home regimen: vimpat 150 daily, vimpat 200mg qHS, Depakote 750mg BID  Valproic level therapeutic 1/20  Low suspicion for any breakthrough seizures, not post-ictal on exam and is interactive  Vimpat transitioned to IV, depakote via oG-tube  
Follows with [facility]'s neurology   Focal epilepsy manifests as simple partial, complex partial, and generalized tonic-clonic seizures   Hx of b/l postural and action tremor per neurology notes  Home regimen: vimpat 150 daily, vimpat 200mg qHS, Depakote 750mg BID  Valproic level therapeutic 1/20  Low suspicion for any breakthrough seizures, not post-ictal on exam and is interactive  Vimpat transitioned to IV, depakote on hold due to NPO  
Follows with [facility]'s neurology   Focal epilepsy manifests as simple partial, complex partial, and generalized tonic-clonic seizures   Hx of b/l postural and action tremor per neurology notes  Home regimen: vimpat 150 daily, vimpat 200mg qHS, Depakote 750mg BID  
Follows with [facility] neurology   Focal epilepsy manifests as simple partial, complex partial, and generalized tonic-clonic seizures   Hx of b/l postural and action tremor per neurology notes  Home regimen: vimpat 150 daily, vimpat 200mg qHS, Depakote 750mg BID  Valproic level therapeutic 1/20  Low suspicion for any breakthrough seizures, not post-ictal on exam and is interactive  Vimpat transitioned to IV, depakote via oG-tube  1/25 upward gaze and tremors, patient did appear to open eyes to voice though, gave 2mg ativan with resolve in symptoms   Consider EEG if reoccurance  
Follows with [facility] neurology   Focal epilepsy manifests as simple partial, complex partial, and generalized tonic-clonic seizures   Hx of b/l postural and action tremor per neurology notes  Home regimen: vimpat 150 daily, vimpat 200mg qHS, Depakote 750mg BID  Valproic level therapeutic 1/20  Low suspicion for any breakthrough seizures, not post-ictal on exam and is interactive  Vimpat transitioned to IV, depakote via oG-tube  1/25 upward gaze and tremors, patient did appear to open eyes to voice though, gave 2mg ativan with resolve in symptoms   Consider EEG if reoccurance  
Follows with [facility] neurology   Focal epilepsy manifests as simple partial, complex partial, and generalized tonic-clonic seizures   Hx of b/l postural and action tremor per neurology notes  Home regimen: vimpat 150 daily, vimpat 200mg qHS, Depakote 750mg BID  Valproic level therapeutic 1/20  Low suspicion for any breakthrough seizures, not post-ictal on exam and is interactive  Vimpat transitioned to IV, depakote via oG-tube  
Follows with [facility] neurology   Focal epilepsy manifests as simple partial, complex partial, and generalized tonic-clonic seizures   Hx of b/l postural and action tremor per neurology notes  Home regimen: vimpat 150 daily, vimpat 200mg qHS, Depakote 750mg BID  Valproic level therapeutic 1/20  Low suspicion for any breakthrough seizures, not post-ictal on exam and is interactive  Vimpat transitioned to IV, depakote via oG-tube  
Follows with [facility]'s neurology   Focal epilepsy manifests as simple partial, complex partial, and generalized tonic-clonic seizures   Hx of b/l postural and action tremor per neurology notes  Home regimen: vimpat 150 daily, vimpat 200mg qHS, Depakote 750mg BID  Valproic level therapeutic 1/20  Low suspicion for any breakthrough seizures, not post-ictal on exam and is interactive  Vimpat transitioned to IV, depakote on hold due to NPO  
[facility]   Focal epilepsy manifests as simple partial, complex partial, and generalized tonic-clonic seizures   Hx of b/l postural and action tremor per neurology notes  Cont. O/P meds: vimpat 150 daily, vimpat 200mg qHS, Depakote 750mg BID  
[facility]   Focal epilepsy manifests as simple partial, complex partial, and generalized tonic-clonic seizures   Hx of b/l postural and action tremor per neurology notes  Cont. O/P meds: vimpat 150 daily, vimpat 200mg qHS, Depakote 750mg BID  
[facility]   Focal epilepsy manifests as simple partial, complex partial, and generalized tonic-clonic seizures   Hx of b/l postural and action tremor per neurology notes  Cont. O/P meds: vimpat 150 daily, vimpat 200mg qHS, Depakote 750mg BID  
[facility]   Focal epilepsy manifests as simple partial, complex partial, and generalized tonic-clonic seizures   Hx of b/l postural and action tremor per neurology notes  Cont. O/P meds: vimpat 150 daily, vimpat 200mg qHS, Depakote 750mg BID  
[facility]   Focal epilepsy manifests as simple partial, complex partial, and generalized tonic-clonic seizures   Hx of b/l postural and action tremor per neurology notes  Cont. O/P meds: vimpat 150 daily, vimpat 200mg qHS, Depakote 750mg BID  
HYPOXIA  O2 sat %. Stable on 2LNC  In setting of flu. See above plan  Wean off O2 as able  
History of developmental delay due to childhood head injury at 10months of age  Continue supportive care  
History of developmental delay due to injury  Supportive care  
History of papillary thyroid carcinoma in 2013 s/p total thyroidectomy and I-131 ablation  Continue Levothyroxine 175 mcg   
History of papillary thyroid carcinoma in 2013 s/p total thyroidectomy and I-131 ablation  Levothyroxine 175 mcg   
Home regimen: Cogentin 2mg BID for EPS, Depakote 750mg BID for mood lability, Invega 6mg qd for psychosis, Trazodone 50mg QHS for insomnia. Takes prn, Olanzapine 10 mg at bedtime   1/11/25: Refills denied by outpatient psychiatrist.  Needs follow-up appt; last visit in October  
Home regimen: zyprexa 10mg daily, trazodone 50mg PRN, invega 6mg daily   Invega held due to unable to crush for enteral tube  
Home regimen: zyprexa 10mg daily, trazodone 50mg PRN, invega 6mg daily   Zyprexa transitioned to IM due to NPO, trazodone and invega on hold   
Home regimen: zyprexa 10mg daily, trazodone 50mg PRN, invega 6mg daily   Zyprexa transitioned to IM due to NPO, trazodone and paliperidone on hold   
In the setting of agitation, hypoxia, viral sepsis, fever  Sinus tach with occasional PVCs to 150s   CT C/A/P negative on admission   Trial nighttime zyprexa   Consider CTA if persistent    EKG as needed  
In the setting of agitation, hypoxia, viral sepsis, fever  Sinus tach with occasional PVCs to 150s   CT C/A/P negative on admission   Trial nighttime zyprexa   EKG as needed  
Lacosamide 150 mg daily in the morning, 200mg at bedtime  On divalproex for mood stabilization 750mg BID  
Lacosamide 150 mg daily in the morning, 200mg at bedtime  On divalproex for mood stabilization 750mg BID, currently on depacon  
Likely in the setting of poor PO intake   Na on admission 132  Continue to trend BMP   
Na 131.  Mild hyponatremia  Suspect euvolemic hyponatremia in setting of flu/sepsis   Provide IV hydration.  Repeat a.m. BMP  
Non diabetic patient  Last A1c of 5.5  Lab Results   Component Value Date    GLUC 108 01/30/2025    GLUC 90 01/29/2025    GLUC 129 01/28/2025   Has not needed insulin, d/c insulin and sliding scale  
Non diabetic patient  Last A1c of 5.5  Lab Results   Component Value Date    GLUC 129 01/28/2025    GLUC 218 (H) 01/27/2025    GLUC 196 (H) 01/26/2025    Continue with the sliding scale insulin  
Non diabetic patient  Last A1c of 5.5  Lab Results   Component Value Date    GLUC 218 (H) 01/27/2025    GLUC 196 (H) 01/26/2025    GLUC 129 01/25/2025    Continue with the sliding scale insulin  
Non diabetic patient  Last A1c of 5.5  Lab Results   Component Value Date    GLUC 90 01/29/2025    GLUC 129 01/28/2025    GLUC 218 (H) 01/27/2025   Has not needed insulin, d/c insulin and sliding scale  
Non diabetic patient  Last A1c of 5.5  Lab Results   Component Value Date    GLUC 91 02/01/2025    GLUC 95 01/31/2025    GLUC 108 01/30/2025   Has not needed insulin, d/c insulin and sliding scale  
Non diabetic patient  Last A1c of 5.5  Lab Results   Component Value Date    GLUC 91 02/01/2025    GLUC 95 01/31/2025    GLUC 108 01/30/2025   Has not needed insulin, d/c insulin and sliding scale  
Non diabetic patient  Last A1c of 5.5  Lab Results   Component Value Date    GLUC 95 01/31/2025    GLUC 108 01/30/2025    GLUC 90 01/29/2025   Has not needed insulin, d/c insulin and sliding scale  
O2 sat 88%.  Stable on 2LNC  Multifactorial in setting of flu, morbid obesity, JEFF  Minimal expiratory wheezing, will order albuterol inhaler QID for 3 days  Wean off O2 as able  
Positive on 1/19  Complicated by viral sepsis, see plan above  Tamiflu completed    
Positive on 1/19  Complicated by viral sepsis, see plan below  Tamiflu started 1/19 but held 1/20 due to not taking PO     
Positive on 1/19  Complicated by viral sepsis, see plan below  Tamiflu started 1/19 but held 1/20 due to not taking PO     
Positive on 1/19  Complicated by viral sepsis, see plan below  Tamiflu via oG-tube    
Positive on 1/19  Complicated by viral sepsis, see plan below  Tamiflu via oG-tube completed    
Presented after a fall which resulted in a head strike. CTH without acute abnormalities  
Presented with fall, + head strike   CTH negative   Bed alarm/fall precautions  
Recent Labs     01/25/25  0544 01/26/25  0435 01/27/25  0446   SODIUM 147 148* 148*   Corrected fro hyperglycemia: 150  FWD 4lt  Benefits vs risks of IV fluids given suspected concern fro reduceed cardiac function  
Recent Labs     01/26/25  0435 01/27/25  0446 01/28/25  0508   SODIUM 148* 148* 145   IV fluids given, free water flushes increased on 1/27.  Sodium better on 1/28.  Continue to monitor.  
Recent Labs     01/27/25  0446 01/28/25  0508 01/29/25  0427   SODIUM 148* 145 142   Improved.  Lower free water flushes  
Recent Labs     01/28/25  0508 01/29/25  0427 01/30/25  0441   SODIUM 145 142 142   Improved.  Lower free water flushes  
Recent Labs     01/29/25  0427 01/30/25  0441 01/31/25  0457   SODIUM 142 142 141   Improved.  Lower free water flushes  
Recent Labs     01/30/25  0441 01/31/25  0457 02/01/25  0444   SODIUM 142 141 141   Improved.  Lower free water flushes  
Resolved    Recent Labs     02/01/25  0444   SODIUM 141           
Resolved    Recent Labs     02/05/25  0511   SODIUM 139             
Resolved    Recent Labs     02/05/25  0511 02/06/25  0552   SODIUM 139 141             
Resolved    Recent Labs     02/05/25  0511 02/06/25  0552   SODIUM 139 141             
Resolved    Recent Labs     02/06/25  0552   SODIUM 141             
Sepsis POA with fever and tachycardia.  Tmax 102.2F  Influenza A positive  Normal PCT  Start Tamiflu 75mg BID x5 days  Supportive care with IV hydration, antipyretic and antitussives   Follow blood cultures  
Stage I papillary thyroid cancer s/p total thyroidectomy and I-131 ablation in 2013   
Stage I papillary thyroid cancer s/p total thyroidectomy and ablation in 2013  Post-operative hypothyroidism   Hold home levothyroxine due to NPO  
Stage I papillary thyroid cancer s/p total thyroidectomy and ablation in 2013  Post-operative hypothyroidism   Hold home levothyroxine due to NPO  
Stage I papillary thyroid cancer s/p total thyroidectomy and ablation in 2013  Post-operative hypothyroidism   Levothyroxine via OG tube  
Tremors thought to be 2/2 to divalproex therapy  
[facility]

## 2025-02-08 NOTE — H&P
H&P - PMR   Name: Esperanza Prajapati 54 y.o. female I MRN: [id number]  Unit/Bed#: [facility] 269-01 I Date of Admission: 2/8/2025   Date of Service: 2/8/2025 I Hospital Day: 0     Assessment & Plan  Acute respiratory failure with hypoxia (HCC)  Presented on 1/19/25 with respiratory failure related to Influenza A  Intubated 1/21/25 - 1/30/25.  Required HFNC  Weaning down from oxygen.  Currently needing 0-2L oxygen  Improving  Continue IS, OPEP, and pulmonary toileting  Aspiration precautions, deep breathing  Monitor pulmonary status while in [facility]  Follow-up with Pulmonary as outpatient  Influenza A  Presented on 1/19/25 with respiratory failure  +Influenza A  s/p Tamiflu treatment   Acquired hypothyroidism  Acquired after total thyroidectomy 2/2 papillary thyroid carcinoma in 2013  Continue Synthroid 175 mcg daily  Focal epilepsy (HCC)  Chronic seizure disorder  Continue chronic AED regimen: Lacosamide 150 mg QAM, Lacosamide 200 mg QHS, Depakote 750 mg Q 12 hours  Schizoaffective disorder (HCC)  Stable  Continue chronic medications: Cogentin 2 mg BID, Zyprexa 10 mg QHS, Invega 6 mg QAM.    Hypernatremia  Resolved  Checking labs on Monday  Pneumonia of both lungs due to infectious organism  Possible bacterial PNA.   S/p treatment with Azithromycin  Continue supportive care  Continues on oxygen 0-2L  Insomnia  Continue Melatonin 6 mg QHS  At risk for deep venous thrombosis  Continue Lovenox 40 mg daily.    Oropharyngeal dysphagia  Currently on pureed diet with thins  SLP to follow  Hopeful to upgrade based on recent notes.     Rehab Diagnosis: Impairment of mobility, safety, Activities of Daily Living (ADLs), and cognitive/communication skills due to Debility:  16  Debility (Non-cardiac/Non-pulmonary)  Etiology: Acute respiratory failure secondary to influenza A    History of Present Illness    Esperanza Prajapati is a 54 y.o. female with past medical history significant for epileps/seizure disorder, schizoaffective  disorder, cognitive deficits, history of papillary thyroid carcinoma s/p total thyroidectomy who presented to the [facility] 1/19/25 with acute respiratory failure.  +Influenza A.  Intubated 1/21/25 - 1/30/25.  Brochoscopy 1/24/25.  Completed Tamiflu treatment.  Patient with superimposed bacterial PNA.  Treated with Azithromyocin.  Followed closely by Pulmonary.  Needing intermittent oxygen, but has weaned to RA.    Medical course complicated by dysphagia.  Patient seen by SLP and placed on a pureed diet with thin liquids.    After medical stabilization, patient found to have acute functional deficits from baseline in mobilty, self care, swallow, therefore admitted to [facility] for acute inpatient rehabiltiation.      Subjective : Patient seen face to face upon arrival.  Hopeful to get better and get home soon.  Denies SOB or chest pain.  No current cough.  Feeling ok.  Weak in legs.       Review of Systems   Constitutional: Negative.    HENT: Negative.     Eyes: Negative.    Respiratory: Negative.     Cardiovascular: Negative.    Gastrointestinal: Negative.    Endocrine: Negative.    Genitourinary: Negative.    Musculoskeletal: Negative.    Skin: Negative.    Allergic/Immunologic: Negative.    Neurological: Negative.    Hematological: Negative.    Psychiatric/Behavioral: Negative.         Restrictions include:  aspiration precautions  Fall precautions      Functional History - Prior to Admission:    Patient was independent with mobility/ambulation, transfers, ADL's, IADL's.    Functional Status Upon Admission to [facility]:  PT: Supervision with transfers, CGA-close supervision ambulation 220 feet with RW  OT: supervision with eating.  Min A with grooming.  Min A with UB ADLs, Mod A with LB ADLs  SLP: Pureed with thins     HOME ENVIRONMENT:  The living area: can live on one level  There are 4 steps to enter the home.    The patient will have 24 hour supervision/physical assistance available upon  discharge.       Objective :  Temp:  [97.1 °F (36.2 °C)-99 °F (37.2 °C)] 97.5 °F (36.4 °C)  HR:  [] 102  BP: (105-128)/(75-86) 105/75  Resp:  [18-20] 20  SpO2:  [93 %-96 %] 93 %  O2 Device: None (Room air)    Physical Exam  Vitals and nursing note reviewed.   Constitutional:       General: She is not in acute distress.     Appearance: She is well-developed.   HENT:      Head: Normocephalic and atraumatic.      Nose: Nose normal.      Mouth/Throat:      Mouth: Mucous membranes are moist.   Eyes:      Conjunctiva/sclera: Conjunctivae normal.   Cardiovascular:      Rate and Rhythm: Normal rate and regular rhythm.      Heart sounds: No murmur heard.  Pulmonary:      Effort: Pulmonary effort is normal. No respiratory distress.      Breath sounds: No stridor. No rhonchi.      Comments: Decreased BS at bases  Abdominal:      General: Bowel sounds are normal. There is no distension.      Palpations: Abdomen is soft.      Tenderness: There is no abdominal tenderness.   Musculoskeletal:         General: Swelling (trace) present. No tenderness.      Cervical back: Neck supple.   Skin:     General: Skin is warm and dry.      Capillary Refill: Capillary refill takes less than 2 seconds.   Neurological:      Mental Status: She is alert and oriented to person, place, and time.      Motor: Weakness present.      Comments: Motor: BUE strength 4-/5 SAB, SF, 4/5 EE, EF,   BLE: 3+/5 HF, 4/5 KE, KF, DF, PF   Psychiatric:         Mood and Affect: Mood normal.           Lab Results: I have reviewed the following results:  Results from last 7 days   Lab Units 02/06/25  0552 02/05/25  0511   HEMOGLOBIN g/dL 10.0* 9.5*   HEMATOCRIT % 29.9* 29.1*   WBC Thousand/uL 3.27* 3.34*   PLATELETS Thousands/uL 347 352     Results from last 7 days   Lab Units 02/06/25  0552 02/05/25  0511   BUN mg/dL 14 16   SODIUM mmol/L 141 139   POTASSIUM mmol/L 3.7 3.5   CHLORIDE mmol/L 104 104   CREATININE mg/dL 0.74 0.67              Wt Readings from  "Last 1 Encounters:   02/08/25 102 kg (225 lb)     Estimated body mass index is 33.23 kg/m² as calculated from the following:    Height as of this encounter: 5' 9\" (1.753 m).    Weight as of this encounter: 102 kg (225 lb).    Rehabilitation Prognosis: good     Tolerance for three hours of therapy a day: good    Family/Patient Goals:  Patient/family's goals: Return to previous home/apartment    Patient will receive PT and OT 90 minutes each per day, five days per week to achieve rehab goals or participate in 900 minutes of therapy within a 7 day week period.    Mobility Goals: Modified [place]  Transfer Goals: Modified [place]  Activities of Daily Living (ADLs) Goals: Modified [place]    Discharge Planning:  Rehabilitation and discharge goals discussed with patient    Case Managment and Social Work to review patient/family resources and to coordinate Discharge Planning.    Estimated length of stay: 2 - 3 weeks    Patient and Family Education and Training:  Rehabilitation and discharge goals discussed with patient.  Patient/family education/training needs to be discussed in weekly team meeting.    Equipment/DME needs: Therapy teams to assess and evaluate for additional equipment/DME needs throughout rehabilitation stay    Administrative Statements   I have spent a total time of 90 minutes in caring for this patient on the day of the visit/encounter including Diagnostic results, Prognosis, Risks and benefits of tx options, Instructions for management, Patient and family education, Importance of tx compliance, Risk factor reductions, Impressions, Counseling / Coordination of care, Documenting in the medical record, Reviewing / ordering tests, medicine, procedures  , Obtaining or reviewing history  , and Communicating with other healthcare professionals .      Medical Necessity Criteria for [facility] Admission: Anemia, Pneumonia, and epilepsy, influenza A, acute respiratory failure . In addition, the " preadmission screen, post-admission physical evaluation, overall plan of care and admissions order demonstrate a reasonable expectation that the following criteria were met at the time of admission to the [facility].  The patient requires active and ongoing therapeutic intervention of multiple therapy disciplines (physical therapy, occupational therapy, speech-language pathology, or prosthetics/orthotics), one of which is physical or occupational therapy.    Patient requires an intensive rehabilitation therapy program, as defined in Chapter 1, section 110.2.2 of the CMS Medicare Policy Manual. This intensive rehabilitation therapy program will consist of at least 3 hours of therapy per day at least 5 days per week or at least 15 hours of intensive rehabilitation therapy within a 7 consecutive day period, beginning with the date of admission to the [facility].    The patient is reasonably expected to actively participate in, and benefit significantly from, the intensive rehabilitation therapy program as defined in Chapter 1, section 110.2.2 of the CMS Medicare Policy Manual at this time of admission to the [facility]. She can reasonably be expected to make measurable improvement (that will be of practical value to improve the patient’s functional capacity or adaptation to impairments) as a result of the rehabilitation treatment, as defined in section 110.3, and such improvement can be expected to be made within the prescribed period of time. As noted in the CMS Medicare Policy Manual, the patient need not be expected to achieve complete independence in the domain of self-care nor be expected to return to his or her prior level of functioning in order to meet this standard.  The patient must require physician supervision by a rehabilitation physician. As such, a rehabilitation physician will conduct face-to-face visits with the patient at least 3 days per week throughout the patient’s stay in the [facility] to assess the patient both medically  and functionally, as well as to modify the course of treatment as needed to maximize the patient’s capacity to benefit from the rehabilitation process.  The patient requires an intensive and coordinated interdisciplinary approach to providing rehabilitation, as defined in Chapter 1, section 110.2.5 of the CMS Medicare Policy Manual. This will be achieved through periodic team conferences, conducted at least once in a 7-day period, and comprising of an interdisciplinary team of medical professionals consisting of: a rehabilitation physician, registered nurse,  and/or , and a licensed/certified therapist from each therapy discipline involved in treating the patient.     Changes Since Pre-admission Assessment: none    CMS Required Post-Admission Physician Evaluation Elements  History and Physical, including medical history, functional history and active comorbidities as in above text.     Post-Admission Physician Evaluation:   The patient has the potential to make improvement and is in need of physical, occupational, and/or therapy services. The patient may also need nutritional services. Given the patient's complex medical condition and risk of further medical complications, rehabilitative services cannot be safely provided at a lower level of care, such as a skilled nursing facility. I have reviewed the patient's functional and medical status at the time of the preadmission screening and they are the same as on the day of this admission. I acknowledge that I have personally performed a full physical examination on this patient within 24 hours of admission. The patient and/or family demonstrated understanding the rehabilitation program and the discharge process after we discussed them.    Agree in entirety: Yes  Minor adaptions: none  Major changes: none     Tia Benz MD  Physical Medicine and Rehabilitation

## 2025-02-08 NOTE — ASSESSMENT & PLAN NOTE
Chronic seizure disorder  Continue chronic AED regimen: Lacosamide 150 mg QAM, Lacosamide 200 mg QHS, Depakote 750 mg Q 12 hours

## 2025-02-08 NOTE — PROGRESS NOTES
PHYSICAL MEDICINE AND REHABILITATION   PREADMISSION ASSESSMENT     Projected IGC and Rehabilitation Diagnoses:  Impairment of mobility, safety, Activities of Daily Living (ADLs), and cognitive/communication skills due to Debility:  16  Debility (Non-cardiac/Non-pulmonary)  Etiologic: Acute respiratory failure with hypoxia secondary to influenza A and possible aspiration pneumonia      Date of Onset: 1/19/25     Date of surgery: 1/24/25 Bronchoscopy     PATIENT INFORMATION  Name: Esperanza Prajapati Phone #: [phone] (home)   Address: [address] [place], [place] [address]  YOB: 1970 Age: 54 y.o. #   Marital Status: Single  Ethnicity: Another  /a or [place]ican origin   Employment Status: on disability  Extended Emergency Contact Information  Primary Emergency Contact: Layne Palm  Mobile Phone: [phone]  Relation: Sister  Secondary Emergency Contact: Sofia Palm  Mobile Phone: [phone]  Relation: Sister In-Law  Advance Directive: Level 1 Full code ( No advance directive on file )    INSURANCE/COVERAGE:     Primary Payor: [employer] Counts include 234 beds at the [facility] REP / Plan: [employer] [facility] / Product Type: Medicare HMO /  #[id number]  Secondary Payer: Self Pay    Payer Contact: see below Payer Contact: n/a   Contact Phone: see below  Contact Phone: n/a     Authorization #: [id number]  Start of Care: 2/8/25  Next Review Date: 2/16/25  Continued Stay Care Coordinator: n/a  Submit next review to: [phone]                 Benefits: Dual eligibility plan SNAP  covered 100%.     MEDICARE #: [id number]   Medicare Days: n/a      Medical Record #: [id number]    REFERRAL SOURCE:   Referring provider: Yariel Acharya MD  Referring facility: [facility]   Room: [facility] /E4 -*  PCP: Foster Biswas MD PCP phone number: [phone]    MEDICAL INFORMATION  HPI: Esperanza is a 54 y.o. female  with a medical history of but not limited to intellectual disability ;  Schizoaffective disorder ;Focal epilepsy; thyroid Ca who presents with c/o altered mental status. As per family, she was ill approximately 2 weeks ago with some coughing. This persisted. She had been acting more confused over the last 2 days prior to presentation that they described as some brain fog and say that she put on her pants backwards which is something she typically would not do. Day of presentation to hospital, when she was ambulating up the stairs she had a witnessed fall and struck her head on the wall. She has been acting increasingly confused since this fall. She is not known to have any fevers. She typically is able to hold a conversation and is alert and oriented. She typically ambulates on her own. Since this event she was oriented only to her name but not to time or place. She was admitted for further evaluation/assessment/consultation. During hospital course / stay - developed  viral sepsis /toxic metabolic encephalopathy d/t Influenza A. Required intubation 1/21/2025 - 1/30/2025, and was extubated to HFNC. Transitioned to 2L NC. Now on room air. She completed Tamiflu. She is now A&Ox3.  Patient on a dysphagia diet.  PT/OT therapies were consulted and continue to follow patient at this time and are recommending inpatient acute rehab when medically stable. All involved medical disciplines feel/agree patient is medically ready for discharge at this time. Inpatient acute rehabilitation physician was consulted. Upon review of patient's case and correspondence with PT/OT therapy services, [facility] physician feels Esperanza will benefit and is a good candidate / appropriate for inpatient acute rehab at this time. She has demonstrated the willingness / desire and tolerance to participate in the required 3 hours or more of therapies per day.     Past Medical History:   Past Surgical History:   Allergies:     Past Medical History:   Diagnosis  Date    Cancer (HCC)     thyroid cancer    Disease of thyroid gland     Hallucination     Hyperactivity (behavior)     Last Assessed: 11/7/2014     Hyperlipidemia     Hyponatremia 01/20/2025    Obesity     Psychosis (HCC)     Seizures (HCC)     Past Surgical History:   Procedure Laterality Date    OTHER SURGICAL HISTORY      Removal of urinary calculus     OTHER SURGICAL HISTORY      Rhinologic Surgery     [place] OPEN TX PHALANGEAL SHAFT FRACTURE PROX/MIDDLE EA Left 8/3/2023    Procedure: CLOSED REDUCTION PERCUTANEOUS PINNING - LEFT RING FINGER;  Surgeon: Fransisco Escalante MD;  Location: [facility] MAIN OR;  Service: Orthopedics    TOTAL THYROIDECTOMY      LAst Assessed: 11/7/2014     No Known Allergies      Medical/functional conditions requiring inpatient rehabilitation:   Influenza A  Acute respiratory failure with hypoxia   Viral sepsis   Toxic metabolic encephalopathy   Focal epilepsy   Schizoaffective disorder   Hypernatremia   Hyperglycemia   Pneumonia of both lungs due to infectious organism  Impaired mobility/self care  Impaired balance  Impaired mobility     Risk for medical/clinical complications: risk for falls, risk for respiratory decline, risk for seizures, risk for skin breakdown, risk for infection, risk for DVT/PE     Comorbidities/Surgeries in the last 100 days:   Focal epilepsy   Schizoaffective disorder  Postoperative hypothyroidism  Cognitive developmental delay   1/24/25-s/p bronchoscopy     CURRENT VITAL SIGNS:   Temp:  [97.1 °F (36.2 °C)-99 °F (37.2 °C)] 97.1 °F (36.2 °C)  HR:  [87-92] 87  BP: (111-128)/(78-86) 125/78  Resp:  [18-20] 20  SpO2:  [94 %-96 %] 96 %  O2 Device: None (Room air)   Intake/Output Summary (Last 24 hours) at 2/8/2025 0951  Last data filed at 2/8/2025 0900  Gross per 24 hour   Intake 880 ml   Output --   Net 880 ml        LABORATORY RESULTS:      Lab Results   Component Value Date    HGB 10.0 (L) 02/06/2025    HGB 13.6 06/19/2018    HCT 29.9 (L) 02/06/2025    HCT 40.4  06/19/2018    WBC 3.27 (L) 02/06/2025    WBC 3.8 (L) 06/19/2018     Lab Results   Component Value Date    BUN 14 02/06/2025    BUN 19 04/11/2024     06/19/2018    K 3.7 02/06/2025    K 4.5 04/11/2024     02/06/2025     04/11/2024    GLUCOSE 106 08/26/2013    CREATININE 0.74 02/06/2025    CREATININE 0.75 04/11/2024     Lab Results   Component Value Date    PROTIME 14.3 01/19/2025    PROTIME 13.5 08/26/2013    INR 1.09 01/19/2025    INR 1.08 08/26/2013        DIAGNOSTIC STUDIES:  Bronchoscopy  Result Date: 1/24/2025  Impression: The lower trachea, main dennis, left main stem, DONNY, lingula, LLL, right main stem, RUL, bronchus intermedius, RML and RLL appeared normal. Minimal, thin and clear secretions present RECOMMENDATION: Follow up bronchoscopy Attestation: I was the critical care attending during the entire procedure by bedside helping and supervising on 1/24/2025 Forrest Garrett MD     XR chest portable ICU  Result Date: 1/24/2025  Impression: Moderate pulmonary edema with moderate pleural effusions and bibasilar atelectasis. Pneumonia not excluded in the appropriate clinical setting. Workstation performed: [id number]     XR chest portable ICU  Result Date: 1/23/2025  Impression: Stable congestion with bilateral effusions and bibasilar airspace opacity. Workstation performed: [id number]     XR chest portable  Result Date: 1/22/2025  Impression: Lines and tubes appear satisfactory. Pulmonary edema pattern with pleural effusions. Low lung volumes. Pneumonia not entirely excluded in the appropriate clinical situation, particularly in the left lower lobe. Workstation performed: [id number]     XR chest portable ICU  Result Date: 1/21/2025  Impression: ET tube 4 cm above the dennis. Worsening pulmonary edema Dense left lower lobe consolidation with air bronchograms which could be due to pneumonia in the appropriate clinical setting. Workstation performed: [id number]     XR chest portable ICU  Result  Date: 1/21/2025  Impression: Right jugular catheter in right brachiocephalic vein with no pneumothorax. Persistent pulmonary edema. Dense consolidation in the left lower lobe which could be due to pneumonia in the appropriate clinical setting. Workstation performed: [id number]     XR chest portable  Result Date: 1/21/2025  Impression: Low lung volumes with bibasilar opacity with loss of the medial left diaphragm which could be due to atelectasis. Pneumonia not excluded in the appropriate clinical setting. Workstation performed: [id number]     CT chest abdomen pelvis w contrast  Result Date: 1/20/2025  Impression: No acute findings in the chest, abdomen or pelvis, noting images degraded by motion artifact. Workstation performed: [id number]     CT cervical spine without contrast  Result Date: 1/20/2025  Impression: 1.  No cervical spine fracture or traumatic malalignment, noting images are degraded by motion artifact. 2.  Mildly prominent bilateral level 2 lymph nodes, nonspecific. Workstation performed: [id number]     CT head without contrast  Result Date: 1/20/2025  Impression: No acute intracranial abnormality. Workstation performed: [id number]       PRECAUTIONS/SPECIAL NEEDS:  Tobacco:   Social History     Tobacco Use   Smoking Status Never    Passive exposure: Never   Smokeless Tobacco Never   , Alcohol:    Social History     Substance and Sexual Activity   Alcohol Use Never   , Weight Bearing Precautions:  weight bearing as tolerated, Anticoagulation:   Lovenox SQ @ ordered by MD , Edema Management, Safety Concerns, Pain Management, Dietary Restrictions: Dysphagia 1 - Pureed/thin liq, Language Preference: English, and Fall precautions, aspiration precautions, speaks Austrian-need      MEDICATIONS:     Current Facility-Administered Medications:     Acetaminophen (TYLENOL) oral suspension 975 mg, 975 mg, Oral, Q6H PRN, Yariel Acharya MD, 975 mg at 01/27/25 1209    aluminum-magnesium hydroxide-simethicone  (MAALOX) oral suspension 30 mL, 30 mL, Oral, Q6H PRN, Yariel Acharya MD    benztropine (COGENTIN) tablet 2 mg, 2 mg, Oral, BID, Yariel Acharya MD, 2 mg at 02/08/25 0803    cyanocobalamin (VITAMIN B-12) tablet 1,000 mcg, 1,000 mcg, Oral, Daily, Yariel Acharya MD, 1,000 mcg at 02/08/25 0803    divalproex sodium (DEPAKOTE) DR tablet 750 mg, 750 mg, Oral, Q12H MOSHE, Yariel Acharya MD, 750 mg at 02/08/25 0804    enoxaparin (LOVENOX) subcutaneous injection 40 mg, 40 mg, Subcutaneous, Daily, Yariel Acharya MD, 40 mg at 02/08/25 0805    ipratropium-albuterol (DUO-NEB) 0.5-2.5 mg/3 mL inhalation solution 3 mL, 3 mL, Nebulization, Q8H PRN, Yariel Acharya MD    lacosamide (VIMPAT) tablet 150 mg, 150 mg, Oral, Daily, Yariel Acharya MD, 150 mg at 02/08/25 0804    lacosamide (VIMPAT) tablet 200 mg, 200 mg, Oral, HS, Yariel Acharya MD, 200 mg at 02/07/25 2148    levothyroxine tablet 175 mcg, 175 mcg, Oral, Early Morning, Yariel Acharya MD, 175 mcg at 02/08/25 0639    melatonin tablet 6 mg, 6 mg, Oral, HS, Yariel Acharya MD, 6 mg at 02/07/25 2148    OLANZapine (ZyPREXA) tablet 10 mg, 10 mg, Oral, HS, Yariel Acharya MD, 10 mg at 02/07/25 2148    ondansetron (ZOFRAN) injection 4 mg, 4 mg, Intravenous, Q6H PRN, Yariel Acharya MD    paliperidone (INVEGA) 24 hr tablet 6 mg, 6 mg, Oral, QAM, Yariel Acharya MD, 6 mg at 02/08/25 0805    polyethylene glycol (MIRALAX) packet 17 g, 17 g, Oral, Daily PRN, Yariel Acharya MD, 17 g at 01/30/25 0720    senna-docusate sodium (SENOKOT S) 8.6-50 mg per tablet 2 tablet, 2 tablet, Oral, BID, Yariel Acharya MD, 2 tablet at 02/08/25 0803    SKIN INTEGRITY:   Skin intact    PRIOR LEVEL OF FUNCTION:  She lives in a(n) single family home  Esperanza Prajapati is not  and lives with their family.  Self Care: Independent, Indoor Mobility: Independent, Stairs (in/outdoor): Independent, and Cognition: Needed some help    FALLS IN THE LAST 6 MONTHS: 1 to 4    HOME ENVIRONMENT:  The living area: can live on one  level  There are 4 steps to enter the home.    The patient will have 24 hour supervision/physical assistance available upon discharge.    PREVIOUS DME:  Equipment in home (previous DME): None    FUNCTIONAL STATUS:  Physical Therapy Occupational Therapy Speech Therapy   As per PT:     PT Visit Date 02/06/25   Note Type   Note Type Treatment   Pain Assessment   Pain Assessment Tool 0-10   Pain Score No Pain   Restrictions/Precautions   Weight Bearing Precautions Per Order No   Other Precautions Chair Alarm;Fall Risk;O2   General   Chart Reviewed Yes   Subjective   Subjective Pt. agreeable to PT   Transfers   Sit to Stand 5  Supervision   Additional items Verbal cues;Armrests   Stand to Sit 5  Supervision   Additional items Assist x 1;Armrests   Stand pivot 5  Supervision   Additional items Assist x 1;Increased time required   Ambulation/Elevation   Gait pattern Wide WENDY;Decreased foot clearance;Short stride;Excessively slow;Decreased toe off;Decreased heel strike   Gait Assistance    (CGA/CS)   Additional items Assist x 1;Verbal cues   Assistive Device Rolling walker   Distance 220ft x 2, 10ft x 2   Stair Management Assistance 4  Minimal assist   Additional items Assist x 1;Verbal cues;Increased time required   Stair Management Technique Two rails;Step to pattern;Foreward;Nonreciprocal   Number of Stairs 4   Balance   Static Sitting Good   Dynamic Sitting Fair +   Static Standing Fair   Dynamic Standing Fair -   Ambulatory Fair -   Endurance Deficit   Endurance Deficit No   Activity Tolerance   Activity Tolerance Patient tolerated treatment well   Nurse Made Aware yes   Assessment   Prognosis Good   Problem List Decreased strength;Decreased range of motion;Decreased endurance;Decreased mobility;Impaired judgement   Assessment Pt. progressign well with voerall mobility. Pt. able to ambulate longer distance this session and was on RA and SPO2 stats noted 95% t/o session. Pt. given cues for LE sequencing for stair  negotiation ascending leading with RLE and descended with LLE. Pt. had seated rests between ambulation and stiar negotiation. Pt. seated on chair post sessiojn with all needs within reach and on .5 L O2 as she was at the beginning of the session. No LOB noted t/o session however noted tremors in UEs and unsteadiness in LEs occ. Will continue to follow epr PT POC. Recommended pt. to ambulate with staff on the unit    As per OT:     02/06/25 0928    Note Type   Note Type Treatment   Pain Assessment   Pain Assessment Tool 0-10   Pain Score No Pain   Pain Rating: FLACC (Rest) - Face 0   Pain Rating: FLACC (Rest) - Legs 0   Pain Rating: FLACC (Rest) - Activity 0   Pain Rating: FLACC (Rest) - Cry 0   Pain Rating: FLACC (Rest) - Consolability 0   Score: FLACC (Rest) 0   Restrictions/Precautions   Weight Bearing Precautions Per Order No   Other Precautions Cognitive;Chair Alarm;Bed Alarm;Fall Risk;O2  (SPO2=89% on RA, on 1 liter of O2)   ADL   Where Assessed Edge of bed   Eating Assistance 5  Supervision/Setup   Grooming Assistance 5  Supervision/Setup   UB Bathing Assistance 4  Minimal Assistance   LB Bathing Assistance 3  Moderate Assistance   UB Dressing Assistance 4  Minimal Assistance   LB Dressing Assistance 3  Moderate Assistance   Functional Standing Tolerance   Time 2-3mins   Bed Mobility   Rolling R 5  Supervision   Additional items Increased time required;Verbal cues;LE management   Rolling L 5  Supervision   Additional items Increased time required;Verbal cues   Supine to Sit 4  Minimal assistance   Additional items Assist x 1;Increased time required;Verbal cues;LE management   Transfers   Sit to Stand 4  Minimal assistance   Additional items Assist x 1;Increased time required;Verbal cues   Stand to Sit 5  Supervision   Additional items Increased time required;Verbal cues   Functional Mobility   Functional Mobility 4  Minimal assistance   Additional Comments x1   Additional items Rolling walker   Subjective  "  Subjective \"I need a brush for my hair.\"   Cognition   Overall Cognitive Status Impaired   Arousal/Participation Alert   Attention Attends with cues to redirect   Orientation Level Oriented to person;Oriented to place;Oriented to time   Memory Decreased short term memory;Decreased recall of precautions;Decreased recall of recent events   Following Commands Follows one step commands with increased time or repetition   Comments hx cognitive deficits   Activity Tolerance   Activity Tolerance Other (Comment);Patient limited by fatigue  (cognition)   Medical Staff Made Aware jorge, GINI, MD   Assessment   Assessment Pt seen for 54min tx session with focus on functional balance, functional mobility, ADL status, and cognition. Pt able to tolerate OOB mobility; sitting balance=f+/f, standing balance=f/f-. Pt required verbal cues and occasional physical assistance to maintain transfer safety; functional mobility improving. Pt demonstrating need for assistance with her UE and LE ADLs--verbal cues to completeness/problem-solving tasks; pt states independence with her ADLs premorbidly. Cognitive deficits noted--i.e.problem-solving, memory, higher-level direction following. Pt continues to demonstrate appropriateness for inpt rehab to improve her overall level of independence. Will continue. The patient's raw score on the -PAC Daily Activity Inpatient Short Form is 16. A raw score of less than 19 suggests the patient may benefit from discharge to post-acute rehabilitation services. Please refer to the recommendation of the Occupational Therapist for safe discharge planning.    As per SLP:     2/5/2025     Problem List  Principal Problem:    Acute respiratory failure with hypoxia (HCC)  Active Problems:    Cognitive developmental delay    Postoperative hypothyroidism    Focal epilepsy (HCC)    Schizoaffective disorder (HCC)    Viral sepsis (HCC)    Toxic metabolic encephalopathy    Fall at home, initial encounter    Influenza A   "  Hypernatremia    Hyperglycemia    Pneumonia of both lungs due to infectious organism        Past Medical History  Medical History        Past Medical History:   Diagnosis Date    Cancer (HCC)       thyroid cancer    Disease of thyroid gland      Hallucination      Hyperactivity (behavior)       Last Assessed: 2014     Hyperlipidemia      Hyponatremia 2025    Obesity      Psychosis (HCC)      Seizures (HCC)              Past Surgical History  Surgical History         Past Surgical History:   Procedure Laterality Date    OTHER SURGICAL HISTORY         Removal of urinary calculus     OTHER SURGICAL HISTORY         Rhinologic Surgery     [place] OPEN TX PHALANGEAL SHAFT FRACTURE PROX/MIDDLE EA Left 8/3/2023     Procedure: CLOSED REDUCTION PERCUTANEOUS PINNING - LEFT RING FINGER;  Surgeon: Fransisco Escalante MD;  Location: [facility] MAIN OR;  Service: Orthopedics    TOTAL THYROIDECTOMY         LAst Assessed: 2014               Subjective:  Pt alert. Flat affect.Ordered her a trial level 2 tray for lunch. Chose items earlier. ID not request mac and cheese but added to tray.    Objective:  Pt seen for po tolerance and additional recommendations. Sleepy in am per nurse. (Seems to be her norm while here). Upright and feeding self at lunch. Had tuna fish that was very fine/moist. Not really much mastication required, tolerated wfl. Took only a few bites of the mac and cheese, stated she didn't want any more. Fair mastication. Then ate her chocolate ice cream, drank multiple sips of juice and water. No cough or wet vocal quality.   Assessment:  [facility] though mastication required on items today. Limited intake of items that required mastication. No s/s w/ liquids.   Plan/Recommendations:  Continue puree w/ thin for now. ST to f/u w/ level 2 upgraded trials as able.         CARE SCORES:  Self Care:  Eatin: Supervision or touching  assistance  Oral hygiene: 04: Supervision or touching   assistance  Toilet hygiene: 09: Not applicable  Shower/bathing self: 03: Partial/moderate assistance  Upper body dressin: Partial/moderate assistance  Lower body dressin: Partial/moderate assistance  Putting on/taking off footwear: 09: Not applicable  Transfers:  Roll left and right: 09: Not applicable  Sit to lyin: Not applicable  Lying to sitting on side of bed: 09: Not applicable  Sit to stand: 04: Supervision or touching  assistance  Chair/bed to chair transfer: 04: Supervision or touching  assistance  Toilet transfer: 09: Not applicable  Mobility:  Walk 10 ft: 04: Supervision or touching  assistance  Walk 50 ft with two turns: 04: Supervision or touching  assistance  Walk 150ft: 04: Supervision or touching  assistance    CURRENT GAP IN FUNCTION  Prior to Admission: Functional Status: Patient was independent with mobility/ambulation, transfers, ADL's, IADL's.    Expected functional outcomes: It is expected that with skilled acute rehabilitation services the patient will progress to Supervision for self care and Supervision for mobility     Estimated length of stay: 10 to 14 days    Anticipated Post-Discharge Disposition/Treatment  Disposition: Return to previous home/apartment.  Outpatient Services: Physical Therapy (PT) and Occupational Therapy (OT)    BARRIERS TO DISCHARGE  Lovenox, Weakness, Pain, Diminished cognition/Mentation change, Balance Difficulty, Fatigue, Caregiver Accessibility, and Equipment Needs    INTERVENTIONS FOR DISCHARGE  Adaptive equipment, Patient/Family/Caregiver Education, Arrange DME needs, Medication Changes per MD, Therapy exercises, and Energy conservation education     REQUIRED THERAPY:  Patient will require PT, OT and ST 60 minutes each per day, five days per week to achieve rehab goals.     REQUIRED FUNCTIONAL AND MEDICAL MANAGEMENT FOR INPATIENT REHABILITATION:  Pain Management: Overall pain is well controlled, Deep Vein Thrombosis (DVT) Prophylaxis:  low molecular  weight heparin, SCD's while in bed, and   Nursing education and bowel/bladder management, internal medicine to manage/monitor medical conditions, PM&R to maximize function and provide medical oversight, PT/OT intervention, patient/family education/training, and any consults as needed     RECOMMENDED LEVEL OF CARE:   Esperanza is a 54 y.o. female with a medical history of but not limited to intellectual disability ;  Schizoaffective disorder ;Focal epilepsy; thyroid Ca who presents with c/o altered mental status. As per family, she was ill approximately 2 weeks ago with some coughing. This persisted. She had been acting more confused over the last 2 days prior to presentation that they described as some brain fog and say that she put on her pants backwards which is something she typically would not do. Day of presentation to hospital, when she was ambulating up the stairs she had a witnessed fall and struck her head on the wall. She has been acting increasingly confused since this fall. She is not known to have any fevers. She typically is able to hold a conversation and is alert and oriented. She typically ambulates on her own. Since this event she was oriented only to her name but not to time or place. She was admitted for further evaluation/assessment/consultation. During hospital course / stay - developed  viral sepsis /toxic metabolic encephalopathy d/t Influenza A. Required intubation 1/21/2025 - 1/30/2025, and was extubated to HFNC. Transitioned to 2L NC. Now on room air. She completed Tamiflu. She is now A&Ox3.  Patient on a dysphagia diet.  PT/OT therapies were consulted and continue to follow patient at this time and are recommending inpatient acute rehab when medically stable. All involved medical disciplines feel/agree patient is medically ready for discharge at this time. Inpatient acute rehabilitation physician was consulted. Upon review of patient's case and correspondence with PT/OT therapy services, [facility]  physician feels Esperanza will benefit and is a good candidate / appropriate for inpatient acute rehab at this time. She has demonstrated the willingness / desire and tolerance to participate in the required 3 hours or more of therapies per day.     Prior to admission, patient  was independent with her ADLs and IADLs.  She ambulated without an AD. She is now functioning below her baseline requiring supervision to mod assistance to complete her ADLs.  With PT, she is requiring supervision assistance for transfers.  She ambulated a total of 460 feet with a RW and CGA.  Gait pattern: wide WENDY, decreased foot clearance, short stride, excessively slow, decreased toe off and decreased heel strike.    Patient requires close oversight by a physician, PM&R management, 24/7 rehabilitation nursing care and specialized interdisciplinary therapy services in preparation for discharge which can only be provided in the inpatient acute rehabilitation setting.  While on ARC, this patient will need close monitoring of her VS, I/Os, skin, respiratory status, labs and her overall condition.  Inpatient acute rehabilitation is recommended for this patient to maximize her overall strength, endurance, self-care and mobility upon discharge back to her home with the support of her family.

## 2025-02-08 NOTE — DISCHARGE SUMMARY
Discharge Summary - Hospitalist   Name: Esperanza Prajapati 54 y.o. female I MRN: [id number]  Unit/Bed#: E4 -01 I Date of Admission: 1/19/2025   Date of Service: 2/8/2025 I Hospital Day: 19     Assessment & Plan  Acute respiratory failure with hypoxia (HCC)  Due to influenza, possible multifocal aspiration pneumonia.  S/p bronchoscopy 1/24. Mixed carmen  Required intubation 1/21/2025 - 1/30/2025, and was extubated to HFNC. Currently on 2L NC.  Appreciate pulmonary recommendations. Cleared by pulmonary for discharge.   Discharge to rehab today  Viral sepsis (HCC)  Due to Influenza  Toxic metabolic encephalopathy  Due to viral sepsis, hypoxia, Influenza.   Continue supportive care  Influenza A  Completed tamiflu  Fall at home, initial encounter  Presented after a fall which resulted in a head strike. CTH without acute abnormalities  Postoperative hypothyroidism  History of papillary thyroid carcinoma in 2013 s/p total thyroidectomy and I-131 ablation  Continue Levothyroxine 175 mcg   Focal epilepsy (HCC)  Lacosamide 150 mg daily in the morning, 200mg at bedtime  On divalproex for mood stabilization 750mg BID  Cognitive developmental delay  History of developmental delay due to childhood head injury at 10months of age  Continue supportive care  Schizoaffective disorder (HCC)  Home regimen: Cogentin 2mg BID for EPS, Depakote 750mg BID for mood lability, Invega 6mg qd for psychosis, Trazodone 50mg QHS for insomnia. Takes prn, Olanzapine 10 mg at bedtime   1/11/25: Refills denied by outpatient psychiatrist.  Needs follow-up appt; last visit in October  Hypernatremia  Resolved    Recent Labs     02/06/25  0552   SODIUM 141             Hyperglycemia    Pneumonia of both lungs due to infectious organism  Completed 7 days of cephalosporins, 3 days of azithromycin, and Tamiflu     Discharging Physician / Practitioner: Yariel Acharya MD  PCP: Foster Biswas MD  Admission Date:   Admission Orders (From admission,  onward)       Ordered        01/20/25 0218  INPATIENT ADMISSION  Once                          Discharge Date: 02/08/25    Medical Problems       Resolved Problems  Date Reviewed: 2/3/2025          Resolved    Tachycardia 1/26/2025     Resolved by  BRINA Nazario    Hyponatremia 1/22/2025     Resolved by  Yoana Garrido MD          Consultations During Hospital Stay:  Nuetrition, critical care    Procedures Performed:   Intubation    Significant Findings / Test Results:   Imaging  CT head:  No acute intracranial abnormality.     CT chest abdomen pelvis with contrast:  No acute findings in the chest, abdomen or pelvis, noting images degraded by motion artifact.     CT spine cervical without contrast:      1.  No cervical spine fracture or traumatic malalignment, noting images are degraded by motion artifact.  2.  Mildly prominent bilateral level 2 lymph nodes, nonspecific.       X-ray chest:      Low lung volumes with bibasilar opacity with loss of the medial left diaphragm which could be due to atelectasis. Pneumonia not excluded in the appropriate clinical setting.     X-ray chest:    ET tube 4 cm above the dennis.     Worsening pulmonary edema     Dense left lower lobe consolidation with air bronchograms which could be due to pneumonia in the appropriate clinical setting.       X-ray chest:    Right jugular catheter in right brachiocephalic vein with no pneumothorax.     Persistent pulmonary edema.     Dense consolidation in the left lower lobe which could be due to pneumonia in the appropriate clinical setting.    X-ray chest:      Lines and tubes appear satisfactory.  Pulmonary edema pattern with pleural effusions. Low lung volumes. Pneumonia not entirely excluded in the appropriate clinical situation, particularly in the left lower lobe.       X-ray chest:    Stable congestion with bilateral effusions and bibasilar airspace opacity.     X-ray chest:      Moderate pulmonary edema with moderate  pleural effusions and bibasilar atelectasis. Pneumonia not excluded in the appropriate clinical setting.       X-ray chest:    The left base is obscured due to combination of small effusion/atelectasis/consolidation     No worsening seen  No new consolidation  Tubes and lines in appropriate position    X-ray chest:    Small left pleural effusion. Bibasilar linear opacities likely represent atelectasis. Pneumonia is to be determined clinical grounds.       X-ray chest      Unchanged bibasilar opacities, atelectasis versus pneumonia.    Echo:      Left Ventricle: Left ventricular cavity size is normal. There is mild concentric hypertrophy. The left ventricular ejection fraction is 52% by visual estimation. Systolic function is low normal. Wall motion is normal. Diastolic function is normal.    Right Ventricle: Right ventricular cavity size is normal. Systolic function is normal.    Mitral Valve: There is trace regurgitation.    Tricuspid Valve: Pulmonary artery systolic pressures cannot be estimated due to lack of tricuspid regurgitation jet.    There is no study for comparison.    Incidental Findings:   Mildly prominent bilateral level 2 lymph nodes, nonspecific.     Outpatient Tests Requested:  PCP, CBC, BMP  Repeat X-ray in 4-6 weeks to document resolution if indicated    Complications:  none    Reason for Admission: influenza    Hospital Course:     Esperanza Prajapati is a 54 y.o. female patient who originally presented to the hospital on 1/19/2025 due to shortness of breath.    Patient is a 54-year-old female with history of intellectual disability, mood disorder, seizure disorder, and postoperative hypothyroidism who presented to the hospital due to mental status changes secondary to sepsis due to influenza A.  She required ICU level of care due to her acute hypoxic respiratory failure and required intubation.  She was eventually extubated and was placed on Vapotherm.  Fortunately she continued to show  "clinical improvement and has since then been downgraded to the floors.  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  Patient will be discharged to rehab today for further management of her medical condition.  She has since then completed her antibiotic course and Tamiflu.    Family / Patient agrees to follow-up with her providers as scheduled and to take her medications as prescribed. All questions were addressed.    she understood the need to seek immediate medical attention should she develop any chest pain, shortness of breath, severe pain, fever, chills, or any other concerning symptoms.    Please see above list of diagnoses and related plan for additional information.     Condition at Discharge: fair     Discharge Day Visit / Exam:     Subjective:  patient seen and examined at bedside. No new issues overnight.   Vitals: Blood Pressure: 125/78 (02/08/25 0730)  Pulse: 87 (02/08/25 0730)  Temperature: (!) 97.1 °F (36.2 °C) (02/08/25 0730)  Temp Source: Temporal (02/08/25 0730)  Respirations: 20 (02/08/25 0730)  Height: 5' 9\" (175.3 cm) (01/28/25 0733)  Weight - Scale: 105 kg (231 lb 7.7 oz) (02/06/25 0023)  SpO2: 96 % (02/08/25 0730)  Exam:   Physical Exam  Vitals reviewed.   Constitutional:       General: She is not in acute distress.  HENT:      Head: Normocephalic.      Nose: Nose normal.      Mouth/Throat:      Mouth: Mucous membranes are moist.   Eyes:      General: No scleral icterus.  Cardiovascular:      Rate and Rhythm: Normal rate.   Pulmonary:      Effort: Pulmonary effort is normal. No respiratory distress.   Abdominal:      General: There is no distension.      Palpations: Abdomen is soft.      Tenderness: There is no abdominal tenderness.   Skin:     General: Skin is warm.   Neurological:      Mental Status: She is alert.   Psychiatric:         Mood and Affect: Mood normal.         Behavior: Behavior normal.       Discussion with Family: " Layne    Discharge instructions/Information to patient and family:   See after visit summary for information provided to patient and family.      Provisions for Follow-Up Care:  See after visit summary for information related to follow-up care and any pertinent home health orders.      Disposition:     Rehab     Planned Readmission: no     Discharge Statement:  I spent 40 minutes discharging the patient. This time was spent on the day of discharge. I had direct contact with the patient on the day of discharge. Greater than 50% of the total time was spent examining patient, answering all patient questions, arranging and discussing plan of care with patient as well as directly providing post-discharge instructions.  Additional time then spent on discharge activities.    Discharge Medications:  See after visit summary for reconciled discharge medications provided to patient and family.      ** Please Note: This note has been constructed using a voice recognition system **

## 2025-02-08 NOTE — ASSESSMENT & PLAN NOTE
Presented on 1/19/25 with respiratory failure related to Influenza A  Intubated 1/21/25 - 1/30/25.  Required HFNC  Weaning down from oxygen.  Currently needing 0-2L oxygen  Improving  Continue IS, OPEP, and pulmonary toileting  Aspiration precautions, deep breathing  Monitor pulmonary status while in ARC  Follow-up with Pulmonary as outpatient

## 2025-02-08 NOTE — TREATMENT PLAN
Individualized Plan of Care - [facility]  Esperanza Prajapati 54 y.o. female MRN: [id number]  Unit/Bed#: -01 Encounter: [id number]     PATIENT INFORMATION  ADMISSION DATE: 2/8/2025 11:54 AM SHAWN CATEGORY: Debility related to acute respiratory failure secondary to influenza   ADMISSION DIAGNOSIS: Acute respiratory failure (HCC) [J96.00]  EXPECTED LOS: 2-3 weeks     MEDICAL/FUNCTIONAL PROGNOSIS  Based on my assessment of the patient's medical conditions and current functional status, the prognosis for attaining medical and functional goals or the IRF stay is:  Good    Medical Goals: Patient will be medically stable for discharge to [facility] upon completion of rehab program and Patient will be able to manage medical conditions and comorbid conditions with medications and follow up upon completion of rehab program    ANTICIPATED DISCHARGE DISPOSITION AND SERVICES  COMMUNITY SETTING: Home - Assistance    ANTICIPATED FOLLOW-UP SERVICE:   Outpatient Therapy Services: PT, OT, and SLP   vs   Home Health Services: PT, OT, and SLP      DISCIPLINE SPECIFIC PLANS:  Required Disciplines & Services: Rehabillitation Nursing, Case Management, and Repiratory Therapy    REQUIRED THERAPY:  Therapy Hours per Day Days per Week Total Days   Physical Therapy 1 5-6 7   Occupational Therapy 1 5-6 7   Speech/Language Therapy 1 5-6 7   NOTE: Additional therapy time(s) may be added as appropriate to meet patient needs and to achieve functional goals.    ANTICIPATED FUNCTIONAL OUTCOMES:  ADL:  Mod I with LRAD   Bladder/Bowel:  Mod I with LRAD   Transfers:  Mod I with LRAD   Locomotion:  Mod I with LRAD   Cognitive:       DISCHARGE PLANNING NEEDS  Equipment needs: Discharge needs to be reviewed with team      REHAB ANTICIPATED PARTICIPATION RESTRICTIONS:  None

## 2025-02-08 NOTE — PLAN OF CARE
Problem: Potential for Falls  Goal: Patient will remain free of falls  Description: INTERVENTIONS:  - Educate patient/family on patient safety including physical limitations  - Instruct patient to call for assistance with activity   - Consult OT/PT to assist with strengthening/mobility   - Keep Call bell within reach  - Keep bed low and locked with side rails adjusted as appropriate  - Keep care items and personal belongings within reach  - Initiate and maintain comfort rounds  - Make Fall Risk Sign visible to staff  - Offer Toileting every 2 Hours, in advance of need  - Initiate/Maintain bed alarm  - Obtain necessary fall risk management equipment:   - Apply yellow socks and bracelet for high fall risk patients  - Consider moving patient to room near nurses station  Outcome: Adequate for Discharge     Problem: Prexisting or High Potential for Compromised Skin Integrity  Goal: Skin integrity is maintained or improved  Description: INTERVENTIONS:  - Identify patients at risk for skin breakdown  - Assess and monitor skin integrity  - Assess and monitor nutrition and hydration status  - Monitor labs   - Assess for incontinence   - Turn and reposition patient  - Assist with mobility/ambulation  - Relieve pressure over bony prominences  - Avoid friction and shearing  - Provide appropriate hygiene as needed including keeping skin clean and dry  - Evaluate need for skin moisturizer/barrier cream  - Collaborate with interdisciplinary team   - Patient/family teaching  - Consider wound care consult   Outcome: Adequate for Discharge     Problem: PAIN - ADULT  Goal: Verbalizes/displays adequate comfort level or baseline comfort level  Description: Interventions:  - Encourage patient to monitor pain and request assistance  - Assess pain using appropriate pain scale  - Administer analgesics based on type and severity of pain and evaluate response  - Implement non-pharmacological measures as appropriate and evaluate response  -  Consider cultural and social influences on pain and pain management  - Notify physician/advanced practitioner if interventions unsuccessful or patient reports new pain  Outcome: Adequate for Discharge     Problem: INFECTION - ADULT  Goal: Absence or prevention of progression during hospitalization  Description: INTERVENTIONS:  - Assess and monitor for signs and symptoms of infection  - Monitor lab/diagnostic results  - Monitor all insertion sites, i.e. indwelling lines, tubes, and drains  - Monitor endotracheal if appropriate and nasal secretions for changes in amount and color  - [place] appropriate cooling/warming therapies per order  - Administer medications as ordered  - Instruct and encourage patient and family to use good hand hygiene technique  - Identify and instruct in appropriate isolation precautions for identified infection/condition  Outcome: Adequate for Discharge     Problem: SAFETY ADULT  Goal: Patient will remain free of falls  Description: INTERVENTIONS:  - Educate patient/family on patient safety including physical limitations  - Instruct patient to call for assistance with activity   - Consult OT/PT to assist with strengthening/mobility   - Keep Call bell within reach  - Keep bed low and locked with side rails adjusted as appropriate  - Keep care items and personal belongings within reach  - Initiate and maintain comfort rounds  - Make Fall Risk Sign visible to staff  - Offer Toileting every 2 Hours, in advance of need  - Initiate/Maintain bed alarm  - Obtain necessary fall risk management equipment:   - Apply yellow socks and bracelet for high fall risk patients  - Consider moving patient to room near nurses station  Outcome: Adequate for Discharge  Goal: Maintain or return to baseline ADL function  Description: INTERVENTIONS:  -  Assess patient's ability to carry out ADLs; assess patient's baseline for ADL function and identify physical deficits which impact ability to perform ADLs (bathing,  care of mouth/teeth, toileting, grooming, dressing, etc.)  - Assess/evaluate cause of self-care deficits   - Assess range of motion  - Assess patient's mobility; develop plan if impaired  - Assess patient's need for assistive devices and provide as appropriate  - Encourage maximum independence but intervene and supervise when necessary  - Involve family in performance of ADLs  - Assess for home care needs following discharge   - Consider OT consult to assist with ADL evaluation and planning for discharge  - Provide patient education as appropriate  Outcome: Adequate for Discharge  Goal: Maintains/Returns to pre admission functional level  Description: INTERVENTIONS:  - Perform AM-PAC 6 Click Basic Mobility/ Daily Activity assessment daily.  - Set and communicate daily mobility goal to care team and patient/family/caregiver.   - Collaborate with rehabilitation services on mobility goals if consulted  - Perform Range of Motion 4 times a day.  - Reposition patient every 2 hours.    - Out of bed for toileting  - Record patient progress and toleration of activity level   Outcome: Adequate for Discharge     Problem: DISCHARGE PLANNING  Goal: Discharge to home or other facility with appropriate resources  Description: INTERVENTIONS:  - Identify barriers to discharge w/patient and caregiver  - Arrange for needed discharge resources and transportation as appropriate  - Identify discharge learning needs (meds, wound care, etc.)  - Arrange for interpretive services to assist at discharge as needed  - Refer to Case Management Department for coordinating discharge planning if the patient needs post-hospital services based on physician/advanced practitioner order or complex needs related to functional status, cognitive ability, or social support system  Outcome: Completed     Problem: NEUROSENSORY - ADULT  Goal: Achieves stable or improved neurological status  Description: INTERVENTIONS  - Monitor and report changes in  neurological status  - Monitor vital signs such as temperature, blood pressure, glucose, and any other labs ordered   - Initiate measures to prevent increased intracranial pressure  - Monitor for seizure activity and implement precautions if appropriate      Outcome: Adequate for Discharge  Goal: Remains free of injury related to seizures activity  Description: INTERVENTIONS  - Maintain airway, patient safety  and administer oxygen as ordered  - Monitor patient for seizure activity, document and report duration and description of seizure to physician/advanced practitioner  - If seizure occurs,  ensure patient safety during seizure  - Reorient patient post seizure  - Seizure pads on all 4 side rails  - Instruct patient/family to notify RN of any seizure activity including if an aura is experienced  - Instruct patient/family to call for assistance with activity based on nursing assessment  - Administer anti-seizure medications if ordered    Outcome: Adequate for Discharge  Goal: Achieves maximal functionality and self care  Description: INTERVENTIONS  - Monitor swallowing and airway patency with patient fatigue and changes in neurological status  - Encourage and assist patient to increase activity and self care.   - Encourage visually impaired, hearing impaired and aphasic patients to use assistive/communication devices  Outcome: Adequate for Discharge     Problem: GENITOURINARY - ADULT  Goal: Maintains or returns to baseline urinary function  Description: INTERVENTIONS:  - Assess urinary function  - Encourage oral fluids to ensure adequate hydration if ordered  - Administer IV fluids as ordered to ensure adequate hydration  - Administer ordered medications as needed  - Offer frequent toileting  - Follow urinary retention protocol if ordered  Outcome: Adequate for Discharge  Goal: Absence of urinary retention  Description: INTERVENTIONS:  - Assess patient’s ability to void and empty bladder  - Monitor I/O  - Bladder  scan as needed  - Discuss with physician/AP medications to alleviate retention as needed  - Discuss catheterization for long term situations as appropriate  Outcome: Adequate for Discharge     Problem: RESPIRATORY - ADULT  Goal: Achieves optimal ventilation and oxygenation  Description: INTERVENTIONS:  - Assess for changes in respiratory status  - Assess for changes in mentation and behavior  - Position to facilitate oxygenation and minimize respiratory effort  - Oxygen administered by appropriate delivery if ordered  - Initiate smoking cessation education as indicated  - Encourage broncho-pulmonary hygiene including cough, deep breathe, Incentive Spirometry  - Assess the need for suctioning and aspirate as needed  - Assess and instruct to report SOB or any respiratory difficulty  - Respiratory Therapy support as indicated  Outcome: Adequate for Discharge     Problem: HEMATOLOGIC - ADULT  Goal: Maintains hematologic stability  Description: INTERVENTIONS  - Assess for signs and symptoms of bleeding or hemorrhage  - Monitor labs  - Administer supportive blood products/factors as ordered and appropriate  Outcome: Adequate for Discharge     Problem: METABOLIC, FLUID AND ELECTROLYTES - ADULT  Goal: Electrolytes maintained within normal limits  Description: INTERVENTIONS:  - Monitor labs and assess patient for signs and symptoms of electrolyte imbalances  - Administer electrolyte replacement as ordered  - Monitor response to electrolyte replacements, including repeat lab results as appropriate  - Instruct patient on fluid and nutrition as appropriate  Outcome: Adequate for Discharge  Goal: Fluid balance maintained  Description: INTERVENTIONS:  - Monitor labs   - Monitor I/O and WT  - Instruct patient on fluid and nutrition as appropriate  - Assess for signs & symptoms of volume excess or deficit  Outcome: Adequate for Discharge     Problem: Nutrition/Hydration-ADULT  Goal: Nutrient/Hydration intake appropriate for  improving, restoring or maintaining nutritional needs  Description: Monitor and assess patient's nutrition/hydration status for malnutrition. Collaborate with interdisciplinary team and initiate plan and interventions as ordered.  Monitor patient's weight and dietary intake as ordered or per policy. Utilize nutrition screening tool and intervene as necessary. Determine patient's food preferences and provide high-protein, high-caloric foods as appropriate.     INTERVENTIONS:  - Monitor oral intake, urinary output, labs, and treatment plans  - Assess nutrition and hydration status and recommend course of action  - Evaluate amount of meals eaten  - Assist patient with eating if necessary   - Allow adequate time for meals  - Recommend/ encourage appropriate diets, oral nutritional supplements, and vitamin/mineral supplements  - Order, calculate, and assess calorie counts as needed  - Recommend, monitor, and adjust tube feedings and TPN/PPN based on assessed needs  - Assess need for intravenous fluids  - Provide specific nutrition/hydration education as appropriate  - Include patient/family/caregiver in decisions related to nutrition  Outcome: Adequate for Discharge     Problem: MUSCULOSKELETAL - ADULT  Goal: Maintain or return mobility to safest level of function  Description: INTERVENTIONS:  - Assess patient's ability to carry out ADLs; assess patient's baseline for ADL function and identify physical deficits which impact ability to perform ADLs (bathing, care of mouth/teeth, toileting, grooming, dressing, etc.)  - Assess/evaluate cause of self-care deficits   - Assess range of motion  - Assess patient's mobility  - Assess patient's need for assistive devices and provide as appropriate  - Encourage maximum independence but intervene and supervise when necessary  - Involve family in performance of ADLs  - Assess for home care needs following discharge   - Consider OT consult to assist with ADL evaluation and planning  for discharge  - Provide patient education as appropriate  Outcome: Adequate for Discharge  Goal: Maintain proper alignment of affected body part  Description: INTERVENTIONS:  - Support, maintain and protect limb and body alignment  - Provide patient/ family with appropriate education  Outcome: Adequate for Discharge

## 2025-02-08 NOTE — ASSESSMENT & PLAN NOTE
Acquired after total thyroidectomy 2/2 papillary thyroid carcinoma in 2013  Continue Synthroid 175 mcg daily

## 2025-02-08 NOTE — ASSESSMENT & PLAN NOTE
Possible bacterial PNA.   S/p treatment with Azithromycin  Continue supportive care  Continues on oxygen 0-2L

## 2025-02-08 NOTE — ARC ADMISSION
Patient is medically cleared for discharge and is approved for admission to [facility] today.  Patient will admit to [facility] room 269-1 and has a 10:30 transport time.  Report can be called to [phone].  CM has been updated with same in Secure Chat.

## 2025-02-08 NOTE — CASE MANAGEMENT
Case Management Discharge Planning Note    Patient name Esperanza Prajapati  Location [facility] /E4 -* MRN [id number]  : 1970 Date 2025       Current Admission Date: 2025  Current Admission Diagnosis:Acute respiratory failure with hypoxia (HCC)   Patient Active Problem List    Diagnosis Date Noted Date Diagnosed    Pneumonia of both lungs due to infectious organism 2025     Hypernatremia 2025     Hyperglycemia 2025     Viral sepsis (HCC) 2025     Toxic metabolic encephalopathy 2025     Fall at home, initial encounter 2025     Influenza A 2025     Acute respiratory failure with hypoxia (HCC) 2025     Decreased urination 10/23/2024     Dermatitis of face 2024     Impaired ability to manage medication regime 10/09/2023     Bilateral lower extremity edema 2023     h/o VALERIE (acute kidney injury) (HCC) 2023     Gout 2022     Schizoaffective disorder (HCC) 2022     Transient ischemic attack 2022     Vitamin B12 deficiency 2022     Dyslipidemia 2022     PCOS (polycystic ovarian syndrome) 2020     Obesity (BMI 30-39.9) 2017     Vitamin D deficiency 2016     Cognitive developmental delay 2014     Obstructive sleep apnea 2013     Focal epilepsy (HCC) 2013     History of thyroid cancer 2013     Proteinuria 2011     Postoperative hypothyroidism 06/10/2011     Family history of diabetes mellitus 06/10/2011     Family history of malignant neoplasm of breast 06/10/2011     Intellectual disability 06/10/2011       LOS (days): 19  Geometric Mean LOS (GMLOS) (days): 12.8  Days to GMLOS:-6.5     OBJECTIVE:  Risk of Unplanned Readmission Score: 21.77         Current admission status: Inpatient   Preferred Pharmacy:   [facility] - [place] - [address]  [address] [place] [address]  Phone: [phone] Fax: [phone]    Primary Care  Provider: Foster Biswas MD    [facility]  Secondary Insurance:     DISCHARGE DETAILS:                                                                   Transported by (Company and Unit #): [employer]  ETA of Transport (Date): 02/08/25  ETA of Transport (Time): 1030                            Accepting Facility Name, City & State : [facility]  Receiving Facility/Agency Phone Number: [phone]

## 2025-02-08 NOTE — PCC PHYSICAL THERAPY
Esperanza is a 54-year-old female with a history of intellectual disability mood disorder seizure disorder and postoperative hypothyroidism who presented to the hospital 1/20/25 with change in mental status found to have sepsis secondary to influenza A, requiring intubation 1/21/25 - 1/30/25. Brochoscopy done 1/24/25. Completed Tamiflu treatment. Patient with superimposed bacterial PNA. Medical course complicated by dysphagia.  After medical stabilization, patient found to have acute functional deficits from baseline in mobilty, self care, swallow, therefore admitted to [facility] on 2/8/25 for acute inpatient rehabiltiation.  At baseline pt lives with nephew in  home. 0 GRAEME front, 4 GRAEME back, 1/2 bath first floor, full bath and bedroom second floor with 16 steps. ?HR, pt unable to recall. Pt did not use AD, and is non -, and is unable to read. Per OT who spoke with pts sister, mother has been placed in a home due to dementia, and nephew pt has been staying with recently got a job, will stays with pts x-LEON (Nik) does not work, 2nd floor apt. Family asked OT about HHA vs waiver program options, to notify casemgmt.     Pt presents on RA, denies SOB/MAK with activity. +mild dec balance and dec endurance requiring min A for balance and safety with gait and transfers, without AD. Pt to benefit from skilled PT intervention to address endurance deficits as pt needs to be near mod I for DC home with family support. Questionable mod I PTA given h/o cognitive deficits. Pt overall demonstrates good rehab potential to reach S/mod I goals without AD for safe return home with family support. Anticipate no DME needs, and questionable need for f/u therapies pending progress and support.     2/11/25  Pt near baseline mobility at S level, no AD. Await family input for DC home. Anticipate by end of week. No DME. FT for ST prior to DC.

## 2025-02-08 NOTE — PROGRESS NOTES
PHYSICAL MEDICINE AND REHABILITATION   PREADMISSION ASSESSMENT     Projected IGC and Rehabilitation Diagnoses:  Impairment of mobility, safety, Activities of Daily Living (ADLs), and cognitive/communication skills due to Debility:  16  Debility (Non-cardiac/Non-pulmonary)  Etiologic: Acute respiratory failure with hypoxia D/T influenza A      Date of Onset: 1/20/25     Date of surgery: 1/24/25 Bronchoscopy     PATIENT INFORMATION  Name: Esperanza Prajapati Phone #: [phone] (home)   Address: [address] [address]  YOB: 1970 Age: 54 y.o. #   Marital Status: Single  Ethnicity: Another  /a or Lao origin   Employment Status: on disability  Extended Emergency Contact Information  Primary Emergency Contact: NéstorLayne  Mobile Phone: [phone]  Relation: Sister  Secondary Emergency Contact: Sofia Palm  Mobile Phone: [phone]  Relation: Sister In-Law  Advance Directive: Level 1 Full code ( No advance directive on file )    INSURANCE/COVERAGE:     Primary Payor: [employer] [facility] REP / Plan: [employer] / Product Type: Medicare HMO /   Secondary Payer: SELF Pay    Payer Contact: see below Payer Contact: n/a   Contact Phone: see below  Contact Phone: n/a     Authorization #: [id number]  Start of Care: 2/8/25  Next Review Date: 2/16/25  Continued Stay Care Coordinator: n/a  Submit next review to: [phone]                 Benefits: Dual eligibility plan SNAP  covered 100%.     MEDICARE #: [id number]   Medicare Days: n/a      Medical Record #: [id number]    REFERRAL SOURCE:   Referring provider: Yariel Acharya MD  Referring facility: [facility]   Room: East  /E4 -*  PCP: Foster Biswas MD PCP phone number: [phone]    MEDICAL INFORMATION  HPI: Esperanza is a 54 y.o. female with a medical history of but not limited to  intellectual disability ;  Schizoaffective disorder ;Focal epilepsy; thyroid Ca who presents with c/o altered mental status. As per family, she was ill approximately 2 weeks ago with some coughing. This persisted. She had been acting more confused over the last 2 days prior to presentation that they described as some brain fog and say that she put on her pants backwards which is something she typically would not do. Day of presentation to hospital, when she was ambulating up the stairs she had a witnessed fall and struck her head on the wall. She has been acting increasingly confused since this fall. She is not known to have any fevers. She typically is able to hold a conversation and is alert and oriented. She typically ambulates on her own. Since this event she was oriented only to her name but not to time or place. She was admitted for further evaluation/assessment/consultation. During hospital course / stay - developed  viral sepsis /toxic metabolic encephalopathy d/t Influenza A. Required intubation 1/21/2025 - 1/30/2025, and was extubated to HFNC. Transitioned to 2L NC. Now on room air. She completed Tamiflu. PT/OT therapies were consulted and continue to follow patient at this time and are recommending inpatient acute rehab when medically stable. All involved medical disciplines feel/agree patient is medically ready for discharge at this time. Inpatient acute rehabilitation physician was consulted. Upon review of patient's case and correspondence with PT/OT therapy services, [facility] physician feels Esperanza will benefit and is a good candidate / appropriate for inpatient acute rehab at this time. She has demonstrated the willingness / desire and tolerance to participate in the required 3 hours or more of therapies per day.     Past Medical History:   Past Surgical History:   Allergies:     Past Medical History:   Diagnosis Date    Cancer (HCC)     thyroid cancer    Disease of thyroid gland     Hallucination      Hyperactivity (behavior)     Last Assessed: 11/7/2014     Hyperlipidemia     Hyponatremia 01/20/2025    Obesity     Psychosis (HCC)     Seizures (HCC)     Past Surgical History:   Procedure Laterality Date    OTHER SURGICAL HISTORY      Removal of urinary calculus     OTHER SURGICAL HISTORY      Rhinologic Surgery     GA OPEN TX PHALANGEAL SHAFT FRACTURE PROX/MIDDLE EA Left 8/3/2023    Procedure: CLOSED REDUCTION PERCUTANEOUS PINNING - LEFT RING FINGER;  Surgeon: Fransisco Escalante MD;  Location[facility] MAIN OR;  Service: Orthopedics    TOTAL THYROIDECTOMY      LAst Assessed: 11/7/2014     No Known Allergies      Medical/functional conditions requiring inpatient rehabilitation: impaired mobility/self care ; impaired balance / ambulation     Risk for medical/clinical complications: risk for falls; pain;seizures;skin break down ; infection     Comorbidities/Surgeries in the last 100 days:   Influenza A  Acute respiratory failure with hypoxia   Viral sepsis   Toxic metabolic encephalopathy   Focal epilepsy   Schizoaffective disorder   Hypernatremia   Hyperglycemia   Pneumonia of both lungs due to infectious organism   CURRENT VITAL SIGNS:   Temp:  [98.5 °F (36.9 °C)-99 °F (37.2 °C)] 98.8 °F (37.1 °C)  HR:  [81-92] 88  BP: (108-128)/(60-86) 111/81  Resp:  [18-20] 18  SpO2:  [94 %-96 %] 94 %  O2 Device: None (Room air)  FiO2 (%):  [50] 50   Intake/Output Summary (Last 24 hours) at 2/8/2025 0109  Last data filed at 2/7/2025 1701  Gross per 24 hour   Intake 710 ml   Output --   Net 710 ml        LABORATORY RESULTS:      Lab Results   Component Value Date    HGB 10.0 (L) 02/06/2025    HGB 13.6 06/19/2018    HCT 29.9 (L) 02/06/2025    HCT 40.4 06/19/2018    WBC 3.27 (L) 02/06/2025    WBC 3.8 (L) 06/19/2018     Lab Results   Component Value Date    BUN 14 02/06/2025    BUN 19 04/11/2024     06/19/2018    K 3.7 02/06/2025    K 4.5 04/11/2024     02/06/2025     04/11/2024    GLUCOSE 106 08/26/2013     CREATININE 0.74 02/06/2025    CREATININE 0.75 04/11/2024     Lab Results   Component Value Date    PROTIME 14.3 01/19/2025    PROTIME 13.5 08/26/2013    INR 1.09 01/19/2025    INR 1.08 08/26/2013        DIAGNOSTIC STUDIES:  Bronchoscopy  Result Date: 1/24/2025  Impression: The lower trachea, main dennis, left main stem, DONNY, lingula, LLL, right main stem, RUL, bronchus intermedius, RML and RLL appeared normal. Minimal, thin and clear secretions present RECOMMENDATION: Follow up bronchoscopy Attestation: I was the critical care attending during the entire procedure by bedside helping and supervising on 1/24/2025 Forrest Garrett MD     XR chest portable ICU  Result Date: 1/24/2025  Impression: Moderate pulmonary edema with moderate pleural effusions and bibasilar atelectasis. Pneumonia not excluded in the appropriate clinical setting. Workstation performed: [id number]     XR chest portable ICU  Result Date: 1/23/2025  Impression: Stable congestion with bilateral effusions and bibasilar airspace opacity. Workstation performed: [id number]     XR chest portable  Result Date: 1/22/2025  Impression: Lines and tubes appear satisfactory. Pulmonary edema pattern with pleural effusions. Low lung volumes. Pneumonia not entirely excluded in the appropriate clinical situation, particularly in the left lower lobe. Workstation performed: [id number]     XR chest portable ICU  Result Date: 1/21/2025  Impression: ET tube 4 cm above the dennis. Worsening pulmonary edema Dense left lower lobe consolidation with air bronchograms which could be due to pneumonia in the appropriate clinical setting. Workstation performed: [id number]     XR chest portable ICU  Result Date: 1/21/2025  Impression: Right jugular catheter in right brachiocephalic vein with no pneumothorax. Persistent pulmonary edema. Dense consolidation in the left lower lobe which could be due to pneumonia in the appropriate clinical setting. Workstation performed: [id number]      XR chest portable  Result Date: 1/21/2025  Impression: Low lung volumes with bibasilar opacity with loss of the medial left diaphragm which could be due to atelectasis. Pneumonia not excluded in the appropriate clinical setting. Workstation performed: [id number]     CT chest abdomen pelvis w contrast  Result Date: 1/20/2025  Impression: No acute findings in the chest, abdomen or pelvis, noting images degraded by motion artifact. Workstation performed: [id number]     CT cervical spine without contrast  Result Date: 1/20/2025  Impression: 1.  No cervical spine fracture or traumatic malalignment, noting images are degraded by motion artifact. 2.  Mildly prominent bilateral level 2 lymph nodes, nonspecific. Workstation performed: [id number]     CT head without contrast  Result Date: 1/20/2025  Impression: No acute intracranial abnormality. Workstation performed: [id number]       PRECAUTIONS/SPECIAL NEEDS:  Tobacco:   Social History     Tobacco Use   Smoking Status Never    Passive exposure: Never   Smokeless Tobacco Never   , Alcohol:    Social History     Substance and Sexual Activity   Alcohol Use Never   , Weight Bearing Precautions:  weight bearing as tolerated, Anticoagulation:   Lovenox SQ @ ordered by MD , Edema Management, Safety Concerns, Pain Management, Dietary Restrictions: Dysphagia 1 - Pureed/thin liq, Language Preference: English, and Fall precautions    MEDICATIONS:     Current Facility-Administered Medications:     Acetaminophen (TYLENOL) oral suspension 975 mg, 975 mg, Oral, Q6H PRN, Yariel Acharya MD, 975 mg at 01/27/25 1209    aluminum-magnesium hydroxide-simethicone (MAALOX) oral suspension 30 mL, 30 mL, Oral, Q6H PRN, Yariel Acharya MD    benztropine (COGENTIN) tablet 2 mg, 2 mg, Oral, BID, Yariel Acharya MD, 2 mg at 02/07/25 1747    cyanocobalamin (VITAMIN B-12) tablet 1,000 mcg, 1,000 mcg, Oral, Daily, Yariel Acharya MD, 1,000 mcg at 02/07/25 0922    divalproex sodium (DEPAKOTE) DR tablet 750 mg,  750 mg, Oral, Q12H MOSHE, Yariel Acharya MD, 750 mg at 02/07/25 2148    enoxaparin (LOVENOX) subcutaneous injection 40 mg, 40 mg, Subcutaneous, Daily, Yariel Acharya MD, 40 mg at 02/07/25 0922    ipratropium-albuterol (DUO-NEB) 0.5-2.5 mg/3 mL inhalation solution 3 mL, 3 mL, Nebulization, Q8H PRN, Yariel Acharya MD    lacosamide (VIMPAT) tablet 150 mg, 150 mg, Oral, Daily, Yariel Acharya MD, 150 mg at 02/07/25 0922    lacosamide (VIMPAT) tablet 200 mg, 200 mg, Oral, HS, Yariel Acharya MD, 200 mg at 02/07/25 2148    levothyroxine tablet 175 mcg, 175 mcg, Oral, Early Morning, Yariel Acharya MD, 175 mcg at 02/07/25 0628    melatonin tablet 6 mg, 6 mg, Oral, HS, Yariel Acharya MD, 6 mg at 02/07/25 2148    OLANZapine (ZyPREXA) tablet 10 mg, 10 mg, Oral, HS, Yariel Acharya MD, 10 mg at 02/07/25 2148    ondansetron (ZOFRAN) injection 4 mg, 4 mg, Intravenous, Q6H PRN, Yariel Acharya MD    paliperidone (INVEGA) 24 hr tablet 6 mg, 6 mg, Oral, QAM, Yariel Acharya MD, 6 mg at 02/07/25 0922    polyethylene glycol (MIRALAX) packet 17 g, 17 g, Oral, Daily PRN, Yariel Acharya MD, 17 g at 01/30/25 0720    senna-docusate sodium (SENOKOT S) 8.6-50 mg per tablet 2 tablet, 2 tablet, Oral, BID, Yariel Acharya MD, 2 tablet at 02/07/25 1747    SKIN INTEGRITY:   no rashes, no erythema, no peripheral edema    PRIOR LEVEL OF FUNCTION:  She lives in a(n) single family home  Esperanza Prajapati is not  and lives with their family.  Self Care: Independent, Indoor Mobility: Independent, Stairs (in/outdoor): Independent, and Cognition: Needed some help    FALLS IN THE LAST 6 MONTHS: 1 to 4    HOME ENVIRONMENT:  The living area: can live on one level  There are 4 steps to enter the home.    The patient will have 24 hour supervision/physical assistance available upon discharge.    PREVIOUS DME:  Equipment in home (previous DME): None    FUNCTIONAL STATUS:  Physical Therapy Occupational Therapy Speech Therapy   As per PT:     PT Visit Date 02/06/25    Note Type   Note Type Treatment   Pain Assessment   Pain Assessment Tool 0-10   Pain Score No Pain   Restrictions/Precautions   Weight Bearing Precautions Per Order No   Other Precautions Chair Alarm;Fall Risk;O2   General   Chart Reviewed Yes   Subjective   Subjective Pt. agreeable to PT   Transfers   Sit to Stand 5  Supervision   Additional items Verbal cues;Armrests   Stand to Sit 5  Supervision   Additional items Assist x 1;Armrests   Stand pivot 5  Supervision   Additional items Assist x 1;Increased time required   Ambulation/Elevation   Gait pattern Wide WENDY;Decreased foot clearance;Short stride;Excessively slow;Decreased toe off;Decreased heel strike   Gait Assistance    (CGA/CS)   Additional items Assist x 1;Verbal cues   Assistive Device Rolling walker   Distance 220ft x 2, 10ft x 2   Stair Management Assistance 4  Minimal assist   Additional items Assist x 1;Verbal cues;Increased time required   Stair Management Technique Two rails;Step to pattern;Foreward;Nonreciprocal   Number of Stairs 4   Balance   Static Sitting Good   Dynamic Sitting Fair +   Static Standing Fair   Dynamic Standing Fair -   Ambulatory Fair -   Endurance Deficit   Endurance Deficit No   Activity Tolerance   Activity Tolerance Patient tolerated treatment well   Nurse Made Aware yes   Assessment   Prognosis Good   Problem List Decreased strength;Decreased range of motion;Decreased endurance;Decreased mobility;Impaired judgement   Assessment Pt. progressign well with voerall mobility. Pt. able to ambulate longer distance this session and was on RA and SPO2 stats noted 95% t/o session. Pt. given cues for LE sequencing for stair negotiation ascending leading with RLE and descended with LLE. Pt. had seated rests between ambulation and stiar negotiation. Pt. seated on chair post sessiojn with all needs within reach and on .5 L O2 as she was at the beginning of the session. No LOB noted t/o session however noted tremors in UEs and  "unsteadiness in LEs occ. Will continue to follow epr PT POC. Recommended pt. to ambulate with staff on the unit    As per OT:     02/06/25 0928    Note Type   Note Type Treatment   Pain Assessment   Pain Assessment Tool 0-10   Pain Score No Pain   Pain Rating: FLACC (Rest) - Face 0   Pain Rating: FLACC (Rest) - Legs 0   Pain Rating: FLACC (Rest) - Activity 0   Pain Rating: FLACC (Rest) - Cry 0   Pain Rating: FLACC (Rest) - Consolability 0   Score: FLACC (Rest) 0   Restrictions/Precautions   Weight Bearing Precautions Per Order No   Other Precautions Cognitive;Chair Alarm;Bed Alarm;Fall Risk;O2  (SPO2=89% on RA, on 1 liter of O2)   ADL   Where Assessed Edge of bed   Eating Assistance 5  Supervision/Setup   Grooming Assistance 5  Supervision/Setup   UB Bathing Assistance 4  Minimal Assistance   LB Bathing Assistance 3  Moderate Assistance   UB Dressing Assistance 4  Minimal Assistance   LB Dressing Assistance 3  Moderate Assistance   Functional Standing Tolerance   Time 2-3mins   Bed Mobility   Rolling R 5  Supervision   Additional items Increased time required;Verbal cues;LE management   Rolling L 5  Supervision   Additional items Increased time required;Verbal cues   Supine to Sit 4  Minimal assistance   Additional items Assist x 1;Increased time required;Verbal cues;LE management   Transfers   Sit to Stand 4  Minimal assistance   Additional items Assist x 1;Increased time required;Verbal cues   Stand to Sit 5  Supervision   Additional items Increased time required;Verbal cues   Functional Mobility   Functional Mobility 4  Minimal assistance   Additional Comments x1   Additional items Rolling walker   Subjective   Subjective \"I need a brush for my hair.\"   Cognition   Overall Cognitive Status Impaired   Arousal/Participation Alert   Attention Attends with cues to redirect   Orientation Level Oriented to person;Oriented to place;Oriented to time   Memory Decreased short term memory;Decreased recall of " precautions;Decreased recall of recent events   Following Commands Follows one step commands with increased time or repetition   Comments hx cognitive deficits   Activity Tolerance   Activity Tolerance Other (Comment);Patient limited by fatigue  (cognition)   Medical Staff Made Aware GINI patel, MD   Assessment   Assessment Pt seen for 54min tx session with focus on functional balance, functional mobility, ADL status, and cognition. Pt able to tolerate OOB mobility; sitting balance=f+/f, standing balance=f/f-. Pt required verbal cues and occasional physical assistance to maintain transfer safety; functional mobility improving. Pt demonstrating need for assistance with her UE and LE ADLs--verbal cues to completeness/problem-solving tasks; pt states independence with her ADLs premorbidly. Cognitive deficits noted--i.e.problem-solving, memory, higher-level direction following. Pt continues to demonstrate appropriateness for inpt rehab to improve her overall level of independence. Will continue. The patient's raw score on the -PAC Daily Activity Inpatient Short Form is 16. A raw score of less than 19 suggests the patient may benefit from discharge to post-acute rehabilitation services. Please refer to the recommendation of the Occupational Therapist for safe discharge planning.    As per SLP:     2/5/2025     Problem List  Principal Problem:    Acute respiratory failure with hypoxia (HCC)  Active Problems:    Cognitive developmental delay    Postoperative hypothyroidism    Focal epilepsy (HCC)    Schizoaffective disorder (HCC)    Viral sepsis (HCC)    Toxic metabolic encephalopathy    Fall at home, initial encounter    Influenza A    Hypernatremia    Hyperglycemia    Pneumonia of both lungs due to infectious organism        Past Medical History  Medical History        Past Medical History:   Diagnosis Date    Cancer (HCC)       thyroid cancer    Disease of thyroid gland      Hallucination      Hyperactivity (behavior)        Last Assessed: 2014     Hyperlipidemia      Hyponatremia 2025    Obesity      Psychosis (HCC)      Seizures (HCC)              Past Surgical History  Surgical History         Past Surgical History:   Procedure Laterality Date    OTHER SURGICAL HISTORY         Removal of urinary calculus     OTHER SURGICAL HISTORY         Rhinologic Surgery     [place] OPEN TX PHALANGEAL SHAFT FRACTURE PROX/MIDDLE EA Left 8/3/2023     Procedure: CLOSED REDUCTION PERCUTANEOUS PINNING - LEFT RING FINGER;  Surgeon: Fransisco Escalante MD;  Location: [facility] MAIN OR;  Service: Orthopedics    TOTAL THYROIDECTOMY         LAst Assessed: 2014               Subjective:  Pt alert. Flat affect.Ordered her a trial level 2 tray for lunch. Chose items earlier. ID not request mac and cheese but added to tray.    Objective:  Pt seen for po tolerance and additional recommendations. Sleepy in am per nurse. (Seems to be her norm while here). Upright and feeding self at lunch. Had tuna fish that was very fine/moist. Not really much mastication required, tolerated wfl. Took only a few bites of the mac and cheese, stated she didn't want any more. Fair mastication. Then ate her chocolate ice cream, drank multiple sips of juice and water. No cough or wet vocal quality.   Assessment:  [facility] though mastication required on items today. Limited intake of items that required mastication. No s/s w/ liquids.   Plan/Recommendations:  Continue puree w/ thin for now. ST to f/u w/ level 2 upgraded trials as able.         CARE SCORES:  Self Care:  Eatin: Supervision or touching  assistance  Oral hygiene: 04: Supervision or touching  assistance  Toilet hygiene: 04: Supervision or touching  assistance  Shower/bathing self: 03: Partial/moderate assistance  Upper body dressin: Partial/moderate assistance  Lower body dressin: Partial/moderate assistance  Putting on/taking off footwear: 03: Partial/moderate  assistance  Transfers:  Roll left and right: 04: Supervision or touching  assistance  Sit to lyin: Supervision or touching  assistance  Lying to sitting on side of bed: 04: Supervision or touching  assistance  Sit to stand: 03: Partial/moderate assistance  Chair/bed to chair transfer: 03: Partial/moderate assistance  Toilet transfer: 03: Partial/moderate assistance  Mobility:  Walk 10 ft: 04: Supervision or touching  assistance  Walk 50 ft with two turns: 04: Supervision or touching  assistance  Walk 150ft: 04: Supervision or touching  assistance    CURRENT GAP IN FUNCTION  Prior to Admission: Functional Status: Patient was independent with mobility/ambulation, transfers, ADL's, IADL's.    Expected functional outcomes: It is expected that with skilled acute rehabilitation services the patient will progress to Supervision for self care and Supervision for mobility     Estimated length of stay: 10 to 14 days    Anticipated Post-Discharge Disposition/Treatment  Disposition: Return to previous home/apartment.  Outpatient Services: Physical Therapy (PT) and Occupational Therapy (OT)    BARRIERS TO DISCHARGE  Lovenox, Weakness, Pain, Diminished cognition/Mentation change, Balance Difficulty, Fatigue, Caregiver Accessibility, and Equipment Needs    INTERVENTIONS FOR DISCHARGE  Adaptive equipment, Patient/Family/Caregiver Education, Arrange DME needs, Medication Changes per MD, Therapy exercises, and Energy conservation education     REQUIRED THERAPY:  Patient will require PT and OT 90 minutes each per day, five days per week to achieve rehab goals.     REQUIRED FUNCTIONAL AND MEDICAL MANAGEMENT FOR INPATIENT REHABILITATION:  Pain Management: Overall pain is well controlled, Deep Vein Thrombosis (DVT) Prophylaxis:  low molecular weight heparin, SCD's while in bed, and   Nursing education and bowel/bladder management, internal medicine to manage/monitor medical conditions, PM&R to maximize function and provide medical  oversight, PT/OT intervention, patient/family education/training, and any consults as needed     RECOMMENDED LEVEL OF CARE:   Esperanza is a 54 y.o. female with a medical history of but not limited to intellectual disability ;  Schizoaffective disorder ;Focal epilepsy; thyroid Ca who presents with c/o altered mental status. As per family, she was ill approximately 2 weeks ago with some coughing. This persisted. She had been acting more confused over the last 2 days prior to presentation that they described as some brain fog and say that she put on her pants backwards which is something she typically would not do. Day of presentation to hospital, when she was ambulating up the stairs she had a witnessed fall and struck her head on the wall. She has been acting increasingly confused since this fall. She is not known to have any fevers. She typically is able to hold a conversation and is alert and oriented. She typically ambulates on her own. Since this event she was oriented only to her name but not to time or place. She was admitted for further evaluation/assessment/consultation. During hospital course / stay - developed  viral sepsis /toxic metabolic encephalopathy d/t Influenza A. Required intubation 1/21/2025 - 1/30/2025, and was extubated to HFNC. Transitioned to 2L NC. Now on room air. She completed Tamiflu. PT/OT therapies were consulted and continue to follow patient at this time and are recommending inpatient acute rehab when medically stable. All involved medical disciplines feel/agree patient is medically ready for discharge at this time. Inpatient acute rehabilitation physician was consulted. Upon review of patient's case and correspondence with PT/OT therapy services, [facility] physician feels Esperanza will benefit and is a good candidate / appropriate for inpatient acute rehab at this time. She has demonstrated the willingness / desire and tolerance to participate in the required 3 hours or more of therapies per  day. Prior to admission / hospital stay Esperanza was independent with all ADLs /functional mobility / iADLs. Currently with PT therapies /  OT therapies : supervision-min A with upper ADLS ; mod A with lower body ADLs. Nursing is being recommended for medication distribution / management , bowel / bladder management and educational purposes , internal medicine to continue to monitor and manage medical conditions ,PM&R to maximize  function and provide medical oversight, and inpatient rehab to maximize self care ,mobility ,strength , and endurance upon discharge to home.

## 2025-02-09 PROCEDURE — 97530 THERAPEUTIC ACTIVITIES: CPT

## 2025-02-09 PROCEDURE — 94668 MNPJ CHEST WALL SBSQ: CPT

## 2025-02-09 PROCEDURE — 97163 PT EVAL HIGH COMPLEX 45 MIN: CPT

## 2025-02-09 PROCEDURE — 94664 DEMO&/EVAL PT USE INHALER: CPT

## 2025-02-09 PROCEDURE — 97167 OT EVAL HIGH COMPLEX 60 MIN: CPT

## 2025-02-09 PROCEDURE — 97116 GAIT TRAINING THERAPY: CPT

## 2025-02-09 PROCEDURE — 92526 ORAL FUNCTION THERAPY: CPT

## 2025-02-09 PROCEDURE — 97110 THERAPEUTIC EXERCISES: CPT

## 2025-02-09 RX ORDER — NYSTATIN 100000 [USP'U]/G
POWDER TOPICAL 2 TIMES DAILY
Status: DISCONTINUED | OUTPATIENT
Start: 2025-02-09 | End: 2025-02-17 | Stop reason: HOSPADM

## 2025-02-09 RX ADMIN — NYSTATIN: 100000 POWDER TOPICAL at 10:00

## 2025-02-09 RX ADMIN — Medication 6 MG: at 21:37

## 2025-02-09 RX ADMIN — BENZTROPINE MESYLATE 2 MG: 2 TABLET ORAL at 09:09

## 2025-02-09 RX ADMIN — LEVOTHYROXINE SODIUM 175 MCG: 0.1 TABLET ORAL at 05:58

## 2025-02-09 RX ADMIN — ENOXAPARIN SODIUM 40 MG: 40 INJECTION SUBCUTANEOUS at 09:08

## 2025-02-09 RX ADMIN — SENNOSIDES AND DOCUSATE SODIUM 2 TABLET: 50; 8.6 TABLET ORAL at 09:08

## 2025-02-09 RX ADMIN — OLANZAPINE 10 MG: 10 TABLET, FILM COATED ORAL at 21:37

## 2025-02-09 RX ADMIN — BENZTROPINE MESYLATE 2 MG: 2 TABLET ORAL at 17:49

## 2025-02-09 RX ADMIN — NYSTATIN: 100000 POWDER TOPICAL at 21:37

## 2025-02-09 RX ADMIN — DIVALPROEX SODIUM 750 MG: 250 TABLET, DELAYED RELEASE ORAL at 09:08

## 2025-02-09 RX ADMIN — PALIPERIDONE 6 MG: 6 TABLET, EXTENDED RELEASE ORAL at 09:09

## 2025-02-09 RX ADMIN — LACOSAMIDE 200 MG: 100 TABLET, FILM COATED ORAL at 21:37

## 2025-02-09 RX ADMIN — Medication 1000 MCG: at 09:08

## 2025-02-09 RX ADMIN — DIVALPROEX SODIUM 750 MG: 250 TABLET, DELAYED RELEASE ORAL at 21:37

## 2025-02-09 RX ADMIN — LACOSAMIDE 150 MG: 50 TABLET, FILM COATED ORAL at 09:08

## 2025-02-09 NOTE — PLAN OF CARE
Problem: SAFETY ADULT  Goal: Patient will remain free of falls  Description: INTERVENTIONS:  - Educate patient/family on patient safety including physical limitations  - Instruct patient to call for assistance with activity   - Consult OT/PT to assist with strengthening/mobility   - Keep Call bell within reach  - Keep bed low and locked with side rails adjusted as appropriate  - Keep care items and personal belongings within reach  - Initiate and maintain comfort rounds  - Make Fall Risk Sign visible to staff  - Offer Toileting every 4 Hours, in advance of need  - Initiate/Maintain  alarm  - Obtain necessary fall risk management equipment: rw  - Apply yellow socks and bracelet for high fall risk patients  - Consider moving patient to room near nurses station  Outcome: Progressing

## 2025-02-09 NOTE — PROGRESS NOTES
PT JOANIE  ARC TAA       02/09/25 1230   Patient Data   Rehab Impairment Impairment of mobility, safety, Activities of Daily Living (ADLs), and cognitive/communication skills due to Debility: 16 Debility (Non-cardiac/Non-pulmonary)   Etiologic Diagnosis Acute respiratory failure with hypoxia secondary to influenza A and possible aspiration pneumonia   Date of Onset 01/19/25   Home Setup   Type of Home Multi Level   Method of Entry   (no steps in front)   Number of Stairs in Home 16   First Floor Bathroom Half   First Floor Setup Available No   Home Modification Comment pending progress- see OT note.   Baseline Information   Vocation Other  (disability)   Transportation Family/friends drive   Prior Level of Function   Self-Care 3. Independent - Patient completed the activities by him/herself, with or without an assistive device, with no assistance from a helper.   Indoor-Mobility (Ambulation) 3. Independent - Patient completed the activities by him/herself, with or without an assistive device, with no assistance from a helper.   Stairs 3. Independent - Patient completed the activities by him/herself, with or without an assistive device, with no assistance from a helper.   Functional Cognition 2. Needed Some Help - Patient needed a partial assistance from another person to complete activities.   Prior Assistance Needed for Driving;Household Chores/Cleaning;Meal Preparation;Medication Management;Money Management;Shopping   Prior Device Used Z. None of the above   Falls in the Last Year   Number of falls in the past 12 months 1   Type of Injury Associated with Fall Injury  (recalls falling on steps prior to going to hospital - [facility] back [facility].)   Patient Preference   Nickname (Patient Preference) Edolly (pronounced e- yanni)   Psychosocial   Patient Behaviors/Mood Flat affect;Calm   Restrictions/Precautions   Precautions Aspiration;Bed/chair alarms;Fall Risk;Cognitive;Supervision on toilet/commode;Seizure   Pain  Assessment   Pain Assessment Tool 0-10   Pain Score No Pain   Toilet Transfer   Type of Assistance Needed Physical assistance   Physical Assistance Level 25% or less   Comment min A without AD for balance and safety.   Toilet Transfer CARE Score 3   Transfer Bed/Chair/Wheelchair   Type of Assistance Needed Physical assistance   Physical Assistance Level 25% or less   Comment min A for balance and safety without AD   Chair/Bed-to-Chair Transfer CARE Score 3   Roll Left and Right   Type of Assistance Needed Physical assistance   Physical Assistance Level 26%-50%   Roll Left and Right CARE Score 3   Sit to Lying   Type of Assistance Needed Physical assistance   Physical Assistance Level 25% or less   Comment claims she cant do it,. needs min A for LE lifting and trunk positioning. does not like HOB flat c/o inc back pain.   Sit to Lying CARE Score 3   Lying to Sitting on Side of Bed   Comment TBA - OOB in chair   Sit to Stand   Type of Assistance Needed Incidental touching   Physical Assistance Level No physical assistance   Sit to Stand CARE Score 4   Picking Up Object   Type of Assistance Needed Physical assistance   Physical Assistance Level 25% or less   Comment min A for balance to pickup small ball from floor.   Picking Up Object CARE Score 3   Car Transfer   Type of Assistance Needed Physical assistance   Physical Assistance Level 25% or less   Comment min A for balance and safety for simulated car transfer at [facility].   Car Transfer CARE Score 3   Ambulation   Primary Mode of Locomotion Prior to Admission Walk   Distance Walked (feet) 150 ft   Assist Device Other  (none)   Gait Pattern Wide WENDY;Inconsistant Elizabeth;Slow Elizabeth;Shuffle   Limitations Noted In Endurance;Heel Strike;Speed;Strength;Swing   Provided Assistance with: Balance   Walk Assist Level Minimum Assist   Findings min A for balance, denies SOB/MAK with activity.   Walk 10 Feet   Type of Assistance Needed Physical assistance   Physical Assistance  Level 25% or less   Comment min A no AD   Walk 10 Feet CARE Score 3   Walk 50 Feet with Two Turns   Type of Assistance Needed Physical assistance   Physical Assistance Level 25% or less   Comment min A no AD   Walk 50 Feet with Two Turns CARE Score 3   Walk 150 Feet   Type of Assistance Needed Physical assistance   Physical Assistance Level 25% or less   Comment min A no AD   Walk 150 Feet CARE Score 3   Walking 10 Feet on Uneven Surfaces   Type of Assistance Needed Physical assistance   Physical Assistance Level 25% or less   Comment min A over mat on floor HHA, no AD.   Walking 10 Feet on Uneven Surfaces CARE Score 3   Wheelchair mobility   Findings anticipate ambulatory at LA.   Wheel 50 Feet with Two Turns   Reason if not Attempted Activity not applicable   Wheel 50 Feet with Two Turns CARE Score 9   Wheel 150 Feet   Reason if not Attempted Activity not applicable   Wheel 150 Feet CARE Score 9   Curb or Single Stair   Style negotiated Curb   Type of Assistance Needed Physical assistance   Physical Assistance Level 51%-75%   Comment poor balance while stepping up on 8inch block. mod/max A for balance and safety.   1 Step (Curb) CARE Score 2   4 Steps   Type of Assistance Needed Physical assistance   Physical Assistance Level 26%-50%   Comment mild posterior lean decsending, B HR ( pt unable to recall which HR at baseline)   4 Steps CARE Score 3   12 Steps   Comment limited by ftg - has 16 steps at home.   Reason if not Attempted Safety concerns   12 Steps CARE Score 88   Stairs   Findings non reciprocall, use of B HR, pt unable to recall which HR at home or at [facility].   Memory   Initiates Tasks Yes   Short-Term Impaired   Long Term Impaired   Recalls Precaution No   RLE Assessment   RLE Assessment WFL  (grossly - observed functionally)   LLE Assessment   LLE Assessment WFL  (grossly - observed functionally)   Coordination   Movements are Fluid and Coordinated 1   Sensation   Light Touch No apparent  "deficits   Cognition   Overall Cognitive Status Impaired   Arousal/Participation Alert;Cooperative   Attention Attends with cues to redirect   Orientation Level Oriented X4   Memory Decreased short term memory;Decreased recall of recent events;Decreased recall of precautions   Following Commands Follows one step commands with increased time or repetition   Comments h/o intellectual delay from childhood injury - [place] speaking - does speak and understand english.   Vision   Vision Comments denies glasses.   Objective Measure   PT Measure(s) unsupported stand x1min ; 15 sec EC.   PT Findings (S)  May benefit from BBS trial.   Therapeutic Exercise   Therapeutic Exercise/Activity Nustep x7 min seat 8 B UE and LE; sats 95% post on RA. Seated DF/PF, red tband hip abd and HS curls, 2# LAQ, ball squeeze  x30 each.   Discharge Information   Vocational Plan Retired/not working   Patient's Discharge Plan home with family support.   Patient's Rehab Expectations \"i want real food and to go home\"   Barriers to Discharge Home Decreased Cognitive Function;Decreased Endurance;Safety Considerations   Impressions Esperanza is a 54-year-old female with a history of intellectual disability, mood disorder, seizure disorder, and postoperative hypothyroidism who presented to the hospital 1/20/25 with change in mental status found to have sepsis secondary to +influenza A, requiring intubation 1/21/25 - 1/30/25. Brochoscopy done 1/24/25. Completed Tamiflu treatment. Patient with superimposed bacterial PNA. Medical course complicated by dysphagia, pureed diet, thin liquids.  After medical stabilization, patient found to have acute functional deficits from baseline in mobilty, self care, swallow, therefore admitted to [facility] on 2/8/25 for acute inpatient rehabiltiation.  At baseline pt lives with nephew in  home. 0 GRAEME front, 4 GRAEME back, 1/2 bath first floor, full bath and bedroom second floor with 16 steps. ?HR, pt unable to recall. Pt did " not use AD, and is non -, and is unable to read. Per OT who spoke with pts sister, mother has been placed in a home due to dementia, and nephew pt has been staying with recently got a job, will stays with pts x-LEON (Nik) does not work, 2nd floor apt. Family asked OT about HHA vs waiver program options, to notify casemgmt.     Pt presents on RA, denies SOB/MAK with activity. +mild dec balance and dec endurance requiring min A for balance and safety with gait and transfers, without AD. Pt to benefit from skilled PT intervention to address endurance deficits as pt needs to be near mod I for DC home with family support. Questionable mod I PTA given h/o cognitive deficits. Pt overall demonstrates good rehab potential to reach S/mod I goals without AD for safe return home with family support. Anticipate no DME needs, and questionable need for f/u therapies pending progress and support in ELOS 7- 10 days.   PT Therapy Minutes   PT Time In 1230   PT Time Out 1355   PT Total Time (minutes) 85   PT Mode of treatment - Individual (minutes) 85   PT Mode of treatment - Concurrent (minutes) 0   PT Mode of treatment - Group (minutes) 0   PT Mode of treatment - Co-treat (minutes) 0   PT Mode of Treatment - Total time(minutes) 85 minutes   PT Cumulative Minutes 85   OT Therapy Minutes   OT Time In 0000   Cumulative Minutes   Cumulative therapy minutes 205

## 2025-02-09 NOTE — PROGRESS NOTES
OT LTG       02/09/25 0930   Rehab Team Goals   ADL Team Goal Patient will require supervision with ADLs with least restrictive device upon completion of rehab program   Rehab Team Interventions   OT Interventions Self Care;Home Management;Therapeutic Exercise;Patient/Family Education   Eating Goal   Eating Goal 06. Independent - Patient completes the activity by him/herself with no assistance from a helper.   Status Ongoing;Target goal - one week   Grooming Goal   Oral Hygiene Goal 06. Independent - Patient completes the activity by him/herself with no assistance from a helper.   Status Ongoing;Target goal - one week   Intervention Balance Work;Therapeutic Exercise;Tolerance Work   Tub/Shower Transfer Goal   Method Tub Shower   Status Ongoing;Target goal - one week   Interventions ADL Training;Assistive Device   Bathing Goal   Shower/bathe self Goal 04. Supervision or touching assistance- [place] provides VERBAL CUES or supervision throughout activity.   Status Ongoing;Target goal - one week   Intervention ADL Training;Assistive Device;Therapeutic Exercise   Upper Body Dressing Goal   Upper body dressing Goal 06. Independent - Patient completes the activity by him/herself with no assistance from a helper.   Status Ongoing;Target goal - one week   Intervention Balance Work;Therapeutic Exercise;Tolerance Work   Lower Body Dressing Goal   Lower body dressing Goal 06. Independent - Patient completes the activity by him/herself with no assistance from a helper.   Putting on/taking off footwear Goal 06. Independent - Patient completes the activity by him/herself with no assistance from a helper.   Status Ongoing;Target goal - one week   Intervention Assistive Device;Therapeutic Exercise;Tolerance Work   Toileting Transfer Goal   Toilet transfer Goal 04. Supervision or touching assistance- [place] provides VERBAL CUES or supervision throughout activity.   Status Ongoing;Target goal - one week   Intervention ADL  Training;Balance Work;Assistive Device   Toileting Goal   Toileting hygiene Goal 06. Independent - Patient completes the activity by him/herself with no assistance from a helper.   Status Ongoing;Target goal - one week   Intervention ADL Training;Balance Work;Assistive Device

## 2025-02-09 NOTE — PROGRESS NOTES
OT IE       02/09/25 0930   Patient Data   Rehab Impairment Impairment of mobility, safety, Activities of Daily Living (ADLs), and cognitive/communication skills due to Debility:  16  Debility (Non-cardiac/Non-pulmonary)   Etiologic Diagnosis Acute respiratory failure with hypoxia secondary to influenza A and possible aspiration pneumonia   Date of Onset 01/19/25   Support System   Name Layne   Relationship Sister   Phone Number [phone]   Able to provide 24 hour supervision No   Able to provide physical help? Yes   Home Setup   Type of Home Multi Level   Method of Entry Curb   Number of Stairs in Home 16  (16 steps to 2nd floor to bedrooms and full bath.)   In Home Hand Rail Right   First Floor Bathroom Half   Second Floor Bathroom Full;Tub;Combo;Curtain   First Floor Setup Available No   Home Modification Comment This therapist spoke to sister Layne to confirm PLOF and home setup. Per sister pt was living with mother in [address] [place] home, pt's mother is now living in [facility] after dementia diagnosis and pt is living with her nephew in the same home. Nephew assists with IADLs, pt was independent with self care. Sister reports that nephew is now working and pt will be alone for periods of time where she can go to her ex-brother in law for meals/supervision. Sister Layne also reports she will soon be able to work from home and provide more supervision as needed.   Available Equipment   (Pt and sister report no DME at home)   Baseline Information   Vocation Other  (disability)   Transportation Family/friends drive   Prior IADL Participation   Meal Preparation Other  (family completes)   Laundry Other  (family completes)   Home Cleaning Other  (family completes)   Prior Level of Function   Self-Care 3. Independent - Patient completed the activities by him/herself, with or without an assistive device, with no assistance from a helper.   Indoor-Mobility (Ambulation) 3. Independent - Patient completed the activities by  "him/herself, with or without an assistive device, with no assistance from a helper.   Stairs 3. Independent - Patient completed the activities by him/herself, with or without an assistive device, with no assistance from a helper.   Functional Cognition 2. Needed Some Help - Patient needed a partial assistance from another person to complete activities.   Prior Assistance Needed for Driving;Household Chores/Cleaning;Meal Preparation;Medication Management;Money Management;Shopping   Prior Device Used Z. None of the above   Falls in the Last Year   Number of falls in the past 12 months 1   Type of Injury Associated with Fall Major injury  (current admission)   Patient Preference   Nickname (Patient Preference) Edolly   Psychosocial   Psychosocial (WDL) X   Patient Behaviors/Mood Calm;Flat affect   Needs Expressed Physical   Restrictions/Precautions   Precautions Aspiration;Bed/chair alarms;Fall Risk;Cognitive;Supervision on toilet/commode   Pain Assessment   Pain Assessment Tool 0-10   Pain Score No Pain   Eating Assessment   Type of Assistance Needed Supervision   Physical Assistance Level No physical assistance   Eating CARE Score 4   Oral Hygiene   Type of Assistance Needed Incidental touching   Physical Assistance Level No physical assistance   Comment CGA for standing balance at sink, verbal cuing for thorough completion   Oral Hygiene CARE Score 4   Grooming   Able To Comb/Brush Hair   Limitation Noted In Problem Solving;Timeliness;Strength   Findings TA to comb hair, pt encouraged to attempt however pt refusing stating \"I can't do that right now, I need help\"   Tub/Shower Transfer   Limitations Noted In Balance;Safety   Adaptive Equipment Seat with Back;Grab Bars   Assessed Shower   Findings ambulatory transfer from recliner to shower chair, HHA   Shower/Bathe Self   Type of Assistance Needed Physical assistance   Physical Assistance Level 26%-50%   Comment Pt completed shower seated on shower chair with use of " "HH shower hose. Pt able to wash UB and partial BLE. Pt requesting TA to complete BLE and feet, reporting \"I just can't bend over right now, at home I could do it but now I can't, I feel weak\". In stance with use of GB, pt completed washing of rear/jorge area with mod verbal cuing to initiate, CGA for balance.   Shower/Bathe Self CARE Score 3   Bathing   Assessed Bath Style Shower   Anticipated D/C Bath Style Shower   Able to Gather/Transport No   Able to Adjust Water Temperature No   Dressing/Undressing Clothing   Type of Assistance Needed Supervision   Physical Assistance Level No physical assistance   Comment To don [place] shirt seated in recliner, (gown tied to simulate [place] shirt)   Upper Body Dressing CARE Score 4   Type of Assistance Needed Physical assistance   Physical Assistance Level 26%-50%   Comment A to initiate threading of BLE into underwear and pants, in stance to accelerate over hips with CGA for standing balance   Lower Body Dressing CARE Score 3   Putting On/Taking Off Footwear   Type of Assistance Needed Physical assistance   Physical Assistance Level Total assistance   Comment TA to don B  socks, pt reporting \"I can't bend down to put them on\"   Putting On/Taking Off Footwear CARE Score 1   Toileting Hygiene   Type of Assistance Needed Incidental touching   Physical Assistance Level No physical assistance   Comment Pt completed toileting over standard toilet, CM in stance prior to toileting, CGA for standing balance, hygiene after urination completed seated, CM post-hygiene in stance with CGA for standing balance.   Toileting Hygiene CARE Score 4   Toilet Transfer   Type of Assistance Needed Physical assistance   Physical Assistance Level 25% or less   Comment Ambulatory A   Toilet Transfer CARE Score 3   Transfer Bed/Chair/Wheelchair   Type of Assistance Needed Physical assistance   Physical Assistance Level 25% or less   Comment Ambulatory A   Chair/Bed-to-Chair Transfer CARE Score 3   Roll " "Left and Right   Type of Assistance Needed Physical assistance   Physical Assistance Level 25% or less   Comment To complete at PLOF. S with use of bedrail   Roll Left and Right CARE Score 3   Sit to Lying   Type of Assistance Needed Supervision   Physical Assistance Level No physical assistance   Comment HOB flat   Sit to Lying CARE Score 4   Lying to Sitting on Side of Bed   Type of Assistance Needed Physical assistance   Physical Assistance Level 25% or less   Comment A at trunk, HOB flat without use of bedrail   Lying to Sitting on Side of Bed CARE Score 3   Sit to Stand   Type of Assistance Needed Incidental touching   Physical Assistance Level No physical assistance   Comment no AD   Sit to Stand CARE Score 4   Comprehension   QI: Comprehension 3. Usually Understands: Understands most conversations, but misses some part/intent of message. Requires cues at times to understand.   Expression   QI: Expression 3. Exhibits some difficulty with expressing needs and ideas (e.g., some words or finishing thoughts) or speech is not clear   RUE Assessment   RUE Assessment WFL   LUE Assessment   LUE Assessment WFL   Sensation   Light Touch No apparent deficits   Cognition   Overall Cognitive Status Impaired   Arousal/Participation Alert;Cooperative   Attention Attends with cues to redirect   Orientation Level Oriented X4   Memory Decreased short term memory;Decreased recall of recent events;Decreased recall of precautions   Following Commands Follows one step commands with increased time or repetition   Comments history of intellectual disability   Discharge Information   Vocational Plan Retired/not working  (on disability)   Patient's Discharge Plan Home with family support   Patient's Rehab Expectations \"To go home\"   Barriers to Discharge Home Safety Considerations;Decreased Strength;Decreased Endurance;Decreased Cognitive Function   Impressions Pt is a 54 y.o female with a medical history of but not limited to " intellectual disability ;  Schizoaffective disorder ;Focal epilepsy; thyroid Ca who presents with c/o altered mental status. As per family, she was ill approximately 2 weeks ago with some coughing. This persisted. Day of presentation to hospital, when she was ambulating up the stairs she had a witnessed fall and struck her head on the wall. She has been acting increasingly confused since this fall. She was admitted for further evaluation/assessment/consultation. During hospital course / stay - developed  viral sepsis /toxic metabolic encephalopathy d/t Influenza A. Required intubation 1/21/2025 - 1/30/2025, and was extubated to HFNC. Transitioned to 2L NC. Now on room air. She completed Tamiflu. She is now A&Ox3. PRECAUTIONS: falls. Pt presents with the following impairments: strength, balance, endurance, cognitive deficits at baseline. Pt would benefit from skilled occupational therapy services with focus on ADL retraining, strengthening and functional transfers to ensure safe discharge and participation in functional activities to increase independence. Patient and caregiver education to be completed as appropriate. Anticipate  7 day ELOS to meet Supervision level goals.   OT Therapy Minutes   OT Time In 0930   OT Time Out 1100   OT Total Time (minutes) 90   OT Mode of treatment - Individual (minutes) 90   OT Mode of treatment - Concurrent (minutes) 0   OT Mode of treatment - Group (minutes) 0   OT Mode of treatment - Co-treat (minutes) 0   OT Mode of Treatment - Total time(minutes) 90 minutes   OT Cumulative Minutes 90   Cumulative Minutes   Cumulative therapy minutes 120

## 2025-02-09 NOTE — PROGRESS NOTES
ARC ICP  PT LTGS  ELOS 7-10 days       02/09/25 1230   Rehab Team Goals   Transfer Team Goal Patient will be independent with transfers with least restrictive device upon completion of rehab program   Locomotion Team Goal Patient will be independent with locomotion with least restrictive device upon completion of rehab program  (household without AD)   PT Transfer Goal   Roll left and right Goal 06. Independent - Patient completes the activity by him/herself with no assistance from a helper.   Sit to lying Goal 06. Independent - Patient completes the activity by him/herself with no assistance from a helper.   Lying to sitting on side of bed Goal 06. Independent - Patient completes the activity by him/herself with no assistance from a helper.   Sit to stand Goal 06. Independent - Patient completes the activity by him/herself with no assistance from a helper.   Chair/bed-to-chair transfer Goal 06. Independent - Patient completes the activity by him/herself with no assistance from a helper.   Car Transfer Goal 04. Supervision or touching assistance- [place] provides VERBAL CUES or supervision throughout activity.   Status Ongoing;Target goal - one week  (ELOS 7- 10 days)   Locomotion Goal   Primary discharge mode of locomotion Walking   Target Walk Distance 300 ft   Assist Device Other  (none)   Walk 10 feet Goal 06. Independent - Patient completes the activity by him/herself with no assistance from a helper.   Walk 50 feet with 2 turns Goal 06. Independent - Patient completes the activity by him/herself with no assistance from a helper.   Walk 150 feet Goal 04. Supervision or touching assistance- [place] provides VERBAL CUES or supervision throughout activity.   Walking 10 feet on uneven surface 04. Supervision or touching assistance- [place] provides VERBAL CUES or supervision throughout activity.   Walking Goal Status Ongoing;Target goal - one week  (ELOS 7-10days)   Wheel 50 feet with 2 turns Goal 09. Not applicable    Wheel 150 feet Goal 09. Not applicable   Stairs Goal   1 step or curb goal 04. TOUCHING/ STEADYING assistance as patient completes activity.   4 steps Goal 04. Supervision or touching assistance- [place] provides VERBAL CUES or supervision throughout activity.   12 steps Goal 04. Supervision or touching assistance- [place] provides VERBAL CUES or supervision throughout activity.   Technique Non-reciprocal   Hand Rail   (TBD - single HR)   Status Ongoing;Target goal - one week  (ELOS 7-10 days)   Object Retrieval Goal   Picking up object Goal 06. Independent - Patient completes the activity by him/herself with no assistance from a helper.   Assistive Device  Other (Comment)  (LRAD)   Small Object Picked Up marker from floor target goal - ELOS x7-10 days.

## 2025-02-09 NOTE — PROGRESS NOTES
SLP TAA     02/09/25 7975   Patient Data   Rehab Impairment Impairment of mobility, safety, Activities of Daily Living (ADLs), and cognitive/communication skills due to Debility: 16 Debility (Non-cardiac/Non-pulmonary)   Etiologic Diagnosis Acute respiratory failure with hypoxia secondary to influenza A and possible aspiration pneumonia   Date of Onset 01/19/25   Restrictions/Precautions   Precautions Aspiration;Bed/chair alarms;Fall Risk;Supervision on toilet/commode   Pain Assessment   Pain Assessment Tool 0-10   Pain Score No Pain   Eating Assessment   Swallow Precautions Yes   Bedside Swallow Results Yes   Food To Mouth Yes   Positioning Out of Bed   Safety Needs Increase Time;Cues;Freq Swallow   Meal Assessed Breakfast   Current Diet Dysphia I;Thin   Intake Mode PO   Findings See SLP tx note for further details.   Type of Assistance Needed Independent   Physical Assistance Level No physical assistance   Eating CARE Score 6   Discharge Information   Vocational Plan Retired/not working  (on disability)   Patient's Discharge Plan Home with family support   Patient's Rehab Expectations 'I want different food'   Barriers to Discharge Home Decreased Cognitive Function;Decreased Strength;Decreased Endurance;Safety Considerations   Laylas Esperanza is a 54 y.o. female with a medical history of but not limited to intellectual disability ;  Schizoaffective disorder ;Focal epilepsy; thyroid Ca who presents with c/o altered mental status. As per family, she was ill approximately 2 weeks ago with some coughing. This persisted. She had been acting more confused over the last 2 days prior to presentation that they described as some brain fog and say that she put on her pants backwards which is something she typically would not do. Day of presentation to hospital, when she was ambulating up the stairs she had a witnessed fall and struck her head on the wall. She has been acting increasingly confused since this fall. She is  not known to have any fevers. She typically is able to hold a conversation and is alert and oriented. She typically ambulates on her own. Since this event she was oriented only to her name but not to time or place. She was admitted for further evaluation/assessment/consultation. During hospital course / stay - developed  viral sepsis /toxic metabolic encephalopathy d/t Influenza A. Required intubation 1/21/2025 - 1/30/2025, and was extubated to HFNC. Transitioned to 2L NC. Now on room air. She completed Tamiflu. She is now A&Ox3.  Patient on a dysphagia diet. [facility] rehab team consulted for patient to admit to [facility] with order received for dysphagia evaluation as pt is on modified diet. Chart reviewed prior to evaluation.      Currently SLP services were consulted for assessment of dysphagia and overall swallow skills. Pt completed bedside dysphagia assessment in which pt is demonstrating mild oral and suspected mild pharyngeal dysphagia with level 1 puree and thins by cup/straw. Pt is a good rehab candidate to achieve least restrictive diet upon anticipated discharge home with family support/supervision. Barrier which present include the follow risks for aspiration such as poor position, lethargy,  decreased bolus propulsion, decreased mastication, decreased bolus formation, delayed oral intiation, suspected decreased control of puree and thins, piecemeal deglutition, holding of foods, multiple swallows due to pharyngeal weakness, recent and prolonged intubation and recent pneumonia. In order to address the noted barriers, skilled SLP services kelly address this by targeting the following interventions: swallow exercise, proper position, diet modifications, safe swallow strategies, family education/training, and possibility to complete a VFSS if sxs worsen. ELOS ~1-2 weeks. At this time, pt will benefit from skilled SLP services targeting establishment of least restrictive diet without demonstrating increased risk of  aspiration by time of discharge in attempts to decrease need for caregiver burden over time.   SLP Therapy Minutes   SLP Time In 0830   SLP Time Out 0900   SLP Total Time (minutes) 30   SLP Mode of treatment - Individual (minutes) 30   SLP Mode of treatment - Concurrent (minutes) 0   SLP Mode of treatment - Group (minutes) 0   SLP Mode of treatment - Co-treat (minutes) 0   SLP Mode of Treatment - Total time(minutes) 30 minutes   SLP Cumulative Minutes 30   Cumulative Minutes   Cumulative therapy minutes 30

## 2025-02-09 NOTE — PROGRESS NOTES
DYSPHAGIA EVALUATION     02/09/25 0830   Pain Assessment   Pain Assessment Tool 0-10   Pain Score No Pain   Restrictions/Precautions   Precautions Aspiration;Bed/chair alarms;Fall Risk;Supervision on toilet/commode   Eating   Type of Assistance Needed Independent   Physical Assistance Level No physical assistance   Eating CARE Score 6   Swallow Assessment   Swallow Treatment Assessment Bedside Dysphagia Evaluation        Patient Name: Esperanza Prajapati     Today's Date: 2/9/2025     Problem List  Principal Problem:    Acute respiratory failure with hypoxia (HCC)  Active Problems:    Acquired hypothyroidism    Focal epilepsy (HCC)    Schizoaffective disorder (HCC)    Influenza A    Hypernatremia    Pneumonia of both lungs due to infectious organism    Insomnia    At risk for deep venous thrombosis    Oropharyngeal dysphagia        Past Medical History  Medical History        Past Medical History:   Diagnosis Date    Cancer (HCC)       thyroid cancer    Disease of thyroid gland      Hallucination      Hyperactivity (behavior)       Last Assessed: 11/7/2014     Hyperlipidemia      Hyponatremia 01/20/2025    Obesity      Psychosis (HCC)      Seizures (HCC)              Past Surgical History  Surgical History         Past Surgical History:   Procedure Laterality Date    OTHER SURGICAL HISTORY         Removal of urinary calculus     OTHER SURGICAL HISTORY         Rhinologic Surgery     VT OPEN TX PHALANGEAL SHAFT FRACTURE PROX/MIDDLE EA Left 8/3/2023     Procedure: CLOSED REDUCTION PERCUTANEOUS PINNING - LEFT RING FINGER;  Surgeon: Fransisco Escalante MD;  [facility]: [facility] MAIN OR;  Service: Orthopedics    TOTAL THYROIDECTOMY         LAst Assessed: 11/7/2014               Summary   Pt presented with  s/s suggestive of mild oral and suspected mild pharyngeal dysphagia.  Symptoms or concerns included decreased bolus propulsion, decreased mastication, decreased bolus formation, delayed oral intiation, suspected  decreased control of puree and thins , residue effectively cleared with thins and lingual sweeps, and piecemeal deglutition. Additionally, pt is presenting with suspected pharyngeal swallow delay, suspected decreased hyolaryngeal elevation upon palpation, suspected pharyngeal residue, and multiple swallows.       Pt observed during breakfast meal for dysphagia evaluation with SLP assisting with set up of tray and pt being independent in self feeding. Upon arrival of SLP for dysphagia evaluation, pt was asleep in bed and needed frequent gestural and verbal cues to awake with SLP. SLP assisting pt in getting up to EOB to which pt presented significant difficulty following verbal and gestural cues with MAX A in pt's native language of [place]oc[place] as well as English. Pt noted to need MAX A X2 with PCA to stand up and get to recliner. Once in recliner, pt was more alert.     In regards to swallowing, bolus retrieval was adequate around utensils and no anterior spillage was noted with good labial closure around straw and utensils. Mastication was adequate and with puree items noted to have open mouthed breakdown. Bolus formation was mildly decreased due to larger bites and rapid intake needing verbal cues to slow down. Bolus transfer and control were noted to be slow and decreased as pt would need multiple swallows with evidence of piecemeal deglutition needed. No significant oral residue was noted. Noted to have good overall control but mild deficits overall with puree items.Swallow promptness delayed with puree and thins with pt noting to hold bolus prior to initiation, needing increase cues to initiate swallow. HLE was present upon palpation and judged to be mildly delayed with puree items. Coughing observed x2 with thins by straw but no throat clearing, change in vocal quality or respiratory status noted today.     Based on dysphagia evaluation, pt is presenting with s/s suggestive of mild oral and suspected mild  pharyngeal dysphagia with level 1 puree and thins by cup/straw. Based on chart review and formalized dysphagia evaluation, it is best for pt at this time to stay on level 1 puree diet with thins but future sessions to trial level 2 diet with thins so see pt's progress. Since pt only was observed during evaluation with minimal intake (2 puree waffles and thins), it is recommended to keep pt at this time on this diet but next session to trial level 2 items. Pt voiced that she is particular about foods and does not enjoy the hospital's food. When SLP probed to get information on food she enjoys, pt noted difficulty expressing and when SLP presented various items, pt noted 'it depends on how it looks', suspect baseline cognition deficits impacting comprehension. Will continue to trial items with pt and recommend primary speech team to discuss with family in future sessions possible foods to bring in that fit pt's diet that are more enjoyable. This bilingual SLP left note in [place]ian for family for them to read and comprehend that pt is on a modified diet and cans that were seen in room by SLP and noted by staff of [facility] and other foods are not appropriate at this time for pt's diet and to please not bring food into the room without alerting staff. ST staff to continue to provide family education regarding swallowing skills and diet levels in future sessions.     Traditional oral care completed at end of meal with no overt oral residue.      Risk/s for Aspiration: present-due to fatigue, lethargy and cognitive insight/deficits baseline.        Recommendations:  Recommended Diet: puree/level 1 diet and thin liquids   Recommended Form of Meds: whole with liquid and as tolerated    Aspiration precautions and swallowing strategies: upright posture, only feed when fully alert, slow rate of feeding, small bites/sips, quiet environment (tv off, limit talking, door closed, etc.), alternating bites and sips, and OOB FOR ALL  MEALS  Other Recommendations: Continue frequent oral care        FULL supervision w/ meals. -> baseline cognitive deficits, required frequent cues during evaluation to slow down  Consider consult with:  nutrition   Results reviewed with:  patient, RN, and OT   Aspiration precautions posted.     F/u ST tx: Pt will continue to benefit from ongoing skilled dysphagia tx sessions to establish safest least restrictive diet w/o increased oropharyngeal or aspiration sxs as well as monitor ability to carryover swallow strategies independently.        Plan:  -Monitor current diet and ensure no increase oropharyngeal or aspiration risk  -trial level 2 foods with thins w/ SLP supervision only  -Ongoing family education and training on swallow strategies to ensure safety in swallow function        Current Medical Status  Esperanza is a 54 y.o. female with a medical history of but not limited to intellectual disability ;  Schizoaffective disorder ;Focal epilepsy; thyroid Ca who presents with c/o altered mental status. As per family, she was ill approximately 2 weeks ago with some coughing. This persisted. She had been acting more confused over the last 2 days prior to presentation that they described as some brain fog and say that she put on her pants backwards which is something she typically would not do. Day of presentation to hospital, when she was ambulating up the stairs she had a witnessed fall and struck her head on the wall. She has been acting increasingly confused since this fall. She is not known to have any fevers. She typically is able to hold a conversation and is alert and oriented. She typically ambulates on her own. Since this event she was oriented only to her name but not to time or place. She was admitted for further evaluation/assessment/consultation. During hospital course / stay - developed  viral sepsis /toxic metabolic encephalopathy d/t Influenza A. Required intubation 1/21/2025 - 1/30/2025, and was  extubated to HFNC. Transitioned to 2L NC. Now on room air. She completed Tamiflu. She is now A&Ox3.  Patient on a dysphagia diet. [facility] rehab team consulted for patient to admit to [facility] with order received for dysphagia evaluation as pt is on modified diet. Chart reviewed prior to evaluation.      Allergies:  No known food allergies     Past medical history:  Please see H&P for details     Special Studies:  Bronchoscopy  Result Date: 1/24/2025  Impression: The lower trachea, main dennis, left main stem, DONNY, lingula, LLL, right main stem, RUL, bronchus intermedius, RML and RLL appeared normal. Minimal, thin and clear secretions present RECOMMENDATION: Follow up bronchoscopy Attestation: I was the critical care attending during the entire procedure by bedside helping and supervising on 1/24/2025 Forrest Garrett MD      XR chest portable ICU  Result Date: 1/24/2025  Impression: Moderate pulmonary edema with moderate pleural effusions and bibasilar atelectasis. Pneumonia not excluded in the appropriate clinical setting. Workstation performed: [id number]      XR chest portable ICU  Result Date: 1/23/2025  Impression: Stable congestion with bilateral effusions and bibasilar airspace opacity. Workstation performed: [id number]      XR chest portable  Result Date: 1/22/2025  Impression: Lines and tubes appear satisfactory. Pulmonary edema pattern with pleural effusions. Low lung volumes. Pneumonia not entirely excluded in the appropriate clinical situation, particularly in the left lower lobe. Workstation performed: [id number]      XR chest portable ICU  Result Date: 1/21/2025  Impression: ET tube 4 cm above the dennis. Worsening pulmonary edema Dense left lower lobe consolidation with air bronchograms which could be due to pneumonia in the appropriate clinical setting. Workstation performed: [id number]      XR chest portable ICU  Result Date: 1/21/2025  Impression: Right jugular catheter in right brachiocephalic vein with no  pneumothorax. Persistent pulmonary edema. Dense consolidation in the left lower lobe which could be due to pneumonia in the appropriate clinical setting. Workstation performed: [id number]      XR chest portable  Result Date: 1/21/2025  Impression: Low lung volumes with bibasilar opacity with loss of the medial left diaphragm which could be due to atelectasis. Pneumonia not excluded in the appropriate clinical setting. Workstation performed: [id number]      Video Swallow Study   Completed on 1/21/2022- Recommend upgraded VFSS for updated impressions as pt is presenting with new sxs and concerns for aspiration risk and on modified diet     FINDINGS:     The esophagus is normal in caliber.  Esophageal motility is normal and emptying of contrast from the esophagus is prompt.  No mucosal lesion, ulceration or evidence of fold thickening is seen.      Gastroesophageal reflux was not observed.       Small hiatal hernia.        IMPRESSION:     Small hiatal hernia.  Otherwise, normal examination.     If there is concern for oropharyngeal dysphagia, consider video barium swallow with speech therapy.        Social/Education/Vocational Hx:  Pt lives with family     Swallow Information   Current Risks for Dysphagia & Aspiration: recent intubation, prolonged intubation, respiratory compromise, weak cough, general debilitation, new neuro event, cognitive deficit, mental status change, reduced alertness, positioning issues  Current Symptoms/Concerns: cough with food and liquids, during and after meal, holding food in mouth, moist cough, recent pneumonia, decreased oral intake, hx dysphagia  Current Diet: puree/level 1 diet and thin liquids   Baseline Diet: regular diet and thin liquids       Baseline Assessment   Behavior/Cognition: lethargic and decreased attention  Speech/Language Status: able to participate in basic conversation and able to follow commands inconsistently  Patient Positioning: upright in recliner  Pain  Status/Interventions/Response to Interventions: No report of or nonverbal indications of pain.        Swallow Mechanism Exam  Facial: symmetrical  Labial: decreased coordination  Lingual: unable to test 2/2 limited command following  Velum: unable to visualize  Mandible: adequate ROM  Dentition: adequate  Vocal quality:clear/adequate   Volitional Cough: weak   Respiratory Status: on RA        Consistencies Assessed and Total Amount Consumed:  Consistencies Administered: thin liquids and puree  Materials administered included puree waffles, puree eggs, puree sausage, puree banana, oatmeal, orange juice, soda x2, milk     Consumed:  Waffles x2  Orange juice 120 cc  Soda in cup via straw ~470 cc of thins       Oral Stage: mild  Bolus retrieval was adequate around utensils and no anterior spillage was noted with good labial closure around straw and utensils. Mastication was adequate and with puree items noted to have open mouthed breakdown. Bolus formation was mildly decreased due to larger bites and rapid intake needing verbal cues to slow down. Bolus transfer and control were noted to be slow and decreased as pt would need multiple swallows with evidence of piecemeal deglutition needed. No significant oral residue was noted. Noted to have good overall control but mild deficits overall with puree items.    Pharyngeal Stage: mild  Swallow promptness delayed with puree and thins with pt noting to hold bolus prior to initiation, needing increase cues to initiate swallow. HLE was present upon palpation and judged to be mildly delayed with puree items. Coughing observed x2 with thins by straw but no throat clearing, change in vocal quality or respiratory status noted today.    Esophageal Concerns:  No overt sxs of aspiration    Strategies and Efficacy: upright posture, only feed when fully alert, slow rate of feeding, small bites/sips, quiet environment (tv off, limit talking, door closed, etc.), alternating bites and sips,  and OOB FOR ALL MEALS     Summary and Recommendations (see above)   Swallow Assessment Prognosis   Prognosis Good   Prognosis Considerations Co-morbidities;Family/community support;Medical diagnosis;Medical prognosis;Participation level;Potential;Previous level of function;Severity of impairments;New learning ability;Ability to carry over;Cooperation   SLP Therapy Minutes   SLP Time In 0830   SLP Time Out 0900   SLP Total Time (minutes) 30   SLP Mode of treatment - Individual (minutes) 30   SLP Mode of treatment - Concurrent (minutes) 0   SLP Mode of treatment - Group (minutes) 0   SLP Mode of treatment - Co-treat (minutes) 0   SLP Mode of Treatment - Total time(minutes) 30 minutes   SLP Cumulative Minutes 30   Therapy Time missed   Time missed? No

## 2025-02-10 ENCOUNTER — TRANSITIONAL CARE MANAGEMENT (OUTPATIENT)
Dept: INTERNAL MEDICINE CLINIC | Facility: CLINIC | Age: 55
End: 2025-02-10

## 2025-02-10 PROBLEM — R94.31 PROLONGED QT INTERVAL: Status: ACTIVE | Noted: 2025-02-10

## 2025-02-10 PROBLEM — D70.9 NEUTROPENIA (HCC): Status: ACTIVE | Noted: 2025-02-10

## 2025-02-10 LAB
ALBUMIN SERPL BCG-MCNC: 3.9 G/DL (ref 3.5–5)
ALP SERPL-CCNC: 34 U/L (ref 34–104)
ALT SERPL W P-5'-P-CCNC: 27 U/L (ref 7–52)
ANION GAP SERPL CALCULATED.3IONS-SCNC: 8 MMOL/L (ref 4–13)
AST SERPL W P-5'-P-CCNC: 22 U/L (ref 13–39)
ATRIAL RATE: 88 BPM
ATRIAL RATE: 93 BPM
ATRIAL RATE: 94 BPM
ATRIAL RATE: 95 BPM
BASOPHILS # BLD AUTO: 0.02 THOUSANDS/ΜL (ref 0–0.1)
BASOPHILS # BLD AUTO: 0.03 THOUSANDS/ΜL (ref 0–0.1)
BASOPHILS NFR BLD AUTO: 1 % (ref 0–1)
BASOPHILS NFR BLD AUTO: 2 % (ref 0–1)
BILIRUB DIRECT SERPL-MCNC: 0.08 MG/DL (ref 0–0.2)
BILIRUB SERPL-MCNC: 0.37 MG/DL (ref 0.2–1)
BUN SERPL-MCNC: 13 MG/DL (ref 5–25)
CALCIUM SERPL-MCNC: 10.2 MG/DL (ref 8.4–10.2)
CHLORIDE SERPL-SCNC: 99 MMOL/L (ref 96–108)
CO2 SERPL-SCNC: 32 MMOL/L (ref 21–32)
CREAT SERPL-MCNC: 0.9 MG/DL (ref 0.6–1.3)
EOSINOPHIL # BLD AUTO: 0.05 THOUSAND/ΜL (ref 0–0.61)
EOSINOPHIL # BLD AUTO: 0.06 THOUSAND/ΜL (ref 0–0.61)
EOSINOPHIL NFR BLD AUTO: 2 % (ref 0–6)
EOSINOPHIL NFR BLD AUTO: 3 % (ref 0–6)
ERYTHROCYTE [DISTWIDTH] IN BLOOD BY AUTOMATED COUNT: 14.5 % (ref 11.6–15.1)
ERYTHROCYTE [DISTWIDTH] IN BLOOD BY AUTOMATED COUNT: 14.6 % (ref 11.6–15.1)
GFR SERPL CREATININE-BSD FRML MDRD: 73 ML/MIN/1.73SQ M
GLUCOSE SERPL-MCNC: 81 MG/DL (ref 65–140)
HCT VFR BLD AUTO: 32.2 % (ref 34.8–46.1)
HCT VFR BLD AUTO: 35.2 % (ref 34.8–46.1)
HGB BLD-MCNC: 10.1 G/DL (ref 11.5–15.4)
HGB BLD-MCNC: 11.3 G/DL (ref 11.5–15.4)
IMM GRANULOCYTES # BLD AUTO: 0.01 THOUSAND/UL (ref 0–0.2)
IMM GRANULOCYTES # BLD AUTO: 0.02 THOUSAND/UL (ref 0–0.2)
IMM GRANULOCYTES NFR BLD AUTO: 0 % (ref 0–2)
IMM GRANULOCYTES NFR BLD AUTO: 1 % (ref 0–2)
LYMPHOCYTES # BLD AUTO: 1.04 THOUSANDS/ΜL (ref 0.6–4.47)
LYMPHOCYTES # BLD AUTO: 1.68 THOUSANDS/ΜL (ref 0.6–4.47)
LYMPHOCYTES NFR BLD AUTO: 52 % (ref 14–44)
LYMPHOCYTES NFR BLD AUTO: 62 % (ref 14–44)
MCH RBC QN AUTO: 32.1 PG (ref 26.8–34.3)
MCH RBC QN AUTO: 32.8 PG (ref 26.8–34.3)
MCHC RBC AUTO-ENTMCNC: 31.4 G/DL (ref 31.4–37.4)
MCHC RBC AUTO-ENTMCNC: 32.1 G/DL (ref 31.4–37.4)
MCV RBC AUTO: 102 FL (ref 82–98)
MCV RBC AUTO: 102 FL (ref 82–98)
MONOCYTES # BLD AUTO: 0.22 THOUSAND/ΜL (ref 0.17–1.22)
MONOCYTES # BLD AUTO: 0.28 THOUSAND/ΜL (ref 0.17–1.22)
MONOCYTES NFR BLD AUTO: 10 % (ref 4–12)
MONOCYTES NFR BLD AUTO: 11 % (ref 4–12)
NEUTROPHILS # BLD AUTO: 0.6 THOUSANDS/ΜL (ref 1.85–7.62)
NEUTROPHILS # BLD AUTO: 0.68 THOUSANDS/ΜL (ref 1.85–7.62)
NEUTS SEG NFR BLD AUTO: 25 % (ref 43–75)
NEUTS SEG NFR BLD AUTO: 31 % (ref 43–75)
NRBC BLD AUTO-RTO: 0 /100 WBCS
NRBC BLD AUTO-RTO: 0 /100 WBCS
P AXIS: 60 DEGREES
P AXIS: 61 DEGREES
P AXIS: 62 DEGREES
P AXIS: 64 DEGREES
PLATELET # BLD AUTO: 308 THOUSANDS/UL (ref 149–390)
PLATELET # BLD AUTO: 326 THOUSANDS/UL (ref 149–390)
PMV BLD AUTO: 8.9 FL (ref 8.9–12.7)
PMV BLD AUTO: 9.1 FL (ref 8.9–12.7)
POTASSIUM SERPL-SCNC: 4 MMOL/L (ref 3.5–5.3)
PR INTERVAL: 162 MS
PR INTERVAL: 168 MS
PR INTERVAL: 170 MS
PR INTERVAL: 172 MS
PROT SERPL-MCNC: 8.1 G/DL (ref 6.4–8.4)
QRS AXIS: 19 DEGREES
QRS AXIS: 19 DEGREES
QRS AXIS: 21 DEGREES
QRS AXIS: 23 DEGREES
QRSD INTERVAL: 66 MS
QRSD INTERVAL: 66 MS
QRSD INTERVAL: 72 MS
QRSD INTERVAL: 76 MS
QT INTERVAL: 306 MS
QT INTERVAL: 330 MS
QT INTERVAL: 332 MS
QT INTERVAL: 336 MS
QTC INTERVAL: 383 MS
QTC INTERVAL: 407 MS
QTC INTERVAL: 411 MS
QTC INTERVAL: 418 MS
RBC # BLD AUTO: 3.15 MILLION/UL (ref 3.81–5.12)
RBC # BLD AUTO: 3.45 MILLION/UL (ref 3.81–5.12)
SODIUM SERPL-SCNC: 139 MMOL/L (ref 135–147)
T WAVE AXIS: 123 DEGREES
T WAVE AXIS: 128 DEGREES
T WAVE AXIS: 131 DEGREES
T WAVE AXIS: 143 DEGREES
VALPROATE SERPL-MCNC: 99 UG/ML (ref 50–100)
VENTRICULAR RATE: 88 BPM
VENTRICULAR RATE: 93 BPM
VENTRICULAR RATE: 94 BPM
VENTRICULAR RATE: 95 BPM
WBC # BLD AUTO: 1.97 THOUSAND/UL (ref 4.31–10.16)
WBC # BLD AUTO: 2.72 THOUSAND/UL (ref 4.31–10.16)

## 2025-02-10 PROCEDURE — 97530 THERAPEUTIC ACTIVITIES: CPT | Performed by: PHYSICAL THERAPIST

## 2025-02-10 PROCEDURE — 80076 HEPATIC FUNCTION PANEL: CPT | Performed by: NURSE PRACTITIONER

## 2025-02-10 PROCEDURE — 97110 THERAPEUTIC EXERCISES: CPT

## 2025-02-10 PROCEDURE — 99255 IP/OBS CONSLTJ NEW/EST HI 80: CPT | Performed by: PSYCHIATRY & NEUROLOGY

## 2025-02-10 PROCEDURE — 85025 COMPLETE CBC W/AUTO DIFF WBC: CPT | Performed by: NURSE PRACTITIONER

## 2025-02-10 PROCEDURE — 80164 ASSAY DIPROPYLACETIC ACD TOT: CPT

## 2025-02-10 PROCEDURE — 97110 THERAPEUTIC EXERCISES: CPT | Performed by: PHYSICAL THERAPIST

## 2025-02-10 PROCEDURE — 93005 ELECTROCARDIOGRAM TRACING: CPT

## 2025-02-10 PROCEDURE — 99254 IP/OBS CNSLTJ NEW/EST MOD 60: CPT | Performed by: INTERNAL MEDICINE

## 2025-02-10 PROCEDURE — 97112 NEUROMUSCULAR REEDUCATION: CPT | Performed by: PHYSICAL THERAPIST

## 2025-02-10 PROCEDURE — 99232 SBSQ HOSP IP/OBS MODERATE 35: CPT | Performed by: INTERNAL MEDICINE

## 2025-02-10 PROCEDURE — 85025 COMPLETE CBC W/AUTO DIFF WBC: CPT

## 2025-02-10 PROCEDURE — 93010 ELECTROCARDIOGRAM REPORT: CPT | Performed by: STUDENT IN AN ORGANIZED HEALTH CARE EDUCATION/TRAINING PROGRAM

## 2025-02-10 PROCEDURE — 97535 SELF CARE MNGMENT TRAINING: CPT

## 2025-02-10 PROCEDURE — 80048 BASIC METABOLIC PNL TOTAL CA: CPT | Performed by: NURSE PRACTITIONER

## 2025-02-10 PROCEDURE — 97530 THERAPEUTIC ACTIVITIES: CPT

## 2025-02-10 RX ORDER — DIVALPROEX SODIUM 250 MG/1
750 TABLET, DELAYED RELEASE ORAL EVERY 12 HOURS SCHEDULED
Status: DISCONTINUED | OUTPATIENT
Start: 2025-02-10 | End: 2025-02-11

## 2025-02-10 RX ORDER — DIVALPROEX SODIUM 250 MG/1
500 TABLET, DELAYED RELEASE ORAL EVERY 12 HOURS SCHEDULED
Status: DISCONTINUED | OUTPATIENT
Start: 2025-02-10 | End: 2025-02-10

## 2025-02-10 RX ADMIN — NYSTATIN: 100000 POWDER TOPICAL at 08:53

## 2025-02-10 RX ADMIN — Medication 1000 MCG: at 08:51

## 2025-02-10 RX ADMIN — NYSTATIN: 100000 POWDER TOPICAL at 21:34

## 2025-02-10 RX ADMIN — PALIPERIDONE 6 MG: 6 TABLET, EXTENDED RELEASE ORAL at 08:52

## 2025-02-10 RX ADMIN — Medication 6 MG: at 21:31

## 2025-02-10 RX ADMIN — LACOSAMIDE 200 MG: 100 TABLET, FILM COATED ORAL at 21:31

## 2025-02-10 RX ADMIN — LACOSAMIDE 150 MG: 50 TABLET, FILM COATED ORAL at 08:51

## 2025-02-10 RX ADMIN — DIVALPROEX SODIUM 750 MG: 250 TABLET, DELAYED RELEASE ORAL at 08:51

## 2025-02-10 RX ADMIN — SENNOSIDES AND DOCUSATE SODIUM 2 TABLET: 50; 8.6 TABLET ORAL at 17:01

## 2025-02-10 RX ADMIN — LEVOTHYROXINE SODIUM 175 MCG: 0.1 TABLET ORAL at 06:11

## 2025-02-10 RX ADMIN — DIVALPROEX SODIUM 750 MG: 250 TABLET, DELAYED RELEASE ORAL at 21:31

## 2025-02-10 RX ADMIN — SENNOSIDES AND DOCUSATE SODIUM 2 TABLET: 50; 8.6 TABLET ORAL at 08:51

## 2025-02-10 RX ADMIN — BENZTROPINE MESYLATE 2 MG: 2 TABLET ORAL at 17:01

## 2025-02-10 RX ADMIN — OLANZAPINE 10 MG: 10 TABLET, FILM COATED ORAL at 21:31

## 2025-02-10 RX ADMIN — BENZTROPINE MESYLATE 2 MG: 2 TABLET ORAL at 08:52

## 2025-02-10 RX ADMIN — ENOXAPARIN SODIUM 40 MG: 40 INJECTION SUBCUTANEOUS at 08:51

## 2025-02-10 NOTE — ASSESSMENT & PLAN NOTE
Presented on 1/19/25 with respiratory failure related to Influenza A  Intubated 1/21/25 - 1/30/25.  Required HFNC  Weaning down from oxygen.  Now on RA in AM   Improving    PLAN   Continue IS, OPEP, and pulmonary toileting  Aspiration precautions, deep breathing  Monitor pulmonary status while in ARC  Follow-up with Pulmonary as outpatient

## 2025-02-10 NOTE — CONSULTS
Consultation - Behavioral Health   Name: Esperanza Prajapati 54 y.o. female I MRN: [id number]  Unit/Bed#: -01 I Date of Admission: 2/8/2025   Date of Service: 2/10/2025 I Hospital Day: 2   Inpatient consult to Psychiatry  Consult performed by: Taurus Mcnamara DO  Consult ordered by: BRINA Silva        Physician Requesting Evaluation: Rosi Olsen MD   Reason for Evaluation / Principal Problem: psychotropic medication recommendations in the setting of new neutropenia    Assessment & Plan  Schizoaffective disorder (HCC)  Per chart, last inpatient psychiatric hospitalization in May 2024. Pt follows with outpatient psychiatry at [facility]. LOV 10/09/24.  PTA was on the following psychotropic medications: olanzapine 10 mg q HS, paliperidone 6 mg qd (which had been decreased from 9 mg), Cogentin 2 mg PO BID  Per chart pt appears to have been started on standing Cogentin d/t hx of EPS including oculogyric s/sx and dystonia during last psychiatric hospitalization     On evaluation today, pt does not present as acutely decompensated and does not demonstrate any indication for inpatient psychiatric hospitalization. ROS is significant for insomnia, which appears to be chronic, as well as vague auditory hallucinations that are not command in nature. Please see HPI for further details. Verbally marcus for safety. Denies SI/HI, intent, or plan.     Plan  Given new neutropenia and no evidence of acute psychiatric decompensation, will recommend discontinuing paliperidone 6 mg qd at this time.  Will continue current dose of olanzapine 10 mg q HS; re-assess as clinical course develops.  Continue melatonin 6 mg PO q HS for insomnia.   Does not meet criteria for inpatient psychiatric hospitalization at this time.  Neutropenia (HCC)  Lab work 2/10 showed WBC 1.97 (down from 3.27 on 2/6) with ANC of 0.6 (down from 7.71 on 2/1)  Pt did recently have influenza A and was hospitalized, completed course of  Tamiflu, also received cephalosporins and azithromycin for PNA  Numerous medications could be contributing, including lacosamide, VPA, olanzapine, paliperidone, as well as recent ABX use  Currently afebrile, VS stable    Plan  VPA trough level tonight. Repeat CBC w differential tonight.  Repeat CBC w differential tomorrow AM.  Maintain neutropenic precautions.  Continue to monitor VS closely  Focal epilepsy (HCC)  Follows with Neurology, PTA AED regimen: lacosamide 150 mg daily AM and 200 mg q HS, Depakote 750 mg q12h.     Plan  Would recommend Neurology's input as both AEDs can contribute to neutropenia.   VPA trough level tonight.  Intellectual disability  H/o intellectual disability per chart review  Acquired hypothyroidism  S/p thyroidectomy 2/2 papillary thyroid carcinoma  Consider repeat TSH/free T4  PTA: levothyroxine 175 mcg  Management per Primary  Oropharyngeal dysphagia  SLP on board, maintain pureed diet, advance as tolerated/recommended per SLP.   Able to tolerate PO medications thus far.   Acute respiratory failure with hypoxia (HCC)  Hospitalized 1/19/25 with respiratory failure 2/2 influenza A, requiring intubation from 1/21-1/30, and HFNC   Now stable on room air  Management per Primary    I have discussed the above management plan in detail with the primary service.   Behavioral Health service will follow.  Please contact the SecureChat role for the Behavioral Health service with any questions/concerns.    Treatment Plan:  Planned Medication Changes:  Discontinuing paliperidone at this time and continuing current dose of olanzapine; re-assess as clinical course develops.     Risks / Benefits of Treatment:  Risks, benefits, and possible side effects of medications explained to patient and patient verbalizes limited understanding.  Will attempt to re-explain.     History of Present Illness    Reason for Consult: Neutropenia in Setting of Psychotropic Medication    Esperanza Prajapati is a 54 y.o.  "female with a history of hypothyroidism, cognitive delay, schizoaffective disorder focal epilepsy who was admitted to the acute rehab center on 2/8/2025 following recent hospitalization for acute respiratory failure secondary to influenza A involving a complicated hospital course requiring intubation. Psychiatric consultation was requested due to psychotropic medication management in the setting of new neutropenia.    Per primary, patient was not exhibiting symptoms of acutely decompensated psychiatric symptoms prior to consultation.  At the time of evaluation,  Esperanza is a limited historian, although pleasant and cooperative, and able to answer some questions. On assessment, she reports her mood as \"better,\" and relates this to being in ARC with improved strength.  She is able to recognize that she is in \"therapy\" at this time following her hospitalization.  Presently, she denies any feelings of depression, guilt, hopelessness/helplessness, anhedonia, and denies any changes in her appetite.  She denies any increase in goal-directed activity, distractibility, grandiosity, referential ideas, paranoia. She also denies any current anxiety/panic, nightmares, hypervigilance, and intrusive thoughts. ROS significant for decreased sleep; however this appears chronic in nature and is not associated with an increase in goal-directed activity, distractibility, or rapid/pressured speech.  Patient also reports auditory hallucinations, however is vague in describing the nature of said AH although was able to state that they are not command in nature, and do not say negative things.    Currently, she denies any SI/HI, intent, or plan.  She reports feeling safe.  She denies any questions/concerns at this time and denies any unmet needs.     Psychiatric Review Of Systems:  sleep: Reports decreased sleep, chronic; states she is easily awakened  appetite changes: Denies  weight changes: Denies  energy/anergy: " Denies  interest/pleasure/anhedonia: Denies  somatic symptoms: Denies  anxiety/panic: Denies  iliana: Denies  guilty/hopeless: Denies  self injurious behavior/risky behavior: Denies    Historical Information   Past Psychiatric History:   Inpatient Treatment: Per chart review, previous admissions at [facility] in January 2023, March 2022, May 2024  Outpatient Treatment: Per chart review, outpatient psychiatry at [facility]. LOV 10/09/24.  Past Suicide Attempts: Denies  Past Violent Behavior: Per chart review, hx of violent/aggressive behavior  Past Psychiatric Medication Trials: Risperdal, Invega, trazodone.  Depakote & Lamictal in the past, although this had been prescribed for seizures.    Substance Abuse History:  E-Cigarette/Vaping    E-Cigarette Use Never User       E-Cigarette/Vaping Substances    Nicotine No     THC No     CBD No     Flavoring No     Other No     Unknown No        Social History       Tobacco History       Smoking Status  Never      Passive Exposure  Never      Smokeless Tobacco Use  Never              Alcohol History       Alcohol Use Status  Never              Drug Use       Drug Use Status  Never              Sexual Activity       Sexually Active  Never              Other Factors    Not Asked                 Additional Substance Use Detail       Questions Responses    Problems Due to Past Use of Alcohol? No    Problems Due to Past Use of Substances? No    Substance Use Assessment Denies substance use within the past 12 months    Alcohol Use Frequency Denies use in past 12 months    Cannabis frequency Never used    Comment:  Never used on 5/29/2024     Heroin Frequency Denies use in past 12 months    Cocaine frequency Never used    Comment:  Never used on 1/23/2023     Crack Cocaine Frequency Denies use in past 12 months    Methamphetamine Frequency Denies use in past 12 months    Narcotic Frequency Denies use in past 12 months    Benzodiazepine Frequency Denies use in past 12 months     "Amphetamine frequency Denies use in past 12 months    Barbituate Frequency Denies use use in past 12 months    Inhalant frequency Never used    Comment:  Never used on 1/23/2023     Hallucinogen frequency Never used    Comment:  Never used on 1/23/2023     Ecstasy frequency Never used    Comment:  Never used on 1/23/2023     Other drug frequency Never used    Comment:  Never used on 1/23/2023     Opiate frequency Denies use in past 12 months    Not reviewed.          I have assessed this patient for substance use within the past 12 months    Family Psychiatric History:   Patient denies.    Social History:  Education: Special education  Learning Disabilities:  cognitive developmental delay, childhood head injury at 10 months of age.   Marital history: single  Children: None  Living arrangement, social support: The patient lives in home with nephew.  Occupational History: SSDI.  Reports she volunteers in her community  Functioning Relationships: Family  Other Pertinent History:  Legal History: none    History: none      Traumatic History:   Abuse: None reported  Other Traumatic Events: None reported  I have reviewed the patient's PMH, PSH, Social History, Family History, Meds, and Allergies    Objective   Temp:  [97.7 °F (36.5 °C)-99.2 °F (37.3 °C)] 97.8 °F (36.6 °C)  HR:  [65-93] 86  BP: ()/(65-86) 113/86  Resp:  [18] 18  SpO2:  [92 %-97 %] 97 %  O2 Device: None (Room air)    Intake/Output Summary (Last 24 hours) at 2/10/2025 1421  Last data filed at 2/10/2025 0900  Gross per 24 hour   Intake 720 ml   Output --   Net 720 ml       Mental Status Evaluation:  Appearance:  Overtly  female, dressed in hospital attire, sitting upright in bed   Behavior:  Calm, cooperative, psychomotor slowing, fair eye contact   Speech:  Delayed, soft at times   Mood:  \"I'm better.\"   Affect:  Constricted, brightens appropriately at times   Thought Process:  Balm    Thought Content:  No delusions verbalized. "   Perceptual Disturbances: Endorses auditory hallucination, not command in nature.  Denies visual hallucinations.    Risk Potential: Suicidal Ideations: Denies  Homicidal Ideations: Denies  Potential for Aggression: Not at time of this encounter   Sensorium:  person, place, and time/date   Cognition:  recent and remote memory: Impaired   Consciousness:  awake, alert   Attention: attention span appeared shorter than expected for age   Fund of Knowledge: Limited   Insight:  limited   Judgment:  limited   Muscle Strength:  Muscle Tone: Was not formally assessed at the time of this encounter.   Gait/Station: Was not formally assessed as pt was seated in bed throughout the encounter.   Motor Activity: Resting tremor observed in right upper extremity and head.        Patient Strengths/Assets: cooperative, good past treatment response  Patient Barriers/Limitations: impaired cognition, limited education      Lab Results: I have reviewed the following results:Most Recent Labs:   Lab Results   Component Value Date    WBC 1.97 (L) 02/10/2025    RBC 3.45 (L) 02/10/2025    HGB 11.3 (L) 02/10/2025    HCT 35.2 02/10/2025     02/10/2025    RDW 14.6 02/10/2025    NEUTROABS 0.60 (L) 02/10/2025    SODIUM 139 02/10/2025    K 4.0 02/10/2025    CL 99 02/10/2025    CO2 32 02/10/2025    BUN 13 02/10/2025    CREATININE 0.90 02/10/2025    GLUC 81 02/10/2025    GLUF 100 (H) 10/10/2024    CALCIUM 10.2 02/10/2025    AST 22 02/10/2025    ALT 27 02/10/2025    ALKPHOS 34 02/10/2025    TP 8.1 02/10/2025    ALB 3.9 02/10/2025    TBILI 0.37 02/10/2025    CHOLESTEROL 240 (H) 05/30/2024    HDL 39 (L) 05/30/2024    TRIG 517 (H) 01/29/2025    LDLCALC 147 (H) 05/30/2024    NONHDLC 201 05/30/2024    VALPROICTOT 87 01/20/2025    AMMONIA 32 04/01/2024    FFU1WJCEJRXQ 3.323 01/19/2025    FREET4 1.89 (H) 10/10/2024    HCG <2 05/06/2014    HCGQUANT <3 03/24/2022    RPR Non-Reactive 03/25/2022    HGBA1C 5.5 10/10/2024     10/10/2024        Patient  was seen and evaluated by  Chelsey Noel, Medical Student (MS-3)  Taurus Mcnamara D.O. (PGY-I)

## 2025-02-10 NOTE — ASSESSMENT & PLAN NOTE
Home regimen: Zyprexa 10mg at HS, Invega 6mg daily, trazodone 50mg at HS, and Cogentin 2mg BID.  Currently receiving Zyprexa 10mg at HS and Cogentin 2mg BID.  Psychiatry consulted due to concerns for non-compliance and new neutropenia.  Invega discontinued today.    Follows with outpatient Psychiatry.

## 2025-02-10 NOTE — ASSESSMENT & PLAN NOTE
>>ASSESSMENT AND PLAN FOR INTELLECTUAL DISABILITY WRITTEN ON 2/10/2025  2:23 PM BY KEVON CASTILLO DO    H/o intellectual disability per chart review

## 2025-02-10 NOTE — PROGRESS NOTES
"   02/10/25 1400   Pain Assessment   Pain Score No Pain   Restrictions/Precautions   Precautions Aspiration;Bed/chair alarms;Cognitive;Fall Risk;Seizure;Supervision on toilet/commode   Weight Bearing Restrictions No   ROM Restrictions No   Lifestyle   Autonomy (S)  \"I can't read that. I get confused between English and Russian\" pt reports regarding when asked about medications   Sit to Stand   Type of Assistance Needed Incidental touching   Comment CGA no AD   Sit to Stand CARE Score 4   Bed-Chair Transfer   Type of Assistance Needed Incidental touching   Comment CGA no AD   Chair/Bed-to-Chair Transfer CARE Score 4   Health Management   Health Management Level of Assistance Maximum assistance;Dependent   Health Management (S)  Pt reporting PTA she was indep taking medications. However, when asked about what medications she was taking pt unable to verbalize meds. Therapist provided pt with med reference sheet including name, purpose, dosage and frequency. Therapist wrote all information in Russian (pts main language). When provided with sheet pt reporting she is unable to read what is written and reports she gets confused between English and Russian. Due to pts cognitive deficits, history of intellectual disability and overall observed pts interaction during medication task, therapist receommend pt to be maxA/dep for medication mngmnt to ensure safe taking of meds and prevent over/underdosing of medications. Will discuss with family during family training when scheduled.   Exercise Tools   Exercise Tools Yes   Other Exercise Tool 1 seated engaged pt in UE TE with 3# 2x15 to cont to inc fx strength. pt toelrated well with rest break in between for improved STS/SPT.   Cognition   Overall Cognitive Status Impaired   Arousal/Participation Alert;Cooperative   Attention Attends with cues to redirect   Orientation Level Oriented X4   Memory Decreased short term memory;Decreased recall of recent events;Decreased recall of " precautions   Following Commands Follows one step commands with increased time or repetition   Comments h/o intellectual delay from childhood injury - Lebanese speaking - does speak and understand english.  Session completed mainly in Haitian   Activity Tolerance   Activity Tolerance Patient tolerated treatment well   Assessment   Treatment Assessment Engaged pt in 30mins of skilled OT services with focus on UE TE and med mngmnt. Of note, therapist engaged pt in medication task. At this time due to pts dec sognition and hx of intellectual disability recommend pt to have maxA/TA for all medications.   Prognosis Good   Problem List Decreased strength;Decreased range of motion;Decreased endurance;Decreased mobility;Impaired judgement   Barriers to Discharge Decreased caregiver support   Plan   Treatment/Interventions ADL retraining;Functional transfer training;Therapeutic exercise;Endurance training;Patient/family training;Bed mobility;Compensatory technique education   Progress Progressing toward goals   Discharge Recommendation   Rehab Resource Intensity Level, OT   (pending progress)   Equipment Recommended Shower/Tub chair with back ($)  (will discuss with family if able to purchase or have at home)   OT Therapy Minutes   OT Time In 1400   OT Time Out 1430   OT Total Time (minutes) 30   OT Mode of treatment - Individual (minutes) 30   OT Mode of treatment - Concurrent (minutes) 0   OT Mode of treatment - Group (minutes) 0   OT Mode of treatment - Co-treat (minutes) 0   OT Mode of Treatment - Total time(minutes) 30 minutes   OT Cumulative Minutes 180   Therapy Time missed   Time missed? No

## 2025-02-10 NOTE — ASSESSMENT & PLAN NOTE
Lab work 2/10 showed WBC 1.97 (down from 3.27 on 2/6) with ANC of 0.6 (down from 7.71 on 2/1)  Pt did recently have influenza A and was hospitalized, completed course of Tamiflu, also received cephalosporins and azithromycin for PNA  Numerous medications could be contributing, including lacosamide, VPA, olanzapine, paliperidone, as well as recent ABX use  Currently afebrile, VS stable    Plan  VPA trough level tonight. Repeat CBC w differential tonight.  Repeat CBC w differential tomorrow AM.  Maintain neutropenic precautions.  Continue to monitor VS closely

## 2025-02-10 NOTE — PROGRESS NOTES
OT COG Goals added. IADLs completed by family. Pt reports she completed med mngmnt indep PTA       02/10/25 0700   Comprehension Goal   Comprehension Assist Level Supervision   Status Ongoing;Target goal - one week   Expression Goal   Expression Assist Level Supervision   Status Ongoing;Target goal - one week   Social Interaction Goal   Social Interaction Assist Level Supervision   Status Ongoing;Target goal - one week   Problem Solving Goal   Problem Solving Assist Level Supervision   Status Ongoing;Target goal - one week   Memory Goal   Memory Assist Level Minimum Assist   Status Ongoing;Target goal - one week   Meal Prep and Kitchen Mobility   Assist Level   (family completes)   Medication Management   Assist Level   (maxA  Ongoing;Target goal - one week)   Laundry   Assist Level   (family completes)

## 2025-02-10 NOTE — ASSESSMENT & PLAN NOTE
Hospitalized 1/19/25 with respiratory failure 2/2 influenza A, requiring intubation from 1/21-1/30, and HFNC   Now stable on room air  Management per Primary

## 2025-02-10 NOTE — PROGRESS NOTES
Progress Note - PMR   Name: Esperanza Prajapati 54 y.o. female I MRN: [id number]  Unit/Bed#: [facility] 269-01 I Date of Admission: 2/8/2025   Date of Service: 2/10/2025 I Hospital Day: 2     Assessment & Plan  Acute respiratory failure with hypoxia (HCC)  Presented on 1/19/25 with respiratory failure related to Influenza A  Intubated 1/21/25 - 1/30/25.  Required HFNC  Weaning down from oxygen.  Now on RA in AM   Improving    PLAN   Continue IS, OPEP, and pulmonary toileting  Aspiration precautions, deep breathing  Monitor pulmonary status while in [facility]  Follow-up with Pulmonary as outpatient  Influenza A  Presented on 1/19/25 with respiratory failure  +Influenza A  s/p Tamiflu treatment completed   Acquired hypothyroidism  Acquired after total thyroidectomy 2/2 papillary thyroid carcinoma in 2013  Continue Synthroid 175 mcg daily  Focal epilepsy (HCC)  Chronic seizure disorder  Continue chronic AED regimen: Lacosamide 150 mg QAM, Lacosamide 200 mg QHS, Depakote 750 mg Q 12 hours  F.u depakote lvl   Schizoaffective disorder (HCC)  Stable  Continue chronic medications: Cogentin 2 mg BID, Zyprexa 10 mg QHS, Invega 6 mg QAM.    Psych consulted 2/10 given neutropenia for medication adjustment   F.u depakote lvl   Hypernatremia  Resolved  139 2/10  Pneumonia of both lungs due to infectious organism  Possible bacterial PNA.   S/p treatment with Azithromycin  Continue supportive care  Continues on oxygen 0-2L; on RA this AM   Insomnia  Continue Melatonin 6 mg QHS  At risk for deep venous thrombosis  Continue Lovenox 40 mg daily.    Oropharyngeal dysphagia  Currently on pureed diet with thins  SLP to follow  Hopeful to upgrade based on recent notes.   Neutropenia (HCC)  Patient WBC 1.97 2/10; neutrophils 0.6   Neutropenic precautions   Psych consult for medication adjustment   C/t monitor w/ routine blood work     History of Present Illness   Esperanza Prajapati is a 54 y.o. female with past medical history significant for  epileps/seizure disorder, schizoaffective disorder, cognitive deficits, history of papillary thyroid carcinoma s/p total thyroidectomy who presented to the [facility] 1/19/25 with acute respiratory failure.  +Influenza A.  Intubated 1/21/25 - 1/30/25.  Brochoscopy 1/24/25.  Completed Tamiflu treatment.  Patient with superimposed bacterial PNA.  Treated with Azithromyocin.  Followed closely by Pulmonary.  Needing intermittent oxygen, but has weaned to RA.    Medical course complicated by dysphagia.  Patient seen by SLP and placed on a pureed diet with thin liquids.    After medical stabilization, patient found to have acute functional deficits from baseline in mobilty, self care, swallow, therefore admitted to [facility] for acute inpatient rehabiltiation.      Chief Complaint: f/u ambulatory dysfunction    Interval: Patient seen and examined in room. No events overnight.  Reports overall feeling well. No complaints this AM. Last BM 2/9. Denies any f/c/n/v, CP, SOB, cough, abdominal pain, constipation, or diarrhea.       Objective   Functional Update:  Sit to lying: Adolfo  Sit to Stand: no PA   Gait: 150ft  with Adolfo no AD   UE Dressing:  supervision  LE Dressing:  ModA        Temp:  [97.7 °F (36.5 °C)-99.2 °F (37.3 °C)] 97.8 °F (36.6 °C)  HR:  [65-93] 86  Resp:  [18] 18  BP: ()/(65-86) 113/86  SpO2:  [92 %-97 %] 97 %    Physical Exam    Gen: No acute distress, Well-nourished, well-appearing.  HEENT: Moist mucus membranes, Normocephalic/Atraumatic  Cardiovascular: Regular rate, rhythm,  Heme/Extr: No edema  Pulmonary: Non-labored breathing. Lungs CTAB  : No patel  GI:  non-distended. BS+  MSK: ROM is WFL in all extremities.   Integumentary: Skin is warm, dry. .  Neuro: AAOx3, Speech is intact. Appropriate to questioning.  Psych: Normal mood and affect.       Scheduled Meds:  Current Facility-Administered Medications   Medication Dose Route Frequency Provider Last Rate    acetaminophen   975 mg Oral Q6H PRN Tia Benz MD      aluminum-magnesium hydroxide-simethicone  30 mL Oral Q6H PRN Tia Benz MD      benztropine  2 mg Oral BID Tia Benz MD      cyanocobalamin  1,000 mcg Oral Daily Tia Benz MD      divalproex sodium  750 mg Oral Q12H MOSHE Taurus Mcnamara DO      enoxaparin  40 mg Subcutaneous Daily Tia Benz MD      ipratropium-albuterol  3 mL Nebulization Q8H PRN Tia Benz MD      lacosamide  150 mg Oral Daily Tia Benz MD      lacosamide  200 mg Oral HS Tia Benz MD      levothyroxine  175 mcg Oral Early Morning Tia Benz MD      melatonin  6 mg Oral HS Tia Benz MD      nystatin   Topical BID iTa Benz MD      OLANZapine  10 mg Oral HS Tia eBnz MD      ondansetron  4 mg Intravenous Q6H PRN Tia Benz MD      paliperidone  6 mg Oral QAM Tia Benz MD      polyethylene glycol  17 g Oral Daily PRN Tia Benz MD      senna-docusate sodium  2 tablet Oral BID Tia Benz MD           Lab Results: I have reviewed the following results:  Results from last 7 days   Lab Units 02/10/25  0950 02/06/25  0552 02/05/25  0511   HEMOGLOBIN g/dL 11.3* 10.0* 9.5*   HEMATOCRIT % 35.2 29.9* 29.1*   WBC Thousand/uL 1.97* 3.27* 3.34*   PLATELETS Thousands/uL 326 347 352     Results from last 7 days   Lab Units 02/10/25  0950 02/06/25  0552 02/05/25  0511   BUN mg/dL 13 14 16   SODIUM mmol/L 139 141 139   POTASSIUM mmol/L 4.0 3.7 3.5   CHLORIDE mmol/L 99 104 104   CREATININE mg/dL 0.90 0.74 0.67              Rosi Olsen MD   Physical Medicine and Rehabilitation   02/10/25    I have spent a total time of 37 minutes in caring for this patient on the day of the visit/encounter including Counseling / Coordination of care, Documenting in the medical record, Reviewing / ordering tests, medicine, procedures  , and Communicating with other healthcare professionals .

## 2025-02-10 NOTE — ASSESSMENT & PLAN NOTE
EKG on 1/25/25 showed atrial flutter with QTc of 649.  Repeat EKG obtained today showing NSR with QTc of 399.  Psychiatry consulted and reviewing medications.  Avoid QT prolonging medications as able.  Obtain repeat EKG as needed.

## 2025-02-10 NOTE — ASSESSMENT & PLAN NOTE
Possible bacterial PNA.   S/p treatment with Azithromycin  Continue supportive care  Continues on oxygen 0-2L; on RA this AM

## 2025-02-10 NOTE — ASSESSMENT & PLAN NOTE
Continue home Vimpat 150mg in AM and 200mg in PM and Depakote 750mg Q12.  Follows with Dr. Cevallos (Neurology) as outpatient.    Concerns in regards to medication non-compliance.  Has been seizure free for years.  Depakote level to be obtained this evening to assess compliance.

## 2025-02-10 NOTE — ASSESSMENT & PLAN NOTE
Presented on 1/19 with altered mental status and hypoxia.  Influenza A + on 1/19.  Received 5 doses of Tamiflu.  Received IV cefepime and azithromycin for possible pneumonia.  Required intubation 10 days.  Last CXR on 1/31 showed unchanged bibasilar opacities, atelectasis vs pneumonia.  Repeat CXR as needed.  Currently maintaining on RA.  Continue spot pulse ox checks.  Pulmonary toileting, IS use.

## 2025-02-10 NOTE — ASSESSMENT & PLAN NOTE
SLP on board, maintain pureed diet, advance as tolerated/recommended per SLP.   Able to tolerate PO medications thus far.

## 2025-02-10 NOTE — ASSESSMENT & PLAN NOTE
Follows with Neurology, PTA AED regimen: lacosamide 150 mg daily AM and 200 mg q HS, Depakote 750 mg q12h.     Plan  Would recommend Neurology's input as both AEDs can contribute to neutropenia.   VPA trough level tonight.

## 2025-02-10 NOTE — PLAN OF CARE
Problem: PAIN - ADULT  Goal: Verbalizes/displays adequate comfort level or baseline comfort level  Description: Interventions:  - Encourage patient to monitor pain and request assistance  - Assess pain using appropriate pain scale  - Administer analgesics based on type and severity of pain and evaluate response  - Implement non-pharmacological measures as appropriate and evaluate response  - Consider cultural and social influences on pain and pain management  - Notify physician/advanced practitioner if interventions unsuccessful or patient reports new pain  Outcome: Progressing     Problem: INFECTION - ADULT  Goal: Absence or prevention of progression during hospitalization  Description: INTERVENTIONS:  - Assess and monitor for signs and symptoms of infection  - Monitor lab/diagnostic results  - Monitor all insertion sites, i.e. indwelling lines, tubes, and drains  - Monitor endotracheal if appropriate and nasal secretions for changes in amount and color  - [place] appropriate cooling/warming therapies per order  - Administer medications as ordered  - Instruct and encourage patient and family to use good hand hygiene technique  - Identify and instruct in appropriate isolation precautions for identified infection/condition  Outcome: Progressing

## 2025-02-10 NOTE — PROGRESS NOTES
"Session completed by bilingual OT in [place] as this is pt's native language and session can be completed and analyzed in native language.        02/10/25 0700   Pain Assessment   Pain Score No Pain   Restrictions/Precautions   Precautions Aspiration;Bed/chair alarms;Cognitive;Fall Risk;Seizure;Supervision on toilet/commode   Weight Bearing Restrictions No   ROM Restrictions No   Oral Hygiene   Type of Assistance Needed Supervision   Comment SUP seated at sink   Oral Hygiene CARE Score 4   Shower/Bathe Self   Type of Assistance Needed Incidental touching   Comment completed SB this session seated on recliner. pt is able to wash 10/10 parts. pt req vc for enocuragement as pt immediately reporting \"I can't do it\". Pt elevates LE onto chair to be able to reach feet. req vc for thoroughness   Shower/Bathe Self CARE Score 4   Upper Body Dressing   Type of Assistance Needed Set-up / clean-up   Comment seated   Upper Body Dressing CARE Score 5   Lower Body Dressing   Type of Assistance Needed Physical assistance   Physical Assistance Level 25% or less   Comment req Tin to thread undergarment through LLE. pt able to manage pants. CGA in stance for CM. Trial LB dressing seated EOB next session   Lower Body Dressing CARE Score 3   Putting On/Taking Off Footwear   Type of Assistance Needed Supervision   Comment seated EOB pt able to support LEs on bed and then able to don socks/sneakers   Putting On/Taking Off Footwear CARE Score 4   Lying to Sitting on Side of Bed   Type of Assistance Needed Physical assistance   Physical Assistance Level 26%-50%   Comment modA for trunk mmgnt and use of bedrail   Lying to Sitting on Side of Bed CARE Score 3   Sit to Stand   Type of Assistance Needed Incidental touching   Comment CGA no AD   Sit to Stand CARE Score 4   Bed-Chair Transfer   Type of Assistance Needed Incidental touching   Comment CGA no AD   Chair/Bed-to-Chair Transfer CARE Score 4   Toileting Hygiene   Type of Assistance " Needed Incidental touching   Comment CGA in stance for jorge hygiene and CM. Further assess buttock hygiene after BM. Anicipate pt will req vc fo thoroughness   Toileting Hygiene CARE Score 4   Toilet Transfer   Type of Assistance Needed Incidental touching   Comment CGA no AD   Toilet Transfer CARE Score 4   Meal Prep   Meal Prep Level   (family completes)   Money Management   Money Management Level of Assistance   (family completes)   Light Housekeeping   Light Housekeeping Level   (family completes)   Health Management   Health Management Level   (family completes)   Cognition   Overall Cognitive Status Impaired   Arousal/Participation Alert;Cooperative   Attention Attends with cues to redirect   Orientation Level Oriented X4   Memory Decreased short term memory;Decreased recall of recent events;Decreased recall of precautions   Following Commands Follows one step commands with increased time or repetition   Comments h/o intellectual delay from childhood injury - Cymro speaking - does speak and understand english.  Session completed mainly in Malay   Activity Tolerance   Activity Tolerance Patient tolerated treatment well   Medical Staff Made Aware Pt sleeping upon therapist arrival. Req inc tactile cues to stimulate and wake up. BP in bed 99/66 HR83 93% O2; post /78 HR86 93% O2   Assessment   Treatment Assessment Engaged pt in 60mins of skilled OT services with focus on ADL. Pt req inc time to arouse this AM. Pt overall fx at CGA level with ADLs/transfers no AD. Per IE pt has necessary SUP at home. Pt is mkaing steady gains toward OT goals. Cont OT POC with focus on activity tolerance, LB dressing (completed EOB), UE TE to inc fx performance and dec CG burden.     OF note, OT cog goals added, family completes IADLs. See other OT note.   Prognosis Good   Problem List Decreased strength;Decreased range of motion;Decreased endurance;Decreased mobility;Impaired judgement   Barriers to Discharge Decreased  caregiver support   Plan   Treatment/Interventions ADL retraining;Functional transfer training;Therapeutic exercise;Endurance training;Patient/family training;Bed mobility;Compensatory technique education   Progress Progressing toward goals   Discharge Recommendation   Rehab Resource Intensity Level, OT   (pending progress)   Equipment Recommended Shower/Tub chair with back ($)  (will discuss with family if able to purchase or if they have at home)   OT Therapy Minutes   OT Time In 0700   OT Time Out 0800   OT Total Time (minutes) 60   OT Mode of treatment - Individual (minutes) 60   OT Mode of treatment - Concurrent (minutes) 0   OT Mode of treatment - Group (minutes) 0   OT Mode of treatment - Co-treat (minutes) 0   OT Mode of Treatment - Total time(minutes) 60 minutes   OT Cumulative Minutes 150   Therapy Time missed   Time missed? No

## 2025-02-10 NOTE — ASSESSMENT & PLAN NOTE
Patient WBC 1.97 2/10; neutrophils 0.6   Neutropenic precautions   Psych consult for medication adjustment   C/t monitor w/ routine blood work

## 2025-02-10 NOTE — ASSESSMENT & PLAN NOTE
S/p thyroidectomy 2/2 papillary thyroid carcinoma  Consider repeat TSH/free T4  PTA: levothyroxine 175 mcg  Management per Primary

## 2025-02-10 NOTE — CONSULTS
Consultation - Internal Medicine   Name: Esperanza Prajapati 54 y.o. female I MRN: [id number]  Unit/Bed#: -01 I Date of Admission: 2/8/2025   Date of Service: 2/10/2025 I Hospital Day: 2   Inpatient consult to Internal Medicine  Consult performed by: BRINA Silva  Consult ordered by: Tia Benz MD        Physician Requesting Evaluation: Rosi Olsen MD     Assessment & Plan  Schizoaffective disorder (HCC)  Home regimen: Zyprexa 10mg at HS, Invega 6mg daily, trazodone 50mg at HS, and Cogentin 2mg BID.  Currently receiving Zyprexa 10mg at HS and Cogentin 2mg BID.  Psychiatry consulted due to concerns for non-compliance and new neutropenia.  Invega discontinued today.    Follows with outpatient Psychiatry.  Acquired hypothyroidism  Continue home levothyroxine 175mcg daily.  Last TSH 3.323 on 1/19.  Focal epilepsy (HCC)  Continue home Vimpat 150mg in AM and 200mg in PM and Depakote 750mg Q12.  Follows with Dr. Cevallos (Neurology) as outpatient.    Concerns in regards to medication non-compliance.  Has been seizure free for years.  Depakote level to be obtained this evening to assess compliance.  Intellectual disability  Concerns for medication non-compliance.  Considerations with dispo planning.  Influenza A  Influenza A+ on 1/19.  Received 5 days of Tamiflu.  Currently asymptomatic.  Acute respiratory failure with hypoxia (HCC)  Presented on 1/19 with altered mental status and hypoxia.  Influenza A + on 1/19.  Received 5 doses of Tamiflu.  Received IV cefepime and azithromycin for possible pneumonia.  Required intubation 10 days.  Last CXR on 1/31 showed unchanged bibasilar opacities, atelectasis vs pneumonia.  Repeat CXR as needed.  Currently maintaining on RA.  Continue spot pulse ox checks.  Pulmonary toileting, IS use.  Insomnia  Started on melatonin 6mg at HS.  Oropharyngeal dysphagia  Continue pureed diet with thins.  Speech consulted and following.  Aspiration  precautions.  Neutropenia (HCC)  WBC count currently 1.19.  Absolute neutrophils 0.6.  Started on neutropenic precautions.  Likely due to medications.  Had been non-compliant at home and was restarted on medications in acute setting.  Psychiatry consulted - discontinue Invega today.  Recommending Neurology consult.  Neurology consulted - no changes at this time.  Unlikely to be due to Vimpat or Depakote.  Obtain repeat CBC tomorrow.  Prolonged QT interval  EKG on 1/25/25 showed atrial flutter with QTc of 649.  Repeat EKG obtained today showing NSR with QTc of 399.  Psychiatry consulted and reviewing medications.  Avoid QT prolonging medications as able.  Obtain repeat EKG as needed.    History of Present Illness:    Esperanza Prajapati is a 54 y.o. female with a PMH of intellectual disability, schizoaffective disorder, seizure disorder, and hypothyroidism who originally presented to [facility] on 1/19/2025 with altered mental status and hypoxia.  Tested positive for influenza A.  Received Tamiflu for 5 days and IV fluids.  On 1/20, patient required increasing O2 and transferred to ICU for emergent intubation.  CXR showed dense L lower lobe consolidation with air bronchograms which could be due to pneumonia in the appropriate clinical setting.  Patient received IV cefepime and azithromycin for possible superimposed bacterial pneumonia.  Also received IV steroids and IV diuretics.  Successfully extubated on 1/30.  Weaned from BiPAP to high flow nasal cannula to eventually room air.  CXR on 1/31 showed unchanged bibasilar opacities.  Developed critical illness myopathy and was evaluated by PT/OT who recommended acute inpatient rehabilitation.     Admitted to [facility] on 2/8/2025; we are consulted for medical clearance.      Pt examined while pt sitting in recliner in pt room.  Flat affect.  Currently denies any pain.  Denies any lightheadedness, dizziness, SOB, palpitations, or CP.  Has an  occasional dry cough.  Denies any fevers or chills.  Denies any abdominal pain.  LBM was yesterday.  Denies any urinary complaints.  Lives at home with her mother's nephew.  States that he helps her with ADLs but he does not help with her medications.  She manages her medications on her own.  Had practiced medication management with OT and it did not go well.  Concerns for medication non-compliance.          Review of Systems:    Review of Systems   Constitutional:  Negative for appetite change, chills, fatigue and fever.   HENT:  Negative for trouble swallowing.    Eyes:  Negative for visual disturbance.   Respiratory:  Positive for cough (occasional non-productive cough). Negative for shortness of breath, wheezing and stridor.    Cardiovascular:  Negative for chest pain, palpitations and leg swelling.   Gastrointestinal:  Negative for abdominal distention, abdominal pain, constipation, diarrhea, nausea and vomiting.        LBM 2/9   Genitourinary:  Negative for difficulty urinating.   Musculoskeletal:  Negative for arthralgias, back pain and gait problem.   Neurological:  Negative for dizziness, weakness, light-headedness, numbness and headaches.   Psychiatric/Behavioral:  Positive for behavioral problems. Negative for dysphoric mood and sleep disturbance. The patient is not nervous/anxious.    All other systems reviewed and are negative.      Past Medical and Surgical History:     Past Medical History:   Diagnosis Date    Cancer (HCC)     thyroid cancer    Disease of thyroid gland     Hallucination     Hyperactivity (behavior)     Last Assessed: 11/7/2014     Hyperlipidemia     Hyponatremia 01/20/2025    Obesity     Psychosis (HCC)     Seizures (HCC)        Past Surgical History:   Procedure Laterality Date    OTHER SURGICAL HISTORY      Removal of urinary calculus     OTHER SURGICAL HISTORY      Rhinologic Surgery     IL OPEN TX PHALANGEAL SHAFT FRACTURE PROX/MIDDLE EA Left 8/3/2023    Procedure: CLOSED  "REDUCTION PERCUTANEOUS PINNING - LEFT RING FINGER;  Surgeon: Fransisco Escalante MD;  Location: [facility] MAIN OR;  Service: Orthopedics    TOTAL THYROIDECTOMY      LAst Assessed: 11/7/2014       Meds/Allergies:    all medications and allergies reviewed    Allergies: No Known Allergies    Social History:     Marital Status: Single    Substance Use History:   Social History     Substance and Sexual Activity   Alcohol Use Never     Social History     Tobacco Use   Smoking Status Never    Passive exposure: Never   Smokeless Tobacco Never     Social History     Substance and Sexual Activity   Drug Use Never       Family History:  Reviewed    Physical Exam:     Vitals:   Blood Pressure: 113/86 (02/10/25 0800)  Pulse: 86 (02/10/25 0800)  Temperature: 97.8 °F (36.6 °C) (02/10/25 0609)  Temp Source: Temporal (02/10/25 0609)  Respirations: 18 (02/10/25 0609)  Height: 5' 9\" (175.3 cm) (02/08/25 1145)  Weight - Scale: 102 kg (225 lb) (02/08/25 1145)  SpO2: 97 % (02/10/25 0609)    Physical Exam  Vitals and nursing note reviewed.   Constitutional:       General: She is not in acute distress.     Appearance: Normal appearance. She is obese. She is not ill-appearing.   HENT:      Head: Normocephalic and atraumatic.   Cardiovascular:      Rate and Rhythm: Regular rhythm. Tachycardia present.      Pulses: Normal pulses.      Heart sounds: Normal heart sounds. No murmur heard.     No friction rub.   Pulmonary:      Effort: Pulmonary effort is normal. No respiratory distress.      Breath sounds: Normal breath sounds. No wheezing or rhonchi.   Abdominal:      General: Abdomen is flat. Bowel sounds are normal. There is no distension.      Palpations: Abdomen is soft. There is no mass.      Tenderness: There is no abdominal tenderness. There is no guarding or rebound.      Hernia: No hernia is present.   Musculoskeletal:      Cervical back: Normal range of motion and neck supple.      Right lower leg: No edema.      Left lower leg: No edema. "   Skin:     General: Skin is warm and dry.      Capillary Refill: Capillary refill takes less than 2 seconds.   Neurological:      Mental Status: She is alert and oriented to person, place, and time.   Psychiatric:         Mood and Affect: Mood normal. Affect is flat.         Behavior: Behavior normal.         Additional Data:     Labs and imaging reviewed.    EKG Reviewed - EKG today showed NSR with QTc of 399.    M*[employer] software was used to dictate this note.  It may contain errors with dictating incorrect words or incorrect spelling. Please contact the provider directly with any questions.

## 2025-02-10 NOTE — PROGRESS NOTES
02/10/25 1300   Pain Assessment   Pain Assessment Tool 0-10   Pain Score No Pain   Restrictions/Precautions   Precautions Aspiration;Bed/chair alarms;Fall Risk;Cognitive;Supervision on toilet/commode;Seizure  (neutropenic precautions)   Cognition   Arousal/Participation Alert;Cooperative   Subjective   Subjective Pt with no new complaints, agreeable to PT session   Sit to Stand   Type of Assistance Needed Incidental touching   Physical Assistance Level No physical assistance   Sit to Stand CARE Score 4   Bed-Chair Transfer   Type of Assistance Needed Incidental touching   Physical Assistance Level No physical assistance   Chair/Bed-to-Chair Transfer CARE Score 4   Walk 10 Feet   Type of Assistance Needed Incidental touching   Physical Assistance Level No physical assistance   Walk 10 Feet CARE Score 4   Walk 50 Feet with Two Turns   Type of Assistance Needed Incidental touching   Physical Assistance Level No physical assistance   Walk 50 Feet with Two Turns CARE Score 4   Walk 150 Feet   Type of Assistance Needed Incidental touching   Physical Assistance Level No physical assistance   Walk 150 Feet CARE Score 4   Walking 10 Feet on Uneven Surfaces   Type of Assistance Needed Incidental touching   Physical Assistance Level No physical assistance   Comment ramp   Walking 10 Feet on Uneven Surfaces CARE Score 4   Ambulation   Primary Mode of Locomotion Prior to Admission Walk   Distance Walked (feet) 276 ft  (x4)   Gait Pattern Slow Elizabeth;Wide WENDY   Limitations Noted In Balance;Endurance;Heel Strike;Speed;Swing   Provided Assistance with: Balance   Walk Assist Level Contact Guard   Does the patient walk? 2. Yes   Wheel 50 Feet with Two Turns   Reason if not Attempted Activity not applicable   Wheel 50 Feet with Two Turns CARE Score 9   Wheel 150 Feet   Reason if not Attempted Activity not applicable   Wheel 150 Feet CARE Score 9   Wheelchair mobility   Does the patient use a wheelchair? 0. No   Curb or Single  Stair   Style negotiated Single stair   Type of Assistance Needed Physical assistance   Physical Assistance Level 25% or less   1 Step (Curb) CARE Score 3   4 Steps   Type of Assistance Needed Physical assistance   Physical Assistance Level 25% or less   4 Steps CARE Score 3   12 Steps   Type of Assistance Needed Physical assistance   Physical Assistance Level 25% or less   12 Steps CARE Score 3   Stairs   Type Stairs   # of Steps 12   Assist Devices Single Rail   Findings ascend forward reciprocal pattern, descend forward non-reciprocal pattern, with 1 HR and CG /min A. no posterior lean observed today   Therapeutic Interventions   Strengthening heel raises 3x10, standing hip 3 way 3x10 each   Balance sidestepping along hallway railing 4x, walking fwd/bwd along hallway railing 4x   Equipment Use   Cardoz L1 x 10 min   Assessment   Treatment Assessment Pt participated in 60 min skilled PT session focusing on stairs, ambulation, and LE strengthening. Pt with no c/o fatigue or SOB during session, minimal rest breaks taken. Able to negotiate 12 stairs today with 1 HR prior to needing a rest (pt has 16 stairs at home). Overall pt requires CG for all mobility, and no LOB observed throughout today's session. Will perform Manzanares Balance Scale later to further assess static balance and fall risk. Pt returned to recliner with alarms on at end of session. She requires continued skilled PT to maximize independence and safety with all functional mobility.   Barriers to Discharge Inaccessible home environment;Decreased caregiver support   PT Barriers   Physical Impairment Decreased strength;Decreased endurance;Impaired balance;Decreased mobility;Impaired judgement   Functional Limitation Car transfers;Ramp negotiation;Stair negotiation;Transfers;Walking   Plan   Treatment/Interventions Functional transfer training;LE strengthening/ROM;Elevations;Therapeutic exercise;Endurance training;Patient/family training;Equipment  eval/education;Bed mobility;Gait training   Progress Progressing toward goals   PT Therapy Minutes   PT Time In 1300   PT Time Out 1400   PT Total Time (minutes) 60   PT Mode of treatment - Individual (minutes) 60   PT Mode of treatment - Concurrent (minutes) 0   PT Mode of treatment - Group (minutes) 0   PT Mode of treatment - Co-treat (minutes) 0   PT Mode of Treatment - Total time(minutes) 60 minutes   PT Cumulative Minutes 145   Therapy Time missed   Time missed? No

## 2025-02-10 NOTE — ASSESSMENT & PLAN NOTE
>>ASSESSMENT AND PLAN FOR INTELLECTUAL DISABILITY WRITTEN ON 2/10/2025  2:28 PM BY BRINA HOLLY    Concerns for medication non-compliance.  Considerations with dispo planning.

## 2025-02-10 NOTE — PROGRESS NOTES
02/10/25 1500   Pain Assessment   Pain Assessment Tool 0-10   Pain Score No Pain   Restrictions/Precautions   Precautions Aspiration;Bed/chair alarms;Cognitive;Fall Risk;Supervision on toilet/commode;Seizure  (neutropenic precautions)   Weight Bearing Restrictions No   ROM Restrictions No   Cognition   Arousal/Participation Alert;Cooperative   Subjective   Subjective Pt with no new complaints   Sit to Lying   Type of Assistance Needed Supervision   Physical Assistance Level No physical assistance   Sit to Lying CARE Score 4   Lying to Sitting on Side of Bed   Type of Assistance Needed Physical assistance   Physical Assistance Level 26%-50%   Lying to Sitting on Side of Bed CARE Score 3   Sit to Stand   Type of Assistance Needed Incidental touching   Physical Assistance Level No physical assistance   Sit to Stand CARE Score 4   Bed-Chair Transfer   Type of Assistance Needed Incidental touching   Physical Assistance Level No physical assistance   Chair/Bed-to-Chair Transfer CARE Score 4   Walk 10 Feet   Type of Assistance Needed Incidental touching   Physical Assistance Level No physical assistance   Walk 10 Feet CARE Score 4   Walk 50 Feet with Two Turns   Type of Assistance Needed Incidental touching   Physical Assistance Level No physical assistance   Walk 50 Feet with Two Turns CARE Score 4   Walk 150 Feet   Type of Assistance Needed Incidental touching   Physical Assistance Level No physical assistance   Walk 150 Feet CARE Score 4   Walking 10 Feet on Uneven Surfaces   Type of Assistance Needed Incidental touching   Physical Assistance Level No physical assistance   Comment ramp   Walking 10 Feet on Uneven Surfaces CARE Score 4   Ambulation   Primary Mode of Locomotion Prior to Admission Walk   Distance Walked (feet) 276 ft  (x3)   Gait Pattern Slow Elizabeth;Wide WENDY   Limitations Noted In Balance;Endurance;Heel Strike;Speed;Swing   Provided Assistance with: Balance   Walk Assist Level Contact Guard   Does the  "patient walk? 2. Yes   Wheel 50 Feet with Two Turns   Reason if not Attempted Activity not applicable   Wheel 50 Feet with Two Turns CARE Score 9   Wheel 150 Feet   Reason if not Attempted Activity not applicable   Wheel 150 Feet CARE Score 9   Wheelchair mobility   Does the patient use a wheelchair? 0. No   Picking Up Object   Type of Assistance Needed Incidental touching   Physical Assistance Level No physical assistance   Comment marker   Picking Up Object CARE Score 4   Therapeutic Interventions   Balance Manzanares score 36/56 indicating high fall risk. Walking with head turns, walking with \"stop/go\" commands, walking up/down ramp   Assessment   Treatment Assessment Pt participated in 30 min skilled PT session focusing on balance assessment and dynamic gait. She scored 36 on Manzanares indicating high fall risk. Incorporated some dynamic gait exercises into this session, and overall pt able to perform with CG. She required the most assistance for sup>sit transfer for trunk support. Recommend continued static and dynamic balance training to reduce fall risk and maximize safety.   Problem List Decreased strength;Decreased endurance;Impaired balance;Decreased mobility;Impaired judgement   Barriers to Discharge Inaccessible home environment;Decreased caregiver support   PT Barriers   Functional Limitation Car transfers;Ramp negotiation;Stair negotiation;Transfers;Walking   Plan   Treatment/Interventions Functional transfer training;LE strengthening/ROM;Elevations;Therapeutic exercise;Endurance training;Patient/family training;Bed mobility;Equipment eval/education;Gait training   Progress Progressing toward goals   PT Therapy Minutes   PT Time In 1500   PT Time Out 1530   PT Total Time (minutes) 30   PT Mode of treatment - Individual (minutes) 30   PT Mode of treatment - Concurrent (minutes) 0   PT Mode of treatment - Group (minutes) 0   PT Mode of treatment - Co-treat (minutes) 0   PT Mode of Treatment - Total time(minutes) 30 " minutes   PT Cumulative Minutes 115   Therapy Time missed   Time missed? No

## 2025-02-10 NOTE — ASSESSMENT & PLAN NOTE
Chronic seizure disorder  Continue chronic AED regimen: Lacosamide 150 mg QAM, Lacosamide 200 mg QHS, Depakote 750 mg Q 12 hours  F.u depakote lvl

## 2025-02-10 NOTE — PCC OCCUPATIONAL THERAPY
2/11/25  ADL Status:   Grooming: CGA    Bathing: CGA   UB Dressing: S/U  LB Dressing: Adolfo  Toileting:   CGA Toilet Transfers: CGA  Goals: SUP/indep  Meeting Goals:  yes  Modify Goals:   NO    Barriers Interventions   cognition FT   Activity tolerance Endurance training   General weakness Therap exercise                     Family Training: no be scheduled this week  Anticipated D/C Date: late this week (Fri 2/14) >early next week (2/17)  D/C Plan/Location:  Home with nephew and sister support  D/C Therapy Recommendations: Indep for ADLS/transfers. Assistance for IADls. MaxA/TA for med mngmnt  DME: shower chair (will speak with family if pt has at home or they will need to order)

## 2025-02-10 NOTE — ASSESSMENT & PLAN NOTE
Stable  Continue chronic medications: Cogentin 2 mg BID, Zyprexa 10 mg QHS, Invega 6 mg QAM.    Psych consulted 2/10 given neutropenia for medication adjustment   F.u depakote lvl

## 2025-02-10 NOTE — ASSESSMENT & PLAN NOTE
WBC count currently 1.19.  Absolute neutrophils 0.6.  Started on neutropenic precautions.  Likely due to medications.  Had been non-compliant at home and was restarted on medications in acute setting.  Psychiatry consulted - discontinue Invega today.  Recommending Neurology consult.  Neurology consulted - no changes at this time.  Unlikely to be due to Vimpat or Depakote.  Obtain repeat CBC tomorrow.

## 2025-02-10 NOTE — ASSESSMENT & PLAN NOTE
Per chart, last inpatient psychiatric hospitalization in May 2024. Pt follows with outpatient psychiatry at [place]. LOV 10/09/24.  PTA was on the following psychotropic medications: olanzapine 10 mg q HS, paliperidone 6 mg qd (which had been decreased from 9 mg), Cogentin 2 mg PO BID  Per chart pt appears to have been started on standing Cogentin d/t hx of EPS including oculogyric s/sx and dystonia during last psychiatric hospitalization     On evaluation today, pt does not present as acutely decompensated and does not demonstrate any indication for inpatient psychiatric hospitalization. ROS is significant for insomnia, which appears to be chronic, as well as vague auditory hallucinations that are not command in nature. Please see HPI for further details. Verbally marcus for safety. Denies SI/HI, intent, or plan.     Plan  Given new neutropenia and no evidence of acute psychiatric decompensation, will recommend discontinuing paliperidone 6 mg qd at this time.  Will continue current dose of olanzapine 10 mg q HS; re-assess as clinical course develops.  Continue melatonin 6 mg PO q HS for insomnia.   Does not meet criteria for inpatient psychiatric hospitalization at this time.

## 2025-02-11 LAB
BASOPHILS # BLD AUTO: 0.03 THOUSANDS/ΜL (ref 0–0.1)
BASOPHILS NFR BLD AUTO: 1 % (ref 0–1)
EOSINOPHIL # BLD AUTO: 0.06 THOUSAND/ΜL (ref 0–0.61)
EOSINOPHIL NFR BLD AUTO: 2 % (ref 0–6)
ERYTHROCYTE [DISTWIDTH] IN BLOOD BY AUTOMATED COUNT: 14.4 % (ref 11.6–15.1)
HCT VFR BLD AUTO: 31.9 % (ref 34.8–46.1)
HGB BLD-MCNC: 10.3 G/DL (ref 11.5–15.4)
IMM GRANULOCYTES # BLD AUTO: 0.01 THOUSAND/UL (ref 0–0.2)
IMM GRANULOCYTES NFR BLD AUTO: 0 % (ref 0–2)
LYMPHOCYTES # BLD AUTO: 1.76 THOUSANDS/ΜL (ref 0.6–4.47)
LYMPHOCYTES NFR BLD AUTO: 65 % (ref 14–44)
MCH RBC QN AUTO: 32.4 PG (ref 26.8–34.3)
MCHC RBC AUTO-ENTMCNC: 32.3 G/DL (ref 31.4–37.4)
MCV RBC AUTO: 100 FL (ref 82–98)
MONOCYTES # BLD AUTO: 0.3 THOUSAND/ΜL (ref 0.17–1.22)
MONOCYTES NFR BLD AUTO: 11 % (ref 4–12)
NEUTROPHILS # BLD AUTO: 0.59 THOUSANDS/ΜL (ref 1.85–7.62)
NEUTS SEG NFR BLD AUTO: 21 % (ref 43–75)
NRBC BLD AUTO-RTO: 0 /100 WBCS
PLATELET # BLD AUTO: 287 THOUSANDS/UL (ref 149–390)
PMV BLD AUTO: 9.5 FL (ref 8.9–12.7)
RBC # BLD AUTO: 3.18 MILLION/UL (ref 3.81–5.12)
WBC # BLD AUTO: 2.75 THOUSAND/UL (ref 4.31–10.16)

## 2025-02-11 PROCEDURE — 97530 THERAPEUTIC ACTIVITIES: CPT

## 2025-02-11 PROCEDURE — 97110 THERAPEUTIC EXERCISES: CPT

## 2025-02-11 PROCEDURE — 97112 NEUROMUSCULAR REEDUCATION: CPT

## 2025-02-11 PROCEDURE — 85025 COMPLETE CBC W/AUTO DIFF WBC: CPT

## 2025-02-11 PROCEDURE — 94668 MNPJ CHEST WALL SBSQ: CPT

## 2025-02-11 PROCEDURE — 99449 NTRPROF PH1/NTRNET/EHR 31/>: CPT | Performed by: PSYCHIATRY & NEUROLOGY

## 2025-02-11 PROCEDURE — 99232 SBSQ HOSP IP/OBS MODERATE 35: CPT | Performed by: INTERNAL MEDICINE

## 2025-02-11 PROCEDURE — 97116 GAIT TRAINING THERAPY: CPT

## 2025-02-11 PROCEDURE — 92526 ORAL FUNCTION THERAPY: CPT

## 2025-02-11 PROCEDURE — 97535 SELF CARE MNGMENT TRAINING: CPT

## 2025-02-11 PROCEDURE — 99232 SBSQ HOSP IP/OBS MODERATE 35: CPT | Performed by: PSYCHIATRY & NEUROLOGY

## 2025-02-11 RX ORDER — DIVALPROEX SODIUM 500 MG/1
500 TABLET, DELAYED RELEASE ORAL EVERY 12 HOURS SCHEDULED
Status: DISCONTINUED | OUTPATIENT
Start: 2025-02-11 | End: 2025-02-13

## 2025-02-11 RX ADMIN — NYSTATIN 1 APPLICATION: 100000 POWDER TOPICAL at 21:35

## 2025-02-11 RX ADMIN — Medication 6 MG: at 21:33

## 2025-02-11 RX ADMIN — SENNOSIDES AND DOCUSATE SODIUM 2 TABLET: 50; 8.6 TABLET ORAL at 17:35

## 2025-02-11 RX ADMIN — BENZTROPINE MESYLATE 2 MG: 2 TABLET ORAL at 08:35

## 2025-02-11 RX ADMIN — DIVALPROEX SODIUM 750 MG: 250 TABLET, DELAYED RELEASE ORAL at 08:34

## 2025-02-11 RX ADMIN — BENZTROPINE MESYLATE 2 MG: 2 TABLET ORAL at 17:35

## 2025-02-11 RX ADMIN — DIVALPROEX SODIUM 500 MG: 500 TABLET, DELAYED RELEASE ORAL at 21:33

## 2025-02-11 RX ADMIN — OLANZAPINE 10 MG: 10 TABLET, FILM COATED ORAL at 21:33

## 2025-02-11 RX ADMIN — NYSTATIN 1 APPLICATION: 100000 POWDER TOPICAL at 08:37

## 2025-02-11 RX ADMIN — LEVOTHYROXINE SODIUM 175 MCG: 0.1 TABLET ORAL at 05:15

## 2025-02-11 RX ADMIN — ENOXAPARIN SODIUM 40 MG: 40 INJECTION SUBCUTANEOUS at 08:34

## 2025-02-11 RX ADMIN — Medication 1000 MCG: at 08:35

## 2025-02-11 RX ADMIN — LACOSAMIDE 200 MG: 100 TABLET, FILM COATED ORAL at 21:33

## 2025-02-11 RX ADMIN — LACOSAMIDE 150 MG: 50 TABLET, FILM COATED ORAL at 08:35

## 2025-02-11 RX ADMIN — SENNOSIDES AND DOCUSATE SODIUM 2 TABLET: 50; 8.6 TABLET ORAL at 08:36

## 2025-02-11 NOTE — ASSESSMENT & PLAN NOTE
>>ASSESSMENT AND PLAN FOR INTELLECTUAL DISABILITY WRITTEN ON 2/11/2025 11:25 AM BY KEVON CASTILLO DO    H/o intellectual disability per chart review

## 2025-02-11 NOTE — PROGRESS NOTES
02/11/25 0900   Pain Assessment   Pain Score No Pain   Restrictions/Precautions   Precautions Aspiration;Bed/chair alarms;Cognitive;Fall Risk;Seizure;Supervision on toilet/commode   Weight Bearing Restrictions No   Tub/Shower Transfer   Findings CGA for side stepping into tub for dry tub transfer. Recommend hsower chair. handout printed and left in room for family to purchase to prevent falls. will discuss with sister Layne during FT or when she calls back   Upper Body Dressing   Type of Assistance Needed Set-up / clean-up   Comment seated OH shirt   Upper Body Dressing CARE Score 5   Sit to Stand   Type of Assistance Needed Supervision   Comment SUP no AD   Sit to Stand CARE Score 4   Bed-Chair Transfer   Type of Assistance Needed Incidental touching   Comment SUP/CGA no AD   Chair/Bed-to-Chair Transfer CARE Score 4   Meal Prep   Meal Preparation pt reports family complete main meal prep, however, at times she may heat up in microwave. engaged pt in heating up meals. pt able to retrieve items from freezer and place in microwave. able to complete at SUP level   Exercise Tools   Other Exercise Tool 1 seated engaged pt in UE TE with 3# 2x15 to cont to inc fx strength. pt toelrated well with rest break in between for improved STS/SPT.   Cognition   Overall Cognitive Status Impaired   Arousal/Participation Alert;Cooperative   Attention Attends with cues to redirect   Orientation Level Oriented to person;Oriented to place;Oriented to situation;Disoriented to time  (oriented to month and year. not oriented to exact day and day of the week)   Memory Decreased short term memory;Decreased recall of recent events;Decreased recall of precautions   Following Commands Follows one step commands with increased time or repetition   Additional Activities   Additional Activities Comments engaged pt in fx standing balance with airex foam and ball toss. pt completed x3 with rest break in between to challenge WENDY, endurance. in  addition, engaged in weighted ball toss on trampoline to challenge overall balance. tolerated well but observed with SOB with inc activity.   Activity Tolerance   Activity Tolerance Patient tolerated treatment well   Medical Staff Made Aware /76  communicated with GUSTAVO rueda HR   Assessment   Treatment Assessment Engaged pt in 90mins of skilled OT services with focus on UB dressing, standing balance, hand eye coord, UE TE. Pt overall fx at /[facility] leve no AD. Attempted to contact sister [place] to discuss CLOF and DC plan. However, no answer and left VM. Anticpiate DC end of this week or ealry next week pending FT and speaking with sister. Cont OT POC with dfocus on activity tolerance, balance, transfers to inc fx performance and safety.   Prognosis Good   Problem List Decreased strength;Decreased endurance;Impaired balance;Decreased mobility;Impaired judgement   Barriers to Discharge Inaccessible home environment;Decreased caregiver support   Barriers to Discharge Comments (S)  Pt repors that during the day she stays with ex LEON (Nik) and he assists with tanisha prep. Once her nephew is done with work they will both return to her home   Discharge Recommendation   Rehab Resource Intensity Level, OT   (pending progress)   Equipment Recommended Shower/Tub chair with back ($)  (handout left in pts room for sister when she visits)   OT Therapy Minutes   OT Time In 0900   OT Time Out 1030   OT Total Time (minutes) 90   OT Mode of treatment - Individual (minutes) 90   OT Mode of treatment - Concurrent (minutes) 0   OT Mode of treatment - Group (minutes) 0   OT Mode of treatment - Co-treat (minutes) 0   OT Mode of Treatment - Total time(minutes) 90 minutes   OT Cumulative Minutes 270   Therapy Time missed   Time missed? No

## 2025-02-11 NOTE — ASSESSMENT & PLAN NOTE
>>ASSESSMENT AND PLAN FOR INTELLECTUAL DISABILITY WRITTEN ON 2/11/2025 11:11 AM BY JOSE NIÑO MD    Patient will need assistance w/ medications and iADLs once discharged   Will need to communicate with family to see what assistance is available

## 2025-02-11 NOTE — PLAN OF CARE
Problem: PAIN - ADULT  Goal: Verbalizes/displays adequate comfort level or baseline comfort level  Description: Interventions:  - Encourage patient to monitor pain and request assistance  - Assess pain using appropriate pain scale  - Administer analgesics based on type and severity of pain and evaluate response  - Implement non-pharmacological measures as appropriate and evaluate response  - Consider cultural and social influences on pain and pain management  - Notify physician/advanced practitioner if interventions unsuccessful or patient reports new pain  Outcome: Progressing     Problem: MUSCULOSKELETAL - ADULT  Goal: Maintain or return mobility to safest level of function  Description: INTERVENTIONS:  - Assess patient's ability to carry out ADLs; assess patient's baseline for ADL function and identify physical deficits which impact ability to perform ADLs (bathing, care of mouth/teeth, toileting, grooming, dressing, etc.)  - Assess/evaluate cause of self-care deficits   - Assess range of motion  - Assess patient's mobility  - Assess patient's need for assistive devices and provide as appropriate  - Encourage maximum independence but intervene and supervise when necessary  - Involve family in performance of ADLs  - Assess for home care needs following discharge   - Consider OT consult to assist with ADL evaluation and planning for discharge  - Provide patient education as appropriate  Outcome: Progressing

## 2025-02-11 NOTE — PROGRESS NOTES
"   02/11/25 1400   Pain Assessment   Pain Assessment Tool 0-10   Pain Score No Pain   Restrictions/Precautions   Precautions Aspiration;Bed/chair alarms;Cognitive;Fall Risk;Seizure;Supervision on toilet/commode   General   Change In Medical/Functional Status (S)  +neutropenic precautions - pt to mask, staff to mask with pt, wash hands.   Cognition   Overall Cognitive Status Impaired   Arousal/Participation Alert;Cooperative   Attention Attends with cues to redirect   Orientation Level Oriented X4   Memory Decreased short term memory;Decreased recall of recent events;Decreased recall of precautions   Following Commands Follows one step commands with increased time or repetition   Comments flat affect, cooperative.   Subjective   Subjective denies complaints, \"I know I have to wear a masK\"   Roll Left and Right   Type of Assistance Needed Independent   Roll Left and Right CARE Score 6   Sit to Lying   Type of Assistance Needed Supervision   Physical Assistance Level No physical assistance   Sit to Lying CARE Score 4   Lying to Sitting on Side of Bed   Type of Assistance Needed Supervision   Physical Assistance Level No physical assistance   Lying to Sitting on Side of Bed CARE Score 4   Sit to Stand   Type of Assistance Needed Supervision   Physical Assistance Level No physical assistance   Sit to Stand CARE Score 4   Bed-Chair Transfer   Type of Assistance Needed Supervision   Physical Assistance Level No physical assistance   Chair/Bed-to-Chair Transfer CARE Score 4   Car Transfer   Type of Assistance Needed Supervision   Physical Assistance Level No physical assistance   Comment for simulated setup at [facility]   Car Transfer CARE Score 4   Walk 10 Feet   Type of Assistance Needed Supervision   Physical Assistance Level No physical assistance   Comment S no AD   Walk 10 Feet CARE Score 4   Walk 50 Feet with Two Turns   Type of Assistance Needed Supervision   Physical Assistance Level No physical assistance   Comment " CS no AD   Walk 50 Feet with Two Turns CARE Score 4   Walk 150 Feet   Type of Assistance Needed Supervision   Physical Assistance Level No physical assistance   Comment CS no AD   Walk 150 Feet CARE Score 4   Walking 10 Feet on Uneven Surfaces   Type of Assistance Needed Supervision   Physical Assistance Level No physical assistance   Comment indoor ramp without AD, CS for safety   Walking 10 Feet on Uneven Surfaces CARE Score 4   Ambulation   Primary Mode of Locomotion Prior to Admission Walk   Distance Walked (feet) 300 ft  (2x150, 2x275, 400ft)   Gait Pattern Slow Elizabeth;Wide WENDY   Walk Assist Level Supervision;Close Supervision   Findings no AD, WBOS- unable to efficently inc speed. No overt LOB or buckling.   Does the patient walk? 2. Yes   Wheel 50 Feet with Two Turns   Reason if not Attempted Activity not applicable   Wheel 50 Feet with Two Turns CARE Score 9   Wheel 150 Feet   Reason if not Attempted Activity not applicable   Wheel 150 Feet CARE Score 9   Wheelchair mobility   Does the patient use a wheelchair? 0. No   Curb or Single Stair   Comment (S)  re-assess curb step   4 Steps   Type of Assistance Needed Supervision   Physical Assistance Level No physical assistance   4 Steps CARE Score 4   12 Steps   Type of Assistance Needed Supervision   Physical Assistance Level No physical assistance   Comment CS on FF in hallway R HR, reciprocal ascending, non reciprocal descending.   12 Steps CARE Score 4   Picking Up Object   Type of Assistance Needed Supervision   Physical Assistance Level No physical assistance   Comment CS without AD x5 canes from floor.   Picking Up Object CARE Score 4   Therapeutic Interventions   Strengthening Supine B LE 2# SAQ, hip/knee flex.ext 2x10 each. 2# EOM B LE LAQ and hip abd with knee ext 2x10.   Balance Tramp ball bounce x20; bean bag toss, 4x8 (x2 each UE); on foam: ball bounce with trampoline x20; reciprocal cane step over x40ft; x150ft ball toss, x150ft ball bounce.    Equipment Use   NuStep L2 x10 min B UE and LE for conditioning.   Assessment   Treatment Assessment Pt engaged in 90 min skilled PT intervention to endurance and inc activity tolerance. Pt and therapist masked t/o session for neutropenic precautions. Ongoing no AD used. No overt gait or balance issues with acitivities performed, including FF in hallway with single HR and dynamic gait tasks. Pt denied SOB with activity. Messages left for pts sister by CM and OT, awaiting response for plan for DC home by end of week, pending medical stability. Pt near baseline mobility without AD. Hand sanitier used t/o session and pt washed hands thoroughly at end of session with soap and water.   Plan   Progress Improving as expected   PT Therapy Minutes   PT Time In 1400   PT Time Out 1530   PT Total Time (minutes) 90   PT Mode of treatment - Individual (minutes) 90   PT Mode of treatment - Concurrent (minutes) 0   PT Mode of treatment - Group (minutes) 0   PT Mode of treatment - Co-treat (minutes) 0   PT Mode of Treatment - Total time(minutes) 90 minutes   PT Cumulative Minutes 265

## 2025-02-11 NOTE — ASSESSMENT & PLAN NOTE
"Per chart, last inpatient psychiatric hospitalization in May 2024. Pt follows with outpatient psychiatry at [place]. LOV 10/09/24.  PTA was on the following psychotropic medications: olanzapine 10 mg q HS, paliperidone 6 mg qd (which had been decreased from 9 mg), Cogentin 2 mg PO BID  Per chart pt appears to have been started on standing Cogentin d/t hx of EPS including oculogyric s/sx and dystonia during last psychiatric hospitalization     Currently, pt does not present as acutely decompensated and does not demonstrate any indication for inpatient psychiatric hospitalization. Today pt denying AVH, SI/HI, intent, or plan. Reports improvement in sleep. All other ROS unremarkable.     Plan  Will taper down Depakote; pt to receive 500 mg tonight, with plans to further decrease tomorrow as tolerated (see under \"Neutropenia\")  Discontinued paliperidone 6 mg qd 2/2 neutropenia.  Continue current dose of olanzapine 10 mg q HS; re-assess as clinical course develops.  Continue melatonin 6 mg PO q HS for insomnia.   Does not meet criteria for inpatient psychiatric hospitalization at this time.  "

## 2025-02-11 NOTE — CONSULTS
e-Consult (IPC) - Neurology   Name: Esperanza Prajapati 54 y.o. female I MRN: [id number]  Unit/Bed#: -01 I Date of Admission: 2/8/2025   Date of Service: 2/11/2025 I Hospital Day: 3   Inpatient consult to Neurology  Consult performed by: Anirudh Anglin MD  Consult ordered by: BRINA Silva        Physician Requesting Evaluation: Rosi Olsen MD   Reason for Evaluation / Principal Problem: evaluate AEDs as a potential cause for acute neutropenia    ASSESSMENT:  Case carefully reviewed. Patient has stable epilepsy and has not had breakthrough seizures.  He has been on the depakoate 750 mg po bid dose, and lacosamide 150 mg am dose and 200 mg pm dose for quite some time. Both lacosamide and depakoate can cause neutropenia however not common.  Although his last neurology epilepsy visit was on 9/30/24, he has had his total depakoate level checked every few months prior and after to that visit averaging in a normal range of 76 to 102. During this period there was no evidence of neutropenia. Per discussion with primary team yesterday there was concern for recent noncompliance however overall it appears that for the most part the patient has been compliant with medications.      RECOMMENDATIONS:  I would continue the depakoate and lacosamide dosing as they are and look for an alternative cause for the neutropenia.    31 + minutes, >50% of the total time devoted to medical consultative verbal/EMR discussion between providers. Written report will be generated in the EMR.

## 2025-02-11 NOTE — ASSESSMENT & PLAN NOTE
Home regimen: Zyprexa 10mg at HS, Invega 6mg daily, trazodone 50mg at HS, and Cogentin 2mg BID.  Currently receiving Zyprexa 10mg at HS and Cogentin 2mg BID.  Psychiatry consulted due to concerns for non-compliance and new neutropenia.  Invega discontinued on 2/10.    Follows with outpatient Psychiatry.

## 2025-02-11 NOTE — ASSESSMENT & PLAN NOTE
[facility] Transitions Initial Follow Up Call    Call within 2 business days of discharge: Yes    Patient: Maxwell Barrett Patient : 1933   MRN: <[id number]>  Reason for Admission:   Discharge Date: 21 RARS: Readmission Risk Score: 31      Last Discharge [facility]       Complaint Diagnosis Description Type Department Provider    21 Shortness of Breath Acute respiratory failure with hypoxia ([address] . .. ED to Hosp-Admission (Discharged) (ADMITTED) Dragan Reno MD; Theresa Palma,... Attempted to reach pt for follow up call. Pt's dtr states she's on the other line with home health nurse and to call back. Called back later, line connected but no response on the other end. Pt's dtr returned call. States pt is doing fair. Reports she won't ever be able to weigh herself but they are aware of the other clinical signs to look for. Denies swelling or SOB. States per the home care nurse, her lung sounds are clear. States her decubitis ulcer is getting worse but wound care is out today for that. States she's not at home right now so she's unable to review medications at this time but they were able to get everything and pt is taking as prescribed. Denies questions or concerns at this time.        Care Transitions 24 Hour Call    Care Transitions Interventions         Follow Up  Future Appointments   Date Time Provider Santana Arnett   2021  3:15 PM Collin Carrasquillo MD [facility]   2021 10:00 AM Darrian Roger MD [address] [place] [address]   2021 11:00 AM Collin Carrasquillo MD [facility]   2021  3:00 PM FRAN Conde - CNP [facility] November Presented on 1/19/25 with respiratory failure  +Influenza A  s/p Tamiflu treatment completed  Has been stable on RA

## 2025-02-11 NOTE — ASSESSMENT & PLAN NOTE
Lab work 2/10 showed WBC 1.97 (down from 3.27 on 2/6) with ANC of 0.6 (down from 7.71 on 2/1)  Pt did recently have influenza A and was hospitalized, completed course of Tamiflu, also received cephalosporins and azithromycin for PNA  Numerous medications could be contributing, including VPA, olanzapine, paliperidone, lacosamide, as well as recent ABX use  Afebrile, VS stable  VPA trough level 99   This morning: ANC 0.59, WBC 2.75.    Plan  Will taper down Depakote; pt to receive 500 mg tonight (for a total of 1250 mg today).   Per Neurology consult, no changes had been made to AEDs.  However, per their recommendations, reached out to outpatient epileptologist Dr. Cevallos as LOV note states VPA had been prescribed for mood, and to consider consolidating AED to lacosamide monotherapy if Psychiatry is in agreement with discontinuation/decrease of VPA.  Considering risks of neutropenia and overall clinical picture, both teams are in agreement with discontinuation of VPA.   If concerns for breakthrough seizures, can increase dose of lacosamide.  Of note, pt is on olanzapine, which can confer mood stability, so its dose can be adjusted should the need arise.  Discontinued paliperidone 6 mg qd.   Pt received last dose 2/10 at 0852.  Recheck CBC w differential tomorrow morning  Maintain neutropenic precautions.  Continue to monitor VS closely

## 2025-02-11 NOTE — ASSESSMENT & PLAN NOTE
Stable  Continue chronic medications: Cogentin 2 mg BID, Zyprexa 10 mg QHS, Invega 6 mg QAM.    Psych consulted 2/10 given neutropenia for medication adjustment; invega has been dc 2/10  F.u depakote lvl: 99

## 2025-02-11 NOTE — ASSESSMENT & PLAN NOTE
Continue home Vimpat 150mg in AM and 200mg in PM and Depakote 750mg Q12.  Follows with Dr. Cevallos (Neurology) as outpatient.    Concerns in regards to medication non-compliance.  Has been seizure free for years.  Depakote level 99 on 2/10.

## 2025-02-11 NOTE — PLAN OF CARE
Problem: INFECTION - ADULT  Goal: Absence or prevention of progression during hospitalization  Description: INTERVENTIONS:  - Assess and monitor for signs and symptoms of infection  - Monitor lab/diagnostic results  - Monitor all insertion sites, i.e. indwelling lines, tubes, and drains  - Monitor endotracheal if appropriate and nasal secretions for changes in amount and color  - [place] appropriate cooling/warming therapies per order  - Administer medications as ordered  - Instruct and encourage patient and family to use good hand hygiene technique  - Identify and instruct in appropriate isolation precautions for identified infection/condition  Outcome: Progressing  Goal: Absence of fever/infection during neutropenic period  Description: INTERVENTIONS:  - Monitor WBC    Outcome: Progressing     Problem: NEUROSENSORY - ADULT  Goal: Achieves stable or improved neurological status  Description: INTERVENTIONS  - Monitor and report changes in neurological status  - Monitor vital signs such as temperature, blood pressure, glucose, and any other labs ordered   - Initiate measures to prevent increased intracranial pressure  - Monitor for seizure activity and implement precautions if appropriate      Outcome: Progressing

## 2025-02-11 NOTE — ASSESSMENT & PLAN NOTE
Possible bacterial PNA.   S/p treatment with Azithromycin  Continue supportive care  Continues on oxygen 0-2L; stable on RA

## 2025-02-11 NOTE — ASSESSMENT & PLAN NOTE
>>ASSESSMENT AND PLAN FOR INTELLECTUAL DISABILITY WRITTEN ON 2/11/2025  9:24 AM BY BRINA HOLLY    Concerns for medication non-compliance.  Considerations with dispo planning.

## 2025-02-11 NOTE — PCC SPEECH THERAPY
Pt is being followed for dysphagia tx  sessions to where pt she was evaluated on admission and found to be presenting with mild oral and pharyngeal dysphagia, with potentially increased deficits if given less restrictive diet items.      Dysphagia barriers which present include the following risks for aspiration such as: decreased mastication of softer solids, reduced bolus formation, suspected reduced bolus control, incomplete transfers of solids, oral residual of softer solids, suspected delay in swallow initiation, and suspected reduced hyolaryngeal rise with presence of wet vocal quality very occasionally with liquids. Pt also limited by impulsivity and faster rate of intake with food and drink, which when faster rate occurs, pt with more observable swallow deficits. In order to address the noted barriers, skilled SLP services will address this by targeting the following interventions: swallow exercise, proper positioning, diet modification, safe swallow strategies, family education/training, and complete VFSS as needed. Pt is currently on a puree diet and thin liquids with recommendations for full supervision for meals due to need for verbal cuing to slow his rate of intake. Pt has been cleared, however, for a preferred item of hers which is a cut up/softer item.       Family training/education has not yet been initiated but plan to complete this week/prior to discharge regarding diet recommendations and safe swallow strategies. Recommendations at time of discharge are likely for continued dysphagia therapy with diet recommendations TBD when closer to discharge. This week, focus for continued services to target trials of diet upgrades to include dysphagia level 2 and 3 items, along with completion of family training/education. Pt is currently recommended for further skilled SLP services targeting dysphagia therapy in order to maximize oral and pharyngeal swallow skills, while safely supporting PO intake, as  well as to improve independent carryover of safe swallow strategies.

## 2025-02-11 NOTE — ASSESSMENT & PLAN NOTE
"Follows with Neurology, PTA AED regimen: lacosamide 150 mg daily AM and 200 mg q HS, Depakote 750 mg q12h.   Tapering down Depakote; pt to receive 500 mg tonight. See plan as outlined under \"Neutropenia.\"   Per outpatient epileptologist, if concerns for breakthrough seizures, can increase dose of lacosamide.  "

## 2025-02-11 NOTE — CASE MANAGEMENT
Case Management Open     Met with the patient today to complete CM open, she gave permission to call her emergency contacts. Pt presents to the [facility] after acute respiratory failure. Pt with co morbidities of Schizoaffective d/o and intellectual disability. CM left voicemails for pt's sister and sister in law, requesting call back. Per chart review, pt had been living with her mother, however her mother now has a dementia dx and pt had been living with her nephew, who works. House was 2 story with full flight of steps inside. CM and team awaiting call back from family to discuss assistance levels that are anticipated to be needed upon d/c and d/c location / plan. PTA pt did not use DME and had been independent in ADLs/mobility, but had assist with IADLs.     If team does not hear back today, will call family again tomorrow as this is necessary for d/c planning process.

## 2025-02-11 NOTE — PROGRESS NOTES
Progress Note - Internal Medicine   Name: Esperanza Prajapati 54 y.o. female I MRN: [id number]  Unit/Bed#: -01 I Date of Admission: 2/8/2025   Date of Service: 2/11/2025 I Hospital Day: 3    Assessment & Plan  Schizoaffective disorder (HCC)  Home regimen: Zyprexa 10mg at HS, Invega 6mg daily, trazodone 50mg at HS, and Cogentin 2mg BID.  Currently receiving Zyprexa 10mg at HS and Cogentin 2mg BID.  Psychiatry consulted due to concerns for non-compliance and new neutropenia.  Invega discontinued on 2/10.    Follows with outpatient Psychiatry.  Acquired hypothyroidism  Continue home levothyroxine 175mcg daily.  Last TSH 3.323 on 1/19.  Focal epilepsy (HCC)  Continue home Vimpat 150mg in AM and 200mg in PM and Depakote 750mg Q12.  Follows with Dr. Cevallos (Neurology) as outpatient.    Concerns in regards to medication non-compliance.  Has been seizure free for years.  Depakote level 99 on 2/10.  Intellectual disability  Concerns for medication non-compliance.  Considerations with dispo planning.  Influenza A  Influenza A+ on 1/19.  Received 5 days of Tamiflu.  Currently asymptomatic.  Acute respiratory failure with hypoxia ([facility])  Presented on 1/19 with altered mental status and hypoxia.  Influenza A + on 1/19.  Received 5 doses of Tamiflu.  Received IV cefepime and azithromycin for possible pneumonia.  Required intubation 10 days.  Last CXR on 1/31 showed unchanged bibasilar opacities, atelectasis vs pneumonia.  Repeat CXR as needed.  Currently maintaining on RA.  Continue spot pulse ox checks.  Pulmonary toileting, IS use.  Insomnia  Started on melatonin 6mg at HS.  Oropharyngeal dysphagia  Continue pureed diet with thins.  Speech consulted and following.  Aspiration precautions.  Neutropenia (HCC)  WBC count currently 2.75.  Absolute neutrophils 0.59.  Continue neutropenic precautions.  Likely due to medications.  Had been non-compliant at home and was restarted on medications in acute setting.  Psychiatry  consulted - discontinued Invega on 2/10.  Recommending Neurology consult.  Neurology consulted - no changes at this time.  Unlikely to be due to Vimpat or Depakote.  Repeat CBC tomorrow.  Prolonged QT interval  EKG on 25 showed atrial flutter with QTc of 649.  Repeat EKG obtained on 2/10 showing NSR with QTc of 399.  Psychiatry consulted and reviewing medications.  Avoid QT prolonging medications as able.  Obtain repeat EKG as needed.    VTE Pharmacologic Prophylaxis:   Pharmacologic: Enoxaparin (Lovenox)  Mechanical VTE Prophylaxis in Place: Yes - sequential compression devices.    Current Length of Stay: 3 day(s)    Current Patient Status: Inpatient Rehab     Discharge Plan: As per primary team.    Code Status: Level 1 - Full Code    Subjective:   Pt examined while pt sitting in recliner in pt room.  Currently has no complaints.  Slept well last night.  Denies any fevers, chills, dyspnea, or abdominal pain.  LBM was yesterday without any issues.  Tolerating therapy well.    Objective:     Vitals:   Temp (24hrs), Av.6 °F (36.4 °C), Min:96.6 °F (35.9 °C), Max:98.5 °F (36.9 °C)    Temp:  [96.6 °F (35.9 °C)-98.5 °F (36.9 °C)] 96.6 °F (35.9 °C)  HR:  [83-95] 95  Resp:  [17-18] 18  BP: ()/(58-64) 107/60  SpO2:  [92 %-100 %] 92 %  Body mass index is 33.23 kg/m².     Review of Systems   Constitutional:  Negative for chills, fatigue and fever.   HENT:  Negative for trouble swallowing.    Eyes:  Negative for visual disturbance.   Respiratory:  Negative for cough, shortness of breath, wheezing and stridor.    Cardiovascular:  Negative for chest pain, palpitations and leg swelling.   Gastrointestinal:  Negative for abdominal distention, abdominal pain, constipation, diarrhea, nausea and vomiting.        LBM 2/10   Genitourinary:  Negative for difficulty urinating.   Musculoskeletal:  Negative for arthralgias, back pain and gait problem.   Neurological:  Negative for dizziness, weakness, light-headedness,  numbness and headaches.   Psychiatric/Behavioral:  Negative for dysphoric mood and sleep disturbance. The patient is not nervous/anxious.    All other systems reviewed and are negative.       Input and Output Summary (last 24 hours):       Intake/Output Summary (Last 24 hours) at 2/11/2025 0924  Last data filed at 2/11/2025 0846  Gross per 24 hour   Intake 920 ml   Output --   Net 920 ml       Physical Exam:     Physical Exam  Vitals and nursing note reviewed.   Constitutional:       General: She is not in acute distress.     Appearance: Normal appearance. She is obese. She is not ill-appearing.   HENT:      Head: Normocephalic and atraumatic.   Cardiovascular:      Rate and Rhythm: Normal rate and regular rhythm.      Pulses: Normal pulses.      Heart sounds: Normal heart sounds. No murmur heard.     No friction rub.   Pulmonary:      Effort: Pulmonary effort is normal. No respiratory distress.      Breath sounds: Normal breath sounds. No wheezing or rhonchi.   Abdominal:      General: Abdomen is flat. Bowel sounds are normal. There is no distension.      Palpations: Abdomen is soft. There is no mass.      Tenderness: There is no abdominal tenderness. There is no guarding or rebound.      Hernia: No hernia is present.   Musculoskeletal:      Cervical back: Normal range of motion and neck supple. No tenderness.      Right lower leg: Edema (Minimal non-pitting edema) present.      Left lower leg: Edema (Minimal non-pitting edema) present.   Skin:     General: Skin is warm and dry.      Capillary Refill: Capillary refill takes less than 2 seconds.   Neurological:      Mental Status: She is alert and oriented to person, place, and time.   Psychiatric:         Mood and Affect: Mood normal. Affect is flat.         Behavior: Behavior normal.         Additional Data:     Labs:    Results from last 7 days   Lab Units 02/11/25  0516   WBC Thousand/uL 2.75*   HEMOGLOBIN g/dL 10.3*   HEMATOCRIT % 31.9*   PLATELETS Thousands/uL  287   SEGS PCT % 21*   LYMPHO PCT % 65*   MONO PCT % 11   EOS PCT % 2     Results from last 7 days   Lab Units 02/10/25  0950   SODIUM mmol/L 139   POTASSIUM mmol/L 4.0   CHLORIDE mmol/L 99   CO2 mmol/L 32   BUN mg/dL 13   CREATININE mg/dL 0.90   ANION GAP mmol/L 8   CALCIUM mg/dL 10.2   ALBUMIN g/dL 3.9   TOTAL BILIRUBIN mg/dL 0.37   ALK PHOS U/L 34   ALT U/L 27   AST U/L 22   GLUCOSE RANDOM mg/dL 81                       Labs reviewed    Imaging:    Imaging reviewed    Recent Cultures (last 7 days):           Last 24 Hours Medication List:   Current Facility-Administered Medications   Medication Dose Route Frequency Provider Last Rate    acetaminophen  975 mg Oral Q6H PRN Tia Benz MD      aluminum-magnesium hydroxide-simethicone  30 mL Oral Q6H PRN Tia Benz MD      benztropine  2 mg Oral BID Tia Benz MD      cyanocobalamin  1,000 mcg Oral Daily Tia Benz MD      divalproex sodium  750 mg Oral Q12H [facility] Taurus Mcnamara DO      enoxaparin  40 mg Subcutaneous Daily Tia Benz MD      ipratropium-albuterol  3 mL Nebulization Q8H PRN Tia Benz MD      lacosamide  150 mg Oral Daily Tia Benz MD      lacosamide  200 mg Oral HS Tia Benz MD      levothyroxine  175 mcg Oral Early Morning Tia Benz MD      melatonin  6 mg Oral HS Tia Benz MD      nystatin   Topical BID Tia Benz MD      OLANZapine  10 mg Oral HS Tia Benz MD      ondansetron  4 mg Intravenous Q6H PRN Tia Benz MD      polyethylene glycol  17 g Oral Daily PRN Tia Benz MD      senna-docusate sodium  2 tablet Oral BID Tia Benz MD          M*Modal software was used to dictate this note.  It may contain errors with dictating incorrect words or incorrect spelling. Please contact the provider directly with any questions.

## 2025-02-11 NOTE — ASSESSMENT & PLAN NOTE
EKG on 1/25/25 showed atrial flutter with QTc of 649.  Repeat EKG obtained on 2/10 showing NSR with QTc of 399.  Psychiatry consulted and reviewing medications.  Avoid QT prolonging medications as able.  Obtain repeat EKG as needed.

## 2025-02-11 NOTE — OCCUPATIONAL THERAPY NOTE
Attempted to contact sister Layne ([phone]) to discuss CLOF and gather more information regarding who pts stays with during the day and who will cont to assist with IADLs. Also, will further discuss with family recommendation of pt req total A for med mngmnt at [facility].      -Doris Yung MS, OTR/L, CSRS

## 2025-02-11 NOTE — ASSESSMENT & PLAN NOTE
EKG 1/25 aflutter w/ ; repeat EKG 2/10   Avoid QT prolong agents as possible   Repeat EKG as necessary

## 2025-02-11 NOTE — PROGRESS NOTES
02/11/25 1200   Pain Assessment   Pain Assessment Tool 0-10   Pain Score No Pain   Restrictions/Precautions   Precautions Aspiration;Bed/chair alarms;Cognitive;Fall Risk;Seizure;Supervision on toilet/commode   Eating   Type of Assistance Needed Supervision;Verbal cues   Physical Assistance Level No physical assistance   Eating CARE Score 4   Swallow Assessment   Swallow Treatment Assessment Daily Dysphagia Tx Note     Patient Name: Esperanza Prajapati    Today's Date: 2/11/2025      Current Risks for Dysphagia & Aspiration: recent intubation, prolonged intubation, respiratory compromise, weak cough, general debilitation, new neuro event, cognitive deficit, mental status change, reduced alertness, positioning issues     Current Symptoms/Concerns: cough with food and liquids, during and after meal, holding food in mouth, moist cough, recent pneumonia, decreased oral intake    Current diet:puree/level 1 diet and thin liquids     Premorbid diet::regular diet and thin liquids     Positioning: upright in recliner    Items administered:Consistencies Administered: thin liquids, puree, and mechanical soft solids  Materials administered included cut up beef raviolis in sauce, puree peaches, jello, ice cream and thins by cup/straw sips    Total amount of meal consumed:   100% of meal, 480cc thins          Summary:  Pt presenting with s/s suggestive of mild oral and pharyngeal dysphagia this session. Pt seen with lunch meal with puree tray and trials of softer solids/level 2 items as per pt's request of her preferred canned beef ravioli. Pt was independent for tray set up and self fed throughout meal. Pt with times of faster rate of intake with both food and drink items, requiring min to moderate verbal cues to slow down or to complete chewing/swallowing before taking more.     Pt demonstrated functional bolus retrieval by utensil, cup and straw with good oral containment-no anterior loss. Mastication of cut up ravioli  appeared minimally reduced but overall functional for breakdown. Manipulation of puree, jello and ice cream was WFL. Bolus formation appeared functional this meal but suspect occasional decreased bolus control, more so occurring when pt took rapid bites and sips. Transfers of soft solids were minimally slower and reduced in terms overall oral clearance, observing consistent min oral residual of ravioli. Given time and verbal cues to slow down, pt able to independently clear all residual. No overt pocketing observed. Functional and complete transfers of thins and purees. Hyolaryngeal rise present to palpation but judged to be reduced. Pt with wet vocal quality x1 where she spontaneously elicited a throat clear and again x1 but required verbal cues to initiate a throat clear. For both instances, wet vocal quality appeared after intake of liquids (rapid, consecutive sips of thins by straw and following ice cream). No further overt s/s aspiration observed with meal.     Pt is appears to be tolerating a puree diet and thin liquids but benefits highly from full supervision due to the need for verbal cues for pacing/slowing rate of intake. Pt also appeared demo overall functional swallow skills with trials of pt's desired item of canned ravioli (cut up). Pt is cleared for intake of this item with supervision from staff and when cut up. Pt desired less restrictive diet-planning on full trial tray tomorrow morning to assess for potential diet upgrade.       Recommendations:  Diet: puree/level 1 diet and thin liquids  Meds: as best tolerated  Strategies: upright posture, only feed when fully alert, slow rate of feeding, small bites/sips, quiet environment (tv off, limit talking, door closed, etc.), alternating bites and sips, and OOB for meals    Pt is cleared to have her canned ravioli IF they are cut into smaller pieces and with full supervision-needs cues to slow down.    FULL supervision w/ meals.  Results reviewed with:   patient, RN Nohelia, therapy manager Maribel, Internal Med BRINA Francis  Aspiration precautions posted.    F/u ST tx: Pt is currently recommended for further skilled SLP services targeting dysphagia therapy in order to maximize oral and pharyngeal swallow skills, while safely supporting PO intake, as well as to improve independent carryover of safe swallow strategies.      Plan:      Continue puree diet, thins-full supervision with meals (verbal cues to slow down)     Plan to trial a full dysphagia level 2 trial tray tomorrow to assess for potential diet upgrade     Plan to complete family training on diet recs and safe swallow strategies   SLP Therapy Minutes   SLP Time In 1200   SLP Time Out 1240   SLP Total Time (minutes) 40   SLP Mode of treatment - Individual (minutes) 40   SLP Mode of treatment - Concurrent (minutes) 0   SLP Mode of treatment - Group (minutes) 0   SLP Mode of treatment - Co-treat (minutes) 0   SLP Mode of Treatment - Total time(minutes) 40 minutes   SLP Cumulative Minutes 70   Therapy Time missed   Time missed? No

## 2025-02-11 NOTE — ASSESSMENT & PLAN NOTE
Chronic seizure disorder  Continue chronic AED regimen: Lacosamide 150 mg QAM, Lacosamide 200 mg QHS, Depakote 750 mg Q 12 hours  F.u depakote lvl: 99  Per Neurology consult neutropenia not likely to be 2/2 to lacosamide or depakote

## 2025-02-11 NOTE — PROGRESS NOTES
Progress Note - Behavioral Health   Name: Esperanza Prajapati 54 y.o. female I MRN: [id number]  Unit/Bed#: -01 I Date of Admission: 2/8/2025   Date of Service: 2/11/2025 I Hospital Day: 3    Assessment & Plan  Neutropenia (HCC)  Lab work 2/10 showed WBC 1.97 (down from 3.27 on 2/6) with ANC of 0.6 (down from 7.71 on 2/1)  Pt did recently have influenza A and was hospitalized, completed course of Tamiflu, also received cephalosporins and azithromycin for PNA  Numerous medications could be contributing, including VPA, olanzapine, paliperidone, lacosamide, as well as recent ABX use  Afebrile, VS stable  VPA trough level 99   This morning: ANC 0.59, WBC 2.75.    Plan  Will taper down Depakote; pt to receive 500 mg tonight (for a total of 1250 mg today).   Per Neurology consult, no changes had been made to AEDs.  However, per their recommendations, reached out to outpatient epileptologist Dr. Cevallos as LOV note states VPA had been prescribed for mood, and to consider consolidating AED to lacosamide monotherapy if Psychiatry is in agreement with discontinuation/decrease of VPA.  Considering risks of neutropenia and overall clinical picture, both teams are in agreement with discontinuation of VPA.   If concerns for breakthrough seizures, can increase dose of lacosamide.  Of note, pt is on olanzapine, which can confer mood stability, so its dose can be adjusted should the need arise.  Discontinued paliperidone 6 mg qd.   Pt received last dose 2/10 at 0852.  Recheck CBC w differential tomorrow morning  Maintain neutropenic precautions.  Continue to monitor VS closely  Schizoaffective disorder (HCC)  Per chart, last inpatient psychiatric hospitalization in May 2024. Pt follows with outpatient psychiatry at [facility]. LOV 10/09/24.  PTA was on the following psychotropic medications: olanzapine 10 mg q HS, paliperidone 6 mg qd (which had been decreased from 9 mg), Cogentin 2 mg PO BID  Per chart pt appears to have  "been started on standing Cogentin d/t hx of EPS including oculogyric s/sx and dystonia during last psychiatric hospitalization     Currently, pt does not present as acutely decompensated and does not demonstrate any indication for inpatient psychiatric hospitalization. Today pt denying AVH, SI/HI, intent, or plan. Reports improvement in sleep. All other ROS unremarkable.     Plan  Will taper down Depakote; pt to receive 500 mg tonight, with plans to further decrease tomorrow as tolerated (see under \"Neutropenia\")  Discontinued paliperidone 6 mg qd 2/2 neutropenia.  Continue current dose of olanzapine 10 mg q HS; re-assess as clinical course develops.  Continue melatonin 6 mg PO q HS for insomnia.   Does not meet criteria for inpatient psychiatric hospitalization at this time.  Focal epilepsy (HCC)  Follows with Neurology, PTA AED regimen: lacosamide 150 mg daily AM and 200 mg q HS, Depakote 750 mg q12h.   Tapering down Depakote; pt to receive 500 mg tonight. See plan as outlined under \"Neutropenia.\"   Per outpatient epileptologist, if concerns for breakthrough seizures, can increase dose of lacosamide.  Intellectual disability  H/o intellectual disability per chart review  Acquired hypothyroidism  S/p thyroidectomy 2/2 papillary thyroid carcinoma  Consider repeat TSH/free T4  PTA: levothyroxine 175 mcg  Management per Primary  Oropharyngeal dysphagia  SLP on board, maintain pureed diet, advance as tolerated/recommended per SLP.   Able to tolerate PO medications thus far.   Acute respiratory failure with hypoxia (HCC)  Hospitalized 1/19/25 with respiratory failure 2/2 influenza A, requiring intubation from 1/21-1/30, and HFNC   Now stable on room air  Management per Primary      SUBJECTIVE     Patient seen and evaluated in the [facility]. Per record review, no acute events overnight. No PRNs required over the past 24 hours. Pedius  (Yub #[id number]) used for the encounter although pt is able to understand and " "converse in English. This morning, she has actively participated in her physical therapy. She appears tired, however reports a \"good\" mood.  She reports sleeping well last night, \"almost eight hours,\" and denies any frequent awakenings. She currently denies any auditory or visual hallucinations, although does appear internally preoccupied similar to yesterday. She denies any SI/HI, intent, or plan. Overall denies any current questions/concerns. The plan to adjust medications with the input of both Psychiatry and Neurology to address her neutropenia is again explained to her, to which she nods.     Psychiatric Review of Systems:  Behavior over the last 24 hours: unchanged  Sleep: improving  Appetite: adequate  Medication side effects: denies  ROS: Complete review of systems is negative except as noted above.    OBJECTIVE     Vital signs in last 24 hours:  Temp:  [96.6 °F (35.9 °C)-98.5 °F (36.9 °C)] 96.6 °F (35.9 °C)  HR:  [83-95] 95  BP: ()/(58-64) 107/60  Resp:  [17-18] 18  SpO2:  [92 %-100 %] 92 %  O2 Device: None (Room air)    Mental Status Evaluation:  Appearance:  Overtly  female, dressed in hospital attire, sitting upright in bed   Behavior:  Cooperative, psychomotor slowing, fair eye contact   Speech:  Soft, delayed, mumbling at times   Mood:  \"I'm good.\"   Affect:  constricted, less reactive than yesterday   Thought Process:  slowing of thoughts, concrete   Thought Content:  No delusions verbalized.   Perceptual Disturbances: Denies current AVH. Appears internally preoccupied, but does not appear to be responding to internal stimuli.    Risk Potential: Denies SI/HI, intent, or plan.   Potential for aggression: not at time of this encounter    Sensorium:  oriented to person, place, and time/date   Consciousness:  awake   Attention/Concentration: attention span and concentration appear shorter than expected for age   Insight:  limited   Judgment: limited   Muscle Strength Muscle Tone: Not " formally assessed at this encounter.   Gait/Station: slow gait   Motor Activity: Resting tremor observed in right upper extremity and head.        ACTIVE MEDICATIONS     Current Medications:  Current Facility-Administered Medications   Medication Dose Route Frequency Provider Last Rate    acetaminophen  975 mg Oral Q6H PRN Tia Benz MD      aluminum-magnesium hydroxide-simethicone  30 mL Oral Q6H PRN Tia Benz MD      benztropine  2 mg Oral BID Tia Benz MD      cyanocobalamin  1,000 mcg Oral Daily Tia Benz MD      divalproex sodium  750 mg Oral Q12H [facility] Taurus Mcnamara DO      enoxaparin  40 mg Subcutaneous Daily Tia Benz MD      ipratropium-albuterol  3 mL Nebulization Q8H PRN Tia Benz MD      lacosamide  150 mg Oral Daily Tia Benz MD      lacosamide  200 mg Oral HS Tia Benz MD      levothyroxine  175 mcg Oral Early Morning Tia Benz MD      melatonin  6 mg Oral HS Tia Benz MD      nystatin   Topical BID Tia Benz MD      OLANZapine  10 mg Oral HS Tia Benz MD      ondansetron  4 mg Intravenous Q6H PRN Tia Benz MD      polyethylene glycol  17 g Oral Daily PRN Tia Benz MD      senna-docusate sodium  2 tablet Oral BID Tia Benz MD         Behavioral Health Medications: I have personally reviewed all current active medications. Changes as in plan section above.      ADDITIONAL DATA     EKG Results: I have personally reviewed pertinent reports.  Results for orders placed or performed during the hospital encounter of 02/08/25 (from the past 1000 hours)   ECG 12 lead    Collection Time: 02/10/25  2:55 PM   Result Value    Ventricular Rate 95    Atrial Rate 95    RI Interval 168    QRSD Interval 76    QT Interval 332    QTC Interval 418    P Axis 61    QRS Axis 21    T Wave Axis 123    Narrative    Normal sinus rhythm  Nonspecific T wave abnormality  Abnormal ECG  When  compared with ECG of 10-Feb-2025 14:52, (unconfirmed)  No significant change was found  Confirmed by Joselito Wellington ([id number]) on 2/10/2025 4:48:43 PM     *Note: Due to a large number of results and/or encounters for the requested time period, some results have not been displayed. A complete set of results can be found in Results Review.       Radiology Results: I have personally reviewed pertinent reports. I have personally reviewed pertinent films in PACS.  No results found.  No Chest XR results available for this patient.     Laboratory Results: I have personally reviewed all pertinent laboratory/tests results.  Recent Results (from the past 48 hours)   CBC and differential    Collection Time: 02/10/25  9:50 AM   Result Value Ref Range    WBC 1.97 (L) 4.31 - 10.16 Thousand/uL    RBC 3.45 (L) 3.81 - 5.12 Million/uL    Hemoglobin 11.3 (L) 11.5 - 15.4 g/dL    Hematocrit 35.2 34.8 - 46.1 %     (H) 82 - 98 fL    MCH 32.8 26.8 - 34.3 pg    MCHC 32.1 31.4 - 37.4 g/dL    RDW 14.6 11.6 - 15.1 %    MPV 8.9 8.9 - 12.7 fL    Platelets 326 149 - 390 Thousands/uL    nRBC 0 /100 WBCs    Segmented % 31 (L) 43 - 75 %    Immature Grans % 1 0 - 2 %    Lymphocytes % 52 (H) 14 - 44 %    Monocytes % 11 4 - 12 %    Eosinophils Relative 3 0 - 6 %    Basophils Relative 2 (H) 0 - 1 %    Absolute Neutrophils 0.60 (L) 1.85 - 7.62 Thousands/µL    Absolute Immature Grans 0.02 0.00 - 0.20 Thousand/uL    Absolute Lymphocytes 1.04 0.60 - 4.47 Thousands/µL    Absolute Monocytes 0.22 0.17 - 1.22 Thousand/µL    Eosinophils Absolute 0.06 0.00 - 0.61 Thousand/µL    Basophils Absolute 0.03 0.00 - 0.10 Thousands/µL   Basic metabolic panel    Collection Time: 02/10/25  9:50 AM   Result Value Ref Range    Sodium 139 135 - 147 mmol/L    Potassium 4.0 3.5 - 5.3 mmol/L    Chloride 99 96 - 108 mmol/L    CO2 32 21 - 32 mmol/L    ANION GAP 8 4 - 13 mmol/L    BUN 13 5 - 25 mg/dL    Creatinine 0.90 0.60 - 1.30 mg/dL    Glucose 81 65 - 140 mg/dL    Calcium  10.2 8.4 - 10.2 mg/dL    eGFR 73 ml/min/1.73sq m   Hepatic function panel    Collection Time: 02/10/25  9:50 AM   Result Value Ref Range    Total Bilirubin 0.37 0.20 - 1.00 mg/dL    Bilirubin, Direct 0.08 0.00 - 0.20 mg/dL    Alkaline Phosphatase 34 34 - 104 U/L    AST 22 13 - 39 U/L    ALT 27 7 - 52 U/L    Total Protein 8.1 6.4 - 8.4 g/dL    Albumin 3.9 3.5 - 5.0 g/dL   ECG 12 lead    Collection Time: 02/10/25 10:52 AM   Result Value Ref Range    Ventricular Rate 88 BPM    Atrial Rate 88 BPM    DE Interval 170 ms    QRSD Interval 72 ms    QT Interval 336 ms    QTC Interval 407 ms    P [place] 64 degrees    QRS Axis 19 degrees    T Wave Axis 143 degrees   ECG 12 lead    Collection Time: 02/10/25  2:51 PM   Result Value Ref Range    Ventricular Rate 94 BPM    Atrial Rate 94 BPM    DE Interval 172 ms    QRSD Interval 66 ms    QT Interval 306 ms    QTC Interval 383 ms    P Axis 60 degrees    QRS Axis 23 degrees    T Wave Axis 128 degrees   ECG 12 lead    Collection Time: 02/10/25  2:52 PM   Result Value Ref Range    Ventricular Rate 93 BPM    Atrial Rate 93 BPM    DE Interval 162 ms    QRSD Interval 66 ms    QT Interval 330 ms    QTC Interval 411 ms    P Axis 62 degrees    QRS Axis 19 degrees    T Wave Axis 131 degrees   ECG 12 lead    Collection Time: 02/10/25  2:55 PM   Result Value Ref Range    Ventricular Rate 95 BPM    Atrial Rate 95 BPM    DE Interval 168 ms    QRSD Interval 76 ms    QT Interval 332 ms    QTC Interval 418 ms    P Axis 61 degrees    QRS Axis 21 degrees    T Wave Axis 123 degrees   Valproic acid level, total    Collection Time: 02/10/25  8:13 PM   Result Value Ref Range    Valproic Acid, Total 99 50 - 100 ug/mL   CBC and differential    Collection Time: 02/10/25  8:13 PM   Result Value Ref Range    WBC 2.72 (L) 4.31 - 10.16 Thousand/uL    RBC 3.15 (L) 3.81 - 5.12 Million/uL    Hemoglobin 10.1 (L) 11.5 - 15.4 g/dL    Hematocrit 32.2 (L) 34.8 - 46.1 %     (H) 82 - 98 fL    MCH 32.1 26.8 - 34.3 pg     MCHC 31.4 31.4 - 37.4 g/dL    RDW 14.5 11.6 - 15.1 %    MPV 9.1 8.9 - 12.7 fL    Platelets 308 149 - 390 Thousands/uL    nRBC 0 /100 WBCs    Segmented % 25 (L) 43 - 75 %    Immature Grans % 0 0 - 2 %    Lymphocytes % 62 (H) 14 - 44 %    Monocytes % 10 4 - 12 %    Eosinophils Relative 2 0 - 6 %    Basophils Relative 1 0 - 1 %    Absolute Neutrophils 0.68 (L) 1.85 - 7.62 Thousands/µL    Absolute Immature Grans 0.01 0.00 - 0.20 Thousand/uL    Absolute Lymphocytes 1.68 0.60 - 4.47 Thousands/µL    Absolute Monocytes 0.28 0.17 - 1.22 Thousand/µL    Eosinophils Absolute 0.05 0.00 - 0.61 Thousand/µL    Basophils Absolute 0.02 0.00 - 0.10 Thousands/µL   CBC and differential    Collection Time: 02/11/25  5:16 AM   Result Value Ref Range    WBC 2.75 (L) 4.31 - 10.16 Thousand/uL    RBC 3.18 (L) 3.81 - 5.12 Million/uL    Hemoglobin 10.3 (L) 11.5 - 15.4 g/dL    Hematocrit 31.9 (L) 34.8 - 46.1 %     (H) 82 - 98 fL    MCH 32.4 26.8 - 34.3 pg    MCHC 32.3 31.4 - 37.4 g/dL    RDW 14.4 11.6 - 15.1 %    MPV 9.5 8.9 - 12.7 fL    Platelets 287 149 - 390 Thousands/uL    nRBC 0 /100 WBCs    Segmented % 21 (L) 43 - 75 %    Immature Grans % 0 0 - 2 %    Lymphocytes % 65 (H) 14 - 44 %    Monocytes % 11 4 - 12 %    Eosinophils Relative 2 0 - 6 %    Basophils Relative 1 0 - 1 %    Absolute Neutrophils 0.59 (L) 1.85 - 7.62 Thousands/µL    Absolute Immature Grans 0.01 0.00 - 0.20 Thousand/uL    Absolute Lymphocytes 1.76 0.60 - 4.47 Thousands/µL    Absolute Monocytes 0.30 0.17 - 1.22 Thousand/µL    Eosinophils Absolute 0.06 0.00 - 0.61 Thousand/µL    Basophils Absolute 0.03 0.00 - 0.10 Thousands/µL        This note was not shared with the patient due to reasonable likelihood of causing patient harm        Taurus Mcnamara DO  Psychiatry Resident, PGY-I  Department of Psychiatry and Behavioral Health  [facility]     This note was completed in part utilizing voice recognizing software. Grammatical, translation, syntax  errors, random word insertions, spelling mistakes, and incomplete sentences may be an occasional consequence of this system secondary to software limitations with voice recognition, ambient noise, and hardware issues. If you have any questions or concerns about the content, text, or information contained within the body of this dictation, please contact the provider for clarification.

## 2025-02-11 NOTE — PROGRESS NOTES
Progress Note - PMR   Name: Esperanza Prajapati 54 y.o. female I MRN: [id number]  Unit/Bed#: [facility] 269-01 I Date of Admission: 2/8/2025   Date of Service: 2/11/2025 I Hospital Day: 3     Assessment & Plan  Acute respiratory failure with hypoxia (HCC)  Presented on 1/19/25 with respiratory failure related to Influenza A  Intubated 1/21/25 - 1/30/25.  Required HFNC  Weaning down from oxygen.  Now on RA in AM   Improving    PLAN   Continue IS, OPEP, and pulmonary toileting  Aspiration precautions, deep breathing  Monitor pulmonary status while in [facility]  Follow-up with Pulmonary as outpatient  Influenza A  Presented on 1/19/25 with respiratory failure  +Influenza A  s/p Tamiflu treatment completed  Has been stable on RA   Acquired hypothyroidism  Acquired after total thyroidectomy 2/2 papillary thyroid carcinoma in 2013  Continue Synthroid 175 mcg daily  Focal epilepsy (HCC)  Chronic seizure disorder  Continue chronic AED regimen: Lacosamide 150 mg QAM, Lacosamide 200 mg QHS, Depakote 750 mg Q 12 hours  F.u depakote lvl: 99  Per Neurology consult neutropenia not likely to be 2/2 to lacosamide or depakote   Schizoaffective disorder (HCC)  Stable  Continue chronic medications: Cogentin 2 mg BID, Zyprexa 10 mg QHS, Invega 6 mg QAM.    Psych consulted 2/10 given neutropenia for medication adjustment; invega has been dc 2/10  F.u depakote lvl: 99   Hypernatremia  Resolved  139 2/10  Pneumonia of both lungs due to infectious organism  Possible bacterial PNA.   S/p treatment with Azithromycin  Continue supportive care  Continues on oxygen 0-2L; stable on RA   Insomnia  Continue Melatonin 6 mg QHS  At risk for deep venous thrombosis  Continue Lovenox 40 mg daily.    Oropharyngeal dysphagia  Currently on pureed diet with thins  SLP to follow  Hopeful to upgrade based on recent notes.   Neutropenia (HCC)  Patient WBC 1.97 2/10; neutrophils 0.6   Neutropenic precautions   Psych consult for medication adjustment   C/t monitor w/  routine blood work   Intellectual disability  Patient will need assistance w/ medications and iADLs once discharged   Will need to communicate with family to see what assistance is available   Prolonged QT interval  EKG 1/25 aflutter w/ ; repeat EKG 2/10   Avoid QT prolong agents as possible   Repeat EKG as necessary     History of Present Illness   Esperanza Prajapati is a 54 y.o. female with past medical history significant for epileps/seizure disorder, schizoaffective disorder, cognitive deficits, history of papillary thyroid carcinoma s/p total thyroidectomy who presented to [facility] 1/19[facility]25 with acute respiratory failure.  +Influenza A.  Intubated 1/21/25 - 1/30/25.  Brochoscopy 1/24/25.  Completed Tamiflu treatment.  Patient with superimposed bacterial PNA.  Treated with Azithromyocin.  Followed closely by Pulmonary.  Needing intermittent oxygen, but has weaned to RA.    Medical course complicated by dysphagia.  Patient seen by SLP and placed on a pureed diet with thin liquids.    After medical stabilization, patient found to have acute functional deficits from baseline in mobilty, self care, swallow, therefore admitted to [facility] for acute inpatient rehabiltiation.    Chief Complaint: f/u ambulatory dysfunction    Interval: Patient seen and examined in recliner. No events overnight.  Reports overall feeling well. Last BM 2/10. Sleeping well at night. Denies any f/c/n/v, CP, SOB, abdominal pain, constipation, or diarrhea.       Objective   Functional Update:  Physical Therapy Occupational Therapy Speech Therapy      Mobility: 12 stairs: CG/Adolfo  Eating: Supervision  Grooming: Supervision  Bathing: Incidental Touching  Bathing: Incidental Touching  Upper Body Dressing:  (setup)  Lower Body Dressing: Minimal Assistance  Toileting: Incidental Touching  Tub/Shower Transfer: Minimal Assistance  Toilet Transfer: Incidental Touching  Cognition: Exceptions to  WNL  Cognition: Decreased Memory, Decreased Safety, Decreased Executive Functions, Behavioral Considerations, Decreased Attention, Decreased Comprehension  Orientation: Person, Place, Time, Situation                   Temp:  [96.6 °F (35.9 °C)-98.5 °F (36.9 °C)] 96.6 °F (35.9 °C)  HR:  [83-95] 95  Resp:  [17-18] 18  BP: ()/(58-64) 107/60  SpO2:  [92 %-100 %] 92 %    Physical Exam    Gen: No acute distress, obese   HEENT: Moist mucus membranes, Normocephalic/Atraumatic  Cardiovascular: Regular rate, rhythm,  Heme/Extr: No edema  Pulmonary: Non-labored breathing. On RA   : No patel  GI:  non-distended. BS+  MSK: ROM is WFL in all extremities.   Integumentary: Skin is warm, dry.   Neuro: , Speech is intact. Appropriate to questioning.  Psych: Flat affect       Scheduled Meds:  Current Facility-Administered Medications   Medication Dose Route Frequency Provider Last Rate    acetaminophen  975 mg Oral Q6H PRN Tia Benz MD      aluminum-magnesium hydroxide-simethicone  30 mL Oral Q6H PRN Tia Benz MD      benztropine  2 mg Oral BID Tia Benz MD      cyanocobalamin  1,000 mcg Oral Daily Tia Benz MD      divalproex sodium  750 mg Oral Q12H [facility] Taurus Mcnamara DO      enoxaparin  40 mg Subcutaneous Daily Tia Benz MD      ipratropium-albuterol  3 mL Nebulization Q8H PRN Tia Benz MD      lacosamide  150 mg Oral Daily Tia Benz MD      lacosamide  200 mg Oral HS Tia Benz MD      levothyroxine  175 mcg Oral Early Morning Tia Benz MD      melatonin  6 mg Oral HS Tia Benz MD      nystatin   Topical BID Tia Benz MD      OLANZapine  10 mg Oral HS Tia Benz MD      ondansetron  4 mg Intravenous Q6H PRN Tia Benz MD      polyethylene glycol  17 g Oral Daily PRN Tia Benz MD      senna-docusate sodium  2 tablet Oral BID Tia Benz MD           Lab Results: I have reviewed the  following results:  Results from last 7 days   Lab Units 02/11/25  0516 02/10/25  2013 02/10/25  0950   HEMOGLOBIN g/dL 10.3* 10.1* 11.3*   HEMATOCRIT % 31.9* 32.2* 35.2   WBC Thousand/uL 2.75* 2.72* 1.97*   PLATELETS Thousands/uL 287 308 326     Results from last 7 days   Lab Units 02/10/25  0950 02/06/25  0552 02/05/25  0511   BUN mg/dL 13 14 16   SODIUM mmol/L 139 141 139   POTASSIUM mmol/L 4.0 3.7 3.5   CHLORIDE mmol/L 99 104 104   CREATININE mg/dL 0.90 0.74 0.67   AST U/L 22  --   --    ALT U/L 27  --   --               Rosi Olsen MD   Physical Medicine and Rehabilitation   02/11/25    I have spent a total time of 35 minutes in caring for this patient on the day of the visit/encounter including Counseling / Coordination of care, Documenting in the medical record, and Communicating with other healthcare professionals .

## 2025-02-11 NOTE — ASSESSMENT & PLAN NOTE
Patient will need assistance w/ medications and iADLs once discharged   Will need to communicate with family to see what assistance is available

## 2025-02-11 NOTE — PCC NURSING
Esperanza Prajapati is a 54 y.o. female with a PMH of intellectual disability, schizoaffective disorder, seizure disorder, and hypothyroidism who originally presented to [facility] on 1/19/2025 with altered mental status and hypoxia.  Tested positive for influenza A.  Received Tamiflu for 5 days and IV fluids.  On 1/20, patient required increasing O2 and transferred to ICU for emergent intubation.  CXR showed dense L lower lobe consolidation with air bronchograms which could be due to pneumonia in the appropriate clinical setting.  Patient received IV cefepime and azithromycin for possible superimposed bacterial pneumonia.  Also received IV steroids and IV diuretics.  Successfully extubated on 1/30.  Weaned from BiPAP to high flow nasal cannula to eventually room air.  CXR on 1/31 showed unchanged bibasilar opacities.  Developed critical illness myopathy and was evaluated by PT/OT who recommended acute inpatient rehabilitation.      Admitted to [facility] on 2/8/2025; we are consulted for medical clearance.    Pain managed with Tylenol oral suspension 975 mg every 6 hours PRN. Bowel regimen managed with Senokot S 2  tablets 2 times daily, Miralax 17 g daily PRN. Schizoaffective disorder managed with Zyprexa 10mg at HS and Cogentin 2mg BID. Psychiatry consulted due to concerns for non-compliance and new neutropenia. Hypothyroidism managed with levothyroxine 175mcg daily. Focal epilepsy managed with Vimpat 150mg in AM and 200mg in PM and Depakote 750mg q 12 hours, a Depakote level was obtained 2/10 prior to receiving evening dose. Acute respiratory failure with hypoxia managed by obtaining a repeat CXR as needed, currently maintaining on room air, continue spot pulse ox checks. Pulmonary toileting, encourage IS use and deep breathing, able to get up to 700 on IS. Patient is on melatonin 6mg at HS for insomnia. Oropharyngeal dysphagia is managed with pureed diet and liquid thins, speech  consulted and following, currently on aspiration precautions. Neutropenia managed by placing pt on neutropenic precautions and repeating CBC on 2/11. Prolonged QT interval will be managed with obtaining EKG's as needed. DVT prophylaxis managed with Lovenox 40 mg daily, and SCD's while in bed.     This week we will continue to monitor vital signs and lab values. We will manage pain with the above orders so that the patient can participate in her therapy sessions to her optimal abilities. We will teach the patient how to conserve energy so that she may perform ADL's independently as much as she can. We will educate the patient on the importance of repositioning and off-loading pressure to help lower the risk of skin breakdown. We will prevent falls by keeping personal items and call bell within reach, we will also initiate and maintain hourly rounding.

## 2025-02-11 NOTE — ASSESSMENT & PLAN NOTE
WBC count currently 2.75.  Absolute neutrophils 0.59.  Continue neutropenic precautions.  Likely due to medications.  Had been non-compliant at home and was restarted on medications in acute setting.  Psychiatry consulted - discontinued Invega on 2/10.  Recommending Neurology consult.  Neurology consulted - no changes at this time.  Unlikely to be due to Vimpat or Depakote.  Repeat CBC tomorrow.

## 2025-02-11 NOTE — UTILIZATION REVIEW
NOTIFICATION OF ADMISSION DISCHARGE   This is a Notification of Discharge from [facility]. Please be advised that this patient has been discharge from our facility. Below you will find the admission and discharge date and time including the patient’s disposition.   UTILIZATION REVIEW CONTACT:  Sheeba Whaley  Utilization   Network Utilization Review Department  Phone: [phone] x carefully listen to the prompts. All voicemails are confidential.  Email: [email or web address]     ADMISSION INFORMATION  PRESENTATION DATE: 1/19/2025  9:50 PM  OBERVATION ADMISSION DATE: N/A  INPATIENT ADMISSION DATE: 1/20/25  2:18 AM   DISCHARGE DATE: 2/8/2025 11:15 AM   DISPOSITION:[facility]    Network Utilization Review Department  ATTENTION: Please call with any questions or concerns to [phone] and carefully listen to the prompts so that you are directed to the right person. All voicemails are confidential.   For Discharge needs, contact Care Management DC Support Team at [phone] opt. 2  Send all requests for admission clinical reviews, approved or denied determinations and any other requests to dedicated fax number below belonging to the campus where the patient is receiving treatment. List of dedicated fax numbers for the Facilities:  FACILITY NAME UR FAX NUMBER   ADMISSION DENIALS (Administrative/Medical Necessity) [phone]   DISCHARGE SUPPORT TEAM ([facility]) [phone]   PARENT CHILD HEALTH (Maternity/NICU/Pediatrics) [phone]   [facility] [phone]   [facility] [phone]   [facility] [phone]   [facility] [phone]   [facility] [phone]   [facility] [phone]   [facility] [phone]   [facility] [phone]   [facility]  [facility] [phone]   [facility] [phone]   [facility] [phone]   [facility] [place] [phone]

## 2025-02-12 LAB
BASOPHILS # BLD AUTO: 0.03 THOUSANDS/ΜL (ref 0–0.1)
BASOPHILS NFR BLD AUTO: 1 % (ref 0–1)
EOSINOPHIL # BLD AUTO: 0.08 THOUSAND/ΜL (ref 0–0.61)
EOSINOPHIL NFR BLD AUTO: 3 % (ref 0–6)
ERYTHROCYTE [DISTWIDTH] IN BLOOD BY AUTOMATED COUNT: 14.3 % (ref 11.6–15.1)
HCT VFR BLD AUTO: 32.1 % (ref 34.8–46.1)
HGB BLD-MCNC: 10.2 G/DL (ref 11.5–15.4)
IMM GRANULOCYTES # BLD AUTO: 0.01 THOUSAND/UL (ref 0–0.2)
IMM GRANULOCYTES NFR BLD AUTO: 0 % (ref 0–2)
LYMPHOCYTES # BLD AUTO: 1.98 THOUSANDS/ΜL (ref 0.6–4.47)
LYMPHOCYTES NFR BLD AUTO: 65 % (ref 14–44)
MCH RBC QN AUTO: 32.9 PG (ref 26.8–34.3)
MCHC RBC AUTO-ENTMCNC: 31.8 G/DL (ref 31.4–37.4)
MCV RBC AUTO: 104 FL (ref 82–98)
MONOCYTES # BLD AUTO: 0.31 THOUSAND/ΜL (ref 0.17–1.22)
MONOCYTES NFR BLD AUTO: 10 % (ref 4–12)
NEUTROPHILS # BLD AUTO: 0.62 THOUSANDS/ΜL (ref 1.85–7.62)
NEUTS SEG NFR BLD AUTO: 21 % (ref 43–75)
NRBC BLD AUTO-RTO: 0 /100 WBCS
PLATELET # BLD AUTO: 259 THOUSANDS/UL (ref 149–390)
PMV BLD AUTO: 9.4 FL (ref 8.9–12.7)
RBC # BLD AUTO: 3.1 MILLION/UL (ref 3.81–5.12)
WBC # BLD AUTO: 3.03 THOUSAND/UL (ref 4.31–10.16)

## 2025-02-12 PROCEDURE — 97110 THERAPEUTIC EXERCISES: CPT

## 2025-02-12 PROCEDURE — 97530 THERAPEUTIC ACTIVITIES: CPT

## 2025-02-12 PROCEDURE — 99232 SBSQ HOSP IP/OBS MODERATE 35: CPT | Performed by: INTERNAL MEDICINE

## 2025-02-12 PROCEDURE — 85025 COMPLETE CBC W/AUTO DIFF WBC: CPT | Performed by: NURSE PRACTITIONER

## 2025-02-12 PROCEDURE — 97535 SELF CARE MNGMENT TRAINING: CPT

## 2025-02-12 PROCEDURE — 92526 ORAL FUNCTION THERAPY: CPT

## 2025-02-12 PROCEDURE — 99232 SBSQ HOSP IP/OBS MODERATE 35: CPT | Performed by: PSYCHIATRY & NEUROLOGY

## 2025-02-12 RX ADMIN — Medication 6 MG: at 21:06

## 2025-02-12 RX ADMIN — DIVALPROEX SODIUM 500 MG: 500 TABLET, DELAYED RELEASE ORAL at 08:31

## 2025-02-12 RX ADMIN — BENZTROPINE MESYLATE 2 MG: 2 TABLET ORAL at 08:23

## 2025-02-12 RX ADMIN — LACOSAMIDE 150 MG: 50 TABLET, FILM COATED ORAL at 08:22

## 2025-02-12 RX ADMIN — LACOSAMIDE 200 MG: 100 TABLET, FILM COATED ORAL at 21:06

## 2025-02-12 RX ADMIN — DIVALPROEX SODIUM 500 MG: 500 TABLET, DELAYED RELEASE ORAL at 20:54

## 2025-02-12 RX ADMIN — ENOXAPARIN SODIUM 40 MG: 40 INJECTION SUBCUTANEOUS at 08:23

## 2025-02-12 RX ADMIN — SENNOSIDES AND DOCUSATE SODIUM 2 TABLET: 50; 8.6 TABLET ORAL at 08:23

## 2025-02-12 RX ADMIN — NYSTATIN: 100000 POWDER TOPICAL at 20:55

## 2025-02-12 RX ADMIN — OLANZAPINE 10 MG: 10 TABLET, FILM COATED ORAL at 21:06

## 2025-02-12 RX ADMIN — LEVOTHYROXINE SODIUM 175 MCG: 0.1 TABLET ORAL at 05:55

## 2025-02-12 RX ADMIN — NYSTATIN: 100000 POWDER TOPICAL at 08:22

## 2025-02-12 RX ADMIN — SENNOSIDES AND DOCUSATE SODIUM 2 TABLET: 50; 8.6 TABLET ORAL at 17:56

## 2025-02-12 RX ADMIN — Medication 1000 MCG: at 08:23

## 2025-02-12 RX ADMIN — BENZTROPINE MESYLATE 2 MG: 2 TABLET ORAL at 17:56

## 2025-02-12 NOTE — PROGRESS NOTES
02/12/25 0900   Pain Assessment   Pain Assessment Tool 0-10   Pain Score No Pain   Restrictions/Precautions   Precautions Aspiration;Bed/chair alarms;Cognitive;Fall Risk;Seizure;Supervision on toilet/commode   Eating   Type of Assistance Needed Set-up / clean-up;Supervision;Verbal cues   Physical Assistance Level No physical assistance   Eating CARE Score 4   Swallow Assessment   Swallow Treatment Assessment Daily Dysphagia Tx Note      Patient Name: Esperanza Prajapati     Today's Date: 2/12/2025        Current Risks for Dysphagia & Aspiration: recent intubation, prolonged intubation, respiratory compromise, weak cough, general debilitation, new neuro event, cognitive deficit, mental status change, reduced alertness, positioning issues      Current Symptoms/Concerns: cough with food and liquids, during and after meal, holding food in mouth, recent pneumonia     Current diet:puree/level 1 diet and thin liquids      Premorbid diet::regular diet and thin liquids      Positioning: upright in recliner     Items administered:Consistencies Administered: thin liquids, soft solids  Materials administered included diced peaches, pancakes with syrup, scrambled eggs and thins by cup     Total amount of meal consumed:   75% of meal, 240cc thins        Summary:  Pt presenting with s/s suggestive of minimal to mild oral and pharyngeal dysphagia this session. Pt seen during breakfast meal with focus on trialing softer solids with potential for diet advancement. Pt presented with dysphagia level 2 and 3 softer solids, where she was provided with tray set up but self fed independently throughout meal. Pt with fast rate of intake, requiring moderate verbal cues to slow down.      Presented with softer solid tray, pt continued with functional bolus retrieval by utensil and cup with good oral containment-no anterior loss. Mastication of softer solids was minimally to mildly slower at times, more so with dry items such as the  eggs, but was more timely with diced peaches and pancakes. Bolus formation was functional with peaches and pancakes but observed to be mildly reduced with scrambled eggs. Transfers were prompt with thins and fairly timely with softer solids peaches and pancakes. However, slower with eggs and incomplete, resulting in mild to moderate L sided oral residual/pocketing. Given increased time and with use of thins, pt able to fully clear all residual/pocketing. No overt s/s of decreased bolus control. Of note, when pt took food items at a faster rate, overall oral processing and prep was more delayed but pt was much more functional when using pacing techniques and given verbal cues. Swallow initiation suspected to be mildly delayed to at times prompt appearing with thins. Again, when pt is focused on her next bite or taking bites too fast, swallow delay is more so suspected. No secondary or audible swallows observed. Hyolaryngeal rise present to palpation but judged to be reduced. No overt s/s aspiration observed during the meal with food items or thins when taking in rapid consecutive sips but noted delayed single cough ~3 minutes after meal completion, suspecting due to how pt consumed thins.     Overall, pt appeared to tolerate level 2 mechanical softer solids but did present with greater dysphagia s/s with level 3 softer solids. Based on pt's recent progress, recommending upgrade to dysphagia level 2 diet with thin liquids but due to pt's fast rate of intake, full supervision for staff to provide verbal cues to slow rate of intake.     SLP discussed therapy team's attempts to contact pt's family where pt did report that her sister visited last night and left a written message of when she is available to speak today, along with her phone number. SLP relayed this info to OT/PT team. SLP also provided pt with verbal education on dysphagia, her specific deficits/concerns, and diet advancement with review of softer solids.  SLP provided written hand outs on the above info and left in pt's room for pt's sister to review. Still plan for in person training to occur before discharge.        Recommendations:  Diet: UPGRADE to dysphagia level 2/mechanical soft and thin liquids  Meds: as best tolerated  Strategies: upright posture, only feed when fully alert, slow rate of feeding, small bites/sips, quiet environment (tv off, limit talking, door closed, etc.), alternating bites and sips, and OOB for meals     Pt is cleared to have her canned ravioli IF they are cut into smaller pieces and with full supervision-needs cues to slow down.     FULL supervision w/ meals.  Results reviewed with:  patient, nursing Sharonda, PMR Dr. Olsen, Mariana MERCER, Tito GONSALVES  Aspiration precautions posted.     F/u ST tx: Pt is currently recommended for further skilled SLP services targeting dysphagia therapy in order to maximize oral and pharyngeal swallow skills, while safely supporting PO intake, as well as to improve independent carryover of safe swallow strategies.       Plan:      UPGRADE to dysphagia level 2/mechanical soft diet and thins     FULL supervision with meals with cues to slow down     Continue trials of diet upgrades     Efforts made to schedule family training-therapy team calling again today   SLP Therapy Minutes   SLP Time In 0900   SLP Time Out 0930   SLP Total Time (minutes) 30   SLP Mode of treatment - Individual (minutes) 30   SLP Mode of treatment - Concurrent (minutes) 0   SLP Mode of treatment - Group (minutes) 0   SLP Mode of treatment - Co-treat (minutes) 0   SLP Mode of Treatment - Total time(minutes) 30 minutes   SLP Cumulative Minutes 100   Therapy Time missed   Time missed? No

## 2025-02-12 NOTE — ASSESSMENT & PLAN NOTE
"Follows with Neurology, PTA AED regimen: lacosamide 150 mg daily AM and 200 mg q HS, Depakote 750 mg q12h.   Continue down-taper of Depakote; pt to receive 500 mg q12h today. See under \"Neutropenia.\"   Per outpatient epileptologist, if concerns for breakthrough seizures, can increase dose of lacosamide.  "

## 2025-02-12 NOTE — ASSESSMENT & PLAN NOTE
Lab work 2/10 showed WBC 1.97 (down from 3.27 on 2/6) with ANC of 0.6 (down from 7.71 on 2/1)  Pt did recently have influenza A and was hospitalized, completed course of Tamiflu, also received cephalosporins and azithromycin for PNA  Numerous medications could be contributing, including VPA, olanzapine, paliperidone, lacosamide, as well as recent ABX use  Afebrile, VS stable  VPA trough level 99   This morning: ANC 0.62, WBC 3.03.    Plan  Continue down-taper of Depakote; pt to receive 500 mg q12h today  Plan to decrease evening dose to 250 mg tomorrow.   Per Neurology consult, no changes had been made to AEDs.  However, per their recommendations, reached out to outpatient epileptologist Dr. Cevallos as LOV note states VPA had been prescribed for mood, and to consider consolidating AED to lacosamide monotherapy if Psychiatry is in agreement with discontinuation/decrease of VPA.  Considering risks of neutropenia and overall clinical picture, both teams are in agreement with discontinuation of VPA.   If concerns for breakthrough seizures, can increase dose of lacosamide.  Of note, pt is on olanzapine, which can confer mood stability, so its dose can be adjusted should the need arise.  Discontinued paliperidone 6 mg qd.   Pt received last dose 2/10 at 0852.  Recheck CBC w differential tomorrow morning  Maintain neutropenic precautions.  Continue to monitor VS closely

## 2025-02-12 NOTE — ASSESSMENT & PLAN NOTE
Presented on 1/19/25 with respiratory failure  +Influenza A  s/p Tamiflu treatment completed  Has been stable on RA

## 2025-02-12 NOTE — TEAM CONFERENCE
Acute RehabilitationTeam Conference Note  Date: 2/12/2025   Time: 10:20 AM       Patient Name:  Esperanza Prajapati       Medical Record Number: [id number]   YOB: 1970  Sex: Female          Room/Bed:  [facility] 269/[facility] 269-01  Payor Info:  Payor: [employer] [facility] REP / Plan: [employer] [facility] CARE [facility] / Product Type: Medicare HMO /      Admitting Diagnosis: Acute respiratory failure (HCC) [J96.00]   Admit Date/Time:  2/8/2025 11:54 AM  Admission Comments: No comment available     Primary Diagnosis:  Schizoaffective disorder (HCC)  Principal Problem: Schizoaffective disorder (HCC)    Patient Active Problem List    Diagnosis Date Noted    Neutropenia (HCC) 02/10/2025    Prolonged QT interval 02/10/2025    Insomnia 02/08/2025    At risk for deep venous thrombosis 02/08/2025    Oropharyngeal dysphagia 02/08/2025    Pneumonia of both lungs due to infectious organism 01/29/2025    Hypernatremia 01/27/2025    Hyperglycemia 01/27/2025    Viral sepsis (HCC) 01/20/2025    Toxic metabolic encephalopathy 01/20/2025    Fall at home, initial encounter 01/20/2025    Influenza A 01/20/2025    Acute respiratory failure with hypoxia (HCC) 01/20/2025    Decreased urination 10/23/2024    Dermatitis of face 06/07/2024    Impaired ability to manage medication regime 10/09/2023    Bilateral lower extremity edema 06/09/2023    h/o VALERIE (acute kidney injury) (HCC) 01/24/2023    Gout 06/05/2022    Schizoaffective disorder (HCC) 06/05/2022    Transient ischemic attack 06/05/2022    Vitamin B12 deficiency 06/05/2022    Dyslipidemia 03/25/2022    PCOS (polycystic ovarian syndrome) 06/12/2020    Obesity (BMI 30-39.9) 06/05/2017    Vitamin D deficiency 04/27/2016    Cognitive developmental delay 11/07/2014    Obstructive sleep apnea 11/12/2013    Focal epilepsy (HCC) 08/14/2013    History of thyroid cancer 08/14/2013    Proteinuria 11/11/2011    Acquired hypothyroidism 06/10/2011     Family history of diabetes mellitus 06/10/2011    Family history of malignant neoplasm of breast 06/10/2011    Intellectual disability 06/10/2011       Physical Therapy:    Weight Bearing Status: Full Weight Bearing  Transfers: Supervision  Bed Mobility: Supervision  Amulation Distance (ft): 250 feet  Ambulation: Supervision  Assistive Device for Ambulation:  (none)  Number of Stairs: 12  Assistive Device for Stairs: Lehft Hand Rail  Stair Assistance: Supervision  Ramp: Supervision  Assistive Device for Ramp: None  Discharge Recommendations: Home with:  DC Home with:: Family Support    Esperanza is a 54-year-old female with a history of intellectual disability mood disorder seizure disorder and postoperative hypothyroidism who presented to the hospital 1/20/25 with change in mental status found to have sepsis secondary to influenza A, requiring intubation 1/21/25 - 1/30/25. Brochoscopy done 1/24/25. Completed Tamiflu treatment. Patient with superimposed bacterial PNA. Medical course complicated by dysphagia.  After medical stabilization, patient found to have acute functional deficits from baseline in mobilty, self care, swallow, therefore admitted to [facility] on 2/8/25 for acute inpatient rehabiltiation.  At baseline pt lives with nephew in  home. 0 GRAEME front, 4 GRAEME back, 1/2 bath first floor, full bath and bedroom second floor with 16 steps. ?HR, pt unable to recall. Pt did not use AD, and is non -, and is unable to read. Per OT who spoke with pts sister, mother has been placed in a home due to dementia, and nephew pt has been staying with recently got a job, will stays with pts x-LEON (Nik) does not work, 2nd floor apt. Family asked OT about HHA vs waiver program options, to notify casemgmt.     Pt presents on RA, denies SOB/MAK with activity. +mild dec balance and dec endurance requiring min A for balance and safety with gait and transfers, without AD. Pt to benefit from skilled PT intervention to address  endurance deficits as pt needs to be near mod I for DC home with family support. Questionable mod I PTA given h/o cognitive deficits. Pt overall demonstrates good rehab potential to reach S/mod I goals without AD for safe return home with family support. Anticipate no DME needs, and questionable need for f/u therapies pending progress and support.     2/11/25  Pt near baseline mobility at S level, no AD. Await family input for DC home. Anticipate by end of week. No DME. FT for ST prior to DC.     Occupational Therapy:  Eating: Supervision  Grooming: Supervision  Bathing: Incidental Touching  Bathing: Incidental Touching  Upper Body Dressing:  (setup)  Lower Body Dressing: Minimal Assistance  Toileting: Incidental Touching  Tub/Shower Transfer: Minimal Assistance  Toilet Transfer: Incidental Touching  Cognition: Exceptions to WNL  Cognition: Decreased Memory, Decreased Safety, Decreased Executive Functions, Behavioral Considerations, Decreased Attention, Decreased Comprehension  Orientation: Person, Place, Time, Situation  Discharge Recommendations: Home with:  DC Home with:: Family Support, Home Occupational Therapy       2/11/25  ADL Status:   Grooming: CGA    Bathing: CGA   UB Dressing: S/U  LB Dressing: Adolfo  Toileting:   CGA Toilet Transfers: CGA  Goals: SUP/indep  Meeting Goals:  yes  Modify Goals:   NO    Barriers Interventions   cognition FT   Activity tolerance Endurance training   General weakness Therap exercise                     Family Training: no be scheduled this week  Anticipated D/C Date: late this week (Fri 2/14) >early next week (2/17)  D/C Plan/Location:  Home with nephew and sister support  D/C Therapy Recommendations: Indep for ADLS/transfers. Assistance for IADls. MaxA/TA for med mngmnt  DME: shower chair (will speak with family if pt has at home or they will need to order)      Speech Therapy:  Mode of Communication: Verbal  Cognition: Exceptions to WNL (baseline cognitive linguistic  deficits)     Swallowing: Exceptions to WNL  Swallowing: Oral Dysphagia, Pharyngeal Dysphagia, Aspiration Risk, Reflux Precautions  Diet Recommendations: Level 1/Puree, Thin  Discharge Recommendations: Home with:  DC Home with:: 24 Hour Supervision, 24 Hour Assisteance, Family Support, Outpatient Speech Therapy  Pt is being followed for dysphagia tx  sessions to where pt she was evaluated on admission and found to be presenting with mild oral and pharyngeal dysphagia, with potentially increased deficits if given less restrictive diet items.      Dysphagia barriers which present include the following risks for aspiration such as: decreased mastication of softer solids, reduced bolus formation, suspected reduced bolus control, incomplete transfers of solids, oral residual of softer solids, suspected delay in swallow initiation, and suspected reduced hyolaryngeal rise with presence of wet vocal quality very occasionally with liquids. Pt also limited by impulsivity and faster rate of intake with food and drink, which when faster rate occurs, pt with more observable swallow deficits. In order to address the noted barriers, skilled SLP services will address this by targeting the following interventions: swallow exercise, proper positioning, diet modification, safe swallow strategies, family education/training, and complete VFSS as needed. Pt is currently on a puree diet and thin liquids with recommendations for full supervision for meals due to need for verbal cuing to slow his rate of intake. Pt has been cleared, however, for a preferred item of hers which is a cut up/softer item.       Family training/education has not yet been initiated but plan to complete this week/prior to discharge regarding diet recommendations and safe swallow strategies. Recommendations at time of discharge are likely for continued dysphagia therapy with diet recommendations TBD when closer to discharge. This week, focus for continued services  to target trials of diet upgrades to include dysphagia level 2 and 3 items, along with completion of family training/education. Pt is currently recommended for further skilled SLP services targeting dysphagia therapy in order to maximize oral and pharyngeal swallow skills, while safely supporting PO intake, as well as to improve independent carryover of safe swallow strategies.      Nursing Notes:  Appetite: Poor  Diet Type: Dysphagia I, Thin Liquids                                                                        Pain Score: 0                       Hospital Pain Intervention(s): Rest          Esperanza Prajapati is a 54 y.o. female with a PMH of intellectual disability, schizoaffective disorder, seizure disorder, and hypothyroidism who originally presented to [facility] on 1/19/2025 with altered mental status and hypoxia.  Tested positive for influenza A.  Received Tamiflu for 5 days and IV fluids.  On 1/20, patient required increasing O2 and transferred to ICU for emergent intubation.  CXR showed dense L lower lobe consolidation with air bronchograms which could be due to pneumonia in the appropriate clinical setting.  Patient received IV cefepime and azithromycin for possible superimposed bacterial pneumonia.  Also received IV steroids and IV diuretics.  Successfully extubated on 1/30.  Weaned from BiPAP to high flow nasal cannula to eventually room air.  CXR on 1/31 showed unchanged bibasilar opacities.  Developed critical illness myopathy and was evaluated by PT/OT who recommended acute inpatient rehabilitation.      Admitted to [facility] on 2/8/2025; we are consulted for medical clearance.    Pain managed with Tylenol oral suspension 975 mg every 6 hours PRN. Bowel regimen managed with Senokot S 2  tablets 2 times daily, Miralax 17 g daily PRN. Schizoaffective disorder managed with Zyprexa 10mg at HS and Cogentin 2mg BID. Psychiatry consulted due to concerns for  non-compliance and new neutropenia. Hypothyroidism managed with levothyroxine 175mcg daily. Focal epilepsy managed with Vimpat 150mg in AM and 200mg in PM and Depakote 750mg q 12 hours, a Depakote level was obtained 2/10 prior to receiving evening dose. Acute respiratory failure with hypoxia managed by obtaining a repeat CXR as needed, currently maintaining on room air, continue spot pulse ox checks. Pulmonary toileting, encourage IS use and deep breathing, able to get up to 700 on IS. Patient is on melatonin 6mg at HS for insomnia. Oropharyngeal dysphagia is managed with pureed diet and liquid thins, speech consulted and following, currently on aspiration precautions. Neutropenia managed by placing pt on neutropenic precautions and repeating CBC on 2/11. Prolonged QT interval will be managed with obtaining EKG's as needed. DVT prophylaxis managed with Lovenox 40 mg daily, and SCD's while in bed.     This week we will continue to monitor vital signs and lab values. We will manage pain with the above orders so that the patient can participate in her therapy sessions to her optimal abilities. We will teach the patient how to conserve energy so that she may perform ADL's independently as much as she can. We will educate the patient on the importance of repositioning and off-loading pressure to help lower the risk of skin breakdown. We will prevent falls by keeping personal items and call bell within reach, we will also initiate and maintain hourly rounding.      Case Management:     Discharge Planning  Living Arrangements: Lives w/ Family members  Support Systems: Family members  Assistance Needed: to be determined  Type of Current Residence: Private residence  Current Home Care Services: No  No notes on file    Is the patient actively participating in therapies? yes  List any modifications to the treatment plan: Patient discharging Monday due to medication management of neutrofils    Barriers Interventions    Neutropenic precautions Psych consulted, adjustments made to medications, precautions remain in place   Acute respiratory failure Titrated to room air, incentive spirometry    Family support Education   Cognitive deficits Family will assist with meds, family education, assist for all IADLs   Dysphagia Upgraded level II diet, family training to be completed, trial diet upgrades, external cueing, pacing techniques   Endurance Graded tasks, there ex, rest breaks, nu-step,    Balance Scored 36 on DIAZ, guarded gait speed, balance training various surfaces,    Is the patient making expected progress toward goals? yes  List any update or changes to goals: Patient making good progress toward goals. Downgraded medication management goal to max A due to cognitive deficits and concern that patient was mistaking medications prior to admission.     Medical Goals: Patient will be medically stable for discharge to least restrictive envrionment upon completion of rehab program    Weekly Team Goals:   Rehab Team Goals  ADL Team Goal: Patient will require supervision with ADLs with least restrictive device upon completion of rehab program  Transfer Team Goal: Patient will be independent with transfers with least restrictive device upon completion of rehab program  Locomotion Team Goal: Patient will be independent with locomotion with least restrictive device upon completion of rehab program (household without AD)    Discussion: Patient made good progress with therapy this week. Pt is currently Supervision for all fxnl transfers. Walking 500 feet with no device. Pt is completing stair management at supervision level with hand rail. Patient made improvement with oral dysphagia intervention and has been upgraded to level II diet. Will complete family training with sister for diet management and oral dysphagia strategies. Continue trials of swallow upgrades this upcoming week. Goal set for Mod I patient on track to achieve upon day of  d/c. Spoke to sister reviewed recommendation for set up of medications and direct supervision for patient taking all medications. Sister confirmed patient will have assistance with meal prep, laundry and  medication upon d/c. Sister requesting information on waiver program- CM provided. DME: shower chair (handout provided in patient's room) Recommend Outpatient SLP services    Anticipated Discharge Date:  Monday 2/17 outpatient SLP services.   [facility] Team Members Present:  The following team members are supervising care for this patient and were present during this Weekly Team Conference.    Physician: Dr. Raúl MD  : Maribel Harris  Registered Nurse: Jaz Jacobs  Physical Therapist: Silva Wells, PT  Occupational Therapist: Doris Yung MS, OTR/L  Speech Therapist: Jody Fernandes MS, CCC-SLP

## 2025-02-12 NOTE — OCCUPATIONAL THERAPY NOTE
Second attempted to contact sister Layne ([phone]) and first attempt to contact ELIECER [place] ([phone]). Left VM regarding to discuss CLOF, recommendations and DC home. No answer left call back number.     -Doris Yung MS, OTR/L, CSRS

## 2025-02-12 NOTE — ASSESSMENT & PLAN NOTE
Chronic seizure disorder  Continue chronic AED regimen: Lacosamide 150 mg QAM, Lacosamide 200 mg QHS, Depakote 750 mg Q 12 hours  F.u depakote lvl: 99  Per Neurology consult neutropenia not likely to be 2/2 to lacosamide or depakote   Per psych Titrating depakote as it was mainly used for mood rather than epilepsy

## 2025-02-12 NOTE — ASSESSMENT & PLAN NOTE
Currently on pureed diet with thins--> inc to mechanical soft and thin   SLP to follow  Hopeful to upgrade based on recent notes.

## 2025-02-12 NOTE — ASSESSMENT & PLAN NOTE
WBC count currently 3.03 from 2.75.  Absolute neutrophils 0.62.  Continue neutropenic precautions.  Likely due to medications.  Had been non-compliant at home and was restarted on medications in acute setting.  Psychiatry consulted - discontinued Invega on 2/10.  Recommending Neurology consult.  Neurology consulted - discussed with Psych that Depakote was started for mood stabilization rather than seizures - ok to decrease/discontinue per Psych.  Depakote changed to 500mg Q12 today.  Repeat CBC tomorrow.

## 2025-02-12 NOTE — PROGRESS NOTES
PSYCHIATRY CONSULT SERVICE  PROGRESS NOTE    Esperanza Prajapati 54 y.o. female MRN: [id number]  Unit/Bed#: [facility] 269-01 Encounter: [id number]     ASSESSMENT / PLAN     Esperanza Prajapati is a 54 y.o. female, single, lives in home with nephew, with history of schizoaffective disorder, who initially presented to  for hospitalization secondary to ARC. Was found on admission to have neutropenia in the setting of psychotropic medication.     Principal Psychiatric Problem:  Neutropenia in Setting of Psychotropic Medication   The pt was recently hospitalized had had a hx of influenza A, completed Tamiflu and received cephalosporin and azithromycin for PNA.   Medications that could have contributed to neutropenia include: VPA, olanzapine, paliperidone, lacosamide, and recent Abx use.   This morning 2/12: ANC was 0.62 and WBC 3.03     Principal Problem:    Schizoaffective disorder (HCC)  Active Problems:    Acquired hypothyroidism    Focal epilepsy (HCC)    Intellectual disability    Influenza A    Acute respiratory failure with hypoxia (HCC)    Hypernatremia    Pneumonia of both lungs due to infectious organism    Insomnia    At risk for deep venous thrombosis    Oropharyngeal dysphagia    Neutropenia (HCC)    Prolonged QT interval      RECOMMENDED TREATMENT     Discussed plan with primary team as follows:  Hospital labs reviewed.  Collaborate with collaterals for further assessment and disposition as indicated.  Inpatient psychiatric hospitalization is not indicated at this time.  Recommend the following psychotropic medication regimen at this time:  Will taper down Depakote; pt received 500 mg tonight, with plans to further decrease tomorrow as tolerated   Discontinued paliperidone 6 mg qd 2/2 neutropenia.  Continue current dose of olanzapine 10 mg q HS; re-assess as clinical course develops.  Continue melatonin 6 mg PO q HS for insomnia.   Continue medical management per primary team.  Observation level: Not  "on observation, currently not indicated  Please reach out to on-call Psychiatry team via Epic Chat with any questions or concerns.         SUBJECTIVE     Patient was seen and evaluated for continuity of care. Esperanza demonstrates improved symptoms, no longer endorses auditory hallucinations. Has been able to tolerate eating her diet this morning.  She reports \"OK\" mood. Says she slept \"OK.\"  Reports her shakiness is improved and \"sometimes there and sometimes not there,\" compared to before.     Currently, patient denies changes in vision or visual hallucinations. Denies having depressed symptoms or feeling sad/anxious. Denies wanting to harm self/others. The treatment plan was explained to the patient and there were no additional question/concerns.    Psychiatric Review of Systems:  Behavior over the last 24 hours: improved  Sleep: normal. Reports that she slept at 11 pm and work up at 8 am to take her medication.   Appetite: adequate. Reported tolerating her breakfast. Denies any GI upsets.   Medication side effects: tremor, notes that the tremors are sometimes present throughout the day. Improvement compared to before.  ROS: Complete review of systems is negative except as noted above.    OBJECTIVE     Vital signs in last 24 hours:  Temp:  [97.3 °F (36.3 °C)-97.9 °F (36.6 °C)] 97.4 °F (36.3 °C)  HR:  [] 87  BP: ()/(53-82) 92/53  Resp:  [18] 18  SpO2:  [95 %-96 %] 96 %  O2 Device: None (Room air)    Mental Status Evaluation:  Appearance:  adequate grooming, dressed in hospital attire, looks stated age, overweight   Behavior:  pleasant, cooperative, calm, slow responses   Speech:  slow, paucity of speech, monotone   Mood:  \"OK\"   Affect:  constricted   Language: unable to assess   Thought Process:  goal directed, concrete   Associations: Unable to assess   Thought Content:  Unable to assess   Perceptual Disturbances: denies auditory or visual hallucinations when asked   Risk Potential: Suicidal ideation - " None at present  Homicidal ideation - None at present  Potential for aggression - Not at present   Sensorium:  Did not assess   Memory:  Did not assess   Consciousness:  awake   Attention/Concentration: decreased concentration and decreased attention span   Intellect: decreased; pt has PMHx of developmental delay    Fund of Knowledge: Did not assess   Insight:  limited   Judgment: limited   Muscle Strength Muscle Tone: Did not assess  Did not assess   Gait/Station: Did not assess   Motor Activity: abnormal movement noted: dyskinetic movements face and arms present, hand tremor present; decreased compared to prior couple of days.           ACTIVE MEDICATIONS     Current Medications:  Current Facility-Administered Medications   Medication Dose Route Frequency Provider Last Rate    acetaminophen  975 mg Oral Q6H PRN Tia Benz MD      aluminum-magnesium hydroxide-simethicone  30 mL Oral Q6H PRN Tia Benz MD      benztropine  2 mg Oral BID Tia Benz MD      cyanocobalamin  1,000 mcg Oral Daily Tia Benz MD      divalproex sodium  500 mg Oral Q12H [facility] Taurus Mcnamara DO      enoxaparin  40 mg Subcutaneous Daily Tia Benz MD      ipratropium-albuterol  3 mL Nebulization Q8H PRN Tia Benz MD      lacosamide  150 mg Oral Daily Tia Benz MD      lacosamide  200 mg Oral HS Tia Benz MD      levothyroxine  175 mcg Oral Early Morning Tia Benz MD      melatonin  6 mg Oral HS Tia Benz MD      nystatin   Topical BID Tia Benz MD      OLANZapine  10 mg Oral HS Tia Benz MD      ondansetron  4 mg Intravenous Q6H PRN Tia Benz MD      polyethylene glycol  17 g Oral Daily PRN Tia Benz MD      senna-docusate sodium  2 tablet Oral BID Tia Benz MD         Behavioral Health Medications: I have personally reviewed all current active medications. Changes as in plan section  above.      ADDITIONAL DATA     EKG Results: I have personally reviewed pertinent reports.  Results for orders placed or performed during the hospital encounter of 02/08/25 (from the past 1000 hours)   ECG 12 lead    Collection Time: 02/10/25  2:55 PM   Result Value    Ventricular Rate 95    Atrial Rate 95    MN Interval 168    QRSD Interval 76    QT Interval 332    QTC Interval 418    P Axis 61    QRS Axis 21    T Wave Axis 123    Narrative    Normal sinus rhythm  Nonspecific T wave abnormality  Abnormal ECG  When compared with ECG of 10-Feb-2025 14:52, (unconfirmed)  No significant change was found  Confirmed by Joselito Wellington ([id number]) on 2/10/2025 4:48:43 PM     *Note: Due to a large number of results and/or encounters for the requested time period, some results have not been displayed. A complete set of results can be found in Results Review.       Radiology Results: I have personally reviewed pertinent reports. I have personally reviewed pertinent films in PACS.  No results found.  No Chest XR results available for this patient.     Laboratory Results: I have personally reviewed all pertinent laboratory/tests results.  Recent Results (from the past 48 hours)   ECG 12 lead    Collection Time: 02/10/25 10:52 AM   Result Value Ref Range    Ventricular Rate 88 BPM    Atrial Rate 88 BPM    MN Interval 170 ms    QRSD Interval 72 ms    QT Interval 336 ms    QTC Interval 407 ms    P Hardy 64 degrees    QRS Axis 19 degrees    T Wave Axis 143 degrees   ECG 12 lead    Collection Time: 02/10/25  2:51 PM   Result Value Ref Range    Ventricular Rate 94 BPM    Atrial Rate 94 BPM    MN Interval 172 ms    QRSD Interval 66 ms    QT Interval 306 ms    QTC Interval 383 ms    P Axis 60 degrees    QRS Axis 23 degrees    T Wave Axis 128 degrees   ECG 12 lead    Collection Time: 02/10/25  2:52 PM   Result Value Ref Range    Ventricular Rate 93 BPM    Atrial Rate 93 BPM    MN Interval 162 ms    QRSD Interval 66 ms    QT Interval 330 ms     QTC Interval 411 ms    P Axis 62 degrees    QRS Axis 19 degrees    T Wave Axis 131 degrees   ECG 12 lead    Collection Time: 02/10/25  2:55 PM   Result Value Ref Range    Ventricular Rate 95 BPM    Atrial Rate 95 BPM    NM Interval 168 ms    QRSD Interval 76 ms    QT Interval 332 ms    QTC Interval 418 ms    P Axis 61 degrees    QRS Axis 21 degrees    T Wave Axis 123 degrees   Valproic acid level, total    Collection Time: 02/10/25  8:13 PM   Result Value Ref Range    Valproic Acid, Total 99 50 - 100 ug/mL   CBC and differential    Collection Time: 02/10/25  8:13 PM   Result Value Ref Range    WBC 2.72 (L) 4.31 - 10.16 Thousand/uL    RBC 3.15 (L) 3.81 - 5.12 Million/uL    Hemoglobin 10.1 (L) 11.5 - 15.4 g/dL    Hematocrit 32.2 (L) 34.8 - 46.1 %     (H) 82 - 98 fL    MCH 32.1 26.8 - 34.3 pg    MCHC 31.4 31.4 - 37.4 g/dL    RDW 14.5 11.6 - 15.1 %    MPV 9.1 8.9 - 12.7 fL    Platelets 308 149 - 390 Thousands/uL    nRBC 0 /100 WBCs    Segmented % 25 (L) 43 - 75 %    Immature Grans % 0 0 - 2 %    Lymphocytes % 62 (H) 14 - 44 %    Monocytes % 10 4 - 12 %    Eosinophils Relative 2 0 - 6 %    Basophils Relative 1 0 - 1 %    Absolute Neutrophils 0.68 (L) 1.85 - 7.62 Thousands/µL    Absolute Immature Grans 0.01 0.00 - 0.20 Thousand/uL    Absolute Lymphocytes 1.68 0.60 - 4.47 Thousands/µL    Absolute Monocytes 0.28 0.17 - 1.22 Thousand/µL    Eosinophils Absolute 0.05 0.00 - 0.61 Thousand/µL    Basophils Absolute 0.02 0.00 - 0.10 Thousands/µL   CBC and differential    Collection Time: 02/11/25  5:16 AM   Result Value Ref Range    WBC 2.75 (L) 4.31 - 10.16 Thousand/uL    RBC 3.18 (L) 3.81 - 5.12 Million/uL    Hemoglobin 10.3 (L) 11.5 - 15.4 g/dL    Hematocrit 31.9 (L) 34.8 - 46.1 %     (H) 82 - 98 fL    MCH 32.4 26.8 - 34.3 pg    MCHC 32.3 31.4 - 37.4 g/dL    RDW 14.4 11.6 - 15.1 %    MPV 9.5 8.9 - 12.7 fL    Platelets 287 149 - 390 Thousands/uL    nRBC 0 /100 WBCs    Segmented % 21 (L) 43 - 75 %    Immature  Grans % 0 0 - 2 %    Lymphocytes % 65 (H) 14 - 44 %    Monocytes % 11 4 - 12 %    Eosinophils Relative 2 0 - 6 %    Basophils Relative 1 0 - 1 %    Absolute Neutrophils 0.59 (L) 1.85 - 7.62 Thousands/µL    Absolute Immature Grans 0.01 0.00 - 0.20 Thousand/uL    Absolute Lymphocytes 1.76 0.60 - 4.47 Thousands/µL    Absolute Monocytes 0.30 0.17 - 1.22 Thousand/µL    Eosinophils Absolute 0.06 0.00 - 0.61 Thousand/µL    Basophils Absolute 0.03 0.00 - 0.10 Thousands/µL   CBC and differential    Collection Time: 02/12/25  5:40 AM   Result Value Ref Range    WBC 3.03 (L) 4.31 - 10.16 Thousand/uL    RBC 3.10 (L) 3.81 - 5.12 Million/uL    Hemoglobin 10.2 (L) 11.5 - 15.4 g/dL    Hematocrit 32.1 (L) 34.8 - 46.1 %     (H) 82 - 98 fL    MCH 32.9 26.8 - 34.3 pg    MCHC 31.8 31.4 - 37.4 g/dL    RDW 14.3 11.6 - 15.1 %    MPV 9.4 8.9 - 12.7 fL    Platelets 259 149 - 390 Thousands/uL    nRBC 0 /100 WBCs    Segmented % 21 (L) 43 - 75 %    Immature Grans % 0 0 - 2 %    Lymphocytes % 65 (H) 14 - 44 %    Monocytes % 10 4 - 12 %    Eosinophils Relative 3 0 - 6 %    Basophils Relative 1 0 - 1 %    Absolute Neutrophils 0.62 (L) 1.85 - 7.62 Thousands/µL    Absolute Immature Grans 0.01 0.00 - 0.20 Thousand/uL    Absolute Lymphocytes 1.98 0.60 - 4.47 Thousands/µL    Absolute Monocytes 0.31 0.17 - 1.22 Thousand/µL    Eosinophils Absolute 0.08 0.00 - 0.61 Thousand/µL    Basophils Absolute 0.03 0.00 - 0.10 Thousands/µL        This note was not shared with the patient due to reasonable likelihood of causing patient harm        Chelsey Noel  Department of Psychiatry and Behavioral Health  [facility]

## 2025-02-12 NOTE — CASE MANAGEMENT
Case Management    Spoke to daughter Layne gave an update regarding patient's functional level and anticipated goals of Mod I for bathing/dressing/toileting and fxnl mobility. Daughter did confirm patient is either with nephew or ex brother in law during the day/evening. They provide all assistance for IADLs including cooking, laundry, and transportation. Daughter was asking for information regarding HHA covered under insurace to assist with driving patient to appointments etc. Discussed Waiver Program with patient. She did report that she believed patient had a  through medicaid but wasn't sure. Apparently someone had called her before christmas and stated they would be reaching out to her when they got back from vacation however she never received another phone call. Emailed daughter waiver application at [email or web address]. Discussed current diet level with daughter and recommendation for family training regarding diet prior to discharge. Daughter can come in Monday for family training prior to discharge. Recommending outpatient SLP services upon d/c. Did discuss need for assistance for medication management. Dtr reported in past patient had pop pack meds for AM/PM. Explained to dtr there may be a delay from time of d/c until she gets pop packs for meds. Discussed using pill box and having family assist with medication management. Daughter in understanding. Daughter aware of d/c date of Monday 2/17 and in agreement.     Maribel Harris

## 2025-02-12 NOTE — PROGRESS NOTES
"   02/12/25 1230   Pain Assessment   Pain Assessment Tool 0-10   Pain Score No Pain   Restrictions/Precautions   Precautions Aspiration;Bed/chair alarms;Cognitive;Fluid restriction;Seizure;Supervision on toilet/commode   Lifestyle   Autonomy \"I'm OK\"   Sit to Stand   Type of Assistance Needed Supervision   Comment CS no AD   Sit to Stand CARE Score 4   Light Housekeeping   Light Housekeeping Completed laundry gathering and folding task at table top. first complted fxnl mobilty across room and picked up large pile of laundry, mobilized to table and completed folding task. Tolerated well at CS.   Transfers   Additional Comments fxnl mobility on unit while collecting targetted items CS-CGA no LOB   Exercise Tools   UE Ergometer UE ergometer on min resistance 3min prograde x2. Tolerated well, SpO2 97-98%, HR 90s-low 100s and w/ several min rest break recovers to 90s. Completed to inc activity tolerance and UE strength/endurance   Cognition   Overall Cognitive Status Impaired   Arousal/Participation Alert;Cooperative   Attention Attends with cues to redirect   Orientation Level Oriented X4   Memory Decreased short term memory;Decreased recall of recent events;Decreased recall of precautions   Following Commands Follows one step commands with increased time or repetition   Activity Tolerance   Activity Tolerance Patient tolerated treatment well   Assessment   Treatment Assessment OT session focusing on fxnl mobility and activity tolerance, UE TE. Pt tolearted well despite initial request to rest in bed. Pt progressing well towards goals. OT to continue POC.   Prognosis Good   Problem List Decreased endurance;Impaired balance;Decreased mobility;Decreased coordination;Decreased cognition;Impaired judgement;Decreased safety awareness;Obesity   Plan   Treatment/Interventions ADL retraining;Functional transfer training;Therapeutic exercise;Endurance training;Cognitive reorientation;Patient/family training;Equipment " eval/education;Compensatory technique education;Continued evaluation;Spoke to nursing   Progress Improving as expected   OT Therapy Minutes   OT Time In 1230   OT Time Out 1330   OT Total Time (minutes) 60   OT Mode of treatment - Individual (minutes) 60   OT Mode of treatment - Concurrent (minutes) 0   OT Mode of treatment - Group (minutes) 0   OT Mode of treatment - Co-treat (minutes) 0   OT Mode of Treatment - Total time(minutes) 60 minutes   OT Cumulative Minutes 420   Therapy Time missed   Time missed? No

## 2025-02-12 NOTE — PROGRESS NOTES
Progress Note - PMR   Name: Esperanza Prajapati 54 y.o. female I MRN: [id number]  Unit/Bed#: [facility] 269-01 I Date of Admission: 2/8/2025   Date of Service: 2/12/2025 I Hospital Day: 4     Assessment & Plan  Acute respiratory failure with hypoxia (HCC)  Presented on 1/19/25 with respiratory failure related to Influenza A  Intubated 1/21/25 - 1/30/25.  Required HFNC  Weaning down from oxygen.  Now on RA in AM   Improving    PLAN   Continue IS, OPEP, and pulmonary toileting  Aspiration precautions, deep breathing  Monitor pulmonary status while in ARC  Has been stable on RA   Follow-up with Pulmonary as outpatient  Influenza A  Presented on 1/19/25 with respiratory failure  +Influenza A  s/p Tamiflu treatment completed  Has been stable on RA   Acquired hypothyroidism  Acquired after total thyroidectomy 2/2 papillary thyroid carcinoma in 2013  Continue Synthroid 175 mcg daily  Focal epilepsy (HCC)  Chronic seizure disorder  Continue chronic AED regimen: Lacosamide 150 mg QAM, Lacosamide 200 mg QHS, Depakote 750 mg Q 12 hours  F.u depakote lvl: 99  Per Neurology consult neutropenia not likely to be 2/2 to lacosamide or depakote   Per psych Titrating depakote as it was mainly used for mood rather than epilepsy   Schizoaffective disorder (HCC)  Stable  Continue chronic medications: Cogentin 2 mg BID, Zyprexa 10 mg QHS, Invega 6 mg QAM.    Psych consulted 2/10 given neutropenia for medication adjustment; invega has been dc 2/10  F.u depakote lvl: 99   Titrating depakote per psych   Hypernatremia  Resolved  139 2/10  Pneumonia of both lungs due to infectious organism  Possible bacterial PNA.   S/p treatment with Azithromycin  Continue supportive care  Continues on oxygen 0-2L; stable on RA   Insomnia  Continue Melatonin 6 mg QHS  At risk for deep venous thrombosis  Continue Lovenox 40 mg daily.    Oropharyngeal dysphagia  Currently on pureed diet with thins--> inc to mechanical soft and thin   SLP to follow  Hopeful to  upgrade based on recent notes.   Neutropenia (HCC)  Patient WBC 1.97 2/10; neutrophils 0.6 --> 3.03 WBC 0.62 neutrophil 2/12   Neutropenic precautions   Psych consult for medication adjustment   C/t monitor w/ routine blood work   Intellectual disability  Patient will need assistance w/ medications and iADLs once discharged   Will need to communicate with family to see what assistance is available   Prolonged QT interval  EKG 1/25 aflutter w/ ; repeat EKG 2/10   Avoid QT prolong agents as possible   Repeat EKG as necessary     History of Present Illness   Esperanza Prajapati is a 54 y.o. female with past medical history significant for epileps/seizure disorder, schizoaffective disorder, cognitive deficits, history of papillary thyroid carcinoma s/p total thyroidectomy who presented to the [facility] 1/19/25 with acute respiratory failure.  +Influenza A.  Intubated 1/21/25 - 1/30/25.  Brochoscopy 1/24/25.  Completed Tamiflu treatment.  Patient with superimposed bacterial PNA.  Treated with Azithromyocin.  Followed closely by Pulmonary.  Needing intermittent oxygen, but has weaned to RA.    Medical course complicated by dysphagia.  Patient seen by SLP and placed on a pureed diet with thin liquids.    After medical stabilization, patient found to have acute functional deficits from baseline in mobilty, self care, swallow, therefore admitted to [facility] for acute inpatient rehabiltiation.       Chief Complaint: f/u ambulatory dysfunction    Interval: Patient seen and examined in recliner. No events overnight.  Reports overall feeling well. Last BM 2/11. Sleeping well at night. Denies any f/c/n/v, CP, SOB, abdominal pain, constipation, or diarrhea.       Objective   Functional Update:  Physical Therapy Occupational Therapy Speech Therapy   Weight Bearing Status: Full Weight Bearing  Transfers: Supervision  Bed Mobility: Supervision  Amulation Distance (ft): 250 feet  Ambulation:  Supervision  Assistive Device for Ambulation:  (none)  Number of Stairs: 12  Assistive Device for Stairs: Lehft Hand Rail  Stair Assistance: Supervision  Ramp: Supervision  Assistive Device for Ramp: None  Discharge Recommendations: Home with:  DC Home with:: Family Support   Eating: Supervision  Grooming: Supervision  Bathing: Incidental Touching  Bathing: Incidental Touching  Upper Body Dressing:  (setup)  Lower Body Dressing: Minimal Assistance  Toileting: Incidental Touching  Tub/Shower Transfer: Minimal Assistance  Toilet Transfer: Incidental Touching  Cognition: Exceptions to WNL  Cognition: Decreased Memory, Decreased Safety, Decreased Executive Functions, Behavioral Considerations, Decreased Attention, Decreased Comprehension  Orientation: Person, Place, Time, Situation   Mode of Communication: Verbal  Cognition: Exceptions to WNL (baseline cognitive linguistic deficits)     Swallowing: Exceptions to WNL  Swallowing: Oral Dysphagia, Pharyngeal Dysphagia, Aspiration Risk, Reflux Precautions  Diet Recommendations: Level 1/Puree, Thin  Discharge Recommendations: Home with:  DC Home with:: 24 Hour Supervision, 24 Hour Assisteance, Family Support, Outpatient Speech Therapy         Temp:  [97.3 °F (36.3 °C)-97.9 °F (36.6 °C)] 97.4 °F (36.3 °C)  HR:  [] 87  Resp:  [18] 18  BP: ()/(53-82) 92/53  SpO2:  [95 %-96 %] 96 %    Physical Exam    Gen: No acute distress, obese  HEENT: Moist mucus membranes, Normocephalic/Atraumatic  Cardiovascular: Regular rate, rhythm,  Heme/Extr: No edema  Pulmonary: Non-labored breathing. Lungs CTAB  : No patel  GI:  non-distended. BS+  MSK: ROM is WFL in all extremities.   Integumentary: Skin is warm, dry. .  Neuro:  Speech is intact. Appropriate to questioning.  Psych: Flat       Scheduled Meds:  Current Facility-Administered Medications   Medication Dose Route Frequency Provider Last Rate    acetaminophen  975 mg Oral Q6H PRN Tia Benz MD      aluminum-magnesium  hydroxide-simethicone  30 mL Oral Q6H PRN Tia Benz MD      benztropine  2 mg Oral BID Tia Benz MD      cyanocobalamin  1,000 mcg Oral Daily Tia Benz MD      divalproex sodium  500 mg Oral Q12H MOSHE Mcnamara DO      enoxaparin  40 mg Subcutaneous Daily Tia Benz MD      ipratropium-albuterol  3 mL Nebulization Q8H PRN Tia Benz MD      lacosamide  150 mg Oral Daily Tia Benz MD      lacosamide  200 mg Oral HS Tia Benz MD      levothyroxine  175 mcg Oral Early Morning Tia Benz MD      melatonin  6 mg Oral HS Tia Benz MD      nystatin   Topical BID Tia Benz MD      OLANZapine  10 mg Oral HS Tia Benz MD      ondansetron  4 mg Intravenous Q6H PRN Tia Benz MD      polyethylene glycol  17 g Oral Daily PRN Tia Benz MD      senna-docusate sodium  2 tablet Oral BID Tia Benz MD           Lab Results: I have reviewed the following results:  Results from last 7 days   Lab Units 02/12/25  0540 02/11/25  0516 02/10/25  2013   HEMOGLOBIN g/dL 10.2* 10.3* 10.1*   HEMATOCRIT % 32.1* 31.9* 32.2*   WBC Thousand/uL 3.03* 2.75* 2.72*   PLATELETS Thousands/uL 259 287 308     Results from last 7 days   Lab Units 02/10/25  0950 02/06/25  0552   BUN mg/dL 13 14   SODIUM mmol/L 139 141   POTASSIUM mmol/L 4.0 3.7   CHLORIDE mmol/L 99 104   CREATININE mg/dL 0.90 0.74   AST U/L 22  --    ALT U/L 27  --               Rosi Olsen MD   Physical Medicine and Rehabilitation   02/12/25    I have spent a total time of 35 minutes in caring for this patient on the day of the visit/encounter including Counseling / Coordination of care, Documenting in the medical record, and Communicating with other healthcare professionals .

## 2025-02-12 NOTE — PROGRESS NOTES
Progress Note - Behavioral Health   Name: Esperanza Prajapati 54 y.o. female I MRN: [id number]  Unit/Bed#: -01 I Date of Admission: 2/8/2025   Date of Service: 2/12/2025 I Hospital Day: 4    Assessment & Plan  Neutropenia (HCC)  Lab work 2/10 showed WBC 1.97 (down from 3.27 on 2/6) with ANC of 0.6 (down from 7.71 on 2/1)  Pt did recently have influenza A and was hospitalized, completed course of Tamiflu, also received cephalosporins and azithromycin for PNA  Numerous medications could be contributing, including VPA, olanzapine, paliperidone, lacosamide, as well as recent ABX use  Afebrile, VS stable  VPA trough level 99   This morning: ANC 0.62, WBC 3.03.    Plan  Continue down-taper of Depakote; pt to receive 500 mg q12h today  Plan to decrease evening dose to 250 mg tomorrow.   Per Neurology consult, no changes had been made to AEDs.  However, per their recommendations, reached out to outpatient epileptologist Dr. Cevallos as LOV note states VPA had been prescribed for mood, and to consider consolidating AED to lacosamide monotherapy if Psychiatry is in agreement with discontinuation/decrease of VPA.  Considering risks of neutropenia and overall clinical picture, both teams are in agreement with discontinuation of VPA.   If concerns for breakthrough seizures, can increase dose of lacosamide.  Of note, pt is on olanzapine, which can confer mood stability, so its dose can be adjusted should the need arise.  Discontinued paliperidone 6 mg qd.   Pt received last dose 2/10 at 0852.  Recheck CBC w differential tomorrow morning  Maintain neutropenic precautions.  Continue to monitor VS closely  Schizoaffective disorder (HCC)  Per chart, last inpatient psychiatric hospitalization in May 2024. Pt follows with outpatient psychiatry at [facility]. LOV 10/09/24.  PTA was on the following psychotropic medications: olanzapine 10 mg q HS, paliperidone 6 mg qd (which had been decreased from 9 mg), Cogentin 2 mg PO  "BID  Per chart pt appears to have been started on standing Cogentin d/t hx of EPS including oculogyric s/sx and dystonia during last psychiatric hospitalization     Currently, pt does not present as acutely decompensated and does not demonstrate any indication for inpatient psychiatric hospitalization. Today pt denying AVH, SI/HI, intent, or plan. Reports improvement in sleep. All other ROS unremarkable.     Plan  Continue down-taper of Depakote; pt to receive 500 mg q12h today. See under \"Neutropenia.\"  Plan to decrease evening dose to 250 mg tomorrow.   Discontinued paliperidone 6 mg qd 2/2 neutropenia.  Continue current dose of olanzapine 10 mg q HS; re-assess as clinical course develops.  Continue melatonin 6 mg PO q HS for insomnia.   Does not meet criteria for inpatient psychiatric hospitalization at this time.  Focal epilepsy (HCC)  Follows with Neurology, PTA AED regimen: lacosamide 150 mg daily AM and 200 mg q HS, Depakote 750 mg q12h.   Continue down-taper of Depakote; pt to receive 500 mg q12h today. See under \"Neutropenia.\"   Per outpatient epileptologist, if concerns for breakthrough seizures, can increase dose of lacosamide.  Intellectual disability  H/o intellectual disability per chart review  Acquired hypothyroidism  S/p thyroidectomy 2/2 papillary thyroid carcinoma  Consider repeat TSH/free T4  PTA: levothyroxine 175 mcg  Management per Primary  Oropharyngeal dysphagia  SLP on board, maintain pureed diet, advance as tolerated/recommended per SLP.   Able to tolerate PO medications thus far.   Acute respiratory failure with hypoxia (HCC)  Hospitalized 1/19/25 with respiratory failure 2/2 influenza A, requiring intubation from 1/21-1/30, and HFNC   Now stable on room air  Management per Primary      SUBJECTIVE     Patient seen and evaluated in the [facility] this morning. Per chart review, no acute events overnight. She appears brighter in terms of affect today, and states she feels \"okay.\" She reports " "sleeping well from approximately 11PM to 8AM. She denies any current auditory or visual hallucinations, although does appear internally preoccupied. She denies any current depression, anxiety, restlessness, etc. She denies any current SI/HI, intent, or plan. Presently, she denies any questions/concerns    Psychiatric Review of Systems:  Behavior over the last 24 hours: unchanged  Sleep: improving  Appetite: adequate  Medication side effects: denies  ROS: Complete review of systems is negative except as noted above.    OBJECTIVE     Vital signs in last 24 hours:  Temp:  [97.1 °F (36.2 °C)-97.4 °F (36.3 °C)] 97.1 °F (36.2 °C)  HR:  [84-97] 84  BP: ()/(53-82) 95/62  Resp:  [16-18] 16  SpO2:  [95 %-96 %] 95 %  O2 Device: None (Room air)    Mental Status Evaluation:  Appearance:  Overtly  female, dressed in hospital attire, sitting upright in bed   Behavior:  Cooperative, psychomotor slowing, fair eye contact   Speech:  Soft, delayed   Mood:  \"I'm okay.\"   Affect:  constricted, brighter than previous   Thought Process:  slowing of thoughts, concrete   Thought Content:  No delusions verbalized.   Perceptual Disturbances: Denies current AVH. Appears internally preoccupied, but does not appear to be responding to internal stimuli.    Risk Potential: Denies SI/HI, intent, or plan.   Potential for aggression: not at time of this encounter    Sensorium:  oriented to person, place, and time/date   Consciousness:  awake   Attention/Concentration: attention span and concentration appear shorter than expected for age   Insight:  limited   Judgment: limited   Muscle Strength Muscle Tone: Not formally assessed at this encounter.   Gait/Station: slow gait   Motor Activity: Resting tremor observed in right upper extremity and head.        ACTIVE MEDICATIONS     Current Medications:  Current Facility-Administered Medications   Medication Dose Route Frequency Provider Last Rate    acetaminophen  975 mg Oral Q6H PRN Tia " Catrachito Benz MD      aluminum-magnesium hydroxide-simethicone  30 mL Oral Q6H PRN Tia Benz MD      benztropine  2 mg Oral BID Tia Benz MD      cyanocobalamin  1,000 mcg Oral Daily Tia Benz MD      divalproex sodium  500 mg Oral Q12H MOSHE Taurus Mcnamara DO      enoxaparin  40 mg Subcutaneous Daily Tia Benz MD      ipratropium-albuterol  3 mL Nebulization Q8H PRN Tia Benz MD      lacosamide  150 mg Oral Daily Tia Benz MD      lacosamide  200 mg Oral HS Tia Benz MD      levothyroxine  175 mcg Oral Early Morning Tia Benz MD      melatonin  6 mg Oral HS Tia Benz MD      nystatin   Topical BID Tia Benz MD      OLANZapine  10 mg Oral HS Tia Benz MD      ondansetron  4 mg Intravenous Q6H PRN Tia Benz MD      polyethylene glycol  17 g Oral Daily PRN Tia Benz MD      senna-docusate sodium  2 tablet Oral BID Tia Benz MD         Behavioral Health Medications: I have personally reviewed all current active medications. Changes as in plan section above.      ADDITIONAL DATA     EKG Results: I have personally reviewed pertinent reports.  Results for orders placed or performed during the hospital encounter of 02/08/25 (from the past 1000 hours)   ECG 12 lead    Collection Time: 02/10/25  2:55 PM   Result Value    Ventricular Rate 95    Atrial Rate 95    CT Interval 168    QRSD Interval 76    QT Interval 332    QTC Interval 418    P Axis 61    QRS Axis 21    T Wave Axis 123    Narrative    Normal sinus rhythm  Nonspecific T wave abnormality  Abnormal ECG  When compared with ECG of 10-Feb-2025 14:52, (unconfirmed)  No significant change was found  Confirmed by Joselito Wellington ([id number]) on 2/10/2025 4:48:43 PM     *Note: Due to a large number of results and/or encounters for the requested time period, some results have not been displayed. A complete set of results can be found in Results  Review.       Radiology Results: I have personally reviewed pertinent reports. I have personally reviewed pertinent films in PACS.  No results found.  No Chest XR results available for this patient.     Laboratory Results: I have personally reviewed all pertinent laboratory/tests results.  Recent Results (from the past 48 hours)   Valproic acid level, total    Collection Time: 02/10/25  8:13 PM   Result Value Ref Range    Valproic Acid, Total 99 50 - 100 ug/mL   CBC and differential    Collection Time: 02/10/25  8:13 PM   Result Value Ref Range    WBC 2.72 (L) 4.31 - 10.16 Thousand/uL    RBC 3.15 (L) 3.81 - 5.12 Million/uL    Hemoglobin 10.1 (L) 11.5 - 15.4 g/dL    Hematocrit 32.2 (L) 34.8 - 46.1 %     (H) 82 - 98 fL    MCH 32.1 26.8 - 34.3 pg    MCHC 31.4 31.4 - 37.4 g/dL    RDW 14.5 11.6 - 15.1 %    MPV 9.1 8.9 - 12.7 fL    Platelets 308 149 - 390 Thousands/uL    nRBC 0 /100 WBCs    Segmented % 25 (L) 43 - 75 %    Immature Grans % 0 0 - 2 %    Lymphocytes % 62 (H) 14 - 44 %    Monocytes % 10 4 - 12 %    Eosinophils Relative 2 0 - 6 %    Basophils Relative 1 0 - 1 %    Absolute Neutrophils 0.68 (L) 1.85 - 7.62 Thousands/µL    Absolute Immature Grans 0.01 0.00 - 0.20 Thousand/uL    Absolute Lymphocytes 1.68 0.60 - 4.47 Thousands/µL    Absolute Monocytes 0.28 0.17 - 1.22 Thousand/µL    Eosinophils Absolute 0.05 0.00 - 0.61 Thousand/µL    Basophils Absolute 0.02 0.00 - 0.10 Thousands/µL   CBC and differential    Collection Time: 02/11/25  5:16 AM   Result Value Ref Range    WBC 2.75 (L) 4.31 - 10.16 Thousand/uL    RBC 3.18 (L) 3.81 - 5.12 Million/uL    Hemoglobin 10.3 (L) 11.5 - 15.4 g/dL    Hematocrit 31.9 (L) 34.8 - 46.1 %     (H) 82 - 98 fL    MCH 32.4 26.8 - 34.3 pg    MCHC 32.3 31.4 - 37.4 g/dL    RDW 14.4 11.6 - 15.1 %    MPV 9.5 8.9 - 12.7 fL    Platelets 287 149 - 390 Thousands/uL    nRBC 0 /100 WBCs    Segmented % 21 (L) 43 - 75 %    Immature Grans % 0 0 - 2 %    Lymphocytes % 65 (H) 14 - 44 %     Monocytes % 11 4 - 12 %    Eosinophils Relative 2 0 - 6 %    Basophils Relative 1 0 - 1 %    Absolute Neutrophils 0.59 (L) 1.85 - 7.62 Thousands/µL    Absolute Immature Grans 0.01 0.00 - 0.20 Thousand/uL    Absolute Lymphocytes 1.76 0.60 - 4.47 Thousands/µL    Absolute Monocytes 0.30 0.17 - 1.22 Thousand/µL    Eosinophils Absolute 0.06 0.00 - 0.61 Thousand/µL    Basophils Absolute 0.03 0.00 - 0.10 Thousands/µL   CBC and differential    Collection Time: 02/12/25  5:40 AM   Result Value Ref Range    WBC 3.03 (L) 4.31 - 10.16 Thousand/uL    RBC 3.10 (L) 3.81 - 5.12 Million/uL    Hemoglobin 10.2 (L) 11.5 - 15.4 g/dL    Hematocrit 32.1 (L) 34.8 - 46.1 %     (H) 82 - 98 fL    MCH 32.9 26.8 - 34.3 pg    MCHC 31.8 31.4 - 37.4 g/dL    RDW 14.3 11.6 - 15.1 %    MPV 9.4 8.9 - 12.7 fL    Platelets 259 149 - 390 Thousands/uL    nRBC 0 /100 WBCs    Segmented % 21 (L) 43 - 75 %    Immature Grans % 0 0 - 2 %    Lymphocytes % 65 (H) 14 - 44 %    Monocytes % 10 4 - 12 %    Eosinophils Relative 3 0 - 6 %    Basophils Relative 1 0 - 1 %    Absolute Neutrophils 0.62 (L) 1.85 - 7.62 Thousands/µL    Absolute Immature Grans 0.01 0.00 - 0.20 Thousand/uL    Absolute Lymphocytes 1.98 0.60 - 4.47 Thousands/µL    Absolute Monocytes 0.31 0.17 - 1.22 Thousand/µL    Eosinophils Absolute 0.08 0.00 - 0.61 Thousand/µL    Basophils Absolute 0.03 0.00 - 0.10 Thousands/µL        This note was not shared with the patient due to reasonable likelihood of causing patient harm        Taurus Mcnamara DO  Psychiatry Resident, PGY-I  Department of Psychiatry and Behavioral Health  [facility]     This note was completed in part utilizing voice recognizing software. Grammatical, translation, syntax errors, random word insertions, spelling mistakes, and incomplete sentences may be an occasional consequence of this system secondary to software limitations with voice recognition, ambient noise, and hardware issues. If you have any  questions or concerns about the content, text, or information contained within the body of this dictation, please contact the provider for clarification.

## 2025-02-12 NOTE — ASSESSMENT & PLAN NOTE
>>ASSESSMENT AND PLAN FOR INTELLECTUAL DISABILITY WRITTEN ON 2/12/2025 10:06 AM BY BRINA HOLLY    Concerns for medication non-compliance.  Considerations with dispo planning.

## 2025-02-12 NOTE — ASSESSMENT & PLAN NOTE
>>ASSESSMENT AND PLAN FOR INTELLECTUAL DISABILITY WRITTEN ON 2/12/2025 10:14 AM BY JOSE NIÑO MD    Patient will need assistance w/ medications and iADLs once discharged   Will need to communicate with family to see what assistance is available

## 2025-02-12 NOTE — ASSESSMENT & PLAN NOTE
Home regimen: Vimpat 150mg in AM and 200mg in PM and Depakote 750mg Q12.  Discussion between Neuro and Psych - Depakote was initiated for mood stabilization - ok to decrease dose per Psych discretion.  Depakote decreased to 500mg Q12 today.  Follows with Dr. Cevallos (Neurology) as outpatient.    Concerns in regards to medication non-compliance.  Has been seizure free for years.  Depakote level 99 on 2/10.

## 2025-02-12 NOTE — PROGRESS NOTES
"   02/12/25 1000   Pain Assessment   Pain Assessment Tool 0-10   Pain Score No Pain   Restrictions/Precautions   Precautions Aspiration;Bed/chair alarms;Cognitive;Fall Risk;Seizure;Supervision on toilet/commode   Lifestyle   Autonomy \"I have to shower?\"   Oral Hygiene   Type of Assistance Needed Supervision   Comment sup in stance   Oral Hygiene CARE Score 4   Shower/Bathe Self   Type of Assistance Needed Incidental touching   Comment seated for majority of shower. Able to wash UB w/ cues and encouragement, washes to feet w/ cues and encouragment, in stance at  CGA for groin and buttocks but requires cues to repeat for thoroughness.   Shower/Bathe Self CARE Score 4   Bathing   Assessed Bath Style Shower   Tub/Shower Transfer   Findings CGA in/out of wet shower at ambulatory level no AD to    Upper Body Dressing   Type of Assistance Needed Supervision   Comment seated   Upper Body Dressing CARE Score 4   Lower Body Dressing   Type of Assistance Needed Physical assistance   Physical Assistance Level 25% or less   Comment did requrie Adolfo for threading underwear and pants CGA for CM in stance   Lower Body Dressing CARE Score 3   Putting On/Taking Off Footwear   Type of Assistance Needed Physical assistance   Physical Assistance Level 51%-75%   Comment don/doff  socks and slip in sneakers   Putting On/Taking Off Footwear CARE Score 2   Sit to Stand   Type of Assistance Needed Supervision   Comment CS no AD   Sit to Stand CARE Score 4   Transfers   Additional Comments fxnl mobility in room and <> OT gym CGA-Adolfo no AD   Exercise Tools   Exercise Tools Yes   Other Exercise Tool 1 seated engaged pt in UE TE with 3# 2x15 to cont to inc fx strength. pt toelrated well with rest break in between for improved ind w/ ADL and mobility   Other Exercise Tool 2 for inc IND w/ shower transfers at DC pt trialed no AD step onto 4\" block 2x10 and then repeated w/ 6\" block, overall at CGA level   Cognition   Overall Cognitive " Status Impaired   Arousal/Participation Alert;Cooperative   Attention Attends with cues to redirect   Memory Decreased short term memory;Decreased recall of recent events;Decreased recall of precautions   Following Commands Follows one step commands with increased time or repetition   Activity Tolerance   Activity Tolerance Patient tolerated treatment well   Assessment   Treatment Assessment OT session focusing on ADL retraining, UE TE, balance and strengthening w/ mobility tasks. Pt completed shower routine, overall CGA but extensive encouragment for thorougness w/ completion. Pt w/ flat affect for most of session but cooperative w/ therapy. OT to see pt again in PM.   Prognosis Good   Problem List Decreased strength;Decreased range of motion;Decreased endurance;Impaired balance;Decreased mobility;Decreased coordination;Decreased cognition;Impaired judgement;Decreased safety awareness   Plan   Treatment/Interventions ADL retraining;Functional transfer training;Therapeutic exercise;Endurance training;Cognitive reorientation;Patient/family training;Equipment eval/education;Compensatory technique education;Continued evaluation   Progress Improving as expected   OT Therapy Minutes   OT Time In 1000   OT Time Out 1130   OT Total Time (minutes) 90   OT Mode of treatment - Individual (minutes) 90   OT Mode of treatment - Concurrent (minutes) 0   OT Mode of treatment - Group (minutes) 0   OT Mode of treatment - Co-treat (minutes) 0   OT Mode of Treatment - Total time(minutes) 90 minutes   OT Cumulative Minutes 360   Therapy Time missed   Time missed? No

## 2025-02-12 NOTE — ASSESSMENT & PLAN NOTE
Stable  Continue chronic medications: Cogentin 2 mg BID, Zyprexa 10 mg QHS, Invega 6 mg QAM.    Psych consulted 2/10 given neutropenia for medication adjustment; invega has been dc 2/10  F.u depakote lvl: 99   Titrating depakote per psych

## 2025-02-12 NOTE — PLAN OF CARE
Problem: SAFETY ADULT  Goal: Patient will remain free of falls  Description: INTERVENTIONS:  - Educate patient/family on patient safety including physical limitations  - Instruct patient to call for assistance with activity   - Consult OT/PT to assist with strengthening/mobility   - Keep Call bell within reach  - Keep bed low and locked with side rails adjusted as appropriate  - Keep care items and personal belongings within reach  - Initiate and maintain comfort rounds  - Make Fall Risk Sign visible to staff  - Offer Toileting every 2 Hours, in advance of need  - Initiate/Maintain bed and chair alarm  - Obtain necessary fall risk management equipment: call light within reach, side rails up and functioning, bed at lowest position  - Apply yellow socks and bracelet for high fall risk patients  - Consider moving patient to room near nurses station  Outcome: Progressing     Problem: Knowledge Deficit  Goal: Patient/family/caregiver demonstrates understanding of disease process, treatment plan, medications, and discharge instructions  Description: Complete learning assessment and assess knowledge base.  Interventions:  - Provide teaching at level of understanding  - Provide teaching via preferred learning methods  Outcome: Progressing     Problem: NEUROSENSORY - ADULT  Goal: Achieves stable or improved neurological status  Description: INTERVENTIONS  - Monitor and report changes in neurological status  - Monitor vital signs such as temperature, blood pressure, glucose, and any other labs ordered   - Initiate measures to prevent increased intracranial pressure  - Monitor for seizure activity and implement precautions if appropriate      Outcome: Progressing

## 2025-02-12 NOTE — PROGRESS NOTES
Progress Note - Internal Medicine   Name: Esperanza Prajapati 54 y.o. female I MRN: [id number]  Unit/Bed#: -01 I Date of Admission: 2/8/2025   Date of Service: 2/12/2025 I Hospital Day: 4    Assessment & Plan  Schizoaffective disorder (HCC)  Home regimen: Zyprexa 10mg at HS, Invega 6mg daily, trazodone 50mg at HS, and Cogentin 2mg BID.  Currently receiving Zyprexa 10mg at HS and Cogentin 2mg BID.  Psychiatry consulted due to concerns for non-compliance and new neutropenia.  Invega discontinued on 2/10.    Follows with outpatient Psychiatry.  Acquired hypothyroidism  Continue home levothyroxine 175mcg daily.  Last TSH 3.323 on 1/19.  Focal epilepsy (HCC)  Home regimen: Vimpat 150mg in AM and 200mg in PM and Depakote 750mg Q12.  Discussion between Neuro and Psych - Depakote was initiated for mood stabilization - ok to decrease dose per Psych discretion.  Depakote decreased to 500mg Q12 today.  Follows with Dr. Cevallos (Neurology) as outpatient.    Concerns in regards to medication non-compliance.  Has been seizure free for years.  Depakote level 99 on 2/10.  Intellectual disability  Concerns for medication non-compliance.  Considerations with dispo planning.  Influenza A  Influenza A+ on 1/19.  Received 5 days of Tamiflu.  Currently asymptomatic.  Acute respiratory failure with hypoxia ([facility])  Presented on 1/19 with altered mental status and hypoxia.  Influenza A + on 1/19.  Received 5 doses of Tamiflu.  Received IV cefepime and azithromycin for possible pneumonia.  Required intubation 10 days.  Last CXR on 1/31 showed unchanged bibasilar opacities, atelectasis vs pneumonia.  Repeat CXR as needed.  Currently maintaining on RA.  Continue spot pulse ox checks.  Pulmonary toileting, IS use.  Insomnia  Started on melatonin 6mg at HS.  Oropharyngeal dysphagia  Continue pureed diet with thins.  Speech consulted and following.  Aspiration precautions.  Neutropenia (HCC)  WBC count currently 3.03 from 2.75.   Absolute neutrophils 0.62.  Continue neutropenic precautions.  Likely due to medications.  Had been non-compliant at home and was restarted on medications in acute setting.  Psychiatry consulted - discontinued Invega on 2/10.  Recommending Neurology consult.  Neurology consulted - discussed with Psych that Depakote was started for mood stabilization rather than seizures - ok to decrease/discontinue per Psych.  Depakote changed to 500mg Q12 today.  Repeat CBC tomorrow.  Prolonged QT interval  EKG on 25 showed atrial flutter with QTc of 649.  Repeat EKG obtained on 2/10 showing NSR with QTc of 399.  Psychiatry consulted and reviewing medications.  Avoid QT prolonging medications as able.  Obtain repeat EKG as needed.    VTE Pharmacologic Prophylaxis:   Pharmacologic: Enoxaparin (Lovenox)  Mechanical VTE Prophylaxis in Place: Yes - sequential compression devices.    Current Length of Stay: 4 day(s)    Current Patient Status: Inpatient Rehab     Discharge Plan: As per primary team.    Code Status: Level 1 - Full Code    Subjective:   Pt examined while pt sitting in WC in pt room.  Currently has no complaints.  States that she slept well.  Had a shower with OT this morning but did not really want to have a shower.  Denies any fevers, chills, cough, SOB, or abdominal pain.  LBM was yesterday.    Objective:     Vitals:   Temp (24hrs), Av.5 °F (36.4 °C), Min:97.3 °F (36.3 °C), Max:97.9 °F (36.6 °C)    Temp:  [97.3 °F (36.3 °C)-97.9 °F (36.6 °C)] 97.4 °F (36.3 °C)  HR:  [] 87  Resp:  [18] 18  BP: ()/(53-82) 92/53  SpO2:  [95 %-96 %] 96 %  Body mass index is 33.23 kg/m².     Review of Systems   Constitutional:  Negative for appetite change, chills, fatigue and fever.   HENT:  Negative for trouble swallowing.    Eyes:  Negative for visual disturbance.   Respiratory:  Negative for cough, shortness of breath, wheezing and stridor.    Cardiovascular:  Negative for chest pain, palpitations and leg swelling.    Gastrointestinal:  Negative for abdominal distention, abdominal pain, constipation, diarrhea, nausea and vomiting.        LBM 2/11   Genitourinary:  Negative for difficulty urinating.   Musculoskeletal:  Negative for arthralgias, back pain and gait problem.   Neurological:  Negative for dizziness, weakness, light-headedness, numbness and headaches.   Psychiatric/Behavioral:  Negative for dysphoric mood and sleep disturbance. The patient is not nervous/anxious.    All other systems reviewed and are negative.       Input and Output Summary (last 24 hours):       Intake/Output Summary (Last 24 hours) at 2/12/2025 1006  Last data filed at 2/11/2025 1715  Gross per 24 hour   Intake 920 ml   Output --   Net 920 ml       Physical Exam:     Physical Exam  Vitals and nursing note reviewed.   Constitutional:       General: She is not in acute distress.     Appearance: Normal appearance. She is obese. She is not ill-appearing.   HENT:      Head: Normocephalic and atraumatic.   Cardiovascular:      Rate and Rhythm: Normal rate and regular rhythm.      Pulses: Normal pulses.      Heart sounds: Normal heart sounds. No murmur heard.     No friction rub.   Pulmonary:      Effort: Pulmonary effort is normal. No respiratory distress.      Breath sounds: Normal breath sounds. No wheezing or rhonchi.   Abdominal:      General: Abdomen is flat. Bowel sounds are normal. There is no distension.      Palpations: Abdomen is soft. There is no mass.      Tenderness: There is no abdominal tenderness. There is no guarding or rebound.      Hernia: No hernia is present.   Musculoskeletal:      Cervical back: Normal range of motion and neck supple. No tenderness.      Right lower leg: Edema (Minimal non-pitting edema) present.      Left lower leg: Edema (Minimal non-pitting edema) present.   Skin:     General: Skin is warm and dry.      Capillary Refill: Capillary refill takes less than 2 seconds.   Neurological:      Mental Status: She is alert  and oriented to person, place, and time.   Psychiatric:         Mood and Affect: Mood normal. Affect is flat.         Behavior: Behavior normal.         Additional Data:     Labs:    Results from last 7 days   Lab Units 02/12/25  0540   WBC Thousand/uL 3.03*   HEMOGLOBIN g/dL 10.2*   HEMATOCRIT % 32.1*   PLATELETS Thousands/uL 259   SEGS PCT % 21*   LYMPHO PCT % 65*   MONO PCT % 10   EOS PCT % 3     Results from last 7 days   Lab Units 02/10/25  0950   SODIUM mmol/L 139   POTASSIUM mmol/L 4.0   CHLORIDE mmol/L 99   CO2 mmol/L 32   BUN mg/dL 13   CREATININE mg/dL 0.90   ANION GAP mmol/L 8   CALCIUM mg/dL 10.2   ALBUMIN g/dL 3.9   TOTAL BILIRUBIN mg/dL 0.37   ALK PHOS U/L 34   ALT U/L 27   AST U/L 22   GLUCOSE RANDOM mg/dL 81                       Labs reviewed    Imaging:    Imaging reviewed    Recent Cultures (last 7 days):           Last 24 Hours Medication List:   Current Facility-Administered Medications   Medication Dose Route Frequency Provider Last Rate    acetaminophen  975 mg Oral Q6H PRN Tia Benz MD      aluminum-magnesium hydroxide-simethicone  30 mL Oral Q6H PRN Tia Benz MD      benztropine  2 mg Oral BID Tia Benz MD      cyanocobalamin  1,000 mcg Oral Daily Tia Benz MD      divalproex sodium  500 mg Oral Q12H [facility] Taurus Mcnamara DO      enoxaparin  40 mg Subcutaneous Daily Tia Benz MD      ipratropium-albuterol  3 mL Nebulization Q8H PRN Tia Benz MD      lacosamide  150 mg Oral Daily Tia Benz MD      lacosamide  200 mg Oral HS Tia Benz MD      levothyroxine  175 mcg Oral Early Morning Tia Benz MD      melatonin  6 mg Oral HS Tia Benz MD      nystatin   Topical BID Tia Benz MD      OLANZapine  10 mg Oral HS Tia Benz MD      ondansetron  4 mg Intravenous Q6H PRN Tia Benz MD      polyethylene glycol  17 g Oral Daily PRN Tia Benz MD      senna-docusate sodium   2 tablet Oral BID Tia Benz MD          M*Modal software was used to dictate this note.  It may contain errors with dictating incorrect words or incorrect spelling. Please contact the provider directly with any questions.

## 2025-02-12 NOTE — ASSESSMENT & PLAN NOTE
"Per chart, last inpatient psychiatric hospitalization in May 2024. Pt follows with outpatient psychiatry at [place]. LOV 10/09/24.  PTA was on the following psychotropic medications: olanzapine 10 mg q HS, paliperidone 6 mg qd (which had been decreased from 9 mg), Cogentin 2 mg PO BID  Per chart pt appears to have been started on standing Cogentin d/t hx of EPS including oculogyric s/sx and dystonia during last psychiatric hospitalization     Currently, pt does not present as acutely decompensated and does not demonstrate any indication for inpatient psychiatric hospitalization. Today pt denying AVH, SI/HI, intent, or plan. Reports improvement in sleep. All other ROS unremarkable.     Plan  Continue down-taper of Depakote; pt to receive 500 mg q12h today. See under \"Neutropenia.\"  Plan to decrease evening dose to 250 mg tomorrow.   Discontinued paliperidone 6 mg qd 2/2 neutropenia.  Continue current dose of olanzapine 10 mg q HS; re-assess as clinical course develops.  Continue melatonin 6 mg PO q HS for insomnia.   Does not meet criteria for inpatient psychiatric hospitalization at this time.  "

## 2025-02-12 NOTE — ASSESSMENT & PLAN NOTE
>>ASSESSMENT AND PLAN FOR INTELLECTUAL DISABILITY WRITTEN ON 2/12/2025  3:40 PM BY KEVON CASTILLO DO    H/o intellectual disability per chart review

## 2025-02-12 NOTE — PLAN OF CARE
Problem: METABOLIC, FLUID AND ELECTROLYTES - ADULT  Goal: Fluid balance maintained  Description: INTERVENTIONS:  - Monitor labs   - Monitor I/O and WT  - Instruct patient on fluid and nutrition as appropriate  - Assess for signs & symptoms of volume excess or deficit  2/12/2025 1112 by Sharonda Chavira LPN  Outcome: Progressing  2/12/2025 1112 by Sharonda Chavira LPN  Outcome: Progressing     Problem: Nutrition/Hydration-ADULT  Goal: Nutrient/Hydration intake appropriate for improving, restoring or maintaining nutritional needs  Description: Monitor and assess patient's nutrition/hydration status for malnutrition. Collaborate with interdisciplinary team and initiate plan and interventions as ordered.  Monitor patient's weight and dietary intake as ordered or per policy. Utilize nutrition screening tool and intervene as necessary. Determine patient's food preferences and provide high-protein, high-caloric foods as appropriate.     INTERVENTIONS:  - Monitor oral intake, urinary output, labs, and treatment plans  - Assess nutrition and hydration status and recommend course of action  - Evaluate amount of meals eaten  - Assist patient with eating if necessary   - Allow adequate time for meals  - Recommend/ encourage appropriate diets, oral nutritional supplements, and vitamin/mineral supplements  - Order, calculate, and assess calorie counts as needed  - Recommend, monitor, and adjust tube feedings and TPN/PPN based on assessed needs  - Assess need for intravenous fluids  - Provide specific nutrition/hydration education as appropriate  - Include patient/family/caregiver in decisions related to nutrition  Outcome: Progressing

## 2025-02-12 NOTE — ASSESSMENT & PLAN NOTE
Presented on 1/19/25 with respiratory failure related to Influenza A  Intubated 1/21/25 - 1/30/25.  Required HFNC  Weaning down from oxygen.  Now on RA in AM   Improving    PLAN   Continue IS, OPEP, and pulmonary toileting  Aspiration precautions, deep breathing  Monitor pulmonary status while in [facility]C  Has been stable on RA   Follow-up with Pulmonary as outpatient

## 2025-02-13 PROBLEM — Z78.9 IMPAIRED MOBILITY AND ADLS: Status: ACTIVE | Noted: 2025-02-13

## 2025-02-13 PROBLEM — Z74.09 IMPAIRED MOBILITY AND ADLS: Status: ACTIVE | Noted: 2025-02-13

## 2025-02-13 LAB
ANION GAP SERPL CALCULATED.3IONS-SCNC: 7 MMOL/L (ref 4–13)
BASOPHILS # BLD AUTO: 0.02 THOUSANDS/ΜL (ref 0–0.1)
BASOPHILS NFR BLD AUTO: 1 % (ref 0–1)
BUN SERPL-MCNC: 11 MG/DL (ref 5–25)
CALCIUM SERPL-MCNC: 9.6 MG/DL (ref 8.4–10.2)
CHLORIDE SERPL-SCNC: 99 MMOL/L (ref 96–108)
CO2 SERPL-SCNC: 34 MMOL/L (ref 21–32)
CREAT SERPL-MCNC: 0.78 MG/DL (ref 0.6–1.3)
EOSINOPHIL # BLD AUTO: 0.08 THOUSAND/ΜL (ref 0–0.61)
EOSINOPHIL NFR BLD AUTO: 2 % (ref 0–6)
ERYTHROCYTE [DISTWIDTH] IN BLOOD BY AUTOMATED COUNT: 14.3 % (ref 11.6–15.1)
GFR SERPL CREATININE-BSD FRML MDRD: 87 ML/MIN/1.73SQ M
GLUCOSE P FAST SERPL-MCNC: 84 MG/DL (ref 65–99)
GLUCOSE SERPL-MCNC: 84 MG/DL (ref 65–140)
HCT VFR BLD AUTO: 31.8 % (ref 34.8–46.1)
HGB BLD-MCNC: 10.1 G/DL (ref 11.5–15.4)
IMM GRANULOCYTES # BLD AUTO: 0.01 THOUSAND/UL (ref 0–0.2)
IMM GRANULOCYTES NFR BLD AUTO: 0 % (ref 0–2)
LYMPHOCYTES # BLD AUTO: 2.01 THOUSANDS/ΜL (ref 0.6–4.47)
LYMPHOCYTES NFR BLD AUTO: 60 % (ref 14–44)
MCH RBC QN AUTO: 32.2 PG (ref 26.8–34.3)
MCHC RBC AUTO-ENTMCNC: 31.8 G/DL (ref 31.4–37.4)
MCV RBC AUTO: 101 FL (ref 82–98)
MONOCYTES # BLD AUTO: 0.32 THOUSAND/ΜL (ref 0.17–1.22)
MONOCYTES NFR BLD AUTO: 10 % (ref 4–12)
NEUTROPHILS # BLD AUTO: 0.91 THOUSANDS/ΜL (ref 1.85–7.62)
NEUTS SEG NFR BLD AUTO: 27 % (ref 43–75)
NRBC BLD AUTO-RTO: 0 /100 WBCS
PLATELET # BLD AUTO: 245 THOUSANDS/UL (ref 149–390)
PMV BLD AUTO: 9.7 FL (ref 8.9–12.7)
POTASSIUM SERPL-SCNC: 4 MMOL/L (ref 3.5–5.3)
RBC # BLD AUTO: 3.14 MILLION/UL (ref 3.81–5.12)
SODIUM SERPL-SCNC: 140 MMOL/L (ref 135–147)
WBC # BLD AUTO: 3.35 THOUSAND/UL (ref 4.31–10.16)

## 2025-02-13 PROCEDURE — 85025 COMPLETE CBC W/AUTO DIFF WBC: CPT

## 2025-02-13 PROCEDURE — 80048 BASIC METABOLIC PNL TOTAL CA: CPT | Performed by: NURSE PRACTITIONER

## 2025-02-13 PROCEDURE — 99232 SBSQ HOSP IP/OBS MODERATE 35: CPT | Performed by: INTERNAL MEDICINE

## 2025-02-13 PROCEDURE — 97110 THERAPEUTIC EXERCISES: CPT

## 2025-02-13 PROCEDURE — 97530 THERAPEUTIC ACTIVITIES: CPT

## 2025-02-13 PROCEDURE — 99232 SBSQ HOSP IP/OBS MODERATE 35: CPT | Performed by: PSYCHIATRY & NEUROLOGY

## 2025-02-13 PROCEDURE — 97116 GAIT TRAINING THERAPY: CPT

## 2025-02-13 PROCEDURE — 92526 ORAL FUNCTION THERAPY: CPT

## 2025-02-13 RX ORDER — DIVALPROEX SODIUM 250 MG/1
250 TABLET, DELAYED RELEASE ORAL EVERY 12 HOURS SCHEDULED
Status: DISCONTINUED | OUTPATIENT
Start: 2025-02-13 | End: 2025-02-17 | Stop reason: HOSPADM

## 2025-02-13 RX ADMIN — SENNOSIDES AND DOCUSATE SODIUM 2 TABLET: 50; 8.6 TABLET ORAL at 08:02

## 2025-02-13 RX ADMIN — OLANZAPINE 10 MG: 10 TABLET, FILM COATED ORAL at 21:39

## 2025-02-13 RX ADMIN — NYSTATIN: 100000 POWDER TOPICAL at 21:41

## 2025-02-13 RX ADMIN — BENZTROPINE MESYLATE 2 MG: 2 TABLET ORAL at 17:02

## 2025-02-13 RX ADMIN — DIVALPROEX SODIUM 500 MG: 500 TABLET, DELAYED RELEASE ORAL at 08:02

## 2025-02-13 RX ADMIN — DIVALPROEX SODIUM 250 MG: 250 TABLET, DELAYED RELEASE ORAL at 21:40

## 2025-02-13 RX ADMIN — Medication 1000 MCG: at 08:02

## 2025-02-13 RX ADMIN — NYSTATIN: 100000 POWDER TOPICAL at 08:09

## 2025-02-13 RX ADMIN — BENZTROPINE MESYLATE 2 MG: 2 TABLET ORAL at 08:02

## 2025-02-13 RX ADMIN — ENOXAPARIN SODIUM 40 MG: 40 INJECTION SUBCUTANEOUS at 08:01

## 2025-02-13 RX ADMIN — LEVOTHYROXINE SODIUM 175 MCG: 0.1 TABLET ORAL at 05:06

## 2025-02-13 RX ADMIN — LACOSAMIDE 150 MG: 50 TABLET, FILM COATED ORAL at 08:01

## 2025-02-13 RX ADMIN — SENNOSIDES AND DOCUSATE SODIUM 2 TABLET: 50; 8.6 TABLET ORAL at 17:01

## 2025-02-13 RX ADMIN — Medication 6 MG: at 21:39

## 2025-02-13 RX ADMIN — LACOSAMIDE 200 MG: 100 TABLET, FILM COATED ORAL at 21:39

## 2025-02-13 NOTE — PROGRESS NOTES
Progress Note - Behavioral Health   Name: Esperanza Prajapati 54 y.o. female I MRN: [id number]  Unit/Bed#: -01 I Date of Admission: 2/8/2025   Date of Service: 2/13/2025 I Hospital Day: 5    Assessment & Plan  Neutropenia (HCC)  Lab work 2/10 showed WBC 1.97 (down from 3.27 on 2/6) with ANC of 0.6 (down from 7.71 on 2/1)  Pt did recently have influenza A and was hospitalized, completed course of Tamiflu, also received cephalosporins and azithromycin for PNA  Numerous medications could be contributing, including VPA, olanzapine, paliperidone, lacosamide, as well as recent ABX use  Afebrile, VS stable  VPA trough level 99   This morning: ANC 0.91, WBC 3.35.    Plan  Continue down-taper of Depakote; decrease evening dose to 250 mg tonight (for total of 750 mg today).   Per Neurology consult, no changes had been made to AEDs.  However, per their recommendations, reached out to outpatient epileptologist Dr. Cevallos as LOV note states VPA had been prescribed for mood, and to consider consolidating AED to lacosamide monotherapy if Psychiatry is in agreement with discontinuation/decrease of VPA.  Considering risks of neutropenia and overall clinical picture, both teams are in agreement with discontinuation of VPA.   If concerns for breakthrough seizures, can increase dose of lacosamide.  Of note, pt is on olanzapine, which can confer mood stability, so its dose can be adjusted should the need arise.  Discontinued paliperidone 6 mg qd.   Pt received last dose 2/10 at 0852.  Repeat CBC w differential in AM  Maintain neutropenic precautions.  Continue to monitor VS closely  Schizoaffective disorder (HCC)  Per chart, last inpatient psychiatric hospitalization in May 2024. Pt follows with outpatient psychiatry at [place]. LOV 10/09/24.  PTA was on the following psychotropic medications: olanzapine 10 mg q HS, paliperidone 6 mg qd (which had been decreased from 9 mg), Cogentin 2 mg PO BID  Per chart pt appears to  "have been started on standing Cogentin d/t hx of EPS including oculogyric s/sx and dystonia during last psychiatric hospitalization     Currently, pt does not present as acutely decompensated and does not demonstrate any indication for inpatient psychiatric hospitalization. Today pt denying AVH, SI/HI, intent, or plan. Reports improvement in sleep. All other ROS unremarkable.     Plan  Continue down-taper of Depakote; decrease evening dose to 250 mg tonight (for total of 750 mg today). See under \"Neutropenia.\"  Discontinued paliperidone 6 mg qd 2/2 neutropenia.  Continue current dose of olanzapine 10 mg q HS; re-assess as clinical course develops.  Continue melatonin 6 mg PO q HS for insomnia.   Does not meet criteria for inpatient psychiatric hospitalization at this time.  Focal epilepsy (HCC)  Follows with Neurology, PTA AED regimen: lacosamide 150 mg daily AM and 200 mg q HS, Depakote 750 mg q12h.   Continue down-taper of Depakote; decrease evening dose to 250 mg tonight (for total of 750 mg today). See under \"Neutropenia.\"  Continue PTA lacosamide regimen.  Per outpatient epileptologist, if concerns for breakthrough seizures, can increase dose of lacosamide.  Intellectual disability  H/o intellectual disability per chart review  Acquired hypothyroidism  S/p thyroidectomy 2/2 papillary thyroid carcinoma  Consider repeat TSH/free T4  PTA: levothyroxine 175 mcg  Management per Primary  Oropharyngeal dysphagia  SLP on board, maintain pureed diet, advance as tolerated/recommended per SLP.   Able to tolerate PO medications thus far.   Acute respiratory failure with hypoxia (HCC)  Hospitalized 1/19/25 with respiratory failure 2/2 influenza A, requiring intubation from 1/21-1/30, and HFNC   Now stable on room air  Management per Primary      SUBJECTIVE     Patient seen and evaluated at bedside this morning. No acute events overnight. She is pleasant and calm on approach. She reports feeling \"good\" and denies any " "concerns at the moment. She reports sleeping approximately nine hours and feels well-rested. She denies any discomfort from the decrease in dose of her Depakote.  Denies any auditory or visual hallucinations. Denies any SI/HI, intent, or plan.    Psychiatric Review of Systems:  Behavior over the last 24 hours: unchanged  Sleep: improving  Appetite: adequate  Medication side effects: denies  ROS: Complete review of systems is negative except as noted above.    OBJECTIVE     Vital signs in last 24 hours:  Temp:  [96.2 °F (35.7 °C)-97.3 °F (36.3 °C)] 96.2 °F (35.7 °C)  HR:  [82-84] 82  BP: ()/(55-72) 98/55  Resp:  [16-17] 16  SpO2:  [92 %-95 %] 92 %  O2 Device: None (Room air)    Mental Status Evaluation:  Appearance:  Overtly  female, dressed in casual attire, sitting upright in bed   Behavior:  Calm, cooperative, psychomotor slowing, fair eye contact   Speech:  Soft, delayed   Mood:  \"I'm good.\"   Affect:  constricted, brightens appropriately    Thought Process:  slowing of thoughts, concrete   Thought Content:  No delusions verbalized.   Perceptual Disturbances: Denies current AVH. Appears internally preoccupied, but does not appear to be responding to internal stimuli.    Risk Potential: Denies SI/HI, intent, or plan.   Potential for aggression: not at time of this encounter    Sensorium:  oriented to person, place, and time/date   Consciousness:  awake   Attention/Concentration: attention span and concentration appear shorter than expected for age   Insight:  fair   Judgment: fair   Muscle Strength Muscle Tone: Not formally assessed at this encounter.   Gait/Station: slow gait   Motor Activity: Resting tremor observed in right upper extremity and head.        ACTIVE MEDICATIONS     Current Medications:  Current Facility-Administered Medications   Medication Dose Route Frequency Provider Last Rate    acetaminophen  975 mg Oral Q6H PRN Tia Benz MD      aluminum-magnesium " hydroxide-simethicone  30 mL Oral Q6H PRN Tia Benz MD      benztropine  2 mg Oral BID Tia Benz MD      cyanocobalamin  1,000 mcg Oral Daily Tia Benz MD      divalproex sodium  500 mg Oral Q12H MOSHE Mcnamara DO      enoxaparin  40 mg Subcutaneous Daily Tia Benz MD      ipratropium-albuterol  3 mL Nebulization Q8H PRN Tia Benz MD      lacosamide  150 mg Oral Daily Tia Benz MD      lacosamide  200 mg Oral HS Tia Benz MD      levothyroxine  175 mcg Oral Early Morning Tia Benz MD      melatonin  6 mg Oral HS Tia Benz MD      nystatin   Topical BID Tia Benz MD      OLANZapine  10 mg Oral HS Tia Benz MD      ondansetron  4 mg Intravenous Q6H PRN Tia Benz MD      polyethylene glycol  17 g Oral Daily PRN Tia Benz MD      senna-docusate sodium  2 tablet Oral BID Tia Benz MD         Behavioral Health Medications: I have personally reviewed all current active medications. Changes as in plan section above.      ADDITIONAL DATA     EKG Results: I have personally reviewed pertinent reports.  Results for orders placed or performed during the hospital encounter of 02/08/25 (from the past 1000 hours)   ECG 12 lead    Collection Time: 02/10/25  2:55 PM   Result Value    Ventricular Rate 95    Atrial Rate 95    NE Interval 168    QRSD Interval 76    QT Interval 332    QTC Interval 418    P Axis 61    QRS Axis 21    T Wave Axis 123    Narrative    Normal sinus rhythm  Nonspecific T wave abnormality  Abnormal ECG  When compared with ECG of 10-Feb-2025 14:52, (unconfirmed)  No significant change was found  Confirmed by Joselito Wellington ([id number]) on 2/10/2025 4:48:43 PM     *Note: Due to a large number of results and/or encounters for the requested time period, some results have not been displayed. A complete set of results can be found in Results Review.       Radiology Results: I have  personally reviewed pertinent reports. I have personally reviewed pertinent films in PACS.  No results found.  No Chest XR results available for this patient.     Laboratory Results: I have personally reviewed all pertinent laboratory/tests results.  Recent Results (from the past 48 hours)   CBC and differential    Collection Time: 02/12/25  5:40 AM   Result Value Ref Range    WBC 3.03 (L) 4.31 - 10.16 Thousand/uL    RBC 3.10 (L) 3.81 - 5.12 Million/uL    Hemoglobin 10.2 (L) 11.5 - 15.4 g/dL    Hematocrit 32.1 (L) 34.8 - 46.1 %     (H) 82 - 98 fL    MCH 32.9 26.8 - 34.3 pg    MCHC 31.8 31.4 - 37.4 g/dL    RDW 14.3 11.6 - 15.1 %    MPV 9.4 8.9 - 12.7 fL    Platelets 259 149 - 390 Thousands/uL    nRBC 0 /100 WBCs    Segmented % 21 (L) 43 - 75 %    Immature Grans % 0 0 - 2 %    Lymphocytes % 65 (H) 14 - 44 %    Monocytes % 10 4 - 12 %    Eosinophils Relative 3 0 - 6 %    Basophils Relative 1 0 - 1 %    Absolute Neutrophils 0.62 (L) 1.85 - 7.62 Thousands/µL    Absolute Immature Grans 0.01 0.00 - 0.20 Thousand/uL    Absolute Lymphocytes 1.98 0.60 - 4.47 Thousands/µL    Absolute Monocytes 0.31 0.17 - 1.22 Thousand/µL    Eosinophils Absolute 0.08 0.00 - 0.61 Thousand/µL    Basophils Absolute 0.03 0.00 - 0.10 Thousands/µL   CBC and differential    Collection Time: 02/13/25  4:58 AM   Result Value Ref Range    WBC 3.35 (L) 4.31 - 10.16 Thousand/uL    RBC 3.14 (L) 3.81 - 5.12 Million/uL    Hemoglobin 10.1 (L) 11.5 - 15.4 g/dL    Hematocrit 31.8 (L) 34.8 - 46.1 %     (H) 82 - 98 fL    MCH 32.2 26.8 - 34.3 pg    MCHC 31.8 31.4 - 37.4 g/dL    RDW 14.3 11.6 - 15.1 %    MPV 9.7 8.9 - 12.7 fL    Platelets 245 149 - 390 Thousands/uL    nRBC 0 /100 WBCs    Segmented % 27 (L) 43 - 75 %    Immature Grans % 0 0 - 2 %    Lymphocytes % 60 (H) 14 - 44 %    Monocytes % 10 4 - 12 %    Eosinophils Relative 2 0 - 6 %    Basophils Relative 1 0 - 1 %    Absolute Neutrophils 0.91 (L) 1.85 - 7.62 Thousands/µL    Absolute Immature  Grans 0.01 0.00 - 0.20 Thousand/uL    Absolute Lymphocytes 2.01 0.60 - 4.47 Thousands/µL    Absolute Monocytes 0.32 0.17 - 1.22 Thousand/µL    Eosinophils Absolute 0.08 0.00 - 0.61 Thousand/µL    Basophils Absolute 0.02 0.00 - 0.10 Thousands/µL   Basic metabolic panel    Collection Time: 02/13/25  4:58 AM   Result Value Ref Range    Sodium 140 135 - 147 mmol/L    Potassium 4.0 3.5 - 5.3 mmol/L    Chloride 99 96 - 108 mmol/L    CO2 34 (H) 21 - 32 mmol/L    ANION GAP 7 4 - 13 mmol/L    BUN 11 5 - 25 mg/dL    Creatinine 0.78 0.60 - 1.30 mg/dL    Glucose 84 65 - 140 mg/dL    Glucose, Fasting 84 65 - 99 mg/dL    Calcium 9.6 8.4 - 10.2 mg/dL    eGFR 87 ml/min/1.73sq m        This note was not shared with the patient due to reasonable likelihood of causing patient harm        Taurus Mcnamara DO  Psychiatry Resident, PGY-I  Department of Psychiatry and Behavioral Health  [facility]     This note was completed in part utilizing voice recognizing software. Grammatical, translation, syntax errors, random word insertions, spelling mistakes, and incomplete sentences may be an occasional consequence of this system secondary to software limitations with voice recognition, ambient noise, and hardware issues. If you have any questions or concerns about the content, text, or information contained within the body of this dictation, please contact the provider for clarification.

## 2025-02-13 NOTE — ASSESSMENT & PLAN NOTE
Continue home levothyroxine 175mcg daily.  Last TSH 3.323 on 1/19.   HPI: Patient is a 24 year old male who presents with complaints of cough, congestion, body aches, chills that started yesterday and got worse today.  He denies any recorded fevers, chest pain, lightheadedness, dizziness, nausea, vomiting, diarrhea or any other associated symptoms.  He has been doing well otherwise.    Patient was seen and examined wearing full PPE          ROS   Review of system as per HPI      There is no problem list on file for this patient.       PAST MEDICAL HX:  None            Family history was reviewed and was noncontributory to the presenting illness.    Current Outpatient Medications   Medication Sig    benzonatate (TESSALON PERLES) 200 MG capsule Take 1 capsule by mouth 3 times daily as needed for Cough.     No current facility-administered medications for this visit.       Vitals:    04/01/24 0923   BP: 119/71   Pulse: 72   Resp: 18   Temp: 97.9 °F (36.6 °C)   SpO2: 98%   Weight: (!) 145.2 kg (320 lb)   Height: 6' 3\" (1.905 m)   PainSc: 7    PainLoc: Head         Physical Exam  HENT:      Head: Normocephalic and atraumatic.      Right Ear: Tympanic membrane, ear canal and external ear normal.      Left Ear: Tympanic membrane, ear canal and external ear normal.      Mouth/Throat:      Pharynx: Oropharynx is clear.   Eyes:      Conjunctiva/sclera: Conjunctivae normal.   Cardiovascular:      Rate and Rhythm: Normal rate and regular rhythm.      Heart sounds: Normal heart sounds.   Pulmonary:      Effort: Pulmonary effort is normal. No respiratory distress.      Breath sounds: Normal breath sounds.   Abdominal:      General: There is no distension.   Musculoskeletal:         General: Normal range of motion.      Cervical back: Normal range of motion.   Skin:     Findings: No rash.   Neurological:      Mental Status: He is alert.   Psychiatric:         Mood and Affect: Mood and affect normal.            Results for orders placed or performed in visit on 04/01/24   POCT Flu, Influenza, Rapid  A/B   Result Value Ref Range    Rapid Influenza A Ag Negative Negative    Rapid Influenza B Ag Negative Negative    Internal Procedural Controls Acceptable Yes Yes    TEST LOT NUMBER [id number]     TEST LOT EXPIRATION DATE 6,252,025    POCT SARS-COV-2 ANTIGEN   Result Value Ref Range    POCT SARS-COV-2 ANTIGEN Not Detected Not Detected    Internal Procedural Controls Acceptable Yes Yes    TEST LOT NUMBER [id number]     TEST LOT EXPIRATION DATE 7,222,024                 MDM:     Multiple differential diagnoses were considered including: URI, viral syndrome, bronchitis, pneumonia, sinusitis     Laboratory results as above were independently reviewed and interpreted by myself.     Appearance exam seems to be consistent with a viral syndrome.  Rapid flu and COVID were negative.  He otherwise appears in no distress.  He does not appear septic or toxic.  Aggressive hydration along with Motrin and Tylenol recommended.  Cough medicines prescribed.  He is otherwise ambulating immediate care without any difficulty.  They are advised to follow-up with PCP in 3 to 5 days for recheck.  They were advised to go to the ED if symptoms worsen.  Patient verbalized understanding.        ED Diagnoses       Diagnosis Comment Associated Orders       Final diagnoses    Flu-like symptoms -- POCT INFLUENZA A/B  POCT SARS-COV-2 ANTIGEN      Acute cough -- BENZONATATE 200 MG PO CAPS               New Prescriptions    BENZONATATE (TESSALON PERLES) 200 MG CAPSULE    Take 1 capsule by mouth 3 times daily as needed for Cough.         Disposition: Home

## 2025-02-13 NOTE — PROGRESS NOTES
"   02/13/25 0700   Pain Assessment   Pain Assessment Tool 0-10   Pain Score No Pain   Restrictions/Precautions   Precautions Aspiration;Bed/chair alarms;Cognitive;Fall Risk;Seizure;Supervision on toilet/commode  (neutropenic precautions)   Lifestyle   Autonomy \"No, I'm good\"   Grooming   Findings Offered grooming tasks to pt, she declines reporting that they have already been completed this AM   Sit to Stand   Type of Assistance Needed Supervision   Comment no AD   Sit to Stand CARE Score 4   Exercise Tools   Other Exercise Tool 1 seated and standing UE TE with 3# DB 2x15 to cont to inc fx strength. pt tolerated well with rest break in between for improved ind w/ ADL and mobility   Cognition   Overall Cognitive Status Impaired   Arousal/Participation Alert;Cooperative   Activity Tolerance   Activity Tolerance Patient tolerated treatment well   Assessment   Treatment Assessment Brief OT session focusing on UE TE at bedside. Pt in recliner upon OT arrival. Advised at this time for pt to remain in room 2' neutropenic precautions. Pt participated well, reporting nursing staff assisted her w/ all desired ADL tasks prior to session. This is verified by PCA. OT to see pt later this AM for additional OT session. Per yesterdays team meeting and phone call w/ pts MALIK Crawford to be completed Monday w/  and then followed by SD home. Pt reporting near baseline function currently. OT to continue POC.   Prognosis Good   Problem List Decreased strength;Decreased range of motion;Decreased endurance;Impaired balance;Decreased mobility;Decreased coordination;Decreased cognition;Impaired judgement;Decreased safety awareness   Plan   Treatment/Interventions ADL retraining;Functional transfer training;Therapeutic exercise;Endurance training;Cognitive reorientation;Patient/family training;Equipment eval/education;Compensatory technique education;Continued evaluation;Spoke to nursing   Progress Improving as expected   OT Therapy " Minutes   OT Time In 0700   OT Time Out 0830   OT Total Time (minutes) 90   OT Mode of treatment - Individual (minutes) 90   OT Mode of treatment - Concurrent (minutes) 0   OT Mode of treatment - Group (minutes) 0   OT Mode of treatment - Co-treat (minutes) 0   OT Mode of Treatment - Total time(minutes) 90 minutes   OT Cumulative Minutes 510   Therapy Time missed   Time missed? No

## 2025-02-13 NOTE — ASSESSMENT & PLAN NOTE
Patient WBC 1.97 2/10; neutrophils 0.6 --> 3.03 WBC 0.62 neutrophil 2/12 --> 0.91 2/13   Neutropenic precautions   Psych consult for medication adjustment   C/t monitor w/ routine blood work

## 2025-02-13 NOTE — PROGRESS NOTES
"   02/13/25 1100   Pain Assessment   Pain Assessment Tool 0-10   Pain Score No Pain   Restrictions/Precautions   Precautions Aspiration;Bed/chair alarms;Cognitive;Fall Risk;Seizure;Supervision on toilet/commode  (neutropenic precautions)   Lifestyle   Autonomy \"I'll go walk\"   Sit to Stand   Type of Assistance Needed Supervision   Comment no AD   Sit to Stand CARE Score 4   Functional Standing Tolerance   Comments 1# wrist weights applied to BUE pt in stance for 5min 18s completing card sort board w/ CS. Pts BUE tremors which are generally mild appeared to inc w/ wrist weight applied. Tolerated well and completed activity w/o error   Transfers   Additional Comments fxnl mobility around unit at CS no LOB, tolerates longer distaces well, dec speed but this is likely baseline   Exercise Tools   UE Ergometer UE ergometer on min resistance 4min prograde x3. Tolerated well, SpO2 97-98%, HR 90s. Completed to inc activity tolerance and UE strength/endurance.   Other Exercise Tool 2 for inc IND w/ shower transfers at DC pt trialed no AD step onto 4\" block 20x at mostly CGA level but one LOB to L requiring Adolfo for balance recovery   Cognition   Overall Cognitive Status Impaired   Arousal/Participation Alert;Cooperative   Attention Within functional limits   Following Commands Follows one step commands without difficulty   Activity Tolerance   Activity Tolerance Patient tolerated treatment well   Assessment   Treatment Assessment OT session focusing on standing tolerance, dynamic balance tasks, mobility trials, UE TE. Pt tolerated second session this date well. Continues to progress towards goals, pt reports she feels as though she is at her baseline. Pt does understand basic english and is conversational. largely w/ flat affect but this also appears to be her baseline. OT to continue POC at this time.   Problem List Decreased strength;Decreased range of motion;Decreased endurance;Impaired balance;Decreased mobility;Decreased " coordination;Decreased cognition;Impaired judgement;Decreased safety awareness   Plan   Treatment/Interventions ADL retraining;Functional transfer training;Therapeutic exercise;Endurance training;Cognitive reorientation;Patient/family training;Equipment eval/education;Compensatory technique education;Continued evaluation   Progress Improving as expected   OT Therapy Minutes   OT Time In 1100   OT Time Out 1200   OT Total Time (minutes) 60   OT Mode of treatment - Individual (minutes) 60   OT Mode of treatment - Concurrent (minutes) 0   OT Mode of treatment - Group (minutes) 0   OT Mode of treatment - Co-treat (minutes) 0   OT Mode of Treatment - Total time(minutes) 60 minutes   OT Cumulative Minutes 570   Therapy Time missed   Time missed? No

## 2025-02-13 NOTE — PROGRESS NOTES
02/13/25 1230   Eating   Type of Assistance Needed Supervision;Verbal cues   Physical Assistance Level No physical assistance   Eating CARE Score 4   Swallow Assessment   Swallow Treatment Assessment   Daily Dysphagia Tx Note     Patient Name: Esperanza Prajapati    Today's Date: 2/13/2025      Current Risks for Dysphagia & Aspiration: recent intubation, prolonged intubation, respiratory compromise, weak cough, general debilitation, new neuro event, cognitive deficit, mental status change, reduced alertness, positioning issues      Current Symptoms/Concerns: cough with food and liquids, during and after meal, holding food in mouth, recent pneumonia     Current diet:puree/level 1 diet and thin liquids      Premorbid diet::regular diet and thin liquids     Positioning: upright in recliner    Items administered:Consistencies Administered: thin liquids, mechanical soft solids, soft solids, and hard solids  Materials administered included: rough chopped broccoli, peanut butter and jelly sandwich, canned pears, ice cream and thins by cup/straw    Total amount of meal consumed:   100% of meal and 360cc of thins      Summary:     Pt presenting with minimal-mild oral and minimal pharyngeal dysphagia today. Symptoms or concerns included the following observations: lip seal around utensils, finger foods, cup and straw was noted to be functional to where no anterior loss of textures observed. As for mastication given soft/solids, pt was noted to demonstrate mildly prolonged chewing to where overall pacing was good until consumption of canned pears. Pt w/ more rapid intake rate w/ decreased bolus formulation but eventually cleared w/ increased time and pacing cues. No significant oral residual was observed across textures. It did appear that bolus control was fair functional BUT pt was noted to be impulsive in sip size where pt was taking multiple successive sips given cup sips. Improvement in pacing w/ straw sips as pt  was noted to take single sips. Swallow initiation was minimally delayed w/ soft/solids > liquids. But then HLE occurred, palpation appeared functional. Pt sis have minimal wetness in voicing near end of meal but cleared w/ throat clear when prompted by SLP.         Recommendations:  Diet: dysphagia level 2/mechanical soft and thin liquids  Meds: as best tolerated  Strategies: upright posture, only feed when fully alert, slow rate of feeding, small bites/sips, quiet environment (tv off, limit talking, door closed, etc.), alternating bites and sips, and OOB for meals     Pt is cleared to have her canned ravioli IF they are cut into smaller pieces and with full supervision-needs cues to slow down.     FULL supervision w/ meals.  Results reviewed with:  patient, RN  Aspiration precautions posted.     F/u ST tx: Pt is currently recommended for further skilled SLP services targeting dysphagia therapy in order to maximize oral and pharyngeal swallow skills, while safely supporting PO intake, as well as to improve independent carryover of safe swallow strategies.       Plan:      Monitor current diet of dysphagia level 2/mechanical soft diet and thins     FULL supervision with meals with cues to slow down     Continue trials of diet upgrades     Family training scheduled for 2/17 at 9am for ST w/ meal to review diet recommendations and swallow strategies   Swallow Assessment Prognosis   Prognosis Good   Prognosis Considerations Age;Co-morbidities;Family/community support;Medical diagnosis;Medical prognosis;Severity of impairments;New learning ability;Ability to carry over   SLP Therapy Minutes   SLP Time In 1230   SLP Time Out 1300   SLP Total Time (minutes) 30   SLP Mode of treatment - Individual (minutes) 30   SLP Mode of treatment - Concurrent (minutes) 0   SLP Mode of treatment - Group (minutes) 0   SLP Mode of treatment - Co-treat (minutes) 0   SLP Mode of Treatment - Total time(minutes) 30 minutes   SLP Cumulative  Minutes 130   Therapy Time missed   Time missed? No

## 2025-02-13 NOTE — PLAN OF CARE
Problem: INFECTION - ADULT  Goal: Absence of fever/infection during neutropenic period  Description: INTERVENTIONS:  - Monitor WBC    Outcome: Progressing     Problem: MOBILITY - ADULT  Goal: Maintain or return to baseline ADL function  Description: INTERVENTIONS:  -  Assess patient's ability to carry out ADLs; assess patient's baseline for ADL function and identify physical deficits which impact ability to perform ADLs (bathing, care of mouth/teeth, toileting, grooming, dressing, etc.)  - Assess/evaluate cause of self-care deficits   - Assess range of motion  - Assess patient's mobility; develop plan if impaired  - Assess patient's need for assistive devices and provide as appropriate  - Encourage maximum independence but intervene and supervise when necessary  - Involve family in performance of ADLs  - Assess for home care needs following discharge   - Consider OT consult to assist with ADL evaluation and planning for discharge  - Provide patient education as appropriate  Outcome: Progressing     Problem: Potential for Falls  Goal: Patient will remain free of falls  Description: INTERVENTIONS:  - Educate patient/family on patient safety including physical limitations  - Instruct patient to call for assistance with activity   - Consult OT/PT to assist with strengthening/mobility   - Keep Call bell within reach  - Keep bed low and locked with side rails adjusted as appropriate  - Keep care items and personal belongings within reach  - Initiate and maintain comfort rounds  - Make Fall Risk Sign visible to staff  - Offer Toileting every 2 Hours, in advance of need  - Initiate/Maintain bed and chair alarm  - Obtain necessary fall risk management equipment: call light within reach, side rails up and functioning, bed at lowest position.  - Apply yellow socks and bracelet for high fall risk patients  - Consider moving patient to room near nurses station  Outcome: Progressing

## 2025-02-13 NOTE — ASSESSMENT & PLAN NOTE
Patient was evaluated by the rehabilitation team MD and an appropriate candidate for acute inpatient rehabilitation program at this time.  The patient will tolerate 3 hours/day 5 to 7 days/week of intensive physical, occupational and speech therapy in order to obtain goals for community discharge  Due to the patient's functional Compared to their baseline level of function in addition to their ongoing medical needs, the patient would benefit from daily supervision from a rehabilitation physician as well as rehabilitation nursing to implement and adjust the medical as well as functional plan of care in order to meet the patient's goals.  DC 2/17

## 2025-02-13 NOTE — ASSESSMENT & PLAN NOTE
Lab work 2/10 showed WBC 1.97 (down from 3.27 on 2/6) with ANC of 0.6 (down from 7.71 on 2/1)  Pt did recently have influenza A and was hospitalized, completed course of Tamiflu, also received cephalosporins and azithromycin for PNA  Numerous medications could be contributing, including VPA, olanzapine, paliperidone, lacosamide, as well as recent ABX use  Afebrile, VS stable  VPA trough level 99   This morning: ANC 0.91, WBC 3.35.    Plan  Continue down-taper of Depakote; decrease evening dose to 250 mg tonight (for total of 750 mg today).   Per Neurology consult, no changes had been made to AEDs.  However, per their recommendations, reached out to outpatient epileptologist Dr. Cevallos as LOV note states VPA had been prescribed for mood, and to consider consolidating AED to lacosamide monotherapy if Psychiatry is in agreement with discontinuation/decrease of VPA.  Considering risks of neutropenia and overall clinical picture, both teams are in agreement with discontinuation of VPA.   If concerns for breakthrough seizures, can increase dose of lacosamide.  Of note, pt is on olanzapine, which can confer mood stability, so its dose can be adjusted should the need arise.  Discontinued paliperidone 6 mg qd.   Pt received last dose 2/10 at 0852.  Repeat CBC w differential in AM  Maintain neutropenic precautions.  Continue to monitor VS closely

## 2025-02-13 NOTE — PROGRESS NOTES
Progress Note - Internal Medicine   Name: Esperanza Prajapati 54 y.o. female I MRN: [id number]  Unit/Bed#: -01 I Date of Admission: 2/8/2025   Date of Service: 2/13/2025 I Hospital Day: 5    Assessment & Plan  Schizoaffective disorder (HCC)  Home regimen: Zyprexa 10mg at HS, Invega 6mg daily, trazodone 50mg at HS, and Cogentin 2mg BID.  Currently receiving Zyprexa 10mg at HS and Cogentin 2mg BID.  Psychiatry consulted due to concerns for non-compliance and new neutropenia.  Invega discontinued on 2/10.    Follows with outpatient Psychiatry.  Acquired hypothyroidism  Continue home levothyroxine 175mcg daily.  Last TSH 3.323 on 1/19.  Focal epilepsy (HCC)  Home regimen: Vimpat 150mg in AM and 200mg in PM and Depakote 750mg Q12.  Discussion between Neuro and Psych - Depakote was initiated for mood stabilization - ok to decrease dose per Psych discretion.  Depakote decreased to 500mg Q12 on 2/12.  Follows with Dr. Cevallos (Neurology) as outpatient.    Concerns in regards to medication non-compliance.  Has been seizure free for years.  Depakote level 99 on 2/10.  Intellectual disability  Concerns for medication non-compliance.  Considerations with dispo planning.  Influenza A  Influenza A+ on 1/19.  Received 5 days of Tamiflu.  Currently asymptomatic.  Acute respiratory failure with hypoxia (HCC)  Presented on 1/19 with altered mental status and hypoxia.  Influenza A + on 1/19.  Received 5 doses of Tamiflu.  Received IV cefepime and azithromycin for possible pneumonia.  Required intubation 10 days.  Last CXR on 1/31 showed unchanged bibasilar opacities, atelectasis vs pneumonia.  Repeat CXR as needed.  Currently maintaining on RA.  Continue spot pulse ox checks.  Pulmonary toileting, IS use.  Insomnia  Started on melatonin 6mg at HS.  Oropharyngeal dysphagia  Continue pureed diet with thins.  Speech consulted and following.  Aspiration precautions.  Neutropenia (HCC)  WBC count currently 3.35 from 3.03.   Absolute neutrophils improving, 0.91.  Continue neutropenic precautions.  Likely due to medications.  Had been non-compliant at home and was restarted on medications in acute setting.  Psychiatry consulted - discontinued Invega on 2/10.  Recommending Neurology consult.  Neurology consulted - discussed with Psych that Depakote was started for mood stabilization rather than seizures - ok to decrease/discontinue per Psych.  Depakote changed to 500mg Q12 on .  Repeat CBC tomorrow.  Prolonged QT interval  EKG on 25 showed atrial flutter with QTc of 649.  Repeat EKG obtained on 2/10 showing NSR with QTc of 399.  Psychiatry consulted and reviewing medications.  Avoid QT prolonging medications as able.  Obtain repeat EKG as needed.    VTE Pharmacologic Prophylaxis:   Pharmacologic: Enoxaparin (Lovenox)  Mechanical VTE Prophylaxis in Place: Yes - sequential compression devices.    Current Length of Stay: 5 day(s)    Current Patient Status: Inpatient Rehab     Discharge Plan: As per primary team.    Code Status: Level 1 - Full Code    Subjective:   Pt examined while pt sitting in recliner in pt room.  Slept well last night.  Currently has no complaints besides feeling a little tired after therapy this morning.      Objective:     Vitals:   Temp (24hrs), Av.9 °F (36.1 °C), Min:96.2 °F (35.7 °C), Max:97.3 °F (36.3 °C)    Temp:  [96.2 °F (35.7 °C)-97.3 °F (36.3 °C)] 96.2 °F (35.7 °C)  HR:  [82-84] 82  Resp:  [16-17] 16  BP: ()/(55-72) 98/55  SpO2:  [92 %-95 %] 92 %  Body mass index is 33.23 kg/m².     Review of Systems   Constitutional:  Positive for fatigue. Negative for appetite change, chills and fever.   HENT:  Negative for trouble swallowing.    Eyes:  Negative for visual disturbance.   Respiratory:  Negative for cough, shortness of breath, wheezing and stridor.    Cardiovascular:  Negative for chest pain, palpitations and leg swelling.   Gastrointestinal:  Negative for abdominal distention, abdominal  pain, constipation, diarrhea, nausea and vomiting.        LBM 2/11   Genitourinary:  Negative for difficulty urinating.   Musculoskeletal:  Negative for arthralgias, back pain and gait problem.   Neurological:  Negative for dizziness, weakness, light-headedness, numbness and headaches.   Psychiatric/Behavioral:  Negative for dysphoric mood and sleep disturbance. The patient is not nervous/anxious.    All other systems reviewed and are negative.       Input and Output Summary (last 24 hours):       Intake/Output Summary (Last 24 hours) at 2/13/2025 0956  Last data filed at 2/12/2025 1700  Gross per 24 hour   Intake 1020 ml   Output --   Net 1020 ml       Physical Exam:     Physical Exam  Vitals and nursing note reviewed.   Constitutional:       General: She is not in acute distress.     Appearance: Normal appearance. She is obese. She is not ill-appearing.   HENT:      Head: Normocephalic and atraumatic.   Cardiovascular:      Rate and Rhythm: Normal rate and regular rhythm.      Pulses: Normal pulses.      Heart sounds: Normal heart sounds. No murmur heard.     No friction rub.   Pulmonary:      Effort: Pulmonary effort is normal. No respiratory distress.      Breath sounds: Normal breath sounds. No wheezing or rhonchi.   Abdominal:      General: Abdomen is flat. Bowel sounds are normal. There is no distension.      Palpations: Abdomen is soft. There is no mass.      Tenderness: There is no abdominal tenderness. There is no guarding or rebound.      Hernia: No hernia is present.   Musculoskeletal:      Cervical back: Normal range of motion and neck supple.      Right lower leg: Edema (Trace non-pitting edema) present.      Left lower leg: Edema (Trace non-pitting edema) present.   Skin:     General: Skin is warm and dry.   Neurological:      Mental Status: She is alert and oriented to person, place, and time.   Psychiatric:         Mood and Affect: Mood normal.         Behavior: Behavior normal.         Additional  Data:     Labs:    Results from last 7 days   Lab Units 02/13/25  0458   WBC Thousand/uL 3.35*   HEMOGLOBIN g/dL 10.1*   HEMATOCRIT % 31.8*   PLATELETS Thousands/uL 245   SEGS PCT % 27*   LYMPHO PCT % 60*   MONO PCT % 10   EOS PCT % 2     Results from last 7 days   Lab Units 02/13/25  0458 02/10/25  0950   SODIUM mmol/L 140 139   POTASSIUM mmol/L 4.0 4.0   CHLORIDE mmol/L 99 99   CO2 mmol/L 34* 32   BUN mg/dL 11 13   CREATININE mg/dL 0.78 0.90   ANION GAP mmol/L 7 8   CALCIUM mg/dL 9.6 10.2   ALBUMIN g/dL  --  3.9   TOTAL BILIRUBIN mg/dL  --  0.37   ALK PHOS U/L  --  34   ALT U/L  --  27   AST U/L  --  22   GLUCOSE RANDOM mg/dL 84 81                       Labs reviewed    Imaging:    Imaging reviewed    Recent Cultures (last 7 days):           Last 24 Hours Medication List:   Current Facility-Administered Medications   Medication Dose Route Frequency Provider Last Rate    acetaminophen  975 mg Oral Q6H PRN Tia Benz MD      aluminum-magnesium hydroxide-simethicone  30 mL Oral Q6H PRN Tia Benz MD      benztropine  2 mg Oral BID Tia Benz MD      cyanocobalamin  1,000 mcg Oral Daily Tia Benz MD      divalproex sodium  250 mg Oral Q12H [facility] Taurus Mcnamara DO      enoxaparin  40 mg Subcutaneous Daily Tia Benz MD      ipratropium-albuterol  3 mL Nebulization Q8H PRN Tia Benz MD      lacosamide  150 mg Oral Daily Tia Benz MD      lacosamide  200 mg Oral HS Tia Benz MD      levothyroxine  175 mcg Oral Early Morning Tia Benz MD      melatonin  6 mg Oral HS Tia Benz MD      nystatin   Topical BID Tia Benz MD      OLANZapine  10 mg Oral HS Tia Benz MD      ondansetron  4 mg Intravenous Q6H PRN Tia Benz MD      polyethylene glycol  17 g Oral Daily PRN Tia Benz MD      senna-docusate sodium  2 tablet Oral BID Tia Benz MD          M*Modal software was used to dictate this  note.  It may contain errors with dictating incorrect words or incorrect spelling. Please contact the provider directly with any questions.

## 2025-02-13 NOTE — PLAN OF CARE
Problem: SAFETY ADULT  Goal: Patient will remain free of falls  Description: INTERVENTIONS:  - Educate patient/family on patient safety including physical limitations  - Instruct patient to call for assistance with activity   - Consult OT/PT to assist with strengthening/mobility   - Keep Call bell within reach  - Keep bed low and locked with side rails adjusted as appropriate  - Keep care items and personal belongings within reach  - Initiate and maintain comfort rounds  - Make Fall Risk Sign visible to staff  - Offer Toileting every   Hours, in advance of need  - Initiate/Maintain alarm  - Obtain necessary fall risk management equipment:   - Apply yellow socks and bracelet for high fall risk patients  - Consider moving patient to room near nurses station  Outcome: Progressing

## 2025-02-13 NOTE — ASSESSMENT & PLAN NOTE
>>ASSESSMENT AND PLAN FOR INTELLECTUAL DISABILITY WRITTEN ON 2/13/2025  9:04 AM BY KEVON CASTILLO DO    H/o intellectual disability per chart review

## 2025-02-13 NOTE — ASSESSMENT & PLAN NOTE
Home regimen: Vimpat 150mg in AM and 200mg in PM and Depakote 750mg Q12.  Discussion between Neuro and Psych - Depakote was initiated for mood stabilization - ok to decrease dose per Psych discretion.  Depakote decreased to 500mg Q12 on 2/12.  Follows with Dr. Cevallos (Neurology) as outpatient.    Concerns in regards to medication non-compliance.  Has been seizure free for years.  Depakote level 99 on 2/10.

## 2025-02-13 NOTE — CASE MANAGEMENT
2/12/25 CM emailed application form and step by step instructions for waiver application to [email or web address], as requested.

## 2025-02-13 NOTE — ASSESSMENT & PLAN NOTE
>>ASSESSMENT AND PLAN FOR INTELLECTUAL DISABILITY WRITTEN ON 2/13/2025  9:57 AM BY BRINA HOLLY    Concerns for medication non-compliance.  Considerations with dispo planning.

## 2025-02-13 NOTE — ASSESSMENT & PLAN NOTE
"Follows with Neurology, PTA AED regimen: lacosamide 150 mg daily AM and 200 mg q HS, Depakote 750 mg q12h.   Continue down-taper of Depakote; decrease evening dose to 250 mg tonight (for total of 750 mg today). See under \"Neutropenia.\"  Continue PTA lacosamide regimen.  Per outpatient epileptologist, if concerns for breakthrough seizures, can increase dose of lacosamide.  "

## 2025-02-13 NOTE — ASSESSMENT & PLAN NOTE
WBC count currently 3.35 from 3.03.  Absolute neutrophils improving, 0.91.  Continue neutropenic precautions.  Likely due to medications.  Had been non-compliant at home and was restarted on medications in acute setting.  Psychiatry consulted - discontinued Invega on 2/10.  Recommending Neurology consult.  Neurology consulted - discussed with Psych that Depakote was started for mood stabilization rather than seizures - ok to decrease/discontinue per Psych.  Depakote changed to 500mg Q12 on 2/12.  Repeat CBC tomorrow.

## 2025-02-13 NOTE — ASSESSMENT & PLAN NOTE
"Per chart, last inpatient psychiatric hospitalization in May 2024. Pt follows with outpatient psychiatry at [facility]. LOV 10/09/24.  PTA was on the following psychotropic medications: olanzapine 10 mg q HS, paliperidone 6 mg qd (which had been decreased from 9 mg), Cogentin 2 mg PO BID  Per chart pt appears to have been started on standing Cogentin d/t hx of EPS including oculogyric s/sx and dystonia during last psychiatric hospitalization     Currently, pt does not present as acutely decompensated and does not demonstrate any indication for inpatient psychiatric hospitalization. Today pt denying AVH, SI/HI, intent, or plan. Reports improvement in sleep. All other ROS unremarkable.     Plan  Continue down-taper of Depakote; decrease evening dose to 250 mg tonight (for total of 750 mg today). See under \"Neutropenia.\"  Discontinued paliperidone 6 mg qd 2/2 neutropenia.  Continue current dose of olanzapine 10 mg q HS; re-assess as clinical course develops.  Continue melatonin 6 mg PO q HS for insomnia.   Does not meet criteria for inpatient psychiatric hospitalization at this time.  "

## 2025-02-13 NOTE — PROGRESS NOTES
Progress Note - PMR   Name: Esperanza Prajapati 54 y.o. female I MRN: [id number]  Unit/Bed#: [facility] 269-01 I Date of Admission: 2/8/2025   Date of Service: 2/13/2025 I Hospital Day: 5     Assessment & Plan  Acute respiratory failure with hypoxia (HCC)  Presented on 1/19/25 with respiratory failure related to Influenza A  Intubated 1/21/25 - 1/30/25.  Required HFNC  Weaning down from oxygen.  Now on RA in AM   Improving    PLAN   Continue IS, OPEP, and pulmonary toileting  Aspiration precautions, deep breathing  Monitor pulmonary status while in ARC  Has been stable on RA   Follow-up with Pulmonary as outpatient  Influenza A  Presented on 1/19/25 with respiratory failure  +Influenza A  s/p Tamiflu treatment completed  Has been stable on RA   Acquired hypothyroidism  Acquired after total thyroidectomy 2/2 papillary thyroid carcinoma in 2013  Continue Synthroid 175 mcg daily  Focal epilepsy (HCC)  Chronic seizure disorder  Continue chronic AED regimen: Lacosamide 150 mg QAM, Lacosamide 200 mg QHS, Depakote 750 mg Q 12 hours  F.u depakote lvl: 99  Per Neurology consult neutropenia not likely to be 2/2 to lacosamide or depakote   Per psych Titrating depakote as it was mainly used for mood rather than epilepsy   Schizoaffective disorder (HCC)  Stable  Continue chronic medications: Cogentin 2 mg BID, Zyprexa 10 mg QHS, Invega 6 mg QAM.    Psych consulted 2/10 given neutropenia for medication adjustment; invega has been dc 2/10  F.u depakote lvl: 99   Titrating depakote per psych   Hypernatremia  Resolved  139 2/10  Pneumonia of both lungs due to infectious organism  Possible bacterial PNA.   S/p treatment with Azithromycin  Continue supportive care  Continues on oxygen 0-2L; stable on RA   Insomnia  Continue Melatonin 6 mg QHS  At risk for deep venous thrombosis  Continue Lovenox 40 mg daily.    Oropharyngeal dysphagia  Currently on pureed diet with thins--> inc to mechanical soft and thin   SLP to follow  Hopeful to  upgrade based on recent notes.   Neutropenia (HCC)  Patient WBC 1.97 2/10; neutrophils 0.6 --> 3.03 WBC 0.62 neutrophil 2/12 --> 0.91 2/13   Neutropenic precautions   Psych consult for medication adjustment   C/t monitor w/ routine blood work   Intellectual disability  Patient will need assistance w/ medications and iADLs once discharged   Will need to communicate with family to see what assistance is available   Prolonged QT interval  EKG 1/25 aflutter w/ ; repeat EKG 2/10   Avoid QT prolong agents as possible   Repeat EKG as necessary   Impaired mobility and ADLs  Patient was evaluated by the rehabilitation team MD and an appropriate candidate for acute inpatient rehabilitation program at this time.  The patient will tolerate 3 hours/day 5 to 7 days/week of intensive physical, occupational and speech therapy in order to obtain goals for community discharge  Due to the patient's functional Compared to their baseline level of function in addition to their ongoing medical needs, the patient would benefit from daily supervision from a rehabilitation physician as well as rehabilitation nursing to implement and adjust the medical as well as functional plan of care in order to meet the patient's goals.  DC 2/17     History of Present Illness   Esperanza Prajapati is a 54 y.o. female with past medical history significant for epileps/seizure disorder, schizoaffective disorder, cognitive deficits, history of papillary thyroid carcinoma s/p total thyroidectomy who presented to the [facility] 1/19/25 with acute respiratory failure.  +Influenza A.  Intubated 1/21/25 - 1/30/25.  Brochoscopy 1/24/25.  Completed Tamiflu treatment.  Patient with superimposed bacterial PNA.  Treated with Azithromyocin.  Followed closely by Pulmonary.  Needing intermittent oxygen, but has weaned to RA.    Medical course complicated by dysphagia.  Patient seen by SLP and placed on a pureed diet with thin  liquids.    After medical stabilization, patient found to have acute functional deficits from baseline in mobilty, self care, swallow, therefore admitted to [facility] for acute inpatient rehabiltiation.    Chief Complaint: f/u ambulatory dysfunction    Interval: Patient seen and examined in recliner. No events overnight.  Reports overall feeling well. Last BM 2/11. Sleeping well at night. Reported that she wanted to get back in bed. Denies any f/c/n/v, CP, SOB, abdominal pain, constipation, or diarrhea.       Objective   Functional Update:  Physical Therapy Occupational Therapy Speech Therapy   Weight Bearing Status: Full Weight Bearing  Transfers: Supervision  Bed Mobility: Supervision  Amulation Distance (ft): 250 feet  Ambulation: Supervision  Assistive Device for Ambulation:  (none)  Number of Stairs: 12  Assistive Device for Stairs: Lehft Hand Rail  Stair Assistance: Supervision  Ramp: Supervision  Assistive Device for Ramp: None  Discharge Recommendations: Home with:  DC Home with:: Family Support   Eating: Supervision  Grooming: Supervision  Bathing: Incidental Touching  Bathing: Incidental Touching  Upper Body Dressing:  (setup)  Lower Body Dressing: Minimal Assistance  Toileting: Incidental Touching  Tub/Shower Transfer: Minimal Assistance  Toilet Transfer: Incidental Touching  Cognition: Exceptions to WNL  Cognition: Decreased Memory, Decreased Safety, Decreased Executive Functions, Behavioral Considerations, Decreased Attention, Decreased Comprehension  Orientation: Person, Place, Time, Situation   Mode of Communication: Verbal  Cognition: Exceptions to WNL (baseline cognitive linguistic deficits)     Swallowing: Exceptions to WNL  Swallowing: Oral Dysphagia, Pharyngeal Dysphagia, Aspiration Risk, Reflux Precautions  Diet Recommendations: Level 1/Puree, Thin  Discharge Recommendations: Home with:  DC Home with:: 24 Hour Supervision, 24 Hour Assisteance, Family Support, Outpatient Speech Therapy          Temp:  [96.2 °F (35.7 °C)-97.3 °F (36.3 °C)] 96.2 °F (35.7 °C)  HR:  [82-84] 82  Resp:  [16-17] 16  BP: ()/(55-72) 98/55  SpO2:  [92 %-95 %] 92 %    Physical Exam    Gen: No acute distress, obese  HEENT: Moist mucus membranes, Normocephalic/Atraumatic  Cardiovascular: Regular rate, rhythm,  Heme/Extr: No edema  Pulmonary: Non-labored breathing. On RA   : No patel  GI:  non-distended. BS+  MSK: ROM is WFL in all extremities.   Integumentary: Skin is warm, dry. .  Neuro: Speech is intact. Appropriate to questioning.  Psych: flat affect       Scheduled Meds:  Current Facility-Administered Medications   Medication Dose Route Frequency Provider Last Rate    acetaminophen  975 mg Oral Q6H PRN Tia Benz MD      aluminum-magnesium hydroxide-simethicone  30 mL Oral Q6H PRN Tia Benz MD      benztropine  2 mg Oral BID Tia Benz MD      cyanocobalamin  1,000 mcg Oral Daily Tia Benz MD      divalproex sodium  500 mg Oral Q12H MOSHE Mcnamara DO      enoxaparin  40 mg Subcutaneous Daily Tia Benz MD      ipratropium-albuterol  3 mL Nebulization Q8H PRN Tia Benz MD      lacosamide  150 mg Oral Daily Tia Benz MD      lacosamide  200 mg Oral HS Tia Benz MD      levothyroxine  175 mcg Oral Early Morning Tia Benz MD      melatonin  6 mg Oral HS Tia Benz MD      nystatin   Topical BID Tia Benz MD      OLANZapine  10 mg Oral HS Tia Benz MD      ondansetron  4 mg Intravenous Q6H PRN Tia Benz MD      polyethylene glycol  17 g Oral Daily PRN Tia Benz MD      senna-docusate sodium  2 tablet Oral BID Tia Benz MD           Lab Results: I have reviewed the following results:  Results from last 7 days   Lab Units 02/13/25  0458 02/12/25  0540 02/11/25  0516   HEMOGLOBIN g/dL 10.1* 10.2* 10.3*   HEMATOCRIT % 31.8* 32.1* 31.9*   WBC Thousand/uL 3.35* 3.03* 2.75*    PLATELETS Thousands/uL 245 259 287     Results from last 7 days   Lab Units 02/13/25  0458 02/10/25  0950   BUN mg/dL 11 13   SODIUM mmol/L 140 139   POTASSIUM mmol/L 4.0 4.0   CHLORIDE mmol/L 99 99   CREATININE mg/dL 0.78 0.90   AST U/L  --  22   ALT U/L  --  27              Rosi Olsen MD   Physical Medicine and Rehabilitation   02/13/25    I have spent a total time of 38 minutes in caring for this patient on the day of the visit/encounter including Counseling / Coordination of care, Documenting in the medical record, Reviewing / ordering tests, medicine, procedures  , and Communicating with other healthcare professionals .

## 2025-02-13 NOTE — PROGRESS NOTES
02/13/25 1430   Pain Assessment   Pain Assessment Tool 0-10   Pain Score No Pain   Restrictions/Precautions   Precautions Aspiration;Bed/chair alarms;Cognitive;Fall Risk;Seizure;Supervision on toilet/commode   General   Change In Medical/Functional Status +neutropenic precautions - pt to mask, staff to mask with pt, wash hands.   Cognition   Overall Cognitive Status Impaired   Arousal/Participation Alert;Cooperative   Attention Within functional limits   Orientation Level Oriented X4   Memory Decreased short term memory;Decreased recall of recent events;Decreased recall of precautions   Following Commands Follows one step commands without difficulty   Comments flat affect, cooperative   Subjective   Subjective denies complaints, agreeable to PT   Sit to Stand   Type of Assistance Needed Supervision   Physical Assistance Level No physical assistance   Sit to Stand CARE Score 4   Bed-Chair Transfer   Type of Assistance Needed Supervision   Physical Assistance Level No physical assistance   Comment no AD   Chair/Bed-to-Chair Transfer CARE Score 4   Car Transfer   Type of Assistance Needed Supervision   Physical Assistance Level No physical assistance   Car Transfer CARE Score 4   Walk 10 Feet   Type of Assistance Needed Supervision   Physical Assistance Level No physical assistance   Walk 10 Feet CARE Score 4   Walk 50 Feet with Two Turns   Type of Assistance Needed Supervision   Walk 50 Feet with Two Turns CARE Score 4   Walk 150 Feet   Type of Assistance Needed Supervision   Walk 150 Feet CARE Score 4   Walking 10 Feet on Uneven Surfaces   Type of Assistance Needed Supervision   Walking 10 Feet on Uneven Surfaces CARE Score 4   Ambulation   Primary Mode of Locomotion Prior to Admission Walk   Distance Walked (feet) 650 ft   Walk Assist Level Supervision   Findings no AD, WBOS near mod I/baseline.   Does the patient walk? 2. Yes   Curb or Single Stair   Style negotiated Curb   Type of Assistance Needed Incidental  touching   Physical Assistance Level No physical assistance   Comment 8inc curb with R wall support ascending and descending.   1 Step (Curb) CARE Score 4   4 Steps   Type of Assistance Needed Supervision   Physical Assistance Level No physical assistance   Comment +reciprocally ascending ,non recip descend.   4 Steps CARE Score 4   12 Steps   Type of Assistance Needed Supervision   Physical Assistance Level No physical assistance   12 Steps CARE Score 4   Picking Up Object   Type of Assistance Needed Supervision   Physical Assistance Level No physical assistance   Comment no AD x10 cubes from floor.   Picking Up Object CARE Score 4   Toilet Transfer   Type of Assistance Needed Supervision   Physical Assistance Level No physical assistance   Comment S no AD standard toilet.   Toilet Transfer CARE Score 4   Toilet Transfer   Findings S for toileting, pant mgmt, and hygiene   Therapeutic Interventions   Balance unsupported ball kick x3 min, placed and remoaved 10 small cubes on top of cones around gym.   Equipment Use   NuStep L2 x10 min B UE/LE   Assessment   Treatment Assessment Pt engaged in 60 min skilled PT intervention, demonstrating near mod I mobility without AD. DC delayed until monday 2/17 due to need for medical f/u  and pts sisters to has FT wtih ST. Discussed with OT potential for IRPs over weekend, no AD. Denies SOB with activity, HR did inc 112 with steps, in the 90s baseline. Will have family support at MD.   Plan   Progress Improving as expected   PT Therapy Minutes   PT Time In 1430   PT Time Out 1530   PT Total Time (minutes) 60   PT Mode of treatment - Individual (minutes) 60   PT Mode of treatment - Concurrent (minutes) 0   PT Mode of treatment - Group (minutes) 0   PT Mode of treatment - Co-treat (minutes) 0   PT Mode of Treatment - Total time(minutes) 60 minutes   PT Cumulative Minutes 325   Therapy Time missed   Time missed? No

## 2025-02-13 NOTE — ASSESSMENT & PLAN NOTE
>>ASSESSMENT AND PLAN FOR INTELLECTUAL DISABILITY WRITTEN ON 2/13/2025  9:29 AM BY JOSE NIÑO MD    Patient will need assistance w/ medications and iADLs once discharged   Will need to communicate with family to see what assistance is available

## 2025-02-14 LAB
BASOPHILS # BLD AUTO: 0.02 THOUSANDS/ΜL (ref 0–0.1)
BASOPHILS NFR BLD AUTO: 1 % (ref 0–1)
EOSINOPHIL # BLD AUTO: 0.1 THOUSAND/ΜL (ref 0–0.61)
EOSINOPHIL NFR BLD AUTO: 3 % (ref 0–6)
ERYTHROCYTE [DISTWIDTH] IN BLOOD BY AUTOMATED COUNT: 14.2 % (ref 11.6–15.1)
HCT VFR BLD AUTO: 34 % (ref 34.8–46.1)
HGB BLD-MCNC: 10.8 G/DL (ref 11.5–15.4)
IMM GRANULOCYTES # BLD AUTO: 0.01 THOUSAND/UL (ref 0–0.2)
IMM GRANULOCYTES NFR BLD AUTO: 0 % (ref 0–2)
LYMPHOCYTES # BLD AUTO: 2.05 THOUSANDS/ΜL (ref 0.6–4.47)
LYMPHOCYTES NFR BLD AUTO: 62 % (ref 14–44)
MCH RBC QN AUTO: 32.7 PG (ref 26.8–34.3)
MCHC RBC AUTO-ENTMCNC: 31.8 G/DL (ref 31.4–37.4)
MCV RBC AUTO: 103 FL (ref 82–98)
MONOCYTES # BLD AUTO: 0.35 THOUSAND/ΜL (ref 0.17–1.22)
MONOCYTES NFR BLD AUTO: 11 % (ref 4–12)
NEUTROPHILS # BLD AUTO: 0.77 THOUSANDS/ΜL (ref 1.85–7.62)
NEUTS SEG NFR BLD AUTO: 23 % (ref 43–75)
NRBC BLD AUTO-RTO: 0 /100 WBCS
PLATELET # BLD AUTO: 243 THOUSANDS/UL (ref 149–390)
PMV BLD AUTO: 9.8 FL (ref 8.9–12.7)
RBC # BLD AUTO: 3.3 MILLION/UL (ref 3.81–5.12)
WBC # BLD AUTO: 3.3 THOUSAND/UL (ref 4.31–10.16)

## 2025-02-14 PROCEDURE — 97530 THERAPEUTIC ACTIVITIES: CPT

## 2025-02-14 PROCEDURE — 97535 SELF CARE MNGMENT TRAINING: CPT

## 2025-02-14 PROCEDURE — 85025 COMPLETE CBC W/AUTO DIFF WBC: CPT

## 2025-02-14 PROCEDURE — 97110 THERAPEUTIC EXERCISES: CPT

## 2025-02-14 PROCEDURE — 99232 SBSQ HOSP IP/OBS MODERATE 35: CPT | Performed by: PSYCHIATRY & NEUROLOGY

## 2025-02-14 PROCEDURE — 99232 SBSQ HOSP IP/OBS MODERATE 35: CPT | Performed by: INTERNAL MEDICINE

## 2025-02-14 PROCEDURE — 92526 ORAL FUNCTION THERAPY: CPT

## 2025-02-14 RX ORDER — LACOSAMIDE 200 MG/1
200 TABLET ORAL
Qty: 10 TABLET | Refills: 0 | Status: SHIPPED | OUTPATIENT
Start: 2025-02-14 | End: 2025-02-24

## 2025-02-14 RX ORDER — LACOSAMIDE 150 MG/1
150 TABLET ORAL DAILY
Qty: 10 TABLET | Refills: 0 | Status: SHIPPED | OUTPATIENT
Start: 2025-02-14 | End: 2025-02-24

## 2025-02-14 RX ORDER — TRAZODONE HYDROCHLORIDE 50 MG/1
50 TABLET, FILM COATED ORAL
Qty: 15 TABLET | Refills: 0 | Status: SHIPPED | OUTPATIENT
Start: 2025-02-14

## 2025-02-14 RX ORDER — BENZTROPINE MESYLATE 2 MG/1
2 TABLET ORAL 2 TIMES DAILY
Qty: 60 TABLET | Refills: 0 | Status: SHIPPED | OUTPATIENT
Start: 2025-02-14

## 2025-02-14 RX ORDER — LEVOTHYROXINE SODIUM 175 UG/1
175 TABLET ORAL
Qty: 30 TABLET | Refills: 0 | Status: SHIPPED | OUTPATIENT
Start: 2025-02-15

## 2025-02-14 RX ORDER — OLANZAPINE 10 MG/1
10 TABLET ORAL
Qty: 30 TABLET | Refills: 0 | Status: SHIPPED | OUTPATIENT
Start: 2025-02-14 | End: 2025-03-16

## 2025-02-14 RX ADMIN — NYSTATIN: 100000 POWDER TOPICAL at 21:35

## 2025-02-14 RX ADMIN — Medication 6 MG: at 21:35

## 2025-02-14 RX ADMIN — SENNOSIDES AND DOCUSATE SODIUM 2 TABLET: 50; 8.6 TABLET ORAL at 08:17

## 2025-02-14 RX ADMIN — SENNOSIDES AND DOCUSATE SODIUM 2 TABLET: 50; 8.6 TABLET ORAL at 17:29

## 2025-02-14 RX ADMIN — LEVOTHYROXINE SODIUM 175 MCG: 0.1 TABLET ORAL at 05:02

## 2025-02-14 RX ADMIN — NYSTATIN: 100000 POWDER TOPICAL at 08:21

## 2025-02-14 RX ADMIN — DEXTRAN 70, GLYCERIN, HYPROMELLOSE 1 DROP: 1; 2; 3 SOLUTION/ DROPS OPHTHALMIC at 17:29

## 2025-02-14 RX ADMIN — LACOSAMIDE 200 MG: 100 TABLET, FILM COATED ORAL at 21:35

## 2025-02-14 RX ADMIN — BENZTROPINE MESYLATE 2 MG: 2 TABLET ORAL at 08:17

## 2025-02-14 RX ADMIN — DIVALPROEX SODIUM 250 MG: 250 TABLET, DELAYED RELEASE ORAL at 08:25

## 2025-02-14 RX ADMIN — ENOXAPARIN SODIUM 40 MG: 40 INJECTION SUBCUTANEOUS at 08:18

## 2025-02-14 RX ADMIN — BENZTROPINE MESYLATE 2 MG: 2 TABLET ORAL at 17:29

## 2025-02-14 RX ADMIN — OLANZAPINE 10 MG: 10 TABLET, FILM COATED ORAL at 21:35

## 2025-02-14 RX ADMIN — LACOSAMIDE 150 MG: 50 TABLET, FILM COATED ORAL at 08:16

## 2025-02-14 RX ADMIN — DEXTRAN 70, GLYCERIN, HYPROMELLOSE 1 DROP: 1; 2; 3 SOLUTION/ DROPS OPHTHALMIC at 14:17

## 2025-02-14 RX ADMIN — Medication 1000 MCG: at 08:17

## 2025-02-14 RX ADMIN — DIVALPROEX SODIUM 250 MG: 250 TABLET, DELAYED RELEASE ORAL at 21:35

## 2025-02-14 NOTE — PROGRESS NOTES
02/14/25 0830   Pain Assessment   Pain Assessment Tool 0-10   Pain Score No Pain   Restrictions/Precautions   Precautions Aspiration;Bed/chair alarms;Cognitive;Fall Risk;Impulsive;Supervision on toilet/commode   Eating   Type of Assistance Needed Set-up / clean-up;Supervision;Verbal cues   Physical Assistance Level No physical assistance   Eating CARE Score 4   Swallow Assessment   Swallow Treatment Assessment Daily Dysphagia Tx Note     Patient Name: Esperanza Prajapati    Today's Date: 2/14/2025      Current Risks for Dysphagia & Aspiration: recent intubation, prolonged intubation, respiratory compromise, weak cough, general debilitation, new neuro event, cognitive deficit, mental status change, reduced alertness, positioning issues      Current Symptoms/Concerns: cough with food and liquids, during and after meal, holding food in mouth, recent pneumonia    Current diet:mechanically altered/level 2 diet and thin liquids     Premorbid diet::regular diet and thin liquids     Positioning: upright in recliner    Items administered:Consistencies Administered: thin liquids and soft solids  Materials administered included: scrambled eggs, pancakes, canned peaches, jello and thins by straw    Total amount of meal consumed:   100% of meal and 240cc of thins      Summary:     Pt presenting with minimal-mild oral and minimal-functional pharyngeal dysphagia today. Symptoms or concerns included functional lip seal and bolus retrieval across items from meal today from utensils, despite increased tremors noted today when feeding self. No anterior loss observed across meal. Mastication was noted to be slower but overall effective in breakdown of textures. Pt demonstrating rapid intake rate today, needing verbal cues ~50% of the time throughout meal to slow down, swallow between bites. However, despite the faster intake rate, pt did manage accordingly. Pt did have minimally decreased bolus formulation given softer solids  due to faster intake rate, but no increased oral residual was observed throughout meal. Bolus control transfer of thins by straw was observed to be prompter and timely when taking both single sips and larger consecutive sips. Swallow initiation was minimally delayed w/ soft solids but prompt w/ thins. HLE present and appeared functional when palpated. No increased double swallows observed throughout meal, nor audible swallows. No overt signs/sxs of aspiration noted throughout meal today.        Recommendations:  Diet: UPGRADE to dysphagia level 3 diet and continue thin liquids  Meds: as best tolerated  Strategies: upright posture, only feed when fully alert, slow rate of feeding, small bites/sips, quiet environment (tv off, limit talking, door closed, etc.), alternating bites and sips, and OOB for meals     Pt is cleared to have her canned ravioli IF they are cut into smaller pieces and with full supervision-needs cues to slow down.     FULL supervision w/ meals.  Results reviewed with:  patient, RN- MD Radha- Dr. Olsen, PMR SYLVIE- Mariana  Aspiration precautions posted.     F/u ST tx: Pt is currently recommended for further skilled SLP services targeting dysphagia therapy in order to maximize oral and pharyngeal swallow skills, while safely supporting PO intake, as well as to improve independent carryover of safe swallow strategies.       Plan:      Monitor advancement of diet to now dysphagia level 3 diet and thins     FULL supervision with meals with cues to slow down     Continue trials of diet upgrades     Family training scheduled for 2/17 at 9am for ST w/ meal to review diet recommendations and swallow strategies   Swallow Assessment Prognosis   Prognosis Good   Prognosis Considerations Age;Co-morbidities;Family/community support;Medical prognosis;Medical diagnosis;New learning ability;Ability to carry over   SLP Therapy Minutes   SLP Time In 0830   SLP Time Out 0900   SLP Total Time (minutes) 30   SLP  Mode of treatment - Individual (minutes) 30   SLP Mode of treatment - Concurrent (minutes) 0   SLP Mode of treatment - Group (minutes) 0   SLP Mode of treatment - Co-treat (minutes) 0   SLP Mode of Treatment - Total time(minutes) 30 minutes   SLP Cumulative Minutes 160   Therapy Time missed   Time missed? No

## 2025-02-14 NOTE — PLAN OF CARE
Problem: PAIN - ADULT  Goal: Verbalizes/displays adequate comfort level or baseline comfort level  Description: Interventions:  - Encourage patient to monitor pain and request assistance  - Assess pain using appropriate pain scale  - Administer analgesics based on type and severity of pain and evaluate response  - Implement non-pharmacological measures as appropriate and evaluate response  - Consider cultural and social influences on pain and pain management  - Notify physician/advanced practitioner if interventions unsuccessful or patient reports new pain  Outcome: Progressing     Problem: SAFETY ADULT  Goal: Patient will remain free of falls  Description: INTERVENTIONS:  - Educate patient/family on patient safety including physical limitations  - Instruct patient to call for assistance with activity   - Consult OT/PT to assist with strengthening/mobility   - Keep Call bell within reach  - Keep bed low and locked with side rails adjusted as appropriate  - Keep care items and personal belongings within reach  - Initiate and maintain comfort rounds  - Make Fall Risk Sign visible to staff  - Offer Toileting  - Initiate/Maintain   - Apply yellow socks and bracelet for high fall risk patients  - Consider moving patient to room near nurses station  Outcome: Progressing

## 2025-02-14 NOTE — ASSESSMENT & PLAN NOTE
Home regimen: Vimpat 150mg in AM and 200mg in PM and Depakote 750mg Q12.  Discussion between Neuro and Psych - Depakote was initiated for mood stabilization - ok to decrease dose per Psych discretion.  Depakote decreased to 500mg Q12 on 2/12.  Decreased to 250mg Q12 on 2/13.  Follows with Dr. Cevallos (Neurology) as outpatient.    Concerns in regards to medication non-compliance.  Has been seizure free for years.  Depakote level 99 on 2/10.

## 2025-02-14 NOTE — ASSESSMENT & PLAN NOTE
>>ASSESSMENT AND PLAN FOR INTELLECTUAL DISABILITY WRITTEN ON 2/14/2025  9:47 AM BY JOSE NIÑO MD    Patient will need assistance w/ medications and iADLs once discharged   Will need to communicate with family to see what assistance is available

## 2025-02-14 NOTE — PROGRESS NOTES
Progress Note - Internal Medicine   Name: Esperanza Prajapati 54 y.o. female I MRN: [id number]  Unit/Bed#: -01 I Date of Admission: 2/8/2025   Date of Service: 2/14/2025 I Hospital Day: 6    Assessment & Plan  Schizoaffective disorder (HCC)  Home regimen: Zyprexa 10mg at HS, Invega 6mg daily, trazodone 50mg at HS, and Cogentin 2mg BID.  Currently receiving Zyprexa 10mg at HS and Cogentin 2mg BID.  Psychiatry consulted due to concerns for non-compliance and new neutropenia.  Invega discontinued on 2/10.    Follows with outpatient Psychiatry.  Acquired hypothyroidism  Continue home levothyroxine 175mcg daily.  Last TSH 3.323 on 1/19.  Focal epilepsy (HCC)  Home regimen: Vimpat 150mg in AM and 200mg in PM and Depakote 750mg Q12.  Discussion between Neuro and Psych - Depakote was initiated for mood stabilization - ok to decrease dose per Psych discretion.  Depakote decreased to 500mg Q12 on 2/12.  Decreased to 250mg Q12 on 2/13.  Follows with Dr. Cevallos (Neurology) as outpatient.    Concerns in regards to medication non-compliance.  Has been seizure free for years.  Depakote level 99 on 2/10.  Intellectual disability  Concerns for medication non-compliance.  Considerations with dispo planning.  Influenza A  Influenza A+ on 1/19.  Received 5 days of Tamiflu.  Currently asymptomatic.  Acute respiratory failure with hypoxia ([facility])  Presented on 1/19 with altered mental status and hypoxia.  Influenza A + on 1/19.  Received 5 doses of Tamiflu.  Received IV cefepime and azithromycin for possible pneumonia.  Required intubation 10 days.  Last CXR on 1/31 showed unchanged bibasilar opacities, atelectasis vs pneumonia.  Repeat CXR as needed.  Currently maintaining on RA.  Continue spot pulse ox checks.  Pulmonary toileting, IS use.  Insomnia  Started on melatonin 6mg at HS.  Oropharyngeal dysphagia  Continue pureed diet with thins.  Speech consulted and following.  Aspiration precautions.  Neutropenia (HCC)  WBC  count currently 3.30 from 3.35.  Absolute neutrophils 0.77 from 0.91.  Continue neutropenic precautions.  Likely due to medications.  Had been non-compliant at home and was restarted on medications in acute setting.  Psychiatry consulted - discontinued Invega on 2/10.  Recommending Neurology consult.  Neurology consulted - discussed with Psych that Depakote was started for mood stabilization rather than seizures - ok to decrease/discontinue per Psych.  Depakote changed to 500mg Q12 on .  Decreased to 250mg Q12 on .  Repeat CBC on Monday.  Prolonged QT interval  EKG on 25 showed atrial flutter with QTc of 649.  Repeat EKG obtained on 2/10 showing NSR with QTc of 399.  Psychiatry consulted and reviewing medications.  Avoid QT prolonging medications as able.  Obtain repeat EKG as needed.    VTE Pharmacologic Prophylaxis:   Pharmacologic: Enoxaparin (Lovenox)  Mechanical VTE Prophylaxis in Place: Yes - sequential compression devices.    Current Length of Stay: 6 day(s)    Current Patient Status: Inpatient Rehab     Discharge Plan: As per primary team.    Code Status: Level 1 - Full Code    Subjective:   Pt examined while pt sitting in recliner in pt room.  Currently has no complaints.  Slept well last night.  Has body tremors on exam but has these at baseline at home as well.  Plans for possible discharge on Monday and she states that she feels ready to go home.    Objective:     Vitals:   Temp (24hrs), Av.8 °F (36.6 °C), Min:97.7 °F (36.5 °C), Max:98 °F (36.7 °C)    Temp:  [97.7 °F (36.5 °C)-98 °F (36.7 °C)] 97.8 °F (36.6 °C)  HR:  [71-89] 89  Resp:  [16-18] 18  BP: ()/(60-70) 95/60  SpO2:  [93 %-95 %] 95 %  Body mass index is 33.23 kg/m².     Review of Systems   Constitutional:  Negative for appetite change, chills, fatigue and fever.   HENT:  Negative for trouble swallowing.    Eyes:  Negative for visual disturbance.   Respiratory:  Negative for cough, shortness of breath, wheezing and stridor.     Cardiovascular:  Negative for chest pain, palpitations and leg swelling.   Gastrointestinal:  Negative for abdominal distention, abdominal pain, constipation, diarrhea, nausea and vomiting.        LBM 2/13   Genitourinary:  Negative for difficulty urinating.   Musculoskeletal:  Negative for arthralgias, back pain and gait problem.   Neurological:  Positive for tremors (head and upper extremity tremors, chronic). Negative for dizziness, weakness, light-headedness, numbness and headaches.   Psychiatric/Behavioral:  Negative for dysphoric mood and sleep disturbance. The patient is not nervous/anxious.    All other systems reviewed and are negative.       Input and Output Summary (last 24 hours):       Intake/Output Summary (Last 24 hours) at 2/14/2025 0948  Last data filed at 2/14/2025 0855  Gross per 24 hour   Intake 960 ml   Output --   Net 960 ml       Physical Exam:     Physical Exam  Vitals and nursing note reviewed.   Constitutional:       General: She is not in acute distress.     Appearance: Normal appearance. She is obese. She is not ill-appearing.   HENT:      Head: Normocephalic and atraumatic.   Cardiovascular:      Rate and Rhythm: Normal rate and regular rhythm.      Pulses: Normal pulses.      Heart sounds: Normal heart sounds. No murmur heard.     No friction rub.   Pulmonary:      Effort: Pulmonary effort is normal. No respiratory distress.      Breath sounds: Normal breath sounds. No wheezing or rhonchi.   Abdominal:      General: Abdomen is flat. Bowel sounds are normal. There is no distension.      Palpations: Abdomen is soft. There is no mass.      Tenderness: There is no abdominal tenderness. There is no guarding or rebound.      Hernia: No hernia is present.   Musculoskeletal:      Cervical back: Normal range of motion and neck supple. No tenderness.      Right lower leg: Edema (Trace non-pitting edema) present.      Left lower leg: Edema (Trace non-pitting edema) present.   Skin:     General:  Skin is warm and dry.   Neurological:      Mental Status: She is alert and oriented to person, place, and time.   Psychiatric:         Mood and Affect: Mood normal.         Behavior: Behavior normal.         Additional Data:     Labs:    Results from last 7 days   Lab Units 02/14/25  0511   WBC Thousand/uL 3.30*   HEMOGLOBIN g/dL 10.8*   HEMATOCRIT % 34.0*   PLATELETS Thousands/uL 243   SEGS PCT % 23*   LYMPHO PCT % 62*   MONO PCT % 11   EOS PCT % 3     Results from last 7 days   Lab Units 02/13/25  0458 02/10/25  0950   SODIUM mmol/L 140 139   POTASSIUM mmol/L 4.0 4.0   CHLORIDE mmol/L 99 99   CO2 mmol/L 34* 32   BUN mg/dL 11 13   CREATININE mg/dL 0.78 0.90   ANION GAP mmol/L 7 8   CALCIUM mg/dL 9.6 10.2   ALBUMIN g/dL  --  3.9   TOTAL BILIRUBIN mg/dL  --  0.37   ALK PHOS U/L  --  34   ALT U/L  --  27   AST U/L  --  22   GLUCOSE RANDOM mg/dL 84 81                       Labs reviewed    Imaging:    Imaging reviewed    Recent Cultures (last 7 days):           Last 24 Hours Medication List:   Current Facility-Administered Medications   Medication Dose Route Frequency Provider Last Rate    acetaminophen  975 mg Oral Q6H PRN Tia Benz MD      aluminum-magnesium hydroxide-simethicone  30 mL Oral Q6H PRN Tia Benz MD      benztropine  2 mg Oral BID Tia Benz MD      cyanocobalamin  1,000 mcg Oral Daily Tia Benz MD      divalproex sodium  250 mg Oral Q12H MOSHE Taurus Mcnamara DO      enoxaparin  40 mg Subcutaneous Daily Tia Benz MD      ipratropium-albuterol  3 mL Nebulization Q8H PRN Tia Benz MD      lacosamide  150 mg Oral Daily Tia Benz MD      lacosamide  200 mg Oral HS Tia Benz MD      levothyroxine  175 mcg Oral Early Morning Tia Benz MD      melatonin  6 mg Oral HS Tia Benz MD      nystatin   Topical BID Tia Benz MD      OLANZapine  10 mg Oral HS Tia Benz MD      ondansetron  4 mg Intravenous  Q6H PRN Tia Benz MD      polyethylene glycol  17 g Oral Daily PRN Tia Benz MD      senna-docusate sodium  2 tablet Oral BID Tia Benz MD          M*Modal software was used to dictate this note.  It may contain errors with dictating incorrect words or incorrect spelling. Please contact the provider directly with any questions.

## 2025-02-14 NOTE — ASSESSMENT & PLAN NOTE
Chronic seizure disorder  Continue chronic AED regimen: Lacosamide 150 mg QAM, Lacosamide 200 mg QHS, Depakote 750 mg Q 12 hours  F.u depakote lvl: 99  Per Neurology consult neutropenia not likely to be 2/2 to lacosamide or depakote -->[id number] (2/14)  Per psych Titrating depakote as it was mainly used for mood rather than epilepsy

## 2025-02-14 NOTE — ASSESSMENT & PLAN NOTE
>>ASSESSMENT AND PLAN FOR INTELLECTUAL DISABILITY WRITTEN ON 2/14/2025  9:21 AM BY KEVON CASTILLO DO    H/o intellectual disability per chart review

## 2025-02-14 NOTE — ASSESSMENT & PLAN NOTE
Patient WBC WBC 0.62 neutrophil 2/12 --> 0.91 2/14-->0.77 neutrophil  Neutropenic precautions   Psych consult for medication adjustment   C/t monitor w/ routine blood work

## 2025-02-14 NOTE — ASSESSMENT & PLAN NOTE
WBC count currently 3.30 from 3.35.  Absolute neutrophils 0.77 from 0.91.  Continue neutropenic precautions.  Likely due to medications.  Had been non-compliant at home and was restarted on medications in acute setting.  Psychiatry consulted - discontinued Invega on 2/10.  Recommending Neurology consult.  Neurology consulted - discussed with Psych that Depakote was started for mood stabilization rather than seizures - ok to decrease/discontinue per Psych.  Depakote changed to 500mg Q12 on 2/12.  Decreased to 250mg Q12 on 2/13.  Repeat CBC on Monday.

## 2025-02-14 NOTE — PROGRESS NOTES
Progress Note - PMR   Name: Esperanza Prajapati 54 y.o. female I MRN: [id number]  Unit/Bed#: [facility] 269-01 I Date of Admission: 2/8/2025   Date of Service: 2/14/2025 I Hospital Day: 6     Assessment & Plan  Acute respiratory failure with hypoxia (HCC)  Presented on 1/19/25 with respiratory failure related to Influenza A  Intubated 1/21/25 - 1/30/25.  Required HFNC  Weaning down from oxygen.  Now on RA in AM   Improving    PLAN   Continue IS, OPEP, and pulmonary toileting  Aspiration precautions, deep breathing  Monitor pulmonary status while in ARC  Has been stable on RA   Follow-up with Pulmonary as outpatient  Influenza A  Presented on 1/19/25 with respiratory failure  +Influenza A  s/p Tamiflu treatment completed  Has been stable on RA   Acquired hypothyroidism  Acquired after total thyroidectomy 2/2 papillary thyroid carcinoma in 2013  Continue Synthroid 175 mcg daily  Focal epilepsy (HCC)  Chronic seizure disorder  Continue chronic AED regimen: Lacosamide 150 mg QAM, Lacosamide 200 mg QHS, Depakote 750 mg Q 12 hours  F.u depakote lvl: 99  Per Neurology consult neutropenia not likely to be 2/2 to lacosamide or depakote -->[id number] (2/14)  Per psych Titrating depakote as it was mainly used for mood rather than epilepsy   Schizoaffective disorder (HCC)  Stable  Continue chronic medications: Cogentin 2 mg BID, Zyprexa 10 mg QHS, Invega 6 mg QAM.    Psych consulted 2/10 given neutropenia for medication adjustment; invega has been dc 2/10  F.u depakote lvl: 99   Titrating depakote per psych   Hypernatremia  Resolved  139 2/10  Pneumonia of both lungs due to infectious organism  Possible bacterial PNA.   S/p treatment with Azithromycin  Continue supportive care  Continues on oxygen 0-2L; stable on RA   Insomnia  Continue Melatonin 6 mg QHS  At risk for deep venous thrombosis  Continue Lovenox 40 mg daily.    Oropharyngeal dysphagia  Currently on pureed diet with thins--> inc to mechanical soft and thin   SLP to  follow  Hopeful to upgrade based on recent notes.   Neutropenia (HCC)  Patient WBC WBC 0.62 neutrophil 2/12 --> 0.91 2/14-->0.77 neutrophil  Neutropenic precautions   Psych consult for medication adjustment   C/t monitor w/ routine blood work   Intellectual disability  Patient will need assistance w/ medications and iADLs once discharged   Will need to communicate with family to see what assistance is available   Prolonged QT interval  EKG 1/25 aflutter w/ ; repeat EKG 2/10   Avoid QT prolong agents as possible   Repeat EKG as necessary   Impaired mobility and ADLs  Patient was evaluated by the rehabilitation team MD and an appropriate candidate for acute inpatient rehabilitation program at this time.  The patient will tolerate 3 hours/day 5 to 7 days/week of intensive physical, occupational and speech therapy in order to obtain goals for community discharge  Due to the patient's functional Compared to their baseline level of function in addition to their ongoing medical needs, the patient would benefit from daily supervision from a rehabilitation physician as well as rehabilitation nursing to implement and adjust the medical as well as functional plan of care in order to meet the patient's goals.  DC 2/17       History of Present Illness   Esperanza Prajapati is a 54 y.o. female with past medical history significant for epileps/seizure disorder, schizoaffective disorder, cognitive deficits, history of papillary thyroid carcinoma s/p total thyroidectomy who presented to the [facility] 1/19/25 with acute respiratory failure.  +Influenza A.  Intubated 1/21/25 - 1/30/25.  Brochoscopy 1/24/25.  Completed Tamiflu treatment.  Patient with superimposed bacterial PNA.  Treated with Azithromyocin.  Followed closely by Pulmonary.  Needing intermittent oxygen, but has weaned to RA.    Medical course complicated by dysphagia.  Patient seen by SLP and placed on a pureed diet with thin  liquids.    After medical stabilization, patient found to have acute functional deficits from baseline in mobilty, self care, swallow, therefore admitted to [facility] for acute inpatient rehabiltiation.    Chief Complaint: f/u ambulatory dysfunction    Interval: Patient seen and examined in bed. No events overnight.  Reports overall feeling well. Last BM 2/13 Feels a little warm. Was able to be upgrade her diet to lvl3. Sleeping well at night. Denies any f/c/n/v, CP, SOB, abdominal pain, constipation, or diarrhea.       Objective   Functional Update:  Physical Therapy Occupational Therapy Speech Therapy   Weight Bearing Status: Full Weight Bearing  Transfers: Supervision  Bed Mobility: Supervision  Amulation Distance (ft): 250 feet  Ambulation: Supervision  Assistive Device for Ambulation:  (none)  Number of Stairs: 12  Assistive Device for Stairs: Lehft Hand Rail  Stair Assistance: Supervision  Ramp: Supervision  Assistive Device for Ramp: None  Discharge Recommendations: Home with:  DC Home with:: Family Support   Eating: Supervision  Grooming: Supervision  Bathing: Incidental Touching  Bathing: Incidental Touching  Upper Body Dressing:  (setup)  Lower Body Dressing: Minimal Assistance  Toileting: Incidental Touching  Tub/Shower Transfer: Minimal Assistance  Toilet Transfer: Incidental Touching  Cognition: Exceptions to WNL  Cognition: Decreased Memory, Decreased Safety, Decreased Executive Functions, Behavioral Considerations, Decreased Attention, Decreased Comprehension  Orientation: Person, Place, Time, Situation   Mode of Communication: Verbal  Cognition: Exceptions to WNL (baseline cognitive linguistic deficits)     Swallowing: Exceptions to WNL  Swallowing: Oral Dysphagia, Pharyngeal Dysphagia, Aspiration Risk, Reflux Precautions  Diet Recommendations: Level 1/Puree, Thin  Discharge Recommendations: Home with:  DC Home with:: 24 Hour Supervision, 24 Hour Assisteance, Family Support, Outpatient Speech Therapy          Temp:  [97.7 °F (36.5 °C)-98 °F (36.7 °C)] 97.8 °F (36.6 °C)  HR:  [71-89] 89  Resp:  [16-18] 18  BP: ()/(60-70) 95/60  SpO2:  [93 %-95 %] 95 %    Physical Exam    Gen: No acute distress, obese  HEENT: Moist mucus membranes, Normocephalic/Atraumatic  Cardiovascular: Regular rate, rhythm,  Heme/Extr: mild bilateral le edema  Pulmonary: Non-labored breathing. On RA   : No patel  GI:  non-distended. BS+  MSK: ROM is WFL in all extremities.   Integumentary: Skin is warm, dry. .  Neuro:  Speech is intact. Appropriate to questioning.  Psych: Normal mood and affect.       Scheduled Meds:  Current Facility-Administered Medications   Medication Dose Route Frequency Provider Last Rate    acetaminophen  975 mg Oral Q6H PRN Tia Benz MD      aluminum-magnesium hydroxide-simethicone  30 mL Oral Q6H PRN Tia Benz MD      benztropine  2 mg Oral BID Tia Benz MD      cyanocobalamin  1,000 mcg Oral Daily Tia Benz MD      divalproex sodium  250 mg Oral Q12H MOSHE Mcnamara DO      enoxaparin  40 mg Subcutaneous Daily Tia Benz MD      ipratropium-albuterol  3 mL Nebulization Q8H PRN Tia Benz MD      lacosamide  150 mg Oral Daily Tia Benz MD      lacosamide  200 mg Oral HS Tia Benz MD      levothyroxine  175 mcg Oral Early Morning Tia Benz MD      melatonin  6 mg Oral HS Tia Benz MD      nystatin   Topical BID Tia Benz MD      OLANZapine  10 mg Oral HS Tia Benz MD      ondansetron  4 mg Intravenous Q6H PRN Tia Benz MD      polyethylene glycol  17 g Oral Daily PRN Tia Benz MD      senna-docusate sodium  2 tablet Oral BID Tia Benz MD           Lab Results: I have reviewed the following results:  Results from last 7 days   Lab Units 02/14/25  0511 02/13/25  0018 02/12/25  0540   HEMOGLOBIN g/dL 10.8* 10.1* 10.2*   HEMATOCRIT % 34.0* 31.8* 32.1*   WBC Thousand/uL  3.30* 3.35* 3.03*   PLATELETS Thousands/uL 243 245 259     Results from last 7 days   Lab Units 02/13/25  0458 02/10/25  0950   BUN mg/dL 11 13   SODIUM mmol/L 140 139   POTASSIUM mmol/L 4.0 4.0   CHLORIDE mmol/L 99 99   CREATININE mg/dL 0.78 0.90   AST U/L  --  22   ALT U/L  --  27              Rosi Olsen MD   Physical Medicine and Rehabilitation   02/14/25    I have spent a total time of 36 minutes in caring for this patient on the day of the visit/encounter including Counseling / Coordination of care, Documenting in the medical record, and Communicating with other healthcare professionals .

## 2025-02-14 NOTE — PROGRESS NOTES
Progress Note - Behavioral Health   Name: Esperanza Prajapati 54 y.o. female I MRN: [id number]  Unit/Bed#: -01 I Date of Admission: 2/8/2025   Date of Service: 2/14/2025 I Hospital Day: 6    Assessment & Plan  Neutropenia (HCC)  Lab work 2/10 showed WBC 1.97 (down from 3.27 on 2/6) with ANC of 0.6 (down from 7.71 on 2/1)  Pt did recently have influenza A and was hospitalized, completed course of Tamiflu, also received cephalosporins and azithromycin for PNA  Numerous medications could be contributing, including VPA, olanzapine, paliperidone, lacosamide, as well as recent ABX use  Afebrile, VS stable  VPA trough level 99   This morning: ANC 0.77, WBC 3.30.    Plan  Continue down-taper of Depakote; pt to receive 250 mg q12h (for total of 500 mg today).   Per Neurology consult, no changes had been made to AEDs.  However, per their recommendations, reached out to outpatient epileptologist Dr. Cevallos as LOV note states VPA had been prescribed for mood, and to consider consolidating AED to lacosamide monotherapy if Psychiatry is in agreement with discontinuation/decrease of VPA.  Considering risks of neutropenia and overall clinical picture, both teams are in agreement with discontinuation of VPA.   If concerns for breakthrough seizures, can increase dose of lacosamide.  Of note, pt is on olanzapine, which can confer mood stability, so its dose can be adjusted should the need arise.  Discontinued paliperidone 6 mg qd.   Pt received last dose 2/10 at 0852.  Repeat CBC w differential in AM  Maintain neutropenic precautions.  Continue to monitor VS closely  Schizoaffective disorder (HCC)  Per chart, last inpatient psychiatric hospitalization in May 2024. Pt follows with outpatient psychiatry at [facility]. LOV 10/09/24.  PTA was on the following psychotropic medications: olanzapine 10 mg q HS, paliperidone 6 mg qd (which had been decreased from 9 mg), Cogentin 2 mg PO BID  Per chart pt appears to have been  "started on standing Cogentin d/t hx of EPS including oculogyric s/sx and dystonia during last psychiatric hospitalization     Currently, pt does not present as acutely decompensated and does not demonstrate any indication for inpatient psychiatric hospitalization. Today pt denying AVH, SI/HI, intent, or plan. Reports improvement in sleep. All other ROS unremarkable.     Plan  Continue down-taper of Depakote; pt to receive 250 mg q12h (for total of 500 mg today). See under \"Neutropenia.\"  Discontinued paliperidone 6 mg qd 2/2 neutropenia.  Continue current dose of olanzapine 10 mg q HS; re-assess as clinical course develops.  Continue melatonin 6 mg PO q HS for insomnia.   Does not meet criteria for inpatient psychiatric hospitalization at this time.  Focal epilepsy (HCC)  Follows with Neurology, PTA AED regimen: lacosamide 150 mg daily AM and 200 mg q HS, Depakote 750 mg q12h.   Continue down-taper of Depakote; pt to receive 250 mg q12h (for total of 500 mg today). See under \"Neutropenia.\"  Continue PTA lacosamide regimen.  Per outpatient epileptologist, if concerns for breakthrough seizures, can increase dose of lacosamide.  Intellectual disability  H/o intellectual disability per chart review  Acquired hypothyroidism  S/p thyroidectomy 2/2 papillary thyroid carcinoma  Consider repeat TSH/free T4  PTA: levothyroxine 175 mcg  Management per Primary  Oropharyngeal dysphagia  SLP on board, maintain pureed diet, advance as tolerated/recommended per SLP.   Able to tolerate PO medications thus far.   Acute respiratory failure with hypoxia (HCC)  Hospitalized 1/19/25 with respiratory failure 2/2 influenza A, requiring intubation from 1/21-1/30, and HFNC   Now stable on room air  Management per Primary      SUBJECTIVE     Patient seen and evaluated at bedside this morning. No acute events overnight. She reports sleeping well overnight and endorses an adequate appetite. She has been participating in PT/OT. She does endorse " "some eye dryness/burning that began today although denies any associated vision blurriness or vision loss. Denies any associated headaches. Currently, she denies any AVH and does not appear to be responding to internal stimuli. She denies any SI/HI, intent, or plan.     Psychiatric Review of Systems:  Behavior over the last 24 hours: unchanged  Sleep: improving  Appetite: adequate  ROS: Complete review of systems is negative except as noted above.    OBJECTIVE     Vital signs in last 24 hours:  Temp:  [97.7 °F (36.5 °C)-98 °F (36.7 °C)] 97.8 °F (36.6 °C)  HR:  [71-89] 89  BP: ()/(60-70) 95/60  Resp:  [16-18] 18  SpO2:  [93 %-95 %] 95 %  O2 Device: None (Room air)    Mental Status Evaluation:  Appearance:  Overtly  female, dressed in casual attire, sitting upright in chair   Behavior:  Calm, cooperative, psychomotor slowing, fair eye contact   Speech:  Delayed, soft, at times mumbling   Mood:  \"I'm alright.\"   Affect:  blunted   Thought Process:  slowing of thoughts, concrete   Thought Content:  No delusions verbalized.   Perceptual Disturbances: Denies current AVH. Appears internally preoccupied, but does not appear to be responding to internal stimuli.    Risk Potential: Denies SI/HI, intent, or plan.   Potential for aggression: not at time of this encounter    Sensorium:  oriented to person, place, and time/date   Consciousness:  awake   Attention/Concentration: attention span and concentration appear shorter than expected for age   Insight:  fair   Judgment: fair   Muscle Strength Muscle Tone: Not formally assessed at this encounter.   Gait/Station: Not observed at time of this encounter as pt is seated.    Motor Activity: Resting tremor occasionally observed in RUE.       ACTIVE MEDICATIONS     Current Medications:  Current Facility-Administered Medications   Medication Dose Route Frequency Provider Last Rate    acetaminophen  975 mg Oral Q6H PRN Tia Benz MD      aluminum-magnesium " hydroxide-simethicone  30 mL Oral Q6H PRN Tia Benz MD      benztropine  2 mg Oral BID Tia Benz MD      cyanocobalamin  1,000 mcg Oral Daily Tia Benz MD      divalproex sodium  250 mg Oral Q12H MOSHE Mcnamara DO      enoxaparin  40 mg Subcutaneous Daily Tia Benz MD      ipratropium-albuterol  3 mL Nebulization Q8H PRN Tia Benz MD      lacosamide  150 mg Oral Daily Tia Benz MD      lacosamide  200 mg Oral HS Tia Benz MD      levothyroxine  175 mcg Oral Early Morning Tia Benz MD      melatonin  6 mg Oral HS Tia Benz MD      nystatin   Topical BID Tia Benz MD      OLANZapine  10 mg Oral HS Tia Benz MD      ondansetron  4 mg Intravenous Q6H PRN Tia Benz MD      polyethylene glycol  17 g Oral Daily PRN Tia Benz MD      senna-docusate sodium  2 tablet Oral BID Tia Benz MD         Behavioral Health Medications: I have personally reviewed all current active medications. Changes as in plan section above.      ADDITIONAL DATA     EKG Results: I have personally reviewed pertinent reports.  Results for orders placed or performed during the hospital encounter of 02/08/25 (from the past 1000 hours)   ECG 12 lead    Collection Time: 02/10/25  2:55 PM   Result Value    Ventricular Rate 95    Atrial Rate 95    IL Interval 168    QRSD Interval 76    QT Interval 332    QTC Interval 418    P Axis 61    QRS Axis 21    T Wave Axis 123    Narrative    Normal sinus rhythm  Nonspecific T wave abnormality  Abnormal ECG  When compared with ECG of 10-Feb-2025 14:52, (unconfirmed)  No significant change was found  Confirmed by Joselito Wellington ([id number]) on 2/10/2025 4:48:43 PM     *Note: Due to a large number of results and/or encounters for the requested time period, some results have not been displayed. A complete set of results can be found in Results Review.       Radiology Results: I have  personally reviewed pertinent reports. I have personally reviewed pertinent films in PACS.  No results found.  No Chest XR results available for this patient.     Laboratory Results: I have personally reviewed all pertinent laboratory/tests results.  Recent Results (from the past 48 hours)   CBC and differential    Collection Time: 02/13/25  4:58 AM   Result Value Ref Range    WBC 3.35 (L) 4.31 - 10.16 Thousand/uL    RBC 3.14 (L) 3.81 - 5.12 Million/uL    Hemoglobin 10.1 (L) 11.5 - 15.4 g/dL    Hematocrit 31.8 (L) 34.8 - 46.1 %     (H) 82 - 98 fL    MCH 32.2 26.8 - 34.3 pg    MCHC 31.8 31.4 - 37.4 g/dL    RDW 14.3 11.6 - 15.1 %    MPV 9.7 8.9 - 12.7 fL    Platelets 245 149 - 390 Thousands/uL    nRBC 0 /100 WBCs    Segmented % 27 (L) 43 - 75 %    Immature Grans % 0 0 - 2 %    Lymphocytes % 60 (H) 14 - 44 %    Monocytes % 10 4 - 12 %    Eosinophils Relative 2 0 - 6 %    Basophils Relative 1 0 - 1 %    Absolute Neutrophils 0.91 (L) 1.85 - 7.62 Thousands/µL    Absolute Immature Grans 0.01 0.00 - 0.20 Thousand/uL    Absolute Lymphocytes 2.01 0.60 - 4.47 Thousands/µL    Absolute Monocytes 0.32 0.17 - 1.22 Thousand/µL    Eosinophils Absolute 0.08 0.00 - 0.61 Thousand/µL    Basophils Absolute 0.02 0.00 - 0.10 Thousands/µL   Basic metabolic panel    Collection Time: 02/13/25  4:58 AM   Result Value Ref Range    Sodium 140 135 - 147 mmol/L    Potassium 4.0 3.5 - 5.3 mmol/L    Chloride 99 96 - 108 mmol/L    CO2 34 (H) 21 - 32 mmol/L    ANION GAP 7 4 - 13 mmol/L    BUN 11 5 - 25 mg/dL    Creatinine 0.78 0.60 - 1.30 mg/dL    Glucose 84 65 - 140 mg/dL    Glucose, Fasting 84 65 - 99 mg/dL    Calcium 9.6 8.4 - 10.2 mg/dL    eGFR 87 ml/min/1.73sq m   CBC and differential    Collection Time: 02/14/25  5:11 AM   Result Value Ref Range    WBC 3.30 (L) 4.31 - 10.16 Thousand/uL    RBC 3.30 (L) 3.81 - 5.12 Million/uL    Hemoglobin 10.8 (L) 11.5 - 15.4 g/dL    Hematocrit 34.0 (L) 34.8 - 46.1 %     (H) 82 - 98 fL    MCH 32.7  26.8 - 34.3 pg    MCHC 31.8 31.4 - 37.4 g/dL    RDW 14.2 11.6 - 15.1 %    MPV 9.8 8.9 - 12.7 fL    Platelets 243 149 - 390 Thousands/uL    nRBC 0 /100 WBCs    Segmented % 23 (L) 43 - 75 %    Immature Grans % 0 0 - 2 %    Lymphocytes % 62 (H) 14 - 44 %    Monocytes % 11 4 - 12 %    Eosinophils Relative 3 0 - 6 %    Basophils Relative 1 0 - 1 %    Absolute Neutrophils 0.77 (L) 1.85 - 7.62 Thousands/µL    Absolute Immature Grans 0.01 0.00 - 0.20 Thousand/uL    Absolute Lymphocytes 2.05 0.60 - 4.47 Thousands/µL    Absolute Monocytes 0.35 0.17 - 1.22 Thousand/µL    Eosinophils Absolute 0.10 0.00 - 0.61 Thousand/µL    Basophils Absolute 0.02 0.00 - 0.10 Thousands/µL        This note was not shared with the patient due to reasonable likelihood of causing patient harm        Taurus Mcnamara DO  Psychiatry Resident, PGY-I  Department of Psychiatry and Behavioral Health  [facility]     This note was completed in part utilizing voice recognizing software. Grammatical, translation, syntax errors, random word insertions, spelling mistakes, and incomplete sentences may be an occasional consequence of this system secondary to software limitations with voice recognition, ambient noise, and hardware issues. If you have any questions or concerns about the content, text, or information contained within the body of this dictation, please contact the provider for clarification.

## 2025-02-14 NOTE — ASSESSMENT & PLAN NOTE
Lab work 2/10 showed WBC 1.97 (down from 3.27 on 2/6) with ANC of 0.6 (down from 7.71 on 2/1)  Pt did recently have influenza A and was hospitalized, completed course of Tamiflu, also received cephalosporins and azithromycin for PNA  Numerous medications could be contributing, including VPA, olanzapine, paliperidone, lacosamide, as well as recent ABX use  Afebrile, VS stable  VPA trough level 99   This morning: ANC 0.77, WBC 3.30.    Plan  Continue down-taper of Depakote; pt to receive 250 mg q12h (for total of 500 mg today).   Per Neurology consult, no changes had been made to AEDs.  However, per their recommendations, reached out to outpatient epileptologist Dr. Cevallos as LOV note states VPA had been prescribed for mood, and to consider consolidating AED to lacosamide monotherapy if Psychiatry is in agreement with discontinuation/decrease of VPA.  Considering risks of neutropenia and overall clinical picture, both teams are in agreement with discontinuation of VPA.   If concerns for breakthrough seizures, can increase dose of lacosamide.  Of note, pt is on olanzapine, which can confer mood stability, so its dose can be adjusted should the need arise.  Discontinued paliperidone 6 mg qd.   Pt received last dose 2/10 at 0852.  Repeat CBC w differential in AM  Maintain neutropenic precautions.  Continue to monitor VS closely

## 2025-02-14 NOTE — ASSESSMENT & PLAN NOTE
"Follows with Neurology, PTA AED regimen: lacosamide 150 mg daily AM and 200 mg q HS, Depakote 750 mg q12h.   Continue down-taper of Depakote; pt to receive 250 mg q12h (for total of 500 mg today). See under \"Neutropenia.\"  Continue PTA lacosamide regimen.  Per outpatient epileptologist, if concerns for breakthrough seizures, can increase dose of lacosamide.  "

## 2025-02-14 NOTE — CASE MANAGEMENT
CM called patient's sister Layne, left message and CM's contact information, with request for return phone call to discuss discharge planning.

## 2025-02-14 NOTE — ASSESSMENT & PLAN NOTE
"Per chart, last inpatient psychiatric hospitalization in May 2024. Pt follows with outpatient psychiatry at [place]. LOV 10/09/24.  PTA was on the following psychotropic medications: olanzapine 10 mg q HS, paliperidone 6 mg qd (which had been decreased from 9 mg), Cogentin 2 mg PO BID  Per chart pt appears to have been started on standing Cogentin d/t hx of EPS including oculogyric s/sx and dystonia during last psychiatric hospitalization     Currently, pt does not present as acutely decompensated and does not demonstrate any indication for inpatient psychiatric hospitalization. Today pt denying AVH, SI/HI, intent, or plan. Reports improvement in sleep. All other ROS unremarkable.     Plan  Continue down-taper of Depakote; pt to receive 250 mg q12h (for total of 500 mg today). See under \"Neutropenia.\"  Discontinued paliperidone 6 mg qd 2/2 neutropenia.  Continue current dose of olanzapine 10 mg q HS; re-assess as clinical course develops.  Continue melatonin 6 mg PO q HS for insomnia.   Does not meet criteria for inpatient psychiatric hospitalization at this time.  "

## 2025-02-14 NOTE — PROGRESS NOTES
02/14/25 0900   Pain Assessment   Pain Assessment Tool 0-10   Pain Score No Pain   Restrictions/Precautions   Precautions Aspiration;Bed/chair alarms;Cognitive;Fall Risk;Seizure;Supervision on toilet/commode  (+neutropenic precautions)   Weight Bearing Restrictions No   ROM Restrictions No   Oral Hygiene   Type of Assistance Needed Supervision;Set-up / clean-up   Physical Assistance Level No physical assistance   Comment Sup in stance at sink to brush teeth, with S/U A to open toothpaste/ apply it to toothbrush 2* BUE tremors   Oral Hygiene CARE Score 4   Grooming   Able To Comb/Brush Hair;Brush/Clean Teeth;Wash/Dry Hands   Limitation Noted In Problem Solving;Strength;Timeliness   Findings Sup for all grooming tasks while in stance at sink   Shower/Bathe Self   Type of Assistance Needed Supervision;Verbal cues;Adaptive equipment   Physical Assistance Level No physical assistance   Comment Pt engaged in showering, able to wash UB and BLEs while seated on tub bench with dynamic fxl reaching to feet and UE support on GB, with vc's for encouragement for fxl reaching as pt seeks assistance but with encouragement able to complete. CS in stance with unilateral UE support on GB while pt washed jorge/rear, no LOB. Vc's required for thoroughness to ensure all parts were washed. Pt would benefit from using a LH sponge at home to wash lower legs/feet 2* difficulty with dynamic reach to feet/ thoroughness.   Shower/Bathe Self CARE Score 4   Bathing   Assessed Bath Style Shower   Anticipated D/C Bath Style Shower   Able to Gather/Transport No   Able to Adjust Water Temperature No   Able to Wash/Rinse/Dry (body part) Left Arm;Right Arm;L Upper Leg;R Upper Leg;L Lower Leg/Foot;R Lower Leg/Foot;Chest;Abdomen;Perineal Area;Buttocks   Limitations Noted in Balance;Coordination;Endurance;Problem Solving;ROM;Sequencing;Strength;Timeliness   Positioning Seated;Standing   Adaptive Equipment Tub Bench;Shower Bars;Hand Held Shower    Tub/Shower Transfer   Limitations Noted In Balance;Coordination;Problem Solving;Safety;LE Strength   Adaptive Equipment Grab Bars;Transfer Bench   Assessed Shower   Findings CS side-stepping in/out of wet shower environment with BUE support on GB, no LOB.   Upper Body Dressing   Type of Assistance Needed Supervision   Physical Assistance Level No physical assistance   Comment seated   Upper Body Dressing CARE Score 4   Lower Body Dressing   Type of Assistance Needed Physical assistance;Verbal cues   Physical Assistance Level 25% or less   Comment seated, pt cont to require Clemente to thread LEs, then CS in unsupported stance for CM over hips, no LOB. Did not introduce LHAE ([facility]) for threading 2* cog deficits but may be benefitcial to trial in future OT session to improve pt independence w/LB dressing   Lower Body Dressing CARE Score 3   Putting On/Taking Off Footwear   Type of Assistance Needed Physical assistance;Verbal cues   Physical Assistance Level 26%-50%   Comment A to doff/don  socks 2* decreased fxl reaching and unable to complete xleg technique, Sup to doff/don slip-on shoes   Putting On/Taking Off Footwear CARE Score 3   Dressing/Undressing Clothing   Remove UB Clothes   (hospital gown)   Don UB Clothes Pullover Shirt   Remove LB Clothes Undergarment;Socks;Shoes   Don LB Clothes Pants;Undergarment;Socks;Shoes   Limitations Noted In Balance;Coordination;Problem Solving;Safety;Sequencing;Strength;ROM;Timeliness   Adaptive Equipment   (didn't introduce LHAE 2* cog deficits, decreased carryover)   Positioning Supported Sit;Standing   Sit to Stand   Type of Assistance Needed Supervision   Physical Assistance Level No physical assistance   Comment no AD   Sit to Stand CARE Score 4   Bed-Chair Transfer   Type of Assistance Needed Supervision   Physical Assistance Level No physical assistance   Comment fxl mob, no AD   Chair/Bed-to-Chair Transfer CARE Score 4   Commmunity Re-entry   Community Re-entry Level    (no AD)   Community Re-entry Level of Assistance Close supervision   Community Re-entry Pt engaged in fxl mobility, no AD, at  level household and community distances, with focus on increasing endurance and standing balance/tolerance during fxl ADLs/IADLs in anticipation of upcoming D/C and community reentry. Pt tolerated well, mobilizing through [facility] hallways, up hallway ramp, past [place] elevators, making loop through [place] area and then back to [facility] therapy gym. Offered seated rest breaks several times but pt declined. No LOB. Pt easily distracted by environment (people getting off elevator, staff near cafeteria entrance, etc) requiring gentle cueing to redirect and focus on balance and obstacle negotiation, pt receptive.   Exercise Tools   Exercise Tools Yes   UE Ergometer Seated w/Sup, tabletop UEB bilaterally for 4min prograde x2 against min resistance, for improved endurance and UE strength during fxl ADL/IADL tasks. Pt tolerated well with rest break between bouts to manage fatigue. Vitals monitored and WNL, SPO2 95-98% on RA, -115bpm w/activity.   Other Exercise Tool 1 Seated w/Sup, 2x15 reps each of alternating UE chest press, shoulder flexion to 90* (attempted OH press but pt unable to tolerate, too difficult), elbow flexion, prograde rowing, and forearm pronation/supination, using 3# DB for increased fxl UE strength. Pt tolerated well with rest breaks between sets to manage fatigue, and min cueing for technique.   Other Exercise Tool 2 Pt completed 10min on NuStep machine at L1 for increased fxl endurance and BUE/BLE strength during fxl ADLs/IADLs. Pt tolerated well with no rest breaks required, reporting min fatigue after complete.   Cognition   Overall Cognitive Status Impaired   Arousal/Participation Alert;Cooperative   Attention Within functional limits   Orientation Level Oriented X4   Memory Decreased short term memory;Decreased recall of recent events;Decreased recall of precautions    Following Commands Follows one step commands without difficulty   Activity Tolerance   Activity Tolerance Patient tolerated treatment well   Assessment   Treatment Assessment Pt seen for 90min skilled OT session focused on ADL retraining (shower), shower transfer training, UE strengthening, endurance training, LB dressing skills, and fxl mobility household and community distances (no AD), for increased independence w/ADLs/IADLs and decreased caregiver burden. See detailed descriptions of fxl performance above. Pt tolerated session well, able to complete all fxl mobility at CS level, no AD, no LOB. Pt cont to require Adolfo for LB dressing/footwear mgmt, however therapist did not introduce LHAE 2* cog deficits and anticipated decreased carryover for home use. Pt cont to be limited by decreased strength, endurance, act keyon, fxl cognition, BUE tremors. Pt would benefit from continued skilled OT focused on ADL retraining, FT, strengthening, endurance training, fxl standing balance/tolerance, dynamic standing balance. Pt anticipated to D/C Mon 2/17 home w/family support.   Prognosis Good   Problem List Decreased strength;Decreased range of motion;Decreased endurance;Impaired balance;Decreased mobility;Decreased coordination;Decreased cognition;Impaired judgement;Decreased safety awareness;Obesity   Plan   Treatment/Interventions ADL retraining;Functional transfer training;Therapeutic exercise;Endurance training;Patient/family training;Equipment eval/education;Bed mobility;Compensatory technique education;Family   Progress Progressing toward goals   Discharge Recommendation   Rehab Resource Intensity Level, OT   (home w/family support)   OT Therapy Minutes   OT Time In 0900   OT Time Out 1030   OT Total Time (minutes) 90   OT Mode of treatment - Individual (minutes) 90   OT Mode of treatment - Concurrent (minutes) 0   OT Mode of treatment - Group (minutes) 0   OT Mode of treatment - Co-treat (minutes) 0   OT Mode of  Treatment - Total time(minutes) 90 minutes   OT Cumulative Minutes 660   Therapy Time missed   Time missed? No

## 2025-02-14 NOTE — PROGRESS NOTES
02/14/25 1330   Pain Assessment   Pain Assessment Tool 0-10   Pain Score No Pain   Restrictions/Precautions   Precautions Aspiration;Bed/chair alarms;Cognitive;Fall Risk;Seizure;Supervision on toilet/commode  (+neutropenic precautions)   Weight Bearing Restrictions No   ROM Restrictions No   Lying to Sitting on Side of Bed   Type of Assistance Needed Supervision   Physical Assistance Level No physical assistance   Comment increased time required   Lying to Sitting on Side of Bed CARE Score 4   Sit to Stand   Type of Assistance Needed Supervision   Physical Assistance Level No physical assistance   Comment no AD   Sit to Stand CARE Score 4   Bed-Chair Transfer   Type of Assistance Needed Supervision   Physical Assistance Level No physical assistance   Comment fxl mob, no AD   Chair/Bed-to-Chair Transfer CARE Score 4   Toileting Hygiene   Type of Assistance Needed Physical assistance   Physical Assistance Level 25% or less   Comment Sup in stance for CM up/down over hips, no AD. Pt required A for rear hygiene, then Sup in stance for jorge hygiene, no LOB   Toileting Hygiene CARE Score 3   Toilet Transfer   Type of Assistance Needed Supervision   Physical Assistance Level No physical assistance   Comment fxl mob, no AD   Toilet Transfer CARE Score 4   Commmunity Re-entry   Community Re-entry Level   (no AD)   Community Re-entry Level of Assistance Close supervision   Community Re-entry Pt engaged in fxl mob, no AD, at CS level household and community distances w/focus on increasing endurance and standing balance/tolerance during fxl ADLs/IADLs for upcoming D/C and community reentry. Pt tolerated well, mobilizing t/o [facility] unit and [place] hallways. Pt took seated rest break once to manage min BLE fatigue. No LOB.   Exercise Tools   Exercise Tools Yes   Other Exercise Tool 1 Seated w/Sup, 2x10 BUE tabletop towel glides in forward, lateral, and circular planes for gentle stretching and prep prior to fxl reaching activity.  Pt tolerated well, denying pain.   Coordination   Fine Motor Pt utilized B/L pad pinch to retrieve beads from yellow theraputty for FMS/FMC challenge to improve digit strength and coordination during fxl ADL/IADL tasks. Pt tolerated well with increased time to locate all beads and 2* BUE tremors.   Cognition   Overall Cognitive Status Impaired   Arousal/Participation Alert;Cooperative   Attention Within functional limits   Orientation Level Oriented X4   Memory Decreased short term memory;Decreased recall of recent events;Decreased recall of precautions   Following Commands Follows one step commands without difficulty   Additional Activities   Additional Activities Comments Sup fxl mob t/o OT gym, no AD, using dynamic fxl reaching OH and waist level to retrieve cones, for improved dynamic fxl standing balance/tolerance and simulating item retrieval in home environment. Pt tolerated well, no LOB, no rest breaks required.   Activity Tolerance   Activity Tolerance Patient tolerated treatment well   Assessment   Treatment Assessment Pt seen for 60min skilled OT session focused on toileting hygiene/CM, fxl mobility household and community distances (no AD), item retrieval w/fxl reaching, FMC/FMS, and endurance training, for increased independence w/ADLs/IADLs and decreased caregiver burden. See detailed descriptions of fxl performance above. Pt tolerated session well, able to complete all fxl mobility at CS level, no AD, no LOB. Pt cont to require Adolfo for rear hygiene during toileting. Pt cont to be limited by decreased strength, endurance, act keyon, fxl cognition, BUE tremors. Pt would benefit from continued skilled OT focused on ADL retraining, FT, strengthening, endurance training, fxl standing balance/tolerance, dynamic standing balance. Pt anticipated to D/C Mon 2/17 home w/family support.   Prognosis Good   Problem List Decreased strength;Decreased range of motion;Decreased endurance;Impaired balance;Decreased  mobility;Decreased coordination;Decreased cognition;Impaired judgement;Decreased safety awareness;Obesity   Plan   Treatment/Interventions ADL retraining;Functional transfer training;Therapeutic exercise;Endurance training;Patient/family training;Equipment eval/education;Bed mobility;Compensatory technique education   Progress Progressing toward goals   Discharge Recommendation   Rehab Resource Intensity Level, OT   (home w/family support)   OT Therapy Minutes   OT Time In 1330   OT Time Out 1430   OT Total Time (minutes) 60   OT Mode of treatment - Individual (minutes) 60   OT Mode of treatment - Concurrent (minutes) 0   OT Mode of treatment - Group (minutes) 0   OT Mode of treatment - Co-treat (minutes) 0   OT Mode of Treatment - Total time(minutes) 60 minutes   OT Cumulative Minutes 720   Therapy Time missed   Time missed? No

## 2025-02-14 NOTE — ASSESSMENT & PLAN NOTE
>>ASSESSMENT AND PLAN FOR INTELLECTUAL DISABILITY WRITTEN ON 2/14/2025  9:48 AM BY BRINA HOLLY    Concerns for medication non-compliance.  Considerations with dispo planning.

## 2025-02-14 NOTE — DISCHARGE INSTR - AVS FIRST PAGE
DISCHARGE INSTRUCTIONS: [facility] ACUTE REHABILITATION [place]    Bring these instructions with you to your Outpatient Physician appointments so they can order and follow-up any additional lab work or imaging recommended at time of discharge.      Resume follow-up with all your prior providers that you have established care prior to this hospitalization.  Discuss with primary care physician (PCP) if you have additional questions.     It  is you or your caregivers responsibility to obtain follow-up MEDICATION REFILLS  As indicated through your Primary Care Physician (PCP) and other outpatient specialty provider(s) after discharge.  Please follow-up with your PCP as soon as possible after discharge to set-up follow-up management and when appropriate refills.      You have been determined to have some cognitive impairments. - It is YOUR CAREGIVER'S RESPONSIBILITY to ensure appropriate follow-up which includes:  - APPOINTMENTS are scheduled and safe transportation is arranged  - LABS and IMAGING are completed  - MEDICATION MANAGEMENT at home is carried out appropriately     You remain a fall and injury risk which could be severe.  - Your risk of fall has decreased however since admission to acute rehab.    - Appropriate supervision +/- assistance as instructed during your rehab course is recommended to decrease risk of fall and injury.    - Continue skilled therapy as discussed after discharge to further decrease this risk    If you (or your health care proxy) have any questions or concerns regarding your acute rehabilitation stay including issues with medications, rehabilitation, and follow-up plan, please call:          [facility] at [place] at [phone]    Should you develop fevers, chills, new weakness, changes in sensation, difficulty speaking, facial weakness, confusion, shortness of breath, chest pain, or other concerning symptoms please call 911 and/or  "obtain transportation to nearest ER immediately.      Should you develop feelings of significant hopelessness, severe depression, severe anxiety, or suicide you should call 911 or obtain transportation to nearest ER.    24-7 Nationwide suicide and crisis lifeline call \"090\"  National Suicide Prevention Lifeline is 1-647.974.6802 and is available 24 hrs/7 days a week.   [facility] Crisis [phone] is available 24 hrs/7 days for mental health  [facility] Crisis [phone] is available 24 hrs/7 days for mental health   [facility] Crisis [phone]    PHYSICIANS to see:  Please see your doctors listed in the follow up providers section of your discharge paperwork, and take the discharge paperwork with you to your appointments.    Driving restrictions:  You are recommended against driving.     You are recommended against driving until cleared by an outpatient physician.        **As outlined in [place] state law, healthcare personnel are required to report every person over 15 years of age diagnosed as having a condition that could impair their ability to drive, with the exception of medical condition expected to last less than 90 days (most commonly orthopedic fractures and post orthopedic surgery conditions without other co-morbidities).  Your medical condition hopefully will have adequately improved by that time but at this time it is not certain.      **You should NOT operate a motor vehicle while under the influence of an opiate medication, muscle relaxant, or other sedative.  Doing so may lead to an accident resulting in serious injury or death to yourself or others.  You have agreed to avoid driving when under the influence of this medication.  "     ------------------------------------------------------------------------------------------------------------  ------------------------------------------------------------------------------------------------------------    MEDICATIONS:  Please see a full list of your medications outlined in the After Visit Summary that is attached to these Discharge Instructions.  Please note changes may have been made to your medications please refer to your discharge paperwork for your current medications and take this list with you to all your doctors appointments for your doctors to review.  Please do not resume a home medication unless the medication reconciliation sheet indicates to do so, please do not assume that a medication that you were given a prescription for is the same as a medication you have at home based on both medications having the same name as dosages and frequency may have changed.      Unless specifically noted in your medication list provided to you in your discharge paper work do not resume prior vitamins, minerals, or supplements you may have been taking prior to your hospitalization unless instructed by an outpatient physician in the future.  Discuss with your primary care at next visit if applicable.      NSAID Warning:  Please avoid NSAID (including but not limited to advil, aleve, motrin, naproxen, ibuprofen, mobic, meloxicam, diclofenac etc) medications as NSAID medications may increase your risk ofbleeding (which can be life-threatening), stroke, worsening kidney disease, heart attack, developing/worsening GI ulcers, worsening heart failure, worsening liver (cirrhosis) disease, potentially delay bone healing.      ------------------------------------------------------------------------------------------------------------  ------------------------------------------------------------------------------------------------------------    Psychotropic Medications (Medications intended to improve mental,  cognitive, and functional health)  You were managed by both medications and non-pharmacologic (non-medication) methods to try to improve mood sleep pain, cognition, function, and quality of life.      Medications were adjusted during your course based on their effectiveness, tolerability, and safety.  You tolerated the medication(s) adequately during your course without significant side-effects noted by rehab staff or by yourself (family member).      Prior to beginning of treatment medications risks and benefits and possible side effects including risk of suicidality related to treatment with antidepressants were reviewed with patient (if applicable caregiver). The patient (if applicable caregiver) verbalized understanding and agreement for treatment.      (SI/HI RISK Only) You denied any suicidal ideation, intent or plan at the time of discharge and denied homicidal ideation, intent or plan at the time of discharge. There was no obvious psychosis  at the time of discharge. Sleep and appetite were improved. The patient was tolerating medications and was not reporting any significant side effects at the time of discharge.    You have functionally improved significantly and appear to be doing better.  Treatment team (rehab physician, internal medicine team, skilled therapists, nursing, and social work) feel you are adequately appropriate for discharge.  You nevertheless remain at risk for fall, injury, and additional medical and physical complications in the future.  Obtain transportation to nearest hospital from family/friends or call 911 for any concerning new symptoms.      Please follow-up with recommended discharge plan to optimize your mental and physical health and decrease risk of mental and physical health complications and hospital re-admission.    Risk of serotonin syndrome  Taking Olanzapine may increase risk of serotonin syndrome which can lead to anxiety, restlessness, confusion, and agitation.  It may  also cause sweating, elevated heart rate, diarrhea, and hypertension.  This risk is low and you the benefits from these medications currently appear to outweigh the risks.  The benefits from these medications currently appear to outweigh the risks per prior behavioral health/psychiatry recommendations.  Nevertheless, if you notice any symptoms notify your physician or obtain transportation to nearest emergency room for evaluation (or call 911) .      Risk of extrapyramidal (specific neurologic) symptoms   Taking Olanzapine may increase risk of extrapyramidal symptoms which can cause tardive dyskinesia (See below), neuromalignant syndrome (See below), akathisia (feeling restless/inner restlessness), dystonia (involuntary muscle contractions resulting in abnormal postures or repetitive movements), and Parkinsonism (symptoms similar to Parkinson's disease such as tremor, rigidity, and slowing of movements).  Stopping this (these) medication(s) or reducing them may improve symptoms and other medications or treatment may be required as well.  Nevertheless, if you notice any symptoms notify your physician and/or seek medical attention.      Risk of tardive dyskinesia   Taking Olanzapine may increase risk of tardive dyskinesia which is a neurological movement disorder cause by use of dopamine receptor blocking drugs prescribed to treat certain psychiatric and gastrointestinal conditions.  This can cause involuntary and abnormal movements of the jaw, lips, and tongue.  Typical symptoms include facial grimacing, sticking out of tongue, sucking or fish-like movements of the mouth.  In some cases, the arms and/or legs may also be affected by involuntary rapid, jerking movements (chorea), or slow, writhing movements (athetosis).  Symptoms of tardive dystonia include slower, twisting movements of larger muscles of the neck, trunk, as well as the face.  The benefits from these medications currently appear to outweigh the risks  per prior behavioral health/psychiatry recommendations.  Nevertheless, if you notice any symptoms or worsening symptoms notify your physician as soon as possible.  For significant or concerning symptoms and unable to contact your physician, obtain transportation to nearest emergency room for evaluation (or call 911) .      Risk of neuromalignant syndrome  Taking Olanzapine may increase risk of neuromalignant syndrome which can cause very high fever, irregular pulse, accelerated heart beat, increased respiration rate, muscle tightness (rigidity), confusion, high or low blood pressure, sweating, or other serious and even life-threatening side effects.  This risk is low and you the benefits from these medications currently appear to outweigh the risks.  The benefits from these medications currently appear to outweigh the risks per prior behavioral health/psychiatry recommendations.  Nevertheless, if you notice any symptoms notify your physician and  obtain transportation to nearest emergency room for evaluation (or call 911).      Antidepressants and suicide risk  Prior to beginning of treatment new psychotropic medications risks and benefits and possible side effects including risk of suicidality related to treatment with antidepressants were reviewed with the patient. The patient verbalized understanding and agreement for treatment.     Depakote (Divalproex Sodium)/Valproate Management:  This medication has helped to improve your mood, function, and sleep. It is a mood stabilizer.  It can rarely cause serious liver failure as discussed with you.  You will need to obtain intermittent follow-up lab work to ensure your liver function enzymes are stable/normal.  Should you develop confusion, significant abdominal pain, significant increased swelling, or other concerning signs or symptoms obtain transportation to hospital for evaluation.  It can also rarely cause serious pancreatitis - seek prompt medical attention should  you develop abdominal pain, nausea, vomiting, or other significant concerns.    *Fetal risk (women of child-bearing age only) - Depakote (valproate) can cause major congential malformations particularly neural tube defects (ie spinal bifida) and impair cognition.  You must use effective contraception while using this drug.    ------------------------------------------------------------------------------------------------------------  ------------------------------------------------------------------------------------------------------------            Please note a summary of your hospital stay with relevant information for your doctors will try to be sent to them.  Please confirm with your doctors at your follow up visits that they have received this summary and have them contact [facility] if they have not received them along with any other medical records they may require.     [facility] [employer] Phone Number:  [phone]

## 2025-02-15 LAB
BASOPHILS # BLD AUTO: 0.02 THOUSANDS/ΜL (ref 0–0.1)
BASOPHILS NFR BLD AUTO: 1 % (ref 0–1)
EOSINOPHIL # BLD AUTO: 0.09 THOUSAND/ΜL (ref 0–0.61)
EOSINOPHIL NFR BLD AUTO: 3 % (ref 0–6)
ERYTHROCYTE [DISTWIDTH] IN BLOOD BY AUTOMATED COUNT: 14.3 % (ref 11.6–15.1)
HCT VFR BLD AUTO: 33.5 % (ref 34.8–46.1)
HGB BLD-MCNC: 11 G/DL (ref 11.5–15.4)
IMM GRANULOCYTES # BLD AUTO: 0.01 THOUSAND/UL (ref 0–0.2)
IMM GRANULOCYTES NFR BLD AUTO: 0 % (ref 0–2)
LYMPHOCYTES # BLD AUTO: 1.9 THOUSANDS/ΜL (ref 0.6–4.47)
LYMPHOCYTES NFR BLD AUTO: 56 % (ref 14–44)
MCH RBC QN AUTO: 33 PG (ref 26.8–34.3)
MCHC RBC AUTO-ENTMCNC: 32.8 G/DL (ref 31.4–37.4)
MCV RBC AUTO: 101 FL (ref 82–98)
MONOCYTES # BLD AUTO: 0.46 THOUSAND/ΜL (ref 0.17–1.22)
MONOCYTES NFR BLD AUTO: 14 % (ref 4–12)
NEUTROPHILS # BLD AUTO: 0.89 THOUSANDS/ΜL (ref 1.85–7.62)
NEUTS SEG NFR BLD AUTO: 26 % (ref 43–75)
NRBC BLD AUTO-RTO: 0 /100 WBCS
PLATELET # BLD AUTO: 198 THOUSANDS/UL (ref 149–390)
PMV BLD AUTO: 9.5 FL (ref 8.9–12.7)
RBC # BLD AUTO: 3.33 MILLION/UL (ref 3.81–5.12)
WBC # BLD AUTO: 3.37 THOUSAND/UL (ref 4.31–10.16)

## 2025-02-15 PROCEDURE — 97116 GAIT TRAINING THERAPY: CPT

## 2025-02-15 PROCEDURE — 92526 ORAL FUNCTION THERAPY: CPT

## 2025-02-15 PROCEDURE — 97110 THERAPEUTIC EXERCISES: CPT

## 2025-02-15 PROCEDURE — 85025 COMPLETE CBC W/AUTO DIFF WBC: CPT

## 2025-02-15 PROCEDURE — 97530 THERAPEUTIC ACTIVITIES: CPT

## 2025-02-15 RX ADMIN — LACOSAMIDE 200 MG: 100 TABLET, FILM COATED ORAL at 21:14

## 2025-02-15 RX ADMIN — Medication 1000 MCG: at 09:03

## 2025-02-15 RX ADMIN — Medication 6 MG: at 21:14

## 2025-02-15 RX ADMIN — DEXTRAN 70, GLYCERIN, HYPROMELLOSE 1 DROP: 1; 2; 3 SOLUTION/ DROPS OPHTHALMIC at 09:10

## 2025-02-15 RX ADMIN — OLANZAPINE 10 MG: 10 TABLET, FILM COATED ORAL at 21:14

## 2025-02-15 RX ADMIN — LEVOTHYROXINE SODIUM 175 MCG: 0.1 TABLET ORAL at 05:48

## 2025-02-15 RX ADMIN — LACOSAMIDE 150 MG: 50 TABLET, FILM COATED ORAL at 09:03

## 2025-02-15 RX ADMIN — SENNOSIDES AND DOCUSATE SODIUM 2 TABLET: 50; 8.6 TABLET ORAL at 09:02

## 2025-02-15 RX ADMIN — BENZTROPINE MESYLATE 2 MG: 2 TABLET ORAL at 17:00

## 2025-02-15 RX ADMIN — SENNOSIDES AND DOCUSATE SODIUM 2 TABLET: 50; 8.6 TABLET ORAL at 17:00

## 2025-02-15 RX ADMIN — DIVALPROEX SODIUM 250 MG: 250 TABLET, DELAYED RELEASE ORAL at 21:14

## 2025-02-15 RX ADMIN — BENZTROPINE MESYLATE 2 MG: 2 TABLET ORAL at 09:03

## 2025-02-15 RX ADMIN — DEXTRAN 70, GLYCERIN, HYPROMELLOSE 1 DROP: 1; 2; 3 SOLUTION/ DROPS OPHTHALMIC at 17:03

## 2025-02-15 RX ADMIN — ENOXAPARIN SODIUM 40 MG: 40 INJECTION SUBCUTANEOUS at 09:03

## 2025-02-15 RX ADMIN — DIVALPROEX SODIUM 250 MG: 250 TABLET, DELAYED RELEASE ORAL at 09:03

## 2025-02-15 RX ADMIN — NYSTATIN: 100000 POWDER TOPICAL at 09:03

## 2025-02-15 RX ADMIN — NYSTATIN: 100000 POWDER TOPICAL at 21:16

## 2025-02-15 NOTE — PROGRESS NOTES
02/15/25 0800   Pain Assessment   Pain Assessment Tool 0-10   Pain Score No Pain   Restrictions/Precautions   Precautions Aspiration;Bed/chair alarms;Cognitive;Fall Risk;Seizure;Supervision on toilet/commode  (neutropenic precautions)   Weight Bearing Restrictions No   ROM Restrictions No   Eating   Type of Assistance Needed Set-up / clean-up;Supervision;Verbal cues   Physical Assistance Level No physical assistance   Eating CARE Score 4   Swallow Assessment   Swallow Treatment Assessment Daily Dysphagia Tx Note      Patient Name: Esperanza Prajapati     Today's Date: 2/15/2025        Current Risks for Dysphagia & Aspiration: recent intubation, prolonged intubation, respiratory compromise, weak cough, general debilitation, new neuro event, cognitive deficit, mental status change, reduced alertness, positioning issues      Current Symptoms/Concerns: cough with food and liquids, during and after meal, holding food in mouth, recent pneumonia     Current diet:Dysphagia Level 3 diet and thin liquids      Premorbid diet::regular diet and thin liquids      Positioning: upright in recliner     Items administered:Consistencies Administered: thin liquids and soft solids  Materials administered included: pancakes, jello and thin OJ by cup     Total amount of meal consumed:   100% of meal and 360cc of thins        Summary:    Pt presenting with minimal and mild oral and WFL and minimal pharyngeal dysphagia today. Symptoms or concerns included decreased mastication suspected pharyngeal swallow delay.       Pt set up with tray- requesting pancakes as only eggs provided- SLP retrieved from kitchen. SLP set up tray and reviewed strategies for slow rate and alternate food and liquids. Pt with little carryover- eating fast and larger bites needing increased verbal cues to slow down. Minimal delay and slightly prolonged oral processing however bolus formations grossly functional with timely transfers and swallows. Verbal cues  to drink thin liquids- pt with successive sips and swallows drinking entire 4oz at a time. Oral clearance functional with no retention present. No s/s aspiration present.         Recommendations:  Diet: Dysphagia Level 3 and thin liquids  Meds: as best tolerated  Strategies: upright posture, only feed when fully alert, slow rate of feeding, small bites/sips, quiet environment (tv off, limit talking, door closed, etc.), alternating bites and sips, and OOB for meals     Pt is cleared to have her canned ravioli IF they are cut into smaller pieces and with full supervision-needs cues to slow down.     FULL supervision w/ meals.  Results reviewed with:  patient,  Aspiration precautions posted.     F/u ST tx: Pt is currently recommended for further skilled SLP services targeting dysphagia therapy in order to maximize oral and pharyngeal swallow skills, while safely supporting PO intake, as well as to improve independent carryover of safe swallow strategies.       Plan:      Monitor advancement of diet to now dysphagia level 3 diet and thins     FULL supervision with meals with cues to slow down     Continue trials of diet upgrades     Family training scheduled for 2/17 at 9am for ST w/ meal to review diet recommendations and swallow strategies   Swallow Assessment Prognosis   Prognosis Good   Prognosis Considerations Age;Co-morbidities;Medical diagnosis;Medical prognosis;Previous level of function;Severity of impairments;New learning ability;Ability to carry over;Cooperation   SLP Therapy Minutes   SLP Time In 0800   SLP Time Out 0830   SLP Total Time (minutes) 30   SLP Mode of treatment - Individual (minutes) 30   SLP Mode of treatment - Concurrent (minutes) 0   SLP Mode of treatment - Group (minutes) 0   SLP Mode of treatment - Co-treat (minutes) 0   SLP Mode of Treatment - Total time(minutes) 30 minutes   SLP Cumulative Minutes 190   Therapy Time missed   Time missed? No

## 2025-02-15 NOTE — PLAN OF CARE
Problem: PAIN - ADULT  Goal: Verbalizes/displays adequate comfort level or baseline comfort level  Description: Interventions:  - Encourage patient to monitor pain and request assistance  - Assess pain using appropriate pain scale  - Administer analgesics based on type and severity of pain and evaluate response  - Implement non-pharmacological measures as appropriate and evaluate response  - Consider cultural and social influences on pain and pain management  - Notify physician/advanced practitioner if interventions unsuccessful or patient reports new pain  Outcome: Progressing     Problem: INFECTION - ADULT  Goal: Absence of fever/infection during neutropenic period  Description: INTERVENTIONS:  - Monitor WBC    Outcome: Progressing     Problem: Potential for Falls  Goal: Patient will remain free of falls  Description: INTERVENTIONS:  - Educate patient/family on patient safety including physical limitations  - Instruct patient to call for assistance with activity   - Consult OT/PT to assist with strengthening/mobility   - Keep Call bell within reach  - Keep bed low and locked with side rails adjusted as appropriate  - Keep care items and personal belongings within reach  - Initiate and maintain comfort rounds  - Make Fall Risk Sign visible to staff  - Offer Toileting every 2-4 Hours, in advance of need  - Initiate/Maintain bed/chair alarm  - Apply yellow socks and bracelet for high fall risk patients  - Consider moving patient to room near nurses station  - Room next to nurse's station  Outcome: Progressing

## 2025-02-15 NOTE — PROGRESS NOTES
02/15/25 1400   Pain Assessment   Pain Assessment Tool 0-10   Pain Score No Pain   Restrictions/Precautions   Precautions Aspiration;Bed/chair alarms;Cognitive;Fall Risk;Seizure;Supervision on toilet/commode  (neutropenic precaution)   Weight Bearing Restrictions No   ROM Restrictions No   Cognition   Overall Cognitive Status Impaired   Arousal/Participation Alert;Cooperative   Sit to Stand   Type of Assistance Needed Supervision   Physical Assistance Level No physical assistance   Comment no AD   Sit to Stand CARE Score 4   Bed-Chair Transfer   Type of Assistance Needed Supervision   Physical Assistance Level No physical assistance   Comment no AD   Chair/Bed-to-Chair Transfer CARE Score 4   Walk 10 Feet   Type of Assistance Needed Supervision   Physical Assistance Level No physical assistance   Comment no AD   Walk 10 Feet CARE Score 4   Walk 50 Feet with Two Turns   Type of Assistance Needed Supervision   Physical Assistance Level No physical assistance   Comment CS without AD   Walk 50 Feet with Two Turns CARE Score 4   Walk 150 Feet   Type of Assistance Needed Supervision   Physical Assistance Level No physical assistance   Comment CS without AD   Walk 150 Feet CARE Score 4   Walking 10 Feet on Uneven Surfaces   Type of Assistance Needed Supervision   Physical Assistance Level No physical assistance   Comment indoor ramp without AD with CS   Walking 10 Feet on Uneven Surfaces CARE Score 4   Ambulation   Does the patient walk? 2. Yes   Wheel 50 Feet with Two Turns   Reason if not Attempted Activity not applicable   Wheel 50 Feet with Two Turns CARE Score 9   Wheel 150 Feet   Reason if not Attempted Activity not applicable   Wheel 150 Feet CARE Score 9   Wheelchair mobility   Does the patient use a wheelchair? 0. No   Curb or Single Stair   Style negotiated Curb   Type of Assistance Needed Incidental touching   Physical Assistance Level No physical assistance   Comment 8 inch curb step without AD with CG due  to one incident of her foot caught on the curb going down but didn't lose her balance.   1 Step (Curb) CARE Score 4   4 Steps   Type of Assistance Needed Supervision   Physical Assistance Level No physical assistance   Comment CS FF in the hallway using 1 HR   4 Steps CARE Score 4   12 Steps   Type of Assistance Needed Supervision   Physical Assistance Level No physical assistance   Comment CS in FF in hallway using 1 HR   12 Steps CARE Score 4   Therapeutic Interventions   Strengthening Seated ther ex on BLE's using 2.5# ankle weights for LAQ, hip flexion, hip abduction/adduction with knees extended, hip adduction with ball squeezes and ankle DF/PF for 3 x 10 reps each. With recliner chair elevated, she performed heel slides, SLR and ankle DF/PF for 3 x 10 reps each. Rest breaks given in between.   Balance While ambulating in the hallway she retrieved cones in the hallway at various heights and give it to therapist. No LOB noted.   Equipment Use   NuStep Level 2 x 15 minutes BUE/BLE   Assessment   Treatment Assessment Patient was very pleasant and very cooperative during therapy session. She tolerated therapy very well with rest breaks in between. She's Supervision with STS transfers and SPT without AD. She ambulated around the unit without AD with CS. She goes up and down FF of stairs in the hallway using 1 HR with CS. Goes up and down a curb step without AD with CG due to 1 incident of her foot being caught on the curb step while going down but she didn't lose her balance. She's progressing well in therapy. Continue with current PT POC.   Family/Caregiver Present no   Barriers to Discharge Inaccessible home environment;Decreased caregiver support   PT Barriers   Physical Impairment Decreased strength;Decreased endurance;Impaired balance;Decreased mobility;Impaired judgement   Functional Limitation Ramp negotiation;Stair negotiation;Standing;Transfers;Walking   Plan   Treatment/Interventions Functional transfer  training;LE strengthening/ROM;Elevations;Therapeutic exercise;Endurance training;Gait training   Progress Progressing toward goals   PT Therapy Minutes   PT Time In 1400   PT Time Out 1500   PT Total Time (minutes) 60   PT Mode of treatment - Individual (minutes) 60   PT Mode of treatment - Concurrent (minutes) 0   PT Mode of treatment - Group (minutes) 0   PT Mode of treatment - Co-treat (minutes) 0   PT Mode of Treatment - Total time(minutes) 60 minutes   PT Cumulative Minutes 385   Therapy Time missed   Time missed? No

## 2025-02-15 NOTE — PLAN OF CARE
Problem: INFECTION - ADULT  Goal: Absence of fever/infection during neutropenic period  Description: INTERVENTIONS:  - Monitor WBC    Outcome: Progressing     Problem: SAFETY ADULT  Goal: Patient will remain free of falls  Description: INTERVENTIONS:  - Educate patient/family on patient safety including physical limitations  - Instruct patient to call for assistance with activity   - Consult OT/PT to assist with strengthening/mobility   - Keep Call bell within reach  - Keep bed low and locked with side rails adjusted as appropriate  - Keep care items and personal belongings within reach  - Initiate and maintain comfort rounds  - Make Fall Risk Sign visible to staff  - Apply yellow socks and bracelet for high fall risk patients  - Consider moving patient to room near nurses station  Outcome: Progressing

## 2025-02-16 LAB
BASOPHILS # BLD AUTO: 0.02 THOUSANDS/ΜL (ref 0–0.1)
BASOPHILS NFR BLD AUTO: 1 % (ref 0–1)
EOSINOPHIL # BLD AUTO: 0.1 THOUSAND/ΜL (ref 0–0.61)
EOSINOPHIL NFR BLD AUTO: 3 % (ref 0–6)
ERYTHROCYTE [DISTWIDTH] IN BLOOD BY AUTOMATED COUNT: 14.1 % (ref 11.6–15.1)
HCT VFR BLD AUTO: 33.2 % (ref 34.8–46.1)
HGB BLD-MCNC: 10.8 G/DL (ref 11.5–15.4)
IMM GRANULOCYTES # BLD AUTO: 0.01 THOUSAND/UL (ref 0–0.2)
IMM GRANULOCYTES NFR BLD AUTO: 0 % (ref 0–2)
LYMPHOCYTES # BLD AUTO: 2.41 THOUSANDS/ΜL (ref 0.6–4.47)
LYMPHOCYTES NFR BLD AUTO: 59 % (ref 14–44)
MCH RBC QN AUTO: 33.4 PG (ref 26.8–34.3)
MCHC RBC AUTO-ENTMCNC: 32.5 G/DL (ref 31.4–37.4)
MCV RBC AUTO: 103 FL (ref 82–98)
MONOCYTES # BLD AUTO: 0.48 THOUSAND/ΜL (ref 0.17–1.22)
MONOCYTES NFR BLD AUTO: 12 % (ref 4–12)
NEUTROPHILS # BLD AUTO: 1.03 THOUSANDS/ΜL (ref 1.85–7.62)
NEUTS SEG NFR BLD AUTO: 25 % (ref 43–75)
NRBC BLD AUTO-RTO: 0 /100 WBCS
PLATELET # BLD AUTO: 180 THOUSANDS/UL (ref 149–390)
PMV BLD AUTO: 9.8 FL (ref 8.9–12.7)
RBC # BLD AUTO: 3.23 MILLION/UL (ref 3.81–5.12)
WBC # BLD AUTO: 4.05 THOUSAND/UL (ref 4.31–10.16)

## 2025-02-16 PROCEDURE — 97530 THERAPEUTIC ACTIVITIES: CPT

## 2025-02-16 PROCEDURE — 97116 GAIT TRAINING THERAPY: CPT

## 2025-02-16 PROCEDURE — 97110 THERAPEUTIC EXERCISES: CPT

## 2025-02-16 PROCEDURE — 97535 SELF CARE MNGMENT TRAINING: CPT

## 2025-02-16 PROCEDURE — 85025 COMPLETE CBC W/AUTO DIFF WBC: CPT

## 2025-02-16 RX ADMIN — DIVALPROEX SODIUM 250 MG: 250 TABLET, DELAYED RELEASE ORAL at 08:25

## 2025-02-16 RX ADMIN — NYSTATIN 1 APPLICATION: 100000 POWDER TOPICAL at 08:25

## 2025-02-16 RX ADMIN — BENZTROPINE MESYLATE 2 MG: 2 TABLET ORAL at 08:25

## 2025-02-16 RX ADMIN — LACOSAMIDE 200 MG: 100 TABLET, FILM COATED ORAL at 21:57

## 2025-02-16 RX ADMIN — BENZTROPINE MESYLATE 2 MG: 2 TABLET ORAL at 17:02

## 2025-02-16 RX ADMIN — OLANZAPINE 10 MG: 10 TABLET, FILM COATED ORAL at 21:57

## 2025-02-16 RX ADMIN — Medication 6 MG: at 21:57

## 2025-02-16 RX ADMIN — DEXTRAN 70, GLYCERIN, HYPROMELLOSE 1 DROP: 1; 2; 3 SOLUTION/ DROPS OPHTHALMIC at 17:02

## 2025-02-16 RX ADMIN — DIVALPROEX SODIUM 250 MG: 250 TABLET, DELAYED RELEASE ORAL at 21:58

## 2025-02-16 RX ADMIN — Medication 1000 MCG: at 08:25

## 2025-02-16 RX ADMIN — LEVOTHYROXINE SODIUM 175 MCG: 0.1 TABLET ORAL at 05:26

## 2025-02-16 RX ADMIN — DEXTRAN 70, GLYCERIN, HYPROMELLOSE 1 DROP: 1; 2; 3 SOLUTION/ DROPS OPHTHALMIC at 08:25

## 2025-02-16 RX ADMIN — LACOSAMIDE 150 MG: 50 TABLET, FILM COATED ORAL at 08:24

## 2025-02-16 RX ADMIN — ENOXAPARIN SODIUM 40 MG: 40 INJECTION SUBCUTANEOUS at 08:24

## 2025-02-16 RX ADMIN — SENNOSIDES AND DOCUSATE SODIUM 2 TABLET: 50; 8.6 TABLET ORAL at 17:02

## 2025-02-16 RX ADMIN — SENNOSIDES AND DOCUSATE SODIUM 2 TABLET: 50; 8.6 TABLET ORAL at 08:24

## 2025-02-16 RX ADMIN — NYSTATIN 1 APPLICATION: 100000 POWDER TOPICAL at 21:59

## 2025-02-16 NOTE — PLAN OF CARE
Problem: SAFETY ADULT  Goal: Patient will remain free of falls  Description: INTERVENTIONS:  - Educate patient/family on patient safety including physical limitations  - Instruct patient to call for assistance with activity   - Consult OT/PT to assist with strengthening/mobility   - Keep Call bell within reach  - Keep bed low and locked with side rails adjusted as appropriate  - Keep care items and personal belongings within reach  - Initiate and maintain comfort rounds  - Make Fall Risk Sign visible to staff  - Offer Toileting every 2 Hours, in advance of need  - Initiate/Maintain bed/chair alarm  - Obtain necessary fall risk management equipment: bed/chair alarms  - Apply yellow socks and bracelet for high fall risk patients  - Consider moving patient to room near nurses station  Outcome: Progressing  Goal: Maintain or return to baseline ADL function  Description: INTERVENTIONS:  -  Assess patient's ability to carry out ADLs; assess patient's baseline for ADL function and identify physical deficits which impact ability to perform ADLs (bathing, care of mouth/teeth, toileting, grooming, dressing, etc.)  - Assess/evaluate cause of self-care deficits   - Assess range of motion  - Assess patient's mobility; develop plan if impaired  - Assess patient's need for assistive devices and provide as appropriate  - Encourage maximum independence but intervene and supervise when necessary  - Involve family in performance of ADLs  - Assess for home care needs following discharge   - Consider OT consult to assist with ADL evaluation and planning for discharge  - Provide patient education as appropriate  Outcome: Progressing

## 2025-02-16 NOTE — PROGRESS NOTES
"   02/16/25 0930   Pain Assessment   Pain Score No Pain   Restrictions/Precautions   Precautions Aspiration;Bed/chair alarms;Cognitive;Fall Risk;Seizure;Supervision on toilet/commode  (Neutropenic precautions)   Oral Hygiene   Type of Assistance Needed Independent   Physical Assistance Level No physical assistance   Comment standing at sink   Oral Hygiene CARE Score 6   Shower/Bathe Self   Type of Assistance Needed Supervision   Physical Assistance Level No physical assistance   Comment seated on shower chair   Shower/Bathe Self CARE Score 4   Bathing   Assessed Bath Style Shower   Anticipated D/C Bath Style Shower   Tub/Shower Transfer   Assessed Shower   Findings close supervision for wet and dry transfer with grab bars as needed   Upper Body Dressing   Type of Assistance Needed Independent   Physical Assistance Level No physical assistance   Upper Body Dressing CARE Score 6   Lower Body Dressing   Type of Assistance Needed Physical assistance   Physical Assistance Level 25% or less   Comment A to thread left leg, was able to thread right leg with increased time and pull up independently. Educated patient on using a reacher to thread leg but she declined trying this.   Lower Body Dressing CARE Score 3   Putting On/Taking Off Footwear   Type of Assistance Needed Physical assistance   Physical Assistance Level 26%-50%   Comment A to don socks-unwilling to try alternative methods to don sock reporting \"I can't do it.\" while dressing. At end of session, educated patient on use of sock aid and she was able to complete with min A but still reached A to pull sock up in back of heel. Provided print out of sock aid if she is interested in purchasing for joanna.   Putting On/Taking Off Footwear CARE Score 3   Lying to Sitting on Side of Bed   Type of Assistance Needed Independent   Physical Assistance Level No physical assistance   Lying to Sitting on Side of Bed CARE Score 6   Sit to Stand   Type of Assistance Needed " Independent   Physical Assistance Level No physical assistance   Sit to Stand CARE Score 6   Bed-Chair Transfer   Type of Assistance Needed Independent   Physical Assistance Level No physical assistance   Chair/Bed-to-Chair Transfer CARE Score 6   Toileting Hygiene   Type of Assistance Needed Incidental touching   Physical Assistance Level No physical assistance   Toileting Hygiene CARE Score 4   Toilet Transfer   Type of Assistance Needed Independent   Physical Assistance Level No physical assistance   Toilet Transfer CARE Score 6   Cognition   Overall Cognitive Status Impaired   Arousal/Participation Alert;Cooperative   Attention Within functional limits   Orientation Level Oriented X4   Activity Tolerance   Activity Tolerance Patient tolerated treatment well   Assessment   Treatment Assessment Good response to OT ADL session this morning. Patient is progressing well and is complete most tasks independently or with supervision. She did still require assistance for both threading her left leg into her pants and donning her socks today. Education provided on use of AE for LB dressing to increase independence; however, patient reports that she is able to get her pants and socks on at home and had questionable understanding of the education. Provided handouts for AE to purchase if interested. Patient is scheduled for discharge tomorrow. Anticipate patient would be safe at home with family support as needed. Recommend family supervise for shower transfers and provide A with LBD as needed.   Problem List Decreased strength;Decreased range of motion;Decreased endurance;Impaired balance;Decreased mobility;Decreased coordination;Decreased cognition;Impaired judgement;Decreased safety awareness;Obesity   Plan   Treatment/Interventions Functional transfer training;LE strengthening/ROM;Elevations;Therapeutic exercise;Endurance training;Gait training;ADL retraining   Progress Progressing toward goals   OT Therapy Minutes   OT  Time In 0930   OT Time Out 1030   OT Total Time (minutes) 60   OT Mode of treatment - Individual (minutes) 60   OT Mode of treatment - Concurrent (minutes) 0   OT Mode of treatment - Group (minutes) 0   OT Mode of treatment - Co-treat (minutes) 0   OT Mode of Treatment - Total time(minutes) 60 minutes   OT Cumulative Minutes 780

## 2025-02-16 NOTE — PROGRESS NOTES
02/16/25 1215   Pain Assessment   Pain Assessment Tool 0-10   Pain Score No Pain   Restrictions/Precautions   Precautions Aspiration;Cognitive;Seizure   Weight Bearing Restrictions No   ROM Restrictions No   General   Change In Medical/Functional Status +neutropenic   Cognition   Overall Cognitive Status Impaired   Arousal/Participation Alert;Cooperative   Attention Within functional limits   Orientation Level Oriented X4   Memory Decreased short term memory   Following Commands Follows one step commands without difficulty   Subjective   Subjective Pt ready for PT   Roll Left and Right   Type of Assistance Needed Independent   Physical Assistance Level No physical assistance   Comment no bed rail   Roll Left and Right CARE Score 6   Sit to Lying   Type of Assistance Needed Independent   Physical Assistance Level No physical assistance   Comment no bed rail   Sit to Lying CARE Score 6   Lying to Sitting on Side of Bed   Type of Assistance Needed Independent   Physical Assistance Level No physical assistance   Comment no bed rail   Lying to Sitting on Side of Bed CARE Score 6   Sit to Stand   Type of Assistance Needed Independent   Physical Assistance Level No physical assistance   Comment no AD   Sit to Stand CARE Score 6   Bed-Chair Transfer   Type of Assistance Needed Independent   Physical Assistance Level No physical assistance   Comment no AD   Chair/Bed-to-Chair Transfer CARE Score 6   Car Transfer   Type of Assistance Needed Independent   Physical Assistance Level No physical assistance   Comment simulated set up   Car Transfer CARE Score 6   Walk 10 Feet   Type of Assistance Needed Independent   Physical Assistance Level No physical assistance   Comment no AD   Walk 10 Feet CARE Score 6   Walk 50 Feet with Two Turns   Type of Assistance Needed Independent   Physical Assistance Level No physical assistance   Comment no AD   Walk 50 Feet with Two Turns CARE Score 6   Walk 150 Feet   Type of Assistance Needed  "Independent   Physical Assistance Level No physical assistance   Comment no AD   Walk 150 Feet CARE Score 6   Walking 10 Feet on Uneven Surfaces   Type of Assistance Needed Independent   Physical Assistance Level No physical assistance   Comment indoor ramp no AD   Walking 10 Feet on Uneven Surfaces CARE Score 6   Ambulation   Primary Mode of Locomotion Prior to Admission Walk   Distance Walked (feet) 650 ft   Assist Device Other  (no AD)   Gait Pattern Slow Elizabeth   Limitations Noted In Endurance   Provided Assistance with: Balance   Walk Assist Level Independent   Findings no AD, wide WENDY   Does the patient walk? 2. Yes   Curb or Single Stair   Style negotiated Curb   Type of Assistance Needed Supervision   Physical Assistance Level No physical assistance   Comment 8 \" curb   1 Step (Curb) CARE Score 4   4 Steps   Type of Assistance Needed Independent   Physical Assistance Level No physical assistance   Comment FF 1 HR   4 Steps CARE Score 6   12 Steps   Type of Assistance Needed Independent   Physical Assistance Level No physical assistance   Comment FF 1 HR   12 Steps CARE Score 6   Stairs   Type Stairs   # of Steps 12   Assist Devices Single Rail   Picking Up Object   Type of Assistance Needed Independent   Physical Assistance Level No physical assistance   Comment no AD   Picking Up Object CARE Score 6   Therapeutic Interventions   Strengthening in // bars 2 x 10 BLE hip abd, hamstring curls, calf raises   Equipment Use   NuStep L2 x 16 min BUE and BLE   Other Comments   Comments Pt independent with donning and doffing shoes in recliner chair   Assessment   Treatment Assessment Esperanza seen for 60 min skilled PT session. Upgraded to In room privledges (IRPs) Nohelia CHEN made aware and board updated. Pt independent without device for transfers, bed mobility and short distance ambulation. Continue to focus on endurance training to maximize community integration. Pt verbalized understanding of in room privledges. "   Family/Caregiver Present no   PT Barriers   Physical Impairment Decreased strength;Decreased endurance   PT Therapy Minutes   PT Time In 1215   PT Time Out 1315   PT Total Time (minutes) 60   PT Mode of treatment - Individual (minutes) 60   PT Mode of treatment - Concurrent (minutes) 0   PT Mode of treatment - Group (minutes) 0   PT Mode of treatment - Co-treat (minutes) 0   PT Mode of Treatment - Total time(minutes) 60 minutes   PT Cumulative Minutes 445   Therapy Time missed   Time missed? No

## 2025-02-17 ENCOUNTER — TRANSITIONAL CARE MANAGEMENT (OUTPATIENT)
Dept: INTERNAL MEDICINE CLINIC | Facility: CLINIC | Age: 55
End: 2025-02-17

## 2025-02-17 VITALS
HEIGHT: 69 IN | SYSTOLIC BLOOD PRESSURE: 92 MMHG | BODY MASS INDEX: 33.33 KG/M2 | TEMPERATURE: 96.5 F | DIASTOLIC BLOOD PRESSURE: 54 MMHG | WEIGHT: 225 LBS | RESPIRATION RATE: 18 BRPM | OXYGEN SATURATION: 95 % | HEART RATE: 85 BPM

## 2025-02-17 LAB
ANION GAP SERPL CALCULATED.3IONS-SCNC: 9 MMOL/L (ref 4–13)
BASOPHILS # BLD AUTO: 0.03 THOUSANDS/ΜL (ref 0–0.1)
BASOPHILS NFR BLD AUTO: 1 % (ref 0–1)
BUN SERPL-MCNC: 12 MG/DL (ref 5–25)
CALCIUM SERPL-MCNC: 9.4 MG/DL (ref 8.4–10.2)
CHLORIDE SERPL-SCNC: 101 MMOL/L (ref 96–108)
CO2 SERPL-SCNC: 29 MMOL/L (ref 21–32)
CREAT SERPL-MCNC: 0.82 MG/DL (ref 0.6–1.3)
EOSINOPHIL # BLD AUTO: 0.11 THOUSAND/ΜL (ref 0–0.61)
EOSINOPHIL NFR BLD AUTO: 3 % (ref 0–6)
ERYTHROCYTE [DISTWIDTH] IN BLOOD BY AUTOMATED COUNT: 14.4 % (ref 11.6–15.1)
GFR SERPL CREATININE-BSD FRML MDRD: 82 ML/MIN/1.73SQ M
GLUCOSE P FAST SERPL-MCNC: 82 MG/DL (ref 65–99)
GLUCOSE SERPL-MCNC: 82 MG/DL (ref 65–140)
HCT VFR BLD AUTO: 34.4 % (ref 34.8–46.1)
HGB BLD-MCNC: 11 G/DL (ref 11.5–15.4)
IMM GRANULOCYTES # BLD AUTO: 0 THOUSAND/UL (ref 0–0.2)
IMM GRANULOCYTES NFR BLD AUTO: 0 % (ref 0–2)
LYMPHOCYTES # BLD AUTO: 2.25 THOUSANDS/ΜL (ref 0.6–4.47)
LYMPHOCYTES NFR BLD AUTO: 56 % (ref 14–44)
MCH RBC QN AUTO: 33.3 PG (ref 26.8–34.3)
MCHC RBC AUTO-ENTMCNC: 32 G/DL (ref 31.4–37.4)
MCV RBC AUTO: 104 FL (ref 82–98)
MONOCYTES # BLD AUTO: 0.57 THOUSAND/ΜL (ref 0.17–1.22)
MONOCYTES NFR BLD AUTO: 14 % (ref 4–12)
NEUTROPHILS # BLD AUTO: 1.02 THOUSANDS/ΜL (ref 1.85–7.62)
NEUTS SEG NFR BLD AUTO: 26 % (ref 43–75)
NRBC BLD AUTO-RTO: 0 /100 WBCS
PLATELET # BLD AUTO: 173 THOUSANDS/UL (ref 149–390)
PMV BLD AUTO: 10.2 FL (ref 8.9–12.7)
POTASSIUM SERPL-SCNC: 3.9 MMOL/L (ref 3.5–5.3)
RBC # BLD AUTO: 3.3 MILLION/UL (ref 3.81–5.12)
SODIUM SERPL-SCNC: 139 MMOL/L (ref 135–147)
WBC # BLD AUTO: 3.98 THOUSAND/UL (ref 4.31–10.16)

## 2025-02-17 PROCEDURE — 92526 ORAL FUNCTION THERAPY: CPT

## 2025-02-17 PROCEDURE — 80048 BASIC METABOLIC PNL TOTAL CA: CPT | Performed by: NURSE PRACTITIONER

## 2025-02-17 PROCEDURE — 99232 SBSQ HOSP IP/OBS MODERATE 35: CPT | Performed by: INTERNAL MEDICINE

## 2025-02-17 PROCEDURE — 85025 COMPLETE CBC W/AUTO DIFF WBC: CPT

## 2025-02-17 PROCEDURE — 99238 HOSP IP/OBS DSCHRG MGMT 30/<: CPT | Performed by: INTERNAL MEDICINE

## 2025-02-17 RX ORDER — DIVALPROEX SODIUM 250 MG/1
250 TABLET, DELAYED RELEASE ORAL EVERY 12 HOURS SCHEDULED
Qty: 60 TABLET | Refills: 0 | Status: SHIPPED | OUTPATIENT
Start: 2025-02-17

## 2025-02-17 RX ADMIN — SENNOSIDES AND DOCUSATE SODIUM 2 TABLET: 50; 8.6 TABLET ORAL at 08:04

## 2025-02-17 RX ADMIN — DEXTRAN 70, GLYCERIN, HYPROMELLOSE 1 DROP: 1; 2; 3 SOLUTION/ DROPS OPHTHALMIC at 08:04

## 2025-02-17 RX ADMIN — LEVOTHYROXINE SODIUM 175 MCG: 0.1 TABLET ORAL at 05:52

## 2025-02-17 RX ADMIN — Medication 1000 MCG: at 08:05

## 2025-02-17 RX ADMIN — ENOXAPARIN SODIUM 40 MG: 40 INJECTION SUBCUTANEOUS at 08:04

## 2025-02-17 RX ADMIN — LACOSAMIDE 150 MG: 50 TABLET, FILM COATED ORAL at 08:04

## 2025-02-17 RX ADMIN — BENZTROPINE MESYLATE 2 MG: 2 TABLET ORAL at 08:05

## 2025-02-17 RX ADMIN — DIVALPROEX SODIUM 250 MG: 250 TABLET, DELAYED RELEASE ORAL at 08:04

## 2025-02-17 RX ADMIN — NYSTATIN 1 APPLICATION: 100000 POWDER TOPICAL at 08:05

## 2025-02-17 NOTE — NURSING NOTE
Patient discharged to home with OP ST services recommended.  Patient accompanied by sister.  Reviewed f/u appointments, medication list and instructions with patient and her sister.  No questions/concerns at time of discharge.  No written prescriptions.  Patient and sister aware medications e-prescribed to their local pharmacy.  No home medications to return to patient.  Nothing locked with security.  Patient transferred off the unit with RN escort.  Patient transported home via family car with her sister.  All belongings returned at time of discharge.

## 2025-02-17 NOTE — PROGRESS NOTES
Progress Note - Internal Medicine   Name: Esperanza Prajapati 54 y.o. female I MRN: [id number]  Unit/Bed#: -01 I Date of Admission: 2/8/2025   Date of Service: 2/17/2025 I Hospital Day: 9    Assessment & Plan  Schizoaffective disorder (HCC)  Home regimen: Zyprexa 10mg at HS, Invega 6mg daily, trazodone 50mg at HS, and Cogentin 2mg BID.  Currently receiving Zyprexa 10mg at HS and Cogentin 2mg BID.  Psychiatry consulted due to concerns for non-compliance and new neutropenia.  Invega discontinued on 2/10.    Follows with outpatient Psychiatry.  Acquired hypothyroidism  Continue home levothyroxine 175mcg daily.  Last TSH 3.323 on 1/19.  Focal epilepsy (HCC)  Home regimen: Vimpat 150mg in AM and 200mg in PM and Depakote 750mg Q12.  Discussion between Neuro and Psych - Depakote was initiated for mood stabilization - ok to decrease dose per Psych discretion.  Depakote decreased to 500mg Q12 on 2/12.  Decreased to 250mg Q12 on 2/13.  Follows with Dr. Cevallos (Neurology) as outpatient.    Concerns in regards to medication non-compliance.  Has been seizure free for years.  Depakote level 99 on 2/10.  Intellectual disability  Concerns for medication non-compliance.  Considerations with dispo planning.  Influenza A  Influenza A + on 1/19.  Received 5 days of Tamiflu.  Currently asymptomatic.  Acute respiratory failure with hypoxia ([facility])  Presented on 1/19 with altered mental status and hypoxia.  Influenza A + on 1/19.  Received 5 doses of Tamiflu.  Received IV cefepime and azithromycin for possible pneumonia.  Required intubation 10 days.  Last CXR on 1/31 showed unchanged bibasilar opacities, atelectasis vs pneumonia.  Repeat CXR as needed.  Currently maintaining on RA.  Continue spot pulse ox checks.  Pulmonary toileting, IS use.  Insomnia  Started on melatonin 6mg at HS.  Oropharyngeal dysphagia  Continue dental soft diet with thins.  Speech consulted and following.  Aspiration precautions.  Neutropenia  ([facility])  WBC count currently 3.98.  Absolute neutrophils improving, 1.02 today.  Continue neutropenic precautions.  Likely due to medications.  Had been non-compliant at home and was restarted on medications in acute setting.  Psychiatry consulted - discontinued Invega on 2/10.  Recommending Neurology consult.  Neurology consulted - discussed with Psych that Depakote was started for mood stabilization rather than seizures - ok to decrease/discontinue per Psych.  Depakote changed to 500mg Q12 on .  Decreased to 250mg Q12 on .  Repeat CBC in a week as outpatient.  Prolonged QT interval  EKG on 25 showed atrial flutter with QTc of 649.  Repeat EKG obtained on 2/10 showing NSR with QTc of 399.  Psychiatry consulted and reviewing medications.  Avoid QT prolonging medications as able.  Obtain repeat EKG as needed.    VTE Pharmacologic Prophylaxis:   Pharmacologic: Enoxaparin (Lovenox)  Mechanical VTE Prophylaxis in Place: Yes - sequential compression devices.    Current Length of Stay: 9 day(s)    Current Patient Status: Inpatient Rehab     Discharge Plan: As per primary team.    Code Status: Level 1 - Full Code    Subjective:   Pt examined while pt sitting in recliner in pt room.  Family at bedside.  Plans for discharge later today.  Currently has no complaints.  Reviewed labs this morning with pt and pt's family member.    Objective:     Vitals:   Temp (24hrs), Av °F (36.1 °C), Min:96.5 °F (35.8 °C), Max:97.4 °F (36.3 °C)    Temp:  [96.5 °F (35.8 °C)-97.4 °F (36.3 °C)] 96.5 °F (35.8 °C)  HR:  [72-86] 85  Resp:  [18] 18  BP: ()/(54-63) 92/54  SpO2:  [95 %-97 %] 95 %  Body mass index is 33.23 kg/m².     Review of Systems   Constitutional:  Negative for appetite change, chills, fatigue and fever.   HENT:  Negative for trouble swallowing.    Eyes:  Negative for visual disturbance.   Respiratory:  Negative for cough, shortness of breath, wheezing and stridor.    Cardiovascular:  Negative for chest pain,  palpitations and leg swelling.   Gastrointestinal:  Negative for abdominal distention, abdominal pain, constipation, diarrhea, nausea and vomiting.        LBM 2/15   Genitourinary:  Negative for difficulty urinating.   Musculoskeletal:  Negative for arthralgias, back pain and gait problem.   Neurological:  Negative for dizziness, weakness, light-headedness, numbness and headaches.   Psychiatric/Behavioral:  Negative for dysphoric mood and sleep disturbance. The patient is not nervous/anxious.    All other systems reviewed and are negative.       Input and Output Summary (last 24 hours):       Intake/Output Summary (Last 24 hours) at 2/17/2025 0914  Last data filed at 2/16/2025 1720  Gross per 24 hour   Intake 600 ml   Output --   Net 600 ml       Physical Exam:     Physical Exam  Vitals and nursing note reviewed.   Constitutional:       General: She is not in acute distress.     Appearance: Normal appearance. She is obese. She is not ill-appearing.   HENT:      Head: Normocephalic and atraumatic.   Cardiovascular:      Rate and Rhythm: Normal rate and regular rhythm.      Pulses: Normal pulses.      Heart sounds: Normal heart sounds. No murmur heard.     No friction rub.   Pulmonary:      Effort: Pulmonary effort is normal. No respiratory distress.      Breath sounds: Normal breath sounds. No wheezing or rhonchi.   Abdominal:      General: Abdomen is flat. Bowel sounds are normal. There is no distension.      Palpations: Abdomen is soft. There is no mass.      Tenderness: There is no abdominal tenderness. There is no guarding or rebound.      Hernia: No hernia is present.   Musculoskeletal:      Cervical back: Normal range of motion and neck supple. No tenderness.      Right lower leg: No edema.      Left lower leg: No edema.   Skin:     General: Skin is warm and dry.   Neurological:      Mental Status: She is alert and oriented to person, place, and time.   Psychiatric:         Mood and Affect: Mood normal.          Behavior: Behavior normal.         Additional Data:     Labs:    Results from last 7 days   Lab Units 02/17/25  0456   WBC Thousand/uL 3.98*   HEMOGLOBIN g/dL 11.0*   HEMATOCRIT % 34.4*   PLATELETS Thousands/uL 173   SEGS PCT % 26*   LYMPHO PCT % 56*   MONO PCT % 14*   EOS PCT % 3     Results from last 7 days   Lab Units 02/17/25  0456 02/13/25  0458 02/10/25  0950   SODIUM mmol/L 139   < > 139   POTASSIUM mmol/L 3.9   < > 4.0   CHLORIDE mmol/L 101   < > 99   CO2 mmol/L 29   < > 32   BUN mg/dL 12   < > 13   CREATININE mg/dL 0.82   < > 0.90   ANION GAP mmol/L 9   < > 8   CALCIUM mg/dL 9.4   < > 10.2   ALBUMIN g/dL  --   --  3.9   TOTAL BILIRUBIN mg/dL  --   --  0.37   ALK PHOS U/L  --   --  34   ALT U/L  --   --  27   AST U/L  --   --  22   GLUCOSE RANDOM mg/dL 82   < > 81    < > = values in this interval not displayed.                       Labs reviewed    Imaging:    Imaging reviewed    Recent Cultures (last 7 days):           Last 24 Hours Medication List:   Current Facility-Administered Medications   Medication Dose Route Frequency Provider Last Rate    acetaminophen  975 mg Oral Q6H PRN Tia Benz MD      aluminum-magnesium hydroxide-simethicone  30 mL Oral Q6H PRN Tia Benz MD      Artificial Tears  1 drop Both Eyes BID Rosi Olsen MD      benztropine  2 mg Oral BID Tia Benz MD      cyanocobalamin  1,000 mcg Oral Daily Tia Benz MD      divalproex sodium  250 mg Oral Q12H [facility] Taurus Mcnamara DO      enoxaparin  40 mg Subcutaneous Daily Tia Benz MD      ipratropium-albuterol  3 mL Nebulization Q8H PRN Tia Benz MD      lacosamide  150 mg Oral Daily Tia Benz MD      lacosamide  200 mg Oral HS Tia Benz MD      levothyroxine  175 mcg Oral Early Morning Tia Benz MD      melatonin  6 mg Oral HS Tia Benz MD      nystatin   Topical BID Tia Benz MD      OLANZapine  10 mg Oral HS Tia Benz MD       ondansetron  4 mg Intravenous Q6H PRN Tia Benz MD      polyethylene glycol  17 g Oral Daily PRN Tia Benz MD      senna-docusate sodium  2 tablet Oral BID Tia Benz MD          M*Modal software was used to dictate this note.  It may contain errors with dictating incorrect words or incorrect spelling. Please contact the provider directly with any questions.

## 2025-02-17 NOTE — CASE MANAGEMENT
Case Management    Met with patient and sister. Sister never received email with waiver application. Provided hard copy to sister and Stephanie Rebollar resent email with digital copy. Reviewed with sister that it takes time to process the application and set up services. Sister did acknowledge that ex [facility] can transport patient for a period of time. Therapy manager did ask CM Stephanie Hopkins to look into patient's medicare policy to see if she qualified for any free rides. Stephanie Hopkins will reach back out to family. Discussed recommendation for outpatient SLP sister in agreement. Gave sister a list of [facility] outpatient facilities. Sister will set up first appointment based on availability of transportation.  Sister with no other questions at this time.     Maribel Harris

## 2025-02-17 NOTE — ASSESSMENT & PLAN NOTE
WBC count currently 3.98.  Absolute neutrophils improving, 1.02 today.  Continue neutropenic precautions.  Likely due to medications.  Had been non-compliant at home and was restarted on medications in acute setting.  Psychiatry consulted - discontinued Invega on 2/10.  Recommending Neurology consult.  Neurology consulted - discussed with Psych that Depakote was started for mood stabilization rather than seizures - ok to decrease/discontinue per Psych.  Depakote changed to 500mg Q12 on 2/12.  Decreased to 250mg Q12 on 2/13.  Repeat CBC in a week as outpatient.

## 2025-02-17 NOTE — ASSESSMENT & PLAN NOTE
Chronic seizure disorder  Continue chronic AED regimen: Lacosamide 150 mg QAM, Lacosamide 200 mg QHS, Depakote 750 mg Q 12 hours  F.u depakote lvl: 99  Per Neurology consult neutropenia not likely to be 2/2 to lacosamide or depakote -->[id number] (2/14)  Per psych Titrating depakote as it was mainly used for mood rather than epilepsy

## 2025-02-17 NOTE — ASSESSMENT & PLAN NOTE
>>ASSESSMENT AND PLAN FOR INTELLECTUAL DISABILITY WRITTEN ON 2/17/2025 10:23 AM BY NAKUL POTTS PA-C    Patient will need assistance w/ medications and iADLs once discharged   Will need to communicate with family to see what assistance is available

## 2025-02-17 NOTE — DISCHARGE SUMMARY
Discharge Summary - PMR   Name: Esperanza Prajapati 54 y.o. female I MRN: [id number]  Unit/Bed#: [facility] 262-01 I Date of Admission: 2/8/2025   Date of Service: 2/17/2025 I Hospital Day: 9    Medical Problems       Resolved Problems  Date Reviewed: 2/17/2025   None         Admission Date: 2/8/2025   Discharge Date:     Etiologic/Rehabilitation Diagnosis: Impairment of mobility, safety and Activities of Daily Living (ADLs) due to Debility:  16  Debility (Non-cardiac/Non-pulmonary)    HPI: Esperanza Prajapati is a 54 y.o. female with past medical history significant for epileps/seizure disorder, schizoaffective disorder, cognitive deficits, history of papillary thyroid carcinoma s/p total thyroidectomy who presented to the [facility] 1/19/25 with acute respiratory failure.  +Influenza A.  Intubated 1/21/25 - 1/30/25.  Brochoscopy 1/24/25.  Completed Tamiflu treatment.  Patient with superimposed bacterial PNA.  Treated with Azithromyocin.  Followed closely by Pulmonary.  Needing intermittent oxygen, but has weaned to RA.    Medical course complicated by dysphagia.  Patient seen by SLP and placed on a pureed diet with thin liquids.    After medical stabilization, patient found to have acute functional deficits from baseline in mobilty, self care, swallow, therefore admitted to [facility] for acute inpatient rehabiltiation.    Procedures Performed During [facility] Admission: none    Acute Rehabilitation Center Course: Patient participated in a comprehensive interdisciplinary inpatient rehabilitation program which included involvment of MD, therapies (PT, OT, and/or SLP), RN, CM, SW, dietary, and psychology services. She was able to be advanced to an independent level of assist and considered safe for discharge home. Please see below for patient's day to day management of medical needs.      Assessment & Plan  Acute respiratory failure with hypoxia (HCC)  Presented on 1/19/25 with respiratory failure  related to Influenza A  Intubated 1/21/25 - 1/30/25.  Required HFNC  Weaning down from oxygen.  Now on RA in AM   Improving    PLAN   Continue IS, OPEP, and pulmonary toileting  Aspiration precautions, deep breathing  Monitor pulmonary status while in ARC  Has been stable on RA   Follow-up with Pulmonary as outpatient  Influenza A  Presented on 1/19/25 with respiratory failure  +Influenza A  s/p Tamiflu treatment completed  Has been stable on RA   Acquired hypothyroidism  Acquired after total thyroidectomy 2/2 papillary thyroid carcinoma in 2013  Continue Synthroid 175 mcg daily  Focal epilepsy (HCC)  Chronic seizure disorder  Continue chronic AED regimen: Lacosamide 150 mg QAM, Lacosamide 200 mg QHS, Depakote 750 mg Q 12 hours  F.u depakote lvl: 99  Per Neurology consult neutropenia not likely to be 2/2 to lacosamide or depakote -->302Q07g (2/14)  Per psych Titrating depakote as it was mainly used for mood rather than epilepsy   Schizoaffective disorder (HCC)  Stable  Continue chronic medications: Cogentin 2 mg BID, Zyprexa 10 mg QHS, Invega 6 mg QAM.    Psych consulted 2/10 given neutropenia for medication adjustment; invega has been dc 2/10  F.u depakote lvl: 99   Titrating depakote per psych   Hypernatremia  Resolved  139 2/10  139 2/17  Pneumonia of both lungs due to infectious organism  Possible bacterial PNA.   S/p treatment with Azithromycin  Continue supportive care  Continues on oxygen 0-2L; stable on RA   Insomnia  Continue Melatonin 6 mg QHS  At risk for deep venous thrombosis  Continue Lovenox 40 mg daily.    Oropharyngeal dysphagia  Currently on pureed diet with thins--> inc to mechanical soft and thin   SLP to follow  Hopeful to upgrade based on recent notes.   Neutropenia (HCC)  Patient WBC WBC 0.62 neutrophil 2/12 --> 0.91 2/14-->0.77 neutrophil  Neutropenic precautions   Psych consult for medication adjustment   C/t monitor w/ routine blood work   Intellectual disability  Patient will need  assistance w/ medications and iADLs once discharged   Will need to communicate with family to see what assistance is available   Prolonged QT interval  EKG 1/25 aflutter w/ ; repeat EKG 2/10   Avoid QT prolong agents as possible   Repeat EKG as necessary   Impaired mobility and ADLs  Patient was evaluated by the rehabilitation team MD and an appropriate candidate for acute inpatient rehabilitation program at this time.  The patient will tolerate 3 hours/day 5 to 7 days/week of intensive physical, occupational and speech therapy in order to obtain goals for community discharge  Due to the patient's functional Compared to their baseline level of function in addition to their ongoing medical needs, the patient would benefit from daily supervision from a rehabilitation physician as well as rehabilitation nursing to implement and adjust the medical as well as functional plan of care in order to meet the patient's goals.  DC 2/17     Subjective: Patient seen in recliner in room with sister at bedside. She denies and SOB, chest pain, cough, fever, chills, nausea, vomiting, constipation, or diarrhea. LBM 2/15. She is excited to go home. Patient and sister were educated to continue to wear a mask in public.     Physical Exam  Constitutional:       General: She is not in acute distress.  HENT:      Head: Normocephalic and atraumatic.      Mouth/Throat:      Mouth: Mucous membranes are moist.      Pharynx: Oropharynx is clear.   Cardiovascular:      Rate and Rhythm: Normal rate and regular rhythm.   Pulmonary:      Effort: Pulmonary effort is normal.      Breath sounds: Normal breath sounds.   Abdominal:      General: Bowel sounds are normal.   Musculoskeletal:         General: Normal range of motion.   Skin:     General: Skin is warm and dry.   Neurological:      Mental Status: She is alert. Mental status is at baseline.   Psychiatric:         Mood and Affect: Mood normal.         Behavior: Behavior normal.          Significant Findings, Care, Treatment and Services Provided: none    Complications: none    Functional Status Upon Admission to ARC:Self Care:  Eatin: Supervision or touching  assistance  Oral hygiene: 04: Supervision or touching  assistance  Toilet hygiene: 09: Not applicable  Shower/bathing self: 03: Partial/moderate assistance  Upper body dressin: Partial/moderate assistance  Lower body dressin: Partial/moderate assistance  Putting on/taking off footwear: 09: Not applicable  Transfers:  Roll left and right: 09: Not applicable  Sit to lyin: Not applicable  Lying to sitting on side of bed: 09: Not applicable  Sit to stand: 04: Supervision or touching  assistance  Chair/bed to chair transfer: 04: Supervision or touching  assistance  Toilet transfer: 09: Not applicable  Mobility:  Walk 10 ft: 04: Supervision or touching  assistance  Walk 50 ft with two turns: 04: Supervision or touching  assistance  Walk 150ft: 04: Supervision or touching  assistance    Functional Status Upon Discharge from ARC: independent    Discharge Diagnosis: Impairment of mobility, safety and Activities of Daily Living (ADLs) due to Debility:  16  Debility (Non-cardiac/Non-pulmonary)    Discharge Medications:   See after visit summary for reconciled discharge medications provided to patient and family.      Condition at Discharge: good     Discharge instructions/Information to patient and family:   See after visit summary for information provided to patient and family.      Provisions for Follow-Up Care:  See after visit summary for information related to follow-up care and any pertinent home health orders.      Future Appointments   Date Time Provider Department Center   2025  5:30 PM Alvaro Dyson MD formerly [facility]       Disposition: Home      Planned Readmission: No      Discharge Medications:  See after visit summary for reconciled discharge medications provided to patient and family.

## 2025-02-17 NOTE — PLAN OF CARE
Problem: PAIN - ADULT  Goal: Verbalizes/displays adequate comfort level or baseline comfort level  Description: Interventions:  - Encourage patient to monitor pain and request assistance  - Assess pain using appropriate pain scale  - Administer analgesics based on type and severity of pain and evaluate response  - Implement non-pharmacological measures as appropriate and evaluate response  - Consider cultural and social influences on pain and pain management  - Notify physician/advanced practitioner if interventions unsuccessful or patient reports new pain  2/17/2025 0949 by Ambar Pritchard RN  Outcome: Adequate for Discharge  2/17/2025 0737 by Ambar Pritchard RN  Outcome: Progressing     Problem: INFECTION - ADULT  Goal: Absence or prevention of progression during hospitalization  Description: INTERVENTIONS:  - Assess and monitor for signs and symptoms of infection  - Monitor lab/diagnostic results  - Monitor all insertion sites, i.e. indwelling lines, tubes, and drains  - Monitor endotracheal if appropriate and nasal secretions for changes in amount and color  - [place] appropriate cooling/warming therapies per order  - Administer medications as ordered  - Instruct and encourage patient and family to use good hand hygiene technique  - Identify and instruct in appropriate isolation precautions for identified infection/condition  Outcome: Adequate for Discharge  Goal: Absence of fever/infection during neutropenic period  Description: INTERVENTIONS:  - Monitor WBC    Outcome: Adequate for Discharge     Problem: SAFETY ADULT  Goal: Patient will remain free of falls  Description: INTERVENTIONS:  - Educate patient/family on patient safety including physical limitations  - Instruct patient to call for assistance with activity   - Consult OT/PT to assist with strengthening/mobility   - Keep Call bell within reach  - Keep bed low and locked with side rails adjusted as appropriate  - Keep care items and personal  belongings within reach  - Initiate and maintain comfort rounds  - Make Fall Risk Sign visible to staff  - Offer Toileting every 2 Hours, in advance of need  - Apply yellow socks and bracelet for high fall risk patients  - Consider moving patient to room near nurses station  Outcome: Adequate for Discharge  Goal: Maintain or return to baseline ADL function  Description: INTERVENTIONS:  -  Assess patient's ability to carry out ADLs; assess patient's baseline for ADL function and identify physical deficits which impact ability to perform ADLs (bathing, care of mouth/teeth, toileting, grooming, dressing, etc.)  - Assess/evaluate cause of self-care deficits   - Assess range of motion  - Assess patient's mobility; develop plan if impaired  - Assess patient's need for assistive devices and provide as appropriate  - Encourage maximum independence but intervene and supervise when necessary  - Involve family in performance of ADLs  - Assess for home care needs following discharge   - Consider OT consult to assist with ADL evaluation and planning for discharge  - Provide patient education as appropriate  Outcome: Adequate for Discharge  Goal: Maintains/Returns to pre admission functional level  Description: INTERVENTIONS:  - Perform AM-PAC 6 Click Basic Mobility/ Daily Activity assessment daily.  - Set and communicate daily mobility goal to care team and patient/family/caregiver.   - Collaborate with rehabilitation services on mobility goals if consulted  - Perform Range of Motion 3 times a day.  - Reposition patient every 2 hours.  - Dangle patient 3 times a day  - Stand patient 3 times a day  - Ambulate patient 3 times a day  - Out of bed to chair 3 times a day   - Out of bed for meals 3 times a day  - Out of bed for toileting  - Record patient progress and toleration of activity level   Outcome: Adequate for Discharge     Problem: DISCHARGE PLANNING  Goal: Discharge to home or other facility with appropriate  resources  Description: INTERVENTIONS:  - Identify barriers to discharge w/patient and caregiver  - Arrange for needed discharge resources and transportation as appropriate  - Identify discharge learning needs (meds, wound care, etc.)  - Arrange for interpretive services to assist at discharge as needed  - Refer to Case Management Department for coordinating discharge planning if the patient needs post-hospital services based on physician/advanced practitioner order or complex needs related to functional status, cognitive ability, or social support system  Outcome: Adequate for Discharge     Problem: Knowledge Deficit  Goal: Patient/family/caregiver demonstrates understanding of disease process, treatment plan, medications, and discharge instructions  Description: Complete learning assessment and assess knowledge base.  Interventions:  - Provide teaching at level of understanding  - Provide teaching via preferred learning methods  Outcome: Adequate for Discharge     Problem: MOBILITY - ADULT  Goal: Maintain or return to baseline ADL function  Description: INTERVENTIONS:  -  Assess patient's ability to carry out ADLs; assess patient's baseline for ADL function and identify physical deficits which impact ability to perform ADLs (bathing, care of mouth/teeth, toileting, grooming, dressing, etc.)  - Assess/evaluate cause of self-care deficits   - Assess range of motion  - Assess patient's mobility; develop plan if impaired  - Assess patient's need for assistive devices and provide as appropriate  - Encourage maximum independence but intervene and supervise when necessary  - Involve family in performance of ADLs  - Assess for home care needs following discharge   - Consider OT consult to assist with ADL evaluation and planning for discharge  - Provide patient education as appropriate  2/17/2025 0949 by Ambar Pritchard RN  Outcome: Adequate for Discharge  2/17/2025 0737 by Ambar Pritchard RN  Outcome:  Progressing  Goal: Maintains/Returns to pre admission functional level  Description: INTERVENTIONS:  - Perform AM-PAC 6 Click Basic Mobility/ Daily Activity assessment daily.  - Set and communicate daily mobility goal to care team and patient/family/caregiver.   - Collaborate with rehabilitation services on mobility goals if consulted  - Perform Range of Motion 3 times a day.  - Reposition patient every 2 hours.  - Dangle patient 3 times a day  - Stand patient 3 times a day  - Ambulate patient 3 times a day  - Out of bed to chair 3 times a day   - Out of bed for meals 3 times a day  - Out of bed for toileting  - Record patient progress and toleration of activity level   Outcome: Adequate for Discharge     Problem: Potential for Falls  Goal: Patient will remain free of falls  Description: INTERVENTIONS:  - Educate patient/family on patient safety including physical limitations  - Instruct patient to call for assistance with activity   - Consult OT/PT to assist with strengthening/mobility   - Keep Call bell within reach  - Keep bed low and locked with side rails adjusted as appropriate  - Keep care items and personal belongings within reach  - Initiate and maintain comfort rounds  - Make Fall Risk Sign visible to staff  - Offer Toileting every 2 Hours, in advance of need  - Apply yellow socks and bracelet for high fall risk patients  - Consider moving patient to room near nurses station  Outcome: Adequate for Discharge     Problem: NEUROSENSORY - ADULT  Goal: Achieves stable or improved neurological status  Description: INTERVENTIONS  - Monitor and report changes in neurological status  - Monitor vital signs such as temperature, blood pressure, glucose, and any other labs ordered   - Initiate measures to prevent increased intracranial pressure  - Monitor for seizure activity and implement precautions if appropriate      Outcome: Adequate for Discharge  Goal: Remains free of injury related to seizures  activity  Description: INTERVENTIONS  - Maintain airway, patient safety  and administer oxygen as ordered  - Monitor patient for seizure activity, document and report duration and description of seizure to physician/advanced practitioner  - If seizure occurs,  ensure patient safety during seizure  - Reorient patient post seizure  - Seizure pads on all 4 side rails  - Instruct patient/family to notify RN of any seizure activity including if an aura is experienced  - Instruct patient/family to call for assistance with activity based on nursing assessment  - Administer anti-seizure medications if ordered    Outcome: Adequate for Discharge  Goal: Achieves maximal functionality and self care  Description: INTERVENTIONS  - Monitor swallowing and airway patency with patient fatigue and changes in neurological status  - Encourage and assist patient to increase activity and self care.   - Encourage visually impaired, hearing impaired and aphasic patients to use assistive/communication devices  Outcome: Adequate for Discharge     Problem: METABOLIC, FLUID AND ELECTROLYTES - ADULT  Goal: Electrolytes maintained within normal limits  Description: INTERVENTIONS:  - Monitor labs and assess patient for signs and symptoms of electrolyte imbalances  - Administer electrolyte replacement as ordered  - Monitor response to electrolyte replacements, including repeat lab results as appropriate  - Instruct patient on fluid and nutrition as appropriate  Outcome: Adequate for Discharge  Goal: Fluid balance maintained  Description: INTERVENTIONS:  - Monitor labs   - Monitor I/O and WT  - Instruct patient on fluid and nutrition as appropriate  - Assess for signs & symptoms of volume excess or deficit  Outcome: Adequate for Discharge  Goal: Glucose maintained within target range  Description: INTERVENTIONS:  - Monitor Blood Glucose as ordered  - Assess for signs and symptoms of hyperglycemia and hypoglycemia  - Administer ordered medications to  maintain glucose within target range  - Assess nutritional intake and initiate nutrition service referral as needed  Outcome: Adequate for Discharge     Problem: MUSCULOSKELETAL - ADULT  Goal: Maintain or return mobility to safest level of function  Description: INTERVENTIONS:  - Assess patient's ability to carry out ADLs; assess patient's baseline for ADL function and identify physical deficits which impact ability to perform ADLs (bathing, care of mouth/teeth, toileting, grooming, dressing, etc.)  - Assess/evaluate cause of self-care deficits   - Assess range of motion  - Assess patient's mobility  - Assess patient's need for assistive devices and provide as appropriate  - Encourage maximum independence but intervene and supervise when necessary  - Involve family in performance of ADLs  - Assess for home care needs following discharge   - Consider OT consult to assist with ADL evaluation and planning for discharge  - Provide patient education as appropriate  Outcome: Adequate for Discharge  Goal: Maintain proper alignment of affected body part  Description: INTERVENTIONS:  - Support, maintain and protect limb and body alignment  - Provide patient/ family with appropriate education  Outcome: Adequate for Discharge     Problem: Nutrition/Hydration-ADULT  Goal: Nutrient/Hydration intake appropriate for improving, restoring or maintaining nutritional needs  Description: Monitor and assess patient's nutrition/hydration status for malnutrition. Collaborate with interdisciplinary team and initiate plan and interventions as ordered.  Monitor patient's weight and dietary intake as ordered or per policy. Utilize nutrition screening tool and intervene as necessary. Determine patient's food preferences and provide high-protein, high-caloric foods as appropriate.     INTERVENTIONS:  - Monitor oral intake, urinary output, labs, and treatment plans  - Assess nutrition and hydration status and recommend course of action  -  Evaluate amount of meals eaten  - Assist patient with eating if necessary   - Allow adequate time for meals  - Recommend/ encourage appropriate diets, oral nutritional supplements, and vitamin/mineral supplements  - Order, calculate, and assess calorie counts as needed  - Recommend, monitor, and adjust tube feedings and TPN/PPN based on assessed needs  - Assess need for intravenous fluids  - Provide specific nutrition/hydration education as appropriate  - Include patient/family/caregiver in decisions related to nutrition  Outcome: Adequate for Discharge

## 2025-02-17 NOTE — PROGRESS NOTES
02/17/25 0900   Pain Assessment   Pain Assessment Tool 0-10   Pain Score No Pain   Restrictions/Precautions   Precautions Aspiration;Fall Risk   Eating   Type of Assistance Needed Supervision   Physical Assistance Level No physical assistance   Eating CARE Score 4   Swallow Assessment   Swallow Treatment Assessment   Daily Dysphagia Tx Note     Patient Name: Esperanza Prajapati    Today's Date: 2/17/2025      Current Risks for Dysphagia & Aspiration: recent intubation, prolonged intubation, respiratory compromise, weak cough, general debilitation, new neuro event, cognitive deficit, mental status change, reduced alertness, positioning issues      Current Symptoms/Concerns: cough with food and liquids, during and after meal, holding food in mouth, recent pneumonia     Current diet:Dysphagia Level 3 diet and thin liquids      Premorbid diet::regular diet and thin liquids      Positioning: upright in recliner    Items administered:Consistencies Administered: thin liquids and soft solids  Materials administered included: pancakes, jello, thins by cup/straw    Total amount of meal consumed:   100% of meal and 240cc of thin liquids      Summary:     Pt presenting with minimal oral and minimal-functional pharyngeal dysphagia today. Symptoms or concerns included the following observations: functional lip seal around utensils across meal today to where bolus retrieval was adequate and no anterior loss noted across meal. Of note, pt cut up soft solids today in which bites were slightly larger BUT pt's overall rate and pacing of intake was improved today. Still mastication is noted to be slower but today was effective where bolus formulation was noted to be more functional across meal today. No increased or oral residual was observed. Bolus control/transfer of thin liquids by cup ans straw today was noted to be timely and prompt when taking both single sips as well as multiple successive sips. Swallow initiation w/ soft  solids was minimally delayed, but prompter swallow noted w/ thins. HLE is present and suspected to be functional. No double swallows noted throughout meal. Additionally, pt did not exhibit overt aspiration sxs given meal today.    Of note, pt's sister was to be present for session, but was not present for dysphagia tx session. Of note, sister did come shortly after to where, SLP was able to complete family training w/ both pt and sister. SLP providing folder given diet recommendations handouts given dysphagia level 3 diets, in addition to aspiration precautions handout, which reviewed most swallow strategies. SLP reviewing all the swallow strategies at length, primarily about fully upright posturing, small bites of foods, slowing rate of intake during meals. Additionally reviewed items to avoid currently w/ meals, including very tough/dry foods (ie: nuts, chips, pretzels), but also educated on increased moistening agents for food items as well. Pt nor pt's sister w/o further questions at this time. SLP did review about continued OP ST services as well to continue to maximize least restrictive diet w/o increased risk for aspiration sxs.       Recommendations:  Diet: Dysphagia Level 3 and thin liquids  Meds: as best tolerated  Strategies: upright posture, only feed when fully alert, slow rate of feeding, small bites/sips, quiet environment (tv off, limit talking, door closed, etc.), alternating bites and sips, and OOB for meals     Pt is cleared to have her canned ravioli IF they are cut into smaller pieces and with full supervision-needs cues to slow down.     FULL supervision w/ meals.  Results reviewed with:  patient, RNJohnathon Pozo, pt's sister  Aspiration precautions posted.     F/u ST tx: Pt is currently recommended for further skilled SLP services targeting dysphagia therapy in order to maximize oral and pharyngeal swallow skills, while safely supporting PO intake, as well as to improve independent carryover of safe  swallow strategies.       Plan:      Monitor advancement of diet to now dysphagia level 3 diet and thins     FULL supervision with meals with cues to slow down     Continue trials of diet upgrades     Family training completed; recommendations for continued OP ST services   Swallow Assessment Prognosis   Prognosis Good   Prognosis Considerations Age;Co-morbidities;Medical diagnosis;Medical prognosis;Severity of impairments;New learning ability;Ability to carry over   SLP Therapy Minutes   SLP Time In 0900   SLP Time Out 0930   SLP Total Time (minutes) 30   SLP Mode of treatment - Individual (minutes) 30   SLP Mode of treatment - Concurrent (minutes) 0   SLP Mode of treatment - Group (minutes) 0   SLP Mode of treatment - Co-treat (minutes) 0   SLP Mode of Treatment - Total time(minutes) 30 minutes   SLP Cumulative Minutes 220   Therapy Time missed   Time missed? No

## 2025-02-18 ENCOUNTER — PATIENT OUTREACH (OUTPATIENT)
Dept: CASE MANAGEMENT | Facility: OTHER | Age: 55
End: 2025-02-18

## 2025-02-18 NOTE — OCCUPATIONAL THERAPY NOTE
OT DISCHARGE SUMMARY    Pt made good progress during stay on the [facility] following admission for schizoaffective disorder.  Pt presented upon IE with generalized weakness, decreased endurance, activity tolerance, and functional mobility. On evaluation, pt required CGA/Adolfo to complete all ADLs and functional transfers. Pt was discharged to home with family support. Pt currently functioning at Genet level for ADL, Genet level for transfers without AD. The following DME was recommended shower chair. Family training did not occur but did speak with sister. Look at note from OTR Maribel Harris 2/12/25. Pt has met all OT goals.       -Doris Yung MS, OTR/L, CSRS

## 2025-02-19 ENCOUNTER — PATIENT OUTREACH (OUTPATIENT)
Dept: CASE MANAGEMENT | Facility: OTHER | Age: 55
End: 2025-02-19

## 2025-02-19 PROBLEM — B97.89 VIRAL SEPSIS (HCC): Status: RESOLVED | Noted: 2025-01-20 | Resolved: 2025-02-19

## 2025-02-19 PROBLEM — A41.89 VIRAL SEPSIS (HCC): Status: RESOLVED | Noted: 2025-01-20 | Resolved: 2025-02-19

## 2025-02-19 LAB
ATRIAL RATE: 105 BPM
P AXIS: 75 DEGREES
PR INTERVAL: 158 MS
QRS AXIS: 53 DEGREES
QRSD INTERVAL: 67 MS
QT INTERVAL: 308 MS
QTC INTERVAL: 407 MS
T WAVE AXIS: 61 DEGREES
VENTRICULAR RATE: 105 BPM

## 2025-02-19 PROCEDURE — 93010 ELECTROCARDIOGRAM REPORT: CPT | Performed by: INTERNAL MEDICINE

## 2025-02-19 NOTE — SPEECH THERAPY NOTE
SLP Discharge Summary    Pt was discharged home w/ family support and supervision on 2/17/2025. Throughout pt's stay on the unit, focus of ST services was towards pt's swallow function to where pt did make consistent gains but was not able to be advanced to regular diet by time of discharge. Pt was admitted to the [facility] on puree diet w/ thin liquids, to where pt was successfully advanced to dysphagia level 2 and soon after dysphagia level 3 diet w/ thin liquids. Pt did need FULL supervision for meals to ensure slower rate of intake as well as cueing for smaller bites of food throughout meals. Other dysphagia barriers which present include the following risks for aspiration such as: decreased mastication of softer solids, reduced bolus formation, oral residual of softer solids, suspected delay in swallow initiation. Pt was also limited by impulsivity and faster rate of intake with food and drink, which when faster rate occurs, pt with more observable swallow deficits. In order to address the noted barriers, skilled SLP services addressed this by targeting the following interventions: swallow exercise, proper positioning, diet modification, safe swallow strategies, family education/training. By time of discharge, pt was able to demonstrate tolerance of dysphagia level 3 diet and thin liquids.     Family training/education had been initiated and completed w/ pt's sister on day of discharge. SLP providing folder given diet recommendations handouts given dysphagia level 3 diets, in addition to aspiration precautions handout, which reviewed most swallow strategies. SLP reviewing all the swallow strategies at length, primarily about fully upright posturing, small bites of foods, slowing rate of intake during meals. Additionally reviewed items to avoid currently w/ meals, including very tough/dry foods (ie: nuts, chips, pretzels), but also educated on increased moistening agents for food items as well. Pt nor pt's sister w/o  further questions at this time. SLP did review about continued OP ST services as well to continue to maximize least restrictive diet w/o increased risk for aspiration sxs.

## 2025-02-20 NOTE — CASE MANAGEMENT
2/20/25 GINI called Layne, left message on voice mail of transportation phone number to get transport for patient. [employer] [phone]. CM left GINI's contact infom, for her to call back with any questions.

## 2025-02-23 NOTE — PHYSICAL THERAPY NOTE
[facility] PT DC SUMMARY    Espernaza is a 54-year-old female with a history of intellectual disability mood disorder seizure disorder and postoperative hypothyroidism who presented to the hospital 1/20/25 with change in mental status found to have sepsis secondary to influenza A, requiring intubation 1/21/25 - 1/30/25. Brochoscopy done 1/24/25. Completed Tamiflu treatment. Patient with superimposed bacterial PNA. Medical course complicated by dysphagia.  After medical stabilization, patient found to have acute functional deficits from baseline in mobilty, self care, swallow, therefore admitted to [facility] on 2/8/25 for acute inpatient rehabiltiation.  At baseline pt lives with nephew in [place] home. 0 GRAEME front, 4 GRAEME back, 1/2 bath first floor, full bath and bedroom second floor with 16 steps. ?HR, pt unable to recall. Pt did not use AD, and is non -, and is unable to read. Per OT who spoke with pts sister, mother has been placed in a home due to dementia, and nephew pt has been staying with recently got a job, will stays with pts x-LEON (Nik) does not work, 2nd floor apt. Family asked OT about HHA vs waiver program options.    Pt grossly met or exceeded set goals for mobility without AD, prolonged medical course due to +neutropenia.     Pt grossly mod I for mobility. HEP provided. Cleared for DC home with family support. No f/u PT intervention recommended, Out pt ST suggested.     DC home.

## 2025-02-24 ENCOUNTER — PATIENT OUTREACH (OUTPATIENT)
Dept: CASE MANAGEMENT | Facility: OTHER | Age: 55
End: 2025-02-24

## 2025-02-24 NOTE — PROGRESS NOTES
Outpatient Care Management note- CM referral, HRR    Chart review completed    Initial outreach call placed to Layne prado. Left voicemail requesting return call.     Patient with h/o schizoaffective d/o, cognitive developmental delay (since childhood, s/p head injury @ 10 months old), epilepsy, hypothyroidism, falls, PNA, obesity, JEFF, thyroid CA, gout, HLD, TIA    Patient was admitted to [facility] from 1/19/25 to 2/8/25 for acute respiratory failure d/t sepsis/Influenza A. Patient then transferred to [facility] at [facility] from 2/8/25 to 2/17/25. She d/c to home with her sister. [facility] d/c states patient will do outpatient SLP services.     Edolly is scheduled with her PCP and Psychiatry on 2/25/25     Await call back from sister.

## 2025-02-25 ENCOUNTER — TELEMEDICINE (OUTPATIENT)
Dept: INTERNAL MEDICINE CLINIC | Facility: CLINIC | Age: 55
End: 2025-02-25
Payer: COMMERCIAL

## 2025-02-25 DIAGNOSIS — R80.9 PROTEINURIA, UNSPECIFIED TYPE: ICD-10-CM

## 2025-02-25 DIAGNOSIS — J18.9 PNEUMONIA OF BOTH LUNGS DUE TO INFECTIOUS ORGANISM, UNSPECIFIED PART OF LUNG: ICD-10-CM

## 2025-02-25 DIAGNOSIS — R73.9 HYPERGLYCEMIA: ICD-10-CM

## 2025-02-25 DIAGNOSIS — F25.9 SCHIZOAFFECTIVE DISORDER, UNSPECIFIED TYPE (HCC): Chronic | ICD-10-CM

## 2025-02-25 DIAGNOSIS — Z76.89 ENCOUNTER FOR SUPPORT AND COORDINATION OF TRANSITION OF CARE: Primary | ICD-10-CM

## 2025-02-25 DIAGNOSIS — R13.12 OROPHARYNGEAL DYSPHAGIA: ICD-10-CM

## 2025-02-25 DIAGNOSIS — G92.8 TOXIC METABOLIC ENCEPHALOPATHY: Chronic | ICD-10-CM

## 2025-02-25 DIAGNOSIS — G40.109 FOCAL EPILEPSY (HCC): ICD-10-CM

## 2025-02-25 DIAGNOSIS — N17.9 AKI (ACUTE KIDNEY INJURY) (HCC): ICD-10-CM

## 2025-02-25 DIAGNOSIS — E87.0 HYPERNATREMIA: ICD-10-CM

## 2025-02-25 DIAGNOSIS — E03.9 ACQUIRED HYPOTHYROIDISM: ICD-10-CM

## 2025-02-25 DIAGNOSIS — D70.9 NEUTROPENIA, UNSPECIFIED TYPE (HCC): ICD-10-CM

## 2025-02-25 DIAGNOSIS — J96.01 ACUTE RESPIRATORY FAILURE WITH HYPOXIA (HCC): ICD-10-CM

## 2025-02-25 DIAGNOSIS — J10.1 INFLUENZA A: ICD-10-CM

## 2025-02-25 PROBLEM — Z91.89 AT RISK FOR DEEP VENOUS THROMBOSIS: Status: RESOLVED | Noted: 2025-02-08 | Resolved: 2025-02-25

## 2025-02-25 PROCEDURE — 99495 TRANSJ CARE MGMT MOD F2F 14D: CPT | Performed by: STUDENT IN AN ORGANIZED HEALTH CARE EDUCATION/TRAINING PROGRAM

## 2025-02-25 RX ORDER — LEVOTHYROXINE SODIUM 175 UG/1
175 TABLET ORAL
Qty: 90 TABLET | Refills: 1 | Status: SHIPPED | OUTPATIENT
Start: 2025-02-25

## 2025-02-25 NOTE — PROGRESS NOTES
Virtual TCM Visit:Name: Esperanza Prajapati      : 1970      MRN: [id number]  Encounter Provider: Foster Biswas MD  Encounter Date: 2025   Enc[facility]  :  Assessment & Plan  Encounter for support and coordination of transition of care         Acute respiratory failure with hypoxia (HCC)         Pneumonia of both lungs due to infectious organism, unspecified part of lung         Influenza A         Hypernatremia         Toxic metabolic encephalopathy         h/o VALERIE (acute kidney injury) (HCC)         Oropharyngeal dysphagia         Hyperglycemia         Acquired hypothyroidism    Orders:    levothyroxine 175 mcg tablet; Take 1 tablet (175 mcg total) by mouth daily in the early morning    Schizoaffective disorder, unspecified type (HCC)         Focal epilepsy (HCC)         Neutropenia, unspecified type (HCC)    Orders:    CBC and differential; Future    Proteinuria, unspecified type    Orders:    Albumin / creatinine urine ratio; Future    Encounter for support and coordination of transition of care         Acute respiratory failure with hypoxia (HCC)  Resolved.  Now on room air  Presented on 25 with respiratory failure related to Influenza A  Intubated 25 - 25.  Required HFNC.  Weaned to room air  Status post rehabilitation       Pneumonia of both lungs due to infectious organism, unspecified part of lung  Resolved  Status post hospitalization.    Requiring ICU care secondary to acute respiratory failure requiring intubation  Status post rehabilitation  Now on room air       Influenza A  Resolved   Status post hospitalization.        Hypernatremia  Lab Results   Component Value Date    SODIUM 139 2025    SODIUM 140 2025    SODIUM 139 02/10/2025     Resolved        Toxic metabolic encephalopathy  Resolved       h/o VALERIE (acute kidney injury) (HCC)  Lab Results   Component Value Date    EGFR 82 2025    EGFR 87  02/13/2025    EGFR 73 02/10/2025    CREATININE 0.82 02/17/2025    CREATININE 0.78 02/13/2025    CREATININE 0.90 02/10/2025      History of VALERIE.  Status post hospitalization  Most recent kidney function markers have been within normal limits.  Avoid nephrotoxic medications         Oropharyngeal dysphagia  Status post hospitalization secondary to acute respiratory failure in the setting of pneumonia  Her sister reports she has been tolerating soft foods and thin liquids  Her sister will schedule outpatient follow-up with speech         Hyperglycemia  Lab Results   Component Value Date    HGBA1C 5.5 10/10/2024    HGBA1C 5.3 08/19/2022    HGBA1C 5.4 04/29/2022    HGBA1C 5.6 06/11/2020    HGBA1C 5.3 06/19/2018     Noted during recent hospitalization  No evidence of glucose metabolism dysfunction  Resolved        Acquired hypothyroidism  Lab Results   Component Value Date    KQQ4SBOKPVSE 3.323 01/19/2025    TBD2CFNLWNGB 0.048 (L) 10/10/2024    FREET4 1.89 (H) 10/10/2024    FREET4 1.06 07/31/2024      Most recent level show TSH within normal limits  Currently on levothyroxine 175 mcg daily-continue       Schizoaffective disorder, unspecified type (HCC)  Following with [facility] psychiatry.  Her current regimen includes Cogentin 2 mg twice daily, Zyprexa 10 mg at bedtime and Depakote 250 mg every 12 hours  As per documentation, Invega was discontinue due to neutropenia.   As per chart review, she had an appointment today at 530 however her sister reports she was unaware of this and unfortunately missed her visit.  I advised her to contact her psychiatry team and reschedule  I discussed this with Dr. Dyson via secure chat and he will facilitate a sooner visit with his scheduling team.          Focal epilepsy (HCC)  Status post hospitalization  She unfortunately missed her appointment due to her recent hospitalization  She is currently on Continue chronic AED regimen: Lacosamide 150 mg QAM, Lacosamide 200 mg QHS,   She is  "also on Depakote 250 mg Q 12 hours for mood stabilization as per psychiatry  I advised patient's sister to contact her neurology team and schedule a visit       Neutropenia, unspecified type (HCC)  Lab Results   Component Value Date    WBC 3.98 (L) 02/17/2025    WBC 4.05 (L) 02/16/2025    WBC 3.37 (L) 02/15/2025     As per inpatient documentation from psychatry:  \"Continue down-taper of Depakote; pt to receive 250 mg q12h (for total of 500 mg today).   Per Neurology consult, no changes had been made to AEDs.  However, per their recommendations, reached out to outpatient epileptologist Dr. Cevallos as LOV note states VPA had been prescribed for mood, and to consider consolidating AED to lacosamide monotherapy if Psychiatry is in agreement with discontinuation/decrease of VPA.  Considering risks of neutropenia and overall clinical picture, both teams are in agreement with discontinuation of VPA.   If concerns for breakthrough seizures, can increase dose of lacosamide.  Of note, pt is on olanzapine, which can confer mood stability, so its dose can be adjusted should the need arise.  Discontinued paliperidone 6 mg qd.   Pt received last dose 2/1\"     Neutropenia appears to be improving   Will repeat a CBC in 2 weeks        Proteinuria, unspecified type                    History of Present Illness     Transitional Care Management Review:   Esperanza Prajapati is a 54 y.o. female here for TCM follow up.    During the TCM phone call patient stated:  TCM Call       Date and time call was made  2/17/2025  3:37 PM    Hospital care reviewed  Records reviewed    Patient was hospitialized at  [facility]    Date of Admission  02/08/25    Date of discharge  02/17/25    Diagnosis  Schizoaffective disorder    Disposition  Home    Were the patients medications reviewed and updated  Yes    Current Symptoms  None          TCM Call       Post hospital issues  None    Should patient be enrolled in anticoag monitoring?  " No    Scheduled for follow up?  Yes    Did you obtain your prescribed medications  Yes    Do you need help managing your prescriptions or medications  No    Is transportation to your appointment needed  No    I have advised the patient to call PCP with any new or worsening symptoms  Claudia Sanchez MA          HPI  Review of Systems  Objective   LMP  (LMP Unknown)     Physical Exam  Medications have been reviewed by provider in current encounter    Administrative Statements   Encounter provider Foster Biswas MD    The Patient is located at Home and in the following state in which I hold an active license [place].    The patient was identified by name and date of birth. Esperanza Prajapati was informed that this is a telemedicine visit and that the visit is being conducted through the Epic Embedded platform. She agrees to proceed..  My office door was closed. No one else was in the room.  She acknowledged consent and understanding of privacy and security of the video platform. The patient has agreed to participate and understands they can discontinue the visit at any time.    I have spent a total time of 15 minutes in caring for this patient on the day of the visit/encounter including Instructions for management, Patient and family education, Counseling / Coordination of care, Reviewing/placing orders in the medical record (including tests, medications, and/or procedures), Obtaining or reviewing history  , and Communicating with other healthcare professionals .    Foster Biswas MD

## 2025-02-26 NOTE — ASSESSMENT & PLAN NOTE
Resolved  Status post hospitalization.    Requiring ICU care secondary to acute respiratory failure requiring intubation  Status post rehabilitation  Now on room air

## 2025-02-26 NOTE — ASSESSMENT & PLAN NOTE
Following with [facility] psychiatry.  As per chart review, she had an appointment today at 530 however her sister reports she was unaware of this and unfortunately missed her visit.  I advised her to contact her psychiatry team and reschedule  Her current regimen includes Cogentin 2 mg twice daily, Zyprexa 10 mg at bedtime and Depakote 250 mg every 12 hours  As per documentation, Invega was discontinue due to neutropenia.

## 2025-02-26 NOTE — ASSESSMENT & PLAN NOTE
Lab Results   Component Value Date    EGFR 82 02/17/2025    EGFR 87 02/13/2025    EGFR 73 02/10/2025    CREATININE 0.82 02/17/2025    CREATININE 0.78 02/13/2025    CREATININE 0.90 02/10/2025      History of VALERIE.  Status post hospitalization  Most recent kidney function markers have been within normal limits.  Avoid nephrotoxic medications

## 2025-02-26 NOTE — ASSESSMENT & PLAN NOTE
Status post hospitalization secondary to acute respiratory failure in the setting of pneumonia  Her sister reports she has been tolerating soft foods and thin liquids  Her sister will schedule outpatient follow-up with speech

## 2025-02-26 NOTE — ASSESSMENT & PLAN NOTE
Resolved.  Now on room air  Presented on 1/19/25 with respiratory failure related to Influenza A  Intubated 1/21/25 - 1/30/25.  Required HFNC.  Weaned to room air  Status post rehabilitation

## 2025-02-26 NOTE — ASSESSMENT & PLAN NOTE
Orders:    levothyroxine 175 mcg tablet; Take 1 tablet (175 mcg total) by mouth daily in the early morning

## 2025-02-26 NOTE — ASSESSMENT & PLAN NOTE
"Lab Results   Component Value Date    WBC 3.98 (L) 02/17/2025    WBC 4.05 (L) 02/16/2025    WBC 3.37 (L) 02/15/2025     As per inpatient documentation from psychatry:  \"Continue down-taper of Depakote; pt to receive 250 mg q12h (for total of 500 mg today).   Per Neurology consult, no changes had been made to AEDs.  However, per their recommendations, reached out to outpatient epileptologist Dr. Cevallos as LOV note states VPA had been prescribed for mood, and to consider consolidating AED to lacosamide monotherapy if Psychiatry is in agreement with discontinuation/decrease of VPA.  Considering risks of neutropenia and overall clinical picture, both teams are in agreement with discontinuation of VPA.   If concerns for breakthrough seizures, can increase dose of lacosamide.  Of note, pt is on olanzapine, which can confer mood stability, so its dose can be adjusted should the need arise.  Discontinued paliperidone 6 mg qd.   Pt received last dose 2/1\"     Neutropenia appears to be improving   Will repeat a CBC in 2 weeks   "

## 2025-02-26 NOTE — ASSESSMENT & PLAN NOTE
Lab Results   Component Value Date    ZWS2BCKTSZNG 3.323 01/19/2025    [id number] 0.048 (L) 10/10/2024    FREET4 1.89 (H) 10/10/2024    FREET4 1.06 07/31/2024      Most recent level show TSH within normal limits  Currently on levothyroxine 175 mcg daily-continue

## 2025-02-26 NOTE — ASSESSMENT & PLAN NOTE
Lab Results   Component Value Date    SODIUM 139 02/17/2025    SODIUM 140 02/13/2025    SODIUM 139 02/10/2025     Resolved

## 2025-02-26 NOTE — ASSESSMENT & PLAN NOTE
Status post hospitalization  She unfortunately missed her appointment due to her recent hospitalization  She is currently on Continue chronic AED regimen: Lacosamide 150 mg QAM, Lacosamide 200 mg QHS,   She is also on Depakote 250 mg Q 12 hours for mood stabilization as per psychiatry  I advised patient's sister to contact her neurology team and schedule a visit

## 2025-02-26 NOTE — ASSESSMENT & PLAN NOTE
Lab Results   Component Value Date    HGBA1C 5.5 10/10/2024    HGBA1C 5.3 08/19/2022    HGBA1C 5.4 04/29/2022    HGBA1C 5.6 06/11/2020    HGBA1C 5.3 06/19/2018     Noted during recent hospitalization  No evidence of glucose metabolism dysfunction  Resolved

## 2025-03-04 ENCOUNTER — PATIENT OUTREACH (OUTPATIENT)
Dept: CASE MANAGEMENT | Facility: OTHER | Age: 55
End: 2025-03-04

## 2025-03-04 NOTE — PROGRESS NOTES
Outpatient Care Management note- follow up call, patient identified as an HRR    Chart review completed    Initial outreach to patient's sister, Layne on 2/24/25 with no response.     2nd outreach call placed today. Spoke phuong Tillman who declines the need for RN CM follow up.     Will close referral.

## 2025-03-13 ENCOUNTER — OFFICE VISIT (OUTPATIENT)
Dept: INTERNAL MEDICINE CLINIC | Facility: CLINIC | Age: 55
End: 2025-03-13
Payer: COMMERCIAL

## 2025-03-13 VITALS
TEMPERATURE: 97.1 F | SYSTOLIC BLOOD PRESSURE: 132 MMHG | DIASTOLIC BLOOD PRESSURE: 90 MMHG | WEIGHT: 215 LBS | HEIGHT: 69 IN | BODY MASS INDEX: 31.84 KG/M2 | HEART RATE: 105 BPM

## 2025-03-13 DIAGNOSIS — E03.9 ACQUIRED HYPOTHYROIDISM: ICD-10-CM

## 2025-03-13 DIAGNOSIS — F81.9 COGNITIVE DEVELOPMENTAL DELAY: ICD-10-CM

## 2025-03-13 DIAGNOSIS — G40.109 FOCAL EPILEPSY (HCC): ICD-10-CM

## 2025-03-13 DIAGNOSIS — Z00.00 MEDICARE ANNUAL WELLNESS VISIT, SUBSEQUENT: Primary | ICD-10-CM

## 2025-03-13 DIAGNOSIS — E53.8 VITAMIN B12 DEFICIENCY: ICD-10-CM

## 2025-03-13 PROBLEM — G45.9 TRANSIENT ISCHEMIC ATTACK: Status: RESOLVED | Noted: 2022-06-05 | Resolved: 2025-03-13

## 2025-03-13 PROCEDURE — G2211 COMPLEX E/M VISIT ADD ON: HCPCS | Performed by: STUDENT IN AN ORGANIZED HEALTH CARE EDUCATION/TRAINING PROGRAM

## 2025-03-13 PROCEDURE — G0439 PPPS, SUBSEQ VISIT: HCPCS | Performed by: STUDENT IN AN ORGANIZED HEALTH CARE EDUCATION/TRAINING PROGRAM

## 2025-03-13 PROCEDURE — 99214 OFFICE O/P EST MOD 30 MIN: CPT | Performed by: STUDENT IN AN ORGANIZED HEALTH CARE EDUCATION/TRAINING PROGRAM

## 2025-03-13 PROCEDURE — G0444 DEPRESSION SCREEN ANNUAL: HCPCS | Performed by: STUDENT IN AN ORGANIZED HEALTH CARE EDUCATION/TRAINING PROGRAM

## 2025-03-13 NOTE — ASSESSMENT & PLAN NOTE
Following with psychiatry.  Unfortunately missed her last 2 visits while he while she was hospitalized and more recently due to lack of transportation

## 2025-03-13 NOTE — ASSESSMENT & PLAN NOTE
Follow-up with neurology  Currently on lacosamide 150 mg in the morning and 200 mg at bedtime managed by her neurology team

## 2025-03-13 NOTE — ASSESSMENT & PLAN NOTE
Known history of intellectual disability, as per chart review due to head injury at the age of 10 months  Patient lives with her sister and multiple family members  She is independent with basic ADLs however requires assistance for medication management and IADLs including transportation to medical visits

## 2025-03-13 NOTE — PROGRESS NOTES
Name: Esperanza Prajapati      : 1970      MRN: [id number]  Encounter Provider: Foster Biswas MD  Encounter Date: 3/13/2025   Encounter department: [facility] INTERNAL MEDICINE [facility]  Medicare annual wellness visit, subsequent         Vitamin B12 deficiency  Lab Results   Component Value Date    [id number] 334 2025        Orders:    cyanocobalamin (VITAMIN B-12) 1000 MCG tablet; Take 1 tablet (1,000 mcg total) by mouth daily    Acquired hypothyroidism  Lab Results   Component Value Date    [id number] 3.323 2025    HVL2VCSHBHLM 0.048 (L) 10/10/2024    FREET4 1.89 (H) 10/10/2024    FREET4 1.06 2024      TSH within therapeutic range.  Continue levothyroxine         Focal epilepsy (HCC)  Follow-up with neurology  Currently on lacosamide 150 mg in the morning and 200 mg at bedtime managed by her neurology team         Cognitive developmental delay  Known history of intellectual disability, as per chart review due to head injury at the age of 10 months  Patient lives with her sister and multiple family members  She is independent with basic ADLs however requires assistance for medication management and IADLs including transportation to medical visits            Preventive health issues were discussed with patient, and age appropriate screening tests were ordered as noted in patient's After Visit Summary. Personalized health advice and appropriate referrals for health education or preventive services given if needed, as noted in patient's After Visit Summary.    History of Present Illness     HPI   Patient Care Team:  Foster Biswas MD as PCP - General (Internal Medicine)  Ambika Mobley MD as PCP - PCP-[facility] ([facility])  Misha Powell MD as PCP - PCP-[facility] ([facility])  Roderick Quiros, MD C William Riedel, DO Victor Rivera-Hernandez, MD as Fellow (Endocrinology)    Review of Systems  Medical History Reviewed by provider this encounter:        Annual Wellness Visit Questionnaire   Esperanza is here for her Subsequent Wellness visit.     Historian  Patient cannot answer questions due to cognitive impairment, intelluctual disability, or expressive limitations. Information provided by: family (Mitch Zaragoza).    Health Risk Assessment:   Patient rates overall health as fair. Patient feels that their physical health rating is same. Patient is satisfied with their life. Eyesight was rated as slightly worse. Hearing was rated as slightly worse. Patient feels that their emotional and mental health rating is same. Patients states they are sometimes angry. Patient states they are never, rarely unusually tired/fatigued. Pain experienced in the last 7 days has been none. Patient states that she has experienced no weight loss or gain in last 6 months.     Depression Screening:   PHQ-2 Score: 0      Fall Risk Screening:   In the past year, patient has experienced: no history of falling in past year      Urinary Incontinence Screening:   Patient has not leaked urine accidently in the last six months.     Home Safety:  Patient does not have trouble with stairs inside or outside of their home. Patient has working smoke alarms and has working carbon monoxide detector. Home safety hazards include: none.     Nutrition:   Current diet is Regular.     Medications:   Patient is currently taking over-the-counter supplements. OTC medications include: see medication list. Patient is able to manage medications.     Activities of Daily Living (ADLs)/Instrumental Activities of Daily Living (IADLs):   Walk and transfer into and out of bed and chair?: Yes  Dress and groom yourself?: Yes    Bathe or shower yourself?: Yes    Feed yourself? Yes  Do your laundry/housekeeping?: Yes  Manage your money, pay your bills and track your expenses?: No  Make your own meals?: No    Do your own shopping?: No    Previous Hospitalizations:   Any hospitalizations or ED visits within the last 12  months?: Yes    How many hospitalizations have you had in the last year?: 1-2    Advance Care Planning:   Living will: No    Durable POA for healthcare: No    Advanced directive: No      Cognitive Screening:   Provider or family/friend/caregiver concerned regarding cognition?: Yes    PREVENTIVE SCREENINGS      Cardiovascular Screening:    General: Screening Not Indicated and History Lipid Disorder      Diabetes Screening:     General: Screening Current      Colorectal Cancer Screening:     General: Risks and Benefits Discussed      Breast Cancer Screening:     General: Screening Current      Cervical Cancer Screening:    General: Risks and Benefits Discussed    Due for: Cervical Pap Smear      Osteoporosis Screening:    General: Screening Not Indicated      Abdominal Aortic Aneurysm (AAA) Screening:        General: Screening Not Indicated      Lung Cancer Screening:     General: Screening Not Indicated      Hepatitis C Screening:    General: Screening Current    Screening, Brief Intervention, and Referral to Treatment (SBIRT)     Screening  Typical number of drinks in a day: 0  Typical number of drinks in a week: 0  Interpretation: Low risk drinking behavior.    Single Item Drug Screening:  How often have you used an illegal drug (including marijuana) or a prescription medication for non-medical reasons in the past year? never    Single Item Drug Screen Score: 0  Interpretation: Negative screen for possible drug use disorder    Annual Depression Screening  Time spent screening and evaluating the patient for depression during today's encounter was 8 minutes.    Other Counseling Topics:   Skin self-exam and sunscreen.     Social Drivers of Health     Financial Resource Strain: Low Risk  (12/8/2023)    Overall Financial Resource Strain (CARDIA)     Difficulty of Paying Living Expenses: Not hard at all   Food Insecurity: Patient Unable To Answer (2/17/2025)    Nursing - Inadequate Food Risk Classification     Worried  "About Running Out of Food in the Last Year: Patient unable to answer     Ran Out of Food in the Last Year: Patient unable to answer     Ran Out of Food in the Last Year: Never true   Transportation Needs: No Transportation Needs (2025)    Nursing - Transportation Risk Classification     Lack of Transportation: No   Housing Stability: Unknown (2025)    Nursing: Inadequate Housing Risk Classification     Unable to Pay for Housing in the Last Year: No     Has Housin   Utilities: Not At Risk (2025)    Nursing - Utilities Risk Classification     Threatened with loss of utilities: No     No results found.    Objective   /90 (BP Location: Left arm, Patient Position: Sitting, Cuff Size: Standard)   Pulse 105   Temp (!) 97.1 °F (36.2 °C)   Ht 5' 9\" (1.753 m)   Wt 97.5 kg (215 lb)   LMP  (LMP Unknown)   BMI 31.75 kg/m²     Physical Exam    "

## 2025-03-13 NOTE — ASSESSMENT & PLAN NOTE
Lab Results   Component Value Date    [id number] 334 01/21/2025        Orders:    cyanocobalamin (VITAMIN B-12) 1000 MCG tablet; Take 1 tablet (1,000 mcg total) by mouth daily

## 2025-03-13 NOTE — ASSESSMENT & PLAN NOTE
Lab Results   Component Value Date    [id number] 3.323 01/19/2025    [id number] 0.048 (L) 10/10/2024    FREET4 1.89 (H) 10/10/2024    FREET4 1.06 07/31/2024      TSH within therapeutic range.  Continue levothyroxine

## 2025-03-31 DIAGNOSIS — G40.109 FOCAL EPILEPSY (HCC): ICD-10-CM

## 2025-03-31 RX ORDER — DIVALPROEX SODIUM 250 MG/1
250 TABLET, DELAYED RELEASE ORAL 2 TIMES DAILY
Qty: 60 TABLET | Refills: 0 | OUTPATIENT
Start: 2025-03-31

## 2025-04-07 DIAGNOSIS — F25.9 SCHIZOAFFECTIVE DISORDER (HCC): Chronic | ICD-10-CM

## 2025-04-07 RX ORDER — OLANZAPINE 10 MG/1
10 TABLET ORAL
Qty: 30 TABLET | Refills: 0 | OUTPATIENT
Start: 2025-04-07 | End: 2025-05-07

## 2025-04-22 ENCOUNTER — DOCUMENTATION (OUTPATIENT)
Dept: ADMINISTRATIVE | Facility: OTHER | Age: 55
End: 2025-04-22

## 2025-04-22 NOTE — PROGRESS NOTES
04/22/25 12:00 PM    Annual Wellness Visit outreach is not required, an AWV was completed at the PCP office.    Thank you.  Carmen Del Cid MA  [facility]

## 2025-05-13 ENCOUNTER — OFFICE VISIT (OUTPATIENT)
Dept: INTERNAL MEDICINE CLINIC | Facility: CLINIC | Age: 55
End: 2025-05-13
Payer: COMMERCIAL

## 2025-05-13 VITALS
WEIGHT: 216 LBS | OXYGEN SATURATION: 97 % | BODY MASS INDEX: 31.99 KG/M2 | DIASTOLIC BLOOD PRESSURE: 84 MMHG | RESPIRATION RATE: 18 BRPM | TEMPERATURE: 97.3 F | SYSTOLIC BLOOD PRESSURE: 130 MMHG | HEIGHT: 69 IN | HEART RATE: 107 BPM

## 2025-05-13 DIAGNOSIS — F25.9 SCHIZOAFFECTIVE DISORDER (HCC): Chronic | ICD-10-CM

## 2025-05-13 DIAGNOSIS — F51.04 PSYCHOPHYSIOLOGICAL INSOMNIA: ICD-10-CM

## 2025-05-13 DIAGNOSIS — R73.01 IMPAIRED FASTING GLUCOSE: ICD-10-CM

## 2025-05-13 DIAGNOSIS — E03.9 ACQUIRED HYPOTHYROIDISM: ICD-10-CM

## 2025-05-13 DIAGNOSIS — G40.109 FOCAL EPILEPSY (HCC): ICD-10-CM

## 2025-05-13 DIAGNOSIS — E53.8 VITAMIN B12 DEFICIENCY: ICD-10-CM

## 2025-05-13 DIAGNOSIS — Z12.31 ENCOUNTER FOR SCREENING MAMMOGRAM FOR BREAST CANCER: Primary | ICD-10-CM
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
## 2025-05-13 PROCEDURE — 99214 OFFICE O/P EST MOD 30 MIN: CPT | Performed by: STUDENT IN AN ORGANIZED HEALTH CARE EDUCATION/TRAINING PROGRAM

## 2025-05-13 PROCEDURE — G2211 COMPLEX E/M VISIT ADD ON: HCPCS | Performed by: STUDENT IN AN ORGANIZED HEALTH CARE EDUCATION/TRAINING PROGRAM

## 2025-05-13 RX ORDER — OLANZAPINE 10 MG/1
10 TABLET, FILM COATED ORAL
Qty: 90 TABLET | Refills: 1 | Status: SHIPPED | OUTPATIENT
Start: 2025-05-13

## 2025-05-13 RX ORDER — BENZTROPINE MESYLATE 2 MG/1
2 TABLET ORAL 2 TIMES DAILY
Qty: 90 TABLET | Refills: 1 | Status: SHIPPED | OUTPATIENT
Start: 2025-05-13

## 2025-05-13 RX ORDER — TRAZODONE HYDROCHLORIDE 50 MG/1
50 TABLET ORAL
Qty: 90 TABLET | Refills: 1 | Status: SHIPPED | OUTPATIENT
Start: 2025-05-13

## 2025-05-13 RX ORDER — LEVOTHYROXINE SODIUM 175 UG/1
175 TABLET ORAL
Qty: 90 TABLET | Refills: 1 | Status: SHIPPED | OUTPATIENT
Start: 2025-05-13

## 2025-05-13 NOTE — PROGRESS NOTES
INTERNAL MEDICINE OFFICE VISIT NOTE  [facility] [place]    NAME: Esperanza Prajapati  AGE: 54 y.o. SEX: female    DATE OF ENCOUNTER:       Chief Complaint   Patient presents with    Follow-up     2 month follow up         Assessment & Plan  Schizoaffective disorder (HCC)  Has been his multiple visits with psychiatry.    She is accompanied today by her nephew who mentions he was not aware that she was scheduled for this visits and is usually his mom who is aware of Esperanza's upcoming visits.  I advised him to discuss this further with his mom and ensure that she does not miss any visits.  Will send a prescription for trazodone 50 mg at bedtime, olanzapine 10 mg at bedtime, benztropine 2 mg twice a day      Orders:    benztropine (COGENTIN) 2 mg tablet; Take 1 tablet (2 mg total) by mouth 2 (two) times a day    OLANZapine (ZyPREXA) 10 mg tablet; Take 1 tablet (10 mg total) by mouth daily at bedtime    traZODone (DESYREL) 50 mg tablet; Take 1 tablet (50 mg total) by mouth daily at bedtime    CBC; Future    Comprehensive metabolic panel; Future    TSH, 3rd generation with Free T4 reflex; Future    Lipid Panel with Direct LDL reflex; Future    Vitamin B12 deficiency    Orders:    cyanocobalamin (VITAMIN B-12) 1000 MCG tablet; Take 1 tablet (1,000 mcg total) by mouth daily    Acquired hypothyroidism    Orders:    levothyroxine 175 mcg tablet; Take 1 tablet (175 mcg total) by mouth daily in the early morning    CBC; Future    Comprehensive metabolic panel; Future    TSH, 3rd generation with Free T4 reflex; Future    Lipid Panel with Direct LDL reflex; Future    Encounter for screening mammogram for breast cancer    Orders:    Mammo screening bilateral w 3d and cad; Future    Focal epilepsy (HCC)  Follow-up with neurology  Currently on lacosamide 150 mg in the morning and 200 mg at bedtime managed by her neurology team  Chart review shows that she missed her last visit with neurology  Advised her  "nephew to schedule visit for follow-up           Psychophysiological insomnia  Has been his multiple visits with psychiatry.  See above for more details.  Will send a prescription for trazodone 50 mg at bedtime  I advised her nephew to schedule visit with psychiatry    Orders:    CBC; Future    Comprehensive metabolic panel; Future    TSH, 3rd generation with Free T4 reflex; Future    Lipid Panel with Direct LDL reflex; Future    Impaired fasting glucose    Orders:    Hemoglobin A1C; Future      Return in about 3 months (around 8/13/2025).      OBJECTIVE:  Vitals:    05/13/25 1136   BP: 130/84   BP Location: Left arm   Patient Position: Sitting   Cuff Size: Standard   Pulse: (!) 107   Resp: 18   Temp: (!) 97.3 °F (36.3 °C)   TempSrc: Temporal   SpO2: 97%   Weight: 98 kg (216 lb)   Height: 5' 9\" (1.753 m)         Physical Exam:   GENERAL: NAD, Normal appearance.    NEUROLOGIC:  Alert/oriented x3.  HEENT:  NC/AT, no scleral icterus  CARDIAC:  RRR, +S1/S2  PULMONARY:  non-labored breathing        Current Outpatient Medications:     benztropine (COGENTIN) 2 mg tablet, Take 1 tablet (2 mg total) by mouth 2 (two) times a day, Disp: 90 tablet, Rfl: 1    cyanocobalamin (VITAMIN B-12) 1000 MCG tablet, Take 1 tablet (1,000 mcg total) by mouth daily, Disp: 90 tablet, Rfl: 1    levothyroxine 175 mcg tablet, Take 1 tablet (175 mcg total) by mouth daily in the early morning, Disp: 90 tablet, Rfl: 1    OLANZapine (ZyPREXA) 10 mg tablet, Take 1 tablet (10 mg total) by mouth daily at bedtime, Disp: 90 tablet, Rfl: 1    traZODone (DESYREL) 50 mg tablet, Take 1 tablet (50 mg total) by mouth daily at bedtime, Disp: 90 tablet, Rfl: 1    lacosamide (VIMPAT) 150 mg tablet, Take 1 tablet (150 mg total) by mouth in the morning for 10 days, Disp: 10 tablet, Rfl: 0    lacosamide (VIMPAT) 200 mg tablet, Take 1 tablet (200 mg total) by mouth daily at bedtime for 10 days, Disp: 10 tablet, Rfl: 0    Patient Active Problem List   Diagnosis    " Vitamin D deficiency    Cognitive developmental delay    Acquired hypothyroidism    Focal epilepsy (HCC)    Obesity (BMI 30-39.9)    Obstructive sleep apnea    History of thyroid cancer    Proteinuria    Family history of diabetes mellitus    Family history of malignant neoplasm of breast    PCOS (polycystic ovarian syndrome)    Dyslipidemia    Gout    Schizoaffective disorder (HCC)    Vitamin B12 deficiency    h/o VALERIE (acute kidney injury) (HCC)    Bilateral lower extremity edema    Impaired ability to manage medication regime    Dermatitis of face    Decreased urination    Insomnia    Oropharyngeal dysphagia    Neutropenia (HCC)    Prolonged QT interval    Impaired mobility and ADLs           Foster Biswas MD  [facility] [place]    * Please Note: This note was completed in part utilizing a dictation software.  Grammatical errors, random word insertions, spelling mistakes, and incomplete sentences may be an occasional consequence of this system secondary to software limitations, ambient noise, and hardware issues.  If you have any questions or concerns about the content, text, or information contained within the body of this dictation, please contact the provider for clarification.**

## 2025-05-13 NOTE — ASSESSMENT & PLAN NOTE
Follow-up with neurology  Currently on lacosamide 150 mg in the morning and 200 mg at bedtime managed by her neurology team  Chart review shows that she missed her last visit with neurology  Advised her nephew to schedule visit for follow-up

## 2025-05-13 NOTE — ASSESSMENT & PLAN NOTE
Has been his multiple visits with psychiatry.  See above for more details.  Will send a prescription for trazodone 50 mg at bedtime  I advised her nephew to schedule visit with psychiatry    Orders:    CBC; Future    Comprehensive metabolic panel; Future    TSH, 3rd generation with Free T4 reflex; Future    Lipid Panel with Direct LDL reflex; Future

## 2025-05-13 NOTE — ASSESSMENT & PLAN NOTE
Orders:    levothyroxine 175 mcg tablet; Take 1 tablet (175 mcg total) by mouth daily in the early morning    CBC; Future    Comprehensive metabolic panel; Future    TSH, 3rd generation with Free T4 reflex; Future    Lipid Panel with Direct LDL reflex; Future

## 2025-05-13 NOTE — ASSESSMENT & PLAN NOTE
Has been his multiple visits with psychiatry.    She is accompanied today by her nephew who mentions he was not aware that she was scheduled for this visits and is usually his mom who is aware of Esperanza's upcoming visits.  I advised him to discuss this further with his mom and ensure that she does not miss any visits.  Will send a prescription for trazodone 50 mg at bedtime, olanzapine 10 mg at bedtime, benztropine 2 mg twice a day      Orders:    benztropine (COGENTIN) 2 mg tablet; Take 1 tablet (2 mg total) by mouth 2 (two) times a day    OLANZapine (ZyPREXA) 10 mg tablet; Take 1 tablet (10 mg total) by mouth daily at bedtime    traZODone (DESYREL) 50 mg tablet; Take 1 tablet (50 mg total) by mouth daily at bedtime    CBC; Future    Comprehensive metabolic panel; Future    TSH, 3rd generation with Free T4 reflex; Future    Lipid Panel with Direct LDL reflex; Future

## 2025-06-06 DIAGNOSIS — G40.019 PARTIAL IDIOPATHIC EPILEPSY WITH SEIZURES OF LOCALIZED ONSET, INTRACTABLE, WITHOUT STATUS EPILEPTICUS (HCC): ICD-10-CM

## 2025-06-06 RX ORDER — LACOSAMIDE 150 MG/1
150 TABLET ORAL DAILY
Qty: 30 TABLET | Refills: 5 | Status: SHIPPED | OUTPATIENT
Start: 2025-06-06

## 2025-06-06 RX ORDER — LACOSAMIDE 200 MG/1
200 TABLET ORAL
Qty: 30 TABLET | Refills: 5 | Status: SHIPPED | OUTPATIENT
Start: 2025-06-06

## 2025-06-06 NOTE — TELEPHONE ENCOUNTER
Medication: lacosamide 150 mg    Dose/Frequency: Take 150 mg in the AM     Quantity: 30    Pharmacy: [facility]    Office:   [] PCP/Provider -   [x] Speciality/Provider - Dr. Cevallos    Does the patient have enough for 3 days?   [x] Yes   [] No - Send as HP to POD     Medication: lacosamide 200 mg     Dose/Frequency: 200 mg in the PM    Quantity: 30    Pharmacy: [facility]    Office:   [] PCP/Provider -   [x] Speciality/Provider - Dr. Cevallos    Does the patient have enough for 3 days?   [x] Yes   [] No - Send as HP to POD   ____________________________________________________    Pharmacist from [facility] called. Pt needs refills of both lacosamide 150 mg and 200 mg. Last OV was 9/30/24, next OV 12/10/25. Pended meds in this encounter and routed to the provider to review.

## 2025-06-26 DIAGNOSIS — F25.9 SCHIZOAFFECTIVE DISORDER (HCC): Chronic | ICD-10-CM

## 2025-06-27 RX ORDER — BENZTROPINE MESYLATE 2 MG/1
2 TABLET ORAL 2 TIMES DAILY
Qty: 90 TABLET | Refills: 1 | Status: SHIPPED | OUTPATIENT
Start: 2025-06-27

## 2025-07-25 ENCOUNTER — HOSPITAL ENCOUNTER (INPATIENT)
Facility: HOSPITAL | Age: 55
LOS: 10 days | Discharge: HOME | DRG: 885 | End: 2025-08-05
Attending: EMERGENCY MEDICINE | Admitting: STUDENT IN AN ORGANIZED HEALTH CARE EDUCATION/TRAINING PROGRAM
Payer: COMMERCIAL

## 2025-07-25 DIAGNOSIS — F25.9 SCHIZOAFFECTIVE DISORDER, UNSPECIFIED TYPE (HCC): Chronic | ICD-10-CM

## 2025-07-25 DIAGNOSIS — E55.9 VITAMIN D DEFICIENCY: Primary | ICD-10-CM

## 2025-07-25 DIAGNOSIS — F22 PARANOID DELUSION (HCC): ICD-10-CM

## 2025-07-25 DIAGNOSIS — G40.019 PARTIAL IDIOPATHIC EPILEPSY WITH SEIZURES OF LOCALIZED ONSET, INTRACTABLE, WITHOUT STATUS EPILEPTICUS (HCC): ICD-10-CM

## 2025-07-25 DIAGNOSIS — Z00.8 MEDICAL CLEARANCE FOR PSYCHIATRIC ADMISSION: ICD-10-CM

## 2025-07-25 DIAGNOSIS — Z76.89 ENCOUNTER FOR PSYCHIATRIC ASSESSMENT: ICD-10-CM

## 2025-07-25 LAB
ALBUMIN SERPL BCG-MCNC: 4.1 G/DL (ref 3.5–5)
ALP SERPL-CCNC: 79 U/L (ref 34–104)
ALT SERPL W P-5'-P-CCNC: 21 U/L (ref 7–52)
ANION GAP SERPL CALCULATED.3IONS-SCNC: 9 MMOL/L (ref 4–13)
AST SERPL W P-5'-P-CCNC: 17 U/L (ref 13–39)
BASOPHILS # BLD AUTO: 0.05 THOUSANDS/ÂΜL (ref 0–0.1)
BASOPHILS NFR BLD AUTO: 1 % (ref 0–1)
BILIRUB SERPL-MCNC: 0.26 MG/DL (ref 0.2–1)
BUN SERPL-MCNC: 16 MG/DL (ref 5–25)
CALCIUM SERPL-MCNC: 9.7 MG/DL (ref 8.4–10.2)
CHLORIDE SERPL-SCNC: 101 MMOL/L (ref 96–108)
CO2 SERPL-SCNC: 27 MMOL/L (ref 21–32)
CREAT SERPL-MCNC: 0.83 MG/DL (ref 0.6–1.3)
EOSINOPHIL # BLD AUTO: 0.15 THOUSAND/ÂΜL (ref 0–0.61)
EOSINOPHIL NFR BLD AUTO: 2 % (ref 0–6)
ERYTHROCYTE [DISTWIDTH] IN BLOOD BY AUTOMATED COUNT: 12.9 % (ref 11.6–15.1)
ETHANOL EXG-MCNC: 0 MG/DL
GFR SERPL CREATININE-BSD FRML MDRD: 81 ML/MIN/1.73SQ M
GLUCOSE SERPL-MCNC: 97 MG/DL (ref 65–140)
HCT VFR BLD AUTO: 40 % (ref 34.8–46.1)
HGB BLD-MCNC: 13.8 G/DL (ref 11.5–15.4)
IMM GRANULOCYTES # BLD AUTO: 0.04 THOUSAND/UL (ref 0–0.2)
IMM GRANULOCYTES NFR BLD AUTO: 1 % (ref 0–2)
LYMPHOCYTES # BLD AUTO: 2.43 THOUSANDS/ÂΜL (ref 0.6–4.47)
LYMPHOCYTES NFR BLD AUTO: 38 % (ref 14–44)
MCH RBC QN AUTO: 31.7 PG (ref 26.8–34.3)
MCHC RBC AUTO-ENTMCNC: 34.5 G/DL (ref 31.4–37.4)
MCV RBC AUTO: 92 FL (ref 82–98)
MONOCYTES # BLD AUTO: 0.75 THOUSAND/ÂΜL (ref 0.17–1.22)
MONOCYTES NFR BLD AUTO: 12 % (ref 4–12)
NEUTROPHILS # BLD AUTO: 2.98 THOUSANDS/ÂΜL (ref 1.85–7.62)
NEUTS SEG NFR BLD AUTO: 46 % (ref 43–75)
NRBC BLD AUTO-RTO: 0 /100 WBCS
PLATELET # BLD AUTO: 291 THOUSANDS/UL (ref 149–390)
PMV BLD AUTO: 9 FL (ref 8.9–12.7)
POTASSIUM SERPL-SCNC: 4 MMOL/L (ref 3.5–5.3)
PROT SERPL-MCNC: 8.2 G/DL (ref 6.4–8.4)
RBC # BLD AUTO: 4.35 MILLION/UL (ref 3.81–5.12)
SODIUM SERPL-SCNC: 137 MMOL/L (ref 135–147)
TSH SERPL DL<=0.05 MIU/L-ACNC: 0.25 UIU/ML (ref 0.45–4.5)
WBC # BLD AUTO: 6.4 THOUSAND/UL (ref 4.31–10.16)

## 2025-07-25 PROCEDURE — 99285 EMERGENCY DEPT VISIT HI MDM: CPT | Performed by: EMERGENCY MEDICINE

## 2025-07-25 PROCEDURE — 82306 VITAMIN D 25 HYDROXY: CPT

## 2025-07-25 PROCEDURE — 80053 COMPREHEN METABOLIC PANEL: CPT | Performed by: EMERGENCY MEDICINE

## 2025-07-25 PROCEDURE — 99285 EMERGENCY DEPT VISIT HI MDM: CPT

## 2025-07-25 PROCEDURE — 85025 COMPLETE CBC W/AUTO DIFF WBC: CPT | Performed by: EMERGENCY MEDICINE

## 2025-07-25 PROCEDURE — 80307 DRUG TEST PRSMV CHEM ANLYZR: CPT | Performed by: EMERGENCY MEDICINE

## 2025-07-25 PROCEDURE — 84439 ASSAY OF FREE THYROXINE: CPT | Performed by: EMERGENCY MEDICINE

## 2025-07-25 PROCEDURE — 82075 ASSAY OF BREATH ETHANOL: CPT | Performed by: EMERGENCY MEDICINE

## 2025-07-25 PROCEDURE — 81003 URINALYSIS AUTO W/O SCOPE: CPT

## 2025-07-25 PROCEDURE — 82746 ASSAY OF FOLIC ACID SERUM: CPT

## 2025-07-25 PROCEDURE — 81001 URINALYSIS AUTO W/SCOPE: CPT

## 2025-07-25 PROCEDURE — 82607 VITAMIN B-12: CPT

## 2025-07-25 PROCEDURE — 36415 COLL VENOUS BLD VENIPUNCTURE: CPT | Performed by: EMERGENCY MEDICINE

## 2025-07-25 PROCEDURE — 83036 HEMOGLOBIN GLYCOSYLATED A1C: CPT

## 2025-07-25 PROCEDURE — 84443 ASSAY THYROID STIM HORMONE: CPT | Performed by: EMERGENCY MEDICINE

## 2025-07-25 RX ORDER — OLANZAPINE 10 MG/1
10 TABLET, FILM COATED ORAL
Status: DISCONTINUED | OUTPATIENT
Start: 2025-07-25 | End: 2025-07-29

## 2025-07-25 RX ORDER — TRAZODONE HYDROCHLORIDE 50 MG/1
50 TABLET ORAL
Status: DISCONTINUED | OUTPATIENT
Start: 2025-07-25 | End: 2025-08-05 | Stop reason: HOSPADM

## 2025-07-25 RX ORDER — BENZTROPINE MESYLATE 2 MG/1
2 TABLET ORAL 2 TIMES DAILY
Status: DISCONTINUED | OUTPATIENT
Start: 2025-07-26 | End: 2025-08-05 | Stop reason: HOSPADM

## 2025-07-25 RX ORDER — OLANZAPINE 5 MG/1
10 TABLET, ORALLY DISINTEGRATING ORAL ONCE
Status: CANCELLED | OUTPATIENT
Start: 2025-07-25 | End: 2025-07-25

## 2025-07-25 RX ORDER — LACOSAMIDE 50 MG/1
150 TABLET ORAL DAILY
Status: DISCONTINUED | OUTPATIENT
Start: 2025-07-26 | End: 2025-08-05 | Stop reason: HOSPADM

## 2025-07-25 RX ORDER — LACOSAMIDE 200 MG/1
200 TABLET ORAL
Status: DISCONTINUED | OUTPATIENT
Start: 2025-07-25 | End: 2025-08-05 | Stop reason: HOSPADM

## 2025-07-25 RX ADMIN — OLANZAPINE 10 MG: 10 TABLET, FILM COATED ORAL at 22:48

## 2025-07-26 LAB
AMPHETAMINES SERPL QL SCN: NEGATIVE
ATRIAL RATE: 97 BPM
BACTERIA UR QL AUTO: NORMAL /HPF
BARBITURATES UR QL: NEGATIVE
BENZODIAZ UR QL: NEGATIVE
BILIRUB UR QL STRIP: NEGATIVE
CLARITY UR: CLEAR
COCAINE UR QL: NEGATIVE
COLOR UR: ABNORMAL
FENTANYL UR QL SCN: NEGATIVE
GLUCOSE UR STRIP-MCNC: NEGATIVE MG/DL
HGB UR QL STRIP.AUTO: NEGATIVE
HYDROCODONE UR QL SCN: NEGATIVE
KETONES UR STRIP-MCNC: NEGATIVE MG/DL
LEUKOCYTE ESTERASE UR QL STRIP: 25
METHADONE UR QL: NEGATIVE
NITRITE UR QL STRIP: NEGATIVE
NON-SQ EPI CELLS URNS QL MICRO: NORMAL /HPF
OPIATES UR QL SCN: NEGATIVE
OXYCODONE+OXYMORPHONE UR QL SCN: NEGATIVE
P AXIS: 62 DEGREES
PCP UR QL: NEGATIVE
PH UR STRIP.AUTO: 7 [PH]
PR INTERVAL: 166 MS
PROT UR STRIP-MCNC: ABNORMAL MG/DL
QRS AXIS: 47 DEGREES
QRSD INTERVAL: 78 MS
QT INTERVAL: 376 MS
QTC INTERVAL: 478 MS
RBC #/AREA URNS AUTO: NORMAL /HPF
SP GR UR STRIP.AUTO: 1 (ref 1–1.04)
T WAVE AXIS: 73 DEGREES
T4 FREE SERPL-MCNC: 1.05 NG/DL (ref 0.61–1.12)
THC UR QL: NEGATIVE
UROBILINOGEN UA: NEGATIVE MG/DL
VENTRICULAR RATE: 97 BPM
WBC #/AREA URNS AUTO: NORMAL /HPF

## 2025-07-26 PROCEDURE — 93010 ELECTROCARDIOGRAM REPORT: CPT | Performed by: INTERNAL MEDICINE

## 2025-07-26 PROCEDURE — 93005 ELECTROCARDIOGRAM TRACING: CPT

## 2025-07-26 RX ORDER — OLANZAPINE 10 MG/2ML
5 INJECTION, POWDER, FOR SOLUTION INTRAMUSCULAR
Status: DISCONTINUED | OUTPATIENT
Start: 2025-07-26 | End: 2025-08-05 | Stop reason: HOSPADM

## 2025-07-26 RX ORDER — HYDROXYZINE HYDROCHLORIDE 50 MG/1
50 TABLET, FILM COATED ORAL
Status: DISCONTINUED | OUTPATIENT
Start: 2025-07-26 | End: 2025-08-05 | Stop reason: HOSPADM

## 2025-07-26 RX ORDER — ACETAMINOPHEN 325 MG/1
650 TABLET ORAL EVERY 4 HOURS PRN
Status: DISCONTINUED | OUTPATIENT
Start: 2025-07-26 | End: 2025-08-05 | Stop reason: HOSPADM

## 2025-07-26 RX ORDER — LORAZEPAM 1 MG/1
1 TABLET ORAL
Status: DISCONTINUED | OUTPATIENT
Start: 2025-07-26 | End: 2025-08-05 | Stop reason: HOSPADM

## 2025-07-26 RX ORDER — BENZTROPINE MESYLATE 0.5 MG/1
0.5 TABLET ORAL
Status: DISCONTINUED | OUTPATIENT
Start: 2025-07-26 | End: 2025-08-05 | Stop reason: HOSPADM

## 2025-07-26 RX ORDER — TRAZODONE HYDROCHLORIDE 50 MG/1
50 TABLET ORAL
Status: DISCONTINUED | OUTPATIENT
Start: 2025-07-26 | End: 2025-08-05 | Stop reason: HOSPADM

## 2025-07-26 RX ORDER — MAGNESIUM HYDROXIDE/ALUMINUM HYDROXICE/SIMETHICONE 120; 1200; 1200 MG/30ML; MG/30ML; MG/30ML
30 SUSPENSION ORAL EVERY 4 HOURS PRN
Status: DISCONTINUED | OUTPATIENT
Start: 2025-07-26 | End: 2025-08-05 | Stop reason: HOSPADM

## 2025-07-26 RX ORDER — OLANZAPINE 2.5 MG/1
2.5 TABLET, FILM COATED ORAL
Status: DISCONTINUED | OUTPATIENT
Start: 2025-07-26 | End: 2025-08-05 | Stop reason: HOSPADM

## 2025-07-26 RX ORDER — BENZTROPINE MESYLATE 1 MG/ML
1 INJECTION, SOLUTION INTRAMUSCULAR; INTRAVENOUS
Status: DISCONTINUED | OUTPATIENT
Start: 2025-07-26 | End: 2025-08-05 | Stop reason: HOSPADM

## 2025-07-26 RX ORDER — LORAZEPAM 2 MG/ML
1 INJECTION INTRAMUSCULAR
Status: DISCONTINUED | OUTPATIENT
Start: 2025-07-26 | End: 2025-08-05 | Stop reason: HOSPADM

## 2025-07-26 RX ORDER — POLYETHYLENE GLYCOL 3350 17 G/17G
17 POWDER, FOR SOLUTION ORAL DAILY PRN
Status: DISCONTINUED | OUTPATIENT
Start: 2025-07-26 | End: 2025-08-05 | Stop reason: HOSPADM

## 2025-07-26 RX ORDER — OLANZAPINE 5 MG/1
5 TABLET, FILM COATED ORAL
Status: DISCONTINUED | OUTPATIENT
Start: 2025-07-26 | End: 2025-08-05 | Stop reason: HOSPADM

## 2025-07-26 RX ORDER — PROPRANOLOL HYDROCHLORIDE 10 MG/1
5 TABLET ORAL EVERY 8 HOURS PRN
Status: DISCONTINUED | OUTPATIENT
Start: 2025-07-26 | End: 2025-08-05 | Stop reason: HOSPADM

## 2025-07-26 RX ORDER — ACETAMINOPHEN 325 MG/1
975 TABLET ORAL EVERY 6 HOURS PRN
Status: DISCONTINUED | OUTPATIENT
Start: 2025-07-26 | End: 2025-08-05 | Stop reason: HOSPADM

## 2025-07-26 RX ORDER — HYDROXYZINE HYDROCHLORIDE 25 MG/1
25 TABLET, FILM COATED ORAL
Status: DISCONTINUED | OUTPATIENT
Start: 2025-07-26 | End: 2025-08-05 | Stop reason: HOSPADM

## 2025-07-26 RX ADMIN — CYANOCOBALAMIN TAB 1000 MCG 1000 MCG: 1000 TAB at 10:50

## 2025-07-26 RX ADMIN — TRAZODONE HYDROCHLORIDE 50 MG: 50 TABLET ORAL at 21:10

## 2025-07-26 RX ADMIN — LACOSAMIDE 150 MG: 50 TABLET, FILM COATED ORAL at 10:50

## 2025-07-26 RX ADMIN — Medication 3 MG: at 21:10

## 2025-07-26 RX ADMIN — BENZTROPINE MESYLATE 2 MG: 2 TABLET ORAL at 10:50

## 2025-07-26 RX ADMIN — OLANZAPINE 10 MG: 10 TABLET, FILM COATED ORAL at 21:10

## 2025-07-26 RX ADMIN — BENZTROPINE MESYLATE 2 MG: 2 TABLET ORAL at 17:19

## 2025-07-26 RX ADMIN — LACOSAMIDE 200 MG: 200 TABLET, FILM COATED ORAL at 21:10

## 2025-07-27 PROBLEM — R73.09 ELEVATED HEMOGLOBIN A1C: Status: ACTIVE | Noted: 2025-07-27

## 2025-07-27 LAB
25(OH)D3 SERPL-MCNC: 19.5 NG/ML (ref 30–100)
CHOLEST SERPL-MCNC: 191 MG/DL (ref ?–200)
EST. AVERAGE GLUCOSE BLD GHB EST-MCNC: 114 MG/DL
FOLATE SERPL-MCNC: 15.4 NG/ML
HBA1C MFR BLD: 5.6 %
HDLC SERPL-MCNC: 38 MG/DL
LDLC SERPL CALC-MCNC: 102 MG/DL (ref 0–100)
MAGNESIUM SERPL-MCNC: 2 MG/DL (ref 1.9–2.7)
NONHDLC SERPL-MCNC: 153 MG/DL
PHOSPHATE SERPL-MCNC: 4 MG/DL (ref 2.7–4.5)
TRIGL SERPL-MCNC: 257 MG/DL (ref ?–150)
VIT B12 SERPL-MCNC: 1802 PG/ML (ref 180–914)

## 2025-07-27 PROCEDURE — 84100 ASSAY OF PHOSPHORUS: CPT

## 2025-07-27 PROCEDURE — 86780 TREPONEMA PALLIDUM: CPT

## 2025-07-27 PROCEDURE — 80061 LIPID PANEL: CPT

## 2025-07-27 PROCEDURE — 99223 1ST HOSP IP/OBS HIGH 75: CPT | Performed by: STUDENT IN AN ORGANIZED HEALTH CARE EDUCATION/TRAINING PROGRAM

## 2025-07-27 PROCEDURE — 99222 1ST HOSP IP/OBS MODERATE 55: CPT | Performed by: NURSE PRACTITIONER

## 2025-07-27 PROCEDURE — 83735 ASSAY OF MAGNESIUM: CPT

## 2025-07-27 RX ORDER — ERGOCALCIFEROL 1.25 MG/1
50000 CAPSULE, LIQUID FILLED ORAL WEEKLY
Status: DISCONTINUED | OUTPATIENT
Start: 2025-07-27 | End: 2025-08-05 | Stop reason: HOSPADM

## 2025-07-27 RX ORDER — FOLIC ACID 1 MG/1
1 TABLET ORAL DAILY
Status: DISCONTINUED | OUTPATIENT
Start: 2025-07-27 | End: 2025-08-05 | Stop reason: HOSPADM

## 2025-07-27 RX ORDER — TRIAMCINOLONE ACETONIDE 0.25 MG/G
CREAM TOPICAL 2 TIMES DAILY
Status: DISCONTINUED | OUTPATIENT
Start: 2025-07-27 | End: 2025-08-05 | Stop reason: HOSPADM

## 2025-07-27 RX ADMIN — LEVOTHYROXINE SODIUM 175 MCG: 0.15 TABLET ORAL at 05:56

## 2025-07-27 RX ADMIN — LACOSAMIDE 200 MG: 200 TABLET, FILM COATED ORAL at 21:19

## 2025-07-27 RX ADMIN — TRIAMCINOLONE ACETONIDE: 0.25 CREAM TOPICAL at 17:21

## 2025-07-27 RX ADMIN — Medication 3 MG: at 21:19

## 2025-07-27 RX ADMIN — BENZTROPINE MESYLATE 2 MG: 2 TABLET ORAL at 09:09

## 2025-07-27 RX ADMIN — BENZTROPINE MESYLATE 2 MG: 2 TABLET ORAL at 17:18

## 2025-07-27 RX ADMIN — OLANZAPINE 10 MG: 10 TABLET, FILM COATED ORAL at 21:19

## 2025-07-27 RX ADMIN — TRAZODONE HYDROCHLORIDE 50 MG: 50 TABLET ORAL at 21:19

## 2025-07-27 RX ADMIN — FOLIC ACID 1 MG: 1 TABLET ORAL at 11:28

## 2025-07-27 RX ADMIN — CYANOCOBALAMIN TAB 1000 MCG 1000 MCG: 1000 TAB at 09:10

## 2025-07-27 RX ADMIN — ERGOCALCIFEROL 50000 UNITS: 1.25 CAPSULE ORAL at 11:28

## 2025-07-27 RX ADMIN — LACOSAMIDE 150 MG: 50 TABLET, FILM COATED ORAL at 09:53

## 2025-07-28 LAB — TREPONEMA PALLIDUM IGG+IGM AB [PRESENCE] IN SERUM OR PLASMA BY IMMUNOASSAY: NORMAL

## 2025-07-28 PROCEDURE — 99233 SBSQ HOSP IP/OBS HIGH 50: CPT | Performed by: STUDENT IN AN ORGANIZED HEALTH CARE EDUCATION/TRAINING PROGRAM

## 2025-07-28 RX ADMIN — OLANZAPINE 5 MG: 5 TABLET, FILM COATED ORAL at 17:54

## 2025-07-28 RX ADMIN — TRIAMCINOLONE ACETONIDE: 0.25 CREAM TOPICAL at 17:44

## 2025-07-28 RX ADMIN — Medication 3 MG: at 21:20

## 2025-07-28 RX ADMIN — OLANZAPINE 10 MG: 10 TABLET, FILM COATED ORAL at 21:20

## 2025-07-28 RX ADMIN — BENZTROPINE MESYLATE 2 MG: 2 TABLET ORAL at 17:14

## 2025-07-28 RX ADMIN — TRIAMCINOLONE ACETONIDE: 0.25 CREAM TOPICAL at 08:26

## 2025-07-28 RX ADMIN — LEVOTHYROXINE SODIUM 175 MCG: 0.15 TABLET ORAL at 05:33

## 2025-07-28 RX ADMIN — BENZTROPINE MESYLATE 2 MG: 2 TABLET ORAL at 08:25

## 2025-07-28 RX ADMIN — FOLIC ACID 1 MG: 1 TABLET ORAL at 08:25

## 2025-07-28 RX ADMIN — LACOSAMIDE 200 MG: 200 TABLET, FILM COATED ORAL at 21:20

## 2025-07-28 RX ADMIN — LACOSAMIDE 150 MG: 50 TABLET, FILM COATED ORAL at 08:25

## 2025-07-28 RX ADMIN — TRAZODONE HYDROCHLORIDE 50 MG: 50 TABLET ORAL at 21:20

## 2025-07-29 PROCEDURE — 99233 SBSQ HOSP IP/OBS HIGH 50: CPT | Performed by: STUDENT IN AN ORGANIZED HEALTH CARE EDUCATION/TRAINING PROGRAM

## 2025-07-29 RX ADMIN — LACOSAMIDE 150 MG: 50 TABLET, FILM COATED ORAL at 08:07

## 2025-07-29 RX ADMIN — BENZTROPINE MESYLATE 2 MG: 2 TABLET ORAL at 17:04

## 2025-07-29 RX ADMIN — BENZTROPINE MESYLATE 2 MG: 2 TABLET ORAL at 08:08

## 2025-07-29 RX ADMIN — TRIAMCINOLONE ACETONIDE: 0.25 CREAM TOPICAL at 17:05

## 2025-07-29 RX ADMIN — LEVOTHYROXINE SODIUM 175 MCG: 0.15 TABLET ORAL at 05:50

## 2025-07-29 RX ADMIN — Medication 3 MG: at 21:15

## 2025-07-29 RX ADMIN — FOLIC ACID 1 MG: 1 TABLET ORAL at 08:07

## 2025-07-29 RX ADMIN — OLANZAPINE 15 MG: 5 TABLET, FILM COATED ORAL at 21:15

## 2025-07-29 RX ADMIN — TRAZODONE HYDROCHLORIDE 50 MG: 50 TABLET ORAL at 21:15

## 2025-07-29 RX ADMIN — TRIAMCINOLONE ACETONIDE: 0.25 CREAM TOPICAL at 08:07

## 2025-07-29 RX ADMIN — LACOSAMIDE 200 MG: 200 TABLET, FILM COATED ORAL at 21:15

## 2025-07-30 PROCEDURE — 97167 OT EVAL HIGH COMPLEX 60 MIN: CPT

## 2025-07-30 PROCEDURE — 99233 SBSQ HOSP IP/OBS HIGH 50: CPT | Performed by: STUDENT IN AN ORGANIZED HEALTH CARE EDUCATION/TRAINING PROGRAM

## 2025-07-30 RX ADMIN — LACOSAMIDE 200 MG: 200 TABLET, FILM COATED ORAL at 21:28

## 2025-07-30 RX ADMIN — TRAZODONE HYDROCHLORIDE 50 MG: 50 TABLET ORAL at 21:28

## 2025-07-30 RX ADMIN — OLANZAPINE 15 MG: 5 TABLET, FILM COATED ORAL at 21:28

## 2025-07-30 RX ADMIN — LEVOTHYROXINE SODIUM 175 MCG: 0.15 TABLET ORAL at 05:12

## 2025-07-30 RX ADMIN — BENZTROPINE MESYLATE 2 MG: 2 TABLET ORAL at 17:17

## 2025-07-30 RX ADMIN — LACOSAMIDE 150 MG: 50 TABLET, FILM COATED ORAL at 08:41

## 2025-07-30 RX ADMIN — Medication 3 MG: at 21:28

## 2025-07-30 RX ADMIN — TRIAMCINOLONE ACETONIDE: 0.25 CREAM TOPICAL at 17:17

## 2025-07-30 RX ADMIN — FOLIC ACID 1 MG: 1 TABLET ORAL at 08:42

## 2025-07-30 RX ADMIN — BENZTROPINE MESYLATE 2 MG: 2 TABLET ORAL at 08:41

## 2025-07-30 RX ADMIN — TRIAMCINOLONE ACETONIDE: 0.25 CREAM TOPICAL at 08:42

## 2025-07-31 PROCEDURE — 99233 SBSQ HOSP IP/OBS HIGH 50: CPT | Performed by: STUDENT IN AN ORGANIZED HEALTH CARE EDUCATION/TRAINING PROGRAM

## 2025-07-31 RX ORDER — OLANZAPINE 10 MG/1
20 TABLET, FILM COATED ORAL
Status: DISCONTINUED | OUTPATIENT
Start: 2025-07-31 | End: 2025-08-05 | Stop reason: HOSPADM

## 2025-07-31 RX ADMIN — LACOSAMIDE 200 MG: 200 TABLET, FILM COATED ORAL at 21:31

## 2025-07-31 RX ADMIN — TRIAMCINOLONE ACETONIDE: 0.25 CREAM TOPICAL at 17:33

## 2025-07-31 RX ADMIN — FOLIC ACID 1 MG: 1 TABLET ORAL at 09:22

## 2025-07-31 RX ADMIN — Medication 3 MG: at 21:32

## 2025-07-31 RX ADMIN — TRAZODONE HYDROCHLORIDE 50 MG: 50 TABLET ORAL at 21:32

## 2025-07-31 RX ADMIN — LEVOTHYROXINE SODIUM 175 MCG: 0.15 TABLET ORAL at 05:43

## 2025-07-31 RX ADMIN — TRIAMCINOLONE ACETONIDE: 0.25 CREAM TOPICAL at 09:27

## 2025-07-31 RX ADMIN — BENZTROPINE MESYLATE 2 MG: 2 TABLET ORAL at 17:33

## 2025-07-31 RX ADMIN — BENZTROPINE MESYLATE 2 MG: 2 TABLET ORAL at 09:22

## 2025-07-31 RX ADMIN — LACOSAMIDE 150 MG: 50 TABLET, FILM COATED ORAL at 09:22

## 2025-07-31 RX ADMIN — OLANZAPINE 20 MG: 10 TABLET, FILM COATED ORAL at 21:32

## 2025-08-01 PROCEDURE — 99233 SBSQ HOSP IP/OBS HIGH 50: CPT | Performed by: STUDENT IN AN ORGANIZED HEALTH CARE EDUCATION/TRAINING PROGRAM

## 2025-08-01 RX ADMIN — TRIAMCINOLONE ACETONIDE 1 APPLICATION: 0.25 CREAM TOPICAL at 08:40

## 2025-08-01 RX ADMIN — Medication 3 MG: at 21:14

## 2025-08-01 RX ADMIN — LACOSAMIDE 150 MG: 50 TABLET, FILM COATED ORAL at 08:21

## 2025-08-01 RX ADMIN — FOLIC ACID 1 MG: 1 TABLET ORAL at 08:21

## 2025-08-01 RX ADMIN — BENZTROPINE MESYLATE 2 MG: 2 TABLET ORAL at 08:20

## 2025-08-01 RX ADMIN — BENZTROPINE MESYLATE 2 MG: 2 TABLET ORAL at 17:13

## 2025-08-01 RX ADMIN — LACOSAMIDE 200 MG: 200 TABLET, FILM COATED ORAL at 21:14

## 2025-08-01 RX ADMIN — OLANZAPINE 20 MG: 10 TABLET, FILM COATED ORAL at 21:14

## 2025-08-01 RX ADMIN — TRIAMCINOLONE ACETONIDE 1 APPLICATION: 0.25 CREAM TOPICAL at 17:14

## 2025-08-01 RX ADMIN — LEVOTHYROXINE SODIUM 175 MCG: 0.15 TABLET ORAL at 06:06

## 2025-08-01 RX ADMIN — TRAZODONE HYDROCHLORIDE 50 MG: 50 TABLET ORAL at 21:14

## 2025-08-02 PROCEDURE — 99233 SBSQ HOSP IP/OBS HIGH 50: CPT | Performed by: PSYCHIATRY & NEUROLOGY

## 2025-08-02 RX ADMIN — BENZTROPINE MESYLATE 2 MG: 2 TABLET ORAL at 08:25

## 2025-08-02 RX ADMIN — LACOSAMIDE 200 MG: 200 TABLET, FILM COATED ORAL at 21:17

## 2025-08-02 RX ADMIN — OLANZAPINE 20 MG: 10 TABLET, FILM COATED ORAL at 21:17

## 2025-08-02 RX ADMIN — TRIAMCINOLONE ACETONIDE: 0.25 CREAM TOPICAL at 11:43

## 2025-08-02 RX ADMIN — LACOSAMIDE 150 MG: 50 TABLET, FILM COATED ORAL at 08:25

## 2025-08-02 RX ADMIN — FOLIC ACID 1 MG: 1 TABLET ORAL at 08:25

## 2025-08-02 RX ADMIN — TRAZODONE HYDROCHLORIDE 50 MG: 50 TABLET ORAL at 21:17

## 2025-08-02 RX ADMIN — Medication 3 MG: at 21:17

## 2025-08-02 RX ADMIN — LEVOTHYROXINE SODIUM 175 MCG: 0.15 TABLET ORAL at 06:10

## 2025-08-02 RX ADMIN — BENZTROPINE MESYLATE 2 MG: 2 TABLET ORAL at 17:06

## 2025-08-02 RX ADMIN — TRIAMCINOLONE ACETONIDE 1 APPLICATION: 0.25 CREAM TOPICAL at 17:18

## 2025-08-03 PROCEDURE — 99233 SBSQ HOSP IP/OBS HIGH 50: CPT | Performed by: PSYCHIATRY & NEUROLOGY

## 2025-08-03 RX ORDER — MECLIZINE HCL 12.5 MG 12.5 MG/1
25 TABLET ORAL ONCE AS NEEDED
Status: COMPLETED | OUTPATIENT
Start: 2025-08-03 | End: 2025-08-03

## 2025-08-03 RX ADMIN — BENZTROPINE MESYLATE 2 MG: 2 TABLET ORAL at 09:41

## 2025-08-03 RX ADMIN — TRAZODONE HYDROCHLORIDE 50 MG: 50 TABLET ORAL at 21:15

## 2025-08-03 RX ADMIN — TRIAMCINOLONE ACETONIDE 1 APPLICATION: 0.25 CREAM TOPICAL at 17:08

## 2025-08-03 RX ADMIN — OLANZAPINE 20 MG: 10 TABLET, FILM COATED ORAL at 21:15

## 2025-08-03 RX ADMIN — ERGOCALCIFEROL 50000 UNITS: 1.25 CAPSULE ORAL at 09:41

## 2025-08-03 RX ADMIN — LEVOTHYROXINE SODIUM 175 MCG: 0.15 TABLET ORAL at 06:07

## 2025-08-03 RX ADMIN — MECLIZINE 25 MG: 12.5 TABLET ORAL at 18:36

## 2025-08-03 RX ADMIN — LACOSAMIDE 200 MG: 200 TABLET, FILM COATED ORAL at 21:15

## 2025-08-03 RX ADMIN — OLANZAPINE 5 MG: 5 TABLET, FILM COATED ORAL at 14:27

## 2025-08-03 RX ADMIN — FOLIC ACID 1 MG: 1 TABLET ORAL at 09:41

## 2025-08-03 RX ADMIN — LACOSAMIDE 150 MG: 50 TABLET, FILM COATED ORAL at 09:41

## 2025-08-03 RX ADMIN — Medication 3 MG: at 21:15

## 2025-08-03 RX ADMIN — BENZTROPINE MESYLATE 2 MG: 2 TABLET ORAL at 17:08

## 2025-08-04 PROBLEM — Z00.8 MEDICAL CLEARANCE FOR PSYCHIATRIC ADMISSION: Status: RESOLVED | Noted: 2022-03-25 | Resolved: 2025-08-04

## 2025-08-04 PROCEDURE — 99233 SBSQ HOSP IP/OBS HIGH 50: CPT | Performed by: STUDENT IN AN ORGANIZED HEALTH CARE EDUCATION/TRAINING PROGRAM

## 2025-08-04 RX ORDER — BENZTROPINE MESYLATE 2 MG/1
2 TABLET ORAL 2 TIMES DAILY
Qty: 60 TABLET | Refills: 2 | Status: SHIPPED | OUTPATIENT
Start: 2025-08-04

## 2025-08-04 RX ORDER — OLANZAPINE 20 MG/1
20 TABLET, FILM COATED ORAL
Qty: 30 TABLET | Refills: 2 | Status: SHIPPED | OUTPATIENT
Start: 2025-08-04

## 2025-08-04 RX ORDER — OLANZAPINE 20 MG/1
20 TABLET, FILM COATED ORAL
Qty: 30 TABLET | Refills: 1 | Status: SHIPPED | OUTPATIENT
Start: 2025-08-04 | End: 2025-08-04

## 2025-08-04 RX ORDER — BENZTROPINE MESYLATE 2 MG/1
2 TABLET ORAL 2 TIMES DAILY
Qty: 60 TABLET | Refills: 1 | Status: SHIPPED | OUTPATIENT
Start: 2025-08-04 | End: 2025-08-04

## 2025-08-04 RX ADMIN — LACOSAMIDE 150 MG: 50 TABLET, FILM COATED ORAL at 09:02

## 2025-08-04 RX ADMIN — Medication 3 MG: at 21:18

## 2025-08-04 RX ADMIN — BENZTROPINE MESYLATE 2 MG: 2 TABLET ORAL at 09:02

## 2025-08-04 RX ADMIN — OLANZAPINE 20 MG: 10 TABLET, FILM COATED ORAL at 21:17

## 2025-08-04 RX ADMIN — FOLIC ACID 1 MG: 1 TABLET ORAL at 09:02

## 2025-08-04 RX ADMIN — BENZTROPINE MESYLATE 2 MG: 2 TABLET ORAL at 17:02

## 2025-08-04 RX ADMIN — TRIAMCINOLONE ACETONIDE 1 APPLICATION: 0.25 CREAM TOPICAL at 09:04

## 2025-08-04 RX ADMIN — TRIAMCINOLONE ACETONIDE: 0.25 CREAM TOPICAL at 17:02

## 2025-08-04 RX ADMIN — TRAZODONE HYDROCHLORIDE 50 MG: 50 TABLET ORAL at 21:18

## 2025-08-04 RX ADMIN — LACOSAMIDE 200 MG: 200 TABLET, FILM COATED ORAL at 21:18

## 2025-08-04 RX ADMIN — LEVOTHYROXINE SODIUM 175 MCG: 0.15 TABLET ORAL at 05:51

## 2025-08-05 VITALS
BODY MASS INDEX: 31.91 KG/M2 | RESPIRATION RATE: 16 BRPM | HEART RATE: 105 BPM | WEIGHT: 216.05 LBS | SYSTOLIC BLOOD PRESSURE: 120 MMHG | OXYGEN SATURATION: 98 % | TEMPERATURE: 97.7 F | DIASTOLIC BLOOD PRESSURE: 78 MMHG

## 2025-08-05 PROCEDURE — 99239 HOSP IP/OBS DSCHRG MGMT >30: CPT | Performed by: STUDENT IN AN ORGANIZED HEALTH CARE EDUCATION/TRAINING PROGRAM

## 2025-08-05 RX ORDER — ERGOCALCIFEROL 1.25 MG/1
50000 CAPSULE, LIQUID FILLED ORAL WEEKLY
Qty: 6 CAPSULE | Refills: 0 | Status: SHIPPED | OUTPATIENT
Start: 2025-08-10 | End: 2025-09-15

## 2025-08-05 RX ADMIN — BENZTROPINE MESYLATE 2 MG: 2 TABLET ORAL at 09:02

## 2025-08-05 RX ADMIN — LEVOTHYROXINE SODIUM 175 MCG: 0.15 TABLET ORAL at 06:05

## 2025-08-05 RX ADMIN — FOLIC ACID 1 MG: 1 TABLET ORAL at 09:13

## 2025-08-05 RX ADMIN — TRIAMCINOLONE ACETONIDE: 0.25 CREAM TOPICAL at 09:07

## 2025-08-05 RX ADMIN — LACOSAMIDE 150 MG: 50 TABLET, FILM COATED ORAL at 09:02

## (undated) DEVICE — STERILE BETHLEHEM PLASTIC HAND: Brand: CARDINAL HEALTH

## (undated) DEVICE — DRAPE KIT C-ARM W/PLATE PRTC FOOT SWITCH COVER

## (undated) DEVICE — Device

## (undated) DEVICE — SPLINT ORTHO-GLASS 5 IN X 15 FT

## (undated) DEVICE — SUT VICRYL 4-0 SH 27 IN J415H

## (undated) DEVICE — GLOVE SRG BIOGEL 6.5

## (undated) DEVICE — INTENDED FOR TISSUE SEPARATION, AND OTHER PROCEDURES THAT REQUIRE A SHARP SURGICAL BLADE TO PUNCTURE OR CUT.: Brand: BARD-PARKER ® CARBON RIB-BACK BLADES

## (undated) DEVICE — GAUZE SPONGES,16 PLY: Brand: CURITY

## (undated) DEVICE — SUT MONOCRYL 4-0 PS-2 18 IN Y496G

## (undated) DEVICE — ADHESIVE SKIN HIGH VISCOSITY EXOFIN 1ML

## (undated) DEVICE — ACE WRAP 3 IN STERILE

## (undated) DEVICE — GLOVE INDICATOR PI UNDERGLOVE SZ 7 BLUE

## (undated) DEVICE — CUFF TOURNIQUET 18 X 4 IN QUICK CONNECT DISP 1 BLADDER

## (undated) DEVICE — SPONGE SCRUB 4 PCT CHLORHEXIDINE

## (undated) DEVICE — PADDING,UNDERCAST,COTTON, 4"X4YD STERILE: Brand: MEDLINE

## (undated) DEVICE — GLOVE SRG BIOGEL ECLIPSE 7

## (undated) DEVICE — OCCLUSIVE GAUZE STRIP,3% BISMUTH TRIBROMOPHENATE IN PETROLATUM BLEND: Brand: XEROFORM

## (undated) DEVICE — ACE WRAP 2 IN UNSTERILE

## (undated) DEVICE — SPLINT 3 X 15 IN XFAST SET PLASTER